# Patient Record
Sex: FEMALE | Employment: OTHER
[De-identification: names, ages, dates, MRNs, and addresses within clinical notes are randomized per-mention and may not be internally consistent; named-entity substitution may affect disease eponyms.]

---

## 2017-01-05 ENCOUNTER — TELEPHONE (OUTPATIENT)
Dept: ORTHOPEDIC SURGERY | Facility: CLINIC | Age: 52
End: 2017-01-05

## 2017-01-09 ENCOUNTER — OFFICE VISIT (OUTPATIENT)
Dept: ORTHOPEDIC SURGERY | Facility: CLINIC | Age: 52
End: 2017-01-09

## 2017-01-09 VITALS — WEIGHT: 153 LBS | HEIGHT: 62 IN | BODY MASS INDEX: 28.16 KG/M2

## 2017-01-09 DIAGNOSIS — M17.32 POST-TRAUMATIC OSTEOARTHRITIS OF LEFT KNEE: Primary | ICD-10-CM

## 2017-01-09 PROCEDURE — 99213 OFFICE O/P EST LOW 20 MIN: CPT | Performed by: ORTHOPAEDIC SURGERY

## 2017-01-09 ASSESSMENT — ENCOUNTER SYMPTOMS
CHOKING: 0
EYE DISCHARGE: 0
CHEST TIGHTNESS: 0
DIARRHEA: 0
ABDOMINAL PAIN: 0
WHEEZING: 0
VOMITING: 0
NAUSEA: 0

## 2017-01-10 DIAGNOSIS — Z01.818 PRE-OP TESTING: Primary | ICD-10-CM

## 2017-01-10 DIAGNOSIS — M17.32 POST-TRAUMATIC OSTEOARTHRITIS OF LEFT KNEE: ICD-10-CM

## 2017-01-26 PROBLEM — Z96.659 S/P TOTAL KNEE ARTHROPLASTY: Status: ACTIVE | Noted: 2017-01-26

## 2017-01-31 DIAGNOSIS — Z96.652 STATUS POST TOTAL LEFT KNEE REPLACEMENT: Primary | ICD-10-CM

## 2017-02-06 ENCOUNTER — OFFICE VISIT (OUTPATIENT)
Dept: ORTHOPEDIC SURGERY | Facility: CLINIC | Age: 52
End: 2017-02-06

## 2017-02-06 VITALS — HEIGHT: 62 IN | WEIGHT: 145.5 LBS | BODY MASS INDEX: 26.78 KG/M2

## 2017-02-06 DIAGNOSIS — Z96.652 STATUS POST TOTAL LEFT KNEE REPLACEMENT: Primary | ICD-10-CM

## 2017-02-06 DIAGNOSIS — M17.32 POST-TRAUMATIC OSTEOARTHRITIS OF LEFT KNEE: ICD-10-CM

## 2017-02-06 PROCEDURE — 99024 POSTOP FOLLOW-UP VISIT: CPT | Performed by: ORTHOPAEDIC SURGERY

## 2017-02-06 RX ORDER — OXYCODONE HYDROCHLORIDE AND ACETAMINOPHEN 5; 325 MG/1; MG/1
1 TABLET ORAL EVERY 6 HOURS PRN
Qty: 60 TABLET | Refills: 0 | Status: SHIPPED | OUTPATIENT
Start: 2017-02-06 | End: 2017-03-01 | Stop reason: SDUPTHER

## 2017-02-06 RX ORDER — DICLOFENAC SODIUM 75 MG/1
75 TABLET, DELAYED RELEASE ORAL 2 TIMES DAILY
Qty: 60 TABLET | Refills: 2 | Status: ON HOLD | OUTPATIENT
Start: 2017-02-06 | End: 2019-04-07

## 2017-02-06 ASSESSMENT — ENCOUNTER SYMPTOMS
NAUSEA: 0
CONSTIPATION: 0
DIARRHEA: 0
COUGH: 0

## 2017-02-16 ENCOUNTER — TELEPHONE (OUTPATIENT)
Dept: ORTHOPEDIC SURGERY | Facility: CLINIC | Age: 52
End: 2017-02-16

## 2017-02-16 DIAGNOSIS — Z96.652 STATUS POST TOTAL LEFT KNEE REPLACEMENT: Primary | ICD-10-CM

## 2017-02-23 ENCOUNTER — TELEPHONE (OUTPATIENT)
Dept: ORTHOPEDIC SURGERY | Facility: CLINIC | Age: 52
End: 2017-02-23

## 2017-02-28 ENCOUNTER — HOSPITAL ENCOUNTER (OUTPATIENT)
Dept: PHYSICAL THERAPY | Age: 52
Setting detail: THERAPIES SERIES
Discharge: HOME | End: 2017-02-28
Payer: MEDICAID

## 2017-04-03 ENCOUNTER — OFFICE VISIT (OUTPATIENT)
Dept: ORTHOPEDIC SURGERY | Age: 52
End: 2017-04-03

## 2017-04-03 VITALS — WEIGHT: 145 LBS | HEIGHT: 61 IN | BODY MASS INDEX: 27.38 KG/M2

## 2017-04-03 DIAGNOSIS — Z96.652 STATUS POST TOTAL LEFT KNEE REPLACEMENT: Primary | ICD-10-CM

## 2017-04-03 DIAGNOSIS — M17.32 POST-TRAUMATIC OSTEOARTHRITIS OF LEFT KNEE: ICD-10-CM

## 2017-04-03 PROCEDURE — 99024 POSTOP FOLLOW-UP VISIT: CPT | Performed by: ORTHOPAEDIC SURGERY

## 2017-04-27 RX ORDER — OXYCODONE HYDROCHLORIDE AND ACETAMINOPHEN 5; 325 MG/1; MG/1
1 TABLET ORAL EVERY 6 HOURS PRN
Qty: 90 TABLET | Refills: 0 | OUTPATIENT
Start: 2017-04-27

## 2017-05-09 RX ORDER — CELECOXIB 200 MG/1
200 CAPSULE ORAL 2 TIMES DAILY
Qty: 60 CAPSULE | Refills: 3 | Status: ON HOLD | OUTPATIENT
Start: 2017-05-09 | End: 2019-04-07

## 2017-05-09 RX ORDER — TRAMADOL HYDROCHLORIDE 50 MG/1
TABLET ORAL
Qty: 60 TABLET | Refills: 0 | Status: ON HOLD | OUTPATIENT
Start: 2017-05-09 | End: 2019-04-07

## 2017-05-10 ENCOUNTER — TELEPHONE (OUTPATIENT)
Dept: ORTHOPEDIC SURGERY | Age: 52
End: 2017-05-10

## 2017-05-15 ENCOUNTER — OFFICE VISIT (OUTPATIENT)
Dept: ORTHOPEDIC SURGERY | Age: 52
End: 2017-05-15
Payer: MEDICAID

## 2017-05-15 VITALS — HEIGHT: 61 IN | BODY MASS INDEX: 27.39 KG/M2 | WEIGHT: 145.06 LBS

## 2017-05-15 DIAGNOSIS — Z96.652 STATUS POST TOTAL LEFT KNEE REPLACEMENT: Primary | ICD-10-CM

## 2017-05-15 PROCEDURE — 99213 OFFICE O/P EST LOW 20 MIN: CPT | Performed by: ORTHOPAEDIC SURGERY

## 2018-07-02 ENCOUNTER — HOSPITAL ENCOUNTER (EMERGENCY)
Age: 53
Discharge: HOME | End: 2018-07-02
Attending: EMERGENCY MEDICINE
Payer: MEDICAID

## 2018-07-02 VITALS
SYSTOLIC BLOOD PRESSURE: 141 MMHG | RESPIRATION RATE: 14 BRPM | HEART RATE: 82 BPM | TEMPERATURE: 97.3 F | OXYGEN SATURATION: 98 % | DIASTOLIC BLOOD PRESSURE: 95 MMHG

## 2018-07-02 DIAGNOSIS — S61.211A LACERATION OF LEFT INDEX FINGER WITHOUT FOREIGN BODY WITHOUT DAMAGE TO NAIL, INITIAL ENCOUNTER: Primary | ICD-10-CM

## 2018-07-02 PROCEDURE — 12001 RPR S/N/AX/GEN/TRNK 2.5CM/<: CPT

## 2018-07-02 PROCEDURE — 99282 EMERGENCY DEPT VISIT SF MDM: CPT

## 2018-07-02 ASSESSMENT — ENCOUNTER SYMPTOMS
SHORTNESS OF BREATH: 0
COLOR CHANGE: 0
BACK PAIN: 0
TROUBLE SWALLOWING: 0
EYE PAIN: 0
NAUSEA: 0
VOMITING: 0
SORE THROAT: 0

## 2018-07-02 ASSESSMENT — PAIN DESCRIPTION - ONSET: ONSET: SUDDEN

## 2018-07-02 ASSESSMENT — PAIN DESCRIPTION - ORIENTATION: ORIENTATION: LEFT

## 2018-07-02 ASSESSMENT — PAIN DESCRIPTION - PAIN TYPE: TYPE: ACUTE PAIN

## 2018-07-02 ASSESSMENT — PAIN DESCRIPTION - DESCRIPTORS: DESCRIPTORS: BURNING;ACHING

## 2018-07-02 ASSESSMENT — PAIN SCALES - GENERAL: PAINLEVEL_OUTOF10: 7

## 2018-07-02 ASSESSMENT — PAIN DESCRIPTION - PROGRESSION: CLINICAL_PROGRESSION: GRADUALLY WORSENING

## 2018-07-02 ASSESSMENT — PAIN DESCRIPTION - LOCATION: LOCATION: HAND

## 2018-07-02 NOTE — ED PROVIDER NOTES
to left palmar PIP of the pointer finger without active bleeding or discharge. No sign of foreign body. DIFFERENTIAL  DIAGNOSIS     PLAN (LABS / IMAGING / EKG):  No orders of the defined types were placed in this encounter. MEDICATIONS ORDERED:  No orders of the defined types were placed in this encounter. DDX: Finger laceration, finger injury, doubt foreign body, doubt significant blood loss anemia. DIAGNOSTIC RESULTS / EMERGENCY DEPARTMENT COURSE / MDM     LABS:  No results found for this visit on 07/02/18. IMPRESSION: 55-year-old female presents shortly after sustaining laceration to left palmar aspect of the pointer finger at the PIP. Patient states she was working on a privacy fence states a wrench slipped off a metal covering and she cut herself. Denies foreign body or any shrapnel from a metal area. Denies numbness, tingling, paresthesias. No significant blood loss physical exam shows 1.5 cm curvilinear laceration without significant bleeding or discharge. No imaging necessary at this time as patient has full range of motion of the affected finger without any evidence of foreign body. Laceration repair performed with 4-0 Ethilon suture after lidocaine infiltration and subsequent irrigation. Patient tolerated procedure well. Finger was covered with gauze and a finger immobilizer. Discharged home with instructions to follow up with PCP or return to the ED for suture removal in 7-10 days    RADIOLOGY:  None    EKG  None    All EKG's are interpreted by the Emergency Department Physician who either signs or Co-signs this chart in the absence of a cardiologist.    EMERGENCY DEPARTMENT COURSE:    55-year-old female presents to ED with acute onset laceration of left pointer finger. Patient was working on a privacy fence when laceration occurred. Up-to-date with immunizations including T gap.   Denies any significant blood loss, no evidence of foreign body no shrapnel from the metal itself. Physical exam remarkable for 1.5 cm curvilinear laceration of left pointer finger. We'll proceed with laceration repair. Laceration repair performed at bedside and patient tolerated procedure well. Wound edges were well approximated and wound appeared clean and dry. A finger immobilizer was placed over gauze dressing and patient instructed to keep the area clean and dry and watch for concerning signs of infection or obvious abnormalities. Patient agreed with plan. She was discharged with instructions to follow with PCP or return to the ED in 7-10 days for suture removal.  She'll otherwise return as necessary. She remained stable throughout the entire ED while under my care and was discharged in no acute distress. PROCEDURES:    Laceration Repair Procedure Note    Indication: Laceration    Procedure: The patient was placed in the appropriate position and anesthesia around the laceration was obtained by infiltration using 1% Lidocaine without epinephrine. The area was then irrigated with high pressure Shur-Clens and normal saline solution. The laceration was closed with 4-0 Ethilon using interrupted sutures. There were no additional lacerations requiring repair. The wound area was then dressed with gauze. Total repaired wound length: 1.5 cm. Other Items: Suture count: 3    The patient tolerated the procedure well. Complications: None      CONSULTS:  None    CRITICAL CARE:  None    FINAL IMPRESSION      1.  Laceration of left index finger without foreign body without damage to nail, initial encounter          DISPOSITION / PLAN     DISPOSITION      Discharged    PATIENT REFERRED TO:  [facility]  [address] [place] [address]  [phone]    For suture removal in 7-10 days      DISCHARGE MEDICATIONS:  Discharge Medication List as of 7/2/2018  6:44 PM          Apolinar Jensen DO    Emergency Medicine Resident    (Please note that portions of this note were completed with a voice recognition program.  Efforts were made to edit the dictations but occasionally words are mis-transcribed.)     Sujey Garcia MD  07/03/18 [id number]

## 2018-11-12 ENCOUNTER — OFFICE VISIT (OUTPATIENT)
Dept: ORTHOPEDIC SURGERY | Age: 53
End: 2018-11-12
Payer: MEDICAID

## 2018-11-12 VITALS — HEIGHT: 61 IN | BODY MASS INDEX: 29.27 KG/M2 | WEIGHT: 155 LBS

## 2018-11-12 DIAGNOSIS — Z96.652 STATUS POST TOTAL LEFT KNEE REPLACEMENT: Primary | ICD-10-CM

## 2018-11-12 PROCEDURE — G8484 FLU IMMUNIZE NO ADMIN: HCPCS | Performed by: STUDENT IN AN ORGANIZED HEALTH CARE EDUCATION/TRAINING PROGRAM

## 2018-11-12 PROCEDURE — G8428 CUR MEDS NOT DOCUMENT: HCPCS | Performed by: STUDENT IN AN ORGANIZED HEALTH CARE EDUCATION/TRAINING PROGRAM

## 2018-11-12 PROCEDURE — 4004F PT TOBACCO SCREEN RCVD TLK: CPT | Performed by: STUDENT IN AN ORGANIZED HEALTH CARE EDUCATION/TRAINING PROGRAM

## 2018-11-12 PROCEDURE — 3017F COLORECTAL CA SCREEN DOC REV: CPT | Performed by: STUDENT IN AN ORGANIZED HEALTH CARE EDUCATION/TRAINING PROGRAM

## 2018-11-12 PROCEDURE — 99213 OFFICE O/P EST LOW 20 MIN: CPT | Performed by: STUDENT IN AN ORGANIZED HEALTH CARE EDUCATION/TRAINING PROGRAM

## 2018-11-12 PROCEDURE — G8419 CALC BMI OUT NRM PARAM NOF/U: HCPCS | Performed by: STUDENT IN AN ORGANIZED HEALTH CARE EDUCATION/TRAINING PROGRAM

## 2018-11-12 ASSESSMENT — ENCOUNTER SYMPTOMS
COLOR CHANGE: 0
ABDOMINAL PAIN: 0
ABDOMINAL DISTENTION: 0
COUGH: 0
SHORTNESS OF BREATH: 0
NAUSEA: 0
VOMITING: 0
CHEST TIGHTNESS: 0

## 2019-04-05 ENCOUNTER — APPOINTMENT (OUTPATIENT)
Dept: GENERAL RADIOLOGY | Age: 54
DRG: 710 | End: 2019-04-05
Payer: MEDICAID

## 2019-04-05 ENCOUNTER — HOSPITAL ENCOUNTER (INPATIENT)
Age: 54
LOS: 29 days | Discharge: HOME HEALTH | DRG: 710 | End: 2019-05-04
Attending: EMERGENCY MEDICINE | Admitting: INTERNAL MEDICINE
Payer: MEDICAID

## 2019-04-05 ENCOUNTER — APPOINTMENT (OUTPATIENT)
Dept: CT IMAGING | Age: 54
DRG: 710 | End: 2019-04-05
Payer: MEDICAID

## 2019-04-05 DIAGNOSIS — F41.9 ANXIETY: ICD-10-CM

## 2019-04-05 DIAGNOSIS — N17.9 AKI (ACUTE KIDNEY INJURY) (HCC): ICD-10-CM

## 2019-04-05 DIAGNOSIS — N17.9 ACUTE RENAL FAILURE, UNSPECIFIED ACUTE RENAL FAILURE TYPE (HCC): Primary | ICD-10-CM

## 2019-04-05 DIAGNOSIS — K55.049: ICD-10-CM

## 2019-04-05 DIAGNOSIS — R41.82 ALTERED MENTAL STATUS, UNSPECIFIED ALTERED MENTAL STATUS TYPE: ICD-10-CM

## 2019-04-05 PROBLEM — R57.9 SHOCK (HCC): Status: ACTIVE | Noted: 2019-04-05

## 2019-04-05 LAB
-: ABNORMAL
ABSOLUTE EOS #: 0 K/UL (ref 0–0.4)
ABSOLUTE IMMATURE GRANULOCYTE: 0 K/UL (ref 0–0.3)
ABSOLUTE LYMPH #: 0.94 K/UL (ref 1–4.8)
ABSOLUTE MONO #: 0.24 K/UL (ref 0.1–0.8)
ALBUMIN SERPL-MCNC: 2.8 G/DL (ref 3.5–5.2)
ALBUMIN/GLOBULIN RATIO: 1.5 (ref 1–2.5)
ALP BLD-CCNC: 168 U/L (ref 35–104)
ALT SERPL-CCNC: 746 U/L (ref 5–33)
AMORPHOUS: ABNORMAL
ANION GAP SERPL CALCULATED.3IONS-SCNC: 23 MMOL/L (ref 9–17)
AST SERPL-CCNC: 1122 U/L
BACTERIA: ABNORMAL
BASOPHILS # BLD: 0 % (ref 0–2)
BASOPHILS ABSOLUTE: 0 K/UL (ref 0–0.2)
BILIRUB SERPL-MCNC: 0.37 MG/DL (ref 0.3–1.2)
BILIRUBIN URINE: NEGATIVE
BUN BLDV-MCNC: 62 MG/DL (ref 6–20)
BUN/CREAT BLD: ABNORMAL (ref 9–20)
CALCIUM SERPL-MCNC: 5.2 MG/DL (ref 8.6–10.4)
CASTS UA: ABNORMAL /LPF (ref 0–8)
CHLORIDE BLD-SCNC: 93 MMOL/L (ref 98–107)
CO2: 9 MMOL/L (ref 20–31)
COLOR: ABNORMAL
CREAT SERPL-MCNC: 4.06 MG/DL (ref 0.5–0.9)
CRYSTALS, UA: ABNORMAL /HPF
DIFFERENTIAL TYPE: ABNORMAL
EOSINOPHILS RELATIVE PERCENT: 0 % (ref 1–4)
EPITHELIAL CELLS UA: ABNORMAL /HPF (ref 0–5)
GFR AFRICAN AMERICAN: 14 ML/MIN
GFR NON-AFRICAN AMERICAN: 12 ML/MIN
GFR SERPL CREATININE-BSD FRML MDRD: ABNORMAL ML/MIN/{1.73_M2}
GFR SERPL CREATININE-BSD FRML MDRD: ABNORMAL ML/MIN/{1.73_M2}
GLUCOSE BLD-MCNC: 186 MG/DL (ref 70–99)
GLUCOSE URINE: NEGATIVE
HCT VFR BLD CALC: 33.8 % (ref 36.3–47.1)
HEMOGLOBIN: 10.2 G/DL (ref 11.9–15.1)
IMMATURE GRANULOCYTES: 0 %
INR BLD: 1.3
KETONES, URINE: ABNORMAL
LACTIC ACID, SEPSIS WHOLE BLOOD: 5.7 MMOL/L (ref 0.5–1.9)
LACTIC ACID, SEPSIS: ABNORMAL MMOL/L (ref 0.5–1.9)
LEUKOCYTE ESTERASE, URINE: ABNORMAL
LYMPHOCYTES # BLD: 4 % (ref 24–44)
MCH RBC QN AUTO: 30.4 PG (ref 25.2–33.5)
MCHC RBC AUTO-ENTMCNC: 30.2 G/DL (ref 28.4–34.8)
MCV RBC AUTO: 100.9 FL (ref 82.6–102.9)
MONOCYTES # BLD: 1 % (ref 1–7)
MORPHOLOGY: ABNORMAL
MUCUS: ABNORMAL
NITRITE, URINE: NEGATIVE
NRBC AUTOMATED: 0 PER 100 WBC
OTHER OBSERVATIONS UA: ABNORMAL
PARTIAL THROMBOPLASTIN TIME: 28 SEC (ref 20.5–30.5)
PDW BLD-RTO: 14.7 % (ref 11.8–14.4)
PH UA: 5 (ref 5–8)
PLATELET # BLD: 381 K/UL (ref 138–453)
PLATELET ESTIMATE: ABNORMAL
PMV BLD AUTO: 10.3 FL (ref 8.1–13.5)
POTASSIUM SERPL-SCNC: 8 MMOL/L (ref 3.7–5.3)
PROTEIN UA: ABNORMAL
PROTHROMBIN TIME: 13.9 SEC (ref 9–12)
RBC # BLD: 3.35 M/UL (ref 3.95–5.11)
RBC # BLD: ABNORMAL 10*6/UL
RBC UA: ABNORMAL /HPF (ref 0–4)
RENAL EPITHELIAL, UA: ABNORMAL /HPF
SEG NEUTROPHILS: 95 % (ref 36–66)
SEGMENTED NEUTROPHILS ABSOLUTE COUNT: 22.32 K/UL (ref 1.8–7.7)
SODIUM BLD-SCNC: 125 MMOL/L (ref 135–144)
SPECIFIC GRAVITY UA: 1.02 (ref 1–1.03)
TOTAL PROTEIN: 4.7 G/DL (ref 6.4–8.3)
TRICHOMONAS: ABNORMAL
TROPONIN INTERP: ABNORMAL
TROPONIN T: ABNORMAL NG/ML
TROPONIN, HIGH SENSITIVITY: 89 NG/L (ref 0–14)
TURBIDITY: ABNORMAL
URINE HGB: ABNORMAL
UROBILINOGEN, URINE: NORMAL
WBC # BLD: 23.5 K/UL (ref 3.5–11.3)
WBC # BLD: ABNORMAL 10*3/UL
WBC UA: ABNORMAL /HPF (ref 0–5)
YEAST: ABNORMAL

## 2019-04-05 PROCEDURE — 80053 COMPREHEN METABOLIC PANEL: CPT

## 2019-04-05 PROCEDURE — 87185 SC STD ENZYME DETCJ PER NZM: CPT

## 2019-04-05 PROCEDURE — 36556 INSERT NON-TUNNEL CV CATH: CPT

## 2019-04-05 PROCEDURE — 83516 IMMUNOASSAY NONANTIBODY: CPT

## 2019-04-05 PROCEDURE — 87186 SC STD MICRODIL/AGAR DIL: CPT

## 2019-04-05 PROCEDURE — 87077 CULTURE AEROBIC IDENTIFY: CPT

## 2019-04-05 PROCEDURE — 86160 COMPLEMENT ANTIGEN: CPT

## 2019-04-05 PROCEDURE — 6360000004 HC RX CONTRAST MEDICATION: Performed by: EMERGENCY MEDICINE

## 2019-04-05 PROCEDURE — 6360000002 HC RX W HCPCS: Performed by: EMERGENCY MEDICINE

## 2019-04-05 PROCEDURE — 85610 PROTHROMBIN TIME: CPT

## 2019-04-05 PROCEDURE — 87149 DNA/RNA DIRECT PROBE: CPT

## 2019-04-05 PROCEDURE — 84295 ASSAY OF SERUM SODIUM: CPT

## 2019-04-05 PROCEDURE — 93005 ELECTROCARDIOGRAM TRACING: CPT

## 2019-04-05 PROCEDURE — 71275 CT ANGIOGRAPHY CHEST: CPT

## 2019-04-05 PROCEDURE — 6360000002 HC RX W HCPCS

## 2019-04-05 PROCEDURE — 2580000003 HC RX 258: Performed by: EMERGENCY MEDICINE

## 2019-04-05 PROCEDURE — 86038 ANTINUCLEAR ANTIBODIES: CPT

## 2019-04-05 PROCEDURE — 31500 INSERT EMERGENCY AIRWAY: CPT

## 2019-04-05 PROCEDURE — 80307 DRUG TEST PRSMV CHEM ANLYZR: CPT

## 2019-04-05 PROCEDURE — 06HJ33Z INSERTION OF INFUSION DEVICE INTO LEFT HYPOGASTRIC VEIN, PERCUTANEOUS APPROACH: ICD-10-PCS | Performed by: EMERGENCY MEDICINE

## 2019-04-05 PROCEDURE — 2580000003 HC RX 258

## 2019-04-05 PROCEDURE — 2500000003 HC RX 250 WO HCPCS: Performed by: EMERGENCY MEDICINE

## 2019-04-05 PROCEDURE — 82947 ASSAY GLUCOSE BLOOD QUANT: CPT

## 2019-04-05 PROCEDURE — 85730 THROMBOPLASTIN TIME PARTIAL: CPT

## 2019-04-05 PROCEDURE — 84132 ASSAY OF SERUM POTASSIUM: CPT

## 2019-04-05 PROCEDURE — 94002 VENT MGMT INPAT INIT DAY: CPT

## 2019-04-05 PROCEDURE — 82803 BLOOD GASES ANY COMBINATION: CPT

## 2019-04-05 PROCEDURE — 80074 ACUTE HEPATITIS PANEL: CPT

## 2019-04-05 PROCEDURE — 0BH18EZ INSERTION OF ENDOTRACHEAL AIRWAY INTO TRACHEA, VIA NATURAL OR ARTIFICIAL OPENING ENDOSCOPIC: ICD-10-PCS | Performed by: EMERGENCY MEDICINE

## 2019-04-05 PROCEDURE — 94762 N-INVAS EAR/PLS OXIMTRY CONT: CPT

## 2019-04-05 PROCEDURE — 84484 ASSAY OF TROPONIN QUANT: CPT

## 2019-04-05 PROCEDURE — 85025 COMPLETE CBC W/AUTO DIFF WBC: CPT

## 2019-04-05 PROCEDURE — 74174 CTA ABD&PLVS W/CONTRAST: CPT

## 2019-04-05 PROCEDURE — 84165 PROTEIN E-PHORESIS SERUM: CPT

## 2019-04-05 PROCEDURE — 99285 EMERGENCY DEPT VISIT HI MDM: CPT

## 2019-04-05 PROCEDURE — 82565 ASSAY OF CREATININE: CPT

## 2019-04-05 PROCEDURE — 83883 ASSAY NEPHELOMETRY NOT SPEC: CPT

## 2019-04-05 PROCEDURE — 71045 X-RAY EXAM CHEST 1 VIEW: CPT

## 2019-04-05 PROCEDURE — 87040 BLOOD CULTURE FOR BACTERIA: CPT

## 2019-04-05 PROCEDURE — 36600 WITHDRAWAL OF ARTERIAL BLOOD: CPT

## 2019-04-05 PROCEDURE — 83690 ASSAY OF LIPASE: CPT

## 2019-04-05 PROCEDURE — 2000000000 HC ICU R&B

## 2019-04-05 PROCEDURE — 84443 ASSAY THYROID STIM HORMONE: CPT

## 2019-04-05 PROCEDURE — 87389 HIV-1 AG W/HIV-1&-2 AB AG IA: CPT

## 2019-04-05 PROCEDURE — 84155 ASSAY OF PROTEIN SERUM: CPT

## 2019-04-05 PROCEDURE — 94770 HC ETCO2 MONITOR DAILY: CPT

## 2019-04-05 PROCEDURE — 81001 URINALYSIS AUTO W/SCOPE: CPT

## 2019-04-05 PROCEDURE — 87205 SMEAR GRAM STAIN: CPT

## 2019-04-05 PROCEDURE — 85014 HEMATOCRIT: CPT

## 2019-04-05 PROCEDURE — 87086 URINE CULTURE/COLONY COUNT: CPT

## 2019-04-05 PROCEDURE — 83605 ASSAY OF LACTIC ACID: CPT

## 2019-04-05 PROCEDURE — 82435 ASSAY OF BLOOD CHLORIDE: CPT

## 2019-04-05 PROCEDURE — 2500000003 HC RX 250 WO HCPCS

## 2019-04-05 PROCEDURE — 5A1955Z RESPIRATORY VENTILATION, GREATER THAN 96 CONSECUTIVE HOURS: ICD-10-PCS | Performed by: INTERNAL MEDICINE

## 2019-04-05 PROCEDURE — 2700000000 HC OXYGEN THERAPY PER DAY

## 2019-04-05 PROCEDURE — 6370000000 HC RX 637 (ALT 250 FOR IP): Performed by: EMERGENCY MEDICINE

## 2019-04-05 PROCEDURE — 86334 IMMUNOFIX E-PHORESIS SERUM: CPT

## 2019-04-05 RX ORDER — ETOMIDATE 2 MG/ML
30 INJECTION INTRAVENOUS ONCE
Status: COMPLETED | OUTPATIENT
Start: 2019-04-05 | End: 2019-04-05

## 2019-04-05 RX ORDER — DEXTROSE MONOHYDRATE 25 G/50ML
25 INJECTION, SOLUTION INTRAVENOUS ONCE
Status: COMPLETED | OUTPATIENT
Start: 2019-04-05 | End: 2019-04-05

## 2019-04-05 RX ORDER — MAGNESIUM SULFATE 1 G/100ML
1 INJECTION INTRAVENOUS ONCE
Status: DISCONTINUED | OUTPATIENT
Start: 2019-04-05 | End: 2019-04-10

## 2019-04-05 RX ORDER — ROCURONIUM BROMIDE 10 MG/ML
1 INJECTION, SOLUTION INTRAVENOUS ONCE
Status: COMPLETED | OUTPATIENT
Start: 2019-04-05 | End: 2019-04-05

## 2019-04-05 RX ORDER — DEXTROSE MONOHYDRATE 25 G/50ML
INJECTION, SOLUTION INTRAVENOUS
Status: COMPLETED
Start: 2019-04-05 | End: 2019-04-05

## 2019-04-05 RX ORDER — MIDAZOLAM HYDROCHLORIDE 5 MG/ML
INJECTION INTRAMUSCULAR; INTRAVENOUS
Status: DISPENSED
Start: 2019-04-05 | End: 2019-04-06

## 2019-04-05 RX ORDER — MAGNESIUM SULFATE 1 G/100ML
INJECTION INTRAVENOUS
Status: COMPLETED
Start: 2019-04-05 | End: 2019-04-05

## 2019-04-05 RX ORDER — LEVOFLOXACIN 5 MG/ML
750 INJECTION, SOLUTION INTRAVENOUS ONCE
Status: COMPLETED | OUTPATIENT
Start: 2019-04-05 | End: 2019-04-05

## 2019-04-05 RX ORDER — 0.9 % SODIUM CHLORIDE 0.9 %
30 INTRAVENOUS SOLUTION INTRAVENOUS ONCE
Status: COMPLETED | OUTPATIENT
Start: 2019-04-05 | End: 2019-04-05

## 2019-04-05 RX ADMIN — INSULIN HUMAN 10 UNITS: 100 INJECTION, SOLUTION PARENTERAL at 23:33

## 2019-04-05 RX ADMIN — Medication 100 MG: at 22:15

## 2019-04-05 RX ADMIN — ROCURONIUM BROMIDE 77 MG: 10 INJECTION INTRAVENOUS at 21:14

## 2019-04-05 RX ADMIN — DEXTROSE MONOHYDRATE 25 G: 25 INJECTION, SOLUTION INTRAVENOUS at 23:32

## 2019-04-05 RX ADMIN — Medication 1 G: at 22:00

## 2019-04-05 RX ADMIN — Medication 1250 MG: at 23:41

## 2019-04-05 RX ADMIN — LEVOFLOXACIN 750 MG: 5 INJECTION, SOLUTION INTRAVENOUS at 22:14

## 2019-04-05 RX ADMIN — Medication 10 MG: at 21:04

## 2019-04-05 RX ADMIN — DEXTROSE MONOHYDRATE 25 G: 500 INJECTION PARENTERAL at 23:32

## 2019-04-05 RX ADMIN — SODIUM CHLORIDE 2313 ML: 9 INJECTION, SOLUTION INTRAVENOUS at 21:04

## 2019-04-05 RX ADMIN — IOHEXOL 100 ML: 240 INJECTION, SOLUTION INTRATHECAL; INTRAVASCULAR; INTRAVENOUS; ORAL at 21:36

## 2019-04-05 RX ADMIN — Medication 2 MG/HR: at 23:02

## 2019-04-05 RX ADMIN — ETOMIDATE 30 MG: 2 INJECTION, SOLUTION INTRAVENOUS at 21:14

## 2019-04-05 RX ADMIN — MAGNESIUM SULFATE HEPTAHYDRATE 1 G: 1 INJECTION, SOLUTION INTRAVENOUS at 20:50

## 2019-04-05 RX ADMIN — DEXTROSE MONOHYDRATE 50 ML: 500 INJECTION PARENTERAL at 20:47

## 2019-04-05 RX ADMIN — NOREPINEPHRINE BITARTRATE 25 MCG/MIN: 1 INJECTION INTRAVENOUS at 22:10

## 2019-04-05 ASSESSMENT — PULMONARY FUNCTION TESTS: PIF_VALUE: 31

## 2019-04-06 ENCOUNTER — APPOINTMENT (OUTPATIENT)
Dept: GENERAL RADIOLOGY | Age: 54
DRG: 710 | End: 2019-04-06
Payer: MEDICAID

## 2019-04-06 ENCOUNTER — ANESTHESIA (OUTPATIENT)
Dept: OPERATING ROOM | Age: 54
DRG: 710 | End: 2019-04-06
Payer: MEDICAID

## 2019-04-06 ENCOUNTER — APPOINTMENT (OUTPATIENT)
Dept: ULTRASOUND IMAGING | Age: 54
DRG: 710 | End: 2019-04-06
Payer: MEDICAID

## 2019-04-06 ENCOUNTER — APPOINTMENT (OUTPATIENT)
Dept: CT IMAGING | Age: 54
DRG: 710 | End: 2019-04-06
Payer: MEDICAID

## 2019-04-06 ENCOUNTER — ANESTHESIA EVENT (OUTPATIENT)
Dept: OPERATING ROOM | Age: 54
DRG: 710 | End: 2019-04-06
Payer: MEDICAID

## 2019-04-06 VITALS — OXYGEN SATURATION: 83 % | RESPIRATION RATE: 14 BRPM | TEMPERATURE: 100.6 F

## 2019-04-06 PROBLEM — J96.00 ACUTE RESPIRATORY FAILURE (HCC): Status: ACTIVE | Noted: 2019-04-06

## 2019-04-06 PROBLEM — E87.20 METABOLIC ACIDOSIS: Status: ACTIVE | Noted: 2019-04-06

## 2019-04-06 PROBLEM — E87.1 HYPONATREMIA: Status: ACTIVE | Noted: 2019-04-06

## 2019-04-06 PROBLEM — N17.9 AKI (ACUTE KIDNEY INJURY) (HCC): Status: ACTIVE | Noted: 2019-04-06

## 2019-04-06 PROBLEM — M62.82 RHABDOMYOLYSIS: Status: ACTIVE | Noted: 2019-04-06

## 2019-04-06 PROBLEM — E87.20 LACTIC ACIDOSIS: Status: ACTIVE | Noted: 2019-04-06

## 2019-04-06 PROBLEM — R74.8 ELEVATED LIVER ENZYMES: Status: ACTIVE | Noted: 2019-04-06

## 2019-04-06 LAB
% CKMB: 2 % (ref 0–3)
-: ABNORMAL
-: NORMAL
ABSOLUTE EOS #: 0 K/UL (ref 0–0.4)
ABSOLUTE EOS #: 0 K/UL (ref 0–0.4)
ABSOLUTE IMMATURE GRANULOCYTE: 0.25 K/UL (ref 0–0.3)
ABSOLUTE IMMATURE GRANULOCYTE: 0.4 K/UL (ref 0–0.3)
ABSOLUTE LYMPH #: 1.35 K/UL (ref 1–4.8)
ABSOLUTE LYMPH #: 2.48 K/UL (ref 1–4.8)
ABSOLUTE MONO #: 0 K/UL (ref 0.1–0.8)
ABSOLUTE MONO #: 0.1 K/UL (ref 0.1–0.8)
ACETAMINOPHEN LEVEL: <5 UG/ML (ref 10–30)
ALBUMIN SERPL-MCNC: 3.2 G/DL (ref 3.5–5.2)
ALBUMIN/GLOBULIN RATIO: 1.4 (ref 1–2.5)
ALLEN TEST: ABNORMAL
ALLEN TEST: POSITIVE
ALP BLD-CCNC: 240 U/L (ref 35–104)
ALT SERPL-CCNC: 1740 U/L (ref 5–33)
AMORPHOUS: ABNORMAL
AMPHETAMINE SCREEN URINE: NEGATIVE
ANION GAP SERPL CALCULATED.3IONS-SCNC: 20 MMOL/L (ref 9–17)
ANION GAP SERPL CALCULATED.3IONS-SCNC: 24 MMOL/L (ref 9–17)
ANION GAP SERPL CALCULATED.3IONS-SCNC: 24 MMOL/L (ref 9–17)
ANION GAP: 16 MMOL/L (ref 7–16)
AST SERPL-CCNC: 2617 U/L
BACTERIA: ABNORMAL
BARBITURATE SCREEN URINE: NEGATIVE
BASOPHILS # BLD: 0 % (ref 0–2)
BASOPHILS # BLD: 0 % (ref 0–2)
BASOPHILS ABSOLUTE: 0 K/UL (ref 0–0.2)
BASOPHILS ABSOLUTE: 0 K/UL (ref 0–0.2)
BENZODIAZEPINE SCREEN, URINE: NEGATIVE
BILIRUB SERPL-MCNC: 0.62 MG/DL (ref 0.3–1.2)
BILIRUBIN DIRECT: 0.29 MG/DL
BILIRUBIN URINE: NEGATIVE
BILIRUBIN, INDIRECT: 0.33 MG/DL (ref 0–1)
BUN BLDV-MCNC: 53 MG/DL (ref 6–20)
BUN BLDV-MCNC: 62 MG/DL (ref 6–20)
BUN BLDV-MCNC: 62 MG/DL (ref 6–20)
BUN/CREAT BLD: ABNORMAL (ref 9–20)
BUPRENORPHINE URINE: ABNORMAL
CALCIUM IONIZED: 0.88 MMOL/L (ref 1.13–1.33)
CALCIUM IONIZED: 0.99 MMOL/L (ref 1.13–1.33)
CALCIUM SERPL-MCNC: 5.9 MG/DL (ref 8.6–10.4)
CALCIUM SERPL-MCNC: 6.8 MG/DL (ref 8.6–10.4)
CALCIUM SERPL-MCNC: 6.8 MG/DL (ref 8.6–10.4)
CANNABINOID SCREEN URINE: POSITIVE
CARBOXYHEMOGLOBIN: 0.5 % (ref 0–5)
CASTS UA: ABNORMAL /LPF (ref 0–8)
CHLORIDE BLD-SCNC: 94 MMOL/L (ref 98–107)
CHLORIDE BLD-SCNC: 95 MMOL/L (ref 98–107)
CHLORIDE BLD-SCNC: 96 MMOL/L (ref 98–107)
CHLORIDE, UR: <20 MMOL/L
CK MB: 305.7 NG/ML
CKMB INTERPRETATION: ABNORMAL
CO2: 13 MMOL/L (ref 20–31)
CO2: 13 MMOL/L (ref 20–31)
CO2: 8 MMOL/L (ref 20–31)
COCAINE METABOLITE, URINE: POSITIVE
COLOR: ABNORMAL
COMPLEMENT C3: 73 MG/DL (ref 90–180)
COMPLEMENT C4: 18 MG/DL (ref 10–40)
CORTISOL COLLECTION INFO: ABNORMAL
CORTISOL: 59 UG/DL (ref 2.7–18.4)
CREAT SERPL-MCNC: 3.11 MG/DL (ref 0.5–0.9)
CREAT SERPL-MCNC: 3.8 MG/DL (ref 0.5–0.9)
CREAT SERPL-MCNC: 3.89 MG/DL (ref 0.5–0.9)
CREATININE URINE: 94.4 MG/DL (ref 28–217)
CRYSTALS, UA: ABNORMAL /HPF
CULTURE: NO GROWTH
CULTURE: NORMAL
DIFFERENTIAL TYPE: ABNORMAL
DIFFERENTIAL TYPE: ABNORMAL
DIRECT EXAM: NORMAL
EOSINOPHIL,URINE: NORMAL
EOSINOPHILS RELATIVE PERCENT: 0 % (ref 1–4)
EOSINOPHILS RELATIVE PERCENT: 0 % (ref 1–4)
EPITHELIAL CELLS UA: ABNORMAL /HPF (ref 0–5)
ETHANOL PERCENT: <0.01 %
ETHANOL: <10 MG/DL
FIO2: 40
FIO2: 50
FIO2: 50
FIO2: ABNORMAL
FREE KAPPA/LAMBDA RATIO: 2.75 (ref 0.26–1.65)
GFR AFRICAN AMERICAN: 15 ML/MIN
GFR AFRICAN AMERICAN: 15 ML/MIN
GFR AFRICAN AMERICAN: 19 ML/MIN
GFR NON-AFRICAN AMERICAN: 12 ML/MIN
GFR NON-AFRICAN AMERICAN: 12 ML/MIN
GFR NON-AFRICAN AMERICAN: 16 ML/MIN
GFR NON-AFRICAN AMERICAN: 9 ML/MIN
GFR SERPL CREATININE-BSD FRML MDRD: 11 ML/MIN
GFR SERPL CREATININE-BSD FRML MDRD: ABNORMAL ML/MIN/{1.73_M2}
GLOBULIN: ABNORMAL G/DL (ref 1.5–3.8)
GLUCOSE BLD-MCNC: 105 MG/DL (ref 70–99)
GLUCOSE BLD-MCNC: 110 MG/DL (ref 74–100)
GLUCOSE BLD-MCNC: 227 MG/DL (ref 70–99)
GLUCOSE BLD-MCNC: 71 MG/DL (ref 74–100)
GLUCOSE BLD-MCNC: 85 MG/DL (ref 70–99)
GLUCOSE BLD-MCNC: 87 MG/DL (ref 74–100)
GLUCOSE URINE: NEGATIVE
HAV IGM SER IA-ACNC: NONREACTIVE
HCO3 VENOUS: 11 MMOL/L (ref 24–30)
HCT VFR BLD CALC: 29.2 % (ref 36.3–47.1)
HCT VFR BLD CALC: 40.3 % (ref 36.3–47.1)
HEMOGLOBIN: 12.8 G/DL (ref 11.9–15.1)
HEMOGLOBIN: 9.2 G/DL (ref 11.9–15.1)
HEPATITIS B CORE IGM ANTIBODY: NONREACTIVE
HEPATITIS B SURFACE ANTIGEN: NONREACTIVE
HEPATITIS C ANTIBODY: NONREACTIVE
HIV AG/AB: NONREACTIVE
IMMATURE GRANULOCYTES: 2 %
IMMATURE GRANULOCYTES: 4 %
INR BLD: 1.5
INR BLD: 1.6
KAPPA FREE LIGHT CHAINS QNT: 5.56 MG/DL (ref 0.37–1.94)
KETONES, URINE: ABNORMAL
LACTIC ACID, SEPSIS WHOLE BLOOD: 4.8 MMOL/L (ref 0.5–1.9)
LACTIC ACID, SEPSIS: ABNORMAL MMOL/L (ref 0.5–1.9)
LACTIC ACID, WHOLE BLOOD: 10.5 MMOL/L (ref 0.7–2.1)
LACTIC ACID, WHOLE BLOOD: 3.5 MMOL/L (ref 0.7–2.1)
LACTIC ACID, WHOLE BLOOD: 5.8 MMOL/L (ref 0.7–2.1)
LACTIC ACID, WHOLE BLOOD: 8.1 MMOL/L (ref 0.7–2.1)
LACTIC ACID, WHOLE BLOOD: 8.6 MMOL/L (ref 0.7–2.1)
LACTIC ACID, WHOLE BLOOD: 9.3 MMOL/L (ref 0.7–2.1)
LAMBDA FREE LIGHT CHAINS QNT: 2.02 MG/DL (ref 0.57–2.63)
LEUKOCYTE ESTERASE, URINE: NEGATIVE
LIPASE: 119 U/L (ref 13–60)
LV EF: 75 %
LVEF MODALITY: NORMAL
LYMPHOCYTES # BLD: 11 % (ref 24–44)
LYMPHOCYTES # BLD: 25 % (ref 24–44)
Lab: NORMAL
MAGNESIUM: 1.6 MG/DL (ref 1.6–2.6)
MCH RBC QN AUTO: 30.5 PG (ref 25.2–33.5)
MCH RBC QN AUTO: 31 PG (ref 25.2–33.5)
MCHC RBC AUTO-ENTMCNC: 31.5 G/DL (ref 28.4–34.8)
MCHC RBC AUTO-ENTMCNC: 31.8 G/DL (ref 28.4–34.8)
MCV RBC AUTO: 96.2 FL (ref 82.6–102.9)
MCV RBC AUTO: 98.3 FL (ref 82.6–102.9)
MDMA URINE: ABNORMAL
METHADONE SCREEN, URINE: NEGATIVE
METHAMPHETAMINE, URINE: ABNORMAL
METHEMOGLOBIN: ABNORMAL % (ref 0–1.5)
MODE: ABNORMAL
MONOCYTES # BLD: 0 % (ref 1–7)
MONOCYTES # BLD: 1 % (ref 1–7)
MORPHOLOGY: ABNORMAL
MRSA, DNA, NASAL: ABNORMAL
MUCUS: ABNORMAL
MYOGLOBIN: ABNORMAL NG/ML (ref 25–58)
NEGATIVE BASE EXCESS, ART: 11 (ref 0–2)
NEGATIVE BASE EXCESS, ART: 11 (ref 0–2)
NEGATIVE BASE EXCESS, ART: 14 (ref 0–2)
NEGATIVE BASE EXCESS, ART: 18 (ref 0–2)
NEGATIVE BASE EXCESS, ART: 9 (ref 0–2)
NEGATIVE BASE EXCESS, VEN: 14.3 MMOL/L (ref 0–2)
NITRITE, URINE: NEGATIVE
NOTIFICATION TIME: ABNORMAL
NOTIFICATION: ABNORMAL
NRBC AUTOMATED: 0.2 PER 100 WBC
NRBC AUTOMATED: 2.2 PER 100 WBC
NUCLEATED RED BLOOD CELLS: 6 PER 100 WBC
O2 DEVICE/FLOW/%: ABNORMAL
O2 SAT, VEN: 97 % (ref 60–85)
OPIATES, URINE: NEGATIVE
OTHER OBSERVATIONS UA: ABNORMAL
OXYCODONE SCREEN URINE: NEGATIVE
OXYHEMOGLOBIN: ABNORMAL % (ref 95–98)
PATIENT TEMP: 37
PATIENT TEMP: 38.1
PATIENT TEMP: ABNORMAL
PCO2, VEN, TEMP ADJ: ABNORMAL MMHG (ref 39–55)
PCO2, VEN: 24.3 (ref 39–55)
PDW BLD-RTO: 14.6 % (ref 11.8–14.4)
PDW BLD-RTO: 15 % (ref 11.8–14.4)
PEEP/CPAP: ABNORMAL
PH UA: 5 (ref 5–8)
PH VENOUS: 7.28 (ref 7.32–7.42)
PH, VEN, TEMP ADJ: ABNORMAL (ref 7.32–7.42)
PHENCYCLIDINE, URINE: NEGATIVE
PHOSPHORUS: 6.1 MG/DL (ref 2.6–4.5)
PLATELET # BLD: 271 K/UL (ref 138–453)
PLATELET # BLD: 396 K/UL (ref 138–453)
PLATELET ESTIMATE: ABNORMAL
PLATELET ESTIMATE: ABNORMAL
PMV BLD AUTO: 10.5 FL (ref 8.1–13.5)
PMV BLD AUTO: 11.8 FL (ref 8.1–13.5)
PO2, VEN, TEMP ADJ: ABNORMAL MMHG (ref 30–50)
PO2, VEN: 116 (ref 30–50)
POC CHLORIDE: 97 MMOL/L (ref 98–107)
POC CREATININE: 5.09 MG/DL (ref 0.51–1.19)
POC HCO3: 10.8 MMOL/L (ref 21–28)
POC HCO3: 12.6 MMOL/L (ref 21–28)
POC HCO3: 14.8 MMOL/L (ref 21–28)
POC HCO3: 15.4 MMOL/L (ref 21–28)
POC HCO3: 15.9 MMOL/L (ref 21–28)
POC HEMATOCRIT: 26 % (ref 36–46)
POC HEMOGLOBIN: 9 G/DL (ref 12–16)
POC IONIZED CALCIUM: 0.84 MMOL/L (ref 1.15–1.33)
POC LACTIC ACID: 6.58 MMOL/L (ref 0.56–1.39)
POC O2 SATURATION: 93 % (ref 94–98)
POC O2 SATURATION: 93 % (ref 94–98)
POC O2 SATURATION: 94 % (ref 94–98)
POC O2 SATURATION: 98 % (ref 94–98)
POC O2 SATURATION: 98 % (ref 94–98)
POC PCO2 TEMP: 34 MM HG
POC PCO2 TEMP: ABNORMAL MM HG
POC PCO2: 27 MM HG (ref 35–48)
POC PCO2: 32 MM HG (ref 35–48)
POC PCO2: 33.5 MM HG (ref 35–48)
POC PCO2: 37.4 MM HG (ref 35–48)
POC PCO2: 38.4 MM HG (ref 35–48)
POC PH TEMP: 7.19
POC PH TEMP: ABNORMAL
POC PH: 7.07 (ref 7.35–7.45)
POC PH: 7.2 (ref 7.35–7.45)
POC PH: 7.22 (ref 7.35–7.45)
POC PH: 7.27 (ref 7.35–7.45)
POC PH: 7.35 (ref 7.35–7.45)
POC PO2 TEMP: 87 MM HG
POC PO2 TEMP: ABNORMAL MM HG
POC PO2: 102.1 MM HG (ref 83–108)
POC PO2: 126.7 MM HG (ref 83–108)
POC PO2: 81 MM HG (ref 83–108)
POC PO2: 83 MM HG (ref 83–108)
POC PO2: 91.2 MM HG (ref 83–108)
POC POTASSIUM: 5.1 MMOL/L (ref 3.5–4.5)
POC SODIUM: 128 MMOL/L (ref 138–146)
POSITIVE BASE EXCESS, ART: ABNORMAL (ref 0–3)
POSITIVE BASE EXCESS, VEN: ABNORMAL MMOL/L (ref 0–2)
POTASSIUM SERPL-SCNC: 4.6 MMOL/L (ref 3.7–5.3)
POTASSIUM SERPL-SCNC: 5.2 MMOL/L (ref 3.7–5.3)
POTASSIUM SERPL-SCNC: 6.6 MMOL/L (ref 3.7–5.3)
PROPOXYPHENE, URINE: ABNORMAL
PROTEIN UA: ABNORMAL
PROTHROMBIN TIME: 15 SEC (ref 9–12)
PROTHROMBIN TIME: 16.3 SEC (ref 9–12)
PSV: ABNORMAL
PT. POSITION: ABNORMAL
RBC # BLD: 2.97 M/UL (ref 3.95–5.11)
RBC # BLD: 4.19 M/UL (ref 3.95–5.11)
RBC # BLD: ABNORMAL 10*6/UL
RBC # BLD: ABNORMAL 10*6/UL
RBC UA: ABNORMAL /HPF (ref 0–4)
REASON FOR REJECTION: NORMAL
RENAL EPITHELIAL, UA: ABNORMAL /HPF
RESPIRATORY RATE: ABNORMAL
SALICYLATE LEVEL: 1 MG/DL (ref 3–10)
SAMPLE SITE: ABNORMAL
SEG NEUTROPHILS: 70 % (ref 36–66)
SEG NEUTROPHILS: 87 % (ref 36–66)
SEGMENTED NEUTROPHILS ABSOLUTE COUNT: 10.7 K/UL (ref 1.8–7.7)
SEGMENTED NEUTROPHILS ABSOLUTE COUNT: 6.92 K/UL (ref 1.8–7.7)
SET RATE: ABNORMAL
SODIUM BLD-SCNC: 127 MMOL/L (ref 135–144)
SODIUM BLD-SCNC: 129 MMOL/L (ref 135–144)
SODIUM BLD-SCNC: 131 MMOL/L (ref 135–144)
SODIUM,UR: <20 MMOL/L
SPECIFIC GRAVITY UA: 1.03 (ref 1–1.03)
SPECIMEN DESCRIPTION: ABNORMAL
SPECIMEN DESCRIPTION: NORMAL
TCO2 (CALC), ART: 12 MMOL/L (ref 22–29)
TCO2 (CALC), ART: 14 MMOL/L (ref 22–29)
TCO2 (CALC), ART: 16 MMOL/L (ref 22–29)
TCO2 (CALC), ART: 16 MMOL/L (ref 22–29)
TCO2 (CALC), ART: 17 MMOL/L (ref 22–29)
TEST INFORMATION: ABNORMAL
TEXT FOR RESPIRATORY: ABNORMAL
TOTAL CK: ABNORMAL U/L (ref 26–192)
TOTAL CK: ABNORMAL U/L (ref 26–192)
TOTAL HB: ABNORMAL G/DL (ref 12–16)
TOTAL PROTEIN, URINE: 197 MG/DL
TOTAL PROTEIN: 5.5 G/DL (ref 6.4–8.3)
TOTAL RATE: ABNORMAL
TOXIC TRICYCLIC SC,BLOOD: NEGATIVE
TRICHOMONAS: ABNORMAL
TRICYCLIC ANTIDEPRESSANTS, UR: ABNORMAL
TROPONIN INTERP: ABNORMAL
TROPONIN INTERP: ABNORMAL
TROPONIN T: ABNORMAL NG/ML
TROPONIN T: ABNORMAL NG/ML
TROPONIN, HIGH SENSITIVITY: 128 NG/L (ref 0–14)
TROPONIN, HIGH SENSITIVITY: 94 NG/L (ref 0–14)
TSH SERPL DL<=0.05 MIU/L-ACNC: 0.97 MIU/L (ref 0.3–5)
TURBIDITY: ABNORMAL
URINE HGB: ABNORMAL
URINE TOTAL PROTEIN CREATININE RATIO: 2.09 (ref 0–0.2)
UROBILINOGEN, URINE: NORMAL
VT: ABNORMAL
WBC # BLD: 12.3 K/UL (ref 3.5–11.3)
WBC # BLD: 9.9 K/UL (ref 3.5–11.3)
WBC # BLD: ABNORMAL 10*3/UL
WBC # BLD: ABNORMAL 10*3/UL
WBC UA: ABNORMAL /HPF (ref 0–5)
YEAST: ABNORMAL
ZZ NTE CLEAN UP: ORDERED TEST: NORMAL
ZZ NTE WITH NAME CLEAN UP: SPECIMEN SOURCE: NORMAL

## 2019-04-06 PROCEDURE — 87641 MR-STAPH DNA AMP PROBE: CPT

## 2019-04-06 PROCEDURE — P9041 ALBUMIN (HUMAN),5%, 50ML: HCPCS | Performed by: NURSE ANESTHETIST, CERTIFIED REGISTERED

## 2019-04-06 PROCEDURE — 84100 ASSAY OF PHOSPHORUS: CPT

## 2019-04-06 PROCEDURE — 2580000003 HC RX 258: Performed by: STUDENT IN AN ORGANIZED HEALTH CARE EDUCATION/TRAINING PROGRAM

## 2019-04-06 PROCEDURE — 6360000002 HC RX W HCPCS: Performed by: EMERGENCY MEDICINE

## 2019-04-06 PROCEDURE — 87086 URINE CULTURE/COLONY COUNT: CPT

## 2019-04-06 PROCEDURE — 5A1D70Z PERFORMANCE OF URINARY FILTRATION, INTERMITTENT, LESS THAN 6 HOURS PER DAY: ICD-10-PCS | Performed by: INTERNAL MEDICINE

## 2019-04-06 PROCEDURE — 80307 DRUG TEST PRSMV CHEM ANLYZR: CPT

## 2019-04-06 PROCEDURE — 2500000003 HC RX 250 WO HCPCS: Performed by: EMERGENCY MEDICINE

## 2019-04-06 PROCEDURE — 84166 PROTEIN E-PHORESIS/URINE/CSF: CPT

## 2019-04-06 PROCEDURE — 0DTG0ZZ RESECTION OF LEFT LARGE INTESTINE, OPEN APPROACH: ICD-10-PCS | Performed by: SURGERY

## 2019-04-06 PROCEDURE — 87205 SMEAR GRAM STAIN: CPT

## 2019-04-06 PROCEDURE — 0DTH0ZZ RESECTION OF CECUM, OPEN APPROACH: ICD-10-PCS | Performed by: SURGERY

## 2019-04-06 PROCEDURE — G0480 DRUG TEST DEF 1-7 CLASSES: HCPCS

## 2019-04-06 PROCEDURE — 82553 CREATINE MB FRACTION: CPT

## 2019-04-06 PROCEDURE — 87804 INFLUENZA ASSAY W/OPTIC: CPT

## 2019-04-06 PROCEDURE — 36620 INSERTION CATHETER ARTERY: CPT

## 2019-04-06 PROCEDURE — 82570 ASSAY OF URINE CREATININE: CPT

## 2019-04-06 PROCEDURE — 3600000015 HC SURGERY LEVEL 5 ADDTL 15MIN: Performed by: SURGERY

## 2019-04-06 PROCEDURE — 83874 ASSAY OF MYOGLOBIN: CPT

## 2019-04-06 PROCEDURE — 2700000000 HC OXYGEN THERAPY PER DAY

## 2019-04-06 PROCEDURE — 82805 BLOOD GASES W/O2 SATURATION: CPT

## 2019-04-06 PROCEDURE — C9113 INJ PANTOPRAZOLE SODIUM, VIA: HCPCS | Performed by: STUDENT IN AN ORGANIZED HEALTH CARE EDUCATION/TRAINING PROGRAM

## 2019-04-06 PROCEDURE — 6360000002 HC RX W HCPCS: Performed by: SURGERY

## 2019-04-06 PROCEDURE — 6360000002 HC RX W HCPCS: Performed by: NURSE ANESTHETIST, CERTIFIED REGISTERED

## 2019-04-06 PROCEDURE — 2580000003 HC RX 258: Performed by: NURSE ANESTHETIST, CERTIFIED REGISTERED

## 2019-04-06 PROCEDURE — 88307 TISSUE EXAM BY PATHOLOGIST: CPT

## 2019-04-06 PROCEDURE — 2500000003 HC RX 250 WO HCPCS: Performed by: STUDENT IN AN ORGANIZED HEALTH CARE EDUCATION/TRAINING PROGRAM

## 2019-04-06 PROCEDURE — 82533 TOTAL CORTISOL: CPT

## 2019-04-06 PROCEDURE — 82803 BLOOD GASES ANY COMBINATION: CPT

## 2019-04-06 PROCEDURE — 3700000001 HC ADD 15 MINUTES (ANESTHESIA): Performed by: SURGERY

## 2019-04-06 PROCEDURE — 82330 ASSAY OF CALCIUM: CPT

## 2019-04-06 PROCEDURE — 2580000003 HC RX 258: Performed by: EMERGENCY MEDICINE

## 2019-04-06 PROCEDURE — 70450 CT HEAD/BRAIN W/O DYE: CPT

## 2019-04-06 PROCEDURE — 93306 TTE W/DOPPLER COMPLETE: CPT

## 2019-04-06 PROCEDURE — 94762 N-INVAS EAR/PLS OXIMTRY CONT: CPT

## 2019-04-06 PROCEDURE — 2580000003 HC RX 258: Performed by: INTERNAL MEDICINE

## 2019-04-06 PROCEDURE — 2709999900 HC NON-CHARGEABLE SUPPLY: Performed by: SURGERY

## 2019-04-06 PROCEDURE — 36415 COLL VENOUS BLD VENIPUNCTURE: CPT

## 2019-04-06 PROCEDURE — 6360000002 HC RX W HCPCS: Performed by: STUDENT IN AN ORGANIZED HEALTH CARE EDUCATION/TRAINING PROGRAM

## 2019-04-06 PROCEDURE — 84295 ASSAY OF SERUM SODIUM: CPT

## 2019-04-06 PROCEDURE — 80048 BASIC METABOLIC PNL TOTAL CA: CPT

## 2019-04-06 PROCEDURE — 84156 ASSAY OF PROTEIN URINE: CPT

## 2019-04-06 PROCEDURE — 71045 X-RAY EXAM CHEST 1 VIEW: CPT

## 2019-04-06 PROCEDURE — 82565 ASSAY OF CREATININE: CPT

## 2019-04-06 PROCEDURE — 86335 IMMUNFIX E-PHORSIS/URINE/CSF: CPT

## 2019-04-06 PROCEDURE — 85610 PROTHROMBIN TIME: CPT

## 2019-04-06 PROCEDURE — 6360000002 HC RX W HCPCS: Performed by: INTERNAL MEDICINE

## 2019-04-06 PROCEDURE — 82436 ASSAY OF URINE CHLORIDE: CPT

## 2019-04-06 PROCEDURE — 83605 ASSAY OF LACTIC ACID: CPT

## 2019-04-06 PROCEDURE — 84484 ASSAY OF TROPONIN QUANT: CPT

## 2019-04-06 PROCEDURE — 80076 HEPATIC FUNCTION PANEL: CPT

## 2019-04-06 PROCEDURE — 83735 ASSAY OF MAGNESIUM: CPT

## 2019-04-06 PROCEDURE — 94770 HC ETCO2 MONITOR DAILY: CPT

## 2019-04-06 PROCEDURE — 84300 ASSAY OF URINE SODIUM: CPT

## 2019-04-06 PROCEDURE — 81001 URINALYSIS AUTO W/SCOPE: CPT

## 2019-04-06 PROCEDURE — 2720000010 HC SURG SUPPLY STERILE: Performed by: SURGERY

## 2019-04-06 PROCEDURE — 2580000003 HC RX 258: Performed by: SURGERY

## 2019-04-06 PROCEDURE — 80053 COMPREHEN METABOLIC PANEL: CPT

## 2019-04-06 PROCEDURE — 2000000000 HC ICU R&B

## 2019-04-06 PROCEDURE — 85025 COMPLETE CBC W/AUTO DIFF WBC: CPT

## 2019-04-06 PROCEDURE — 3700000000 HC ANESTHESIA ATTENDED CARE: Performed by: SURGERY

## 2019-04-06 PROCEDURE — 76775 US EXAM ABDO BACK WALL LIM: CPT

## 2019-04-06 PROCEDURE — 99291 CRITICAL CARE FIRST HOUR: CPT | Performed by: INTERNAL MEDICINE

## 2019-04-06 PROCEDURE — 84132 ASSAY OF SERUM POTASSIUM: CPT

## 2019-04-06 PROCEDURE — 85014 HEMATOCRIT: CPT

## 2019-04-06 PROCEDURE — 94003 VENT MGMT INPAT SUBQ DAY: CPT

## 2019-04-06 PROCEDURE — 74018 RADEX ABDOMEN 1 VIEW: CPT

## 2019-04-06 PROCEDURE — 82947 ASSAY GLUCOSE BLOOD QUANT: CPT

## 2019-04-06 PROCEDURE — 82435 ASSAY OF BLOOD CHLORIDE: CPT

## 2019-04-06 PROCEDURE — 82550 ASSAY OF CK (CPK): CPT

## 2019-04-06 PROCEDURE — 3600000005 HC SURGERY LEVEL 5 BASE: Performed by: SURGERY

## 2019-04-06 PROCEDURE — 82595 ASSAY OF CRYOGLOBULIN: CPT

## 2019-04-06 RX ORDER — FENTANYL CITRATE 50 UG/ML
50 INJECTION, SOLUTION INTRAMUSCULAR; INTRAVENOUS ONCE
Status: COMPLETED | OUTPATIENT
Start: 2019-04-06 | End: 2019-04-06

## 2019-04-06 RX ORDER — MIDAZOLAM HYDROCHLORIDE 1 MG/ML
INJECTION INTRAMUSCULAR; INTRAVENOUS PRN
Status: DISCONTINUED | OUTPATIENT
Start: 2019-04-06 | End: 2019-04-06 | Stop reason: SDUPTHER

## 2019-04-06 RX ORDER — SODIUM CHLORIDE, SODIUM LACTATE, POTASSIUM CHLORIDE, AND CALCIUM CHLORIDE .6; .31; .03; .02 G/100ML; G/100ML; G/100ML; G/100ML
1000 INJECTION, SOLUTION INTRAVENOUS ONCE
Status: COMPLETED | OUTPATIENT
Start: 2019-04-06 | End: 2019-04-06

## 2019-04-06 RX ORDER — POTASSIUM CHLORIDE 7.45 MG/ML
10 INJECTION INTRAVENOUS PRN
Status: DISCONTINUED | OUTPATIENT
Start: 2019-04-06 | End: 2019-04-08

## 2019-04-06 RX ORDER — MAGNESIUM HYDROXIDE 1200 MG/15ML
LIQUID ORAL CONTINUOUS PRN
Status: COMPLETED | OUTPATIENT
Start: 2019-04-06 | End: 2019-04-06

## 2019-04-06 RX ORDER — SODIUM CHLORIDE, SODIUM LACTATE, POTASSIUM CHLORIDE, CALCIUM CHLORIDE 600; 310; 30; 20 MG/100ML; MG/100ML; MG/100ML; MG/100ML
INJECTION, SOLUTION INTRAVENOUS CONTINUOUS PRN
Status: DISCONTINUED | OUTPATIENT
Start: 2019-04-06 | End: 2019-04-06 | Stop reason: SDUPTHER

## 2019-04-06 RX ORDER — FENTANYL CITRATE 50 UG/ML
50 INJECTION, SOLUTION INTRAMUSCULAR; INTRAVENOUS
Status: DISCONTINUED | OUTPATIENT
Start: 2019-04-06 | End: 2019-04-11

## 2019-04-06 RX ORDER — SODIUM CHLORIDE 0.9 % (FLUSH) 0.9 %
10 SYRINGE (ML) INJECTION EVERY 12 HOURS SCHEDULED
Status: DISCONTINUED | OUTPATIENT
Start: 2019-04-06 | End: 2019-05-04 | Stop reason: HOSPADM

## 2019-04-06 RX ORDER — MAGNESIUM HYDROXIDE 1200 MG/15ML
LIQUID ORAL
Status: DISCONTINUED
Start: 2019-04-06 | End: 2019-04-07

## 2019-04-06 RX ORDER — SODIUM CHLORIDE 0.9 % (FLUSH) 0.9 %
10 SYRINGE (ML) INJECTION PRN
Status: DISCONTINUED | OUTPATIENT
Start: 2019-04-06 | End: 2019-05-04 | Stop reason: HOSPADM

## 2019-04-06 RX ORDER — HEPARIN SODIUM 1000 [USP'U]/ML
1400 INJECTION, SOLUTION INTRAVENOUS; SUBCUTANEOUS PRN
Status: DISCONTINUED | OUTPATIENT
Start: 2019-04-06 | End: 2019-04-10

## 2019-04-06 RX ORDER — ALBUMIN, HUMAN INJ 5% 5 %
SOLUTION INTRAVENOUS PRN
Status: DISCONTINUED | OUTPATIENT
Start: 2019-04-06 | End: 2019-04-06 | Stop reason: SDUPTHER

## 2019-04-06 RX ORDER — ONDANSETRON 2 MG/ML
4 INJECTION INTRAMUSCULAR; INTRAVENOUS EVERY 6 HOURS PRN
Status: DISCONTINUED | OUTPATIENT
Start: 2019-04-06 | End: 2019-05-04 | Stop reason: HOSPADM

## 2019-04-06 RX ORDER — FENTANYL CITRATE 50 UG/ML
100 INJECTION, SOLUTION INTRAMUSCULAR; INTRAVENOUS
Status: DISCONTINUED | OUTPATIENT
Start: 2019-04-06 | End: 2019-04-11

## 2019-04-06 RX ORDER — LEVOFLOXACIN 5 MG/ML
750 INJECTION, SOLUTION INTRAVENOUS ONCE
Status: DISCONTINUED | OUTPATIENT
Start: 2019-04-06 | End: 2019-04-06 | Stop reason: SDUPTHER

## 2019-04-06 RX ORDER — HEPARIN SODIUM 1000 [USP'U]/ML
1500 INJECTION, SOLUTION INTRAVENOUS; SUBCUTANEOUS PRN
Status: DISCONTINUED | OUTPATIENT
Start: 2019-04-06 | End: 2019-04-10

## 2019-04-06 RX ORDER — POTASSIUM CHLORIDE 29.8 MG/ML
20 INJECTION INTRAVENOUS PRN
Status: DISCONTINUED | OUTPATIENT
Start: 2019-04-06 | End: 2019-04-08

## 2019-04-06 RX ORDER — LEVOFLOXACIN 5 MG/ML
500 INJECTION, SOLUTION INTRAVENOUS
Status: DISCONTINUED | OUTPATIENT
Start: 2019-04-07 | End: 2019-04-07

## 2019-04-06 RX ORDER — 0.9 % SODIUM CHLORIDE 0.9 %
1000 INTRAVENOUS SOLUTION INTRAVENOUS ONCE
Status: COMPLETED | OUTPATIENT
Start: 2019-04-06 | End: 2019-04-06

## 2019-04-06 RX ORDER — MAGNESIUM SULFATE 1 G/100ML
1 INJECTION INTRAVENOUS PRN
Status: DISCONTINUED | OUTPATIENT
Start: 2019-04-06 | End: 2019-04-08

## 2019-04-06 RX ORDER — SODIUM CHLORIDE 450 MG/100ML
INJECTION, SOLUTION INTRAVENOUS CONTINUOUS
Status: DISCONTINUED | OUTPATIENT
Start: 2019-04-06 | End: 2019-04-09

## 2019-04-06 RX ORDER — IBUPROFEN 400 MG/1
400 TABLET ORAL ONCE
Status: DISCONTINUED | OUTPATIENT
Start: 2019-04-06 | End: 2019-04-06

## 2019-04-06 RX ADMIN — FENTANYL CITRATE 50 MCG: 50 INJECTION, SOLUTION INTRAMUSCULAR; INTRAVENOUS at 06:51

## 2019-04-06 RX ADMIN — Medication: at 19:58

## 2019-04-06 RX ADMIN — PANTOPRAZOLE SODIUM 8 MG/HR: 40 INJECTION, POWDER, FOR SOLUTION INTRAVENOUS at 21:35

## 2019-04-06 RX ADMIN — SODIUM CHLORIDE, POTASSIUM CHLORIDE, SODIUM LACTATE AND CALCIUM CHLORIDE 1000 ML: 600; 310; 30; 20 INJECTION, SOLUTION INTRAVENOUS at 14:03

## 2019-04-06 RX ADMIN — SODIUM CHLORIDE, POTASSIUM CHLORIDE, SODIUM LACTATE AND CALCIUM CHLORIDE 1000 ML: 600; 310; 30; 20 INJECTION, SOLUTION INTRAVENOUS at 15:06

## 2019-04-06 RX ADMIN — Medication: at 01:40

## 2019-04-06 RX ADMIN — MAGNESIUM SULFATE HEPTAHYDRATE 1 G: 1 INJECTION, SOLUTION INTRAVENOUS at 18:33

## 2019-04-06 RX ADMIN — SODIUM CHLORIDE, POTASSIUM CHLORIDE, SODIUM LACTATE AND CALCIUM CHLORIDE: 600; 310; 30; 20 INJECTION, SOLUTION INTRAVENOUS at 17:49

## 2019-04-06 RX ADMIN — VASOPRESSIN 0.02 UNITS/MIN: 20 INJECTION INTRAVENOUS at 19:58

## 2019-04-06 RX ADMIN — ALBUMIN (HUMAN) 25 G: 12.5 INJECTION, SOLUTION INTRAVENOUS at 18:13

## 2019-04-06 RX ADMIN — HEPARIN SODIUM 1500 UNITS: 1000 INJECTION INTRAVENOUS; SUBCUTANEOUS at 04:45

## 2019-04-06 RX ADMIN — VASOPRESSIN 0.04 UNITS/MIN: 20 INJECTION INTRAVENOUS at 10:14

## 2019-04-06 RX ADMIN — CALCIUM GLUCONATE 1 G: 98 INJECTION, SOLUTION INTRAVENOUS at 00:24

## 2019-04-06 RX ADMIN — CALCIUM GLUCONATE 2 G: 98 INJECTION, SOLUTION INTRAVENOUS at 14:07

## 2019-04-06 RX ADMIN — MIDAZOLAM HYDROCHLORIDE 2 MG: 1 INJECTION, SOLUTION INTRAMUSCULAR; INTRAVENOUS at 18:03

## 2019-04-06 RX ADMIN — Medication 50 MCG/HR: at 21:36

## 2019-04-06 RX ADMIN — Medication 10 ML: at 08:55

## 2019-04-06 RX ADMIN — ALBUMIN (HUMAN) 25 G: 12.5 INJECTION, SOLUTION INTRAVENOUS at 18:24

## 2019-04-06 RX ADMIN — FENTANYL CITRATE 100 MCG: 50 INJECTION, SOLUTION INTRAMUSCULAR; INTRAVENOUS at 12:17

## 2019-04-06 RX ADMIN — HEPARIN SODIUM 1400 UNITS: 1000 INJECTION INTRAVENOUS; SUBCUTANEOUS at 04:45

## 2019-04-06 RX ADMIN — SODIUM CHLORIDE: 4.5 INJECTION, SOLUTION INTRAVENOUS at 09:47

## 2019-04-06 RX ADMIN — FENTANYL CITRATE 50 MCG: 50 INJECTION, SOLUTION INTRAMUSCULAR; INTRAVENOUS at 09:19

## 2019-04-06 RX ADMIN — CALCIUM GLUCONATE 2 G: 98 INJECTION, SOLUTION INTRAVENOUS at 17:20

## 2019-04-06 RX ADMIN — AZTREONAM 2 G: 2 INJECTION, POWDER, LYOPHILIZED, FOR SOLUTION INTRAMUSCULAR; INTRAVENOUS at 11:52

## 2019-04-06 RX ADMIN — FAMOTIDINE 20 MG: 10 INJECTION, SOLUTION INTRAVENOUS at 08:40

## 2019-04-06 RX ADMIN — MAGNESIUM SULFATE HEPTAHYDRATE 1 G: 1 INJECTION, SOLUTION INTRAVENOUS at 16:51

## 2019-04-06 RX ADMIN — SODIUM CHLORIDE, POTASSIUM CHLORIDE, SODIUM LACTATE AND CALCIUM CHLORIDE: 600; 310; 30; 20 INJECTION, SOLUTION INTRAVENOUS at 19:16

## 2019-04-06 RX ADMIN — SODIUM CHLORIDE, POTASSIUM CHLORIDE, SODIUM LACTATE AND CALCIUM CHLORIDE 1000 ML: 600; 310; 30; 20 INJECTION, SOLUTION INTRAVENOUS at 22:00

## 2019-04-06 RX ADMIN — CALCIUM GLUCONATE 4 G: 98 INJECTION, SOLUTION INTRAVENOUS at 22:46

## 2019-04-06 RX ADMIN — NOREPINEPHRINE BITARTRATE 30 MCG/MIN: 1 INJECTION, SOLUTION, CONCENTRATE INTRAVENOUS at 09:03

## 2019-04-06 RX ADMIN — Medication: at 10:23

## 2019-04-06 RX ADMIN — SODIUM CHLORIDE 1000 ML: 9 INJECTION, SOLUTION INTRAVENOUS at 09:49

## 2019-04-06 RX ADMIN — Medication 10 ML: at 08:47

## 2019-04-06 RX ADMIN — MEROPENEM 1 G: 1 INJECTION, POWDER, FOR SOLUTION INTRAVENOUS at 13:43

## 2019-04-06 RX ADMIN — FENTANYL CITRATE 50 MCG: 50 INJECTION, SOLUTION INTRAMUSCULAR; INTRAVENOUS at 11:12

## 2019-04-06 RX ADMIN — PANTOPRAZOLE SODIUM 8 MG/HR: 40 INJECTION, POWDER, FOR SOLUTION INTRAVENOUS at 11:04

## 2019-04-06 ASSESSMENT — PULMONARY FUNCTION TESTS
PIF_VALUE: 25
PIF_VALUE: 29
PIF_VALUE: 25
PIF_VALUE: 28
PIF_VALUE: 30
PIF_VALUE: 1
PIF_VALUE: 30
PIF_VALUE: 28
PIF_VALUE: 29
PIF_VALUE: 29
PIF_VALUE: 28
PIF_VALUE: 28
PIF_VALUE: 29
PIF_VALUE: 27
PIF_VALUE: 22
PIF_VALUE: 30
PIF_VALUE: 29
PIF_VALUE: 29
PIF_VALUE: 30
PIF_VALUE: 28
PIF_VALUE: 29
PIF_VALUE: 30
PIF_VALUE: 28
PIF_VALUE: 29
PIF_VALUE: 28
PIF_VALUE: 31
PIF_VALUE: 29
PIF_VALUE: 28
PIF_VALUE: 29
PIF_VALUE: 28
PIF_VALUE: 30
PIF_VALUE: 29
PIF_VALUE: 25
PIF_VALUE: 22
PIF_VALUE: 28
PIF_VALUE: 29
PIF_VALUE: 33
PIF_VALUE: 28
PIF_VALUE: 30
PIF_VALUE: 29
PIF_VALUE: 32
PIF_VALUE: 31
PIF_VALUE: 28
PIF_VALUE: 28
PIF_VALUE: 29
PIF_VALUE: 21
PIF_VALUE: 29
PIF_VALUE: 30
PIF_VALUE: 28
PIF_VALUE: 31
PIF_VALUE: 30
PIF_VALUE: 22
PIF_VALUE: 28
PIF_VALUE: 28
PIF_VALUE: 29
PIF_VALUE: 28
PIF_VALUE: 29
PIF_VALUE: 27
PIF_VALUE: 28
PIF_VALUE: 28
PIF_VALUE: 24
PIF_VALUE: 22
PIF_VALUE: 27
PIF_VALUE: 27
PIF_VALUE: 30
PIF_VALUE: 23
PIF_VALUE: 30
PIF_VALUE: 23
PIF_VALUE: 29
PIF_VALUE: 29
PIF_VALUE: 34
PIF_VALUE: 29
PIF_VALUE: 22
PIF_VALUE: 31
PIF_VALUE: 29
PIF_VALUE: 28
PIF_VALUE: 38
PIF_VALUE: 29
PIF_VALUE: 30
PIF_VALUE: 22
PIF_VALUE: 30
PIF_VALUE: 28
PIF_VALUE: 29
PIF_VALUE: 28
PIF_VALUE: 30
PIF_VALUE: 29
PIF_VALUE: 27
PIF_VALUE: 28
PIF_VALUE: 25
PIF_VALUE: 27
PIF_VALUE: 29
PIF_VALUE: 16
PIF_VALUE: 23
PIF_VALUE: 24
PIF_VALUE: 31
PIF_VALUE: 31
PIF_VALUE: 28
PIF_VALUE: 29
PIF_VALUE: 28
PIF_VALUE: 22
PIF_VALUE: 28
PIF_VALUE: 28
PIF_VALUE: 30
PIF_VALUE: 31
PIF_VALUE: 30
PIF_VALUE: 28
PIF_VALUE: 29
PIF_VALUE: 28
PIF_VALUE: 27
PIF_VALUE: 27
PIF_VALUE: 28
PIF_VALUE: 28
PIF_VALUE: 29
PIF_VALUE: 29
PIF_VALUE: 32
PIF_VALUE: 23
PIF_VALUE: 31
PIF_VALUE: 30
PIF_VALUE: 31
PIF_VALUE: 29
PIF_VALUE: 29

## 2019-04-06 ASSESSMENT — PAIN SCALES - GENERAL
PAINLEVEL_OUTOF10: 0
PAINLEVEL_OUTOF10: 7

## 2019-04-06 ASSESSMENT — LIFESTYLE VARIABLES: SMOKING_STATUS: 1

## 2019-04-06 NOTE — CONSULTS
HISTORY:    Patient presented to ED via EMS due to altered mental status and vomiting. Patient has history significant for COPD, right knee osteoarthirtis s/p arthoplasty, and chronic NSAID use. Patient lives in [place], but was here to visit her significant other. Arrived Wednesday night, said she didn't feel good. Thought she might have had a bad burger at a fast food restaurant. Went to sleep and slept for almost 24 hours. When she awoke, she said she was vomiting, having diarrhea, and abdominal pain. Significant other and sister are unsure of the nature of the vomit, diarrhea, or abdominal pain. Then she slept a bit more, until her significant other found her unresponsive and that's when he called EMS to bring her to the hospital.     Afebrile on admission, but Tmax overnight was 39.3. Tachycardic on admission, 129. Became hypotensive after admission and levophed and vasopressin were started. Initial labs: Admission labs significant for Na 125, K 8.0, CO2 9, BUN 62, Creatinine of 4.06, Anion gap of 23, Lactic acid of 3.5, Glucose of 186, Ca 5.2, POC HCO3 15.9, CK 15,342, Troponins high sensitivity 89 and 94, Alk phos 168, , AST 1,122, Bili .37, lipase of 119, Urine tox positive for THC and Cocaine, WBC 23.5, Hgb 10.2, Urine and blood cultures pending, HIV negative, influenza negative, hepatitis panel non-reactive, urine Leukocyte esterase trace, urine nitrite -, urine protein 2+, urine Hgb large, urine RBCs 5 to 10, many urine yeast,  ABG 7.22, pCO2 27, HCO3 15.9. Initial imaging showed:     CXR: No acute cardiopulmonary process    Abdominal X-ray 4/6: No free air    CTA of chest: Negative for PE or dissection. Patchy consolidations of bilateral lower lung lobes representing developing pneumonia vs atelectasis. CT head: pending    Initial course:     Intubated and sedated in ED due to altered mental status. Currently on Vancomycin, Levaquin, and aztreonam for empiric coverage.  Lisette Deleon catheter was placed due to request by nephrology for emergent dialysis. Dialysis attempted several times, but due to high arterial pressures and line clotting, dialysis to be completed later today by Dr. Daniela Rosas. NG tube with bloody output. FOBT +. General surgery has been consulted for evaluation. Cultures:  Urine:  · Pending  Blood:  · Pending  Sputum :  · NA  Wound:  · NA    Discussed with patient, RN, Dr Deneen Heart, Gen Surgery. ICU Care 60 min                I have personally reviewed the past medical history, past surgical history, medications, social history, and family history, and I have updated the database accordingly.   Past Medical History:     Past Medical History:   Diagnosis Date    Asthma     On inhaler    Back pain, chronic     Hypertension 2015    On Lisinopril    Snores     possible apnea but not tested    Wears glasses        Past Surgical  History:     Past Surgical History:   Procedure Laterality Date    KNEE ARTHROSCOPY Left 1980    KNEE ARTHROSCOPY Left 09/28/2016    with medial menisectomy    TONSILLECTOMY      ULNAR TUNNEL RELEASE Right 2013       Medications:      sodium chloride flush  10 mL Intravenous 2 times per day    [START ON 4/7/2019] levofloxacin  500 mg Intravenous Q48H    vancomycin  10 mg/kg Intravenous Once per day on Tue Thu Sat    vancomycin (VANCOCIN) intermittent dosing (placeholder)   Other RX Placeholder    aztreonam  2 g Intravenous Once    sodium chloride  1,000 mL Intravenous Once    magnesium sulfate  1 g Intravenous Once       Social History:     Social History     Socioeconomic History    Marital status: Single     Spouse name: Not on file    Number of children: 0    Years of education: Not on file    Highest education level: Not on file   Occupational History    Occupation: [address] [place] [address] Financial resource strain: Not on file    Food insecurity:     Worry: Not on file     Inability: Not on file    Transportation needs: 04/06/19 0730 -- -- -- 126 29 92 % -- --   04/06/19 0715 -- -- -- 126 29 92 % -- --   04/06/19 0700 (!) 85/57 -- -- 126 (!) 32 91 % -- --   04/06/19 0630 -- 102.7 °F (39.3 °C) -- -- -- -- -- --   04/06/19 0600 (!) 75/57 -- -- 120 26 91 % -- --   04/06/19 0500 (!) 76/58 -- -- 108 22 93 % -- --   04/06/19 0445 -- -- -- 106 -- -- -- --   04/06/19 0430 -- -- -- 102 -- -- -- --   04/06/19 0426 139/73 98.2 °F (36.8 °C) -- 104 21 98 % -- --   04/06/19 0400 86/65 98.4 °F (36.9 °C) Oral 99 20 97 % -- --   04/06/19 0330 118/60 98.2 °F (36.8 °C) -- 98 -- -- -- --   04/06/19 0317 -- -- -- 98 18 98 % -- --   04/06/19 0315 -- -- -- 97 -- -- -- --   04/06/19 0300 (!) 92/52 -- -- 98 19 97 % -- --   04/06/19 0245 -- -- -- 98 -- -- -- --   04/06/19 0230 (!) 107/58 -- -- 96 -- -- -- --   04/06/19 0215 -- -- -- -- -- -- 5' 1.02\" (1.55 m) --   04/06/19 0213 (!) 112/49 98.4 °F (36.9 °C) -- 92 -- -- -- --   04/06/19 0157 (!) 117/43 98.4 °F (36.9 °C) -- 93 20 99 % -- 173 lb 15.1 oz (78.9 kg)     General Appearance: Sedated, on ventilator  Head:  Normocephalic, no trauma  Eyes: Pupils equal, round, reactive to light; sclera anicteric; conjunctivae pink. No embolic phenomena. ENT: Oropharynx clear, without erythema, exudate, or thrush. No tenderness of sinuses. Mouth/throat: mucosa pink and moist. No lesions. Dentition in good repair. Neck:Supple, without lymphadenopathy. Thyroid normal, No bruits. Pulmonary/Chest: Clear to auscultation, without wheezes, rales, or rhonchi. No dullness to percussion. Cardiovascular: Regular rate and rhythm without murmurs, rubs, or gallops. Abdomen: Distended. Bowel sounds absent. Hard to palpate but no rigidity. Grimaces with pressure. All four Extremities: Cyanosis of trunk, abdomen, distal extremities  Neurologic: Sedated  Skin: Cool and dry with poor turgor. Signs of peripheral arterial  insufficiency. No ulcerations. No open wounds. Skin purplish, cyanotic.     Medical Decision Making -Laboratory:   I have independently reviewed/ordered the following labs:    CBC with Differential:   Recent Labs     04/05/19 2152 04/06/19  0436   WBC 23.5* 12.3*   HGB 10.2* 12.8   HCT 33.8* 40.3    396   LYMPHOPCT 4* 11*   MONOPCT 1 0*     BMP:   Recent Labs     04/06/19  0025 04/06/19  0436   * 129*   K 6.6* 5.2   CL 95* 96*   CO2 8* 13*   BUN 62* 53*   CREATININE 3.80* 3.11*     Hepatic Function Panel:   Recent Labs     04/05/19 2152 04/06/19 0436   PROT 4.5*  4.7* 5.5*   LABALBU 2.8* 3.2*   BILIDIR  --  0.29   IBILI  --  0.33   BILITOT 0.37 0.62   ALKPHOS 168* 240*   * 1,740*   AST 1,122* 2,617*     No results for input(s): RPR in the last 72 hours. No results for input(s): HIV in the last 72 hours. No results for input(s): BC in the last 72 hours. Lab Results   Component Value Date    MUCUS NOT REPORTED 04/06/2019    RBC 4.19 04/06/2019    TRICHOMONAS NOT REPORTED 04/06/2019    WBC 12.3 04/06/2019    YEAST NOT REPORTED 04/06/2019    TURBIDITY TURBID 04/06/2019     Lab Results   Component Value Date    CREATININE 3.11 04/06/2019    GLUCOSE 105 04/06/2019       Medical Decision Making-Imaging:     Abdominal xray:    Gas in mildly distended loops of large and small bowel likely reflecting an   ileus.       NG tube tip in the gastric fundus with the proximal side-port in the distal   thoracic esophagus, repositioning recommended.       Airspace disease left lung base.      CT scan chest:    Impression   Satisfactory position endotracheal tube.       Nasogastric tube needs advanced 10 cm.       Patchy perihilar opacities and bibasilar airspace disease.  Follow-up may be   beneficial following medical treatment course to document complete resolution.           EXAMINATION:   CTA OF THE ABDOMEN AND PELVIS WITH CONTRAST       4/5/2019 9:23 pm:       TECHNIQUE:   CTA of the abdomen and pelvis was performed with the administration of   intravenous contrast. Multiplanar reformatted images are

## 2019-04-06 NOTE — ED PROVIDER NOTES
MG per tablet Take 1 tablet by mouth daily 8/31/16   Historical Provider, MD   albuterol (PROVENTIL) (2.5 MG/3ML) 0.083% nebulizer solution Take 2.5 mg by nebulization every 6 hours as needed for Wheezing    Historical Provider, MD   albuterol sulfate  (90 BASE) MCG/ACT inhaler Inhale 2 puffs into the lungs every 6 hours as needed for Wheezing    Historical Provider, MD       REVIEW OF SYSTEMS    (2-9 systems for level 4, 10 or more for level 5)      Review of Systems   Unable to perform ROS: Unstable vital signs       PHYSICAL EXAM   (up to 7 for level 4, 8 or more for level 5)      INITIAL VITALS:   /63   Pulse 97   Temp 99.2 °F (37.3 °C) (Oral)   Resp 25   Ht 5' 1.02\" (1.55 m)   Wt 187 lb 6.3 oz (85 kg)   SpO2 100%   BMI 35.38 kg/m²     Physical Exam   Constitutional: She appears well-developed and well-nourished. She appears distressed. HENT:   Head: Normocephalic and atraumatic. Eyes: Pupils are equal, round, and reactive to light. Right eye exhibits no discharge. Left eye exhibits no discharge. Neck: Normal range of motion. Cardiovascular: Regular rhythm and normal heart sounds. Exam reveals no gallop and no friction rub. No murmur heard. Tachycardic     Pulmonary/Chest: No respiratory distress. She has no wheezes. She has no rales. She exhibits no tenderness. Abdominal: Soft. She exhibits distension. She exhibits no mass. There is tenderness. There is guarding. There is no rebound. Musculoskeletal: Normal range of motion. She exhibits no edema, tenderness or deformity. Neurological: She is alert. Patient had waxing and waning mentation was GCS approximately 7-10 as she waxed and waned. Skin: Skin is warm and intact. No rash noted. She is diaphoretic. No erythema. No pallor. Psychiatric: She has a normal mood and affect.  Her speech is normal and behavior is normal. Judgment and thought content normal. Cognition and memory are normal.       DIFFERENTIAL  DIAGNOSIS PLAN (LABS / IMAGING / EKG):  Orders Placed This Encounter   Procedures    Negative Pressure Wound Therapy - Supply Request    Culture Blood #1    Culture Blood #1    Urine Culture    RAPID INFLUENZA A/B ANTIGENS    MRSA DNA Probe, Nasal    Urine Culture    Microbiology Miscellaneous    ANAEROBIC AND AEROBIC CULTURE    Wound Culture    CULTURE BLOOD #1    CULTURE BLOOD #1    Culture Blood #1    CTA CHEST W WO CONTRAST    CTA ABDOMEN PELVIS W CONTRAST    XR CHEST PORTABLE    XR CHEST PORTABLE    XR CHEST PORTABLE    US RENAL LIMITED    CT Head WO Contrast    XR ABDOMEN (KUB) (SINGLE AP VIEW)    XR CHEST (SINGLE VIEW FRONTAL)    VL DUP UPPER EXTREMITY VENOUS RIGHT    FL MODIFIED BARIUM SWALLOW W VIDEO    CT CHEST ABDOMEN PELVIS WO CONTRAST    Comprehensive Metabolic Panel    CBC Auto Differential    Lactate, Sepsis    Protime-INR    APTT    Troponin    Urinalysis with Microscopic    TOX SCR, BLD, ED    CORTISOL    POTASSIUM (POC)    Urine Drug Screen    Basic Metabolic Panel w/ Reflex to MG    CALVIN Screen with Reflex    Anti-Neutrophilic Cytoplasmic Antibody    C3 Complement    C4 Complement    [place]/Lambda Free Lt Chains, Serum Quant    HIV Screen    Immunofixation serum profile    Lipase    Electrophoresis Protein, Serum without Reflex to Immunofixation    Hepatitis Panel, Acute    TSH with Reflex    BASIC METABOLIC PANEL    CBC WITH AUTO DIFFERENTIAL    Chloride, Random Urine    Eosinophils, Urine    Protein Electrophoresis, Urine    Sodium, urine, random    Urinalysis with Microscopic    CK isoenzymes    Cryoglobulin    Protein / creatinine ratio, urine    CK    MYOGLOBIN, SERUM    BLOOD GAS, ARTERIAL    LACTIC ACID, WHOLE BLOOD    Protime-INR    PREVIOUS SPECIMEN    Basic Metabolic Panel w/ Reflex to MG    MAGNESIUM    PHOSPHORUS    Lactic Acid, Whole Blood    Troponin    BLOOD GAS, VENOUS    CALCIUM, IONIZED    CK    Surgical Pathology  CBC WITH AUTO DIFFERENTIAL    LACTIC ACID, WHOLE BLOOD    PROTIME-INR    CALCIUM, IONIZED    SPECIMEN REJECTION    PREVIOUS SPECIMEN    Phosphorus    Comprehensive Metabolic Panel    Magnesium    Hemoglobin and hematocrit, blood    SODIUM (POC)    POTASSIUM (POC)    CHLORIDE (POC)    CALCIUM, IONIC (POC)    Blood Gas, Venous    Lactic Acid, Whole Blood    Hemoglobin and hematocrit, blood    SODIUM (POC)    POTASSIUM (POC)    CHLORIDE (POC)    CALCIUM, IONIC (POC)    Hemoglobin and hematocrit, blood    SODIUM (POC)    POTASSIUM (POC)    CHLORIDE (POC)    CALCIUM, IONIC (POC)    CALCIUM, IONIZED    Hemoglobin and hematocrit, blood    SODIUM (POC)    POTASSIUM (POC)    CHLORIDE (POC)    CALCIUM, IONIC (POC)    CALCIUM, IONIZED    MAGNESIUM    PHOSPHORUS    MAGNESIUM    CALCIUM, IONIZED    PHOSPHORUS    ELECTROLYTE PANEL    CALCIUM, IONIC (POC)    Immunofixation urine random profile    ELECTROLYTE PANEL    CALCIUM, IONIC (POC)    Hemoglobin and hematocrit, blood    SODIUM (POC)    POTASSIUM (POC)    CHLORIDE (POC)    CALCIUM, IONIC (POC)    Hemoglobin and hematocrit, blood    SODIUM (POC)    POTASSIUM (POC)    CHLORIDE (POC)    CALCIUM, IONIC (POC)    Hemoglobin and hematocrit, blood    SODIUM (POC)    POTASSIUM (POC)    CHLORIDE (POC)    CALCIUM, IONIC (POC)    Hemoglobin and hematocrit, blood    SODIUM (POC)    POTASSIUM (POC)    CHLORIDE (POC)    CALCIUM, IONIC (POC)    Hemoglobin and hematocrit, blood    SODIUM (POC)    POTASSIUM (POC)    CHLORIDE (POC)    CALCIUM, IONIC (POC)    Surgical Pathology    LAB SCANNED REPORT    Surgical Pathology    Hepatitis B surface antibody    Hepatitis B surface antigen    Hepatitis B core antibody, total    HEPATITIS C ANTIBODY    Lipase    Immature Platelet Fraction    Surgical Pathology    Immature Platelet Fraction    HEPARIN-INDUCED PLATELET ANTIBODY    HEPATIC FUNCTION PANEL    Basic Metabolic Panel w/ Reflex to MG    CBC Auto Differential    CK    Iron and TIBC    Ferritin    Path Review, Smear    Reticulocytes    Surgical Pathology    Lactate, Sepsis    Immature Platelet Fraction    SPECIMEN REJECTION    Basic Metabolic Panel    PREVIOUS SPECIMEN    Hepatic Function Panel    CALVIN SCREEN WITH REFLEX    ANTI-NEUTROPHILIC CYTOPLASMIC ANTIBODY    C3 COMPLEMENT    C4 COMPLEMENT    C-Reactive Protein    Sedimentation Rate    Procalcitonin    Protein C Functional    Protein S Functional    Antithrombin III Activity    ANTI-PHOSPHOLIPID AB    Factor 8 Assay    Factor 5 Leiden    HOMOCYSTEINE, SERUM    CRYOGLOBULIN    Prothrombin Gene Mutation    Potassium    CBC    APTT    Platelet count    DIET DENTAL SOFT; Low Sodium (2 GM); Daily Fluid Restriction: 1200 ml    Daily weights    Strict intake and output    Telemetry monitoring    Intake and output    Place intermittent pneumatic compression device    Up with assistance    Notify physician    Vital signs per unit routine    Remove Arterial Line    Misc nursing order (specify)    Elevate heels off of bed at all times if patient is not able to move lower extremities    Turn or assist with turn every 2 hours if patient is unable to turn self. Remind patient to turn if necessary.     Inspect skin per unit guidelines    Maintain HOB at the lowest elevation consistent with medical plan of care    Use lift equipment for lifting patient    Reposition every hour while in chair    Use cushion while in chair    Limit chair activity to 2 hour time intervals if patient unable to reposition self    Sacral foam dressing for prevention    Nursing communication    Negative Pressure Wound Therapy - Nursing Instructions    Negative Pressure Wound Therapy - Wound care    Remove central line    Full Code    Inpatient consult to Critical Care    Inpatient consult to Nephrology    Inpatient consult to General Surgery    Inpatient consult to Infectious Diseases    Inpatient consult to Cardiology    Inpatient consult to Dietitian    Inpatient consult to IV Team    Inpatient consult to Vascular Surgery    Inpatient consult to Hem/Onc    Contact isolation    Dietary Nutrition Supplements: Standard High Calorie Oral Supplement    OT eval and treat    PT eval and treat    Pulse oximetry, continuous    Initiate Oxygen Therapy Protocol    Respiratory care evaluation only    ABG draw    Extubation    Respiratory care evaluation only    BIPAP    SLP eval and treat    SLP clinical swallow evaluation    Arterial Blood Gas, POC    Arterial Blood Gas, POC    POCT Glucose    Arterial Blood Gas, POC    POC Blood Gas    Arterial Blood Gas, POC    POCT Glucose    POC Blood Gas    POC Blood Gas and Chemistry    Arterial Blood Gas, POC    Creatinine W/GFR Point of Care    Lactic Acid, POC    POCT Glucose    Anion Gap (Calc) POC    Arterial Blood Gas, POC    Creatinine W/GFR Point of Care    Lactic Acid, POC    POCT Glucose    Anion Gap (Calc) POC    Arterial Blood Gas, POC    Creatinine W/GFR Point of Care    Lactic Acid, POC    POCT Glucose    Anion Gap (Calc) POC    Arterial Blood Gas, POC    Creatinine W/GFR Point of Care    Lactic Acid, POC    POCT Glucose    Anion Gap (Calc) POC    POC Glucose Fingerstick    POCT Glucose    POC Blood Gas    Arterial Blood Gas, POC    Lactic Acid, POC    POCT Glucose    Arterial Blood Gas, POC    Lactic Acid, POC    POCT Glucose    Arterial Blood Gas, POC    Creatinine W/GFR Point of Care    Lactic Acid, POC    POCT Glucose    Anion Gap (Calc) POC    POC Glucose Fingerstick    POC Glucose Fingerstick    Arterial Blood Gas, POC    Creatinine W/GFR Point of Care    Lactic Acid, POC    POCT Glucose    Anion Gap (Calc) POC    Arterial Blood Gas, POC    Creatinine W/GFR Point of Care    Lactic Acid, POC    POCT Glucose    Anion Gap (Calc) POC    Venous Blood Gas, POC    Creatinine W/GFR Point of Care    Lactic Acid, POC    POCT Glucose    Anion Gap (Calc) POC    Arterial Blood Gas, POC    POC Glucose Fingerstick    Arterial Blood Gas, POC    Creatinine W/GFR Point of Care    Lactic Acid, POC    POCT Glucose    Anion Gap (Calc) POC    Arterial Blood Gas, POC    POCT Glucose    Arterial Blood Gas, POC    POCT Glucose    POC Glucose Fingerstick    POC Glucose Fingerstick    POC Glucose Fingerstick    POC Glucose Fingerstick    POC Glucose Fingerstick    Arterial Blood Gas, POC    POCT Glucose    POC Glucose Fingerstick    POC Glucose Fingerstick    POC Glucose Fingerstick    POC Glucose Fingerstick    POC Glucose Fingerstick    POC Glucose Fingerstick    POC Glucose Fingerstick    POC Glucose Fingerstick    POC Glucose Fingerstick    POC Glucose Fingerstick    POC Glucose Fingerstick    POC Glucose Fingerstick    POC Glucose Fingerstick    POC Glucose Fingerstick    POC Glucose Fingerstick    POC Glucose Fingerstick    POC Glucose Fingerstick    Arterial Blood Gas, POC    POCT Glucose    POC Glucose Fingerstick    POC Glucose Fingerstick    POC Glucose Fingerstick    POC Glucose Fingerstick    POC Glucose Fingerstick    POC Glucose Fingerstick    POC Glucose Fingerstick    POC Glucose Fingerstick    POC Glucose Fingerstick    POC Glucose Fingerstick    POC Glucose Fingerstick    POC Glucose Fingerstick    POC Glucose Fingerstick    POC Glucose Fingerstick    POCT glucose    POC Glucose Fingerstick    POC Glucose Fingerstick    POC Glucose Fingerstick    POC Glucose Fingerstick    EKG 12 Lead    EKG 12 Lead    EKG REPORT    EKG REPORT    ECHO Complete 2D W Doppler W Color    ECHO Complete 2D W Doppler W Color    Wound ostomy eval and treat    Insert peripheral IV    Hemodialysis inpatient    Hemodialysis inpatient    PATIENT STATUS (FROM ED OR OR/PROCEDURAL) Inpatient    Transfer Patient  Transfer Patient    Transfer patient    Restraints non-violent or non-self-destructive       MEDICATIONS ORDERED:  Orders Placed This Encounter   Medications    calcium gluconate IVPB     TR HUBBARD: cabinet override    magnesium sulfate 1-5 GM/100ML-% IVPB (premix)     TR HUBBARD: cabinet override    dextrose 50 % solution     SAMMI LEON: cabinet override    dextrose 50 % solution     TR HUBBARD: cabinet override    EPINEPHrine PF 1 MG/10ML injection     SAMMI LEON: cabinet override    norepinephrine (LEVOPHED) 64 MCG/ML infusion SOLN     SAMMI LEON: cabinet override    midazolam HCl (VERSED) 10 MG/2ML injection     TR HUBBARD: cabinet override    0.9 % sodium chloride bolus    levofloxacin (LEVAQUIN) 750 MG/150ML infusion 750 mg    vancomycin (VANCOCIN) 1250 mg in dextrose 5 % 250 mL IVPB    DISCONTD: norepinephrine (LEVOPHED) 16 mg in dextrose 5 % 250 mL infusion    iohexol (OMNIPAQUE 240) injection 100 mL    MIDAZOLAM 100 MG IN DEXTROSE 5% 100 ML INFUSION     TR HUBBARD: cabinet override    DISCONTD: midazolam (VERSED) 100 mg in dextrose 5% 100 mL infusion    etomidate (AMIDATE) injection 30 mg    rocuronium (ZEMURON) injection 77 mg    DISCONTD: magnesium sulfate 1 g in dextrose 5% 100 mL IVPB    calcium gluconate 1 g in dextrose 5 % 100 mL IVPB    dextrose 50 % solution 25 g    insulin regular (HUMULIN R;NOVOLIN R) injection 10 Units    DISCONTD: sodium bicarbonate 150 mEq in dextrose 5 % 1,000 mL infusion    sodium chloride flush 0.9 % injection 10 mL    sodium chloride flush 0.9 % injection 10 mL    magnesium hydroxide (MILK OF MAGNESIA) 400 MG/5ML suspension 30 mL    ondansetron (ZOFRAN) injection 4 mg    DISCONTD: potassium chloride 10 mEq/100 mL IVPB (Peripheral Line)    DISCONTD: potassium chloride 20 mEq/50 mL IVPB (Central Line)    DISCONTD: magnesium sulfate 1 g in dextrose 5% 100 mL IVPB    DISCONTD: famotidine (PEPCID) injection 20 mg    DISCONTD: levofloxacin (LEVAQUIN) 750 MG/150ML infusion 750 mg    DISCONTD: levofloxacin (LEVAQUIN) 500 MG/100ML infusion 500 mg    DISCONTD: vancomycin (VANCOCIN) 750 mg in dextrose 5 % 250 mL IVPB    DISCONTD: vancomycin (VANCOCIN) intermittent dosing (placeholder)    DISCONTD: heparin (porcine) injection 1,400 Units    DISCONTD: heparin (porcine) injection 1,500 Units    DISCONTD: fentaNYL (SUBLIMAZE) injection 50 mcg    DISCONTD: fentaNYL (SUBLIMAZE) injection 100 mcg    DISCONTD: norepinephrine (LEVOPHED) 16 mg in dextrose 5% 250 mL infusion    DISCONTD: ibuprofen (ADVIL;MOTRIN) tablet 400 mg    DISCONTD: vasopressin 20 Units in sodium chloride 0.9 % 100 mL infusion    DISCONTD: pantoprazole (PROTONIX) 80 mg in sodium chloride 0.9 % 100 mL infusion    fentaNYL (SUBLIMAZE) injection 50 mcg    DISCONTD: 0.45 % sodium chloride infusion    DISCONTD: aztreonam (AZACTAM) 2 g IVPB mini-bag    aztreonam (AZACTAM) 2 g IVPB mini-bag    0.9 % sodium chloride bolus    DISCONTD: meropenem (MERREM) 1 g in sodium chloride 0.9 % 100 mL IVPB (mini-bag)    calcium gluconate 2 g in dextrose 5 % 100 mL IVPB    DISCONTD: sodium chloride 0.9 % irrigation     NICKY FLORES: cabinet override    lactated ringers bolus    lactated ringers bolus    calcium gluconate 2 g in dextrose 5 % 100 mL IVPB    sodium chloride 0.9 % irrigation    DISCONTD: fentaNYL 20 mcg/mL Infusion    calcium gluconate 4 g in dextrose 5 % 100 mL IVPB    lactated ringers bolus    calcium gluconate 3 g in dextrose 5 % 100 mL IVPB    lactated ringers bolus    DISCONTD: calcium gluconate 2 g in dextrose 5 % 100 mL IVPB    calcium gluconate 4 g in dextrose 5 % 100 mL IVPB     Total 4g needed per [place].     0.9 % sodium chloride bolus    dextrose 50 % solution     FREDDY GONSALVES: cabinet override    dextrose 50 % solution 25 g    DISCONTD: pantoprazole (PROTONIX) injection 40 mg    DISCONTD: sodium chloride (PF) 0.9 % injection 10 mL    digoxin (LANOXIN) injection 250 mcg    DISCONTD: digoxin (LANOXIN) injection 250 mcg    digoxin (LANOXIN) injection 250 mcg     Please send two doses as we may given another dose in rapid succession if first dose dose not achieve adequate rate control    0.9 % sodium chloride bolus    calcium gluconate 4 g in dextrose 5 % 100 mL IVPB    midazolam (VERSED) injection 2 mg    amiodarone (CORDARONE) 150 mg in dextrose 5 % 100 mL bolus    magnesium sulfate 1 g in dextrose 5% 100 mL IVPB    calcium gluconate 4 g in dextrose 5 % 100 mL IVPB    furosemide (LASIX) injection 40 mg    DISCONTD: diltiazem 125 mg in dextrose 5 % 125 mL infusion    0.9 % sodium chloride bolus    calcium chloride 10 % injection 1 g    0.9 % sodium chloride bolus    calcium gluconate 1 g in dextrose 5% 100 mL IVPB    calcium gluconate 2 g in dextrose 5 % 100 mL IVPB    sodium chloride 0.9 % irrigation    DISCONTD: dextrose 5 % and 0.45 % NaCl 1,000 mL infusion    DISCONTD: dextrose 5 % and 0.45 % sodium chloride infusion    DISCONTD: dextrose 10 % infusion    furosemide (LASIX) injection 40 mg    DISCONTD: dextrose 5 % and 0.9 % sodium chloride infusion    DISCONTD: metoprolol tartrate (LOPRESSOR) tablet 12.5 mg    meropenem (MERREM) 1 g in sodium chloride 0.9 % 100 mL IVPB (mini-bag)    furosemide (LASIX) injection 80 mg    DISCONTD: midodrine (PROAMATINE) tablet 10 mg    midodrine (PROAMATINE) tablet 5 mg    anticoagulant sodium citrate 4 % injection 3 mL    anticoagulant sodium citrate 4 % injection 3 mL    oxyCODONE (ROXICODONE) immediate release tablet 5 mg    cyclobenzaprine (FLEXERIL) tablet 5 mg    OR Linked Order Group     morphine injection 4 mg     morphine injection 6 mg    0.9 % sodium chloride bolus    0.9 % sodium chloride bolus    pantoprazole (PROTONIX) tablet 40 mg    iron sucrose (VENOFER) 200 mg in sodium chloride 0.9 % 100 mL IVPB    metoprolol tartrate (LOPRESSOR) tablet 25 mg  DISCONTD: heparin (porcine) injection 5,000 Units    aspirin chewable tablet 81 mg    clopidogrel (PLAVIX) tablet 75 mg    heparin (porcine) injection 4,000 Units    heparin (porcine) injection 4,000 Units    heparin (porcine) injection 2,000 Units    heparin 25,000 units in dextrose 5% 250 mL infusion    levofloxacin (LEVAQUIN) tablet 500 mg    levofloxacin (LEVAQUIN) tablet 500 mg       DDX: CNS (Meningitis), Cardiac (myocarditis / pericarditis / endocarditis), Pulmonary (pneumonia), Intra-abdominal (pancreatitis, cholecystitis, cholangitis, pyelonephritis, urosepsis), cellulitis, septic arthritis, port/ catheter sites       DIAGNOSTIC RESULTS / EMERGENCY DEPARTMENT COURSE / MDM     LABS:    RADIOLOGY:     Cta Chest W Wo Contrast    Result Date: 4/5/2019  EXAMINATION: CTA OF THE CHEST WITH AND WITHOUT CONTRAST 4/5/2019 9:23 pm TECHNIQUE: CTA of the chest was performed before and after the administration of intravenous contrast.  Multiplanar reformatted images are provided for review. MIP images are provided for review. Dose modulation, iterative reconstruction, and/or weight based adjustment of the mA/kV was utilized to reduce the radiation dose to as low as reasonably achievable. 3D reconstructed images were performed on a separate workstation and provided for review. COMPARISON: Chest x-ray 04/05/2019 HISTORY: ORDERING SYSTEM PROVIDED HISTORY: AORTIC DZ, NON-TRAUMATIC, KNOWN OR SUSPECT FINDINGS: Aorta: No evidence of thoracic aortic aneurysm or dissection. No acute abnormality of the aorta. No intramural hematoma on the noncontrast examination. No mediastinal hemorrhage. Minimal aortic vascular calcifications. Mediastinum: Heart is mildly prominent size. Coronary calcifications. No pericardial effusion. No suspicious mediastinal, hilar, or atelectasis or adenopathy identified per CT criteria. Lungs/Pleura:  The consolidative changes with prominent air bronchograms identified involving both lower lobes evidence of patchy opacity identified involving the lingula. No suspicious pulmonary nodule identified. No pleural effusion pneumothorax. Upper Abdomen: Diffuse hypoattenuation liver suggestive of fatty infiltration. Patchy areas of hypoenhancement identified involving the anterior aspect of the liver along the falciform ligament likely related to focal fatty infiltration versus transient hepatic attenuation difference. Right adrenal nodule identified central low attenuation, please refer to separately reported CT abdomen pelvis report. .  Dilated loops of bowel identified within the left upper quadrant with prominent air-fluid level. Mild wall thickening noted. Soft Tissues/Bones: No acute bone or soft tissue abnormality. No evidence of aortic dissection or intramural hematoma. No central pulmonary emboli identified. Patchy consolidative changes both lower lobes involving lingula may be related to multifocal atelectasis versus developing pneumonia/infiltrates. Clinical correlation and follow-up may be beneficial.     Xr Chest Portable    Result Date: 4/5/2019  EXAMINATION: SINGLE XRAY VIEW OF THE CHEST 4/5/2019 10:16 pm COMPARISON: Chest x-ray 04/05/2019 at 2040 hours HISTORY: ORDERING SYSTEM PROVIDED HISTORY: intubation TECHNOLOGIST PROVIDED HISTORY: intubation FINDINGS: Endotracheal tube 2 cm above the leann. Mild cardiomegaly. Mild perihilar pulmonary vasculature. Patchy bibasilar airspace opacities. Nasogastric tube side port proximal to the GE junction is be advanced 10 cm. No pneumothorax. No pleural effusion. There nipple rings. Satisfactory position endotracheal tube. Nasogastric tube needs advanced 10 cm. Patchy perihilar opacities and bibasilar airspace disease. Follow-up may be beneficial following medical treatment course to document complete resolution.      Xr Chest Portable    Result Date: 4/5/2019  EXAMINATION: SINGLE XRAY VIEW OF THE CHEST 4/5/2019 9:31 pm COMPARISON: None. HISTORY: ORDERING SYSTEM PROVIDED HISTORY: SOB, R/O PNA, CHF Exacerbation TECHNOLOGIST PROVIDED HISTORY: SOB, R/O PNA, CHF Exacerbation Ordering Physician Provided Reason for Exam: copd, diff breathing . upr Acuity: Acute Type of Exam: Initial FINDINGS: The cardiomediastinal silhouette is normal in size and contour. Minor pulmonary vascular congestion. The lungs are clear. No pleural effusion or pneumothorax is present. No acute cardiopulmonary process     Cta Abdomen Pelvis W Contrast    Result Date: 4/5/2019  EXAMINATION: CTA OF THE ABDOMEN AND PELVIS WITH CONTRAST 4/5/2019 9:23 pm: TECHNIQUE: CTA of the abdomen and pelvis was performed with the administration of intravenous contrast. Multiplanar reformatted images are provided for review. MIP images are provided for review. Dose modulation, iterative reconstruction, and/or weight based adjustment of the mA/kV was utilized to reduce the radiation dose to as low as reasonably achievable. COMPARISON: None. HISTORY: ORDERING SYSTEM PROVIDED HISTORY: suspected dissection of abdominal aorta FINDINGS: CTA ABDOMEN: Bibasilar airspace disease. Liver, spleen, pancreas, gallbladder normal. Bilateral adrenal masses measuring 11 mm on the left and 18 x 28 mm on the left. Bilateral ill-defined hypodensities throughout the kidneys. The small bowel is somewhat distended with multiple air-fluid levels. Multiple air-fluid levels are also noted throughout the colon. The stomach appears normal.  Retroaortic left renal vein. Bones normal. Aorta appears normal without dissection. The celiac artery and SMA appear normal.  The renal arteries appear normal. CTA PELVIS: Bladder decompressed. Uterus normal.  Catheter right groin terminates in the common iliac vein. Ileus. No evidence of aortic dissection. The bilateral adrenal masses, left greater than right. Recommend follow-up adrenal MRI non emergently.        EKG    EKG: Normal sinus rhythm slightly prolonged QTC and Peaked T waves. All EKG's are interpreted by the Emergency Department Physician whoeither signs or Co-signs this chart in the absence of a cardiologist.    [facility]/EMERGENCY DEPARTMENT COURSE:      ED Course as of Apr 14 0615 Fri Apr 05, 2019   2206 Patient arrived hypotensive, mottled difficulties obtaining peripheral IV and initial 20-gauge was obtained we moved quickly to a right femoral central line see procedure note from Dr. Claudia Vela, patient tolerated procedure well 2 L of fluid was pressure bagged in patient given a gram of magnesium as well as a gram of calcium as her initial potassium was 5 and there are concerns for this. Patient was also found to have a point-of-care glucose of 20 she was given an amp of D50. Patient was intubated soon after central line as mentation was waxing and waning in patient was having episodes of agitation with disorientation. Patient provided limited to no history other than at one point she said she has been sick the last few days with possible ear infection. On exam patient is mottled below the umbilicus patient does have tenderness on abdominal exam and is wincing. No signs of any trauma. Patient was diaphoretic as well. Initial EKG shows slightly prolonged QTC which is why she is given the magnesium and also shows some potentially peaked T waves versus hyperacute T waves which is why she was given the calcium. [KW]   2255 Leukocyte Esterase, Urine(!): TRACE [KW]   2314 Patient has elevated potassium as well as low calcium we will give another gram of calcium, will give insulin as well as dextrose. [KW]   2315 Lactic Acid, Sepsis, Whole Blood(!): 5.7 [KW]   2315 Patient was started on pressors very early on in her course as she was hypotensive and initially we only had a 20-gauge IV. Patient has had nausea vomiting reported by family. At this time concern for acute renal failure.     [KW]   4298 WBC(!): 23.5 [KW]   2343 With Dr. Lisbet Love of nephrology, he recommended bicarb drip which has been ordered, I spoke with the mother the patient who is agreeable to dialysis is Dr. Jesenia Torres is recommended dialysis. Patient has a bed in the ICU and will be transferred up there or continued care. [KW]      ED Course User Index  [KW] Naomi Graham,        Sepsis Times and Checklist  Vital Signs: BP: 126/63  Pulse: 97  Resp: 25  Temp: 99.2 °F (37.3 °C) SpO2: 100 %  SIRS (>2)   Temp > 38.3C or < 36C   HR > 90   RR > 20   WBC > 12 or < 4 or >10% bands  SIRS (>2) and confirmed or suspected source of infection = Sepsis    Sepsis Identified   Date: 4/6/19  Time: 8:45pm  Sepsis Orders:   CBC: Yes   CMP: Yes   PT/PTT: Yes   Blood Cultures x2: Yes   Urinalysis and Urine Culture: Yes   Lactate: Yes   Broad Spectrum Antibiotics Given (within 3 hours of sepsis identification, after blood cultures): Yes              IV Crystalloid given: Yes    If lactate >2.0 MUST repeat within 6 hours    Is lactate > 4.0:  Yes  If lactate >4.0 OR hypotension 30ml/kg crystalloid MUST be ordered. Fluids must be completed within 3 hours of sepsis identification. Septic Shock Identified (Initial lactate >4.0 or persistent hypotension after 30ml/kg fluid bolus): For septic shock sepsis focus physical exam must be completed AND documented (within 6 hours). Date: 4/5/19 Time: 2300     Sepsis focus exam completed. For persistent hypotension after 30ml/kg fluid bolus vasopressors must be started (within 6 hours)    Vasopressors were started within the first 2 hours, patient received 2 L of pressure bagged fluids as well. PROCEDURES:  None    CONSULTS:  IP CONSULT TO CRITICAL CARE  IP CONSULT TO NEPHROLOGY  IP CONSULT TO GENERAL SURGERY  IP CONSULT TO INFECTIOUS DISEASES  IP CONSULT TO CARDIOLOGY  IP CONSULT TO DIETITIAN  IP CONSULT TO IV TEAM  IP CONSULT TO VASCULAR SURGERY  IP CONSULT TO HEM/ONC    CRITICAL CARE:  None    FINAL IMPRESSION      1.  Acute renal failure, unspecified acute renal failure type ([address]    2.  Altered mental status, unspecified altered mental status type          DISPOSITION / PLAN     DISPOSITION Admitted    PATIENT REFERRED TO:  [facility]  [address] [id number] [phone]  Schedule an appointment as soon as possible for a visit in 2 weeks  follow up in [place] [id number] in 1-2 weeks      DISCHARGE MEDICATIONS:  Current Discharge Medication List          Mago Bean DO  Emergency Medicine Resident    (Please note that portions of this note were completed with a voicerecognition program.  Efforts were made to edit the dictations but occasionally words are mis-transcribed.)       Mago Bean,   04/06/19 [facility]98 [facility] [place],   04/14/19 0101

## 2019-04-06 NOTE — CONSULTS
Renal Consult Note    Patient :  Desi Boateng; 47 y.o. MRN# [id number]  Location:  187/1536-91  Attending:  Genesis Arenas MD  Admit Date:  4/5/2019   Hospital Day: 1    Reason for Consult:     Asked by Dr Genesis Arenas MD to see for MARY/Elevated Creatinine. History Obtained From:     electronic medical record, ER staff    History of Present Illness:     Desi Boateng; 47 y.o. female with past medical history as mentioned below presented to the hospital with the chief complaint of increased confusion, abdominal pain, nausea vomiting to the emergency department. Patient was found to have mottled lower extremities. There is initial concern of aortic dissection due to the condition of lower extremities and hence, CTA chest and abdomen with contrast was done by ER, which was negative for any dissection. Patient started having respiratory failure and required mechanical ventilation. Later her labs came back with [place] 62 and creatinine of 4.06, sodium of 125, potassium of 8.0, chloride of 93 and bicarb of 9, with alk phosphatase of 168, , AST 1,122, lactic acid of 5.7. Initial pH was 7.069/37.4/91.2/10. 8. EKG was showing slightly prolonged QTC and some initially peaked T waves. Patient was started on calcium replacements and insulin and dextrose. Patient was very hypotensive with systolic blood pressures as low as in 70s and started to improve with pressor support and fluid replacements. Later nephrology was consulted after the labs came back positive for acute kidney injury. During the workup patient was also found positive for cocaine and marijuana. Her home meds mentioned Celebrex, diclofenac, lisinopril, hydrochlorothiazide. From the ED notes patient's family stated that she has not been feeling well since last few days. They reported patient has been more drowsy and has been laying in bed for the last 2 days. They brought her to the ED for worsening confusion.   Patient also has history of half pack per day smoking, social drinker and no drug abuse reported but urine tox was positive for cannabinoids and cocaine. Patient's mother was contacted by the ER physician and later I spoke to her personally and she was agreeable with getting hemodialysis initiated if required. Mother did mention that patient has a previous history of suicide attempt by taking sleeping pills about 3 years ago and nothing after that. She does say that she had issues with drug abuse, and the patient was a teenager. Also she mentioned that about few days ago patient was complaining of some lump on her left neck and had positive bleeding from her ear and was supposed to go see a doctor but then got sick and came here. ICU on-call physician was updated. Past History/Allergies? Social History:     Past Medical History:   Diagnosis Date    Asthma     On inhaler    Back pain, chronic     Hypertension 2015    On Lisinopril    Snores     possible apnea but not tested    Wears glasses        Allergies   Allergen Reactions    Pcn [Penicillins] Hives and Shortness Of Breath    Sulfa Antibiotics Hives and Shortness Of Breath    Tape [Adhesive Tape] Rash     redness       Social History     Socioeconomic History    Marital status: Single     Spouse name: Not on file    Number of children: 0    Years of education: Not on file    Highest education level: Not on file   Occupational History    Occupation: [address] [place] [address] Financial resource strain: Not on file    Food insecurity:     Worry: Not on file     Inability: Not on file    Transportation needs:     Medical: Not on file     Non-medical: Not on file   Tobacco Use    Smoking status: Light Tobacco Smoker     Packs/day: 0.25     Types: Cigarettes     Start date: 9/19/1980    Smokeless tobacco: Never Used   Substance and Sexual Activity    Alcohol use: Yes     Comment: OCCASSIONAL    Drug use: No    Sexual activity: Yes     Partners: Female   Lifestyle    Physical activity:     Days per week: Not on file     Minutes per session: Not on file    Stress: Not on file   Relationships    Social connections:     Talks on phone: Not on file     Gets together: Not on file     Attends Synagogue service: Not on file     Active member of club or organization: Not on file     Attends meetings of clubs or organizations: Not on file     Relationship status: Not on file    Intimate partner violence:     Fear of current or ex partner: Not on file     Emotionally abused: Not on file     Physically abused: Not on file     Forced sexual activity: Not on file   Other Topics Concern    Not on file   Social History Narrative    Not on file       Family History:        Family History   Problem Relation Age of Onset    Heart Disease Mother     Stroke Mother     Heart Surgery Mother     Diabetes Maternal Grandmother     Emphysema Maternal Grandfather     Diabetes Maternal Grandfather     Lung Cancer Maternal Uncle        Outpatient Medications:     Medications Prior to Admission: celecoxib (CELEBREX) 200 MG capsule, Take 1 capsule by mouth 2 times daily  traMADol (ULTRAM) 50 MG tablet, 1 tablet PO BID PRN pain  oxyCODONE-acetaminophen (PERCOCET) 5-325 MG per tablet, Take 1 tablet by mouth 2 times daily as needed for Pain . diclofenac (VOLTAREN) 75 MG EC tablet, Take 1 tablet by mouth 2 times daily  oxyCODONE-acetaminophen (PERCOCET) 5-325 MG per tablet, Take 1 tablet by mouth every 6 hours as needed for Pain . aspirin 325 MG EC tablet, Take 1 tablet by mouth 2 times daily for 14 days  Misc. Devices (ROLLER WALKER) MISC, 1 each by Does not apply route daily  QVAR 40 MCG/ACT inhaler, Inhale 2 puffs into the lungs 2 times daily  Calcium Citrate-Vitamin D (CALCIUM + D PO), Take 1 tablet by mouth daily  Misc.  Devices MISC, As directed by physician daily  diclofenac (VOLTAREN) 50 MG EC tablet, Take 1 tablet by mouth 2 times daily  docusate sodium (COLACE) 100 MG 10.2*   HCT 33.8*   .9   MCH 30.4   MCHC 30.2   RDW 14.7*      MPV 10.3      BMP:   Recent Labs     04/05/19 2152   *   K 8.0*   CL 93*   CO2 9*   BUN 62*   CREATININE 4.06*   GLUCOSE 186*   CALCIUM 5.2*      Albumin:    Recent Labs     04/05/19 2152   LABALBU 2.8*     SPEP:  Lab Results   Component Value Date    PROT 4.7 04/05/2019     Urinalysis/Chemistries:      Lab Results   Component Value Date    NITRU NEGATIVE 04/05/2019    COLORU DARK YELLOW 04/05/2019    PHUR 5.0 04/05/2019    WBCUA 10 TO 20 04/05/2019    RBCUA 5 TO 10 04/05/2019    MUCUS NOT REPORTED 04/05/2019    TRICHOMONAS NOT REPORTED 04/05/2019    YEAST MANY 04/05/2019    BACTERIA NOT REPORTED 04/05/2019    SPECGRAV 1.019 04/05/2019    LEUKOCYTESUR TRACE 04/05/2019    UROBILINOGEN Normal 04/05/2019    BILIRUBINUR NEGATIVE 04/05/2019    GLUCOSEU NEGATIVE 04/05/2019    KETUA TRACE 04/05/2019    AMORPHOUS NOT REPORTED 04/05/2019       Radiology:     CT abdomen and pelvis with contrast 4/5/19:  Bilateral adrenal masses measuring 11 mm on the left and 18 x 28 mm on the  left.  Bilateral ill-defined hypodensities throughout the kidneys. Impression:        Ileus.  No evidence of aortic dissection.  The bilateral adrenal masses, left  greater than right.  Recommend follow-up adrenal MRI non emergently. CT chest with and without contrast 4/5/19:  Impression:        No evidence of aortic dissection or intramural hematoma.  No central  pulmonary emboli identified. Patchy consolidative changes both lower lobes involving lingula may be  related to multifocal atelectasis versus developing pneumonia         Assessment:     1. Acute Kidney Injury oliguric likely secondary to hypotension requiring pressor support, the use of contrast and likely further complicated by use of cocaine, also home meds mention Celebrex, diclofenac, lisinopril, hydrochlorothiazide.   Current Baseline creatinine now known previous seems to be normal; last per renal function. 17.  Will follow. Nutrition   Please ensure that patient is on a renal diet/TF. Avoid nephrotoxic drugs/contrast exposure. Thank you for the consultation. Please do not hesitate to contact us for any further questions/concerns. We will continue to follow along with you. Sherman Rivera MD  [facility]     This note is created with the assistance of a speech-recognition program. While intending to generate a document that actually reflects the content of the visit, no guarantees can be provided that every mistake has been identified and corrected by editing.

## 2019-04-06 NOTE — PROGRESS NOTES
PREOPERATIVE DIAGNOSIS:  Central line  POSTOPERATIVE DIAGNOSIS:  Same  PROCEDURE PERFORMED:  Right Femoral Vein Central Line Insertion  ANESTHESIA:  Local utilizing 1% lidocaine  ESTIMATED BLOOD LOSS:  Less than 25 ml  COMPLICATIONS:  None immediately appreciated. DISCUSSION:  Andrew Lewis is a 47y.o.-year-old female who requires additional IV access and central line for pressor support. The history and physical examination were reviewed and confirmed. The diagnoses, proposed procedure, risks, possible complications, benefits and alternatives were discussed with the patient or family. She was given the opportunity to ask questions, and once answered, informed consent was obtained verbally. The patient was then prepared for the procedure. PROCEDURE:  A timeout was initiated and was confirmed by those present. The patient was placed in a supine position. Ultrasound guidance was utilized. The skin overlying the Right Femoral Vein was prepped with chlorhexadine and draped in sterile fashion. The skin was infiltrated with local anesthetic. Through the anesthetized region, the introducer needle was inserted into the femoral vein returning dark red non pulsatile blood. A guidewire was placed through the center of the needle with no resistance. A small incision made in the skin with a #11 scalpel blade. The dilator was inserted into the skin and vein over guidewire using Seldinger technique. The dilator was then removed and the catheter was placed in the vein over the guidewire using Seldinger technique. The guidewire was then removed and all ports aspirated and flushed appropriately. The catheter then secured using silk suture and a temporary sterile dressing was applied. No immediate complication was evident. All sponge, instrument and needle counts were correct at the completion of the procedure. The patient tolerated the procedure well with no immediate complication evidentKatiana Olivier Dyan Neff  4/5/2019, 9:18 PM

## 2019-04-06 NOTE — PLAN OF CARE
Chris Travis  Outcome: Ongoing     Problem: NUTRITION  Goal: Nutritional status is improving  4/6/2019 0803 by Law Solares RN  Outcome: Ongoing  4/5/2019 2220 by Chris Travis  Outcome: Ongoing

## 2019-04-06 NOTE — PROGRESS NOTES
Insert Arterial Line  Date/Time:  04/06/19, 2:56 AM  Performed by: Charlotte Antonio    Patient identity confirmed: arm band and provided demographic data   Time out: Immediately prior to procedure a \"time out\" was called to verify the correct patient, procedure, equipment, support staff. Preparation: Patient was prepped and draped in the usual sterile fashion.     Location:right radial    Senthil's test normal: yes  Needle gauge: 20     Number of attempts: 1  Post-procedure: transparent dressing applied and line secured    Patient tolerance: well

## 2019-04-06 NOTE — ED NOTES
Bed: 12  Expected date:   Expected time:   Means of arrival:   Comments:     Ana Luisa Jovel RN  04/05/19 2039

## 2019-04-06 NOTE — H&P
Critical Care - History and Physical Examination    Patient's name:  Vannessa Canada  Medical Record Number: [id number]  Patient's account/billing number: [de-identified]  Patient's YOB: 1965  Age: 47 y.o. Date of Admission: 4/5/2019  8:39 PM  Reason of ICU admission: AMS, Resp failure, intubated  Date of History and Physical Examination: 4/6/2019      Primary Care Physician: No primary care provider on file. Attending Physician: Dr. Tad Arnold    Code Status: Prior    Chief complaint: Abd pain, N, V    Reason for ICU admission: Resp failure      History Of Present Illness:   History was obtained from significant other and chart review. Vannessa Canada is a 47 y.o. F with PMHx of HTN, COPD who takes Lisinopril-HCTZ and Neurontin presented to the ED with increase confusion, abdominal pain, Nausea, Vomiting. Due to AMS, patient was intubated in the ED. Patient also had mottled lower extremities noted. Initial concern for Aortic dissection, so CTA chest/abd/pelvis performed which shows no dissection, but does show patchy consolidations lower lobes and bilateral adrenal massess as well as ileus. Patients significant other reports the patient hyatt been laying around for the past 2 days, with intermittent confusion, abd pain, nausea, and vomiting. They brought her to the ED for the worsening confusion. Patient is 1/2 ppd smoker, social drinker, no illicit drug use. Patients CMP shows BUN 62/Cr 4.06/Ca 5.2/Na 125/K 8.0/Cl 93/ CO2 9/ /AST 1122. Patient also had lactic acid of 5.7. Blood culture drawn. Patient given Vanco and Levaquin in ED. ED spoke with Nephrology who want emergent dialysis. Patient mother and POA agreeable for dialysis. Also recommended Bicarb drip. Further history obtained from mother when she arrived. States patient had recent ear infection and bleeding from ear on the left side as well as cystic mass to her left scalp.     Past Medical History:        Diagnosis Date    Asthma     On inhaler    Back pain, chronic     Hypertension 2015    On Lisinopril    Snores     possible apnea but not tested    Wears glasses        Past Surgical History:        Procedure Laterality Date    KNEE ARTHROSCOPY Left 1980    KNEE ARTHROSCOPY Left 09/28/2016    with medial menisectomy    TONSILLECTOMY      ULNAR TUNNEL RELEASE Right 2013       Allergies: Allergies   Allergen Reactions    Pcn [Penicillins] Hives and Shortness Of Breath    Sulfa Antibiotics Hives and Shortness Of Breath    Tape [Adhesive Tape] Rash     redness         Home Medications:   Prior to Admission medications    Medication Sig Start Date End Date Taking? Authorizing Provider   celecoxib (CELEBREX) 200 MG capsule Take 1 capsule by mouth 2 times daily 5/9/17   Ysabel Viveros DO   traMADol (ULTRAM) 50 MG tablet 1 tablet PO BID PRN pain 5/9/17   Sy Welsh DO   oxyCODONE-acetaminophen (PERCOCET) 5-325 MG per tablet Take 1 tablet by mouth 2 times daily as needed for Pain . 3/16/17   Sy Welsh DO   diclofenac (VOLTAREN) 75 MG EC tablet Take 1 tablet by mouth 2 times daily 2/6/17   Ysabel Viveros DO   oxyCODONE-acetaminophen (PERCOCET) 5-325 MG per tablet Take 1 tablet by mouth every 6 hours as needed for Pain . 1/25/17   Grace Elam DO   aspirin 325 MG EC tablet Take 1 tablet by mouth 2 times daily for 14 days 1/25/17 2/8/17  Grace Elam DO   Misc. Devices (ROLLER [place]) MISC 1 each by Does not apply route daily 1/25/17   Grace Elam DO   QVAR 40 MCG/ACT inhaler Inhale 2 puffs into the lungs 2 times daily 1/3/17   Historical Provider, MD   Calcium Citrate-Vitamin D (CALCIUM + D PO) Take 1 tablet by mouth daily    Historical Provider, MD   Misc.  Devices MISC As directed by physician daily 1/11/17   Ysabel Viveros DO   diclofenac (VOLTAREN) 50 MG EC tablet Take 1 tablet by mouth 2 times daily 1/9/17   Shelley Sanford DO   docusate sodium (COLACE) 100 MG capsule Take 1 capsule by mouth 2 times daily as needed for Constipation 9/28/16   Rossi Camara DO   gabapentin (NEURONTIN) 100 MG capsule Take 100 mg by mouth 3 times daily as needed  8/11/16   Historical Provider, MD   venlafaxine (EFFEXOR) 75 MG tablet Take 100 mg by mouth 2 times daily  9/10/16   Historical Provider, MD   lisinopril-hydrochlorothiazide (PRINZIDE;ZESTORETIC) 10-12.5 MG per tablet Take 1 tablet by mouth daily 8/31/16   Historical Provider, MD   albuterol (PROVENTIL) (2.5 MG/3ML) 0.083% nebulizer solution Take 2.5 mg by nebulization every 6 hours as needed for Wheezing    Historical Provider, MD   albuterol sulfate  (90 BASE) MCG/ACT inhaler Inhale 2 puffs into the lungs every 6 hours as needed for Wheezing    Historical Provider, MD       Social History:   TOBACCO:   reports that she has been smoking cigarettes. She started smoking about 38 years ago. She has been smoking about 0.25 packs per day. She has never used smokeless tobacco.  ETOH:   reports that she drinks alcohol. DRUGS:  reports that she does not use drugs. OCCUPATION:      Family History:       Problem Relation Age of Onset    Heart Disease Mother     Stroke Mother     Heart Surgery Mother     Diabetes Maternal Grandmother     Emphysema Maternal Grandfather     Diabetes Maternal Grandfather     Lung Cancer Maternal Uncle        REVIEW OF SYSTEMS (ROS):  Unable to obtain 2/2 patient clinical status    Physical Exam:    Vitals: BP (!) 120/49   Pulse 93   Temp 99.1 °F (37.3 °C) (Oral)   Resp 18   Wt 170 lb (77.1 kg)   SpO2 91%   BMI 32.12 kg/m²     Body weight:   Wt Readings from Last 3 Encounters:   04/05/19 170 lb (77.1 kg)   11/12/18 155 lb (70.3 kg)   05/15/17 145 lb 1 oz (65.8 kg)       Body Mass Index : Body mass index is 32.12 kg/m².       PHYSICAL EXAMINATION :   General Examination    General  Intubated and sedated   Head  Normocephalic, without obvious abnormality, atraumatic    Neck  Supple, symmetrical, Microbiology:  Cultures during this admission:     Blood cultures:                 [x] drawn      [] Negative             []  Positive (Details:  )  Urine Culture:                   [x] drawn      [] Negative             []  Positive (Details:  )  Sputum Culture:               [] None drawn       [] Negative             []  Positive (Details:  )   Endotracheal aspirate:     [] None drawn       [] Negative             []  Positive (Details:  )     -----------------------------------------------------------------  Radiological reports:    Cta Chest W Wo Contrast    Result Date: 4/5/2019  EXAMINATION: CTA OF THE CHEST WITH AND WITHOUT CONTRAST 4/5/2019 9:23 pm TECHNIQUE: CTA of the chest was performed before and after the administration of intravenous contrast.  Multiplanar reformatted images are provided for review. MIP images are provided for review. Dose modulation, iterative reconstruction, and/or weight based adjustment of the mA/kV was utilized to reduce the radiation dose to as low as reasonably achievable. 3D reconstructed images were performed on a separate workstation and provided for review. COMPARISON: Chest x-ray 04/05/2019 HISTORY: ORDERING SYSTEM PROVIDED HISTORY: AORTIC DZ, NON-TRAUMATIC, KNOWN OR SUSPECT FINDINGS: Aorta: No evidence of thoracic aortic aneurysm or dissection. No acute abnormality of the aorta. No intramural hematoma on the noncontrast examination. No mediastinal hemorrhage. Minimal aortic vascular calcifications. Mediastinum: Heart is mildly prominent size. Coronary calcifications. No pericardial effusion. No suspicious mediastinal, hilar, or atelectasis or adenopathy identified per CT criteria. Lungs/Pleura: The consolidative changes with prominent air bronchograms identified involving both lower lobes evidence of patchy opacity identified involving the lingula. No suspicious pulmonary nodule identified. No pleural effusion pneumothorax.  Upper Abdomen: Diffuse Shock ([address]     Additional assessment:  · Hyperglycemia  · Acute renal failure  · Hyperkalemia  · Hyponatremia  · Hypochloremia  · Low bicarb  · Anion gap acidosis  · Elevated troponin  · Lactic acidosis  · Cocaine abuse  · THC abuse  · Adrenal masses  · Transaminitis    Plan:  NEUROLOGIC:  - intubated and sedated on Versed  - remote history of left scalp mass and ear infection, f/u CT head     CARDIOVASCULAR:  - Hypotension, shock  - Levophed with goal map >65  - Echo ordered    PULMONARY:  - Chest CT show opacities in bases  - Continue Levaquin and Vanco    RENAL/FLUID/ELECTROLYTE:  - Nephrology consulted, Recommend emergent dialysis, Bicarb drop 150 mEq @ 125 ml/hr  - F/U nephrology labs  - Cortisol elevated  - ED tox panel negative    GI/NUTRITION:  NUTRITION:  Diet NPO Effective Now  - Transaminitis, F/U hep panel, HIV, cryoglobin    ID:  - Possible pneumonia  - continue Levaquin and Vanco    HEMATOLOGIC:  - Leukocytosis, continue to trend daily CBC    PROPHYLAXIS:   Stress ulcer: H2 blocker   VTE: SCDs    DISPOSITION:   Continue ICU      Margaret Murphy DO  Internal Medicine PGY 3   4/6/2019, 12:26 AM    Attending Physician Statement  I have discussed the care of Andrew Lewis, including pertinent history and exam findings with the resident. I have reviewed the key elements of all parts of the encounter with the resident. I have seen and examined the patient with the resident. I agree with the assessment and plan and status of the problem list as documented. I seen the patient during my round this morning, I have seen the events since last night, I have reviewed the chart, labs, arterial blood gases seen. I have reviewed the pertinent finding history and physical reviewed.   According to available history she has few days of vomiting, abdominal pain and possibly diarrhea there is no definite history of melena initially this morning or bleeding per rectum, she presented to emergency room with altered mental status, she was hypotensive she had received fluid boluses she was severely acidotic with metabolic acidosis/lactic acidosis, she was hyperkalemic also with acute renal failure, initial diagnosis was sepsis/septic shock she was mottled in her extremities and skin, because of her abdominal symptoms in the emergency room she had a CT chest and CT abdomen angiogram done because of concern for ischemia of the feet and mottled appearance concern for aortic dissection or aortic disease, CT abdomen angiogram did not show significant dissection aneurysm or abdominal vascular pathology radiology but did show dilated bowel loops consistent with ileus, she had received fluid boluses and she was started on bicarbonate drip, because of hyperkalemia and dialysis was tried last night after dialysis catheter was placed but she was not able to tolerate as catheter and flow was collapsing. She was on Levophed this morning at and she was also on bicarbonate drip, and echo was done this morning which showed hyperdynamic left ventricle and she was given 1 L fluid bolus and she will need more fluid boluses. On exam her abdomen was not very distended but it was tender, when she was off sedation she was able to wake up and follows command with all extremities, she was very cold clammy and sweaty and mottled appearance of the extremities. Her initial potassium was 8 sodium was 125 creatinine was 4.06 and BUN was 62, her potassium this morning was 5.2 and creatinine was 3.11, lactic acid was 3.5 which improved from 5.7 but repeat lactic acid started increasing to 5.8. Because of concern of bowel ischemia as abdominal is tender there is no other source of infection or sepsis, blood cultures so far negative, she was started on Levaquin and vancomycin in the emergency room a pretty she has severe penicillin allergy I have discussed with infectious disease and a started on Azactam to cover gram-negative organism.   There was some history by mother about a cyst rupture behind her left ear when I examined her I did not find any cyst or abscess or drainage both ears were examined and I did not see any purulent drainage from either. Her NG tube drainage was black tarry and she has slight jelly current stool on wiping the buttocks this morning. Her INR was 1.3 PTT was normal, platelet count is normal, initial WBC count was 23.5. Altered mental status likely metabolic encephalopathy. Acute renal failure likely ischemic ATN secondary to shock. Hypovolemic/less likely septic shock. SIRS/sepsis. Metabolic acidosis/lactic acidosis which could be secondary to hypovolemia shock but could be bowel ischemia  Transaminitis/ischemic otitis secondary to shock liver. Possible bowel ischemia causing metabolic acidosis and lactic acidosis. Hyponatremia. Hyperkalemia secondary to acute renal failure and metabolic acidosis. Hypocalcemia and hypomagnesemia. Leukocytosis. Troponin elevation likely secondary to acute renal failure. Cocaine positive on urine drug screen. Surgery consult stat and I have discussed with surgery resident concern for bowel ischemia this morning. An abdominal x-ray was done which shows dilated bowel loop but there was no signs of perforation. As lactic acid is increasing again which will be a distal concern for ischemia. IV fluid resuscitation Will give 1 L now and will need more IV fluid. Continue Levophed and will try to wean Levophed  Will follow-up lactic acid. Blood culture 1 set again today and follow blood cultures from last night. Infectious disease consult and I have discussed with infectious disease attending. Continue with bicarbonate drip. PPI drip  Will follow-up BMP for potassium. Follow-up with nephrology dialysis catheter in place and likely will need CVVH. Follow-up urine output and renal function. Azactam IV to continue and continue with Levaquin. Follow up CK and myoglobin.   [facility]  Follow-up WBC count pain  Will recheck INR and LFTs tomorrow    Discussed with nursing staff, treatment plan discussed. Total critical care time caring for this patient with life threatening, unstable organ failure, including direct patient contact, management of life support systems, review of data including imaging and labs, discussions with other team members and physicians at least 72  Min so far today, excluding procedures. Cinda Ulloa MD  4/6/2019 9:25 AM      Please note that this chart was generated using voice recognition Dragon dictation software. Although every effort was made to ensure the accuracy of this automated transcription, some errors in transcription may have occurred.

## 2019-04-06 NOTE — ED NOTES
Writer on phone w/ mother to witness verbal consent for dialysis treatment      Yodit Chow RN  04/06/19 0112

## 2019-04-06 NOTE — ED NOTES
Dr. Felisha Morley, dr. Joseph Simeon, at bedside. Rhoda Pollard rn at bedside.       Mary Beach RN  04/05/19 8445

## 2019-04-06 NOTE — PROGRESS NOTES
Pharmacy Note  Vancomycin Consult    Karishma Helton is a 47 y.o. female started on Vancomycin for pneumonia; consult received from Dr. Charly Christianson to manage therapy. Also receiving the following antibiotics: Levofloxacin. Patient Active Problem List   Diagnosis    Dog bite    Post-traumatic osteoarthritis of left knee    S/P left knee arthroscopy    S/P total knee arthroplasty    Arthritis of left knee    Shock ([address]       Allergies:  Pcn [penicillins]; Sulfa antibiotics; and Tape [adhesive tape]     Temp max: 98.4    Recent Labs     04/05/19 2152 04/06/19  0025   BUN 62* 62*       Recent Labs     04/05/19 2152 04/06/19  0025   CREATININE 4.06* 3.80*       Recent Labs     04/05/19 2152   WBC 23.5*         Intake/Output Summary (Last 24 hours) at 4/6/2019 0353  Last data filed at 4/6/2019 0130  Gross per 24 hour   Intake 3849 ml   Output 110 ml   Net 3739 ml       Culture Date      Source                       Results  pending    Ht Readings from Last 1 Encounters:   04/06/19 5' 1.02\" (1.55 m)        Wt Readings from Last 1 Encounters:   04/06/19 173 lb 15.1 oz (78.9 kg)         Body mass index is 32.84 kg/m². Estimated Creatinine Clearance: 16 mL/min (A) (based on SCr of 3.8 mg/dL (H)). Goal Trough Level: 15-20 mcg/mL    Assessment/Plan:  Will initiate vancomycin 1250 mg IV for one dose followed by 750 mg IV on Tues, Thurs and Sat following hemodialysis. Timing of trough level will be determined based on culture results, renal function, and clinical response. Thank you for the consult. Will continue to follow.     John Solis [place]  4/6/2019 3:55 AM

## 2019-04-06 NOTE — PROGRESS NOTES
Charting Intubations and reintubations    ETT Size 7.5  ETT placement : 23 @ lips     Tube placement verified by:   Auscultation (yes/no) yes  Positive color change on ETCO2 detector (yes/no) yes  CXR (yes/no) yes    Reason for intubation: AMS / Airway Protection     If difficult airway, was red tape placed (yes/no) n/a  If code situation, was rescue pod used? (yes/no) n/a     Procedure performed by: Dr Merline Lapidus

## 2019-04-06 NOTE — PROCEDURES
Central Line Placement Procedure Note    Indication: Renal failure requiring dialysis    Consent: The patients mother and POA provided verbal consent for this procedure. Procedure: The patient was positioned appropriately and the skin over the right internal jugular vein was prepped with betadine and draped in a sterile fashion. Local anesthesia was obtained by infiltration using 1% Lidocaine without epinephrine. A large bore needle was used to identify the vein. A guide wire was then inserted into the vein through the needle. [facility] D[facility] Catheter was then inserted into the vessel over the guide wire using the Seldinger technique. All ports showed good, free flowing blood return and were flushed with saline solution. The catheter was then securely fastened to the skin with sutures and covered with a sterile dressing. A post procedure X-ray was ordered and showed good line position. The patient tolerated the procedure well.     Complications: None

## 2019-04-06 NOTE — PLAN OF CARE
Problem: OXYGENATION/RESPIRATORY FUNCTION  Goal: Patient will maintain patent airway  4/6/2019 0320 by Luly Guaman RCP  Outcome: Ongoing  4/5/2019 2220 by Haleigh Mcclain  Outcome: Ongoing  Goal: Patient will achieve/maintain normal respiratory rate/effort  Description  Respiratory rate and effort will be within normal limits for the patient  4/6/2019 0320 by Luly Guaman RCP  Outcome: Ongoing  4/5/2019 2220 by Haleigh Mcclain  Outcome: Ongoing     Problem: MECHANICAL VENTILATION  Goal: Patient will maintain patent airway  4/6/2019 0320 by Luly Guaman RCP  Outcome: Ongoing  4/5/2019 2220 by Haleigh Mcclain  Outcome: Ongoing  Goal: Oral health is maintained or improved  4/6/2019 0320 by Luly Guaman RCP  Outcome: Ongoing  4/5/2019 2220 by Haleigh Mcclain  Outcome: Ongoing  Goal: Tracheostomy will be managed safely  4/6/2019 0320 by Luly Guaman RCP  Outcome: Ongoing  4/5/2019 2220 by Haleigh Mcclain  Outcome: Ongoing  Goal: ET tube will be managed safely  4/6/2019 0320 by Luly Guaman RCP  Outcome: Ongoing  4/5/2019 2220 by Haleigh Mcclain  Outcome: Ongoing  Goal: Ability to express needs and understand communication  4/6/2019 0320 by Luly Guaman RCP  Outcome: Ongoing  4/5/2019 2220 by Haleigh Mcclain  Outcome: Ongoing  Goal: Mobility/activity is maintained at optimum level for patient  4/6/2019 0320 by Luly Guaman RCP  Outcome: Ongoing  4/5/2019 2220 by Haleigh Mcclain  Outcome: Ongoing

## 2019-04-06 NOTE — CARE COORDINATION
Case Management Initial Discharge Plan  Winifred Broussard             Met with:family member patient and mother to discuss discharge plans. Information verified: address, contacts, phone number, , insurance Yes  PCP: No primary care provider on file. Date of last visit:     Insurance Provider: Refugio Clemens    Discharge Planning    Living Arrangements:  Family Members, Parent   Support Systems:       Home has 0 stories  0 stairs to climb to get into front door, 0stairs to climb to reach second floor  Location of bedroom/bathroom in home 0    Patient able to perform ADL's:Independent    Current Services (outpatient & in home)   DME equipment:   DME provider:     Pharmacy:    Potential Assistance Purchasing Medications:  No  Does patient want to participate in local refill/ meds to beds program?  No    Potential Assistance Needed:  N/A    Patient agreeable to home care: No  [place] of choice provided:  n/a    Prior SNF/Rehab Placement and Facility:   Agreeable to SNF/Rehab: No  [place] of choice provided: n/a   Evaluation: n/a    Expected Discharge date:     Patient expects to be discharged to:  SNF vs Home w/HC  Follow Up Appointment: Best Day/ Time:      Transportation provider: Family  Transportation arrangements needed for discharge: No    Readmission Risk              Risk of Unplanned Readmission:        14             Does patient have a readmission risk score greater than 14?: Yes or No  If yes, follow-up appointment must be made within 7 days of discharge.      Discharge Plan: SNF vs Home vs Home w/HC, intubated, following as POC develops          Electronically signed by William Live RN on 19 at 4:14 PM

## 2019-04-06 NOTE — PROGRESS NOTES
Patient admitted on Mechanical Ventilator Protocol. Patients height measured at 66 for an IBW 54    Patient placed on the ventilator on settings as charted on flowsheeet. Ventilator Bronchodilator assessment    Breath sounds: clear  Inspiratory Pressure: 25  Plateau Pressure: 23    Patient assessed at level 1          [x]    Bronchodilator Assessment    BRONCHODILATOR ASSESSMENT SCORE  Score 0 (Home) 1 2 3 4   Breath Sounds   []  Chronic Ventilator: Patient at baseline [x]  Mild Wheezes/ Clear []  Intermittent wheezes with good air entry []  Bilateral/unilateral wheezing with diminished air entry []  Insp/Exp wheeze and/or poor aeration   Ventilator Pressures   []  Chronic Ventilator [x]  Insp. Pressure less than 25 cm H20 []  Insp. Pressure less than 25 cm H20 []  Insp. Pressure exceeds 25 cm H20 []  Insp.  Pressure exceeds 30 cm H20   Plateau Pressure []  NA   [x]  Plateau Pressure less than 4  []  Plateau Pressure less than or equal to 5 []  Plateau Pressure greater than or equal to 6 []  Plateau Pressure greater than or equal to 8       [facility]  3:19 AM

## 2019-04-06 NOTE — ANESTHESIA PRE PROCEDURE
Department of Anesthesiology  Preprocedure Note       Name:  Faheem Paul   Age:  47 y.o.  :  1965                                          MRN:  [id number]         Date:  2019      Surgeon: Chester Lopez):  Sarina Morris MD    Procedure: LAPAROTOMY EXPLORATORY, POSSIBLE BOWEL RESECTION, OSTOMY CREATION, WOUND VAC PLACEMENT (N/A )    Medications prior to admission:   Prior to Admission medications    Medication Sig Start Date End Date Taking? Authorizing Provider   celecoxib (CELEBREX) 200 MG capsule Take 1 capsule by mouth 2 times daily 17   Ysabel Viveros DO   traMADol (ULTRAM) 50 MG tablet 1 tablet PO BID PRN pain 17   Sy Welsh DO   oxyCODONE-acetaminophen (PERCOCET) 5-325 MG per tablet Take 1 tablet by mouth 2 times daily as needed for Pain . 3/16/17   Sy Welsh DO   diclofenac (VOLTAREN) 75 MG EC tablet Take 1 tablet by mouth 2 times daily 17   Ysabel Viveros DO   oxyCODONE-acetaminophen (PERCOCET) 5-325 MG per tablet Take 1 tablet by mouth every 6 hours as needed for Pain . 17   Grace Elam DO   aspirin 325 MG EC tablet Take 1 tablet by mouth 2 times daily for 14 days 17  Grace Elam DO   Misc. Devices (ROLLER [place]) MISC 1 each by Does not apply route daily 17   Grace Elam DO   QVAR 40 MCG/ACT inhaler Inhale 2 puffs into the lungs 2 times daily 1/3/17   Historical Provider, MD   Calcium Citrate-Vitamin D (CALCIUM + D PO) Take 1 tablet by mouth daily    Historical Provider, MD   Misc.  Devices MISC As directed by physician daily 17   Ysabel Viveros DO   diclofenac (VOLTAREN) 50 MG EC tablet Take 1 tablet by mouth 2 times daily 17   Shelley Sanford DO   docusate sodium (COLACE) 100 MG capsule Take 1 capsule by mouth 2 times daily as needed for Constipation 16   Luke Yo DO   gabapentin (NEURONTIN) 100 MG capsule Take 100 mg by mouth 3 times daily as needed  16   Historical Provider, MD   venlafaxine (EFFEXOR) 75 MG tablet Take 100 mg by mouth 2 times daily  9/10/16   Historical Provider, MD   lisinopril-hydrochlorothiazide (PRINZIDE;ZESTORETIC) 10-12.5 MG per tablet Take 1 tablet by mouth daily 8/31/16   Historical Provider, MD   albuterol (PROVENTIL) (2.5 MG/3ML) 0.083% nebulizer solution Take 2.5 mg by nebulization every 6 hours as needed for Wheezing    Historical Provider, MD   albuterol sulfate  (90 BASE) MCG/ACT inhaler Inhale 2 puffs into the lungs every 6 hours as needed for Wheezing    Historical Provider, MD       Current medications:    Current Facility-Administered Medications   Medication Dose Route Frequency Provider Last Rate Last Dose    sodium chloride flush 0.9 % injection 10 mL  10 mL Intravenous 2 times per day Karina Cooney, DO   10 mL at 04/06/19 0847    sodium chloride flush 0.9 % injection 10 mL  10 mL Intravenous PRN Karina Cooney, DO   10 mL at 04/06/19 0855    magnesium hydroxide (MILK OF MAGNESIA) 400 MG/5ML suspension 30 mL  30 mL Oral Daily PRN Karina Cooney, DO        ondansetron (ZOFRAN) injection 4 mg  4 mg Intravenous Q6H PRN Karina Cooney, DO        potassium chloride 10 mEq/100 mL IVPB (Peripheral Line)  10 mEq Intravenous PRN Karina Cooney, DO        potassium chloride 20 mEq/50 mL IVPB (Central Line)  20 mEq Intravenous PRN Karina Cooney, DO        magnesium sulfate 1 g in dextrose 5% 100 mL IVPB  1 g Intravenous PRN Karina Cooney,  mL/hr at 04/06/19 1651 1 g at 04/06/19 1651    [START ON 4/7/2019] levofloxacin (LEVAQUIN) 500 MG/100ML infusion 500 mg  500 mg Intravenous Q48H Ricky Riddle, DO        heparin (porcine) injection 1,400 Units  1,400 Units Intercatheter PRN Kelsey Rizvi MD   1,400 Units at 04/06/19 0445    heparin (porcine) injection 1,500 Units  1,500 Units Intercatheter PRN Kelsey Rizvi MD   1,500 Units at 04/06/19 0445    fentaNYL (SUBLIMAZE) injection 50 mcg  50 mcg Intravenous Q1H PRN Trev Samayoa S/P total knee arthroplasty Z96.659    Arthritis of left knee M17.12    Shock (HCC) R57.9    Rhabdomyolysis M62.82    Hyponatremia E87.1    Lactic acidosis E87.2    MARY (acute kidney injury) ([address] C88.9    Metabolic acidosis C85.5    Acute respiratory failure (HCC) J96.00    Elevated liver enzymes R74.8    Hyperkalemia E87.5       Past Medical History:        Diagnosis Date    Asthma     On inhaler    Back pain, chronic     Hypertension 2015    On Lisinopril    Snores     possible apnea but not tested    Wears glasses        Past Surgical History:        Procedure Laterality Date    KNEE ARTHROSCOPY Left 1980    KNEE ARTHROSCOPY Left 09/28/2016    with medial menisectomy    TONSILLECTOMY      ULNAR TUNNEL RELEASE Right 2013       Social History:    Social History     Tobacco Use    Smoking status: Light Tobacco Smoker     Packs/day: 0.25     Types: Cigarettes     Start date: 9/19/1980    Smokeless tobacco: Never Used   Substance Use Topics    Alcohol use: Yes     Comment: OCCASSIONAL                                Ready to quit: Not Answered  Counseling given: Not Answered      Vital Signs (Current):   Vitals:    04/06/19 1545 04/06/19 1600 04/06/19 1615 04/06/19 1630   BP:  (!) 84/41     Pulse: 109 111 113 116   Resp: (!) 37 (!) 36 (!) 36 (!) 37   Temp:       TempSrc:       SpO2: 97% 94% 94% 92%   Weight:       Height:                                                  BP Readings from Last 3 Encounters:   04/06/19 (!) 84/41   07/02/18 (!) 141/95   01/26/17 (!) 158/82       NPO Status:                                                                                 BMI:   Wt Readings from Last 3 Encounters:   04/06/19 173 lb 15.1 oz (78.9 kg)   11/12/18 155 lb (70.3 kg)   05/15/17 145 lb 1 oz (65.8 kg)     Body mass index is 32.84 kg/m².     CBC:   Lab Results   Component Value Date    WBC 12.3 04/06/2019    RBC 4.19 04/06/2019    HGB 12.8 04/06/2019    HCT 40.3 04/06/2019    MCV 96.2 04/06/2019 RDW 14.6 04/06/2019     04/06/2019       CMP:   Lab Results   Component Value Date     04/06/2019    K 4.6 04/06/2019    CL 94 04/06/2019    CO2 13 04/06/2019    BUN 62 04/06/2019    CREATININE 3.89 04/06/2019    GFRAA 15 04/06/2019    LABGLOM 12 04/06/2019    GLUCOSE 85 04/06/2019    PROT 5.5 04/06/2019    CALCIUM 6.8 04/06/2019    BILITOT 0.62 04/06/2019    ALKPHOS 240 04/06/2019    AST 2,617 04/06/2019    ALT 1,740 04/06/2019       POC Tests:   Recent Labs     04/06/19  0008 04/06/19  0417   POCGLU  --  110*   POCK 6.4*  --        Coags:   Lab Results   Component Value Date    PROTIME 15.0 04/06/2019    INR 1.5 04/06/2019    APTT 28.0 04/05/2019       HCG (If Applicable):   Lab Results   Component Value Date    HCG NEGATIVE 01/24/2017        ABGs: No results found for: PHART, PO2ART, WWJ6PGY, ZNY1ITC, BEART, T2MGNXMQ     Type & Screen (If Applicable):  No results found for: LABABO, [address]    Anesthesia Evaluation  Patient summary reviewed no history of anesthetic complications:   Airway:        Comment: Oral intubated   Dental:          Pulmonary:   (+) decreased breath sounds,  asthma: current smoker                          ROS comment: Resp failure   Cardiovascular:    (+) hypertension:,         Rhythm: regular  Rate: normal                    Neuro/Psych:   (+) neuromuscular disease:,             GI/Hepatic/Renal:   (+) renal disease:,           Endo/Other:    (+) : arthritis:., electrolyte abnormalities, .                  Abdominal:           Vascular:                                       Mechanical Ventilation Data   SETTINGS (Comprehensive)  Vent Information  $Ventilation: $Subsequent Day  Ventilator Started: Yes  Skin Assessment: Clean, dry, & intact  Vent Type: Servo i  Vent Mode: PRVC  Vt Ordered: 470 mL  Rate Set: 20 bmp  FiO2 : 40 %  Sensitivity: 5  PEEP/CPAP: 8  I Time/ I Time %: 0.9 s  Cuff Pressure (cm H2O): 22 cm H2O  Humidification Source: HME  Additional Respiratory Assessments  Pulse: 115  Resp: (!) 38  SpO2: 95 %  End Tidal CO2: 18 (%)  Position: Semi-Salazar's  Humidification Source: HME  Oral Care Completed?: Yes  Oral Care: Mouthwash, Mouth swabbed, Mouth moisturizer, Mouth suctioned  Subglottic Suction Done?: No  Cuff Pressure (cm H2O): 22 cm H2O    ABG   No results found for: PH, PCO2, PO2, HCO3, O2SAT  Lab Results   Component Value Date    MODE NOT REPORTED 04/06/2019       Lactic acidosis worsening     Anesthesia Plan      general     ASA 5 - emergent       Induction: intravenous. arterial line    Anesthetic plan and risks discussed with Unable to obtain due to emergent nature. Plan discussed with CRNA.                 Shelli Phan MD   4/6/2019

## 2019-04-06 NOTE — PROGRESS NOTES
Talked to Dr Tj Mckeon, patient's lactic acid is increasing from 8-10.5 in spite of resuscitation Plan to take the patient to OR.

## 2019-04-06 NOTE — ED NOTES
Writer at bedside, ne labs drawn at this time  More medications started       Cam Figures, [facility]  04/06/19 014

## 2019-04-06 NOTE — ED NOTES
Pt arrived via Ls[facility], pt was pulled over from stretcher onto bed. C/O: Altered mental status. Sister states that the pt is on vacation, pt came to the sisters house 3 days ago, pt sister states 3 days ago the pt was vomiting uncontrollably, pt sis states the two days that followed the pt was laying in bed \"sleeping\" and not responding to stimuli or voice. Pt sister called 47 today and on arrival ems states the pt was unresponsive via verbal stimuli, ems gave 0.5 mg narcan and the pt preceded to become more of a awake state, on arrival pt is in very confused state, pt is not able to answer questions but is able to follow commands, pt is diaphoretic on arrival , labored breathing, pt modeling full body, pt cold to touch, Pt placed on full cardiac monitor, Iv access initiated, EKG preformed. Dr. Randa Nassar, dr. Mary Alice Leavitt rn, writer rn at bedside.         Glo Zhang, RN  04/05/19 18 Frances Elias RN  04/05/19 1006

## 2019-04-06 NOTE — PROGRESS NOTES
Nutrition Assessment    Type and Reason for Visit: Initial    Nutrition Recommendations:   -Monitor for start of nutrition, EN vs PN  -If TF, suggest Nepro at 25ml per hr to provide 1080 kcal, 79gm protein      Nutrition Assessment: Pt intubated    Malnutrition Assessment:  · Malnutrition Status: Insufficient data  · Context: Acute illness or injury  · Findings of the 6 clinical characteristics of malnutrition (Minimum of 2 out of 6 clinical characteristics is required to make the diagnosis of moderate or severe Protein Calorie Malnutrition based on AND/ASPEN Guidelines):  1. Energy Intake-Unable to assess, Unable to assess    2. Weight Loss-Unable to assess,    3. Fat Loss-Unable to assess,    4. Muscle Loss-Unable to assess,    5. Fluid Accumulation-No significant fluid accumulation,    6.   Strength-Not measured    Nutrition Risk Level: High    Nutrient Needs:  · Estimated Daily Total Kcal: 950-1150 kcal (12-14 kcal/kg)  · Estimated Daily Protein (g): 55-70 gm (1.2-1.5 gm/kg)    Nutrition Diagnosis:   · Problem: Inadequate oral intake  · Etiology: related to Impaired respiratory function-inability to consume food     Signs and symptoms:  as evidenced by NPO status due to medical condition    Objective Information:  · Nutrition-Focused Physical Findings: no bowel sounds  · Wound Type: None  · Current Nutrition Therapies:  · Oral Diet Orders: NPO   · Oral Diet intake: NPO  · Anthropometric Measures:  · Ht: 5' 1.02\" (155 cm)   · Current Body Wt: 173 lb 15.1 oz (78.9 kg)     · Ideal Body Wt: 105 lb 13.1 oz (48 kg), % Ideal Body 165%  · BMI Classification: BMI 30.0 - 34.9 Obese Class I    Nutrition Interventions:   Continue NPO  Continued Inpatient Monitoring, Education Not Indicated    Nutrition Evaluation:   · Evaluation: Goals set   · Goals: Meet % of estimated nutrition needs    · Monitoring: Nutrition Progression, Weight, Pertinent Labs      Electronically signed by Shazia Ellsworth RD, LD on 4/6/19 at 3:16 PM    Contact Number: [phone]

## 2019-04-06 NOTE — CONSULTS
History and Physical - General Surgery    PATIENT NAME: Niall Hairston  AGE: 47 y.o. MEDICAL RECORD NO. [id number]  DATE: 4/6/2019  SURGEON: Kodi Sanchez  PRIMARY CARE PHYSICIAN: No primary care provider on file. Patient evaluated at the request of  Dr. Aneta Sage  Reason for evaluation: Possible mesenteric ischemia    Patient information was obtained from parent and past medical records. History/Exam limitations: mental status and due to condition. Patient presented to the Emergency Department by ambulance where the patient received see Ambulance Run Sheet prior to arrival.    IMPRESSION:     Active Problems:    Shock ([address]    Rhabdomyolysis    Hyponatremia    Lactic acidosis    MARY (acute kidney injury) ([address]    Metabolic acidosis    Acute respiratory failure (HCC)    Elevated liver enzymes  Resolved Problems:    * No resolved hospital problems. *        MEDICAL DECISION MAKING AND PLAN:     1. Neuro  1. Altered mental status. Primary team ordered head CT, pt too unstable for transport at this time. 2. Pain: Fentanyl PRN  3. Sedation: Versed 2 mg/hr  4. + Drug abuse: cocaine/cannibus  2. CV:  Severe shock, suspect mixed hypovolemic-cardiogenic component  1. Pressor support: Levophed and vasopressin  2. ECHO 4/6: hyperdynamic with EF 75%  3. Troponins: 85-->94  3. Pulm  1. Acute respiratory failure due to acidosis  2. Mechanical ventilation  3. PRVC 40%/ 470 / Rate 20(36)/ PEEP 8  4. P/f: 340  4. GI  1. Acute GI bleed, possible mesenteric ischemia  2. Shock liver  3. NPO  4. CTA 4/5 negative for mesenteric ischemia, but patient profoundly hypotensive with high vasopressor requirements today. 5. Nephro  1. Rhabdomyolysis   2. Acute kidney failure (KDIGO stage III)  3. Severe metabolic acidosis  4. Attempted dialysis for hyperkalemia without success   5. IVF: sodium bicarb 125 cc/hr  6. UO: 33 mL since 7 AM  7. Hyponatremia  6. ID  1. Merrem/ Levaquin/ Aztreonam/ Vanc empiric coverage for sepsis  2.  Improving Pain . 1/25/17   Haresh Garcia DO   aspirin 325 MG EC tablet Take 1 tablet by mouth 2 times daily for 14 days 1/25/17 2/8/17  Haresh Garcia DO   Misc. Devices (ROLLER [place]) MISC 1 each by Does not apply route daily 1/25/17   Haresh Garcia DO   QVAR 40 MCG/ACT inhaler Inhale 2 puffs into the lungs 2 times daily 1/3/17   Historical Provider, MD   Calcium Citrate-Vitamin D (CALCIUM + D PO) Take 1 tablet by mouth daily    Historical Provider, MD   Misc.  Devices MISC As directed by physician daily 1/11/17   Juan Cotto,    diclofenac (VOLTAREN) 50 MG EC tablet Take 1 tablet by mouth 2 times daily 1/9/17   Paula Grey,    docusate sodium (COLACE) 100 MG capsule Take 1 capsule by mouth 2 times daily as needed for Constipation 9/28/16   Shanell Pate DO   gabapentin (NEURONTIN) 100 MG capsule Take 100 mg by mouth 3 times daily as needed  8/11/16   Historical Provider, MD   venlafaxine (EFFEXOR) 75 MG tablet Take 100 mg by mouth 2 times daily  9/10/16   Historical Provider, MD   lisinopril-hydrochlorothiazide (PRINZIDE;ZESTORETIC) 10-12.5 MG per tablet Take 1 tablet by mouth daily 8/31/16   Historical Provider, MD   albuterol (PROVENTIL) (2.5 MG/3ML) 0.083% nebulizer solution Take 2.5 mg by nebulization every 6 hours as needed for Wheezing    Historical Provider, MD   albuterol sulfate  (90 BASE) MCG/ACT inhaler Inhale 2 puffs into the lungs every 6 hours as needed for Wheezing    Historical Provider, MD    Scheduled Meds:   sodium chloride flush  10 mL Intravenous 2 times per day    [START ON 4/7/2019] levofloxacin  500 mg Intravenous Q48H    meropenem  1 g Intravenous Q24H    calcium gluconate IVPB  2 g Intravenous Once    lactated ringers bolus  1,000 mL Intravenous Once    magnesium sulfate  1 g Intravenous Once     Continuous Infusions:   norepinephrine 11 mcg/min (04/06/19 1307)    vasopressin (Septic Shock) infusion 0.04 Units/min (04/06/19 1014)    pantoprozole (PROTONIX) infusion Recent Labs     04/05/19 2152 04/06/19  0025 04/06/19  0436 04/06/19  0516 04/06/19  0942 04/06/19  1026   WBC 23.5*  --   --  12.3*  --   --   --    HGB 10.2*  --   --  12.8  --   --   --    HCT 33.8*  --   --  40.3  --   --   --      --   --  396  --   --   --    *  --  127* 129*  --  131*  --    K 8.0*  --  6.6* 5.2  --  4.6  --    CL 93*  --  95* 96*  --  94*  --    CO2 9*  --  8* 13*  --  13*  --    BUN 62*  --  62* 53*  --  62*  --    CREATININE 4.06*  --  3.80* 3.11*  --  3.89*  --    CALCIUM 5.2*  --  5.9* 6.8*  --  6.8*  --    INR 1.3  --   --   --   --   --  1.5   AST 1,122*  --   --  2,617*  --   --   --    *  --   --  1,740*  --   --   --    BILITOT 0.37  --   --  0.62  --   --   --    BILIDIR  --   --   --  0.29  --   --   --    NITRU  --    < >  --   --  NEGATIVE  --   --    COLORU  --    < >  --   --  DARK YELLOW*  --   --    BACTERIA  --    < >  --   --  NOT REPORTED  --   --     < > = values in this interval not displayed. Recent Labs     04/05/19 2152 04/06/19 0436   ALKPHOS 168* 240*   * 1,740*   AST 1,122* 2,617*   BILITOT 0.37 0.62   BILIDIR  --  0.29   LABALBU 2.8* 3.2*   LIPASE 119*  --        RADIOLOGY:   CT scan abd/pelvis:  Remarkable only for bilateral adrenal masses. Thank you for the interesting evaluation. Further recommendations to follow. Misty Fletcher,   4/6/19, 2:08 PM       Trauma Attending Attestation      I have reviewed the above TECSS note(s) and confirmed the key elements of the medical history and physical exam. I have seen and examined the pt. I have discussed the findings, established the care plan and recommendations with Resident and bedside nurse. I personally reviewed any images in real time to expedite the patient's care. Called to patient's bedside at approximately 13:00. Patient near extremis in profound hypovolemic/cardiogenic shock. Abdomen, bilateral LE mottled and purple toes - severely vasoconsricted. Performed SLR, and the patient was fluid responsive. I performed bedside echo showing decreased MAPSE, overall hypokinesis, and collapsing IVC. Gave 1L fluid, 2 g calcium, and stayed at patient's bedside for resuscitation. At 15:00 patient still fluid responsive by SLR, gave additional 1L, patient weaned from levophed at 0.28 mcg/kg to levophed 0.08 and vaso at 0.03. But lactic acidosis 3.5 this am --> 5.8 --> 8.1 --> 10.5, in spite of resuscitation. Noted troponins elevated 80-->90-->124. I think some of the elevation of both of these is due to worsening renal and liver function. However, I cannot rule out mesenteric ischemia. I had a long and honest conversation with the family based on patient's significant smoking and drug abuse (cocaine) history, that she is at high risk of not surviving an operation including possible death in the operating room, and that she will likely have a prolonged course of recovery if she does survive. Mom, sister, and brother at bedside and want to be sure that we have tried to intervene if there is any chance we can help her. I discussed that if there is a small area of necrotic bowel, that we will resect it and leave her in discontinuity for second look in 24-48 hours, but if there is global ischemia that is nonsurvivable, that I will close her incision and recommend palliation.     Effie Streeter MD  4/6/2019  4:32 PM

## 2019-04-06 NOTE — FLOWSHEET NOTE
SPIRITUAL CARE DEPARTMENT - [address]  PROGRESS NOTE    Shift date: 4/05/2019  Shift day: Friday   Shift # 3    Room # ED12  Name: Karl Rivera            Age: 47 y.o. Gender: female          Restorationism: Rastafari   Place of Islam: None    Referral: ED Alert    Admit Date & Time: 4/5/2019  8:39 PM    PATIENT/EVENT DESCRIPTION:  Karl Rivera is a 47 y.o. female who arrived to ED12 due to being found \"unresponsive\" in significant other's home. Per report, patient is in town from [place] and has been \"vomitting uncontrollably and sleeping for three days. \" Patient arrived to ED12 in a \"confused state, cold to the touch, and modeling. \" Patient was emergently intubated in ED12. SPIRITUAL ASSESSMENT/INTERVENTION:  Bhavna Woo was informed of patient's arrival to [facility].  met patient's sister and her boyfriend in ED Waiting Room and provided comfort and support to them.  facilitated medical update between patient's family and doctors in private consultation room. Patient's significant other, Dominick Jie, arrived soon after.  escorted patient's family to room, per nurse approval. Patient's family continued to share about patient's decline in health with bedside nurses. Per family, patient's parents will be coming in from [place] overnight. Patient's family members were tearful and expressed feelings of worry and concern over patient's condition. SPIRITUAL CARE FOLLOW-UP PLAN:  Chaplains can make follow-up visit, per request. Wesley Basurto can be reached 24/7 via [employer].       Electronically signed by Nba Wilburn on 4/6/2019 at 4:37 AM.  [facility]  [phone]

## 2019-04-06 NOTE — PLAN OF CARE
Problem: OXYGENATION/RESPIRATORY FUNCTION  Goal: Patient will maintain patent airway  4/6/2019 1852 by Eufemia Gomes RN  Outcome: Ongoing     Problem: OXYGENATION/RESPIRATORY FUNCTION  Goal: Patient will achieve/maintain normal respiratory rate/effort  Description  Respiratory rate and effort will be within normal limits for the patient  4/6/2019 1852 by Eufemia Gomes RN  Outcome: Ongoing     Problem: MECHANICAL VENTILATION  Goal: Patient will maintain patent airway  4/6/2019 1852 by Eufemia Gomes RN  Outcome: Ongoing     Problem: MECHANICAL VENTILATION  Goal: Oral health is maintained or improved  4/6/2019 1852 by Eufemia Gomes RN  Outcome: Ongoing     Problem: MECHANICAL VENTILATION  Goal: ET tube will be managed safely  4/6/2019 1852 by Eufemia Gomes RN  Outcome: Ongoing     Problem: MECHANICAL VENTILATION  Goal: Ability to express needs and understand communication  4/6/2019 1852 by Efuemia Gomes RN  Outcome: Ongoing     Problem: MECHANICAL VENTILATION  Goal: Mobility/activity is maintained at optimum level for patient  4/6/2019 1852 by Eufemia Goems RN  Outcome: Ongoing     Problem: NUTRITION  Goal: Nutritional status is improving  4/6/2019 1852 by Eufemia Gomes RN  Outcome: Not Met This Shift     Problem: Restraint Use - Nonviolent/Non-Self-Destructive Behavior:  Goal: Absence of restraint indications  Description  Absence of restraint indications  Outcome: Not Met This Shift     Problem: Restraint Use - Nonviolent/Non-Self-Destructive Behavior:  Goal: Absence of restraint-related injury  Description  Absence of restraint-related injury  Outcome: Met This Shift    Continue with bilateral soft wrist restraints due to patient reaching up towards airway/lines. Will continue to monitor.      Problem: Fluid Volume - Imbalance:  Goal: Absence of imbalanced fluid volume signs and symptoms  Description  Absence of imbalanced fluid volume signs and symptoms  Outcome: Ongoing

## 2019-04-06 NOTE — PROGRESS NOTES
Pharmacy Note     Renal Dose Adjustment    Michelle Forrester is a 47 y.o. female. Pharmacist assessment of renally cleared medications. Recent Labs     04/05/19 2152 04/06/19  0025   BUN 62* 62*       Recent Labs     04/05/19 2152 04/06/19  0025   CREATININE 4.06* 3.80*       Estimated Creatinine Clearance: 16 mL/min (A) (based on SCr of 3.8 mg/dL (H)). Estimated CrCl using Ideal Body Weight: 13.08 mL/min (based on IBW 47.85 kg)    Height:   Ht Readings from Last 1 Encounters:   04/06/19 5' 1.02\" (1.55 m)     Weight:  Wt Readings from Last 1 Encounters:   04/05/19 170 lb (77.1 kg)       The following medication dose has been adjusted based upon renal function per P&T Guidelines:             Famotidine 20 mg IV twice daily changed to 20 mg IV once daily.     Li Bridges [facility]  4/6/2019 2:16 AM

## 2019-04-06 NOTE — ED PROVIDER NOTES
[facility] Department     Faculty Attestation    I performed a history and physical examination of the patient and discussed management with the resident. I reviewed the residents note and agree with the documented findings and plan of care. Any areas of disagreement are noted on the chart. I was personally present for the key portions of any procedures. I have documented in the chart those procedures where I was not present during the key portions. I have reviewed the emergency nurses triage note. I agree with the chief complaint, past medical history, past surgical history, allergies, medications, social and family history as documented unless otherwise noted below. Documentation of the HPI, Physical Exam and Medical Decision Making performed by medical students or scribes is based on my personal performance of the HPI, PE and MDM. For Midlevel providers: I have personally seen and evaluated the patient. I find the patient's history and physical exam are consistent with the NP/PA documentation. I agree with the care provided, treatment rendered, disposition and follow-up plan        Last known well 2 days ago. Patient presented mottled and with respirator distress and confusion. Patient was felt to have probable sepsis. Patient also with complaints of abdominal and back pain with difficulty with pain in her lower extremities and trouble moving her large cavities. Suspected aortic pathology. Patient had hypotension tachycardia and low oxygen saturations. Patient was subsequently resuscitated. A central line was used to give fluids and pressors in preparation for intubation. Patient was intubated after stabilization of pressure with rocuronium and accommodate. Patient's blood pressure improved with fluids. Patient was taken immediately to CT scanner to do angiography of the aorta and chest.  No aortic injury was found. Patient was given IV antibiotics.   Patient on pressors. Patient for ICU.       Critical Care          MD Alondra Eisenberg MD  04/05/19 5054

## 2019-04-07 PROBLEM — K92.2 ACUTE GI BLEEDING: Status: ACTIVE | Noted: 2019-04-07

## 2019-04-07 PROBLEM — K55.049 ISCHEMIC NECROSIS OF LARGE INTESTINE (HCC): Status: ACTIVE | Noted: 2019-04-07

## 2019-04-07 PROBLEM — K55.9 SMALL BOWEL ISCHEMIA (HCC): Status: ACTIVE | Noted: 2019-04-07

## 2019-04-07 LAB
ABSOLUTE EOS #: 0 K/UL (ref 0–0.4)
ABSOLUTE IMMATURE GRANULOCYTE: 0.67 K/UL (ref 0–0.3)
ABSOLUTE LYMPH #: 0.84 K/UL (ref 1–4.8)
ABSOLUTE MONO #: 0.17 K/UL (ref 0.1–0.8)
ALBUMIN SERPL-MCNC: 2.3 G/DL (ref 3.5–5.2)
ALBUMIN SERPL-MCNC: 2.6 G/DL (ref 3.5–5.2)
ALBUMIN/GLOBULIN RATIO: 1.4 (ref 1–2.5)
ALBUMIN/GLOBULIN RATIO: 2 (ref 1–2.5)
ALLEN TEST: ABNORMAL
ALLEN TEST: NORMAL
ALP BLD-CCNC: 187 U/L (ref 35–104)
ALP BLD-CCNC: 194 U/L (ref 35–104)
ALT SERPL-CCNC: 1408 U/L (ref 5–33)
ALT SERPL-CCNC: 1528 U/L (ref 5–33)
ANION GAP SERPL CALCULATED.3IONS-SCNC: 15 MMOL/L (ref 9–17)
ANION GAP SERPL CALCULATED.3IONS-SCNC: 18 MMOL/L (ref 9–17)
ANION GAP SERPL CALCULATED.3IONS-SCNC: 21 MMOL/L (ref 9–17)
ANION GAP SERPL CALCULATED.3IONS-SCNC: 22 MMOL/L (ref 9–17)
ANION GAP: 12 MMOL/L (ref 7–16)
ANION GAP: 12 MMOL/L (ref 7–16)
ANION GAP: 15 MMOL/L (ref 7–16)
ANION GAP: 15 MMOL/L (ref 7–16)
AST SERPL-CCNC: 3260 U/L
AST SERPL-CCNC: 3542 U/L
BASOPHILS # BLD: 0 % (ref 0–2)
BASOPHILS ABSOLUTE: 0 K/UL (ref 0–0.2)
BILIRUB SERPL-MCNC: 1.56 MG/DL (ref 0.3–1.2)
BILIRUB SERPL-MCNC: 1.92 MG/DL (ref 0.3–1.2)
BILIRUBIN DIRECT: 1.11 MG/DL
BILIRUBIN, INDIRECT: 0.81 MG/DL (ref 0–1)
BUN BLDV-MCNC: 62 MG/DL (ref 6–20)
BUN BLDV-MCNC: 62 MG/DL (ref 6–20)
BUN/CREAT BLD: ABNORMAL (ref 9–20)
BUN/CREAT BLD: ABNORMAL (ref 9–20)
CALCIUM IONIZED: 0.96 MMOL/L (ref 1.13–1.33)
CALCIUM IONIZED: 1 MMOL/L (ref 1.13–1.33)
CALCIUM SERPL-MCNC: 6.4 MG/DL (ref 8.6–10.4)
CALCIUM SERPL-MCNC: 7.5 MG/DL (ref 8.6–10.4)
CARBOXYHEMOGLOBIN: 0.7 % (ref 0–5)
CHLORIDE BLD-SCNC: 89 MMOL/L (ref 98–107)
CHLORIDE BLD-SCNC: 90 MMOL/L (ref 98–107)
CHLORIDE BLD-SCNC: 91 MMOL/L (ref 98–107)
CHLORIDE BLD-SCNC: 93 MMOL/L (ref 98–107)
CO2: 14 MMOL/L (ref 20–31)
CO2: 17 MMOL/L (ref 20–31)
CO2: 20 MMOL/L (ref 20–31)
CO2: 23 MMOL/L (ref 20–31)
CREAT SERPL-MCNC: 3.79 MG/DL (ref 0.5–0.9)
CREAT SERPL-MCNC: 3.9 MG/DL (ref 0.5–0.9)
DIFFERENTIAL TYPE: ABNORMAL
EOSINOPHILS RELATIVE PERCENT: 0 % (ref 1–4)
FIO2: 40
FIO2: ABNORMAL
GFR AFRICAN AMERICAN: 15 ML/MIN
GFR AFRICAN AMERICAN: 15 ML/MIN
GFR NON-AFRICAN AMERICAN: 12 ML/MIN
GFR NON-AFRICAN AMERICAN: 13 ML/MIN
GFR NON-AFRICAN AMERICAN: 7 ML/MIN
GFR NON-AFRICAN AMERICAN: 8 ML/MIN
GFR SERPL CREATININE-BSD FRML MDRD: 9 ML/MIN
GFR SERPL CREATININE-BSD FRML MDRD: ABNORMAL ML/MIN/{1.73_M2}
GLOBULIN: ABNORMAL G/DL (ref 1.5–3.8)
GLUCOSE BLD-MCNC: 101 MG/DL (ref 74–100)
GLUCOSE BLD-MCNC: 106 MG/DL (ref 65–105)
GLUCOSE BLD-MCNC: 66 MG/DL (ref 74–100)
GLUCOSE BLD-MCNC: 69 MG/DL (ref 65–105)
GLUCOSE BLD-MCNC: 71 MG/DL (ref 74–100)
GLUCOSE BLD-MCNC: 73 MG/DL (ref 70–99)
GLUCOSE BLD-MCNC: 74 MG/DL (ref 74–100)
GLUCOSE BLD-MCNC: 81 MG/DL (ref 74–100)
GLUCOSE BLD-MCNC: 85 MG/DL (ref 65–105)
GLUCOSE BLD-MCNC: 91 MG/DL (ref 70–99)
GLUCOSE BLD-MCNC: 94 MG/DL (ref 74–100)
HCO3 VENOUS: 15.6 MMOL/L (ref 24–30)
HCT VFR BLD CALC: 28.1 % (ref 36.3–47.1)
HEMOGLOBIN: 9.1 G/DL (ref 11.9–15.1)
IMMATURE GRANULOCYTES: 4 %
INR BLD: 1.5
LACTIC ACID, WHOLE BLOOD: 3.5 MMOL/L (ref 0.7–2.1)
LACTIC ACID, WHOLE BLOOD: 3.8 MMOL/L (ref 0.7–2.1)
LACTIC ACID, WHOLE BLOOD: 6.5 MMOL/L (ref 0.7–2.1)
LACTIC ACID, WHOLE BLOOD: 7.3 MMOL/L (ref 0.7–2.1)
LYMPHOCYTES # BLD: 5 % (ref 24–44)
MAGNESIUM: 2 MG/DL (ref 1.6–2.6)
MAGNESIUM: 2.2 MG/DL (ref 1.6–2.6)
MCH RBC QN AUTO: 30.6 PG (ref 25.2–33.5)
MCHC RBC AUTO-ENTMCNC: 32.4 G/DL (ref 28.4–34.8)
MCV RBC AUTO: 94.6 FL (ref 82.6–102.9)
METHEMOGLOBIN: ABNORMAL % (ref 0–1.5)
MODE: ABNORMAL
MODE: NORMAL
MONOCYTES # BLD: 1 % (ref 1–7)
MORPHOLOGY: ABNORMAL
MORPHOLOGY: ABNORMAL
MYOGLOBIN: ABNORMAL NG/ML (ref 25–58)
NEGATIVE BASE EXCESS, ART: 1 (ref 0–2)
NEGATIVE BASE EXCESS, ART: 4 (ref 0–2)
NEGATIVE BASE EXCESS, ART: 5 (ref 0–2)
NEGATIVE BASE EXCESS, ART: 8 (ref 0–2)
NEGATIVE BASE EXCESS, ART: 8 (ref 0–2)
NEGATIVE BASE EXCESS, ART: NORMAL (ref 0–2)
NEGATIVE BASE EXCESS, VEN: 10.8 MMOL/L (ref 0–2)
NOTIFICATION TIME: ABNORMAL
NOTIFICATION: ABNORMAL
NRBC AUTOMATED: 1.2 PER 100 WBC
O2 DEVICE/FLOW/%: ABNORMAL
O2 DEVICE/FLOW/%: NORMAL
O2 SAT, VEN: 83.1 % (ref 60–85)
OXYHEMOGLOBIN: ABNORMAL % (ref 95–98)
PATIENT TEMP: 36.4
PATIENT TEMP: 37
PATIENT TEMP: ABNORMAL
PATIENT TEMP: NORMAL
PCO2, VEN, TEMP ADJ: ABNORMAL MMHG (ref 39–55)
PCO2, VEN: 39 (ref 39–55)
PDW BLD-RTO: 14.6 % (ref 11.8–14.4)
PEEP/CPAP: ABNORMAL
PH VENOUS: 7.23 (ref 7.32–7.42)
PH, VEN, TEMP ADJ: ABNORMAL (ref 7.32–7.42)
PHOSPHORUS: 8.2 MG/DL (ref 2.6–4.5)
PHOSPHORUS: 8.8 MG/DL (ref 2.6–4.5)
PLATELET # BLD: 234 K/UL (ref 138–453)
PLATELET ESTIMATE: ABNORMAL
PMV BLD AUTO: 11.8 FL (ref 8.1–13.5)
PO2, VEN, TEMP ADJ: ABNORMAL MMHG (ref 30–50)
PO2, VEN: 57.2 (ref 30–50)
POC CHLORIDE: 90 MMOL/L (ref 98–107)
POC CHLORIDE: 93 MMOL/L (ref 98–107)
POC CHLORIDE: 94 MMOL/L (ref 98–107)
POC CHLORIDE: 95 MMOL/L (ref 98–107)
POC CREATININE: 5.83 MG/DL (ref 0.51–1.19)
POC CREATININE: 5.96 MG/DL (ref 0.51–1.19)
POC CREATININE: 6 MG/DL (ref 0.51–1.19)
POC CREATININE: 6.19 MG/DL (ref 0.51–1.19)
POC HCO3: 18 MMOL/L (ref 21–28)
POC HCO3: 18.2 MMOL/L (ref 21–28)
POC HCO3: 20.2 MMOL/L (ref 21–28)
POC HCO3: 22.3 MMOL/L (ref 21–28)
POC HCO3: 24.2 MMOL/L (ref 21–28)
POC HCO3: 24.4 MMOL/L (ref 21–28)
POC HEMATOCRIT: 26 % (ref 36–46)
POC HEMATOCRIT: 27 % (ref 36–46)
POC HEMATOCRIT: 27 % (ref 36–46)
POC HEMATOCRIT: 29 % (ref 36–46)
POC HEMOGLOBIN: 9 G/DL (ref 12–16)
POC HEMOGLOBIN: 9 G/DL (ref 12–16)
POC HEMOGLOBIN: 9.3 G/DL (ref 12–16)
POC HEMOGLOBIN: 9.9 G/DL (ref 12–16)
POC IONIZED CALCIUM: 0.91 MMOL/L (ref 1.15–1.33)
POC IONIZED CALCIUM: 1 MMOL/L (ref 1.15–1.33)
POC IONIZED CALCIUM: 1 MMOL/L (ref 1.15–1.33)
POC IONIZED CALCIUM: 1.09 MMOL/L (ref 1.15–1.33)
POC IONIZED CALCIUM: 1.1 MMOL/L (ref 1.15–1.33)
POC IONIZED CALCIUM: 1.63 MMOL/L (ref 1.15–1.33)
POC LACTIC ACID: 3.16 MMOL/L (ref 0.56–1.39)
POC LACTIC ACID: 3.19 MMOL/L (ref 0.56–1.39)
POC LACTIC ACID: 4.29 MMOL/L (ref 0.56–1.39)
POC LACTIC ACID: 5.28 MMOL/L (ref 0.56–1.39)
POC LACTIC ACID: 5.77 MMOL/L (ref 0.56–1.39)
POC LACTIC ACID: 6.11 MMOL/L (ref 0.56–1.39)
POC O2 SATURATION: 96 % (ref 94–98)
POC O2 SATURATION: 96 % (ref 94–98)
POC O2 SATURATION: 98 % (ref 94–98)
POC O2 SATURATION: 98 % (ref 94–98)
POC O2 SATURATION: 99 % (ref 94–98)
POC O2 SATURATION: 99 % (ref 94–98)
POC PCO2 TEMP: ABNORMAL MM HG
POC PCO2 TEMP: NORMAL MM HG
POC PCO2: 37.2 MM HG (ref 35–48)
POC PCO2: 37.5 MM HG (ref 35–48)
POC PCO2: 39.5 MM HG (ref 35–48)
POC PCO2: 39.7 MM HG (ref 35–48)
POC PCO2: 41.4 MM HG (ref 35–48)
POC PCO2: 43.1 MM HG (ref 35–48)
POC PH TEMP: ABNORMAL
POC PH TEMP: NORMAL
POC PH: 7.26 (ref 7.35–7.45)
POC PH: 7.3 (ref 7.35–7.45)
POC PH: 7.32 (ref 7.35–7.45)
POC PH: 7.34 (ref 7.35–7.45)
POC PH: 7.37 (ref 7.35–7.45)
POC PH: 7.4 (ref 7.35–7.45)
POC PO2 TEMP: ABNORMAL MM HG
POC PO2 TEMP: NORMAL MM HG
POC PO2: 110.1 MM HG (ref 83–108)
POC PO2: 112.6 MM HG (ref 83–108)
POC PO2: 119.6 MM HG (ref 83–108)
POC PO2: 130.4 MM HG (ref 83–108)
POC PO2: 83.1 MM HG (ref 83–108)
POC PO2: 94 MM HG (ref 83–108)
POC POTASSIUM: 4.9 MMOL/L (ref 3.5–4.5)
POC POTASSIUM: 4.9 MMOL/L (ref 3.5–4.5)
POC POTASSIUM: 5 MMOL/L (ref 3.5–4.5)
POC POTASSIUM: 5.1 MMOL/L (ref 3.5–4.5)
POC SODIUM: 126 MMOL/L (ref 138–146)
POC SODIUM: 126 MMOL/L (ref 138–146)
POC SODIUM: 127 MMOL/L (ref 138–146)
POC SODIUM: 127 MMOL/L (ref 138–146)
POSITIVE BASE EXCESS, ART: 0 (ref 0–3)
POSITIVE BASE EXCESS, ART: ABNORMAL (ref 0–3)
POSITIVE BASE EXCESS, VEN: ABNORMAL MMOL/L (ref 0–2)
POTASSIUM SERPL-SCNC: 5.1 MMOL/L (ref 3.7–5.3)
POTASSIUM SERPL-SCNC: 5.2 MMOL/L (ref 3.7–5.3)
POTASSIUM SERPL-SCNC: 5.4 MMOL/L (ref 3.7–5.3)
POTASSIUM SERPL-SCNC: 5.5 MMOL/L (ref 3.7–5.3)
PROTHROMBIN TIME: 15.5 SEC (ref 9–12)
PSV: ABNORMAL
PT. POSITION: ABNORMAL
RBC # BLD: 2.97 M/UL (ref 3.95–5.11)
RBC # BLD: ABNORMAL 10*6/UL
RESPIRATORY RATE: ABNORMAL
SAMPLE SITE: ABNORMAL
SAMPLE SITE: NORMAL
SEG NEUTROPHILS: 90 % (ref 36–66)
SEGMENTED NEUTROPHILS ABSOLUTE COUNT: 15.12 K/UL (ref 1.8–7.7)
SET RATE: ABNORMAL
SODIUM BLD-SCNC: 127 MMOL/L (ref 135–144)
SODIUM BLD-SCNC: 128 MMOL/L (ref 135–144)
SODIUM BLD-SCNC: 129 MMOL/L (ref 135–144)
SODIUM BLD-SCNC: 129 MMOL/L (ref 135–144)
TCO2 (CALC), ART: 19 MMOL/L (ref 22–29)
TCO2 (CALC), ART: 19 MMOL/L (ref 22–29)
TCO2 (CALC), ART: 21 MMOL/L (ref 22–29)
TCO2 (CALC), ART: 24 MMOL/L (ref 22–29)
TCO2 (CALC), ART: 25 MMOL/L (ref 22–29)
TCO2 (CALC), ART: 26 MMOL/L (ref 22–29)
TEXT FOR RESPIRATORY: ABNORMAL
TOTAL CK: ABNORMAL U/L (ref 26–192)
TOTAL HB: ABNORMAL G/DL (ref 12–16)
TOTAL PROTEIN: 3.9 G/DL (ref 6.4–8.3)
TOTAL PROTEIN: 4 G/DL (ref 6.4–8.3)
TOTAL RATE: ABNORMAL
VT: ABNORMAL
WBC # BLD: 16.8 K/UL (ref 3.5–11.3)
WBC # BLD: ABNORMAL 10*3/UL

## 2019-04-07 PROCEDURE — 2580000003 HC RX 258: Performed by: STUDENT IN AN ORGANIZED HEALTH CARE EDUCATION/TRAINING PROGRAM

## 2019-04-07 PROCEDURE — 80076 HEPATIC FUNCTION PANEL: CPT

## 2019-04-07 PROCEDURE — 2580000003 HC RX 258: Performed by: SURGERY

## 2019-04-07 PROCEDURE — 99291 CRITICAL CARE FIRST HOUR: CPT | Performed by: INTERNAL MEDICINE

## 2019-04-07 PROCEDURE — 84132 ASSAY OF SERUM POTASSIUM: CPT

## 2019-04-07 PROCEDURE — 6360000002 HC RX W HCPCS: Performed by: INTERNAL MEDICINE

## 2019-04-07 PROCEDURE — 82803 BLOOD GASES ANY COMBINATION: CPT

## 2019-04-07 PROCEDURE — 82435 ASSAY OF BLOOD CHLORIDE: CPT

## 2019-04-07 PROCEDURE — 6360000002 HC RX W HCPCS: Performed by: EMERGENCY MEDICINE

## 2019-04-07 PROCEDURE — 85014 HEMATOCRIT: CPT

## 2019-04-07 PROCEDURE — 2500000003 HC RX 250 WO HCPCS: Performed by: STUDENT IN AN ORGANIZED HEALTH CARE EDUCATION/TRAINING PROGRAM

## 2019-04-07 PROCEDURE — 84295 ASSAY OF SERUM SODIUM: CPT

## 2019-04-07 PROCEDURE — 82550 ASSAY OF CK (CPK): CPT

## 2019-04-07 PROCEDURE — 6360000002 HC RX W HCPCS: Performed by: SURGERY

## 2019-04-07 PROCEDURE — 82947 ASSAY GLUCOSE BLOOD QUANT: CPT

## 2019-04-07 PROCEDURE — C9113 INJ PANTOPRAZOLE SODIUM, VIA: HCPCS | Performed by: EMERGENCY MEDICINE

## 2019-04-07 PROCEDURE — 94762 N-INVAS EAR/PLS OXIMTRY CONT: CPT

## 2019-04-07 PROCEDURE — 85610 PROTHROMBIN TIME: CPT

## 2019-04-07 PROCEDURE — 94770 HC ETCO2 MONITOR DAILY: CPT

## 2019-04-07 PROCEDURE — 2700000000 HC OXYGEN THERAPY PER DAY

## 2019-04-07 PROCEDURE — 80051 ELECTROLYTE PANEL: CPT

## 2019-04-07 PROCEDURE — 6360000002 HC RX W HCPCS: Performed by: STUDENT IN AN ORGANIZED HEALTH CARE EDUCATION/TRAINING PROGRAM

## 2019-04-07 PROCEDURE — 82565 ASSAY OF CREATININE: CPT

## 2019-04-07 PROCEDURE — 83874 ASSAY OF MYOGLOBIN: CPT

## 2019-04-07 PROCEDURE — 85025 COMPLETE CBC W/AUTO DIFF WBC: CPT

## 2019-04-07 PROCEDURE — 2500000003 HC RX 250 WO HCPCS: Performed by: EMERGENCY MEDICINE

## 2019-04-07 PROCEDURE — 83735 ASSAY OF MAGNESIUM: CPT

## 2019-04-07 PROCEDURE — 2000000000 HC ICU R&B

## 2019-04-07 PROCEDURE — 82330 ASSAY OF CALCIUM: CPT

## 2019-04-07 PROCEDURE — 2580000003 HC RX 258: Performed by: EMERGENCY MEDICINE

## 2019-04-07 PROCEDURE — 84100 ASSAY OF PHOSPHORUS: CPT

## 2019-04-07 PROCEDURE — 2580000003 HC RX 258: Performed by: INTERNAL MEDICINE

## 2019-04-07 PROCEDURE — 83605 ASSAY OF LACTIC ACID: CPT

## 2019-04-07 PROCEDURE — 2580000003 HC RX 258

## 2019-04-07 PROCEDURE — 80048 BASIC METABOLIC PNL TOTAL CA: CPT

## 2019-04-07 PROCEDURE — 93005 ELECTROCARDIOGRAM TRACING: CPT

## 2019-04-07 PROCEDURE — 94003 VENT MGMT INPAT SUBQ DAY: CPT

## 2019-04-07 RX ORDER — DEXTROSE MONOHYDRATE 25 G/50ML
INJECTION, SOLUTION INTRAVENOUS
Status: COMPLETED
Start: 2019-04-07 | End: 2019-04-07

## 2019-04-07 RX ORDER — MAGNESIUM SULFATE 1 G/100ML
1 INJECTION INTRAVENOUS
Status: COMPLETED | OUTPATIENT
Start: 2019-04-07 | End: 2019-04-07

## 2019-04-07 RX ORDER — PANTOPRAZOLE SODIUM 40 MG/10ML
40 INJECTION, POWDER, LYOPHILIZED, FOR SOLUTION INTRAVENOUS 2 TIMES DAILY
Status: DISCONTINUED | OUTPATIENT
Start: 2019-04-07 | End: 2019-04-11 | Stop reason: CLARIF

## 2019-04-07 RX ORDER — 0.9 % SODIUM CHLORIDE 0.9 %
1000 INTRAVENOUS SOLUTION INTRAVENOUS ONCE
Status: COMPLETED | OUTPATIENT
Start: 2019-04-07 | End: 2019-04-07

## 2019-04-07 RX ORDER — CALCIUM CHLORIDE 100 MG/ML
1 INJECTION INTRAVENOUS; INTRAVENTRICULAR ONCE
Status: COMPLETED | OUTPATIENT
Start: 2019-04-07 | End: 2019-04-07

## 2019-04-07 RX ORDER — DIGOXIN 0.25 MG/ML
250 INJECTION INTRAMUSCULAR; INTRAVENOUS ONCE
Status: COMPLETED | OUTPATIENT
Start: 2019-04-07 | End: 2019-04-07

## 2019-04-07 RX ORDER — IBUPROFEN 800 MG/1
800 TABLET ORAL EVERY 6 HOURS PRN
Status: ON HOLD | COMMUNITY
End: 2019-05-04 | Stop reason: HOSPADM

## 2019-04-07 RX ORDER — 0.9 % SODIUM CHLORIDE 0.9 %
10 VIAL (ML) INJECTION 2 TIMES DAILY
Status: DISCONTINUED | OUTPATIENT
Start: 2019-04-07 | End: 2019-04-11 | Stop reason: CLARIF

## 2019-04-07 RX ORDER — DEXTROSE MONOHYDRATE 25 G/50ML
25 INJECTION, SOLUTION INTRAVENOUS PRN
Status: DISCONTINUED | OUTPATIENT
Start: 2019-04-07 | End: 2019-05-04 | Stop reason: HOSPADM

## 2019-04-07 RX ORDER — ALPRAZOLAM 0.25 MG/1
0.25 TABLET ORAL NIGHTLY PRN
Status: ON HOLD | COMMUNITY
End: 2019-05-04 | Stop reason: HOSPADM

## 2019-04-07 RX ORDER — MIDAZOLAM HYDROCHLORIDE 1 MG/ML
2 INJECTION INTRAMUSCULAR; INTRAVENOUS ONCE
Status: COMPLETED | OUTPATIENT
Start: 2019-04-07 | End: 2019-04-07

## 2019-04-07 RX ORDER — SODIUM CHLORIDE, SODIUM LACTATE, POTASSIUM CHLORIDE, AND CALCIUM CHLORIDE .6; .31; .03; .02 G/100ML; G/100ML; G/100ML; G/100ML
1000 INJECTION, SOLUTION INTRAVENOUS ONCE
Status: COMPLETED | OUTPATIENT
Start: 2019-04-07 | End: 2019-04-07

## 2019-04-07 RX ORDER — DIGOXIN 0.25 MG/ML
250 INJECTION INTRAMUSCULAR; INTRAVENOUS ONCE
Status: DISCONTINUED | OUTPATIENT
Start: 2019-04-07 | End: 2019-04-07

## 2019-04-07 RX ORDER — FUROSEMIDE 10 MG/ML
40 INJECTION INTRAMUSCULAR; INTRAVENOUS ONCE
Status: COMPLETED | OUTPATIENT
Start: 2019-04-07 | End: 2019-04-07

## 2019-04-07 RX ADMIN — CALCIUM GLUCONATE 3 G: 98 INJECTION, SOLUTION INTRAVENOUS at 03:27

## 2019-04-07 RX ADMIN — CALCIUM CHLORIDE 1 G: 100 INJECTION, SOLUTION INTRAVENOUS; INTRAVENTRICULAR at 19:46

## 2019-04-07 RX ADMIN — DEXTROSE MONOHYDRATE 25 G: 500 INJECTION PARENTERAL at 07:25

## 2019-04-07 RX ADMIN — VASOPRESSIN 0.01 UNITS/MIN: 20 INJECTION INTRAVENOUS at 20:09

## 2019-04-07 RX ADMIN — Medication 10 ML: at 20:05

## 2019-04-07 RX ADMIN — Medication: at 04:22

## 2019-04-07 RX ADMIN — FUROSEMIDE 40 MG: 10 INJECTION, SOLUTION INTRAMUSCULAR; INTRAVENOUS at 16:14

## 2019-04-07 RX ADMIN — SODIUM CHLORIDE: 4.5 INJECTION, SOLUTION INTRAVENOUS at 16:43

## 2019-04-07 RX ADMIN — MAGNESIUM SULFATE HEPTAHYDRATE 1 G: 1 INJECTION, SOLUTION INTRAVENOUS at 15:30

## 2019-04-07 RX ADMIN — SODIUM CHLORIDE 1000 ML: 9 INJECTION, SOLUTION INTRAVENOUS at 19:47

## 2019-04-07 RX ADMIN — PANTOPRAZOLE SODIUM 40 MG: 40 INJECTION, POWDER, FOR SOLUTION INTRAVENOUS at 20:05

## 2019-04-07 RX ADMIN — DILTIAZEM HYDROCHLORIDE 5 MG/HR: 5 INJECTION INTRAVENOUS at 16:39

## 2019-04-07 RX ADMIN — MIDAZOLAM HYDROCHLORIDE 2 MG: 1 INJECTION, SOLUTION INTRAMUSCULAR; INTRAVENOUS at 11:21

## 2019-04-07 RX ADMIN — CALCIUM GLUCONATE 4 G: 98 INJECTION, SOLUTION INTRAVENOUS at 08:34

## 2019-04-07 RX ADMIN — SODIUM CHLORIDE, POTASSIUM CHLORIDE, SODIUM LACTATE AND CALCIUM CHLORIDE 1000 ML: 600; 310; 30; 20 INJECTION, SOLUTION INTRAVENOUS at 02:41

## 2019-04-07 RX ADMIN — MAGNESIUM SULFATE HEPTAHYDRATE 1 G: 1 INJECTION, SOLUTION INTRAVENOUS at 14:36

## 2019-04-07 RX ADMIN — CALCIUM GLUCONATE 4 G: 98 INJECTION, SOLUTION INTRAVENOUS at 14:32

## 2019-04-07 RX ADMIN — Medication 10 ML: at 16:27

## 2019-04-07 RX ADMIN — CALCIUM GLUCONATE 4 G: 98 INJECTION, SOLUTION INTRAVENOUS at 11:34

## 2019-04-07 RX ADMIN — DIGOXIN 250 MCG: 0.25 INJECTION INTRAMUSCULAR; INTRAVENOUS at 10:09

## 2019-04-07 RX ADMIN — Medication 10 ML: at 08:31

## 2019-04-07 RX ADMIN — PANTOPRAZOLE SODIUM 40 MG: 40 INJECTION, POWDER, FOR SOLUTION INTRAVENOUS at 08:31

## 2019-04-07 RX ADMIN — MEROPENEM 1 G: 1 INJECTION, POWDER, FOR SOLUTION INTRAVENOUS at 13:26

## 2019-04-07 RX ADMIN — SODIUM CHLORIDE 1000 ML: 9 INJECTION, SOLUTION INTRAVENOUS at 06:31

## 2019-04-07 RX ADMIN — Medication 50 MCG/HR: at 16:09

## 2019-04-07 RX ADMIN — DIGOXIN 250 MCG: 0.25 INJECTION INTRAMUSCULAR; INTRAVENOUS at 09:38

## 2019-04-07 RX ADMIN — AMIODARONE HYDROCHLORIDE 150 MG: 50 INJECTION, SOLUTION INTRAVENOUS at 11:33

## 2019-04-07 RX ADMIN — Medication 10 ML: at 08:36

## 2019-04-07 RX ADMIN — DEXTROSE MONOHYDRATE 25 G: 500 INJECTION PARENTERAL at 23:31

## 2019-04-07 RX ADMIN — SODIUM CHLORIDE 1000 ML: 9 INJECTION, SOLUTION INTRAVENOUS at 09:41

## 2019-04-07 RX ADMIN — Medication: at 13:50

## 2019-04-07 ASSESSMENT — PULMONARY FUNCTION TESTS
PIF_VALUE: 18
PIF_VALUE: 22
PIF_VALUE: 22
PIF_VALUE: 20
PIF_VALUE: 23
PIF_VALUE: 22
PIF_VALUE: 20
PIF_VALUE: 21
PIF_VALUE: 22
PIF_VALUE: 19
PIF_VALUE: 21
PIF_VALUE: 18
PIF_VALUE: 23
PIF_VALUE: 22
PIF_VALUE: 21
PIF_VALUE: 20
PIF_VALUE: 21
PIF_VALUE: 23
PIF_VALUE: 20
PIF_VALUE: 22
PIF_VALUE: 20
PIF_VALUE: 21
PIF_VALUE: 22
PIF_VALUE: 19
PIF_VALUE: 24
PIF_VALUE: 21
PIF_VALUE: 23
PIF_VALUE: 22
PIF_VALUE: 23
PIF_VALUE: 21
PIF_VALUE: 20
PIF_VALUE: 23
PIF_VALUE: 20
PIF_VALUE: 20
PIF_VALUE: 18
PIF_VALUE: 20
PIF_VALUE: 22
PIF_VALUE: 18
PIF_VALUE: 23
PIF_VALUE: 19
PIF_VALUE: 21
PIF_VALUE: 19
PIF_VALUE: 19
PIF_VALUE: 21

## 2019-04-07 ASSESSMENT — PAIN SCALES - GENERAL
PAINLEVEL_OUTOF10: 0

## 2019-04-07 NOTE — FLOWSHEET NOTE
CARDIOVERSION:    Dr. Lara OrKatiana Barba at bedside for cardioversion. Continue with fentanyl gtt per [facility] for sedation. Versed 2 mg IVP given per [facility] as ordered. 1113  250J in biphasic synchronized delivery administered by Dr. Jane Hargrove Heading with conversion to NSR briefly & then patient back to a-fib. 1120  300J in biphasic synchronized delivery administered by Dr. Jane Hargrove Heading with conversion to NSR momentarily & then patient back to a-fib. Will continue to monitor.

## 2019-04-07 NOTE — PROGRESS NOTES
Renal Progress Note    Patient :  Johanny Sigala; 47 y.o. MRN# [id number]  Location:  [id number]/7934-56  Attending:  Anthony Gutierrez MD  Admit Date:  2019   Hospital Day: 2    Subjective   Patient was seen and examined. Off Pressors at this time. Vitals signs reviewed. SBP at time off assessment 110s. 968 mL urine output overnight and 550 mL out the NG. + 8 L since admission. Labs reviewed this am note Sodium 129; K is trending upward again, peaked at 8 on admission and this am 5.4 rechecks ordered every 4 hours; Cl 91; CO2 17; BUN 62; creatinine this am 3.9 mg/dL and was 4.06 mg/dL on admission. Ionized calcium 0.96 and lactic acid was 7.3[place] [address] and myoglobin 423179. ALT and AST trending upwards 1528 and 3542. Leukocytosis noted at 16.8. Hbg 9.1. Complements reviewed with noted slight low C3 at 73. Elevated free light chain ratio noted. UA reviewed with 2+ protein and 50 to 100 RBCs noted with a large amount of Hbg in urine. Total urine protein creatinine ration was 2.09. Metabolic acidosis persists with HCO3 of 20.2 this am.     Yesterday afternoon she went for surgery and they ended up performing a ileocecectomy with extended left hemicolectomy and placement of ABThera wound vac for mesenteric ischemic of the distal ileum, ascending colon, descending colon, and sigmoid. During surgery EBL was about 50 mL and she was given 1 L of albumin and 1200 mL of LR.      Objective     VS: BP (!) 69/32   Pulse 103   Temp 98.1 °F (36.7 °C) (Core)   Resp 20   Ht 5' 1.02\" (1.55 m)   Wt 190 lb 7.6 oz (86.4 kg)   SpO2 96%   BMI 35.96 kg/m²   MAXIMUM TEMPERATURE OVER 24HRS:  Temp (24hrs), Av.8 °F (38.2 °C), Min:98.1 °F (36.7 °C), Max:102.2 °F (39 °C)    24HR BLOOD PRESSURE RANGE:  Systolic (77UUJ), BXU:66 , Min:40 , CFQ:576   ; Diastolic (54OBC), YFM:09, Min:11, Max:82    24HR INTAKE/OUTPUT:      Intake/Output Summary (Last 24 hours) at 2019 0756  Last data filed at 2019 0600  Gross per 24 hour Intake 08796.65 ml   Output 2068 ml   Net 8465.65 ml     WEIGHT :  Patient Vitals for the past 96 hrs (Last 3 readings):   Weight   04/07/19 0503 190 lb 7.6 oz (86.4 kg)   04/06/19 0157 173 lb 15.1 oz (78.9 kg)   04/05/19 2040 170 lb (77.1 kg)       Current Medications:     Scheduled Meds:    calcium gluconate IVPB  4 g Intravenous Once    sodium chloride  1,000 mL Intravenous Once    pantoprazole  40 mg Intravenous BID    And    sodium chloride (PF)  10 mL Intravenous BID    sodium chloride flush  10 mL Intravenous 2 times per day    levofloxacin  500 mg Intravenous Q48H    meropenem  1 g Intravenous Q24H    magnesium sulfate  1 g Intravenous Once     Continuous Infusions:    norepinephrine Stopped (04/06/19 2344)    vasopressin (Septic Shock) infusion Stopped (04/07/19 0644)    sodium chloride 10 mL/hr at 04/06/19 0947    fentaNYL 50 mcg/hr (04/06/19 2136)    midazolam Stopped (04/06/19 2003)    sodium bicarbonate infusion 125 mL/hr at 04/07/19 0422       Physical Examination:     General:          Sedated on ventilator. HEENT:           Atraumatic, normocephalic, ET tube +ve   Eyes:               Pupils equal, round and reactive to light, pallor, no icterus. Neck:               Supple  Chest:              Air entry fair bilaterally, no crackles   Cardiac:           S1 S2 RRR  Abdomen:        Soft, no masses or organomegally appreciable, BS sluggish. :                  No suprapubic or flank tenderness. Neuro:             Sedated on ventilator. Skin:                No rashes, good skin turgor. Extremities:     No edema, cold and mottled LE b/l.  Right knee small abrasion     Labs:       Recent Labs     04/06/19  0436 04/06/19 2107 04/07/19 0457   WBC 12.3* 9.9 16.8*   RBC 4.19 2.97* 2.97*   HGB 12.8 9.2* 9.1*   HCT 40.3 29.2* 28.1*   MCV 96.2 98.3 94.6   MCH 30.5 31.0 30.6   MCHC 31.8 31.5 32.4   RDW 14.6* 15.0* 14.6*    271 234   MPV 10.5 11.8 11.8      BMP:   Recent Labs 04/06/19  4784  04/06/19  2348  04/07/19  0442 04/07/19  0457 04/07/19  0651   *  --  129*  --   --  129*  --    K 4.6  --  5.1  --   --  5.4*  --    CL 94*  --  93*  --   --  91*  --    CO2 13*  --  14*  --   --  17*  --    BUN 62*  --  62*  --   --  62*  --    CREATININE 3.89*   < > 3.79*   < > 5.83* 3.90* 6.19*   GLUCOSE 85  --  91  --   --  73  --    CALCIUM 6.8*  --  6.4*  --   --  7.5*  --     < > = values in this interval not displayed. Phosphorus:     Recent Labs     04/06/19  1603 04/06/19  2348   PHOS 6.1* 8.8*     Magnesium:    Recent Labs     04/06/19  1603 04/06/19  2348   MG 1.6 2.2     Albumin:    Recent Labs     04/06/19  0436 04/06/19  2348 04/07/19  0457   LABALBU 3.2* 2.6* 2.3*     Urinalysis/Chemistries:      Lab Results   Component Value Date    NITRU NEGATIVE 04/06/2019    COLORU DARK YELLOW 04/06/2019    PHUR 5.0 04/06/2019    WBCUA 50  04/06/2019    RBCUA 50  04/06/2019    MUCUS NOT REPORTED 04/06/2019    TRICHOMONAS NOT REPORTED 04/06/2019    YEAST NOT REPORTED 04/06/2019    BACTERIA NOT REPORTED 04/06/2019    SPECGRAV 1.032 04/06/2019    LEUKOCYTESUR NEGATIVE 04/06/2019    UROBILINOGEN Normal 04/06/2019    BILIRUBINUR NEGATIVE 04/06/2019    GLUCOSEU NEGATIVE 04/06/2019    KETUA TRACE 04/06/2019    AMORPHOUS NOT REPORTED 04/06/2019     Blood cultures : + GNR       Radiology:     CXR: Reviewed as available. Assessment:     1. Acute Kidney Injury oliguric likely secondary to shock requiring pressor support . She did have contrast exposure as well. Patient also had surgery and was found to have ischemic bowel and had it removed. Current Baseline creatinine now known previous seems to be normal; last creatinine available is from January 2017 of 0.57 mg/dl. 2.  Severe hyperkalemia - likely due to acute kidney injury and decreased distal sodium delivery. K is better    3. Metabolic acidosis - MARY, ischemic bowel .    4.  Acute respiratory failure requiring mechanical ventilation - continues  5. Hypotension requiring pressor support - no pressors at this time. 6.  Lactic acidosis - continues   7. Elevated LFTs  8. Polysubstance abuse, cocaine and marijuana positive  9. Leukocytosis  10. Anemia  11. Bilateral adrenal masses  12. Ileus  13. AMS  14. Nausea and vomiting  15. Ischemic bowel - surgery yesterday    Plan:   1. Continue fluids and bicarb gtt. 2. Closely monitor labs. 3. No dialysis planned unless potassium becomes an issue today. 4. Will cont to follow closely for dialysis needs. Nutrition   Renal Diet/TF    Riley Wilson, APRN-CNP. [facility]. Attending Physician Statement  I have discussed the care of Gloria Olson, including pertinent history and exam findings with the resident/fellow. I have reviewed the key elements of all parts of the encounter with the resident/fellow. I have seen and examined the patient with the resident/fellow. I agree with the assessment and plan and status of the problem list as documented.       Parish Mckeon MD

## 2019-04-07 NOTE — CONSULTS
arthroscopy (Left, 09/28/2016). Home Medications:    Prior to Admission medications    Medication Sig Start Date End Date Taking? Authorizing Provider   ibuprofen (ADVIL;MOTRIN) 800 MG tablet Take 800 mg by mouth every 6 hours as needed for Pain   Yes Historical Provider, MD   ALPRAZolam (XANAX) 0.25 MG tablet Take 0.25 mg by mouth nightly as needed for Sleep or Anxiety.    Yes Historical Provider, MD   QVAR 40 MCG/ACT inhaler Inhale 2 puffs into the lungs 2 times daily 1/3/17   Historical Provider, MD   gabapentin (NEURONTIN) 100 MG capsule Take 100 mg by mouth 3 times daily as needed  8/11/16   Historical Provider, MD   venlafaxine (EFFEXOR) 75 MG tablet Take 100 mg by mouth 2 times daily  9/10/16   Historical Provider, MD   lisinopril-hydrochlorothiazide (PRINZIDE;ZESTORETIC) 10-12.5 MG per tablet Take 1 tablet by mouth daily 8/31/16   Historical Provider, MD   albuterol (PROVENTIL) (2.5 MG/3ML) 0.083% nebulizer solution Take 2.5 mg by nebulization every 6 hours as needed for Wheezing    Historical Provider, MD   albuterol sulfate  (90 BASE) MCG/ACT inhaler Inhale 2 puffs into the lungs every 6 hours as needed for Wheezing    Historical Provider, MD      Current Facility-Administered Medications: dextrose 50 % solution 25 g, 25 g, Intravenous, PRN  pantoprazole (PROTONIX) injection 40 mg, 40 mg, Intravenous, BID **AND** sodium chloride (PF) 0.9 % injection 10 mL, 10 mL, Intravenous, BID  sodium chloride flush 0.9 % injection 10 mL, 10 mL, Intravenous, 2 times per day  sodium chloride flush 0.9 % injection 10 mL, 10 mL, Intravenous, PRN  magnesium hydroxide (MILK OF MAGNESIA) 400 MG/5ML suspension 30 mL, 30 mL, Oral, Daily PRN  ondansetron (ZOFRAN) injection 4 mg, 4 mg, Intravenous, Q6H PRN  potassium chloride 10 mEq/100 mL IVPB (Peripheral Line), 10 mEq, Intravenous, PRN  potassium chloride 20 mEq/50 mL IVPB (Central Line), 20 mEq, Intravenous, PRN  magnesium sulfate 1 g in dextrose 5% 100 mL IVPB, 1 g, Intravenous, PRN  levofloxacin (LEVAQUIN) 500 MG/100ML infusion 500 mg, 500 mg, Intravenous, Q48H  heparin (porcine) injection 1,400 Units, 1,400 Units, Intercatheter, PRN  heparin (porcine) injection 1,500 Units, 1,500 Units, Intercatheter, PRN  fentaNYL (SUBLIMAZE) injection 50 mcg, 50 mcg, Intravenous, Q1H PRN **OR** fentaNYL (SUBLIMAZE) injection 100 mcg, 100 mcg, Intravenous, Q1H PRN  norepinephrine (LEVOPHED) 16 mg in dextrose 5% 250 mL infusion, 2 mcg/min, Intravenous, Continuous  vasopressin 20 Units in sodium chloride 0.9 % 100 mL infusion, 0.04 Units/min, Intravenous, Continuous  0.45 % sodium chloride infusion, , Intravenous, Continuous  meropenem (MERREM) 1 g in sodium chloride 0.9 % 100 mL IVPB (mini-bag), 1 g, Intravenous, Q24H  fentaNYL 20 mcg/mL Infusion, 50 mcg/hr, Intravenous, Continuous  midazolam (VERSED) 100 mg in dextrose 5% 100 mL infusion, 1 mg/hr, Intravenous, Continuous  magnesium sulfate 1 g in dextrose 5% 100 mL IVPB, 1 g, Intravenous, Once  sodium bicarbonate 150 mEq in dextrose 5 % 1,000 mL infusion, , Intravenous, Continuous    Allergies:  Pcn [penicillins]; Sulfa antibiotics; and Tape [adhesive tape]    Social History:   reports that she has been smoking cigarettes. She started smoking about 38 years ago. She has been smoking about 0.25 packs per day. She has never used smokeless tobacco. She reports that she drinks alcohol. She reports that she does not use drugs. Family History: family history includes Diabetes in her maternal grandfather and maternal grandmother; Emphysema in her maternal grandfather; Heart Disease in her mother; Heart Surgery in her mother; Tal Couch in her maternal uncle; Stroke in her mother. No h/o sudden cardiac death. No for premature CAD    REVIEW OF SYSTEMS:    · Constitutional: there has been no unanticipated weight loss. There's been No change in energy level, No change in activity level. · Eyes: No visual changes or diplopia.  No scleral icterus. · ENT: No Headaches  · Cardiovascular: No cardiac history  · Respiratory: No previous pulmonary problems, No cough  · Gastrointestinal: No abdominal pain. No change in bowel or bladder habits. · Genitourinary: No dysuria, trouble voiding, or hematuria. · Musculoskeletal:  No gait disturbance, No weakness or joint complaints. · Integumentary: No rash or pruritis. · Neurological: No headache, diplopia, change in muscle strength, numbness or tingling. No change in gait, balance, coordination, mood, affect, memory, mentation, behavior. · Psychiatric: No anxiety, or depression. · Endocrine: No temperature intolerance. No excessive thirst, fluid intake, or urination. No tremor. · Hematologic/Lymphatic: No abnormal bruising or bleeding, blood clots or swollen lymph nodes. · Allergic/Immunologic: No nasal congestion or hives. PHYSICAL EXAM:      BP 90/71   Pulse 141   Temp 97.3 °F (36.3 °C) (Core)   Resp 14   Ht 5' 1.02\" (1.55 m)   Wt 190 lb 7.6 oz (86.4 kg)   SpO2 96%   BMI 35.96 kg/m²    Constitutional and General Appearance: Intubated and sedated  HEENT: PERRL  Respiratory: Intubated and sedated. Mechanical ventilation. Coarse breath sounds with rhonchi  Cardiovascular: Heart sounds normal.  Irregular rhythm in A. Fib. Tachycardia  Abdomen: Open abdomen wound with wound VAC  Extremities: Generalized edema  Neurological: His extremities. Follows commands intermittently when off sedation    DATA:    Diagnostics:    EKG: A. fib with RVR  ECHO: Hyperdynamic LV function, grade 1 diastolic dysfunction  Ejection fraction: 75%  Stress Test: not obtained. Cardiac Angiography: not obtained.     Labs:     CBC:   Recent Labs     04/06/19 2107 04/07/19 0457   WBC 9.9 16.8*   HGB 9.2* 9.1*   HCT 29.2* 28.1*    234     BMP:   Recent Labs     04/06/19  2348  04/07/19 0457 04/07/19  0651   *  --  129*  --    K 5.1  --  5.4*  --    CO2 14*  --  17*  --    BUN 62*  --  62*  -- CREATININE 3.79*   < > 3.90* 6.19*   LABGLOM 13*   < > 12* 7*   GLUCOSE 91  --  73  --     < > = values in this interval not displayed. BNP: No results for input(s): BNP in the last 72 hours. PT/INR:   Recent Labs     04/06/19  2107 04/07/19  0457   PROTIME 16.3* 15.5*   INR 1.6 1.5     APTT:  Recent Labs     04/05/19  2152   APTT 28.0     CARDIAC ENZYMES:  Recent Labs     04/06/19  0436 04/06/19  0942 04/07/19  0457   CKTOTAL 15,342* 17,118* 44,710*   CKMB 305.7*  --   --      FASTING LIPID PANEL:  Lab Results   Component Value Date    HDL 48 01/06/2016    TRIG 100 01/06/2016     LIVER PROFILE:  Recent Labs     04/06/19  2348 04/07/19  0457   AST 3,260* 3,542*   ALT 1,408* 1,528*   LABALBU 2.6* 2.3*       IMPRESSION:    Patient Active Problem List   Diagnosis    Dog bite    Post-traumatic osteoarthritis of left knee    S/P left knee arthroscopy    S/P total knee arthroplasty    Arthritis of left knee    Shock ([facility] [address]    Rhabdomyolysis    Hyponatremia    Lactic acidosis    MARY (acute kidney injury) ([address]    Metabolic acidosis    Acute respiratory failure (HCC)    Elevated liver enzymes    Hyperkalemia    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HCC)    Small bowel ischemia (HCC)    Acute GI bleeding     1. Sepsis with Multiorgan failure  2. Atrial Fibrillation with RVR s/p Digoxin, CV.  3. Acute Renal Failure  4. Acute Liver failure  5. Bowel Ischemia S/P sub-total colectomy  6. Metabolic acidosis  7. Acute Respiratory failure    RECOMMENDATIONS:    1. Amiodarone  mg bolus. Do not recommend IV Amio at this point  2. Can consider Cardizem blood pressure is stable and patient is off pressors  3. Other management as per primary and critical care    Discussed with family and Nurse.     Electronically signed by Gema Herrera MD on 4/7/2019 at 11:53 [address] [place] [address] [facility]      [phone]

## 2019-04-07 NOTE — OP NOTE
[address]                                OPERATIVE REPORT    PATIENT NAME: Jovanni Adame                 :        1965  MED REC NO:   [id number]                             ROOM:       0117  ACCOUNT NO:   [de-identified]                           ADMIT DATE: 2019  PROVIDER:     Idalia Bowers MD    DATE OF PROCEDURE:  2019    PREOPERATIVE DIAGNOSIS:  Mesenteric ischemic. POSTOPERATIVE DIAGNOSIS:  Distal ileum, ascending colon, descending  colon, and sigmoid ischemia. PROCEDURES:  Ileocecectomy, mobilization of splenic flexure, extended left hemicolectomy, placement of  ABThera wound VAC. ATTENDING SURGEON:  Gil Perea. Piper Alarcon MD, PhD    Rossi Saucedaant:  Amy Montgomery DO; Dallin Gardiner DO    ANESTHESIA:  General endotracheal.    EBL:  50 mL. REPLACEMENT:  IV fluid replacement; 1 L of albumin and 1200 ML of LR.    WOUND CLASS:  Dirty. SPECIMENS:  Ileum and ascending colon, transverse colon, descending  colon, proximal rectum. INDICATIONS:  The patient is a 73-year-old woman with a history  significant for cocaine and drug substance abuse, who presented after a  period of more than 24 hours of downtime. Per family report, the  patient's girlfriend had noticed that the patient was lying on the floor  and unresponsive. She tried to move her to her bed, but was  unsuccessful. The girlfriend went to work the next morning, but when she came home  and noticed that the patient was still in the same position on the floor, called  the patient's mother, who recommended that she be taken to the emergency  room. In the emergency room, she was noted to be in extremis. Her  electrolytes were severely deranged. She was noted to be severely  hypotensive. Her lactate was 3.5. She had a CTA at that time that  demonstrated that her intraabdominal contents were  viable.   This morning, she was continuous dosing of  antibiotics from the ICU, the placement of SCDs, and anticipated  placement of a wound VAC. Entry was made into the abdomen using a midline incision in the  periumbilical location. The skin was incised with a scalpel followed by  Bovie electrocautery to the subcutaneous tissues down to the anterior  fascia. The anterior fascia was entered with a Bovie followed by entry  into the peritoneum using Metzenbaum's. Based on our initial 10-cm incision, we saw  that there was some viable bowel proximally. Based on this, we extended the incision  cephalad and caudad. After mobilizing the small  bowel, we noted that there was approximately 160 cm of viable small  bowel proximal.  We intended to preserve this. The small bowel was  divided using a 3.5-mm stapler load. We then took the LigaSure device  through the mesentery, towards the ileocecal artery. The cecum was  noted to be frankly necrotic and ischemic. There was no obvious hole,  but there was succus and foul-smelling material throughout the abdomen. The ascending colon was divided just distal to the hepatic flexure using  the 3.5-mm stapler load. The transverse colon appeared to be viable. We noted that the splenic flexure came up high behind the spleen in the  left upper quadrant. We then proceeded to inspect the distal large  bowel. We noted that the rectum was frankly ischemic proximally and now  this was divided using a 3.5-mm stapler load, just about at the sacral  promontory. We mobilized the white line of Toldt using Bovie  electrocautery and then took the mesentery using the LigaSure device. We took significant work to mobilize the splenic flexure. The  transverse colon was divided just proximal to the splenic flexure. We then irrigated the abdomen with 5 L of warm saline.   We inspected the  spleen, which was noted to have some adherent attachments and small  capillary tears due to the location of the splenic flexure. We placed  surgicel over this location. There was no active bleeding. We then placed  an ABThera VAC into the abdomen. The patient was awakened from anesthesia. She was left intubated due to  her critical and serious state. She was returned to the ICU in critical  condition. All sponge and needle counts were correct x2. The specimens  were  handed off Pathology. There were no known complications with the  procedure. The attending was present for the entirety of the procedure. Pastor South MD    D: 04/06/2019 19:30:31       T: 04/07/2019 7:29:36     [id number]  Job#: [id number]     Doc#: [id number]    CC:    Eben Pinto.  Elidia Duarte MD, PhD  04/27/19  1:26 PM

## 2019-04-07 NOTE — PROGRESS NOTES
Pt to CT. Romeo Flores RN, Marck Francisco RT at bedside. Pt on portable monitor and vent. ambu bag at bedside.

## 2019-04-07 NOTE — PLAN OF CARE
Problem: Restraint Use - Nonviolent/Non-Self-Destructive Behavior:  Goal: Absence of restraint indications  Description  Absence of restraint indications  Outcome: Met This Shift  Goal: Absence of restraint-related injury  Description  Absence of restraint-related injury  Outcome: Met This Shift    Bilateral soft wrist restraints discontinued this morning due to patient not reaching or pulling at lines & tubes. Restraints discontinued due to not being justified. Will continue to monitor.      Problem: Nutrition  Goal: Optimal nutrition therapy  Description  Nutrition Problem: Inadequate oral intake  Intervention: Food and/or Nutrient Delivery: Continue NPO  Nutritional Goals: Meet % of estimated nutrition needs   Outcome: Ongoing     Problem: Fluid Volume - Imbalance:  Goal: Absence of imbalanced fluid volume signs and symptoms  Description  Absence of imbalanced fluid volume signs and symptoms  Outcome: Ongoing

## 2019-04-07 NOTE — PROGRESS NOTES
[facility] - Progress Note    Today's Date and Time: 4/7/2019, 2:08 PM    Impression :   1. 48 y/o female with complaints of  · Vomiting  · Diarrhea  · Abdominal pain  · Altered mental status  2. Acute kidney injury  · Nephrology onboard and planning for urgent dialysis   3. Shock, presumptive septic plus hypovolemic, requiring Levophed  4. Septicemia with Gram negative bacilli 4-5-19  5. Gastroenteritis  6. Ischemic bowel  7. S/P laparotomy 4-6-19  8. Severe hyponatremia  9. Transaminitis, shock liver  10. Intubation 2/2 AMS  11. COPD  15. Arthritis  13. Chronic NSAID use  14. Funguria  15. Acral mottling of hands and feet  16. Allergy to penicillins: Hives and respiratory distress  17. Allergy to sulfa    Recommendations:   · Meropenem 1 gm IV q 24 hr  · D/C Levaquin   · IV fluids, presors  · Follow up cultures    Medical Decision Making/Summary/Discussion:   · 48 y/o female with vomiting, diarrhea, and AMS  · Found to have transaminitis, MARY requiring emergent dialysis, lactic acidosis, shock requiring pressors  · Intubated due to AMS  · CT chest shows atelactasis vs pneumonia  · Currently on vanc, levaquin, and aztreonam   · Patient is critically ill with origin in GI tract . Will need to treat with meropenem despite Hx of penicillin allergy. · Had laparotomy 4-6-19 with findings of ischemic bowel from distal ileum to sigmoid colon.    · S/P ileocecectomy with extended left hemicolectomy and placement of wound vac 4-6-19  · Overall improvement post surgery  Infection Control Recommendations   · [place] Precautions    Antimicrobial Stewardship Recommendations     · Simplification of therapy  · Targeted therapy    Coordination of Outpatient Care:   · Estimated Length of IV antimicrobials: TBD  · Patient will need Midline Catheter Insertion: TBD  · Patient will need PICC line Insertion: TBD  · Patient will need: Home IV , [place],  SNF,  LTAC  · Patient will need outpatient wound care: TBD    Chief complaint/reason for consultation:   · Altered mental status and tender abdomen       History of Present Illness:   Mckenna Wilhelm is a 47y.o.-year-old  female who was initially admitted on 4/5/2019. Patient seen at the request of Dr. Rosemary Masters:    Patient presented to ED via EMS due to altered mental status and vomiting. Patient has history significant for COPD, right knee osteoarthirtis s/p arthoplasty, and chronic NSAID use. Patient lives in [place], but was here to visit her significant other. Arrived Wednesday night, said she didn't feel good. Thought she might have had a bad burger at a fast food restaurant. Went to sleep and slept for almost 24 hours. When she awoke, she said she was vomiting, having diarrhea, and abdominal pain. Significant other and sister are unsure of the nature of the vomit, diarrhea, or abdominal pain. Then she slept a bit more, until her significant other found her unresponsive and that's when he called EMS to bring her to the hospital.     Afebrile on admission, but Tmax overnight was 39.3. Tachycardic on admission, 129. Became hypotensive after admission and levophed and vasopressin were started. Initial labs: Admission labs significant for Na 125, K 8.0, CO2 9, BUN 62, Creatinine of 4.06, Anion gap of 23, Lactic acid of 3.5, Glucose of 186, Ca 5.2, POC HCO3 15.9, CK 15,342, Troponins high sensitivity 89 and 94, Alk phos 168, , AST 1,122, Bili .37, lipase of 119, Urine tox positive for THC and Cocaine, WBC 23.5, Hgb 10.2, Urine and blood cultures pending, HIV negative, influenza negative, hepatitis panel non-reactive, urine Leukocyte esterase trace, urine nitrite -, urine protein 2+, urine Hgb large, urine RBCs 5 to 10, many urine yeast,  ABG 7.22, pCO2 27, HCO3 15.9.     Initial imaging showed:     CXR: No acute cardiopulmonary process    Abdominal X-ray 4/6: No free air    CTA of chest: Negative for PE or dissection. Patchy consolidations of bilateral lower lung lobes representing developing pneumonia vs atelectasis. CT head: pending    Initial course:     Intubated and sedated in ED due to altered mental status. Currently on Vancomycin, Levaquin, and aztreonam for empiric coverage. Richar catheter was placed due to request by nephrology for emergent dialysis. Dialysis attempted several times, but due to high arterial pressures and line clotting, dialysis to be completed later today by Dr. Daniela Rosas. NG tube with bloody output. FOBT +. General surgery has been consulted for evaluation. CURRENT EVALUATION : 4/7/2019    Patient underwent ileocecectomy with extended left hemicolectomy and placement of wound vac on 4-6-19 because of ischemic bowel. Overall improvement post surgery    Afebrile  VS stable. BP supported with Vasopressin. Had episodes of a fib with RVR. Shocked twice to control rhythm and rate. On ventilator. Comfortable, sedated    Abdomen softer. VAC in place  No bowel activity   Skin cyanosis much improved    Blood culture with Gram negative bacilli     Cultures:  Urine:  · Pending  Blood:  · 4-5-19 : Gram negative bacilli   Sputum :  · NA  Wound:  · NA    MRSA- Neg    Discussed with RN, Dr Deneen Heart, Gen Surgery. ICU Care 35 min    I have personally reviewed the past medical history, past surgical history, medications, social history, and family history, and I have updated the database accordingly.   Past Medical History:     Past Medical History:   Diagnosis Date    Asthma     On inhaler    Back pain, chronic     Hypertension 2015    On Lisinopril    Snores     possible apnea but not tested    Wears glasses        Past Surgical  History:     Past Surgical History:   Procedure Laterality Date    KNEE ARTHROSCOPY Left 1980    KNEE ARTHROSCOPY Left 09/28/2016    with medial menisectomy    TONSILLECTOMY      ULNAR TUNNEL RELEASE Right 2013       Medications:      pantoprazole  40 mg Intravenous BID    And    sodium chloride (PF)  10 mL Intravenous BID    magnesium sulfate  1 g Intravenous Q1H    calcium gluconate IVPB  4 g Intravenous Once    sodium chloride flush  10 mL Intravenous 2 times per day    levofloxacin  500 mg Intravenous Q48H    meropenem  1 g Intravenous Q24H    magnesium sulfate  1 g Intravenous Once       Social History:     Social History     Socioeconomic History    Marital status: Single     Spouse name: Not on file    Number of children: 0    Years of education: Not on file    Highest education level: Not on file   Occupational History    Occupation: [address] [place] [address] Financial resource strain: Not on file    Food insecurity:     Worry: Not on file     Inability: Not on file    Transportation needs:     Medical: Not on file     Non-medical: Not on file   Tobacco Use    Smoking status: Light Tobacco Smoker     Packs/day: 0.25     Types: Cigarettes     Start date: 9/19/1980    Smokeless tobacco: Never Used   Substance and Sexual Activity    Alcohol use: Yes     Comment: OCCASSIONAL    Drug use: No    Sexual activity: Yes     Partners: Female   Lifestyle    Physical activity:     Days per week: Not on file     Minutes per session: Not on file    Stress: Not on file   Relationships    Social connections:     Talks on phone: Not on file     Gets together: Not on file     Attends Anabaptist service: Not on file     Active member of club or organization: Not on file     Attends meetings of clubs or organizations: Not on file     Relationship status: Not on file    Intimate partner violence:     Fear of current or ex partner: Not on file     Emotionally abused: Not on file     Physically abused: Not on file     Forced sexual activity: Not on file   Other Topics Concern    Not on file   Social History Narrative    Not on file       Family History:     Family History   Problem Relation Age of Onset    Heart Disease Mother     Stroke Mother  Heart Surgery Mother     Diabetes Maternal Grandmother     Emphysema Maternal Grandfather     Diabetes Maternal Grandfather     Lung Cancer Maternal Uncle         Allergies:   Pcn [penicillins]; Sulfa antibiotics; and Tape [adhesive tape]     Review of Systems:   Unable to provide. Sedated on ventilator.       Physical Examination :     Patient Vitals for the past 8 hrs:   BP Temp Temp src Pulse Resp SpO2   04/07/19 1400 -- -- -- 145 20 97 %   04/07/19 1345 -- -- -- 141 20 97 %   04/07/19 1330 -- -- -- 145 20 97 %   04/07/19 1315 -- -- -- 132 20 97 %   04/07/19 1300 (!) 102/56 -- -- 121 20 97 %   04/07/19 1245 -- -- -- 137 19 99 %   04/07/19 1230 -- -- -- 146 -- 97 %   04/07/19 1215 -- -- -- 138 -- 98 %   04/07/19 1200 (!) 90/38 97.5 °F (36.4 °C) CORE 137 19 98 %   04/07/19 1145 -- -- -- 143 19 96 %   04/07/19 1143 -- -- -- 141 14 96 %   04/07/19 1130 -- -- -- 151 14 96 %   04/07/19 1115 -- -- -- 138 15 98 %   04/07/19 1100 90/71 -- -- 146 24 98 %   04/07/19 1045 -- -- -- 150 16 97 %   04/07/19 1030 -- -- -- 151 20 96 %   04/07/19 1015 -- -- -- 153 17 97 %   04/07/19 1009 -- -- -- 154 -- --   04/07/19 1000 (!) 71/36 -- -- 153 24 96 %   04/07/19 0958 -- -- -- 154 25 96 %   04/07/19 0945 -- -- -- 156 24 96 %   04/07/19 0938 -- -- -- 154 -- --   04/07/19 0930 -- -- -- 152 22 96 %   04/07/19 0915 -- -- -- 155 21 97 %   04/07/19 0905 -- -- -- -- 29 97 %   04/07/19 0903 -- -- -- 129 28 97 %   04/07/19 0900 (!) 98/54 -- -- 107 22 97 %   04/07/19 0845 -- -- -- 106 22 97 %   04/07/19 0830 -- -- -- 103 27 96 %   04/07/19 0815 -- -- -- 105 25 96 %   04/07/19 0800 (!) 81/43 97.3 °F (36.3 °C) CORE 103 22 96 %   04/07/19 0745 -- -- -- 106 20 97 %   04/07/19 0730 -- -- -- 103 29 100 %   04/07/19 0715 -- -- -- 102 21 96 %   04/07/19 0700 (!) 76/37 -- -- 101 28 96 %   04/07/19 0645 -- -- -- 103 20 96 %   04/07/19 0630 -- -- -- 106 27 96 %   04/07/19 0615 -- -- -- 107 28 96 %     General Appearance: Sedated, on ventilator  Head:  Normocephalic, no trauma  Eyes: Pupils equal, round, reactive to light; sclera anicteric; conjunctivae pink. No embolic phenomena. ENT: Oropharynx clear, without erythema, exudate, or thrush. No tenderness of sinuses. Mouth/throat: mucosa pink and moist. No lesions. Dentition in good repair. Neck:Supple, without lymphadenopathy. Thyroid normal, No bruits. Pulmonary/Chest: Clear to auscultation, without wheezes, rales, or rhonchi. No dullness to percussion. Cardiovascular: Regular rate and rhythm without murmurs, rubs, or gallops. Abdomen: Distended. Bowel sounds absent. Hard to palpate but no rigidity. Grimaces with pressure. All four Extremities: Cyanosis of trunk, abdomen, distal extremities  Neurologic: Sedated  Skin: Cool and dry with poor turgor. Signs of peripheral arterial  insufficiency. No ulcerations. No open wounds. Skin purplish, cyanotic. Medical Decision Making -Laboratory:   I have independently reviewed/ordered the following labs:    CBC with Differential:   Recent Labs     04/06/19 2107 04/07/19 0457   WBC 9.9 16.8*   HGB 9.2* 9.1*   HCT 29.2* 28.1*    234   LYMPHOPCT 25 5*   MONOPCT 1 1     BMP:   Recent Labs     04/06/19 2348 04/07/19 0457 04/07/19  0651 04/07/19  0942 04/07/19  1301   *  --  129*  --   --  127*   K 5.1  --  5.4*  --   --  5.5*   CL 93*  --  91*  --   --  89*   CO2 14*  --  17*  --   --  20   BUN 62*  --  62*  --   --   --    CREATININE 3.79*   < > 3.90* 6.19*  --   --    MG 2.2  --   --   --  2.0  --     < > = values in this interval not displayed. Hepatic Function Panel:   Recent Labs     04/06/19  0436 04/06/19 2348 04/07/19 0457   PROT 5.5* 3.9* 4.0*   LABALBU 3.2* 2.6* 2.3*   BILIDIR 0.29  --  1.11*   IBILI 0.33  --  0.81   BILITOT 0.62 1.56* 1.92*   ALKPHOS 240* 187* 194*   ALT 1,740* 1,408* 1,528*   AST 2,617* 3,260* 3,542*     No results for input(s): RPR in the last 72 hours.   No results for input(s): HIV in the last 72 hours. No results for input(s): BC in the last 72 hours. Lab Results   Component Value Date    MUCUS NOT REPORTED 04/06/2019    RBC 2.97 04/07/2019    TRICHOMONAS NOT REPORTED 04/06/2019    WBC 16.8 04/07/2019    YEAST NOT REPORTED 04/06/2019    TURBIDITY TURBID 04/06/2019     Lab Results   Component Value Date    CREATININE 6.19 04/07/2019    CREATININE 3.90 04/07/2019    GLUCOSE 73 04/07/2019       Medical Decision Making-Imaging:     Abdominal xray:    Gas in mildly distended loops of large and small bowel likely reflecting an   ileus.       NG tube tip in the gastric fundus with the proximal side-port in the distal   thoracic esophagus, repositioning recommended.       Airspace disease left lung base. CT scan chest:    Impression   Satisfactory position endotracheal tube.       Nasogastric tube needs advanced 10 cm.       Patchy perihilar opacities and bibasilar airspace disease.  Follow-up may be   beneficial following medical treatment course to document complete resolution.           EXAMINATION:   CTA OF THE ABDOMEN AND PELVIS WITH CONTRAST       4/5/2019 9:23 pm:       TECHNIQUE:   CTA of the abdomen and pelvis was performed with the administration of   intravenous contrast. Multiplanar reformatted images are provided for review. MIP images are provided for review.  Dose modulation, iterative   reconstruction, and/or weight based adjustment of the mA/kV was utilized to   reduce the radiation dose to as low as reasonably achievable.       COMPARISON:   None.       HISTORY:   ORDERING SYSTEM PROVIDED HISTORY: suspected dissection of abdominal aorta       FINDINGS:       CTA ABDOMEN:       Bibasilar airspace disease.  Liver, spleen, pancreas, gallbladder normal.   Bilateral adrenal masses measuring 11 mm on the left and 18 x 28 mm on the   left.  Bilateral ill-defined hypodensities throughout the kidneys.  The small   bowel is somewhat distended with multiple air-fluid levels.  Multiple   air-fluid

## 2019-04-07 NOTE — PROGRESS NOTES
ICU PROGRESS NOTE          PATIENT NAME: Sarah Arambula RECORD NO. [id number]  DATE: 4/7/2019    PRIMARY CARE PHYSICIAN: No primary care provider on file. HD: # 2    ASSESSMENT    Active Problems:    Shock ([facility] [address]    Rhabdomyolysis    Hyponatremia    Lactic acidosis    MARY (acute kidney injury) ([facility] [address]    Metabolic acidosis    Acute respiratory failure (HCC)    Elevated liver enzymes    Hyperkalemia    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HCC)    Small bowel ischemia (HCC)    Acute GI bleeding  Resolved Problems:    * No resolved hospital problems. *      MEDICAL DECISION MAKING AND PLAN    1. Neuro  1. Altered mental status. CT head -- negative. Likely acute metabolic encephalopathy  2. Pain: Fentanyl drip  3. Sedation: Versed   4. + Drug abuse: cocaine/cannibus  2. CV:  Severe shock, suspect mixed hypovolemic-cardiogenic component  1. Pressor support: Vasopressin. Off levophed  2. ECHO 4/6: hyperdynamic with EF 75%  3. Troponins: 85-->94-->128 - secondary to renal failure and liver failure  3. Pulm  1. Acute respiratory failure due to acidosis  2. Mechanical ventilation  3. PRVC 40%/ 470 / Rate 20(36)/ PEEP 8  4. GI  1. Acute mesenteric ischemia  2. POD#1 ex lap, resection of ischemic sigmoid, right colon and distal small bowel. Left in discontinuity  3. Shock liver  4. Strict NPO  5. Anticipate return to OR in next 1-2 days  5. Nephro  1. Rhabdomyolysis   2. Acute kidney failure   3. Severe metabolic acidosis  4. IVF: sodium bicarb 125 cc/hr  5. Hyponatremia  6. Creatinine trending up  6. ID  1. Merrem/ Levaquin/ Aztreonam/ Vanc empiric coverage for sepsis  2. Improving leukocytosis 23-->12  7. Endo:    1. Glucose   8. Heme  1. H/h 9.9      SUBJECTIVE    Reyne Florence was seen and examined. OR yesterday for ex lap and resection of terminal ileum, right colon and sigmoid colon. Urine output started to increase after resuscitation. Lactic acid trending down.        OBJECTIVE  VITALS: Temp: Temp: 99.5 °F (37.5 °C)Temp  Av.8 °F (38.2 °C)  Min: 98.2 °F (36.8 °C)  Max: 102.7 °F (46.7 °C) BP Systolic (22STX), WRB:19 , Min:40 , FEQ:876   Diastolic (38OTK), GW, Min:11, Max:81   Pulse Pulse  Av.3  Min: 101  Max: 126 Resp Resp  Av.6  Min: 4  Max: 38 Pulse ox SpO2  Av.9 %  Min: 52 %  Max: 100 %    GENERAL: intubated and sedated  NEURO: sedated, follows commands off sedation  HEENT: mucous membranes moist  LUNGS: clear to ausculation, without wheezes, rales or rhonci  HEART: Tachycardic, regular rhythm  ABDOMEN: soft, non distended. Midline incision with abthera in place. EXTERMITY: No signs of cyanosis or ischemia      LAB:  CBC:   Recent Labs     19  2152 19  0436 19  2107   WBC 23.5* 12.3* 9.9   HGB 10.2* 12.8 9.2*   HCT 33.8* 40.3 29.2*   .9 96.2 98.3    396 271     BMP:   Recent Labs     19  0436 19  0942 19  2337 19  2348 19  0221   * 131*  --  129*  --    K 5.2 4.6  --  5.1  --    CL 96* 94*  --  93*  --    CO2 13* 13*  --  14*  --    BUN 53* 62*  --  62*  --    CREATININE 3.11* 3.89* 5.09* 3.79* 5.96*   GLUCOSE 105* 85  --  91  --          RADIOLOGY:  CXR:       Amy Montgomery DO  19, 4:05 AM       Trauma Attending Attestation      I have reviewed the above TECSS note(s) and confirmed the key elements of the medical history and physical exam. I have seen and examined the pt. I have discussed the findings, established the care plan and recommendations with Resident, GCS RN, bedside nurse. I personally reviewed any images in real time to expedite the patient's care.         Arias Stephenson MD  2019  8:41 PM

## 2019-04-07 NOTE — PROGRESS NOTES
POST OP NOTE    SUBJECTIVE  Pt s/p exploratory lap. .     OBJECTIVE  VITALS:  /81   Pulse 113   Temp 99.5 °F (37.5 °C) (Core)   Resp 20   Ht 5' 1.02\" (1.55 m)   Wt 173 lb 15.1 oz (78.9 kg)   SpO2 (!) 54%   BMI 32.84 kg/m²         GENERAL:  Sedated and intubated  CARDIOVASCULAR:  regular rate and rhythm   LUNGS:  CTA Bilaterally  ABDOMEN:   Abdomen soft, mildly tender, non-distended  INCISION: Incision clean/dry/intact    ASSESSMENT  1. POD# 0 s/p exploratory lap and ileocecectomy and wound vac placement    PLAN  1. Pain management-Fentanyl  2. DVT proph-per primary  3. GI proph-per primary  4.  Monitor drain output      Daisha Ratechristiano  Trauma/Surgery Service  4/7/2019 at 12:47 AM

## 2019-04-07 NOTE — PROGRESS NOTES
and INR tomorrow. Continue to follow arterial blood gases. Next and continue with bicarbonate drip. Likely will need CVVH. Continue with Levophed but surgery want vasopressin. Will get chest x-ray tomorrow. Continue to follow lactic acid. Surgery plans to take her back to surgery later today. Cardiology was consulted and discussed with cardiology we will not be able to give amiodarone drip because of severely elevated liver function tests and she already had digoxin. Will start her on Cardizem drip without bolus and will continue with pressors  Follow up CK tomorrow    Discussed with parents in detail, all questions answered  Discussed with nursing staff, treatment plan discussed. Discussed with surgery attending Dr. Elidia Duarte. Discussed with the respiratory therapist.    Total critical care time caring for this patient with life threatening, unstable organ failure, including direct patient contact, management of life support systems, review of data including imaging and labs, discussions with other team members and physicians at least 61  Min so far today, excluding procedures. Alexander Woodruff MD  4/7/2019 9:42 AM      Please note that this chart was generated using voice recognition Dragon dictation software. Although every effort was made to ensure the accuracy of this automated transcription, some errors in transcription may have occurred.

## 2019-04-07 NOTE — FLOWSHEET NOTE
Perfect Serve message sent to Dr. Ball Lab her of lab/ABG results:  Lactic 4.29, Ica 1.10, Ph 7.322, PC02 43.1, P02 130.4, HC03 22.3, BE -3.5

## 2019-04-07 NOTE — FLOWSHEET NOTE
04/07/19 0903   Vitals   Pulse 129   Resp 28   Art Line   ABP (Arterial line BP) 124/52   ABP mean (Arterial line mean) 70 mmHg     Dr. Holly Ashley notified of HR up to 160's - rhythm interpreted as a-fib. Orders received for EKG. Will continue to monitor.

## 2019-04-07 NOTE — ANESTHESIA POSTPROCEDURE EVALUATION
Department of Anesthesiology  Postprocedure Note    Patient: Shaila Mcarthur  MRN: [id number]  YOB: 1965  Date of evaluation: 4/7/2019  Time:  6:54 AM     Procedure Summary     Date:  04/06/19 Room / Location:  [address] OR    Anesthesia Start:  1749 Anesthesia Stop:  1947    Procedure:  LAPAROTOMY EXPLORATORY, ILEOCECECTOMY, SUBTOTAL COLECTOMY, ABTHEHRA WOUND VAC PLACEMENT (N/A ) Diagnosis:  (ISCHEMIC BOWEL)    Surgeon:  Flavio Adair MD Responsible Provider:  Maciel Carlin MD    Anesthesia Type:  general ASA Status:  5 - Emergent          Anesthesia Type: general    Dillon Phase I:      Dillon Phase II:      Last vitals: Reviewed and per EMR flowsheets.        Anesthesia Post Evaluation    Patient location during evaluation: ICU  Patient participation: complete - patient cannot participate  Level of consciousness: sedated and ventilated  Pain score: 0  Airway patency: patent  Nausea & Vomiting: no nausea and no vomiting  Complications: no  Cardiovascular status: blood pressure returned to baseline  Respiratory status: intubated  Hydration status: euvolemic  Comments:   Mechanical Ventilation Data  SETTINGS (Comprehensive)  Vent Information  $Ventilation: $Subsequent Day  Ventilator Started: Yes  Skin Assessment: Clean, dry, & intact  Vent Type: Servo i  Vent Mode: PRVC  Vt Ordered: 470 mL  Rate Set: 20 bmp  FiO2 : 40 %  Sensitivity: 5  PEEP/CPAP: 8  I Time/ I Time %: 0.9 s  Cuff Pressure (cm H2O): 22 cm H2O  Humidification Source: HME  Additional Respiratory  Assessments  Pulse: 107  Resp: 28  SpO2: 96 %  End Tidal CO2: 30 (%)  Position: Semi-Salazar's  Humidification Source: HME  Oral Care Completed?: Yes  Oral Care: Mouthwash, Mouth swabbed, Mouth moisturizer, Mouth suctioned  Subglottic Suction Done?: No  Cuff Pressure (cm H2O): 22 cm H2O    ABG   No results found for: PH, PCO2, PO2, HCO3, O2SAT  Lab Results       Component                Value               Date                       MODE [facility]                04/07/2019

## 2019-04-07 NOTE — PLAN OF CARE
Problem: OXYGENATION/RESPIRATORY FUNCTION  Goal: Patient will achieve/maintain normal respiratory rate/effort  Description  Respiratory rate and effort will be within normal limits for the patient  4/7/2019 0845 by Tierra Jordan RCP  Outcome: Ongoing     Problem: MECHANICAL VENTILATION  Goal: Patient will maintain patent airway  4/7/2019 0845 by Tierra Jordan RCP  Outcome: Ongoing     Problem: MECHANICAL VENTILATION  Goal: Oral health is maintained or improved  4/7/2019 0845 by Tierra Jordan RCP  Outcome: Ongoing     Problem: MECHANICAL VENTILATION  Goal: ET tube will be managed safely  4/7/2019 0845 by Tierra Jordan RCP  Outcome: Ongoing     Problem: MECHANICAL VENTILATION  Goal: Ability to express needs and understand communication  4/7/2019 0845 by Tierra Jordan RCP  Outcome: Ongoing     Problem: MECHANICAL VENTILATION  Goal: Mobility/activity is maintained at optimum level for patient  4/7/2019 0845 by Tierra Jordan RCP  Outcome: Ongoing

## 2019-04-07 NOTE — BRIEF OP NOTE
Brief Postoperative Note  ______________________________________________________________    Patient: Niall Hairston  YOB: 1965  MRN: [id number]  Date of Procedure: 4/6/2019    Pre-Op Diagnosis: ISCHEMIC BOWEL    Post-Op Diagnosis: Same       Procedure(s):  LAPAROTOMY EXPLORATORY, ILEOCECECTOMY, SUBTOTAL COLECTOMY, ABTHEHRA WOUND VAC PLACEMENT    Anesthesia: General    Surgeon(s):  Patsy Cranker, MD    Assistant: Raghav Ramirez    Estimated Blood Loss (mL): 50    Complications: None    Specimens:   ID Type Source Tests Collected by Time Destination   A : DISTAL ILEUM ASCENDING COLON Tissue Ileum SURGICAL PATHOLOGY Patsy Cranker, MD 4/6/2019 1846    B : TRANSVERSE AND DESCENDING COLON Tissue Colon-Transverse SURGICAL PATHOLOGY Patsy Cranker, MD 4/6/2019 1913        Implants:  * No implants in log *      Drains:   Negative Pressure Wound Therapy Abdomen Mid (Active)       NG/OG/NJ/NE Tube Nasogastric 16 fr Left nostril (Active)   Surrounding Skin Dry; Intact 4/6/2019  4:00 PM   Securement device Yes 4/6/2019  4:00 PM   Status Suction-low intermittent 4/6/2019  4:00 PM   Placement Verified by Gastric Contents 4/6/2019  4:00 PM   Drainage Appearance Other (Comment) 4/6/2019  4:00 PM   Free Water Flush (mL) 180 mL 4/6/2019  3:00 PM   Output (mL) 150 ml 4/6/2019  5:40 PM       Urethral Catheter (Active)   Catheter Indications Need for fluid management in critically ill patients in a critical care setting not able to be managed by other means such as BSC with hat, bedpan, urinal, condom catheter, or short term intermittent urethral catherization 4/6/2019  4:00 PM   Site Assessment No urethral drainage 4/6/2019  4:00 PM   Urine Color Anita 4/6/2019  4:00 PM   Urine Appearance Cloudy 4/6/2019  4:00 PM   Output (mL) 65 mL 4/6/2019  5:21 PM       Findings: Purulent, malodorous ascites. Global hypoperfusion throughout abdomen. 160cm dusky, but potentially viable proximal small bowel.   Ischemic cecum, viable

## 2019-04-07 NOTE — PLAN OF CARE
Problem: OXYGENATION/RESPIRATORY FUNCTION  Goal: Patient will maintain patent airway  4/6/2019 2311 by Gilbert Wyatt RN  Outcome: Ongoing  4/6/2019 1852 by Juan Jose Coronado RN  Outcome: Ongoing  Goal: Patient will achieve/maintain normal respiratory rate/effort  Description  Respiratory rate and effort will be within normal limits for the patient  4/6/2019 2311 by Gilbert Wyatt RN  Outcome: Ongoing  4/6/2019 1852 by Juan Jose Coronado RN  Outcome: Ongoing     Problem: MECHANICAL VENTILATION  Goal: Patient will maintain patent airway  4/6/2019 2311 by Gilbert Wyatt RN  Outcome: Ongoing  4/6/2019 1852 by Juan Jose Coronado RN  Outcome: Ongoing  Goal: Oral health is maintained or improved  4/6/2019 2311 by Gilbert Wyatt RN  Outcome: Ongoing  4/6/2019 1852 by Juan Jose Coronado RN  Outcome: Ongoing  Goal: Tracheostomy will be managed safely  Outcome: Ongoing  Goal: ET tube will be managed safely  4/6/2019 2311 by Gilbert Wyatt RN  Outcome: Ongoing  4/6/2019 1852 by Juan Jose Coronado RN  Outcome: Ongoing  Goal: Ability to express needs and understand communication  4/6/2019 2311 by Gilbert Wyatt RN  Outcome: Ongoing  4/6/2019 1852 by Juan Jose Coronado RN  Outcome: Ongoing  Goal: Mobility/activity is maintained at optimum level for patient  4/6/2019 2311 by Gilbert Wyatt RN  Outcome: Ongoing  4/6/2019 1852 by Juan Jose Coronado RN  Outcome: Ongoing     Problem: NUTRITION  Goal: Nutritional status is improving  4/6/2019 2311 by Gilbert Wyatt RN  Outcome: Ongoing  4/6/2019 1852 by Juan Jose Coronado RN  Outcome: Not Met This Shift     Problem: Restraint Use - Nonviolent/Non-Self-Destructive Behavior:  Goal: Absence of restraint indications  Description  Absence of restraint indications  4/6/2019 2311 by Gilbert Wyatt RN  Outcome: Ongoing  4/6/2019 1852 by Juan Jose Coronado RN  Outcome: Not Met This Shift  Goal: Absence of restraint-related injury  Description  Absence of restraint-related injury  4/6/2019 2311 by Conchita Savage RN  Outcome: Ongoing  4/6/2019 1852 by Elder Michael RN  Outcome: Met This Shift     Problem: Nutrition  Goal: Optimal nutrition therapy  Description  Nutrition Problem: Inadequate oral intake  Intervention: Food and/or Nutrient Delivery: Continue NPO  Nutritional Goals: Meet % of estimated nutrition needs   Outcome: Ongoing     Problem: Fluid Volume - Imbalance:  Goal: Absence of imbalanced fluid volume signs and symptoms  Description  Absence of imbalanced fluid volume signs and symptoms  4/6/2019 2311 by Conchita Savage RN  Outcome: Ongoing  4/6/2019 1852 by Elder Michael RN  Outcome: Ongoing     Problem: Infection - Central Venous Catheter-Associated Bloodstream Infection:  Goal: Will show no infection signs and symptoms  Description  Will show no infection signs and symptoms  Outcome: Ongoing

## 2019-04-07 NOTE — PROGRESS NOTES
Pt opened eyes while turning for the first time since returning from OR. Pt is still not responsive to pain or voice.

## 2019-04-07 NOTE — PROCEDURES
[facility]   Cardioversion procedure Note         Today's Date: 4/7/2019  Indication: Atrial fibrillation with RVR and hemodynamic instability     Verbal consent was obtained from family after explanation of risks and benefits of procedure. Patient was seen and examined. History and Physical reviewed. Labs were reviewed. After an adequate level of sedation was achieved, 250J in biphasic synchronized delivery was administered with conversion to normal sinus rhythm. The patient went back to atrial fib after 30 secs. Another shock of 300 J was then administered. Patient remained in sinus rhythm for one minute and them went back to A-fib.        Electronically signed by Yajaira Dumont MD on 4/7/2019 at 4:31 PM

## 2019-04-08 ENCOUNTER — APPOINTMENT (OUTPATIENT)
Dept: GENERAL RADIOLOGY | Age: 54
DRG: 710 | End: 2019-04-08
Payer: MEDICAID

## 2019-04-08 ENCOUNTER — ANESTHESIA (OUTPATIENT)
Dept: OPERATING ROOM | Age: 54
DRG: 710 | End: 2019-04-08
Payer: MEDICAID

## 2019-04-08 ENCOUNTER — ANESTHESIA EVENT (OUTPATIENT)
Dept: OPERATING ROOM | Age: 54
DRG: 710 | End: 2019-04-08
Payer: MEDICAID

## 2019-04-08 VITALS
SYSTOLIC BLOOD PRESSURE: 80 MMHG | DIASTOLIC BLOOD PRESSURE: 47 MMHG | TEMPERATURE: 97.9 F | OXYGEN SATURATION: 98 % | RESPIRATION RATE: 14 BRPM

## 2019-04-08 LAB
ABSOLUTE EOS #: 0 K/UL (ref 0–0.4)
ABSOLUTE IMMATURE GRANULOCYTE: 0.26 K/UL (ref 0–0.3)
ABSOLUTE LYMPH #: 0.79 K/UL (ref 1–4.8)
ABSOLUTE MONO #: 0.52 K/UL (ref 0.1–0.8)
ALBUMIN SERPL-MCNC: 1.7 G/DL (ref 3.5–5.2)
ALBUMIN/GLOBULIN RATIO: 0.9 (ref 1–2.5)
ALLEN TEST: ABNORMAL
ALLEN TEST: NORMAL
ALLEN TEST: NORMAL
ALP BLD-CCNC: 172 U/L (ref 35–104)
ALT SERPL-CCNC: 1200 U/L (ref 5–33)
ANCA MYELOPEROXIDASE: 6 AU/ML
ANCA PROTEINASE 3: 11 AU/ML
ANION GAP SERPL CALCULATED.3IONS-SCNC: 17 MMOL/L (ref 9–17)
ANION GAP: 10 MMOL/L (ref 7–16)
ANION GAP: 12 MMOL/L (ref 7–16)
ANION GAP: 14 MMOL/L (ref 7–16)
ANION GAP: 9 MMOL/L (ref 7–16)
ANTI-NUCLEAR ANTIBODY (ANA): NEGATIVE
AST SERPL-CCNC: 1867 U/L
BASOPHILS # BLD: 0 % (ref 0–2)
BASOPHILS ABSOLUTE: 0 K/UL (ref 0–0.2)
BILIRUB SERPL-MCNC: 2.77 MG/DL (ref 0.3–1.2)
BILIRUBIN DIRECT: 2.16 MG/DL
BILIRUBIN, INDIRECT: 0.61 MG/DL (ref 0–1)
BUN BLDV-MCNC: 63 MG/DL (ref 6–20)
BUN/CREAT BLD: ABNORMAL (ref 9–20)
CALCIUM IONIZED: 0.86 MMOL/L (ref 1.13–1.33)
CALCIUM SERPL-MCNC: 6.6 MG/DL (ref 8.6–10.4)
CHLORIDE BLD-SCNC: 87 MMOL/L (ref 98–107)
CO2: 24 MMOL/L (ref 20–31)
CREAT SERPL-MCNC: 4.1 MG/DL (ref 0.5–0.9)
CULTURE: ABNORMAL
DIFFERENTIAL TYPE: ABNORMAL
EKG ATRIAL RATE: 87 BPM
EKG P AXIS: 79 DEGREES
EKG P-R INTERVAL: 194 MS
EKG Q-T INTERVAL: 414 MS
EKG QRS DURATION: 110 MS
EKG QTC CALCULATION (BAZETT): 498 MS
EKG R AXIS: -35 DEGREES
EKG T AXIS: 50 DEGREES
EKG VENTRICULAR RATE: 87 BPM
EOSINOPHILS RELATIVE PERCENT: 0 % (ref 1–4)
FIO2: 30
FIO2: 40
FIO2: 40
FIO2: 50
FIO2: ABNORMAL
GFR AFRICAN AMERICAN: 14 ML/MIN
GFR NON-AFRICAN AMERICAN: 11 ML/MIN
GFR NON-AFRICAN AMERICAN: 23 ML/MIN
GFR NON-AFRICAN AMERICAN: 7 ML/MIN
GFR NON-AFRICAN AMERICAN: 7 ML/MIN
GFR NON-AFRICAN AMERICAN: 8 ML/MIN
GFR SERPL CREATININE-BSD FRML MDRD: 28 ML/MIN
GFR SERPL CREATININE-BSD FRML MDRD: 8 ML/MIN
GFR SERPL CREATININE-BSD FRML MDRD: 9 ML/MIN
GFR SERPL CREATININE-BSD FRML MDRD: 9 ML/MIN
GFR SERPL CREATININE-BSD FRML MDRD: ABNORMAL ML/MIN/{1.73_M2}
GLOBULIN: ABNORMAL G/DL (ref 1.5–3.8)
GLUCOSE BLD-MCNC: 101 MG/DL (ref 74–100)
GLUCOSE BLD-MCNC: 21 MG/DL (ref 74–100)
GLUCOSE BLD-MCNC: 69 MG/DL (ref 74–100)
GLUCOSE BLD-MCNC: 78 MG/DL (ref 74–100)
GLUCOSE BLD-MCNC: 79 MG/DL (ref 65–105)
GLUCOSE BLD-MCNC: 87 MG/DL (ref 74–100)
GLUCOSE BLD-MCNC: 88 MG/DL (ref 74–100)
GLUCOSE BLD-MCNC: 91 MG/DL (ref 70–99)
HCO3 VENOUS: 9.4 MMOL/L (ref 22–29)
HCT VFR BLD CALC: 24.1 % (ref 36.3–47.1)
HEMOGLOBIN: 8 G/DL (ref 11.9–15.1)
IMMATURE GRANULOCYTES: 1 %
INR BLD: 1.1
LACTIC ACID, WHOLE BLOOD: 3.4 MMOL/L (ref 0.7–2.1)
LYMPHOCYTES # BLD: 3 % (ref 24–44)
Lab: ABNORMAL
MAGNESIUM: 2.1 MG/DL (ref 1.6–2.6)
MCH RBC QN AUTO: 30.4 PG (ref 25.2–33.5)
MCHC RBC AUTO-ENTMCNC: 33.2 G/DL (ref 28.4–34.8)
MCV RBC AUTO: 91.6 FL (ref 82.6–102.9)
MODE: ABNORMAL
MODE: NORMAL
MODE: NORMAL
MONOCYTES # BLD: 2 % (ref 1–7)
MORPHOLOGY: ABNORMAL
NEGATIVE BASE EXCESS, ART: 15 (ref 0–2)
NEGATIVE BASE EXCESS, ART: ABNORMAL (ref 0–2)
NEGATIVE BASE EXCESS, ART: NORMAL (ref 0–2)
NEGATIVE BASE EXCESS, ART: NORMAL (ref 0–2)
NEGATIVE BASE EXCESS, VEN: 23 (ref 0–2)
NRBC AUTOMATED: 0.7 PER 100 WBC
O2 DEVICE/FLOW/%: ABNORMAL
O2 DEVICE/FLOW/%: NORMAL
O2 DEVICE/FLOW/%: NORMAL
O2 SAT, VEN: 32 % (ref 60–85)
PATIENT TEMP: ABNORMAL
PATIENT TEMP: NORMAL
PATIENT TEMP: NORMAL
PCO2, VEN: 48.5 MM HG (ref 41–51)
PDW BLD-RTO: 14.4 % (ref 11.8–14.4)
PH VENOUS: 6.89 (ref 7.32–7.43)
PHOSPHORUS: 5.9 MG/DL (ref 2.6–4.5)
PLATELET # BLD: 244 K/UL (ref 138–453)
PLATELET ESTIMATE: ABNORMAL
PMV BLD AUTO: ABNORMAL FL (ref 8.1–13.5)
PO2, VEN: 33.7 MM HG (ref 30–50)
POC CHLORIDE: 121 MMOL/L (ref 98–107)
POC CHLORIDE: 90 MMOL/L (ref 98–107)
POC CREATININE: 2.25 MG/DL (ref 0.51–1.19)
POC CREATININE: 5.9 MG/DL (ref 0.51–1.19)
POC CREATININE: 6.14 MG/DL (ref 0.51–1.19)
POC CREATININE: 6.5 MG/DL (ref 0.51–1.19)
POC HCO3: 10.2 MMOL/L (ref 21–28)
POC HCO3: 25.2 MMOL/L (ref 21–28)
POC HCO3: 25.6 MMOL/L (ref 21–28)
POC HCO3: 27.1 MMOL/L (ref 21–28)
POC HCO3: 29.1 MMOL/L (ref 21–28)
POC HCO3: 29.7 MMOL/L (ref 21–28)
POC HEMATOCRIT: 22 % (ref 36–46)
POC HEMATOCRIT: 24 % (ref 36–46)
POC HEMATOCRIT: 25 % (ref 36–46)
POC HEMATOCRIT: 31 % (ref 36–46)
POC HEMOGLOBIN: 10.4 G/DL (ref 12–16)
POC HEMOGLOBIN: 7.6 G/DL (ref 12–16)
POC HEMOGLOBIN: 8.1 G/DL (ref 12–16)
POC HEMOGLOBIN: 8.4 G/DL (ref 12–16)
POC IONIZED CALCIUM: 0.86 MMOL/L (ref 1.15–1.33)
POC IONIZED CALCIUM: 0.88 MMOL/L (ref 1.15–1.33)
POC IONIZED CALCIUM: 0.92 MMOL/L (ref 1.15–1.33)
POC IONIZED CALCIUM: NORMAL MMOL/L (ref 1.15–1.33)
POC LACTIC ACID: 2.87 MMOL/L (ref 0.56–1.39)
POC LACTIC ACID: 3.31 MMOL/L (ref 0.56–1.39)
POC LACTIC ACID: 3.44 MMOL/L (ref 0.56–1.39)
POC LACTIC ACID: 4.93 MMOL/L (ref 0.56–1.39)
POC O2 SATURATION: 95 % (ref 94–98)
POC O2 SATURATION: 96 % (ref 94–98)
POC O2 SATURATION: 96 % (ref 94–98)
POC O2 SATURATION: 97 % (ref 94–98)
POC O2 SATURATION: 98 % (ref 94–98)
POC O2 SATURATION: 99 % (ref 94–98)
POC PCO2 TEMP: ABNORMAL MM HG
POC PCO2 TEMP: NORMAL MM HG
POC PCO2 TEMP: NORMAL MM HG
POC PCO2: 21.9 MM HG (ref 35–48)
POC PCO2: 36.7 MM HG (ref 35–48)
POC PCO2: 38.9 MM HG (ref 35–48)
POC PCO2: 39.7 MM HG (ref 35–48)
POC PCO2: 43 MM HG (ref 35–48)
POC PCO2: 46.4 MM HG (ref 35–48)
POC PH TEMP: ABNORMAL
POC PH TEMP: NORMAL
POC PH TEMP: NORMAL
POC PH: 7.28 (ref 7.35–7.45)
POC PH: 7.41 (ref 7.35–7.45)
POC PH: 7.42 (ref 7.35–7.45)
POC PH: 7.44 (ref 7.35–7.45)
POC PH: 7.45 (ref 7.35–7.45)
POC PH: 7.45 (ref 7.35–7.45)
POC PO2 TEMP: ABNORMAL MM HG
POC PO2 TEMP: NORMAL MM HG
POC PO2 TEMP: NORMAL MM HG
POC PO2: 117.6 MM HG (ref 83–108)
POC PO2: 74 MM HG (ref 83–108)
POC PO2: 81.6 MM HG (ref 83–108)
POC PO2: 84.3 MM HG (ref 83–108)
POC PO2: 94.9 MM HG (ref 83–108)
POC PO2: 98.3 MM HG (ref 83–108)
POC POTASSIUM: 4.4 MMOL/L (ref 3.5–4.5)
POC POTASSIUM: 4.6 MMOL/L (ref 3.5–4.5)
POC POTASSIUM: 4.7 MMOL/L (ref 3.5–4.5)
POC POTASSIUM: 5.4 MMOL/L (ref 3.5–4.5)
POC POTASSIUM: 6.4 MMOL/L (ref 3.5–4.5)
POC SODIUM: 126 MMOL/L (ref 138–146)
POC SODIUM: 126 MMOL/L (ref 138–146)
POC SODIUM: 127 MMOL/L (ref 138–146)
POC SODIUM: 144 MMOL/L (ref 138–146)
POSITIVE BASE EXCESS, ART: 1 (ref 0–3)
POSITIVE BASE EXCESS, ART: 1 (ref 0–3)
POSITIVE BASE EXCESS, ART: 3 (ref 0–3)
POSITIVE BASE EXCESS, ART: 4 (ref 0–3)
POSITIVE BASE EXCESS, ART: 5 (ref 0–3)
POSITIVE BASE EXCESS, ART: ABNORMAL (ref 0–3)
POSITIVE BASE EXCESS, VEN: ABNORMAL (ref 0–3)
POTASSIUM SERPL-SCNC: 4.9 MMOL/L (ref 3.7–5.3)
PROTHROMBIN TIME: 11.3 SEC (ref 9–12)
RBC # BLD: 2.63 M/UL (ref 3.95–5.11)
RBC # BLD: ABNORMAL 10*6/UL
SAMPLE SITE: ABNORMAL
SAMPLE SITE: NORMAL
SAMPLE SITE: NORMAL
SEG NEUTROPHILS: 94 % (ref 36–66)
SEGMENTED NEUTROPHILS ABSOLUTE COUNT: 24.63 K/UL (ref 1.8–7.7)
SODIUM BLD-SCNC: 128 MMOL/L (ref 135–144)
SPECIMEN DESCRIPTION: ABNORMAL
TCO2 (CALC), ART: 11 MMOL/L (ref 22–29)
TCO2 (CALC), ART: 26 MMOL/L (ref 22–29)
TCO2 (CALC), ART: 27 MMOL/L (ref 22–29)
TCO2 (CALC), ART: 28 MMOL/L (ref 22–29)
TCO2 (CALC), ART: 30 MMOL/L (ref 22–29)
TCO2 (CALC), ART: 31 MMOL/L (ref 22–29)
TOTAL CK: ABNORMAL U/L (ref 26–192)
TOTAL CO2, VENOUS: 11 MMOL/L (ref 23–30)
TOTAL PROTEIN: 3.6 G/DL (ref 6.4–8.3)
WBC # BLD: 26.2 K/UL (ref 3.5–11.3)
WBC # BLD: ABNORMAL 10*3/UL

## 2019-04-08 PROCEDURE — 84295 ASSAY OF SERUM SODIUM: CPT

## 2019-04-08 PROCEDURE — 2580000003 HC RX 258: Performed by: STUDENT IN AN ORGANIZED HEALTH CARE EDUCATION/TRAINING PROGRAM

## 2019-04-08 PROCEDURE — 88307 TISSUE EXAM BY PATHOLOGIST: CPT

## 2019-04-08 PROCEDURE — 86317 IMMUNOASSAY INFECTIOUS AGENT: CPT

## 2019-04-08 PROCEDURE — 2580000003 HC RX 258: Performed by: NURSE ANESTHETIST, CERTIFIED REGISTERED

## 2019-04-08 PROCEDURE — 86803 HEPATITIS C AB TEST: CPT

## 2019-04-08 PROCEDURE — 3700000001 HC ADD 15 MINUTES (ANESTHESIA): Performed by: SURGERY

## 2019-04-08 PROCEDURE — 90937 HEMODIALYSIS REPEATED EVAL: CPT

## 2019-04-08 PROCEDURE — 82330 ASSAY OF CALCIUM: CPT

## 2019-04-08 PROCEDURE — 0DBP0ZZ EXCISION OF RECTUM, OPEN APPROACH: ICD-10-PCS | Performed by: SURGERY

## 2019-04-08 PROCEDURE — 3600000014 HC SURGERY LEVEL 4 ADDTL 15MIN: Performed by: SURGERY

## 2019-04-08 PROCEDURE — 6360000002 HC RX W HCPCS: Performed by: STUDENT IN AN ORGANIZED HEALTH CARE EDUCATION/TRAINING PROGRAM

## 2019-04-08 PROCEDURE — 86704 HEP B CORE ANTIBODY TOTAL: CPT

## 2019-04-08 PROCEDURE — 94762 N-INVAS EAR/PLS OXIMTRY CONT: CPT

## 2019-04-08 PROCEDURE — 83735 ASSAY OF MAGNESIUM: CPT

## 2019-04-08 PROCEDURE — 99233 SBSQ HOSP IP/OBS HIGH 50: CPT | Performed by: INTERNAL MEDICINE

## 2019-04-08 PROCEDURE — 0D1B0Z4 BYPASS ILEUM TO CUTANEOUS, OPEN APPROACH: ICD-10-PCS | Performed by: SURGERY

## 2019-04-08 PROCEDURE — 2500000003 HC RX 250 WO HCPCS: Performed by: NURSE ANESTHETIST, CERTIFIED REGISTERED

## 2019-04-08 PROCEDURE — 0DBU0ZZ EXCISION OF OMENTUM, OPEN APPROACH: ICD-10-PCS | Performed by: SURGERY

## 2019-04-08 PROCEDURE — 2720000010 HC SURG SUPPLY STERILE: Performed by: SURGERY

## 2019-04-08 PROCEDURE — 2500000003 HC RX 250 WO HCPCS: Performed by: EMERGENCY MEDICINE

## 2019-04-08 PROCEDURE — 84100 ASSAY OF PHOSPHORUS: CPT

## 2019-04-08 PROCEDURE — 36600 WITHDRAWAL OF ARTERIAL BLOOD: CPT

## 2019-04-08 PROCEDURE — 3700000000 HC ANESTHESIA ATTENDED CARE: Performed by: SURGERY

## 2019-04-08 PROCEDURE — 85025 COMPLETE CBC W/AUTO DIFF WBC: CPT

## 2019-04-08 PROCEDURE — 2500000003 HC RX 250 WO HCPCS: Performed by: STUDENT IN AN ORGANIZED HEALTH CARE EDUCATION/TRAINING PROGRAM

## 2019-04-08 PROCEDURE — 71045 X-RAY EXAM CHEST 1 VIEW: CPT

## 2019-04-08 PROCEDURE — 2709999900 HC NON-CHARGEABLE SUPPLY: Performed by: SURGERY

## 2019-04-08 PROCEDURE — 3600000004 HC SURGERY LEVEL 4 BASE: Performed by: SURGERY

## 2019-04-08 PROCEDURE — 80048 BASIC METABOLIC PNL TOTAL CA: CPT

## 2019-04-08 PROCEDURE — 0DTL0ZZ RESECTION OF TRANSVERSE COLON, OPEN APPROACH: ICD-10-PCS | Performed by: SURGERY

## 2019-04-08 PROCEDURE — 82435 ASSAY OF BLOOD CHLORIDE: CPT

## 2019-04-08 PROCEDURE — 6360000002 HC RX W HCPCS: Performed by: NURSE ANESTHETIST, CERTIFIED REGISTERED

## 2019-04-08 PROCEDURE — C9113 INJ PANTOPRAZOLE SODIUM, VIA: HCPCS | Performed by: EMERGENCY MEDICINE

## 2019-04-08 PROCEDURE — 87340 HEPATITIS B SURFACE AG IA: CPT

## 2019-04-08 PROCEDURE — 82550 ASSAY OF CK (CPK): CPT

## 2019-04-08 PROCEDURE — 2000000000 HC ICU R&B

## 2019-04-08 PROCEDURE — 6360000002 HC RX W HCPCS: Performed by: EMERGENCY MEDICINE

## 2019-04-08 PROCEDURE — 85610 PROTHROMBIN TIME: CPT

## 2019-04-08 PROCEDURE — 80076 HEPATIC FUNCTION PANEL: CPT

## 2019-04-08 PROCEDURE — 82803 BLOOD GASES ANY COMBINATION: CPT

## 2019-04-08 PROCEDURE — 2700000000 HC OXYGEN THERAPY PER DAY

## 2019-04-08 PROCEDURE — 2580000003 HC RX 258: Performed by: EMERGENCY MEDICINE

## 2019-04-08 PROCEDURE — 94003 VENT MGMT INPAT SUBQ DAY: CPT

## 2019-04-08 PROCEDURE — 94770 HC ETCO2 MONITOR DAILY: CPT

## 2019-04-08 PROCEDURE — 88305 TISSUE EXAM BY PATHOLOGIST: CPT

## 2019-04-08 PROCEDURE — 2580000003 HC RX 258: Performed by: SURGERY

## 2019-04-08 PROCEDURE — C9113 INJ PANTOPRAZOLE SODIUM, VIA: HCPCS | Performed by: STUDENT IN AN ORGANIZED HEALTH CARE EDUCATION/TRAINING PROGRAM

## 2019-04-08 PROCEDURE — 99291 CRITICAL CARE FIRST HOUR: CPT | Performed by: INTERNAL MEDICINE

## 2019-04-08 PROCEDURE — 2580000003 HC RX 258: Performed by: INTERNAL MEDICINE

## 2019-04-08 RX ORDER — SODIUM CHLORIDE, SODIUM LACTATE, POTASSIUM CHLORIDE, CALCIUM CHLORIDE 600; 310; 30; 20 MG/100ML; MG/100ML; MG/100ML; MG/100ML
INJECTION, SOLUTION INTRAVENOUS CONTINUOUS PRN
Status: DISCONTINUED | OUTPATIENT
Start: 2019-04-08 | End: 2019-04-08 | Stop reason: SDUPTHER

## 2019-04-08 RX ORDER — MAGNESIUM HYDROXIDE 1200 MG/15ML
LIQUID ORAL CONTINUOUS PRN
Status: COMPLETED | OUTPATIENT
Start: 2019-04-08 | End: 2019-04-08

## 2019-04-08 RX ORDER — FENTANYL CITRATE 50 UG/ML
INJECTION, SOLUTION INTRAMUSCULAR; INTRAVENOUS PRN
Status: DISCONTINUED | OUTPATIENT
Start: 2019-04-08 | End: 2019-04-08 | Stop reason: SDUPTHER

## 2019-04-08 RX ORDER — 0.9 % SODIUM CHLORIDE 0.9 %
1000 INTRAVENOUS SOLUTION INTRAVENOUS ONCE
Status: COMPLETED | OUTPATIENT
Start: 2019-04-08 | End: 2019-04-08

## 2019-04-08 RX ORDER — ROCURONIUM BROMIDE 10 MG/ML
INJECTION, SOLUTION INTRAVENOUS PRN
Status: DISCONTINUED | OUTPATIENT
Start: 2019-04-08 | End: 2019-04-08 | Stop reason: SDUPTHER

## 2019-04-08 RX ORDER — MIDAZOLAM HYDROCHLORIDE 1 MG/ML
INJECTION INTRAMUSCULAR; INTRAVENOUS PRN
Status: DISCONTINUED | OUTPATIENT
Start: 2019-04-08 | End: 2019-04-08 | Stop reason: SDUPTHER

## 2019-04-08 RX ADMIN — Medication: at 07:20

## 2019-04-08 RX ADMIN — MIDAZOLAM HYDROCHLORIDE 2 MG: 1 INJECTION, SOLUTION INTRAMUSCULAR; INTRAVENOUS at 11:46

## 2019-04-08 RX ADMIN — HEPARIN SODIUM 1500 UNITS: 1000 INJECTION INTRAVENOUS; SUBCUTANEOUS at 19:05

## 2019-04-08 RX ADMIN — DILTIAZEM HYDROCHLORIDE 15 MG/HR: 5 INJECTION INTRAVENOUS at 00:03

## 2019-04-08 RX ADMIN — SODIUM CHLORIDE 20 ML/HR: 4.5 INJECTION, SOLUTION INTRAVENOUS at 00:02

## 2019-04-08 RX ADMIN — Medication 75 MCG/HR: at 06:43

## 2019-04-08 RX ADMIN — VASOPRESSIN 0.01 UNITS/MIN: 20 INJECTION INTRAVENOUS at 05:14

## 2019-04-08 RX ADMIN — Medication 10 ML: at 21:58

## 2019-04-08 RX ADMIN — FENTANYL CITRATE 100 MCG: 50 INJECTION INTRAMUSCULAR; INTRAVENOUS at 12:05

## 2019-04-08 RX ADMIN — SODIUM CHLORIDE, POTASSIUM CHLORIDE, SODIUM LACTATE AND CALCIUM CHLORIDE: 600; 310; 30; 20 INJECTION, SOLUTION INTRAVENOUS at 11:37

## 2019-04-08 RX ADMIN — PANTOPRAZOLE SODIUM 40 MG: 40 INJECTION, POWDER, FOR SOLUTION INTRAVENOUS at 08:05

## 2019-04-08 RX ADMIN — HEPARIN SODIUM 1400 UNITS: 1000 INJECTION INTRAVENOUS; SUBCUTANEOUS at 19:05

## 2019-04-08 RX ADMIN — ROCURONIUM BROMIDE 50 MG: 10 INJECTION INTRAVENOUS at 11:46

## 2019-04-08 RX ADMIN — Medication: at 00:04

## 2019-04-08 RX ADMIN — Medication 1 G: at 02:02

## 2019-04-08 RX ADMIN — CALCIUM GLUCONATE 2 G: 98 INJECTION, SOLUTION INTRAVENOUS at 05:47

## 2019-04-08 RX ADMIN — ROCURONIUM BROMIDE 20 MG: 10 INJECTION INTRAVENOUS at 13:00

## 2019-04-08 RX ADMIN — VASOPRESSIN 0.01 UNITS/MIN: 20 INJECTION INTRAVENOUS at 00:30

## 2019-04-08 RX ADMIN — PANTOPRAZOLE SODIUM 40 MG: 40 INJECTION, POWDER, FOR SOLUTION INTRAVENOUS at 21:57

## 2019-04-08 RX ADMIN — Medication 10 ML: at 08:05

## 2019-04-08 RX ADMIN — MEROPENEM 1 G: 1 INJECTION, POWDER, FOR SOLUTION INTRAVENOUS at 14:54

## 2019-04-08 RX ADMIN — ROCURONIUM BROMIDE 20 MG: 10 INJECTION INTRAVENOUS at 14:05

## 2019-04-08 RX ADMIN — ROCURONIUM BROMIDE 30 MG: 10 INJECTION INTRAVENOUS at 13:18

## 2019-04-08 RX ADMIN — DILTIAZEM HYDROCHLORIDE 15 MG/HR: 5 INJECTION INTRAVENOUS at 07:18

## 2019-04-08 RX ADMIN — SODIUM CHLORIDE 1000 ML: 9 INJECTION, SOLUTION INTRAVENOUS at 01:46

## 2019-04-08 ASSESSMENT — PULMONARY FUNCTION TESTS
PIF_VALUE: 24
PIF_VALUE: 25
PIF_VALUE: 20
PIF_VALUE: 24
PIF_VALUE: 23
PIF_VALUE: 26
PIF_VALUE: 25
PIF_VALUE: 25
PIF_VALUE: 24
PIF_VALUE: 25
PIF_VALUE: 24
PIF_VALUE: 20
PIF_VALUE: 25
PIF_VALUE: 25
PIF_VALUE: 22
PIF_VALUE: 25
PIF_VALUE: 23
PIF_VALUE: 25
PIF_VALUE: 24
PIF_VALUE: 1
PIF_VALUE: 25
PIF_VALUE: 21
PIF_VALUE: 24
PIF_VALUE: 24
PIF_VALUE: 23
PIF_VALUE: 24
PIF_VALUE: 26
PIF_VALUE: 25
PIF_VALUE: 23
PIF_VALUE: 25
PIF_VALUE: 25
PIF_VALUE: 21
PIF_VALUE: 26
PIF_VALUE: 26
PIF_VALUE: 25
PIF_VALUE: 26
PIF_VALUE: 25
PIF_VALUE: 25
PIF_VALUE: 23
PIF_VALUE: 24
PIF_VALUE: 24
PIF_VALUE: 26
PIF_VALUE: 24
PIF_VALUE: 23
PIF_VALUE: 25
PIF_VALUE: 26
PIF_VALUE: 24
PIF_VALUE: 26
PIF_VALUE: 22
PIF_VALUE: 25
PIF_VALUE: 24
PIF_VALUE: 26
PIF_VALUE: 26
PIF_VALUE: 24
PIF_VALUE: 26
PIF_VALUE: 26
PIF_VALUE: 22
PIF_VALUE: 25
PIF_VALUE: 24
PIF_VALUE: 25
PIF_VALUE: 25
PIF_VALUE: 23
PIF_VALUE: 25
PIF_VALUE: 25
PIF_VALUE: 23
PIF_VALUE: 24
PIF_VALUE: 24
PIF_VALUE: 25
PIF_VALUE: 23
PIF_VALUE: 24
PIF_VALUE: 25
PIF_VALUE: 26
PIF_VALUE: 25
PIF_VALUE: 24
PIF_VALUE: 25
PIF_VALUE: 25
PIF_VALUE: 26
PIF_VALUE: 23
PIF_VALUE: 24
PIF_VALUE: 21
PIF_VALUE: 25
PIF_VALUE: 26
PIF_VALUE: 25
PIF_VALUE: 24
PIF_VALUE: 25
PIF_VALUE: 15
PIF_VALUE: 24
PIF_VALUE: 24
PIF_VALUE: 23
PIF_VALUE: 25
PIF_VALUE: 20
PIF_VALUE: 23
PIF_VALUE: 25
PIF_VALUE: 24
PIF_VALUE: 25
PIF_VALUE: 24
PIF_VALUE: 25
PIF_VALUE: 25
PIF_VALUE: 26
PIF_VALUE: 24
PIF_VALUE: 23
PIF_VALUE: 24
PIF_VALUE: 25
PIF_VALUE: 25
PIF_VALUE: 26
PIF_VALUE: 25
PIF_VALUE: 26
PIF_VALUE: 24
PIF_VALUE: 25
PIF_VALUE: 2
PIF_VALUE: 25
PIF_VALUE: 24
PIF_VALUE: 24
PIF_VALUE: 27
PIF_VALUE: 26
PIF_VALUE: 25
PIF_VALUE: 25
PIF_VALUE: 24
PIF_VALUE: 25
PIF_VALUE: 26
PIF_VALUE: 21
PIF_VALUE: 24
PIF_VALUE: 25
PIF_VALUE: 25
PIF_VALUE: 24
PIF_VALUE: 26
PIF_VALUE: 26
PIF_VALUE: 22
PIF_VALUE: 25
PIF_VALUE: 21
PIF_VALUE: 24
PIF_VALUE: 25
PIF_VALUE: 24
PIF_VALUE: 24
PIF_VALUE: 25
PIF_VALUE: 24
PIF_VALUE: 25
PIF_VALUE: 25
PIF_VALUE: 24
PIF_VALUE: 24
PIF_VALUE: 23
PIF_VALUE: 25
PIF_VALUE: 26
PIF_VALUE: 24
PIF_VALUE: 22
PIF_VALUE: 26
PIF_VALUE: 25
PIF_VALUE: 25
PIF_VALUE: 24
PIF_VALUE: 25
PIF_VALUE: 26
PIF_VALUE: 24
PIF_VALUE: 26
PIF_VALUE: 25
PIF_VALUE: 25
PIF_VALUE: 23
PIF_VALUE: 24
PIF_VALUE: 25
PIF_VALUE: 23
PIF_VALUE: 20
PIF_VALUE: 25
PIF_VALUE: 26
PIF_VALUE: 25
PIF_VALUE: 25
PIF_VALUE: 24
PIF_VALUE: 23

## 2019-04-08 ASSESSMENT — LIFESTYLE VARIABLES: SMOKING_STATUS: 1

## 2019-04-08 ASSESSMENT — PAIN SCALES - GENERAL
PAINLEVEL_OUTOF10: 0
PAINLEVEL_OUTOF10: 0

## 2019-04-08 ASSESSMENT — ENCOUNTER SYMPTOMS: STRIDOR: 0

## 2019-04-08 NOTE — PROGRESS NOTES
NEPHROLOGY PROGRESS NOTE      SUBJECTIVE   Hospitalized with the delirium associated with abdominal pain nausea vomiting. Initial assessment showed hypotensive lady with the imaging studies revealing evidence of ideas. Further investigations demonstrated neutrophilic leukocytosis along with acute hepatitis/acute kidney injury and elevated lactic acid. Underwent surgical resection for ischemic gut. Hospital course further complicated by persistent atrial fibrillation needing DC cardioversion. She was started on dialysis in view of life-threatening hyperkalemia and acute kidney injury. Her general condition has detriorated  further. Blood pressure has dropped down and needing more pressors. She was scheduled for hemodialysis but plan is to take her for another surgery today. Continues to be in atrial fibrillation on low-dose Cardizem. Urine output is decent. OBJECTIVE     Vitals:    04/08/19 1015 04/08/19 1030 04/08/19 1045 04/08/19 1100   BP:   (!) 76/41 (!) 75/39   Pulse: 84 84 84 82   Resp: 18 18 18 18   Temp:       TempSrc:       SpO2: 95% 95% 94% 96%   Weight:       Height:         24HR INTAKE/OUTPUT:      Intake/Output Summary (Last 24 hours) at 4/8/2019 1130  Last data filed at 4/8/2019 0900  Gross per 24 hour   Intake 8269.56 ml   Output 2741 ml   Net 5528. 56 ml       General appearance: Mechanical ventilation  HEENT: Mechanical ventilation  Respiratory::vesicular breath sounds,no wheeze/crackles  Cardiovascular:S1 S2 normal,no gallop or organic murmur.   Extremities: No Cyanosis or Clubbing, present Lower extremity edema  Neurological: Mechanical ventilation    MEDICATIONS     Scheduled Meds:    pantoprazole  40 mg Intravenous BID    And    sodium chloride (PF)  10 mL Intravenous BID    sodium chloride flush  10 mL Intravenous 2 times per day    meropenem  1 g Intravenous Q24H    magnesium sulfate  1 g Intravenous Once     Continuous Infusions:    diltiazem (CARDIZEM) 125 mg in dextrose 5% Constipation  gabapentin (NEURONTIN) 100 MG capsule, Take 100 mg by mouth 3 times daily as needed   lisinopril-hydrochlorothiazide (PRINZIDE;ZESTORETIC) 10-12.5 MG per tablet, Take 1 tablet by mouth daily  albuterol (PROVENTIL) (2.5 MG/3ML) 0.083% nebulizer solution, Take 2.5 mg by nebulization every 6 hours as needed for Wheezing  albuterol sulfate  (90 BASE) MCG/ACT inhaler, Inhale 2 puffs into the lungs every 6 hours as needed for Wheezing    INVESTIGATIONS     Last 3 CMP:    Recent Labs     04/06/19  2348  04/07/19  0457  04/07/19  1301 04/07/19  1958  04/08/19  0429 04/08/19  0430 04/08/19  0840   *  --  129*  --  127* 128*  --  128*  --   --    K 5.1  --  5.4*  --  5.5* 5.2  --  4.9  --   --    CL 93*  --  91*  --  89* 90*  --  87*  --   --    CO2 14*  --  17*  --  20 23  --  24  --   --    BUN 62*  --  62*  --   --   --   --  63*  --   --    CREATININE 3.79*   < > 3.90*   < >  --   --    < > 4.10* 5.90* 6.50*   CALCIUM 6.4*  --  7.5*  --   --   --   --  6.6*  --   --    PROT 3.9*  --  4.0*  --   --   --   --  3.6*  --   --    LABALBU 2.6*  --  2.3*  --   --   --   --  1.7*  --   --    BILITOT 1.56*  --  1.92*  --   --   --   --  2.77*  --   --    ALKPHOS 187*  --  194*  --   --   --   --  172*  --   --    AST 3,260*  --  3,542*  --   --   --   --  1,867*  --   --    ALT 1,408*  --  1,528*  --   --   --   --  1,200*  --   --     < > = values in this interval not displayed. Last 3 CBC:  Recent Labs     04/06/19  2107 04/07/19  0457 04/08/19  0429   WBC 9.9 16.8* 26.2*   RBC 2.97* 2.97* 2.63*   HGB 9.2* 9.1* 8.0*   HCT 29.2* 28.1* 24.1*   MCV 98.3 94.6 91.6   MCH 31.0 30.6 30.4   MCHC 31.5 32.4 33.2   RDW 15.0* 14.6* 14.4    234 244   MPV 11.8 11.8 NOT REPORTED       ASSESSMENT     1.   Acute kidney injury nonoliguric secondary to ischemic (Hypotension/arrthymia/ischemic gut ) and nephrotoxic (contrast) ATN aggravated further by concomitant NSAID and diuretic use along with cocaine - hemodialysis dependent since   Creat peaked at 4 with hyperkalemia 8  Baseline creat in Jan 2017 was normal  Ultrasound shows right kidney 10.4 cm and left 10.2 cm  2. Circulatory shock - sepsis and volume depletion - on pressors  3. Ischemic gut status post Ileocecectomy, extended left hemicolectomy, placement of  ABThera wound VAC on 4/6  4. Sepsis - blood culture positive for H.infleuenza  5. VDRF  6. Bilateral adrenal masses 11 mm left and 18 mm on the right  7. Anemia  8. Persistent Afib S/o cardioversion      PLAN     1. Patient being wheeled to OR soon for surgery for ongoing ischemic gut  2. Will start her on CRRT after the surgery and review of labs  3. Continue pressors  4. Antibiotics as per CRRT dosing  5. Avoid nephrotoxins  6.   Will follow    Please do not hesitate to call with questions    This note is created with the assistance of a speech-recognition program. While intending to generate a document that actually reflects the content of the visit, no guarantees can be provided that every mistake has been identified and corrected by editing    Melinda Benson MD, MRCP [facility]ada), [address]   4/8/2019 11:30 AM  [facility]

## 2019-04-08 NOTE — PROGRESS NOTES
[facility] - Progress Note    Today's Date and Time: 4/8/2019, 3:47 PM    Impression :   1. 48 y/o female with complaints of  · Vomiting  · Diarrhea  · Abdominal pain  · Altered mental status  2. Acute kidney injury  · Nephrology onboard and planning for urgent dialysis   3. Shock, presumptive septic plus hypovolemic, requiring Levophed  4. Septicemia with Gram negative bacilli 4-5-19  5. Gastroenteritis  6. Ischemic bowel  7. S/P laparotomy 4-6-19  8. Severe hyponatremia  9. Transaminitis, shock liver  10. Intubation 2/2 AMS  11. COPD  15. Arthritis  13. Chronic NSAID use  14. Funguria  15. Acral mottling of hands and feet  16. Allergy to penicillins: Hives and respiratory distress  17. Allergy to sulfa    Recommendations:   · Meropenem 1 gm IV q 24 hr  · IV fluids, presors  · Follow up cultures    Medical Decision Making/Summary/Discussion:   · 48 y/o female with vomiting, diarrhea, and AMS  · Found to have transaminitis, MARY requiring emergent dialysis, lactic acidosis, shock requiring pressors  · Intubated due to AMS  · CT chest shows atelactasis vs pneumonia  · Currently on vanc, levaquin, and aztreonam   · Patient is critically ill with origin in GI tract . Will need to treat with meropenem despite Hx of penicillin allergy. · Had laparotomy 4-6-19 with findings of ischemic bowel from distal ileum to sigmoid colon.    · S/P ileocecectomy with extended left hemicolectomy and placement of wound vac 4-6-19  · Overall improvement post surgery  Infection Control Recommendations   · [place] Precautions    Antimicrobial Stewardship Recommendations     · Simplification of therapy  · Targeted therapy    Coordination of Outpatient Care:   · Estimated Length of IV antimicrobials: TBD  · Patient will need Midline Catheter Insertion: TBD  · Patient will need PICC line Insertion: TBD  · Patient will need: Home IV , [place],  SNF,  LTAC  · Patient will need outpatient wound LAPAROTOMY EXPLORATORY, ILEOCECECTOMY, SUBTOTAL COLECTOMY, ABTHEHRA WOUND VAC PLACEMENT performed by Aleida Munoz MD at [address] Right 2013       Medications:      pantoprazole  40 mg Intravenous BID    And    sodium chloride (PF)  10 mL Intravenous BID    sodium chloride flush  10 mL Intravenous 2 times per day    meropenem  1 g Intravenous Q24H    magnesium sulfate  1 g Intravenous Once       Social History:     Social History     Socioeconomic History    Marital status: Single     Spouse name: Not on file    Number of children: 0    Years of education: Not on file    Highest education level: Not on file   Occupational History    Occupation: [address] [place] [address] Financial resource strain: Not on file    Food insecurity:     Worry: Not on file     Inability: Not on file    Transportation needs:     Medical: Not on file     Non-medical: Not on file   Tobacco Use    Smoking status: Light Tobacco Smoker     Packs/day: 0.25     Types: Cigarettes     Start date: 9/19/1980    Smokeless tobacco: Never Used   Substance and Sexual Activity    Alcohol use: Yes     Comment: OCCASSIONAL    Drug use: No    Sexual activity: Yes     Partners: Female   Lifestyle    Physical activity:     Days per week: Not on file     Minutes per session: Not on file    Stress: Not on file   Relationships    Social connections:     Talks on phone: Not on file     Gets together: Not on file     Attends Christian service: Not on file     Active member of club or organization: Not on file     Attends meetings of clubs or organizations: Not on file     Relationship status: Not on file    Intimate partner violence:     Fear of current or ex partner: Not on file     Emotionally abused: Not on file     Physically abused: Not on file     Forced sexual activity: Not on file   Other Topics Concern    Not on file   Social History Narrative    Not on file       Family History: Family History   Problem Relation Age of Onset    Heart Disease Mother     Stroke Mother     Heart Surgery Mother     Diabetes Maternal Grandmother     Emphysema Maternal Grandfather     Diabetes Maternal Grandfather     Lung Cancer Maternal Uncle         Allergies:   Pcn [penicillins]; Sulfa antibiotics; and Tape [adhesive tape]     Review of Systems:   Unable to provide. Sedated on ventilator. Physical Examination :     Patient Vitals for the past 8 hrs:   BP Temp Temp src Pulse Resp SpO2   04/08/19 1541 -- -- -- -- 18 96 %   04/08/19 1538 -- -- -- 93 18 96 %   04/08/19 1530 -- -- -- 94 18 96 %   04/08/19 1515 -- -- -- 94 18 96 %   04/08/19 1500 -- -- -- 94 18 96 %   04/08/19 1445 -- -- -- 94 18 95 %   04/08/19 1430 -- -- -- 92 -- 94 %   04/08/19 1428 (!) 140/65 -- -- 93 -- --   04/08/19 1422 -- -- -- 95 18 94 %   04/08/19 1100 (!) 75/39 -- -- 82 18 96 %   04/08/19 1045 (!) 76/41 -- -- 84 18 94 %   04/08/19 1030 -- -- -- 84 18 95 %   04/08/19 1015 -- -- -- 84 18 95 %   04/08/19 1000 (!) 66/28 -- -- 83 17 94 %   04/08/19 0945 -- -- -- 85 (!) 0 95 %   04/08/19 0930 -- -- -- 86 (!) 0 96 %   04/08/19 0915 -- -- -- 84 (!) 0 95 %   04/08/19 0900 (!) 79/40 -- -- 84 18 95 %   04/08/19 0845 -- -- -- 83 (!) 0 94 %   04/08/19 0838 -- -- -- -- (!) 0 94 %   04/08/19 0830 -- -- -- 83 18 94 %   04/08/19 0815 -- -- -- 82 18 94 %   04/08/19 0810 -- -- -- -- 19 94 %   04/08/19 0800 (!) 81/37 97.9 °F (36.6 °C) CORE 84 20 92 %     General Appearance: Sedated, on ventilator  Head:  Normocephalic, no trauma  Eyes: Pupils equal, round, reactive to light; sclera anicteric; conjunctivae pink. No embolic phenomena. ENT: Oropharynx clear, without erythema, exudate, or thrush. No tenderness of sinuses. Mouth/throat: mucosa pink and moist. No lesions. Dentition in good repair. Neck:Supple, without lymphadenopathy. Thyroid normal, No bruits. Pulmonary/Chest: Clear to auscultation, without wheezes, rales, or rhonchi.   No dullness to percussion. Cardiovascular: Regular rate and rhythm without murmurs, rubs, or gallops. Abdomen: Distended. Bowel sounds absent. Hard to palpate but no rigidity. Grimaces with pressure. All four Extremities: Cyanosis of trunk, abdomen, distal extremities  Neurologic: Sedated  Skin: Cool and dry with poor turgor. Signs of peripheral arterial  insufficiency. No ulcerations. No open wounds. Skin purplish, cyanotic. Medical Decision Making -Laboratory:   I have independently reviewed/ordered the following labs:    CBC with Differential:   Recent Labs     04/07/19 0457 04/08/19 0429   WBC 16.8* 26.2*   HGB 9.1* 8.0*   HCT 28.1* 24.1*    244   LYMPHOPCT 5* 3*   MONOPCT 1 2     BMP:   Recent Labs     04/07/19 0457 04/07/19  0942  04/07/19 1958 04/08/19 0429 04/08/19  0430 04/08/19  0840   *  --   --    < > 128*  --  128*  --   --    K 5.4*  --   --    < > 5.2  --  4.9  --   --    CL 91*  --   --    < > 90*  --  87*  --   --    CO2 17*  --   --    < > 23  --  24  --   --    BUN 62*  --   --   --   --   --  63*  --   --    CREATININE 3.90*   < >  --   --   --    < > 4.10* 5.90* 6.50*   MG  --   --  2.0  --   --   --  2.1  --   --     < > = values in this interval not displayed. Hepatic Function Panel:   Recent Labs     04/07/19 0457 04/08/19 0429   PROT 4.0* 3.6*   LABALBU 2.3* 1.7*   BILIDIR 1.11* 2.16*   IBILI 0.81 0.61   BILITOT 1.92* 2.77*   ALKPHOS 194* 172*   ALT 1,528* 1,200*   AST 3,542* 1,867*     No results for input(s): RPR in the last 72 hours. No results for input(s): HIV in the last 72 hours. No results for input(s): BC in the last 72 hours.   Lab Results   Component Value Date    MUCUS NOT REPORTED 04/06/2019    RBC 2.63 04/08/2019    TRICHOMONAS NOT REPORTED 04/06/2019    WBC 26.2 04/08/2019    YEAST NOT REPORTED 04/06/2019    TURBIDITY TURBID 04/06/2019     Lab Results   Component Value Date    CREATININE 6.50 04/08/2019    CREATININE 4.10 04/08/2019    GLUCOSE 91 04/08/2019       Medical Decision Making-Imaging:     Abdominal xray:    Gas in mildly distended loops of large and small bowel likely reflecting an   ileus.       NG tube tip in the gastric fundus with the proximal side-port in the distal   thoracic esophagus, repositioning recommended.       Airspace disease left lung base. CT scan chest:    Impression   Satisfactory position endotracheal tube.       Nasogastric tube needs advanced 10 cm.       Patchy perihilar opacities and bibasilar airspace disease.  Follow-up may be   beneficial following medical treatment course to document complete resolution.           EXAMINATION:   CTA OF THE ABDOMEN AND PELVIS WITH CONTRAST       4/5/2019 9:23 pm:       TECHNIQUE:   CTA of the abdomen and pelvis was performed with the administration of   intravenous contrast. Multiplanar reformatted images are provided for review. MIP images are provided for review.  Dose modulation, iterative   reconstruction, and/or weight based adjustment of the mA/kV was utilized to   reduce the radiation dose to as low as reasonably achievable.       COMPARISON:   None.       HISTORY:   ORDERING SYSTEM PROVIDED HISTORY: suspected dissection of abdominal aorta       FINDINGS:       CTA ABDOMEN:       Bibasilar airspace disease.  Liver, spleen, pancreas, gallbladder normal.   Bilateral adrenal masses measuring 11 mm on the left and 18 x 28 mm on the   left.  Bilateral ill-defined hypodensities throughout the kidneys.  The small   bowel is somewhat distended with multiple air-fluid levels.  Multiple   air-fluid levels are also noted throughout the colon.  The stomach appears   normal.  Retroaortic left renal vein.       Bones normal.       Aorta appears normal without dissection.  The celiac artery and SMA appear   normal.  The renal arteries appear normal.           CTA PELVIS:       Bladder decompressed.  Uterus normal.  Catheter right groin terminates in the   common iliac vein.         Impression   Ileus.  No evidence of aortic dissection.  The bilateral adrenal masses, left   greater than right.  Recommend follow-up adrenal MRI non emergently.           Medical Decision Making-Other: Thank you for allowing us to participate in the care of this patient. Please call with questions.     Lorene Officer, MD         Pager: [phone] - Office: [phone]

## 2019-04-08 NOTE — PROGRESS NOTES
ICU PROGRESS NOTE          PATIENT NAME: Sarah Arambula RECORD NO. [id number]  DATE: 2019    PRIMARY CARE PHYSICIAN: No primary care provider on file. HD: # 3    ASSESSMENT    Active Problems:    Shock ([address]    Rhabdomyolysis    Hyponatremia    Lactic acidosis    MARY (acute kidney injury) ([facility] [address]    Metabolic acidosis    Acute respiratory failure (HCC)    Elevated liver enzymes    Hyperkalemia    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HCC)    Small bowel ischemia (HCC)    Acute GI bleeding    Gram negative septic shock ([address]  Resolved Problems:    * No resolved hospital problems. *      MEDICAL DECISION MAKING AND PLAN    1. Neuro  1. Altered mental status. CT head -- negative. Likely acute metabolic encephalopathy  2. Pain: Fentanyl drip  3. Sedation: Versed   4. + Drug abuse: cocaine/cannibus  2. CV:  Severe shock, suspect mixed hypovolemic-cardiogenic component  1. Pressor support: Vasopressin. 2. ECHO 4/6: hyperdynamic with EF 75%  3. Troponins: 85-->94-->128 - secondary to renal failure and liver failure  3. Pulm  1. Acute respiratory failure due to acidosis  2. Mechanical ventilation  3. Vent management per ICU  4. GI  1. Acute GI bleed, possible mesenteric ischemia  2. POD#2 ex lap, resection of ischemic sigmoid, right colon and distal small bowel. Left in discontinuity  3. Shock liver  4. NPO  5. Protonix  6. OR today for second look laparotomy  5. Nephro  1. Rhabdomyolysis   2. Acute kidney failure   3. Severe metabolic acidosis - Resolved  4. IVF: sodium bicarb 125 cc/hr  5. Hyponatremia  6. Creatinine continuing to trend up  6. ID  1. Merrem   2. Leukocytosis of 26.2 today  7. Endo:    1. Glucose   8. Heme  1. H/h 7.6      SUBJECTIVE    Sheyla Catena was seen and examined. Lactic acid trending down. Still on minimal vasopressin, following commands off sedation.        OBJECTIVE  VITALS: Temp: Temp: 98.2 °F (36.8 °C)Temp  Av.9 °F (36.6 °C)  Min: 97.5 °F (36.4 °C)  Max: 98.6 °F (37 °C) BP Systolic (91RYA), RVT:02 , Min:61 , OVP:983   Diastolic (26UYX), QLW:92, Min:36, Max:72   Pulse Pulse  Av  Min: 82  Max: 162 Resp Resp  Av.4  Min: 0  Max: 25 Pulse ox SpO2  Av.2 %  Min: 90 %  Max: 99 %    GENERAL: intubated and sedated  NEURO: sedated, follows commands off sedation  HEENT: mucous membranes moist  LUNGS: clear to ausculation, without wheezes, rales or rhonci  HEART: RRR. ABDOMEN: soft, non distended. Midline incision with abthera in place. EXTERMITY: No signs of cyanosis or ischemia      LAB:  CBC:   Recent Labs     19  2107 19  0457 19  0429   WBC 9.9 16.8* 26.2*   HGB 9.2* 9.1* 8.0*   HCT 29.2* 28.1* 24.1*   MCV 98.3 94.6 91.6    234 244     BMP:   Recent Labs     19  2348  19  0457  19  1301 19  1958  19  0429 19  0430 19  0840   *  --  129*  --  127* 128*  --  128*  --   --    K 5.1  --  5.4*  --  5.5* 5.2  --  4.9  --   --    CL 93*  --  91*  --  89* 90*  --  87*  --   --    CO2 14*  --  17*  --  20 23  --  24  --   --    BUN 62*  --  62*  --   --   --   --  63*  --   --    CREATININE 3.79*   < > 3.90*   < >  --   --    < > 4.10* 5.90* 6.50*   GLUCOSE 91  --  73  --   --   --   --  91  --   --     < > = values in this interval not displayed. RADIOLOGY:  CXR:       Alfred Montgomery DO  19, 9:33 AM                 Trauma Attending Princess Lombardi      I have reviewed the above GCS note(s) and confirmed the key elements of the medical history and physical exam. I have seen and examined the pt. I have discussed the findings, established the care plan and recommendations with Resident, GCS RN, bedside nurse.   Leukocytosis - may need second look       Dru Rosas DO  2019  9:49 PM

## 2019-04-08 NOTE — PLAN OF CARE
Problem: OXYGENATION/RESPIRATORY FUNCTION  Goal: Patient will maintain patent airway  4/8/2019 1227 by Carlos Haney RN  Outcome: Ongoing  4/8/2019 0812 by Marcus Tam RCP  Outcome: Ongoing  Goal: Patient will achieve/maintain normal respiratory rate/effort  Description  Respiratory rate and effort will be within normal limits for the patient  4/8/2019 1227 by Carlos Haney RN  Outcome: Ongoing  4/8/2019 0812 by Marcus Tam RCP  Outcome: Ongoing     Problem: MECHANICAL VENTILATION  Goal: Patient will maintain patent airway  4/8/2019 1227 by Carlos Haney RN  Outcome: Ongoing  4/8/2019 0812 by Marcus Tam RCP  Outcome: Ongoing  Goal: Oral health is maintained or improved  4/8/2019 1227 by Carlos Haney RN  Outcome: Ongoing  4/8/2019 0812 by Marcus Tam RCP  Outcome: Ongoing  Goal: Tracheostomy will be managed safely  4/8/2019 1227 by Carlos Haney RN  Outcome: Ongoing  4/8/2019 0812 by Marcus Tam RCP  Outcome: Ongoing  Goal: ET tube will be managed safely  4/8/2019 1227 by Carlos Haney RN  Outcome: Ongoing  4/8/2019 0812 by Marcus Tam RCP  Outcome: Ongoing  Goal: Ability to express needs and understand communication  4/8/2019 1227 by Carlos Haney RN  Outcome: Ongoing  4/8/2019 0812 by Marcus Tam RCP  Outcome: Ongoing  Goal: Mobility/activity is maintained at optimum level for patient  4/8/2019 1227 by Carlos Haney RN  Outcome: Ongoing  4/8/2019 0812 by Marcus Tam RCP  Outcome: Ongoing     Problem: NUTRITION  Goal: Nutritional status is improving  Outcome: Ongoing     Problem: Nutrition  Goal: Optimal nutrition therapy  Description  Nutrition Problem: Inadequate oral intake  Intervention: Food and/or Nutrient Delivery: Continue NPO  Nutritional Goals: Meet % of estimated nutrition needs   Outcome: Ongoing     Problem: Fluid Volume - Imbalance:  Goal: Absence of imbalanced fluid volume signs and symptoms  Description  Absence of imbalanced fluid volume signs and symptoms  Outcome: Ongoing     Problem: Infection - Central Venous Catheter-Associated Bloodstream Infection:  Goal: Will show no infection signs and symptoms  Description  Will show no infection signs and symptoms  Outcome: Ongoing     Problem: Risk for Impaired Skin Integrity  Goal: Tissue integrity - skin and mucous membranes  Description  Structural intactness and normal physiological function of skin and  mucous membranes.   Outcome: Ongoing     Problem: Falls - Risk of:  Goal: Will remain free from falls  Description  Will remain free from falls  Outcome: Ongoing  Goal: Absence of physical injury  Description  Absence of physical injury  Outcome: Ongoing     Problem: Cardiac:  Goal: Ability to maintain an adequate cardiac output will improve  Description  Ability to maintain an adequate cardiac output will improve  Outcome: Ongoing  Goal: Complications related to the disease process, condition or treatment will be avoided or minimized  Description  Complications related to the disease process, condition or treatment will be avoided or minimized  Outcome: Ongoing     Problem: Safety:  Goal: Ability to remain free from injury will improve  Description  Ability to remain free from injury will improve  Outcome: Ongoing  Goal: Will show no signs and symptoms of excessive bleeding  Description  Will show no signs and symptoms of excessive bleeding  Outcome: Ongoing

## 2019-04-08 NOTE — ANESTHESIA PRE PROCEDURE
Department of Anesthesiology  Preprocedure Note       Name:  Ferna Kussmaul   Age:  47 y.o.  :  1965                                          MRN:  [id number]         Date:  2019      Surgeon: Luis Jha):  Shavon Hernandez MD    Procedure: 2ND LOOK EXPLORATORY LAPAROTOMY, POSSIBLE BOWEL RESECTION, POSSIBLE OSTOMY (N/A )    Medications prior to admission:   Prior to Admission medications    Medication Sig Start Date End Date Taking? Authorizing Provider   ibuprofen (ADVIL;MOTRIN) 800 MG tablet Take 800 mg by mouth every 6 hours as needed for Pain    Historical Provider, MD   ALPRAZolam (XANAX) 0.25 MG tablet Take 0.25 mg by mouth nightly as needed for Sleep or Anxiety. Historical Provider, MD   QVAR 40 MCG/ACT inhaler Inhale 2 puffs into the lungs 2 times daily 1/3/17   Historical Provider, MD   gabapentin (NEURONTIN) 100 MG capsule Take 100 mg by mouth 3 times daily as needed  16   Historical Provider, MD   venlafaxine (EFFEXOR) 75 MG tablet Take 100 mg by mouth 2 times daily  9/10/16   Historical Provider, MD   lisinopril-hydrochlorothiazide (PRINZIDE;ZESTORETIC) 10-12.5 MG per tablet Take 1 tablet by mouth daily 16   Historical Provider, MD   albuterol (PROVENTIL) (2.5 MG/3ML) 0.083% nebulizer solution Take 2.5 mg by nebulization every 6 hours as needed for Wheezing    Historical Provider, MD   albuterol sulfate  (90 BASE) MCG/ACT inhaler Inhale 2 puffs into the lungs every 6 hours as needed for Wheezing    Historical Provider, MD       Current medications:    No current facility-administered medications for this visit. No current outpatient medications on file.      Facility-Administered Medications Ordered in Other Visits   Medication Dose Route Frequency Provider Last Rate Last Dose    midazolam (VERSED) injection    PRN Anthony Josse, APRN - CRNA   2 mg at 19 1146    rocuronium (ZEMURON) injection    PRN Anthony Josse, APRN - CRNA   50 mg at 19 1146    fentaNYL (SUBLIMAZE) injection    PRN LORA Chang CRNA   100 mcg at 04/08/19 1205    sodium chloride 0.9 % irrigation    Continuous PRN Latisha Aldrich MD   4,000 mL at 04/08/19 1200    [MAR Hold] dextrose 50 % solution 25 g  25 g Intravenous PRN Zoya Acosta MD   25 g at 04/07/19 2331    [MAR Hold] pantoprazole (PROTONIX) injection 40 mg  40 mg Intravenous BID Josefa Yue, DO   40 mg at 04/08/19 0805    And    [MAR Hold] sodium chloride (PF) 0.9 % injection 10 mL  10 mL Intravenous BID Josefa Yue, DO   10 mL at 04/08/19 0805    [MAR Hold] diltiazem 125 mg in dextrose 5 % 125 mL infusion  5 mg/hr Intravenous Continuous Josefa Yue, DO 2.5 mL/hr at 04/08/19 1115 2.5 mg/hr at 04/08/19 1115    [MAR Hold] sodium chloride flush 0.9 % injection 10 mL  10 mL Intravenous 2 times per day Christine Boggs, DO   10 mL at 04/07/19 2005    [MAR Hold] sodium chloride flush 0.9 % injection 10 mL  10 mL Intravenous PRN Christine Figueroaler, DO   10 mL at 04/07/19 1627    [MAR Hold] magnesium hydroxide (MILK OF MAGNESIA) 400 MG/5ML suspension 30 mL  30 mL Oral Daily PRN Marggamale Gambler, DO        [facility]] [facility]dansetron [facility]) injection 4 mg  4 mg Intravenous Q6H PRN Nayee Henryler, DO        [facility] Hold] heparin (porcine) injection 1,400 Units  1,400 Units Intercatheter PRN Margueritte Gambler, DO   1,400 Units at 04/06/19 0445    [MAR Hold] heparin (porcine) injection 1,500 Units  1,500 Units Intercatheter PRN Margueritte Gambler, DO   1,500 Units at 04/06/19 0445    [MAR Hold] fentaNYL (SUBLIMAZE) injection 50 mcg  50 mcg Intravenous Q1H PRN Nayee Gambler, DO   50 mcg at 04/06/19 1112    Or    [MAR Hold] fentaNYL (SUBLIMAZE) injection 100 mcg  100 mcg Intravenous Q1H PRN Christine Boggs DO   100 mcg at 04/06/19 1217    [MAR Hold] norepinephrine (LEVOPHED) 16 mg in dextrose 5% 250 mL infusion  2 mcg/min Intravenous Continuous Christine Boggs DO   Stopped at 04/06/19 2344    [MAR Hold] vasopressin 20 Units in sodium chloride 0.9 % 100 mL infusion  0.04 Units/min Intravenous Continuous Finis Furrow, DO 12 mL/hr at 04/08/19 1137 0.04 Units/min at 04/08/19 1137    [MAR Hold] 0.45 % sodium chloride infusion   Intravenous Continuous Raj Will MD 20 mL/hr at 04/08/19 0002 20 mL/hr at 04/08/19 0002    [MAR Hold] meropenem (MERREM) 1 g in sodium chloride 0.9 % 100 mL IVPB (mini-bag)  1 g Intravenous Q24H Finis Furrow, DO   Stopped at 04/07/19 1626    [MAR Hold] fentaNYL 20 mcg/mL Infusion  50 mcg/hr Intravenous Continuous Amy Montgomery, DO 2.5 mL/hr at 04/08/19 1137 50 mcg/hr at 04/08/19 1137    [MAR Hold] midazolam (VERSED) 100 mg in dextrose 5% 100 mL infusion  1 mg/hr Intravenous Continuous Finis Furrow, DO   Stopped at 04/06/19 2003    [MAR Hold] magnesium sulfate 1 g in dextrose 5% 100 mL IVPB  1 g Intravenous Once Finis Furrow, DO   Stopped at 04/05/19 2104    [MAR Hold] sodium bicarbonate 150 mEq in dextrose 5 % 1,000 mL infusion   Intravenous Continuous Finis Furrow,  mL/hr at 04/08/19 1137 125 mL/hr at 04/08/19 1137       Allergies: Allergies   Allergen Reactions    Pcn [Penicillins] Hives and Shortness Of Breath     Tolerated cefazolin 1/13    Sulfa Antibiotics Hives and Shortness Of Breath    Tape Birdena Layman Tape] Rash     redness       Problem List:    Patient Active Problem List   Diagnosis Code    Dog bite W54. 0XXA    Post-traumatic osteoarthritis of left knee M17.32    S/P left knee arthroscopy Z98.890    S/P total knee arthroplasty Z96.659    Arthritis of left knee M17.12    Shock (HCC) R57.9    Rhabdomyolysis M62.82    Hyponatremia E87.1    Lactic acidosis E87.2    MARY (acute kidney injury) ([facility] [address] H70.8    Metabolic acidosis T09.0    Acute respiratory failure (HCC) J96.00    Elevated liver enzymes R74.8    Hyperkalemia E87.5    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HCC) K55.049    Small bowel ischemia (HCC) K55.9    Acute GI bleeding K92.2    Gram negative septic shock ([facility] A41.50, R65.21       Past Medical History:        Diagnosis Date    Asthma     On inhaler    Back pain, chronic     Hypertension 2015    On Lisinopril    Snores     possible apnea but not tested    Wears glasses        Past Surgical History:        Procedure Laterality Date    KNEE ARTHROSCOPY Left 1980    KNEE ARTHROSCOPY Left 09/28/2016    with medial menisectomy    LAPAROTOMY EXPLORATORY N/A 4/6/2019    LAPAROTOMY EXPLORATORY, ILEOCECECTOMY, SUBTOTAL COLECTOMY, ABTHEHRA WOUND VAC PLACEMENT performed by Michelle Wilson MD at [address] [place] [address] Right 2013       Social History:    Social History     Tobacco Use    Smoking status: Light Tobacco Smoker     Packs/day: 0.25     Types: Cigarettes     Start date: 9/19/1980    Smokeless tobacco: Never Used   Substance Use Topics    Alcohol use: Yes     Comment: OCCASSIONAL                                Ready to quit: Not Answered  Counseling given: Not Answered      Vital Signs (Current): There were no vitals filed for this visit.                                            BP Readings from Last 3 Encounters:   04/08/19 (!) 75/39   04/08/19 (!) 73/44   07/02/18 (!) 141/95       NPO Status:                                                                                 BMI:   Wt Readings from Last 3 Encounters:   04/08/19 200 lb 2.8 oz (90.8 kg)   11/12/18 155 lb (70.3 kg)   05/15/17 145 lb 1 oz (65.8 kg)     There is no height or weight on file to calculate BMI.    CBC:   Lab Results   Component Value Date    WBC 26.2 04/08/2019    RBC 2.63 04/08/2019    HGB 8.0 04/08/2019    HCT 24.1 04/08/2019    MCV 91.6 04/08/2019    RDW 14.4 04/08/2019     04/08/2019       CMP:   Lab Results   Component Value Date     04/08/2019    K 4.9 04/08/2019    CL 87 04/08/2019    CO2 24 04/08/2019    BUN 63 04/08/2019    CREATININE 6.50 04/08/2019    CREATININE 4.10 04/08/2019    GFRAA 14 04/08/2019    LABGLOM 7 04/08/2019 GLUCOSE 91 04/08/2019    PROT 3.6 04/08/2019    CALCIUM 6.6 04/08/2019    BILITOT 2.77 04/08/2019    ALKPHOS 172 04/08/2019    AST 1,867 04/08/2019    ALT 1,200 04/08/2019       POC Tests:   Recent Labs     04/08/19  0840   POCGLU 80   POCNA 126*   POCK 4.4   POCCL 90*   POCHEMO 8.4*   POCHCT 25*       Coags:   Lab Results   Component Value Date    PROTIME 11.3 04/08/2019    INR 1.1 04/08/2019    APTT 28.0 04/05/2019       HCG (If Applicable):   Lab Results   Component Value Date    HCG NEGATIVE 01/24/2017        ABGs: No results found for: PHART, PO2ART, NSE5RTZ, YOC6ADC, BEART, S1EXAIGX     Type & Screen (If Applicable):  No results found for: LABABO, [address]    Anesthesia Evaluation  Patient summary reviewed no history of anesthetic complications:   Airway:        Comment: Oral intubated   Dental:      Comment: Unable to evaluate      Pulmonary:   (+) asthma: current smoker    (-) stridor                          ROS comment: Resp failure   Cardiovascular:    (+) hypertension:, dysrhythmias: atrial fibrillation,           Rate: normal                    Neuro/Psych:   (+) neuromuscular disease:,             GI/Hepatic/Renal:   (+) renal disease: dialysis,           Endo/Other:    (+) : arthritis:., electrolyte abnormalities, . Abdominal:           Vascular:                                       Mechanical Ventilation Data   SETTINGS (Comprehensive)          ABG   No results found for: PH, PCO2, PO2, HCO3, O2SAT  Lab Results   Component Value Date    MODE PRVC 04/08/2019       Lactic acidosis worsening       Anesthesia Plan      general     ASA 4 - emergent     (Note copied and updated from previous procedure. No family with patient at OR . Proceed directly to OR for emergent case. Gtts-vasopressin, cardizem, bicarb, levophed or midazolam, fentanyl  )  Induction: intravenous. arterial line    Anesthetic plan and risks discussed with Unable to obtain due to emergent nature.       Plan discussed with CRNA.                   Sharif Talbot MD   4/8/2019

## 2019-04-08 NOTE — ANESTHESIA POSTPROCEDURE EVALUATION
Department of Anesthesiology  Postprocedure Note    Patient: Davina Adjutant  MRN: [id number]  YOB: 1965  Date of evaluation: 4/8/2019  Time:  7:26 PM     Procedure Summary     Date:  04/08/19 Room / Location:  [facility] OR  / [facility] OR    Anesthesia Start:  1137 Anesthesia Stop:  7396    Procedure:  2ND LOOK EXPLORATORY LAPAROTOMY, RESECTION TRANSVERSE COLON, ILEOSTOMY CREATION, RESECTION RECTAL STUMP, ABDOMINAL WASHOUT, OMENTECTOMY, ABDOMINAL WALL CLOSURE (N/A ) Diagnosis:  (MESENTERIC ISCHEMIA)    Surgeon:  Radha Soto MD Responsible Provider:  Sharif Talbot MD    Anesthesia Type:  general ASA Status:  4 - Emergent          Anesthesia Type: No value filed. Dillon Phase I:      Dillon Phase II:      Last vitals: Reviewed and per EMR flowsheets.        Anesthesia Post Evaluation    Patient location during evaluation: ICU  Patient participation: complete - patient cannot participate  Level of consciousness: sedated and ventilated  Pain score: 1  Airway patency: patent  Nausea & Vomiting: no vomiting  Complications: no  Cardiovascular status: hemodynamically stable  Respiratory status: acceptable  Hydration status: stable

## 2019-04-08 NOTE — PROGRESS NOTES
[facility] - Progress Note    Today's Date and Time: 4/8/2019, 7:25 AM    Impression :   1. 48 y/o female with complaints of  · Vomiting  · Diarrhea  · Abdominal pain  · Altered mental status  2. Acute kidney injury  · Nephrology onboard and planning for urgent dialysis   3. Shock, presumptive septic plus hypovolemic, requiring Levophed and vasopressin. Now only on vasopressin. 4. Septicemia with Gram negative bacilli 4-5-19, awaiting final cultures. 5. Gastroenteritis  6. Ischemic bowel  7. S/P laparotomy 4-6-19  8. Severe hyponatremia  9. Transaminitis, shock liver, improving  10. Intubation 2/2 AMS  11. COPD  15. Arthritis  13. Chronic NSAID use  14. Funguria  15. Acral mottling of hands and feet  16. Allergy to penicillins: Hives and respiratory distress  17. Allergy to sulfa    Recommendations:   · Meropenem 1 gm IV q 24 hr  · D/C Levaquin   · IV fluids, presors  · Follow up cultures    Medical Decision Making/Summary/Discussion:   · 48 y/o female with vomiting, diarrhea, and AMS  · Found to have transaminitis, MARY requiring emergent dialysis, lactic acidosis, shock requiring pressors  · Intubated due to AMS  · CT chest shows atelactasis vs pneumonia  · Currently on vanc, levaquin, and aztreonam   · Patient is critically ill with origin in GI tract . Will need to treat with meropenem despite Hx of penicillin allergy. · Had laparotomy 4-6-19 with findings of ischemic bowel from distal ileum to sigmoid colon.    · S/P ileocecectomy with extended left hemicolectomy and placement of wound vac 4-6-19  · Overall improvement post surgery  Infection Control Recommendations   · Tynan Precautions    Antimicrobial Stewardship Recommendations     · Simplification of therapy  · Targeted therapy    Coordination of Outpatient Care:   · Estimated Length of IV antimicrobials: TBD  · Patient will need Midline Catheter Insertion: TBD  · Patient will need PICC line Insertion: TBD  · Patient will need: Home IV , [place],  SNF,  LTAC  · Patient will need outpatient wound care: TBD    Chief complaint/reason for consultation:   · Altered mental status and tender abdomen       History of Present Illness:   Vannessa Canada is a 47y.o.-year-old  female who was initially admitted on 4/5/2019. Patient seen at the request of Dr. Romero Zaragoza:    Patient presented to ED via EMS due to altered mental status and vomiting. Patient has history significant for COPD, right knee osteoarthirtis s/p arthoplasty, and chronic NSAID use. Patient lives in [place], but was here to visit her significant other. Arrived Wednesday night, said she didn't feel good. Thought she might have had a bad burger at a fast food restaurant. Went to sleep and slept for almost 24 hours. When she awoke, she said she was vomiting, having diarrhea, and abdominal pain. Significant other and sister are unsure of the nature of the vomit, diarrhea, or abdominal pain. Then she slept a bit more, until her significant other found her unresponsive and that's when he called EMS to bring her to the hospital.     Afebrile on admission, but Tmax overnight was 39.3. Tachycardic on admission, 129. Became hypotensive after admission and levophed and vasopressin were started. Initial labs: Admission labs significant for Na 125, K 8.0, CO2 9, BUN 62, Creatinine of 4.06, Anion gap of 23, Lactic acid of 3.5, Glucose of 186, Ca 5.2, POC HCO3 15.9, CK 15,342, Troponins high sensitivity 89 and 94, Alk phos 168, , AST 1,122, Bili .37, lipase of 119, Urine tox positive for THC and Cocaine, WBC 23.5, Hgb 10.2, Urine and blood cultures pending, HIV negative, influenza negative, hepatitis panel non-reactive, urine Leukocyte esterase trace, urine nitrite -, urine protein 2+, urine Hgb large, urine RBCs 5 to 10, many urine yeast,  ABG 7.22, pCO2 27, HCO3 15.9.     Initial imaging showed:     CXR: No acute cardiopulmonary process    Abdominal X-ray 4/6: No free air    CTA of chest: Negative for PE or dissection. Patchy consolidations of bilateral lower lung lobes representing developing pneumonia vs atelectasis. CT head: pending    Initial course:     Intubated and sedated in ED due to altered mental status. Currently on Vancomycin, Levaquin, and aztreonam for empiric coverage. Richar catheter was placed due to request by nephrology for emergent dialysis. Dialysis attempted several times, but due to high arterial pressures and line clotting, dialysis to be completed later today by Dr. Sage Uribe. NG tube with bloody output. FOBT +. General surgery has been consulted for evaluation. CURRENT EVALUATION : 4/8/2019    Patient seen and examined. Remains intubated. Following simple commands. Appears to be resting comfortably. Patient underwent ileocecectomy with extended left hemicolectomy and placement of wound vac on 4-6-19 because of ischemic bowel. Overall improvement post surgery. No definite plans of when she is going back to the OR for a second look. One half dialysis session completed, not finished due to improvement of K and electrolytes. Creatinine stable at 4.10    Still hyponatremic, na 128 this morning. CK 44, 540 today. ALT and AST improving. Bilirubin increased, majority is direct. WBC up to 26.2, from 16.8 yesterday. On diltizaem gtt from elevated heart rate. Per nursing, she has not been in A fib since last night. Remains on vasopressin. Afebrile  Heart rate 80's to 90's overnight  Bp 88/49 this morning   VS stable. BP supported with low dose vasopressin. Had episodes of a fib with RVR. Shocked twice to control rhythm and rate. Abdomen softer. VAC in place  No bowel activity   Skin cyanosis much improved    Blood culture with Gram negative bacilli, final cultures awaited.       CXR today showed:     Persistent bilateral heterogeneous opacities, more confluent in the left   lung base.  Possible small bilateral pleural effusions. Cultures:  Urine: NGTD    Blood:  · 4-5-19 : 1/2 Gram negative bacilli, presumptive species haemophilus, awaiting final cultures  Sputum :  · NA  Wound:  · NA    MRSA- Neg    WBC: 23.5 -> 12.3 -> 9.9 -> 16.8 -> 26.2. AST: 1,122 -> 2,617 -> 3,542 -> 1, 867  ALT: 1,740 -> 1,528 -> 1,200    Bilirubin: -> 1.92 -> 2.77  D bili: 1.11 -> 2.16    Discussed with RN, Dr Linda Ball, Gen Surgery. ICU Care 35 min    I have personally reviewed the past medical history, past surgical history, medications, social history, and family history, and I have updated the database accordingly.   Past Medical History:     Past Medical History:   Diagnosis Date    Asthma     On inhaler    Back pain, chronic     Hypertension 2015    On Lisinopril    Snores     possible apnea but not tested    Wears glasses        Past Surgical  History:     Past Surgical History:   Procedure Laterality Date    KNEE ARTHROSCOPY Left 1980    KNEE ARTHROSCOPY Left 09/28/2016    with medial menisectomy    TONSILLECTOMY      ULNAR TUNNEL RELEASE Right 2013       Medications:      calcium gluconate IVPB  2 g Intravenous Once    pantoprazole  40 mg Intravenous BID    And    sodium chloride (PF)  10 mL Intravenous BID    sodium chloride flush  10 mL Intravenous 2 times per day    meropenem  1 g Intravenous Q24H    magnesium sulfate  1 g Intravenous Once       Social History:     Social History     Socioeconomic History    Marital status: Single     Spouse name: Not on file    Number of children: 0    Years of education: Not on file    Highest education level: Not on file   Occupational History    Occupation: [address] [place] [address] Financial resource strain: Not on file    Food insecurity:     Worry: Not on file     Inability: Not on file    Transportation needs:     Medical: Not on file     Non-medical: Not on file   Tobacco Use    Smoking status: Light Tobacco Smoker -- -- -- 90 20 96 % --   04/08/19 0400 (!) 95/49 98.2 °F (36.8 °C) CORE 89 20 96 % --   04/08/19 0345 -- -- -- 89 20 96 % --   04/08/19 0330 -- -- -- 90 20 96 % --   04/08/19 0317 -- -- -- 90 20 95 % --   04/08/19 0315 -- -- -- 90 20 95 % --   04/08/19 0300 (!) 97/46 -- -- 93 20 96 % --   04/08/19 0245 -- -- -- 91 20 95 % --   04/08/19 0230 -- -- -- 93 18 95 % --   04/08/19 0215 -- -- -- 92 (!) 0 95 % --   04/08/19 0200 (!) 100/50 -- -- 92 20 94 % --   04/08/19 0145 -- -- -- 93 19 95 % --   04/08/19 0130 -- -- -- 95 19 95 % --   04/08/19 0115 -- -- -- 94 20 95 % --   04/08/19 0100 (!) 96/49 -- -- 94 20 95 % --   04/08/19 0045 -- -- -- 93 20 95 % --   04/08/19 0030 -- -- -- 101 14 95 % --   04/08/19 0015 -- -- -- 94 15 95 % --   04/08/19 0000 (!) 104/51 98.6 °F (37 °C) CORE 96 15 95 % --   04/07/19 2345 -- -- -- 97 18 95 % --   04/07/19 2330 -- -- -- 95 16 96 % --     General Appearance: Sedated, on ventilator  Head:  Normocephalic, no trauma  Eyes: Pupils equal, round, reactive to light; sclera anicteric; conjunctivae pink. No embolic phenomena. ENT: Oropharynx clear, without erythema, exudate, or thrush. No tenderness of sinuses. Mouth/throat: mucosa pink and moist. No lesions. Dentition in good repair. Neck:Supple, without lymphadenopathy. Thyroid normal, No bruits. Pulmonary/Chest: Clear to auscultation, without wheezes, rales, or rhonchi. No dullness to percussion. Cardiovascular: Regular rate and rhythm without murmurs, rubs, or gallops. Abdomen: Distended. Bowel sounds absent. Hard to palpate but no rigidity. Grimaces with pressure. Abdominal wound vac in place. All four Extremities: Cyanosis of trunk, abdomen, distal extremities  Neurologic: Sedated  Skin: Cool and dry with poor turgor. Signs of peripheral arterial  insufficiency. No ulcerations. No open wounds. Skin purplish, cyanotic.     Medical Decision Making -Laboratory:   I have independently reviewed/ordered the following labs:    CBC with Differential:   Recent Labs     04/07/19 0457 04/08/19 0429   WBC 16.8* 26.2*   HGB 9.1* 8.0*   HCT 28.1* 24.1*    244   LYMPHOPCT 5* 3*   MONOPCT 1 2     BMP:   Recent Labs     04/07/19 0457 04/07/19  0942  04/07/19 1958  04/08/19 0429 04/08/19  0430   *  --   --    < > 128*  --  128*  --    K 5.4*  --   --    < > 5.2  --  4.9  --    CL 91*  --   --    < > 90*  --  87*  --    CO2 17*  --   --    < > 23  --  24  --    BUN 62*  --   --   --   --   --  63*  --    CREATININE 3.90*   < >  --   --   --    < > 4.10* 5.90*   MG  --   --  2.0  --   --   --  2.1  --     < > = values in this interval not displayed. Hepatic Function Panel:   Recent Labs     04/07/19 0457 04/08/19 0429   PROT 4.0* 3.6*   LABALBU 2.3* 1.7*   BILIDIR 1.11* 2.16*   IBILI 0.81 0.61   BILITOT 1.92* 2.77*   ALKPHOS 194* 172*   ALT 1,528* 1,200*   AST 3,542* 1,867*     No results for input(s): RPR in the last 72 hours. No results for input(s): HIV in the last 72 hours. No results for input(s): BC in the last 72 hours. Lab Results   Component Value Date    MUCUS NOT REPORTED 04/06/2019    RBC 2.63 04/08/2019    TRICHOMONAS NOT REPORTED 04/06/2019    WBC 26.2 04/08/2019    YEAST NOT REPORTED 04/06/2019    TURBIDITY TURBID 04/06/2019     Lab Results   Component Value Date    CREATININE 5.90 04/08/2019    CREATININE 4.10 04/08/2019    GLUCOSE 91 04/08/2019       Medical Decision Making-Imaging:     Abdominal xray:    Gas in mildly distended loops of large and small bowel likely reflecting an   ileus.       NG tube tip in the gastric fundus with the proximal side-port in the distal   thoracic esophagus, repositioning recommended.       Airspace disease left lung base.      CT scan chest:    Impression   Satisfactory position endotracheal tube.       Nasogastric tube needs advanced 10 cm.       Patchy perihilar opacities and bibasilar airspace disease.  Follow-up may be   beneficial following medical treatment course to document complete resolution.           EXAMINATION:   CTA OF THE ABDOMEN AND PELVIS WITH CONTRAST       4/5/2019 9:23 pm:       TECHNIQUE:   CTA of the abdomen and pelvis was performed with the administration of   intravenous contrast. Multiplanar reformatted images are provided for review. MIP images are provided for review. Dose modulation, iterative   reconstruction, and/or weight based adjustment of the mA/kV was utilized to   reduce the radiation dose to as low as reasonably achievable.       COMPARISON:   None.       HISTORY:   ORDERING SYSTEM PROVIDED HISTORY: suspected dissection of abdominal aorta       FINDINGS:       CTA ABDOMEN:       Bibasilar airspace disease.  Liver, spleen, pancreas, gallbladder normal.   Bilateral adrenal masses measuring 11 mm on the left and 18 x 28 mm on the   left.  Bilateral ill-defined hypodensities throughout the kidneys.  The small   bowel is somewhat distended with multiple air-fluid levels.  Multiple   air-fluid levels are also noted throughout the colon.  The stomach appears   normal.  Retroaortic left renal vein.       Bones normal.       Aorta appears normal without dissection.  The celiac artery and SMA appear   normal.  The renal arteries appear normal.           CTA PELVIS:       Bladder decompressed.  Uterus normal.  Catheter right groin terminates in the   common iliac vein.           Impression   Ileus.  No evidence of aortic dissection.  The bilateral adrenal masses, left   greater than right.  Recommend follow-up adrenal MRI non emergently.           Medical Decision Making-Other: Thank you for allowing us to participate in the care of this patient. Please call with questions. Connie Chavez     ATTESTATION:    I have discussed the case, including pertinent history and exam findings with the residents. I have seen and examined the patient and the key elements of the encounter have been performed by me.  I have reviewed the laboratory data, other diagnostic

## 2019-04-08 NOTE — BRIEF OP NOTE
Brief Postoperative Note  ______________________________________________________________    Patient: Shaila Mcarthur  YOB: 1965  MRN: [id number]  Date of Procedure: 4/8/2019    Pre-Op Diagnosis: MESENTERIC ISCHEMIA    Post-Op Diagnosis: Same       Procedure(s):  2ND LOOK EXPLORATORY LAPAROTOMY, RESECTION TRANSVERSE COLON, ILEOSTOMY CREATION, RESECTION RECTAL STUMP, ABDOMINAL WASHOUT, OMENTECTOMY, ABDOMINAL WALL CLOSURE    Anesthesia: Anesthesia type not filed in the log. Surgeon(s):  Cyndi Lindo MD    Assistant: Jaden Olson, PGY3, Ciara White, PGY1    Estimated Blood Loss (mL): 50    Complications: None    Specimens:   ID Type Source Tests Collected by Time Destination   A : TRANSVERSE COLON Tissue Colon-Transverse SURGICAL PATHOLOGY Cyndi Lindo MD 4/8/2019 1216    B : RECTAL STUMP Tissue Recto sigmoid SURGICAL PATHOLOGY Cyndi Lindo MD 4/8/2019 1229        Implants:  * No implants in log *      Drains:   NG/OG/NJ/NE Tube Nasogastric 16 fr Left nostril (Active)   Surrounding Skin Dry; Intact 4/8/2019  8:00 AM   Securement device Yes 4/8/2019  8:00 AM   Status Suction-low continuous 4/8/2019  8:00 AM   Placement Verified by External Catheter Length 4/8/2019  8:00 AM   Drainage Appearance [place] 4/8/2019  4:00 AM   Free Water Flush (mL) 180 mL 4/6/2019  3:00 PM   Output (mL) 50 ml 4/8/2019  4:00 AM       Ileostomy Ileostomy RUQ (Active)       Urethral Catheter (Active)   Catheter Indications Need for fluid management in critically ill patients in a critical care setting not able to be managed by other means such as BSC with hat, bedpan, urinal, condom catheter, or short term intermittent urethral catherization 4/8/2019  8:00 AM   Site Assessment No urethral drainage 4/8/2019  8:00 AM   Urine Color Anita 4/8/2019  8:00 AM   Urine Appearance Clear 4/8/2019  8:00 AM   Output (mL) 30 mL 4/8/2019 11:00 AM       [REMOVED] Negative Pressure Wound Therapy Abdomen Mid (Removed)   Wound Type Surgical 4/8/2019  8:00 AM   Unit Type Abthera 4/8/2019  8:00 AM   Dressing Type Other (Comment) 4/8/2019  8:00 AM   Cycle Continuous; On 4/8/2019  8:00 AM   Target Pressure (mmHg) 125 4/8/2019  8:00 AM   Canister changed? Yes 4/8/2019  5:41 AM   Dressing Status Clean;Dry; Intact 4/8/2019  8:00 AM   Drainage Amount Small 4/8/2019  8:00 AM   Drainage Description Serous 4/8/2019  8:00 AM   Output (ml) 0 ml 4/8/2019  8:00 AM   Wound Assessment Clean;Dry; Intact 4/8/2019  8:00 AM   Karly-wound Assessment Clean;Dry; Intact 4/8/2019  8:00 AM   Odor None 4/8/2019  8:00 AM       Findings: Wound class 2. Edematous bowel. NEcrosis of proximal rectal stump. Dontrell Yap DO  Date: 4/8/2019  Time: 2:08 PM         Attending Note    Ileum exteriorized as ostomy. Although the exterior appears viable, and appears no different from the remainder of the small bowel, the mucosa appears somewhat dusky. If revision is required in future, this is preferable to further delay of closure of abdomen or further speculative loss of small bowel length. I have reviewed the above TECSS note and I either performed the key elements of the procedure or was present with the resident when the key elements of the procedure were performed. I have discussed the findings, established the care plan and recommendations with resident.     Annie Perez MD  4/8/2019  4:01 PM

## 2019-04-08 NOTE — PROGRESS NOTES
Spoke with pts sister, Kristen Perry. Correct patient identifiers given. Updated on pts condition and answered any questions.

## 2019-04-08 NOTE — PROGRESS NOTES
INTENSIVE CARE UNIT  Resident Physician Progress Note    Patient - Gloria Olson  Date of Admission -  2019  8:39 PM  Date of Evaluation -  2019  Room and Bed Number -  0117/0117-01   Hospital Day - 3      SUBJECTIVE:     OVERNIGHT EVENTS: JASMYNE; Being weaned from vasopressin; converted from afib back into nsr at 6 oclock        TODAY:  Seen and Examined     AWAKE & FOLLOWING COMMANDS:  [] No   [x] Yes    SECRETIONS Amount:  [] Small [] Moderate  [] Large  [x] None  Color:     [] White [] Colored  [] Bloody    SEDATION:    [] Propofol gtt  [] Versed gtt  [] Ativan gtt   [x] Fentanyl    PARALYZED:  [x] No    [] Yes    VASOPRESSORS:  [] No    [] Yes  [] Levophed [] Dopamine [x] Vasopressin  [] Dobutamine [] Phenylephrine [] Epinephrine      OBJECTIVE:     VITAL SIGNS:  BP (!) 81/37   Pulse 82   Temp 98.2 °F (36.8 °C) (Core)   Resp (!) 0   Ht 5' 1.02\" (1.55 m)   Wt 200 lb 2.8 oz (90.8 kg)   SpO2 94%   BMI 37.79 kg/m²   Tmax over 24 hours:  Temp (24hrs), Av.9 °F (36.6 °C), Min:97.5 °F (36.4 °C), Max:98.6 °F (37 °C)      Patient Vitals for the past 8 hrs:   BP Temp Temp src Pulse Resp SpO2 Weight   19 0838 -- -- -- -- (!) 0 94 % --   19 0815 -- -- -- 82 18 94 % --   19 0810 -- -- -- -- 19 94 % --   19 0800 (!) 81/37 -- -- 84 20 92 % --   19 0745 -- -- -- 82 18 90 % --   19 0730 -- -- -- 86 18 90 % --   19 0715 -- -- -- 86 (!) 7 92 % --   19 0700 -- -- -- 84 8 91 % --   19 0645 -- -- -- 85 17 91 % --   19 -- -- -- 83 18 91 % --   1915 -- -- -- 84 18 91 % --   19 -- -- -- 87 20 92 % --   1945 -- -- -- 87 12 92 % --   19 -- -- -- 88 18 92 % --   19 -- -- -- 87 17 93 % --   1911 -- -- -- -- -- -- 200 lb 2.8 oz (90.8 kg)   19 0500 (!) 88/40 -- -- 89 16 92 % --   19 0445 -- -- -- 84 21 91 % --   19 0430 -- -- -- 88 (!) 0 96 % --   19 0415 -- -- -- 90 20 96 % --   04/08/19 0400 (!) 95/49 98.2 °F (36.8 °C) CORE 89 20 96 % --   04/08/19 0345 -- -- -- 89 20 96 % --   04/08/19 0330 -- -- -- 90 20 96 % --   04/08/19 0317 -- -- -- 90 20 95 % --   04/08/19 0315 -- -- -- 90 20 95 % --   04/08/19 0300 (!) 97/46 -- -- 93 20 96 % --   04/08/19 0245 -- -- -- 91 20 95 % --   04/08/19 0230 -- -- -- 93 18 95 % --   04/08/19 0215 -- -- -- 92 (!) 0 95 % --   04/08/19 0200 (!) 100/50 -- -- 92 20 94 % --   04/08/19 0145 -- -- -- 93 19 95 % --   04/08/19 0130 -- -- -- 95 19 95 % --   04/08/19 0115 -- -- -- 94 20 95 % --   04/08/19 0100 (!) 96/49 -- -- 94 20 95 % --   04/08/19 0045 -- -- -- 93 20 95 % --         Intake/Output Summary (Last 24 hours) at 4/8/2019 0841  Last data filed at 4/8/2019 0600  Gross per 24 hour   Intake 7504.56 ml   Output 2728 ml   Net 4776.56 ml     Date 04/08/19 0000 - 04/08/19 2359   Shift 8035-4420 4746-0944 9550-0199 24 Hour Total   INTAKE   I.V.(mL/kg) 3321(36.6)   3321(36.6)   Shift Total(mL/kg) 8963(56.3)   3321(36.6)   OUTPUT   Urine(mL/kg/hr) 470(0.6)   470   Emesis/NG output(mL/kg) 50(0.6)   50(0.6)   Drains(mL/kg) 900(9.9)   900(9.9)   Shift Total(mL/kg) 1420(15.6)   1420(15.6)   Weight (kg) 90.8 90.8 90.8 90.8     Wt Readings from Last 3 Encounters:   04/08/19 200 lb 2.8 oz (90.8 kg)   11/12/18 155 lb (70.3 kg)   05/15/17 145 lb 1 oz (65.8 kg)     Body mass index is 37.79 kg/m².         PHYSICAL EXAM:  GEN:  no apparent distress, well appearing  HEENT:  Normocepalic, without obvious abnormality, Atraumatic, eyelids/periorbital:  normal and conjunctiva: slight sclera icterus                                    trachea midline/symmetric   LUNGS:  Coarse no retractions/accessory muscles usage  CV:    S1/s2,   ABDOMEN:   soft, moderate distended, vac in place moderate tender and no masses palpated  :    Deferred  MSK:    there is no redness, warmth, or swelling of the joints  SKIN:   Warm and dry  EXTREMITIES:      Edema 2+, distal pulses intact MEDICATIONS:  Scheduled Meds:   pantoprazole  40 mg Intravenous BID    And    sodium chloride (PF)  10 mL Intravenous BID    sodium chloride flush  10 mL Intravenous 2 times per day    meropenem  1 g Intravenous Q24H    magnesium sulfate  1 g Intravenous Once     Continuous Infusions:   diltiazem (CARDIZEM) 125 mg in dextrose 5% 125 mL infusion 10 mg/hr (04/08/19 0804)    norepinephrine Stopped (04/06/19 2344)    vasopressin (Septic Shock) infusion 0.01 Units/min (04/08/19 0514)    sodium chloride 20 mL/hr (04/08/19 0002)    fentaNYL 75 mcg/hr (04/08/19 0643)    midazolam Stopped (04/06/19 2003)    sodium bicarbonate infusion 125 mL/hr at 04/08/19 0720     PRN Meds:     dextrose 25 g PRN   sodium chloride flush 10 mL PRN   magnesium hydroxide 30 mL Daily PRN   ondansetron 4 mg Q6H PRN   heparin (porcine) 1,400 Units PRN   heparin (porcine) 1,500 Units PRN   fentanNYL 50 mcg Q1H PRN   Or     fentanNYL 100 mcg Q1H PRN       SUPPORT DEVICES: [x] Ventilator [] BIPAP  [] Nasal Cannula [] Room Air    VENT SETTINGS (Comprehensive) (if applicable):  Vent Information  $Ventilation: $Subsequent Day  Ventilator Started: Yes  Skin Assessment: Clean, dry, & intact  Vent Type: Servo i  Vent Mode: PRVC  Vt Ordered: 470 mL  Rate Set: 18 bmp  FiO2 : 30 %  Sensitivity: 5  PEEP/CPAP: 5  I Time/ I Time %: 0.9 s  Cuff Pressure (cm H2O): 22 cm H2O  Humidification Source: HME  Additional Respiratory  Assessments  Pulse: 82  Resp: (!) 0  SpO2: 94 %  End Tidal CO2: 32 (%)  Position: Semi-Salazar's  Humidification Source: E  Oral Care Completed?: Yes  Oral Care: Mouthwash, Mouth suctioned, Mouth moisturizer  Subglottic Suction Done?: Yes  Cuff Pressure (cm H2O): 22 cm H2O    ABGs:   Lab Results   Component Value Date    FSY8NGI 26 04/08/2019    FIO2 40.0 04/08/2019         DATA:  Complete Blood Count:   Recent Labs     04/06/19 2107 04/07/19  0457 04/08/19  0429   WBC 9.9 16.8* 26.2*   RBC 2.97* 2.97* 2.63*   HGB 9.2* 9. 1* 8.0*   HCT 29.2* 28.1* 24.1*   MCV 98.3 94.6 91.6   MCH 31.0 30.6 30.4   MCHC 31.5 32.4 33.2   RDW 15.0* 14.6* 14.4    234 244   MPV 11.8 11.8 NOT REPORTED        Last 3 Blood Glucose:   Recent Labs     04/05/19  2152 04/06/19  0025 04/06/19  0436 04/06/19  0942 04/06/19  2348 04/07/19  0457 04/08/19  0429   GLUCOSE 186* 227* 105* 85 91 73 91        PT/INR:    Lab Results   Component Value Date    PROTIME 11.3 04/08/2019    INR 1.1 04/08/2019     PTT:    Lab Results   Component Value Date    APTT 28.0 04/05/2019       Comprehensive Metabolic Profile:   Recent Labs     04/06/19 2348  04/07/19 0457  04/07/19  1301 04/07/19  1958  04/08/19  0016 04/08/19  0429 04/08/19  0430   *  --  129*  --  127* 128*  --   --  128*  --    K 5.1  --  5.4*  --  5.5* 5.2  --   --  4.9  --    CL 93*  --  91*  --  89* 90*  --   --  87*  --    CO2 14*  --  17*  --  20 23  --   --  24  --    BUN 62*  --  62*  --   --   --   --   --  63*  --    CREATININE 3.79*   < > 3.90*   < >  --   --    < > 6.14* 4.10* 5.90*   GLUCOSE 91  --  73  --   --   --   --   --  91  --    CALCIUM 6.4*  --  7.5*  --   --   --   --   --  6.6*  --    PROT 3.9*  --  4.0*  --   --   --   --   --  3.6*  --    LABALBU 2.6*  --  2.3*  --   --   --   --   --  1.7*  --    BILITOT 1.56*  --  1.92*  --   --   --   --   --  2.77*  --    ALKPHOS 187*  --  194*  --   --   --   --   --  172*  --    AST 3,260*  --  3,542*  --   --   --   --   --  1,867*  --    ALT 1,408*  --  1,528*  --   --   --   --   --  1,200*  --     < > = values in this interval not displayed.       Magnesium:   Lab Results   Component Value Date    MG 2.1 04/08/2019    MG 2.0 04/07/2019    MG 2.2 04/06/2019     Phosphorus:   Lab Results   Component Value Date    PHOS 5.9 04/08/2019    PHOS 8.2 04/07/2019    PHOS 8.8 04/06/2019     Ionized Calcium:   Lab Results   Component Value Date    CAION 0.86 04/08/2019    CAION 1.00 04/07/2019    CAION 0.96 04/07/2019        Urinalysis:   Lab Yes    TRANSFER OUT OF ICU:   [x] No  [] Yes      Plan:    RUSLAN  -Volume depletion on admission  -Cocaine positive on admission  -POD #2  status post ileocecectomy, extended left hemicolectomy placement of abthera wound vac  -Consider second look performed just over 24 hours from first operation  -Abdomen remains tender on exam   -Increase in leukocytosis partially it can be attributed from post operative up to 26 vs on going bowel ischemia  -Continue NPO diet vs consider tpn if unable to advance diet in the upcoming days  -Bowel regimen per surgery  -Protonix    Rhabdomyolysis   -Anticipate CK to continue to uptrend to tomorrow   - Replacing  Electrolytes prn: hold on further calcium to prevent calciphylaxis; total corrected calcium normal   -9.6 Liters In /2.8 Out  - Increase 30 pounds since admission  - I/O: Bicarb at 125 ml/hr decrease bicarbonate however will likely need CVRRT  given creatine   - UO: 0.7 ml/kg/hr  - Drains: 1200 vac;     CV:  -Converted to Sinus Rhythm this am  -Wean off cardizem and wean off vasopressin which was at .01    Infectious Disease  -Antibiotics: meropenem since  previously aztreonam and levaquin. Will continue anaerobe coverage.    -1 Blood Culture Grew G- Rods H.  Flu on     Pulm:  - Vent: PRVC 470/5/18/30%  Actual RR of 18-22  wean as tolerable  - AB.45/36/94  - CXR: Will keep daily chest xray for now given leukocytosis     Neuro:  - Sedation:50-75 Fentanyl mcg per hour, wean as tolerable  - RASS:-1, goal Neg 1 to 1  -Cam Negative    ID:  - Tmax: afebrile; increase in luekocytosis  - Cultures: last cultured on     Other/Prophylaxis  - HOB: Elevated  - ICU Level of Care  - Glycemic Control low 80s-100    Chantal Mccall MD  2019 8:41 AM

## 2019-04-08 NOTE — PLAN OF CARE
Problem: OXYGENATION/RESPIRATORY FUNCTION  Goal: Patient will maintain patent airway  4/7/2019 2133 by Arpan Zaidi RN  Outcome: Ongoing  4/7/2019 2003 by Roberto Joe RCP  Outcome: Ongoing  Goal: Patient will achieve/maintain normal respiratory rate/effort  Description  Respiratory rate and effort will be within normal limits for the patient  4/7/2019 2133 by Arpan Zaidi RN  Outcome: Ongoing  4/7/2019 2003 by Roberto Joe RCP  Outcome: Ongoing  4/7/2019 0845 by Nicky Jordan RCP  Outcome: Ongoing     Problem: MECHANICAL VENTILATION  Goal: Patient will maintain patent airway  4/7/2019 2133 by Arpan Zaidi RN  Outcome: Ongoing  4/7/2019 2003 by Roberto Joe RCP  Outcome: Ongoing  4/7/2019 0845 by Nicky Jordan RCP  Outcome: Ongoing  Goal: Oral health is maintained or improved  4/7/2019 2133 by Arpan Zaidi RN  Outcome: Ongoing  4/7/2019 2003 by Roberto Joe RCP  Outcome: Ongoing  4/7/2019 0845 by Nicky Jordan RCP  Outcome: Ongoing  Goal: Tracheostomy will be managed safely  4/7/2019 2133 by Arpan Zaidi RN  Outcome: Ongoing  4/7/2019 2003 by Roberto Joe RCP  Outcome: Ongoing  Goal: ET tube will be managed safely  4/7/2019 2133 by Arpan Zaidi RN  Outcome: Ongoing  4/7/2019 2003 by Roberto Joe RCP  Outcome: Ongoing  4/7/2019 0845 by Nicky Jordan RCP  Outcome: Ongoing  Goal: Ability to express needs and understand communication  4/7/2019 2133 by Arpan Zaidi RN  Outcome: Ongoing  4/7/2019 2003 by Roberto Joe RCP  Outcome: Ongoing  4/7/2019 0845 by Nicky Jordan RCP  Outcome: Ongoing  Goal: Mobility/activity is maintained at optimum level for patient  4/7/2019 2133 by Arpan Zaidi RN  Outcome: Ongoing  4/7/2019 2003 by Roberto Joe RCP  Outcome: Ongoing  4/7/2019 0845 by Nicky Jordan RCP  Outcome: Ongoing     Problem: NUTRITION  Goal: Nutritional status is improving  Outcome: Ongoing     Problem: Restraint Use - Nonviolent/Non-Self-Destructive Behavior:  Goal: Absence of restraint indications  Description  Absence of restraint indications  4/7/2019 2133 by Miryam Holly RN  Outcome: Ongoing  4/7/2019 1753 by Peggy Herrera RN  Outcome: Met This Shift  Goal: Absence of restraint-related injury  Description  Absence of restraint-related injury  4/7/2019 2133 by Miryam Holly RN  Outcome: Ongoing  4/7/2019 1753 by Peggy Herrera RN  Outcome: Met This Shift     Problem: Nutrition  Goal: Optimal nutrition therapy  Description  Nutrition Problem: Inadequate oral intake  Intervention: Food and/or Nutrient Delivery: Continue NPO  Nutritional Goals: Meet % of estimated nutrition needs   4/7/2019 2133 by Miryam Holly RN  Outcome: Ongoing  4/7/2019 1753 by Peggy Herrera RN  Outcome: Ongoing     Problem: Fluid Volume - Imbalance:  Goal: Absence of imbalanced fluid volume signs and symptoms  Description  Absence of imbalanced fluid volume signs and symptoms  4/7/2019 2133 by Miryam Holly RN  Outcome: Ongoing  4/7/2019 1753 by Peggy Herrera RN  Outcome: Ongoing     Problem: Infection - Central Venous Catheter-Associated Bloodstream Infection:  Goal: Will show no infection signs and symptoms  Description  Will show no infection signs and symptoms  4/7/2019 2133 by Miryam Holly RN  Outcome: Ongoing  4/7/2019 1753 by Peggy Herrera RN  Outcome: Ongoing     Problem: Risk for Impaired Skin Integrity  Goal: Tissue integrity - skin and mucous membranes  Description  Structural intactness and normal physiological function of skin and  mucous membranes.   Outcome: Ongoing     Problem: Falls - Risk of:  Goal: Will remain free from falls  Description  Will remain free from falls  Outcome: Ongoing  Goal: Absence of physical injury  Description  Absence of physical injury  Outcome: Ongoing     Problem: Cardiac:  Goal: Ability to maintain an adequate cardiac

## 2019-04-08 NOTE — PROGRESS NOTES
[facility]   Progress Note                   Date:   4/8/2019  Patient name: Karishma Helton  Date of admission:  4/5/2019  8:39 PM  MRN:   [id number]  YOB: 1965  PCP: No primary care provider on file. Reason for Admission:      Subjective:   Patient admitted with Septic shock with Multiorgan failure  S/P Subtotal bowel resection, hemodialysis for ARF  Atrial fibrillation s/p CV x 2    Patient seen and examined bedside  Intubated and sedated with Fentanyl PCA  Opening eyes to commands  HR controlled after starting Cardizem yesterday evening. Now in 80s-90s NSR  BP stable in 80s-100s, on Vasopressin 0.01  UO 1473.  Received no further dialysis    K normal. LFTs improving  Leukocytosis worsening    Medications:   Scheduled Meds:   pantoprazole  40 mg Intravenous BID    And    sodium chloride (PF)  10 mL Intravenous BID    sodium chloride flush  10 mL Intravenous 2 times per day    meropenem  1 g Intravenous Q24H    magnesium sulfate  1 g Intravenous Once       Continuous Infusions:   diltiazem (CARDIZEM) 125 mg in dextrose 5% 125 mL infusion 10 mg/hr (04/08/19 0804)    norepinephrine Stopped (04/06/19 2344)    vasopressin (Septic Shock) infusion 0.01 Units/min (04/08/19 0514)    sodium chloride 20 mL/hr (04/08/19 0002)    fentaNYL 75 mcg/hr (04/08/19 0643)    midazolam Stopped (04/06/19 2003)    sodium bicarbonate infusion 125 mL/hr at 04/08/19 0720       CBC:   Recent Labs     04/06/19  2107 04/07/19 0457 04/08/19  0429   WBC 9.9 16.8* 26.2*   HGB 9.2* 9.1* 8.0*    234 244     BMP:    Recent Labs     04/06/19  2348  04/07/19  0457  04/07/19  1301 04/07/19  1958  04/08/19  0429 04/08/19  0430 04/08/19  0840   *  --  129*  --  127* 128*  --  128*  --   --    K 5.1  --  5.4*  --  5.5* 5.2  --  4.9  --   --    CL 93*  --  91*  --  89* 90*  --  87*  --   --    CO2 14*  --  17*  --  20 23  --  24  --   --    BUN 62*  --  62*  --   --   --   --  63*  --   -- failure    Treatment Plan:   1. Continue Cardizem drip. Can repeat Amiodarone IV bolus if patient has Afib. 2. Anticoagulation withheld due to high risk of bleeding  3. Antibiotics as per ID  4. Respiratory management as per primary/critical care      Discussed with nursing. Betsy Guillen MD  PGY-1 Internal Medicine Resident  [place], [place]  4/8/2019 9:06 AM    I performed a history and physical examination of the patient and discussed management with the resident. I reviewed the residents note and agree with the documented findings and plan of care. Any areas of disagreement are noted on the chart. I was personally present for the key portions of any procedures. I have documented in the chart those procedures where I was not present during the key portions. I have personally evaluated this patient and have completed at least one if not all key elements of the E/M (history, physical exam, and MDM). Additional findings are as noted.     Israel Mccullough MD

## 2019-04-08 NOTE — PROGRESS NOTES
PHARMACY NOTE:    The electrolyte replacement protocol for potassium/magnesium has been discontinued per P&T guidelines because the patient has reduced renal function (CrCl < 30 mL/min). The patient's most recent potassium & magnesium levels are:  Recent Labs     04/06/19  2348  04/07/19  0942 04/07/19  1301 04/07/19  1958 04/08/19  0429   K 5.1   < >  --  5.5* 5.2 4.9   MG 2.2  --  2.0  --   --  2.1    < > = values in this interval not displayed. Estimated Creatinine Clearance: 11 mL/min (A) (based on SCr of 5.9 mg/dL [facility])). For patients with decreased renal function (below 30ml/min) needing potassium/magnesium supplementation, please order individual bolus doses with appropriate monitoring. Please contact the inpatient pharmacy with any concerns. Thank you.   Ty Canseco, PharmD BCPS  4/8/2019 7:55 AM

## 2019-04-08 NOTE — PROGRESS NOTES
Nutrition Assessment    Type and Reason for Visit: Reassess    Nutrition Recommendations: Start nutrition as able. If TPN needed, suggest starting with custom solution: 200 g Dex, 50 g AA at 41.7 mL/hr with 100 mL 20% lipids. Will continue to follow/monitor plans. Nutrition Assessment: Pt remains on vent. No nutrition at present. Back to surgery today. Malnutrition Assessment:  · Malnutrition Status: Insufficient data  · Context: Acute illness or injury    Nutrition Risk Level: High    Nutrient Needs:  · Estimated Daily Total Kcal: 1842-6548 kcal/day  · Estimated Daily Protein (g): 70 g pro/day    Nutrition Diagnosis:   · Problem: Inadequate oral intake  · Etiology: related to Impaired respiratory function-inability to consume food, Alteration in GI function     Signs and symptoms:  as evidenced by NPO status due to medical condition    Objective Information:  · Wound Type: Surgical Wound  · Current Nutrition Therapies:  · Oral Diet Orders: NPO   · Nutrition Support Orders: None   · Anthropometric Measures:  · Ht: 5' 1.02\" (155 cm)   · Current Body Wt: 200 lb 2.8 oz (90.8 kg)  · Admission Body Wt: 173 lb 15.1 oz (78.9 kg)  · Ideal Body Wt: 105 lb 13.1 oz (48 kg), % Ideal Body 165% (adm/ideal)  · BMI Classification: BMI 30.0 - 34.9 Obese Class I    Nutrition Interventions:   Start nutrition as able. If TPN needed, suggest starting with custom solution: 200 g Dex, 50 g AA at 41.7 mL/hr with 100 mL 20% lipids.    Continued Inpatient Monitoring, Education Not Indicated    Nutrition Evaluation:   · Evaluation: No progress toward goals   · Goals: Meet % of estimated nutrition needs    · Monitoring: Nutrition Progression, Monitor Bowel Function, Wound Healing, Weight, Pertinent Labs      Electronically signed by Jan Garza RD, LD on 4/8/19 at 12:07 PM    Contact Number: [phone]

## 2019-04-08 NOTE — PLAN OF CARE
Problem: OXYGENATION/RESPIRATORY FUNCTION  Goal: Patient will maintain patent airway  4/8/2019 0812 by Jazmine Bullock RCP  Outcome: Ongoing  4/7/2019 2133 by Miryam Holly RN  Outcome: Ongoing  4/7/2019 2003 by Carolyn Lei RCP  Outcome: Ongoing     Problem: OXYGENATION/RESPIRATORY FUNCTION  Goal: Patient will maintain patent airway  4/8/2019 0812 by Jazmine Bullock RCP  Outcome: Ongoing     Problem: OXYGENATION/RESPIRATORY FUNCTION  Goal: Patient will achieve/maintain normal respiratory rate/effort  Description  Respiratory rate and effort will be within normal limits for the patient  4/8/2019 6315 by Jazmine Bullock RCP  Outcome: Ongoing  4/7/2019 2133 by Miryam Holly RN  Outcome: Ongoing  4/7/2019 2003 by Carolyn Lei RCP  Outcome: Ongoing     Problem: MECHANICAL VENTILATION  Goal: Patient will maintain patent airway  4/8/2019 0812 by Jazmine Bullock RCP  Outcome: Ongoing  4/7/2019 2133 by Miryam Holly RN  Outcome: Ongoing  4/7/2019 2003 by Carolyn Lei RCP  Outcome: Ongoing     Problem: MECHANICAL VENTILATION  Goal: Oral health is maintained or improved  4/8/2019 0812 by Jazmine Bullock RCP  Outcome: Ongoing  4/7/2019 2133 by Miryam Holly RN  Outcome: Ongoing  4/7/2019 2003 by Carolyn Lei RCP  Outcome: Ongoing     Problem: MECHANICAL VENTILATION  Goal: Ability to express needs and understand communication  4/8/2019 0812 by Jazmine Bullock RCP  Outcome: Ongoing  4/7/2019 2133 by Miryam Holly RN  Outcome: Ongoing  4/7/2019 2003 by Carolyn Lei RCP  Outcome: Ongoing     Problem: MECHANICAL VENTILATION  Goal: Mobility/activity is maintained at optimum level for patient  4/8/2019 8805 by Jazmine Bullock RCP  Outcome: Ongoing  4/7/2019 2133 by Miryam Holly RN  Outcome: Ongoing  4/7/2019 2003 by Carolyn Lei RCP  Outcome: Ongoing

## 2019-04-08 NOTE — PROGRESS NOTES
Dialysis Post Treatment Note  Patient tolerated treatment well. Denies complaints at time of discharge.    Vitals:    04/08/19 1910   BP: 133/65   Pulse:    Resp:    Temp: 97.7 °F (36.5 °C)   SpO2:      Pre-Weight = 89.9  Post-weight = Weight: 196 lb 10.4 oz (89.2 kg)  Total Liters Processed = Total Liters Processed (l/min): 67 l/min  Rinseback Volume (mL) = Rinseback Volume (ml): 350 ml  Net Removal (mL) = 760  Length of treatment=210    Pt tolerated tx intubated, no complications noted

## 2019-04-08 NOTE — PLAN OF CARE
ICU Team Daily Plan of Care     NEURO    Pain Score:  Pain Level: 0    Pain Medication Indicated ? yes    Transition to PO ? no   Agitation/Sedation      RASS Goal: 0 to -2 Current RASS -2 (Light Sedation)   Sedation Awakening Trial (SAT) Indicated ? yes   Delirium (CAM-ICU) negative    Non-pharmacologic therapies reviewed ? yes    Sleep enhancement needed ? no    Pharmacological Rx indicated ? no    Restains needed ? no   CARDS     Vasoactive Agents ? yes    MAP Goal: >65 mmHg     Current IV fluid rate I.V.: 1915 mL(IV pumps cleared from OR )     Can IV fluids be discontinued ? not applicable    Additional IV access needed ? no    Cardiac Meds reviewed ? yes    Echocardiogram reviewed ?  not applicable   RESP    Mechanically Ventilated ? yes   Vent Information  $Ventilation: $Subsequent Day  Ventilator Started: Yes  Skin Assessment: Clean, dry, & intact  Vent Type: Servo i  Vent Mode: PRVC  Vt Ordered: 470 mL  Rate Set: 18 bmp  FiO2 : 50 %  Sensitivity: 5  PEEP/CPAP: 5  I Time/ I Time %: 0.9 s  Cuff Pressure (cm H2O): 22 cm H2O  Humidification Source: HME     Lung Protective Ventilation ? yes    Vent Bundle (Oral care, HOB >30, Subglottic suction) reviewed ? yes   Spontaneous breathing trial (SBT) indicated ? yes   Coordinated SAT & SBT ? yes   CXR/CT reviewed ? yes   RENAL    Urine Output: adequate  Output  Urine: 100 mL Urine: 100 mL     Intake/Output Summary (Last 24 hours) at 4/8/2019 1534  Last data filed at 4/8/2019 1506  Gross per 24 hour   Intake 7656.55 ml   Output 2670 ml   Net 4986.55 ml        Fluid Balance Goal  positive    Renal panel/BMP reviewed? yes    Lytes replaced ? yes    Renal replacement therapy (HD/CRRT) ? yes   HEME/ONC     CBC/Coagulation profile reviewed ? yes    Require transfusion ? no   DVT prophylaxis intermittent pneumatic compression boots   GI    Nutrition Plan:   npo    Current TF rate at Goal ? not applicable    TPN indicated ?  no    Bowel function/regimen required ? Per surgery   GI prophylaxis ? yes protonix   ENDOCRINE     Adequate glycemic control ? yes    Insulin gtt indicated ? no    Stress dose steroids indicated ? no    Taper steroids? no   ID     Antibiotics ? yes    Positive Cx ? G- Rods H. Flu    Source Control ? pending    De-esclation plan ?  not applicable   DERM     Skin integrity/Wound care reviewed ? yes    WOC  nurse needed? not applicable   EARLY MOBILIZATION PLAN     Lines, Tubes, Drains needed ?  yes    PT/OT/Speech Therapy PT and OT   DISPOSITION/CODE STATUS/GOAL OF CARE     Disposition/Code Status/Goals of care ICU

## 2019-04-08 NOTE — PROGRESS NOTES
Spoke with Dr. Oma Singh, notified of ionic zhanna of .86, lactic of 3.3, and hgb of 8. Ordered 2g ca gluconate, stated nothing for lactic at this time, and to notify surgery of decreased hgb. Dr. Howard Rings notified of hgb.

## 2019-04-08 NOTE — PLAN OF CARE
Problem: OXYGENATION/RESPIRATORY FUNCTION  Goal: Patient will maintain patent airway  Outcome: Ongoing  Goal: Patient will achieve/maintain normal respiratory rate/effort  Description  Respiratory rate and effort will be within normal limits for the patient  4/7/2019 2003 by Deidra Odom RCP  Outcome: Ongoing  4/7/2019 0845 by Miya Naqvi RCP  Outcome: Ongoing     Problem: MECHANICAL VENTILATION  Goal: Patient will maintain patent airway  4/7/2019 2003 by Deidra Odom RCP  Outcome: Ongoing  4/7/2019 0845 by Miya Naqvi RCP  Outcome: Ongoing  Goal: Oral health is maintained or improved  4/7/2019 2003 by Deidra Odom RCP  Outcome: Ongoing  4/7/2019 0845 by Miya Naqvi RCP  Outcome: Ongoing  Goal: Tracheostomy will be managed safely  Outcome: Ongoing  Goal: ET tube will be managed safely  4/7/2019 2003 by Deidra Odom RCP  Outcome: Ongoing  4/7/2019 0845 by Miya Naqvi RCP  Outcome: Ongoing  Goal: Ability to express needs and understand communication  4/7/2019 2003 by Deidra Odom RCP  Outcome: Ongoing  4/7/2019 0845 by Miya Naqvi RCP  Outcome: Ongoing  Goal: Mobility/activity is maintained at optimum level for patient  4/7/2019 2003 by Deidra Odom RCP  Outcome: Ongoing  4/7/2019 0845 by Miya Naqvi RCP  Outcome: Ongoing

## 2019-04-09 ENCOUNTER — APPOINTMENT (OUTPATIENT)
Dept: GENERAL RADIOLOGY | Age: 54
DRG: 710 | End: 2019-04-09
Payer: MEDICAID

## 2019-04-09 LAB
ABSOLUTE EOS #: 0.22 K/UL (ref 0–0.4)
ABSOLUTE IMMATURE GRANULOCYTE: 0.22 K/UL (ref 0–0.3)
ABSOLUTE LYMPH #: 1.33 K/UL (ref 1–4.8)
ABSOLUTE MONO #: 1.55 K/UL (ref 0.1–0.8)
ALBUMIN (CALCULATED): 2.8 G/DL (ref 3.2–5.2)
ALBUMIN PERCENT: 63 % (ref 45–65)
ALBUMIN SERPL-MCNC: 1.6 G/DL (ref 3.5–5.2)
ALBUMIN/GLOBULIN RATIO: 0.7 (ref 1–2.5)
ALLEN TEST: ABNORMAL
ALP BLD-CCNC: 145 U/L (ref 35–104)
ALPHA 1 PERCENT: 3 % (ref 3–6)
ALPHA 2 PERCENT: 12 % (ref 6–13)
ALPHA-1-GLOBULIN: 0.2 G/DL (ref 0.1–0.4)
ALPHA-2-GLOBULIN: 0.6 G/DL (ref 0.5–0.9)
ALT SERPL-CCNC: 783 U/L (ref 5–33)
ANION GAP SERPL CALCULATED.3IONS-SCNC: 8 MMOL/L (ref 9–17)
AST SERPL-CCNC: 989 U/L
BASOPHILS # BLD: 0 % (ref 0–2)
BASOPHILS ABSOLUTE: 0 K/UL (ref 0–0.2)
BETA GLOBULIN: 0.5 G/DL (ref 0.5–1.1)
BETA PERCENT: 11 % (ref 11–19)
BILIRUB SERPL-MCNC: 2.42 MG/DL (ref 0.3–1.2)
BILIRUBIN DIRECT: 1.96 MG/DL
BILIRUBIN, INDIRECT: 0.46 MG/DL (ref 0–1)
BUN BLDV-MCNC: 36 MG/DL (ref 6–20)
BUN/CREAT BLD: ABNORMAL (ref 9–20)
CALCIUM IONIZED: 0.84 MMOL/L (ref 1.13–1.33)
CALCIUM SERPL-MCNC: 6.3 MG/DL (ref 8.6–10.4)
CHLORIDE BLD-SCNC: 94 MMOL/L (ref 98–107)
CO2: 28 MMOL/L (ref 20–31)
CREAT SERPL-MCNC: 2.87 MG/DL (ref 0.5–0.9)
DIFFERENTIAL TYPE: ABNORMAL
EKG ATRIAL RATE: 170 BPM
EKG Q-T INTERVAL: 286 MS
EKG QRS DURATION: 70 MS
EKG QTC CALCULATION (BAZETT): 468 MS
EKG R AXIS: 70 DEGREES
EKG T AXIS: 89 DEGREES
EKG VENTRICULAR RATE: 161 BPM
EOSINOPHILS RELATIVE PERCENT: 1 % (ref 1–4)
FIO2: ABNORMAL
GAMMA GLOBULIN %: 11 % (ref 9–20)
GAMMA GLOBULIN: 0.5 G/DL (ref 0.5–1.5)
GFR AFRICAN AMERICAN: 21 ML/MIN
GFR NON-AFRICAN AMERICAN: 17 ML/MIN
GFR SERPL CREATININE-BSD FRML MDRD: ABNORMAL ML/MIN/{1.73_M2}
GFR SERPL CREATININE-BSD FRML MDRD: ABNORMAL ML/MIN/{1.73_M2}
GLOBULIN: ABNORMAL G/DL (ref 1.5–3.8)
GLUCOSE BLD-MCNC: 114 MG/DL (ref 65–105)
GLUCOSE BLD-MCNC: 116 MG/DL (ref 65–105)
GLUCOSE BLD-MCNC: 43 MG/DL (ref 65–105)
GLUCOSE BLD-MCNC: 58 MG/DL (ref 65–105)
GLUCOSE BLD-MCNC: 59 MG/DL (ref 65–105)
GLUCOSE BLD-MCNC: 61 MG/DL (ref 74–100)
GLUCOSE BLD-MCNC: 62 MG/DL (ref 65–105)
GLUCOSE BLD-MCNC: 62 MG/DL (ref 65–105)
GLUCOSE BLD-MCNC: 67 MG/DL (ref 65–105)
GLUCOSE BLD-MCNC: 68 MG/DL (ref 70–99)
GLUCOSE BLD-MCNC: 76 MG/DL (ref 65–105)
GLUCOSE BLD-MCNC: 78 MG/DL (ref 65–105)
GLUCOSE BLD-MCNC: 78 MG/DL (ref 65–105)
GLUCOSE BLD-MCNC: 79 MG/DL (ref 65–105)
GLUCOSE BLD-MCNC: 80 MG/DL (ref 65–105)
GLUCOSE BLD-MCNC: 80 MG/DL (ref 65–105)
GLUCOSE BLD-MCNC: 81 MG/DL (ref 65–105)
GLUCOSE BLD-MCNC: 83 MG/DL (ref 65–105)
GLUCOSE BLD-MCNC: 85 MG/DL (ref 65–105)
GLUCOSE BLD-MCNC: 87 MG/DL (ref 65–105)
GLUCOSE BLD-MCNC: 91 MG/DL (ref 65–105)
GLUCOSE BLD-MCNC: 97 MG/DL (ref 65–105)
HBV SURFACE AB TITR SER: <3.5 MIU/ML
HCT VFR BLD CALC: 24.1 % (ref 36.3–47.1)
HEMOGLOBIN: 7.9 G/DL (ref 11.9–15.1)
HEPATITIS B CORE TOTAL ANTIBODY: NONREACTIVE
HEPATITIS B SURFACE ANTIGEN: NONREACTIVE
HEPATITIS C ANTIBODY: NONREACTIVE
IMMATURE GRANULOCYTES: 1 %
INR BLD: 1
LACTIC ACID, WHOLE BLOOD: 2.1 MMOL/L (ref 0.7–2.1)
LIPASE: 19 U/L (ref 13–60)
LYMPHOCYTES # BLD: 6 % (ref 24–44)
MCH RBC QN AUTO: 30.3 PG (ref 25.2–33.5)
MCHC RBC AUTO-ENTMCNC: 32.8 G/DL (ref 28.4–34.8)
MCV RBC AUTO: 92.3 FL (ref 82.6–102.9)
MODE: ABNORMAL
MONOCYTES # BLD: 7 % (ref 1–7)
MORPHOLOGY: NORMAL
NEGATIVE BASE EXCESS, ART: ABNORMAL (ref 0–2)
NRBC AUTOMATED: 1.1 PER 100 WBC
NUCLEATED RED BLOOD CELLS: 4 PER 100 WBC
O2 DEVICE/FLOW/%: ABNORMAL
PATHOLOGIST: ABNORMAL
PATHOLOGIST: NORMAL
PATIENT TEMP: ABNORMAL
PDW BLD-RTO: 14.3 % (ref 11.8–14.4)
PLATELET # BLD: ABNORMAL K/UL (ref 138–453)
PLATELET ESTIMATE: ABNORMAL
PLATELET, FLUORESCENCE: 98 K/UL (ref 138–453)
PLATELET, IMMATURE FRACTION: 10.3 % (ref 1.1–10.3)
PMV BLD AUTO: ABNORMAL FL (ref 8.1–13.5)
POC HCO3: 30.3 MMOL/L (ref 21–28)
POC O2 SATURATION: 96 % (ref 94–98)
POC PCO2 TEMP: ABNORMAL MM HG
POC PCO2: 42 MM HG (ref 35–48)
POC PH TEMP: ABNORMAL
POC PH: 7.47 (ref 7.35–7.45)
POC PO2 TEMP: ABNORMAL MM HG
POC PO2: 78.3 MM HG (ref 83–108)
POSITIVE BASE EXCESS, ART: 6 (ref 0–3)
POTASSIUM SERPL-SCNC: 3.7 MMOL/L (ref 3.7–5.3)
PROTEIN ELECTROPHORESIS, SERUM: ABNORMAL
PROTHROMBIN TIME: 10.5 SEC (ref 9–12)
RBC # BLD: 2.61 M/UL (ref 3.95–5.11)
RBC # BLD: ABNORMAL 10*6/UL
SAMPLE SITE: ABNORMAL
SEG NEUTROPHILS: 85 % (ref 36–66)
SEGMENTED NEUTROPHILS ABSOLUTE COUNT: 18.88 K/UL (ref 1.8–7.7)
SERUM IFX INTERP: NORMAL
SODIUM BLD-SCNC: 130 MMOL/L (ref 135–144)
SURGICAL PATHOLOGY REPORT: NORMAL
TCO2 (CALC), ART: 32 MMOL/L (ref 22–29)
TOTAL CK: ABNORMAL U/L (ref 26–192)
TOTAL PROT. SUM,%: 100 % (ref 98–102)
TOTAL PROT. SUM: 4.6 G/DL (ref 6.3–8.2)
TOTAL PROTEIN: 3.8 G/DL (ref 6.4–8.3)
TOTAL PROTEIN: 4.5 G/DL (ref 6.4–8.3)
WBC # BLD: 22.2 K/UL (ref 3.5–11.3)
WBC # BLD: ABNORMAL 10*3/UL

## 2019-04-09 PROCEDURE — 85055 RETICULATED PLATELET ASSAY: CPT

## 2019-04-09 PROCEDURE — 2580000003 HC RX 258: Performed by: STUDENT IN AN ORGANIZED HEALTH CARE EDUCATION/TRAINING PROGRAM

## 2019-04-09 PROCEDURE — 82803 BLOOD GASES ANY COMBINATION: CPT

## 2019-04-09 PROCEDURE — 82550 ASSAY OF CK (CPK): CPT

## 2019-04-09 PROCEDURE — 6360000002 HC RX W HCPCS: Performed by: STUDENT IN AN ORGANIZED HEALTH CARE EDUCATION/TRAINING PROGRAM

## 2019-04-09 PROCEDURE — 99211 OFF/OP EST MAY X REQ PHY/QHP: CPT

## 2019-04-09 PROCEDURE — 2000000000 HC ICU R&B

## 2019-04-09 PROCEDURE — 85610 PROTHROMBIN TIME: CPT

## 2019-04-09 PROCEDURE — 94003 VENT MGMT INPAT SUBQ DAY: CPT

## 2019-04-09 PROCEDURE — 2700000000 HC OXYGEN THERAPY PER DAY

## 2019-04-09 PROCEDURE — 83690 ASSAY OF LIPASE: CPT

## 2019-04-09 PROCEDURE — 94762 N-INVAS EAR/PLS OXIMTRY CONT: CPT

## 2019-04-09 PROCEDURE — 80048 BASIC METABOLIC PNL TOTAL CA: CPT

## 2019-04-09 PROCEDURE — 93971 EXTREMITY STUDY: CPT

## 2019-04-09 PROCEDURE — 82330 ASSAY OF CALCIUM: CPT

## 2019-04-09 PROCEDURE — 2580000003 HC RX 258: Performed by: EMERGENCY MEDICINE

## 2019-04-09 PROCEDURE — 99291 CRITICAL CARE FIRST HOUR: CPT | Performed by: INTERNAL MEDICINE

## 2019-04-09 PROCEDURE — 99233 SBSQ HOSP IP/OBS HIGH 50: CPT | Performed by: INTERNAL MEDICINE

## 2019-04-09 PROCEDURE — 94681 O2 UPTK CO2 OUTP % O2 XTRC: CPT

## 2019-04-09 PROCEDURE — 80076 HEPATIC FUNCTION PANEL: CPT

## 2019-04-09 PROCEDURE — 2580000003 HC RX 258: Performed by: INTERNAL MEDICINE

## 2019-04-09 PROCEDURE — 85025 COMPLETE CBC W/AUTO DIFF WBC: CPT

## 2019-04-09 PROCEDURE — C9113 INJ PANTOPRAZOLE SODIUM, VIA: HCPCS | Performed by: STUDENT IN AN ORGANIZED HEALTH CARE EDUCATION/TRAINING PROGRAM

## 2019-04-09 PROCEDURE — 6360000002 HC RX W HCPCS: Performed by: INTERNAL MEDICINE

## 2019-04-09 PROCEDURE — 82947 ASSAY GLUCOSE BLOOD QUANT: CPT

## 2019-04-09 PROCEDURE — 83605 ASSAY OF LACTIC ACID: CPT

## 2019-04-09 PROCEDURE — APPSS15 APP SPLIT SHARED TIME 0-15 MINUTES: Performed by: NURSE PRACTITIONER

## 2019-04-09 PROCEDURE — 71045 X-RAY EXAM CHEST 1 VIEW: CPT

## 2019-04-09 RX ORDER — DEXTROSE MONOHYDRATE 100 MG/ML
INJECTION, SOLUTION INTRAVENOUS CONTINUOUS
Status: DISCONTINUED | OUTPATIENT
Start: 2019-04-09 | End: 2019-04-09

## 2019-04-09 RX ORDER — DEXTROSE AND SODIUM CHLORIDE 5; .9 G/100ML; G/100ML
INJECTION, SOLUTION INTRAVENOUS CONTINUOUS
Status: DISCONTINUED | OUTPATIENT
Start: 2019-04-09 | End: 2019-04-12

## 2019-04-09 RX ORDER — FUROSEMIDE 10 MG/ML
40 INJECTION INTRAMUSCULAR; INTRAVENOUS 2 TIMES DAILY
Status: COMPLETED | OUTPATIENT
Start: 2019-04-09 | End: 2019-04-09

## 2019-04-09 RX ORDER — DEXTROSE AND SODIUM CHLORIDE 5; .45 G/100ML; G/100ML
INJECTION, SOLUTION INTRAVENOUS CONTINUOUS
Status: DISCONTINUED | OUTPATIENT
Start: 2019-04-09 | End: 2019-04-09

## 2019-04-09 RX ADMIN — DEXTROSE MONOHYDRATE 25 G: 500 INJECTION PARENTERAL at 00:33

## 2019-04-09 RX ADMIN — DEXTROSE MONOHYDRATE 25 G: 500 INJECTION PARENTERAL at 05:16

## 2019-04-09 RX ADMIN — DEXTROSE MONOHYDRATE 25 G: 500 INJECTION PARENTERAL at 20:53

## 2019-04-09 RX ADMIN — DEXTROSE MONOHYDRATE 25 G: 500 INJECTION PARENTERAL at 07:58

## 2019-04-09 RX ADMIN — DEXTROSE AND SODIUM CHLORIDE: 5; 900 INJECTION, SOLUTION INTRAVENOUS at 10:51

## 2019-04-09 RX ADMIN — PANTOPRAZOLE SODIUM 40 MG: 40 INJECTION, POWDER, FOR SOLUTION INTRAVENOUS at 07:57

## 2019-04-09 RX ADMIN — DEXTROSE MONOHYDRATE 25 G: 500 INJECTION PARENTERAL at 15:02

## 2019-04-09 RX ADMIN — DEXTROSE MONOHYDRATE: 100 INJECTION, SOLUTION INTRAVENOUS at 08:50

## 2019-04-09 RX ADMIN — FENTANYL CITRATE 50 MCG: 50 INJECTION, SOLUTION INTRAMUSCULAR; INTRAVENOUS at 08:16

## 2019-04-09 RX ADMIN — DEXTROSE AND SODIUM CHLORIDE: 5; 450 INJECTION, SOLUTION INTRAVENOUS at 03:12

## 2019-04-09 RX ADMIN — FUROSEMIDE 40 MG: 10 INJECTION, SOLUTION INTRAMUSCULAR; INTRAVENOUS at 11:20

## 2019-04-09 RX ADMIN — PANTOPRAZOLE SODIUM 40 MG: 40 INJECTION, POWDER, FOR SOLUTION INTRAVENOUS at 20:56

## 2019-04-09 RX ADMIN — FUROSEMIDE 40 MG: 10 INJECTION, SOLUTION INTRAMUSCULAR; INTRAVENOUS at 18:23

## 2019-04-09 RX ADMIN — MEROPENEM 1 G: 1 INJECTION, POWDER, FOR SOLUTION INTRAVENOUS at 13:24

## 2019-04-09 RX ADMIN — FENTANYL CITRATE 50 MCG: 50 INJECTION, SOLUTION INTRAMUSCULAR; INTRAVENOUS at 15:53

## 2019-04-09 RX ADMIN — Medication 10 ML: at 07:57

## 2019-04-09 RX ADMIN — DEXTROSE MONOHYDRATE 25 G: 500 INJECTION PARENTERAL at 17:16

## 2019-04-09 RX ADMIN — Medication 10 ML: at 20:57

## 2019-04-09 RX ADMIN — Medication 10 ML: at 20:56

## 2019-04-09 RX ADMIN — DEXTROSE MONOHYDRATE 25 G: 500 INJECTION PARENTERAL at 12:55

## 2019-04-09 ASSESSMENT — PULMONARY FUNCTION TESTS
PIF_VALUE: 22
PIF_VALUE: 23
PIF_VALUE: 21
PIF_VALUE: 22
PIF_VALUE: 22
PIF_VALUE: 21
PIF_VALUE: 22
PIF_VALUE: 23
PIF_VALUE: 22
PIF_VALUE: 23
PIF_VALUE: 21
PIF_VALUE: 22
PIF_VALUE: 23
PIF_VALUE: 22
PIF_VALUE: 20
PIF_VALUE: 22
PIF_VALUE: 23

## 2019-04-09 ASSESSMENT — PAIN SCALES - GENERAL
PAINLEVEL_OUTOF10: 0

## 2019-04-09 NOTE — PLAN OF CARE
ICU Team Daily Plan of Care     NEURO    Pain Score:  Pain Level: 0    Pain Medication Indicated ? intermittent    Transition to PO ? When bowel function returns   Agitation/Sedation      RASS Goal: 0 to -2 Current RASS -2 (Light Sedation)   Sedation Awakening Trial (SAT) Indicated ?  no   Delirium (CAM-ICU) negative    Non-pharmacologic therapies reviewed ? yes    Sleep enhancement needed ? no    Pharmacological Rx indicated ? no    Restains needed ? no   CARDS     Vasoactive Agents ? no    MAP Goal: >65 mmHg     Current IV fluid rate I.V.: 7826 mL(IV pumps cleared from OR )     Can IV fluids be discontinued ? no    Additional IV access needed ? yes    Cardiac Meds reviewed ? yes    Echocardiogram reviewed ? yes   RESP    Mechanically Ventilated ? yes   Vent Information  $Ventilation: $Subsequent Day  Ventilator Started: Yes  Skin Assessment: Clean, dry, & intact  Equipment Changed: HME  Vent Type: Servo i  Vent Mode: PRVC  Vt Ordered: 470 mL  Rate Set: 18 bmp  FiO2 : 35 %  Sensitivity: 5  PEEP/CPAP: 5  I Time/ I Time %: 0.9 s  Cuff Pressure (cm H2O): 22 cm H2O  Humidification Source: HME     Lung Protective Ventilation ? yes    Vent Bundle (Oral care, HOB >30, Subglottic suction) reviewed ? yes   Spontaneous breathing trial (SBT) indicated ? no   Coordinated SAT & SBT ? no   CXR/CT reviewed ? yes   RENAL    Urine Output: inadequate minimal/third spacing  Output  Urine: 100 mL Urine: 100 mL     Intake/Output Summary (Last 24 hours) at 4/9/2019 1145  Last data filed at 4/9/2019 1100  Gross per 24 hour   Intake 3382.53 ml   Output 1920 ml   Net 1462.53 ml        Fluid Balance Goal  positive    Renal panel/BMP reviewed? yes    Lytes replaced ?  not applicable    Renal replacement therapy (HD/CRRT) ? yes   HEME/ONC     CBC/Coagulation profile reviewed ? yes    Require transfusion ?   no   DVT prophylaxis intermittent pneumatic compression boots   GI    Nutrition Plan:   TPN    Current TF rate at Goal ? Not applicable    TPN indicated ? yes    Bowel function/regimen required ? Per surgery   GI prophylaxis ? yes protonix   ENDOCRINE     Adequate glycemic control ? adjusting    Insulin gtt indicated ? no    Stress dose steroids indicated ? no    Taper steroids? no   ID     Antibiotics ? yes    Positive Cx ? yes    Source Control ? yes    De-esclation plan ?  not   DERM     Skin integrity/Wound care reviewed ? yes    WOC  nurse needed? no   EARLY MOBILIZATION PLAN     Lines, Tubes, Drains needed ? Dced arterial line    PT/OT/Speech Therapy PT and OT   DISPOSITION/CODE STATUS/GOAL OF CARE     Disposition/Code Status/Goals of care Updated Family.

## 2019-04-09 NOTE — PROGRESS NOTES
[facility]   Progress Note                   Date:   4/9/2019  Patient name: Lilliana Hamm  Date of admission:  4/5/2019  8:39 PM  MRN:   [id number]  YOB: 1965  PCP: No primary care provider on file. Reason for Admission:      Subjective:   Patient admitted with Septic shock with Multiorgan failure  S/P Subtotal bowel resection, hemodialysis for ARF  Atrial fibrillation s/p CV x 2    1. @ she had ex lap & resection of ex lap, resection of ischemic sigmoid, right colon and distal small bowel. Left in discontinuity ( 4/8)    In SR  SBP is 100  Off the pressors. On mechanical vent            Medications:   Scheduled Meds:   furosemide  40 mg Intravenous BID    pantoprazole  40 mg Intravenous BID    And    sodium chloride (PF)  10 mL Intravenous BID    sodium chloride flush  10 mL Intravenous 2 times per day    meropenem  1 g Intravenous Q24H    magnesium sulfate  1 g Intravenous Once       Continuous Infusions:   dextrose 5 % and 0.9 % NaCl         CBC:   Recent Labs     04/07/19 0457 04/08/19 0429 04/09/19  0506   WBC 16.8* 26.2* 22.2*   HGB 9.1* 8.0* 7.9*    244 See Reflexed IPF Result     BMP:    Recent Labs     04/07/19 0457 04/07/19 1958 04/08/19 0429 04/08/19  0430 04/08/19  0840 04/09/19  0506   *   < > 128*  --  128*  --   --  130*   K 5.4*   < > 5.2  --  4.9  --   --  3.7   CL 91*   < > 90*  --  87*  --   --  94*   CO2 17*   < > 23  --  24  --   --  28   BUN 62*  --   --   --  63*  --   --  36*   CREATININE 3.90*   < >  --    < > 4.10* 5.90* 6.50* 2.87*   GLUCOSE 73  --   --   --  91  --   --  68*    < > = values in this interval not displayed.      Hepatic:   Recent Labs     04/07/19 0457 04/08/19 0429 04/09/19  0506   AST 3,542* 1,867* 989*   ALT 1,528* 1,200* 783*   BILITOT 1.92* 2.77* 2.42*   ALKPHOS 194* 172* 145*     INR:   Recent Labs     04/07/19  0457 04/08/19  0429 04/09/19  0506   INR 1.5 1.1 1.0       Objective:   Vitals: BP (!) 94/48   Pulse 84   Temp 98.4 °F (36.9 °C) (Core)   Resp (!) 7   Ht 5' 1.02\" (1.55 m)   Wt 199 lb 1.2 oz (90.3 kg)   SpO2 99%   BMI 37.59 kg/m²     General appearance: intubated and sedated  HEENT: Head: Normocephalic, no lesions, without obvious abnormality  Neck: no JVD  Lungs: B/L air entry. mechanically ventilated  Heart: regular rate and rhythm, S1, S2 normal, no murmur  Abdomen: ileostomy present  Extremities: Mild generalized edema+  Neurologic: Mental status: Opens eyes to commands. Intubated and sedated       EKG: NSR 4/8/19    Echocardiogram: 4/6/19 - Hyperdynamic LV function, grade 1 diastolic dysfunction. Ejection fraction: 75%        Assessment:     Patient Active Problem List:     Dog bite     Post-traumatic osteoarthritis of left knee     S/P left knee arthroscopy     S/P total knee arthroplasty     Arthritis of left knee     Shock ([address]     Rhabdomyolysis     Hyponatremia     Lactic acidosis     MARY (acute kidney injury) ([address]     Metabolic acidosis     Acute respiratory failure (HCC)     Elevated liver enzymes     Hyperkalemia     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HCC)     Small bowel ischemia (HCC)     Acute GI bleeding     Gram negative septic shock ([address]      1. Septic shock with Multiorgan failure  2. New onset Atrial Fibrillation with RVR s/p Digoxin, CV X 2 in SR now( [id number]) 2  3. Acute Renal Failure  4. Acute Liver failure  5. Bowel Ischemia S/P sub-total colectomy  6. S/p Ex lap and small bowl resection with colectomy( 4/8)  7. Metabolic acidosis  8. Acute Respiratory failure  9    Hypoglycemia  Treatment Plan:   1. Will recommend lopressor 12.5 mg BID. Not on [facility] because of low platelet counts and bleeding risk. will re address in office once acute issues are resolved. 2. + 20 liter. X ray chest congestion. Bilateral pleural effusion. Fluid management per Nephrology  3. On IV meropenem per ID        Discussed with nursing.        Fellow Cardiology  Mary Montes    Attending Note,   The patient was seen and examined, agree with above, intubated and sedated, in NSR, echo showed preserved EF, continue current management.

## 2019-04-09 NOTE — PROGRESS NOTES
Ostomy Care and Education:      4[birth year]6/[birth year]:Ileocecectomy, extended left hemicolectomy   4/8/19: Ileostomy created  due to mesenteric ischemia. Omentectomy. Resection of the transverse colon, rectal stump. Abdominal wall closure. 04/09/19 0830   Ileostomy Ileostomy RUQ   Placement Date/Time: 04/08/19 1342   Pre-existing: No  Timeout: Patient; Appropriate Equipment;Sterile Technique using full body drape;Procedure  Inserted by: Dr Hola Huerta  Ileostomy Type: Ileostomy  Location: RUQ   Stomal Appliance Clean;Dry; Intact; Changed   Flange Size (inches) 2.5 Inches   Stoma  Assessment Red;Moist;Flush   Mucocutaneous Junction Intact   Peristomal Assessment Clean; Intact   Treatment Bag change;Site care  (ring barrier to kevon-stomal skin)   Stool Color Other (Comment)  (serosangenous)   Stool Appearance Watery   Output (mL) 20 ml   Incision 04/06/19 Abdomen   Date First Assessed/Time First Assessed: 04/06/19 1840   Location: Abdomen   Wound Assessment Clean;Dry;Edges well approximated   Kevon-wound Assessment Clean   Closure Staples  (intermittent)   Drainage Amount Scant   Drainage Description Serosanguinous   Odor None   Dressing/Treatment ABD;Packing   Dressing Changed   (tape applied to secure left lateral dressing edge)   Dressing Status Clean;Dry; Intact     Written educational material placed in room. Patient unable to participate in education. No family at bedside. Plan of care:  Cut barrier to fit stoma with no more than 1/8\" of exposed skin. Currently 7/8\" x 1 1/8\" oval  Apply an [employer] Barrier Ring to the cut edge of the pouching system. Empty the pouch when 1/3 to 1/2 full. Change the pouching system twice weekly and prn  Our goal is to keep the skin around the stoma intact and to have a dependable pouching system without leaks. The skin around the stoma should be intact and appear just like the skin on the opposite side of the abdomen.    Call the [address] [place] [address] at [phone] with questions or

## 2019-04-09 NOTE — CARE COORDINATION
Ref received as pt has started dialysis  Will follow to see if pt will need OP dialysis  Also noted positive tox for cocaine/marijuana - will discuss with pt as well once pt able

## 2019-04-09 NOTE — PROGRESS NOTES
INTENSIVE CARE UNIT  Resident Physician Progress Note    Patient - Lilliana Hamm  Date of Admission -  2019  8:39 PM  Date of Evaluation -  2019  Room and Bed Number -  0117/0117-01   Hospital Day - 4      SUBJECTIVE:     OVERNIGHT EVENTS: JASMYNE; Tolerated second look yesterday and dialysis was weaned from vasopressin and cardizem in NSR         TODAY:  Seen and Examined     AWAKE & FOLLOWING COMMANDS:  [] No   [x] Yes    SECRETIONS Amount:  [] Small [x] Moderate  [] Large  [] None  Color:     [] White [x] Colored  [] Bloody    SEDATION:    [] Propofol gtt  [] Versed gtt  [] Ativan gtt   [x] Fentanyl    PARALYZED:  [x] No    [] Yes    VASOPRESSORS:  [x] No    [] Yes  [] Levophed [] Dopamine [] Vasopressin  [] Dobutamine [] Phenylephrine [] Epinephrine      OBJECTIVE:     VITAL SIGNS:  BP (!) 92/52   Pulse 85   Temp 98.4 °F (36.9 °C) (Core)   Resp 18   Ht 5' 1.02\" (1.55 m)   Wt 199 lb 1.2 oz (90.3 kg)   SpO2 99%   BMI 37.59 kg/m²   Tmax over 24 hours:  Temp (24hrs), Av.4 °F (36.3 °C), Min:91.2 °F (32.9 °C), Max:98.6 °F (37 °C)      Patient Vitals for the past 8 hrs:   BP Temp Temp src Pulse Resp SpO2 Weight   19 1100 (!) 92/52 -- -- 85 18 99 % --   19 1000 (!) 90/50 -- -- 82 18 99 % --   19 0900 (!) 92/50 -- -- 84 (!) 7 99 % --   19 0800 (!) 94/48 98.4 °F (36.9 °C) CORE 85 18 97 % --   19 0730 -- -- -- 85 18 98 % --   19 0700 (!) 91/46 -- -- 84 18 96 % --   19 0645 -- -- -- 85 18 97 % --   19 0630 -- -- -- 88 18 96 % --   19 0615 -- -- -- 88 18 96 % --   19 0600 (!) 97/48 -- -- 90 18 97 % 199 lb 1.2 oz (90.3 kg)   19 0545 -- -- -- 91 16 96 % --   19 0530 -- -- -- 88 18 98 % --   19 0515 -- -- -- 89 18 96 % --   19 0500 (!) 84/37 -- -- 85 18 96 % --   19 0445 -- -- -- 84 18 97 % --   19 0430 -- -- -- 86 18 96 % --   19 0415 -- -- -- 88 18 96 % --   19 0400 (!) 99/53 -- -- 85 (!) 1 97 % -- 04/09/19 0345 -- -- -- 85 (!) 3 97 % --   04/09/19 0330 -- -- -- 87 (!) 0 97 % --   04/09/19 0315 -- -- -- 88 (!) 0 97 % --         Intake/Output Summary (Last 24 hours) at 4/9/2019 1110  Last data filed at 4/9/2019 1000  Gross per 24 hour   Intake 3382.53 ml   Output 1880 ml   Net 1502.53 ml     Date 04/09/19 0000 - 04/09/19 2359   Shift 3402-8889 4643-9379 3672-1074 24 Hour Total   INTAKE   I.V.(mL/kg) 367.5(4.1)   367.5(4.1)   IV Piggyback(mL/kg) 300(3.3)   300(3.3)   Shift Total(mL/kg) 667.5(7.4)   667.5(7.4)   OUTPUT   Urine(mL/kg/hr) 95(0.1) 20  115   Emesis/NG output(mL/kg) 250(2.8)   250(2.8)   Stool(mL/kg) 0(0)   0(0)   Shift Total(mL/kg) 345(3.8) 20(0.2)  365(4)   Weight (kg) 90.3 90.3 90.3 90.3     Wt Readings from Last 3 Encounters:   04/09/19 199 lb 1.2 oz (90.3 kg)   11/12/18 155 lb (70.3 kg)   05/15/17 145 lb 1 oz (65.8 kg)     Body mass index is 37.59 kg/m².         PHYSICAL EXAM:  GEN:  no apparent distress, moderate distress, drowsy follows commands  HEENT:  Normocepalic, without obvious abnormality, Atraumatic, eyelids/periorbital:  normal and conjunctiva: slight sclera icterus                                    trachea midline/symmetric   LUNGS:  Coarse no retractions/accessory muscles usage  CV:    S1/s2,   ABDOMEN:   soft, moderate distended, ostomy in place no output  :    Deferred  MSK:    there is no redness, warmth, or swelling of the joints  SKIN:   Warm and dry  EXTREMITIES:      Edema 2+ and on Right Arm 3+, distal pulses intact       MEDICATIONS:  Scheduled Meds:   furosemide  40 mg Intravenous BID    pantoprazole  40 mg Intravenous BID    And    sodium chloride (PF)  10 mL Intravenous BID    sodium chloride flush  10 mL Intravenous 2 times per day    meropenem  1 g Intravenous Q24H    magnesium sulfate  1 g Intravenous Once     Continuous Infusions:   dextrose 5 % and 0.9 % NaCl 50 mL/hr at 04/09/19 1051     PRN Meds:     dextrose 25 g PRN   sodium chloride flush 10 mL PRN 4.9  --   --  3.7   CL 91*   < > 90*  --  87*  --   --  94*   CO2 17*   < > 23  --  24  --   --  28   BUN 62*  --   --   --  63*  --   --  36*   CREATININE 3.90*   < >  --    < > 4.10* 5.90* 6.50* 2.87*   GLUCOSE 73  --   --   --  91  --   --  68*   CALCIUM 7.5*  --   --   --  6.6*  --   --  6.3*   PROT 4.0*  --   --   --  3.6*  --   --  3.8*   LABALBU 2.3*  --   --   --  1.7*  --   --  1.6*   BILITOT 1.92*  --   --   --  2.77*  --   --  2.42*   ALKPHOS 194*  --   --   --  172*  --   --  145*   AST 3,542*  --   --   --  1,867*  --   --  989*   ALT 1,528*  --   --   --  1,200*  --   --  783*    < > = values in this interval not displayed. Magnesium:   Lab Results   Component Value Date    MG 2.1 04/08/2019    MG 2.0 04/07/2019    MG 2.2 04/06/2019     Phosphorus:   Lab Results   Component Value Date    PHOS 5.9 04/08/2019    PHOS 8.2 04/07/2019    PHOS 8.8 04/06/2019     Ionized Calcium:   Lab Results   Component Value Date    CAION 0.84 04/09/2019    CAION 0.86 04/08/2019    CAION 1.00 04/07/2019        Urinalysis:   Lab Results   Component Value Date    NITRU NEGATIVE 04/06/2019    COLORU DARK YELLOW 04/06/2019    PHUR 5.0 04/06/2019    WBCUA 50  04/06/2019    RBCUA 50  04/06/2019    MUCUS NOT REPORTED 04/06/2019    TRICHOMONAS NOT REPORTED 04/06/2019    YEAST NOT REPORTED 04/06/2019    BACTERIA NOT REPORTED 04/06/2019    SPECGRAV 1.032 04/06/2019    LEUKOCYTESUR NEGATIVE 04/06/2019    UROBILINOGEN Normal 04/06/2019    BILIRUBINUR NEGATIVE 04/06/2019    GLUCOSEU NEGATIVE 04/06/2019    KETUA TRACE 04/06/2019    AMORPHOUS NOT REPORTED 04/06/2019       HgBA1c:  No results found for: LABA1C  TSH:    Lab Results   Component Value Date    TSH 0.97 04/05/2019     Lactic Acid: No results found for: LACTA   Troponin: No results for input(s): TROPONINI in the last 72 hours.     Microbiology:  Gram- Rods H Flu    ASSESSMENT:     Patient Active Problem List    Diagnosis Date Noted    Protein-calorie consider starting prophylaxis now that surgery is done if ok with surgery  - Glycemic Control: d5 NS  - Drains:     Renal:  - UO: 0.3 ml/kg/hr    ID:  - Tmax: 37 deg C  - Antibiotics: continue meropenem  - Cultures: 1 Blood Culture Grew G- Rods H.  Flu on 4/5  -Infectious Disease is following     Fluids/Electrolytes:  - IVF: d5 1/2 at 75 ml/hr change to NS; increase glucose checks, prn dextrose bolus; avoid switching to d10 given d10 has free water  - I/O: +4 Liters  - NGT: 250  - Replacing  Electrolytes prn   -Nephrology is Following     Other/Prophylaxis  - HOB: Elevated  - DVT: Start Heparin prophylaxis when GS is ok; SCDs  - ICU Level of Care  - PT/OT   -Stop CK, Lactic acid de-esclate abgs, inr/pt, ionized calcium    Lawyer Vivian MD  4/9/2019 7:48 AM

## 2019-04-09 NOTE — PROGRESS NOTES
ICU PROGRESS NOTE          PATIENT NAME: Sarah Arambula RECORD NO. [id number]  DATE: 2019    PRIMARY CARE PHYSICIAN: No primary care provider on file. HD: # 4    ASSESSMENT    Active Problems:    Shock ([address]    Rhabdomyolysis    Hyponatremia    Lactic acidosis    MARY (acute kidney injury) ([address]    Metabolic acidosis    Acute respiratory failure (HCC)    Elevated liver enzymes    Hyperkalemia    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HCC)    Small bowel ischemia (HCC)    Acute GI bleeding    Gram negative septic shock ([address]    Protein-calorie malnutrition [facility]  Resolved Problems:    * No resolved hospital problems. *      MEDICAL DECISION MAKING AND PLAN    1. Neuro  1. Altered mental status. CT head -- negative. Likely acute metabolic encephalopathy  2. Pain: Fentanyl drip  3. Sedation: Versed  - being held  4. + Drug abuse: cocaine/cannibus  2. CV:  Severe shock, suspect mixed hypovolemic-cardiogenic component  1. Off pressors  2. ECHO 4/6: hyperdynamic with EF 75%  3. Pulm  1. Acute respiratory failure due to acidosis  2. Mechanical ventilation  3. Vent management per ICU  4. GI  1. Acute GI bleed, possible mesenteric ischemia  2. POD#3  ex lap, resection of ischemic sigmoid, right colon and distal small bowel. Left in discontinuity  3. POD#1 - second look, completion sub total colectomy, rectal stump resection, end ileostomy   4. Shock liver - improving  5. NPO  6. Protonix  7. Recommend D5 1/2 NS to prevent catabolism  5. Nephro  1. Rhabdomyolysis - improving  2. Acute kidney failure   3. IVF: D5 1/2 NS  4. Hyponatremia  5. Creatinine continuing to trend up  6. ID  1. Merrem   2. Leukocytosis down to 22 today (26)  7. Endo:    1. Glucose   8. Heme  1. H/h 7.6      SUBJECTIVE    Mckenna Melonie was seen and examined. No ostomy function, NGT with 250 output. Abdomen soft. Follows commands off sedation. Off pressors.        OBJECTIVE  VITALS: Temp: Temp: 98.4 °F (36.9 °C)Temp  Av.4 °F (36.3 °C)  Min: 91.2 °F (32.9 °C)  Max: 98.6 °F (37 °C) BP Systolic (04ECB), TDJ:28 , Min:66 , CDU:509   Diastolic (89PGL), TEL:36, Min:28, Max:80   Pulse Pulse  Av.7  Min: 81  Max: 102 Resp Resp  Av.5  Min: 0  Max: 20 Pulse ox SpO2  Av.4 %  Min: 40 %  Max: 100 %    GENERAL: intubated, off sedation  NEURO: Follows commands off sedation  HEENT: mucous membranes moist  LUNGS: clear to ausculation, without rales or rhonci  HEART: Regular rate and rhythm  ABDOMEN: soft, non distended. Midline incision with dressing and packing - mild strike through. Ileostomy pink with purple patches, appears viable, retracted at inferior border, no output. EXTERMITY: No signs of cyanosis or ischemia      LAB:  CBC:   Recent Labs     19  0429 19  0506   WBC 16.8* 26.2* 22.2*   HGB 9.1* 8.0* 7.9*   HCT 28.1* 24.1* 24.1*   MCV 94.6 91.6 92.3    244 See Reflexed IPF Result     BMP:   Recent Labs     199 19  0430 19  0840 19  0506   *   < > 128*  --  128*  --   --  130*   K 5.4*   < > 5.2  --  4.9  --   --  3.7   CL 91*   < > 90*  --  87*  --   --  94*   CO2 17*   < > 23  --  24  --   --  28   BUN 62*  --   --   --  63*  --   --  36*   CREATININE 3.90*   < >  --    < > 4.10* 5.90* 6.50* 2.87*   GLUCOSE 73  --   --   --  91  --   --  68*    < > = values in this interval not displayed. RADIOLOGY:  CXR:       Chapin Montgomery DO  19, 9:03 AM                   Trauma Attending Glory Alexandro      I have reviewed the above [facility] note(s) and confirmed the key elements of the medical history and physical exam. I have seen and examined the pt. I have discussed the findings, established the care plan and recommendations with Resident, [facility] RN, bedside nurse.   Ileostomy beefy red, some sweat in bag  Trickle tube feeds  MARY, rhabdo and shock liver improving       Juancho Shankar DO  2019  8:26 PM

## 2019-04-09 NOTE — OP NOTE
[address]                                OPERATIVE REPORT    PATIENT NAME: Vargas Muniz                 :        1965  MED REC NO:   [id number]                             ROOM:       0117  ACCOUNT NO:   [de-identified]                           ADMIT DATE: 2019  PROVIDER:     Cony Montgomery    DATE OF PROCEDURE:  2019    PREOPERATIVE DIAGNOSIS:  Mesenteric ischemia. POSTOPERATIVE DIAGNOSIS:  Mesenteric ischemia. PROCEDURES:  1. Second-look exploratory laparotomy. 2.  Resection of transverse colon with completion of subtotal colectomy. 3.  Ileostomy creation. 4.  Resection of rectal stump. 5.  Abdominal washout. 6.  Omentectomy. 7.  Abdominal wall closure. ANESTHESIA:  General.    SURGEON:  Esha Ramirez MD    ASSISTANTS:  Nelida Garza, PGY-3 and Kenna Schumacher, PGY-1    ESTIMATED BLOOD LOSS:  50 mL. COMPLICATIONS:  None. SPECIMENS:  Transverse colon and rectal stump both sent to pathology. WOUND CLASS:  2. INDICATIONS:  This is a 81-year-old female who is postop from an  exploratory laparotomy with multiple bowel resections and was in  discontinuity 2 days prior for ischemic mesenteric ischemia. The  patient has been resuscitated and is still on a minimal requirement  vasopressors. Her white count went from within normal limits to 26,000  overnight, and the decision was made to take the patient back for a  second-look laparotomy, possible bowel resection, possible ostomy  creation. CONSENT:  The procedure was explained in detail along with risks versus  benefits and alternatives. All questions and concerns were addressed  per the mother over the phone and witnessed by nurses. Formal written  consent was obtained and placed in the patient's chart. DESCRIPTION OF PROCEDURE:  The patient was brought to the OR and placed  on the OR table in a supine position. General anesthesia was continued  by the Anesthesia team.  Bilateral EPC cuffs were placed. Correa  catheter was already placed prior to procedure. A formal time-out  identifying the patient, procedure, allergies, and antibiotics was  performed. The patient was therapeutic on multiple antibiotics for  sepsis and did not require preop antibiotics. The tape of the abdominal wound vac was taken down. Abdomen was prepped and  draped in the usual sterile fashion. The remainder sponges were removed from the  abdomen and the small intestine was identified and ran from the ligament  of Treitz down to the resected portion of small intestine. This was  measured to be 200 cm of small bowel. The transverse colon was  identified which still appeared healthy, but due to only 12 cm  remaining, the decision was made to perform a completion subtotal  colectomy by resecting this portion of bowel. The ligature was used to  take down the bowel just at the mesentery site. This specimen was  passed off from labeled transverse colon. Next, we took our attention to the rectal stump which did appear to have  one area of hemorrhagic appearance. Due to it roughly being 5 cm left  above the peritoneal reflection and concern for a lack of blood flow,  the decision was made to dissect the bowel down to the peritoneal  reflection and resected that portion. The \white line of Toldt and peritoneum on  either side of the sigmoid was taken down with electrocautery towards  the peritoneal reflection. Once the peritoneum was cleared or resected,  the posterior mesenteric portion of the rectosigmoid junction was  transected using the Enseal.  Once down to the reflection, the contour  staple blue load was inserted around the rectum and transected just at  the peritoneal reflection. This specimen was passed off as rectal  stump. 2 L of warm normal saline was then used to wash out the abdomen. Attention was turned to all 4 quadrants.

## 2019-04-09 NOTE — PROGRESS NOTES
[facility] - Progress Note    Today's Date and Time: 4/9/2019, 7:24 AM    Impression :   1. 46 y/o female with complaints of  · Vomiting  · Diarrhea  · Abdominal pain  · Altered mental status  2. Acute kidney injury  · Nephrology onboard and planning for urgent dialysis   3. Shock, presumptive septic plus hypovolemic, requiring Levophed  4. Septicemia with Gram negative bacilli 4-5-19  5. Gastroenteritis  6. Ischemic bowel  7. S/P laparotomy 4-6-19  8. S/p second look with resection of transverse colon, ileostomy creation, resection of rectal stump and abdominal closure. 9. Severe hyponatremia  10. Transaminitis, shock liver, improving  11. Intubation 2/2 AMS  12. COPD  15. Arthritis  14. Chronic NSAID use  15. Funguria  16. Acral mottling of hands and feet  17. Allergy to penicillins: Hives and respiratory distress  18. Allergy to sulfa    Recommendations:   · Meropenem 1 gm IV q 24 hr  · IV fluids, presors    Medical Decision Making/Summary/Discussion:   · 46 y/o female with vomiting, diarrhea, and AMS  · Found to have transaminitis, MARY requiring emergent dialysis, lactic acidosis, shock requiring pressors  · Intubated due to AMS  · CT chest shows atelactasis vs pneumonia  · Currently on vanc, levaquin, and aztreonam   · Patient is critically ill with origin in GI tract . Will need to treat with meropenem despite Hx of penicillin allergy. · Had laparotomy 4-6-19 with findings of ischemic bowel from distal ileum to sigmoid colon.    · S/P ileocecectomy with extended left hemicolectomy and placement of wound vac 4-6-19  · Overall improvement post surgery  · Off pressors and sedation  · Cultures grew haemophilus influenza, beta lactamase negative, sensitivities pending  Infection Control Recommendations   · [place] Precautions    Antimicrobial Stewardship Recommendations     · Simplification of therapy  · Targeted therapy    Coordination of Outpatient Care:   · Estimated Length of IV antimicrobials: TBD  · Patient will need Midline Catheter Insertion: TBD  · Patient will need PICC line Insertion: TBD  · Patient will need: Home IV , [place],  SNF,  LTAC  · Patient will need outpatient wound care: TBD    Chief complaint/reason for consultation:   · Altered mental status and tender abdomen       History of Present Illness:   Tashia Villela is a 47y.o.-year-old  female who was initially admitted on 4/5/2019. Patient seen at the request of Dr. Ever Bliss:    Patient presented to ED via EMS due to altered mental status and vomiting. Patient has history significant for COPD, right knee osteoarthirtis s/p arthoplasty, and chronic NSAID use. Patient lives in [place], but was here to visit her significant other. Arrived Wednesday night, said she didn't feel good. Thought she might have had a bad burger at a fast food restaurant. Went to sleep and slept for almost 24 hours. When she awoke, she said she was vomiting, having diarrhea, and abdominal pain. Significant other and sister are unsure of the nature of the vomit, diarrhea, or abdominal pain. Then she slept a bit more, until her significant other found her unresponsive and that's when he called EMS to bring her to the hospital.     Afebrile on admission, but Tmax overnight was 39.3. Tachycardic on admission, 129. Became hypotensive after admission and levophed and vasopressin were started. Initial labs:     Admission labs significant for Na 125, K 8.0, CO2 9, BUN 62, Creatinine of 4.06, Anion gap of 23, Lactic acid of 3.5, Glucose of 186, Ca 5.2, POC HCO3 15.9, CK 15,342, Troponins high sensitivity 89 and 94, Alk phos 168, , AST 1,122, Bili .37, lipase of 119, Urine tox positive for THC and Cocaine, WBC 23.5, Hgb 10.2, Urine and blood cultures pending, HIV negative, influenza negative, hepatitis panel non-reactive, urine Leukocyte esterase trace, urine nitrite -, urine protein 2+, urine Hgb large, urine RBCs 5 to 10, many urine yeast,  ABG 7.22, pCO2 27, HCO3 15.9. Initial imaging showed:     CXR: No acute cardiopulmonary process    Abdominal X-ray 4/6: No free air    CTA of chest: Negative for PE or dissection. Patchy consolidations of bilateral lower lung lobes representing developing pneumonia vs atelectasis. CT head: pending    Initial course:     Intubated and sedated in ED due to altered mental status. Currently on Vancomycin, Levaquin, and aztreonam for empiric coverage. Richar catheter was placed due to request by nephrology for emergent dialysis. Dialysis attempted several times, but due to high arterial pressures and line clotting, dialysis to be completed later today by Dr. Todd Friedman. NG tube with bloody output. FOBT +. General surgery has been consulted for evaluation. CURRENT EVALUATION : 4/9/2019    Patient seen and examined. Off sedation and pressors, but remains intubated. Remains somnolent but will follow simple commands. Nephrology is planning for dialysis tomorrow. Blood cultures grew haemophilus influenza, beta lactamase negative. Sensitivities pending. LFTs improving. CK improving. WBC improved from 26.2 to 22.2. Patient has duplex ordered of the right upper extremity because of right wrist swelling with ruptured bullae. Patient underwent ileocecectomy with extended left hemicolectomy and placement of wound vac on 4-6-19 because of ischemic bowel. Had additional surgery on 4-8-19 for second look laparotomy with transverse colon resection and ileostomy creation. Overall improvement post surgeries. Afebrile  VS stable. Off pressors, mostly in the 100s. Had episodes of a fib with RVR. Shocked twice to control rhythm and rate on 4-7-19. Pulse now in the 80's and 90's. On ventilator. Comfortable, sedated    Abdomen softer.  VAC in place  No bowel activity   Skin cyanosis much improved      Cultures:  Urine:  · NGTD  Blood:  · 4-5-19 : Gram negative bacilli, haemophilus influenza, beta lactamase negative, sensitivities pending  Sputum :  · NA  Wound:  · NA    MRSA- Neg    Discussed with RN, Dr Kari Rosales, Gen Surgery. ICU Care 35 min    I have personally reviewed the past medical history, past surgical history, medications, social history, and family history, and I have updated the database accordingly.   Past Medical History:     Past Medical History:   Diagnosis Date    Asthma     On inhaler    Back pain, chronic     Hypertension 2015    On Lisinopril    Snores     possible apnea but not tested    Wears glasses        Past Surgical  History:     Past Surgical History:   Procedure Laterality Date    KNEE ARTHROSCOPY Left 1980    KNEE ARTHROSCOPY Left 09/28/2016    with medial menisectomy    LAPAROTOMY EXPLORATORY N/A 4/6/2019    LAPAROTOMY EXPLORATORY, ILEOCECECTOMY, SUBTOTAL COLECTOMY, ABTHEHRA WOUND VAC PLACEMENT performed by Deborah Martínez MD at [address] [place] [address] Right 2013       Medications:      pantoprazole  40 mg Intravenous BID    And    sodium chloride (PF)  10 mL Intravenous BID    sodium chloride flush  10 mL Intravenous 2 times per day    meropenem  1 g Intravenous Q24H    magnesium sulfate  1 g Intravenous Once       Social History:     Social History     Socioeconomic History    Marital status: Single     Spouse name: Not on file    Number of children: 0    Years of education: Not on file    Highest education level: Not on file   Occupational History    Occupation: [address] [place] [address] Financial resource strain: Not on file    Food insecurity:     Worry: Not on file     Inability: Not on file    Transportation needs:     Medical: Not on file     Non-medical: Not on file   Tobacco Use    Smoking status: Light Tobacco Smoker     Packs/day: 0.25     Types: Cigarettes     Start date: 9/19/1980    Smokeless tobacco: Never Used   Substance and Sexual Activity    Alcohol use: Yes     Comment: OCCASSIONAL    Drug use: No    Sexual activity: Yes     Partners: Female   Lifestyle    Physical activity:     Days per week: Not on file     Minutes per session: Not on file    Stress: Not on file   Relationships    Social connections:     Talks on phone: Not on file     Gets together: Not on file     Attends Jew service: Not on file     Active member of club or organization: Not on file     Attends meetings of clubs or organizations: Not on file     Relationship status: Not on file    Intimate partner violence:     Fear of current or ex partner: Not on file     Emotionally abused: Not on file     Physically abused: Not on file     Forced sexual activity: Not on file   Other Topics Concern    Not on file   Social History Narrative    Not on file       Family History:     Family History   Problem Relation Age of Onset    Heart Disease Mother     Stroke Mother     Heart Surgery Mother     Diabetes Maternal Grandmother     Emphysema Maternal Grandfather     Diabetes Maternal Grandfather     Lung Cancer Maternal Uncle         Allergies:   Pcn [penicillins]; Sulfa antibiotics; and Tape [adhesive tape]     Review of Systems:   Unable to provide. Sedated on ventilator.       Physical Examination :     Patient Vitals for the past 8 hrs:   BP Temp Temp src Pulse Resp SpO2 Weight   04/09/19 0645 -- -- -- 85 18 97 % --   04/09/19 0630 -- -- -- 88 18 96 % --   04/09/19 0615 -- -- -- 88 18 96 % --   04/09/19 0600 (!) 97/48 -- -- 90 18 97 % 199 lb 1.2 oz (90.3 kg)   04/09/19 0545 -- -- -- 91 16 96 % --   04/09/19 0530 -- -- -- 88 18 98 % --   04/09/19 0515 -- -- -- 89 18 96 % --   04/09/19 0500 (!) 84/37 -- -- 85 18 96 % --   04/09/19 0445 -- -- -- 84 18 97 % --   04/09/19 0430 -- -- -- 86 18 96 % --   04/09/19 0415 -- -- -- 88 18 96 % --   04/09/19 0400 (!) 99/53 -- -- 85 (!) 1 97 % --   04/09/19 0345 -- -- -- 85 (!) 3 97 % --   04/09/19 0330 -- -- -- 87 (!) 0 97 % --   04/09/19 0315 -- -- -- 88 (!) 0 97 % --   04/09/19 0300 (!) 101/55 -- -- 84 18 97 % --   04/09/19 0259 -- -- -- 84 -- 97 % --   04/09/19 0200 (!) 98/43 -- -- 86 18 97 % --   04/09/19 0100 (!) 104/46 -- -- 85 18 97 % --   04/09/19 0000 (!) 95/43 98.6 °F (37 °C) CORE 82 18 97 % --     General Appearance: Sedated, on ventilator  Head:  Normocephalic, no trauma  Eyes: Pupils equal, round, reactive to light; sclera anicteric; conjunctivae pink. No embolic phenomena. ENT: Oropharynx clear, without erythema, exudate, or thrush. No tenderness of sinuses. Mouth/throat: mucosa pink and moist. No lesions. Dentition in good repair. Neck:Supple, without lymphadenopathy. Thyroid normal, No bruits. Pulmonary/Chest: Clear to auscultation, without wheezes, rales, or rhonchi. No dullness to percussion. Cardiovascular: Regular rate and rhythm without murmurs, rubs, or gallops. Abdomen: Distended. Bowel sounds absent. Soft, mildly tender, surgical bandages in place. Wound vac removed. All four Extremities: Cyanosis of trunk, abdomen, distal extremities  Neurologic: Sedated  Skin: Cool and dry with poor turgor. Signs of peripheral arterial  insufficiency. No ulcerations. No open wounds. Skin purplish, cyanotic. Medical Decision Making -Laboratory:   I have independently reviewed/ordered the following labs:    CBC with Differential:   Recent Labs     04/08/19 0429 04/09/19  0506   WBC 26.2* 22.2*   HGB 8.0* 7.9*   HCT 24.1* 24.1*    See Reflexed IPF Result   LYMPHOPCT 3* 6*   MONOPCT 2 7     BMP:   Recent Labs     04/07/19  0942  04/08/19  0429 04/08/19  0840 04/09/19  0506   NA  --    < > 128*  --   --  130*   K  --    < > 4.9  --   --  3.7   CL  --    < > 87*  --   --  94*   CO2  --    < > 24  --   --  28   BUN  --   --  63*  --   --  36*   CREATININE  --    < > 4.10*   < > 6.50* 2.87*   MG 2.0  --  2.1  --   --   --     < > = values in this interval not displayed.      Hepatic Function Panel:   Recent Labs     04/08/19  0429 04/09/19  0506   PROT 3.6* 3.8*   LABALBU 1.7* 1.6*   BILIDIR 2.16* 1.96*   IBILI 0.61 0.46   BILITOT 2.77* 2.42*   ALKPHOS 172* 145*   ALT 1,200* 783*   AST 1,867* 989*     No results for input(s): RPR in the last 72 hours. No results for input(s): HIV in the last 72 hours. No results for input(s): BC in the last 72 hours. Lab Results   Component Value Date    MUCUS NOT REPORTED 04/06/2019    RBC 2.61 04/09/2019    TRICHOMONAS NOT REPORTED 04/06/2019    WBC 22.2 04/09/2019    YEAST NOT REPORTED 04/06/2019    TURBIDITY TURBID 04/06/2019     Lab Results   Component Value Date    CREATININE 2.87 04/09/2019    GLUCOSE 68 04/09/2019       Medical Decision Making-Imaging:     Abdominal xray:    Gas in mildly distended loops of large and small bowel likely reflecting an   ileus.       NG tube tip in the gastric fundus with the proximal side-port in the distal   thoracic esophagus, repositioning recommended.       Airspace disease left lung base. CT scan chest:    Impression   Satisfactory position endotracheal tube.       Nasogastric tube needs advanced 10 cm.       Patchy perihilar opacities and bibasilar airspace disease.  Follow-up may be   beneficial following medical treatment course to document complete resolution.           EXAMINATION:   CTA OF THE ABDOMEN AND PELVIS WITH CONTRAST       4/5/2019 9:23 pm:       TECHNIQUE:   CTA of the abdomen and pelvis was performed with the administration of   intravenous contrast. Multiplanar reformatted images are provided for review. MIP images are provided for review.  Dose modulation, iterative   reconstruction, and/or weight based adjustment of the mA/kV was utilized to   reduce the radiation dose to as low as reasonably achievable.       COMPARISON:   None.       HISTORY:   ORDERING SYSTEM PROVIDED HISTORY: suspected dissection of abdominal aorta       FINDINGS:       CTA ABDOMEN:       Bibasilar airspace disease.  Liver, spleen, pancreas, gallbladder normal. Bilateral adrenal masses measuring 11 mm on the left and 18 x 28 mm on the   left.  Bilateral ill-defined hypodensities throughout the kidneys.  The small   bowel is somewhat distended with multiple air-fluid levels.  Multiple   air-fluid levels are also noted throughout the colon.  The stomach appears   normal.  Retroaortic left renal vein.       Bones normal.       Aorta appears normal without dissection.  The celiac artery and SMA appear   normal.  The renal arteries appear normal.           CTA PELVIS:       Bladder decompressed.  Uterus normal.  Catheter right groin terminates in the   common iliac vein.           Impression   Ileus.  No evidence of aortic dissection.  The bilateral adrenal masses, left   greater than right.  Recommend follow-up adrenal MRI non emergently.           Medical Decision Making-Other: Thank you for allowing us to participate in the care of this patient. Please call with questions. Latisha Chavez     ATTESTATION:    I have discussed the case, including pertinent history and exam findings with the residents. I have seen and examined the patient and the key elements of the encounter have been performed by me. I have reviewed the laboratory data, other diagnostic studies and discussed them with the residents. I have updated the medical record where necessary. I agree with the assessment, plan and orders as documented by the resident.     Abi Leung MD.      Pager: [phone] - Office: [phone]

## 2019-04-09 NOTE — CARE COORDINATION
Pt remains intubated. New ileostomy yesterday. New hemo-unsure of long term dialysis needs at this time. Notified Margaret SW of potential for OP hemo needs. Continue to follow for transition needs. Anticipate SNF/rehab.

## 2019-04-09 NOTE — ANESTHESIA POSTPROCEDURE EVALUATION
Department of Anesthesiology  Postprocedure Note    Patient: Cesia Puente  MRN: [id number]  YOB: 1965  Date of evaluation: 4/9/2019  Time:  10:28 AM     Procedure Summary     Date:  04/08/19 Room / Location:  [facility] OR  / [facility] OR    Anesthesia Start:  1137 Anesthesia Stop:  4168    Procedure:  2ND LOOK EXPLORATORY LAPAROTOMY, RESECTION TRANSVERSE COLON, ILEOSTOMY CREATION, RESECTION RECTAL STUMP, ABDOMINAL WASHOUT, OMENTECTOMY, ABDOMINAL WALL CLOSURE (N/A ) Diagnosis:  (MESENTERIC ISCHEMIA)    Surgeon:  Basia Hussein MD Responsible Provider:  Jorge Martinez MD    Anesthesia Type:  general ASA Status:  4 - Emergent          Anesthesia Type: No value filed. Dillon Phase I:      Dillon Phase II:      Last vitals: Reviewed and per EMR flowsheets.        Anesthesia Post Evaluation    Patient location during evaluation: ICU  Patient participation: complete - patient cannot participate  Level of consciousness: sedated and ventilated  Airway patency: patent  Complications: no  Cardiovascular status: blood pressure returned to baseline and vasoactive/inotropes  Respiratory status: ventilator  Hydration status: stable

## 2019-04-09 NOTE — PLAN OF CARE
Patient being repositioned every 2 hours and as needed, skin kept clean and dry. No new areas of breakdown noted. Will continue to monitor.

## 2019-04-09 NOTE — PROGRESS NOTES
NEPHROLOGY PROGRESS NOTE      SUBJECTIVE   Hospitalized with the delirium associated with abdominal pain nausea vomiting. Initial assessment showed hypotensive lady with the imaging studies revealing evidence of ideas. Further investigations demonstrated neutrophilic leukocytosis along with acute hepatitis/acute kidney injury and elevated lactic acid. Underwent surgical resection for ischemic gut. Hospital course further complicated by persistent atrial fibrillation needing DC cardioversion. She was started on dialysis in view of life-threatening hyperkalemia and acute kidney injury. Was taken to the OR yesterday for second look expiratory laparotomy. Underwent resection of the transverse colonic with ileostomy creation. Currently off pressors. Having hypoglycemic episodes. Also had dialysis yesterday with minimal ultrafiltration in view she be hypotensive. Weight has gone up significantly. Continues to been mechanical ventilation. OBJECTIVE     Vitals:    04/09/19 0700 04/09/19 0730 04/09/19 0800 04/09/19 0900   BP: (!) 91/46  (!) 94/48    Pulse: 84 85 85 84   Resp: 18 18 18 (!) 7   Temp:   98.4 °F (36.9 °C)    TempSrc:   Core    SpO2: 96% 98% 97% 99%   Weight:       Height:         24HR INTAKE/OUTPUT:      Intake/Output Summary (Last 24 hours) at 4/9/2019 0904  Last data filed at 4/9/2019 0600  Gross per 24 hour   Intake 3382.53 ml   Output 1940 ml   Net 1442.53 ml       General appearance: Mechanical ventilation  HEENT: Mechanical ventilation  Respiratory::vesicular breath sounds,no wheeze/crackles  Cardiovascular:S1 S2 normal,no gallop or organic murmur.   Extremities: No Cyanosis or Clubbing, present Lower extremity edema  Neurological: Mechanical ventilation    MEDICATIONS     Scheduled Meds:    pantoprazole  40 mg Intravenous BID    And    sodium chloride (PF)  10 mL Intravenous BID    sodium chloride flush  10 mL Intravenous 2 times per day    meropenem  1 g Intravenous Q24H    magnesium sulfate  1 g Intravenous Once     Continuous Infusions:    dextrose 75 mL/hr at 04/09/19 0850     PRN Meds:  dextrose, sodium chloride flush, magnesium hydroxide, ondansetron, heparin (porcine), heparin (porcine), fentanNYL **OR** fentanNYL  Home Meds:                Medications Prior to Admission: ibuprofen (ADVIL;MOTRIN) 800 MG tablet, Take 800 mg by mouth every 6 hours as needed for Pain  ALPRAZolam (XANAX) 0.25 MG tablet, Take 0.25 mg by mouth nightly as needed for Sleep or Anxiety. venlafaxine (EFFEXOR) 75 MG tablet, Take 100 mg by mouth 2 times daily   [DISCONTINUED] celecoxib (CELEBREX) 200 MG capsule, Take 1 capsule by mouth 2 times daily  [DISCONTINUED] traMADol (ULTRAM) 50 MG tablet, 1 tablet PO BID PRN pain  [DISCONTINUED] oxyCODONE-acetaminophen (PERCOCET) 5-325 MG per tablet, Take 1 tablet by mouth 2 times daily as needed for Pain . [DISCONTINUED] diclofenac (VOLTAREN) 75 MG EC tablet, Take 1 tablet by mouth 2 times daily  [DISCONTINUED] oxyCODONE-acetaminophen (PERCOCET) 5-325 MG per tablet, Take 1 tablet by mouth every 6 hours as needed for Pain . [DISCONTINUED] aspirin 325 MG EC tablet, Take 1 tablet by mouth 2 times daily for 14 days  [DISCONTINUED] Misc. Devices (ROLLER WALKER) MISC, 1 each by Does not apply route daily  QVAR 40 MCG/ACT inhaler, Inhale 2 puffs into the lungs 2 times daily  [DISCONTINUED] Calcium Citrate-Vitamin D (CALCIUM + D PO), Take 1 tablet by mouth daily  [DISCONTINUED] Misc.  Devices MISC, As directed by physician daily  [DISCONTINUED] diclofenac (VOLTAREN) 50 MG EC tablet, Take 1 tablet by mouth 2 times daily  [DISCONTINUED] docusate sodium (COLACE) 100 MG capsule, Take 1 capsule by mouth 2 times daily as needed for Constipation  gabapentin (NEURONTIN) 100 MG capsule, Take 100 mg by mouth 3 times daily as needed   lisinopril-hydrochlorothiazide (PRINZIDE;ZESTORETIC) 10-12.5 MG per tablet, Take 1 tablet by mouth daily  albuterol (PROVENTIL) (2.5 MG/3ML) 0.083% nebulizer solution, Take 2.5 mg by nebulization every 6 hours as needed for Wheezing  albuterol sulfate  (90 BASE) MCG/ACT inhaler, Inhale 2 puffs into the lungs every 6 hours as needed for Wheezing    INVESTIGATIONS     Last 3 CMP:    Recent Labs     04/07/19 0457 04/07/19 1958 04/08/19 0429 04/08/19  0430 04/08/19  0840 04/09/19  0506   *   < > 128*  --  128*  --   --  130*   K 5.4*   < > 5.2  --  4.9  --   --  3.7   CL 91*   < > 90*  --  87*  --   --  94*   CO2 17*   < > 23  --  24  --   --  28   BUN 62*  --   --   --  63*  --   --  36*   CREATININE 3.90*   < >  --    < > 4.10* 5.90* 6.50* 2.87*   CALCIUM 7.5*  --   --   --  6.6*  --   --  6.3*   PROT 4.0*  --   --   --  3.6*  --   --  3.8*   LABALBU 2.3*  --   --   --  1.7*  --   --  1.6*   BILITOT 1.92*  --   --   --  2.77*  --   --  2.42*   ALKPHOS 194*  --   --   --  172*  --   --  145*   AST 3,542*  --   --   --  1,867*  --   --  989*   ALT 1,528*  --   --   --  1,200*  --   --  783*    < > = values in this interval not displayed. Last 3 CBC:  Recent Labs     04/07/19 0457 04/08/19 0429 04/09/19  0506   WBC 16.8* 26.2* 22.2*   RBC 2.97* 2.63* 2.61*   HGB 9.1* 8.0* 7.9*   HCT 28.1* 24.1* 24.1*   MCV 94.6 91.6 92.3   MCH 30.6 30.4 30.3   MCHC 32.4 33.2 32.8   RDW 14.6* 14.4 14.3    244 See Reflexed IPF Result   MPV 11.8 NOT REPORTED NOT REPORTED       ASSESSMENT     1. Acute kidney injury nonoliguric secondary to ischemic (Hypotension/arrthymia/ischemic gut ) and nephrotoxic (contrast) ATN aggravated further by concomitant NSAID and diuretic use along with cocaine - hemodialysis dependent   Creat peaked at 4 with hyperkalemia 8  Baseline creat in Jan 2017 was normal  Ultrasound shows right kidney 10.4 cm and left 10.2 cm  2. Circulatory shock - sepsis and volume depletion - on pressors  3.   Ischemic gut status post Ileocecectomy, extended left hemicolectomy, placement of  ABThera wound VAC on 4/6 4/8 Second Look exploratory upper, with resection of transverse colon AND ileostomy  4. Sepsis - blood culture positive for H.infleuenza  5. VDRF  6. Bilateral adrenal masses 11 mm left and 18 mm on the right  7. Anemia  8. Persistent Afib S/o cardioversion      PLAN     1.  [id number] saline at 50 an hour for hypoglycemia  2. IV Lasix  3. No HD today  4.   Avoid nephrotoxins    Please do not hesitate to call with questions    This note is created with the assistance of a speech-recognition program. While intending to generate a document that actually reflects the content of the visit, no guarantees can be provided that every mistake has been identified and corrected by editing    Hunter Hudson MD, MRCP [facility], [address]   4/9/2019 9:04 AM  [facility]

## 2019-04-09 NOTE — PROGRESS NOTES
POST OP NOTE    SUBJECTIVE  Pt s/p second look laparotomy, ileostomy creation, abdominal washout, closure of abdominal fascia. OBJECTIVE  VITALS:  /65   Pulse 94   Temp 97.7 °F (36.5 °C)   Resp 18   Ht 5' 1.02\" (1.55 m)   Wt 196 lb 10.4 oz (89.2 kg)   SpO2 99%   BMI 37.13 kg/m²         GENERAL:  Sedated and intubated  CARDIOVASCULAR:  regular rate and rhythm   LUNGS:  CTA Bilaterally  ABDOMEN:   Abdomen soft, non-tender, non-distended  INCISION: Incision clean/dry/intact    ASSESSMENT  1. POD# 0 s/p second look laparotomy, ileostomy creation, abdominal washout, closure of abdominal fascia    PLAN  1. Pain management- fentanyl and propofol  2. DVT proph-per primary  3. GI proph-per primary  4. Monitor ostomy outpt.       [facility]  Trauma/Surgery Service  4/8/2019 at 8:46 PM

## 2019-04-09 NOTE — PROGRESS NOTES
Nutrition Assessment (Enteral Nutrition)    Type and Reason for Visit: Reassess, Consult(TPN )    Nutrition Recommendations: Start Trickle Tube Feeding. Monitor TF tolerance- If/when able to advance rate, suggest goal of 55 mL/hr (1584 kcal and 99 g pro/day). Nutrition Assessment: Pt nutritionally compromised as evidenced by recent GI surgery, intubation, dialysis. Consulted for TPN- to hold off on initiation of PN for now per surgery recs. Plan to start semi-elemental tube feeding at 10 mL/hr (trickle). Will monitor TF tolerance and provide recommendations for goal rate. Malnutrition Assessment:  · Malnutrition Status: Insufficient data  · Context: Acute illness or injury    Nutrition Risk Level: High    Nutrition Needs:  · Estimated Daily Total Kcal: 9791-3251 kcal/day  · Estimated Daily Protein (g): 70-95 g pro/day     Nutrition Diagnosis:   · Problem: Inadequate oral intake  · Etiology: related to Impaired respiratory function-inability to consume food, Alteration in GI function     Signs and symptoms:  as evidenced by NPO status due to medical condition,need for nutrition support     Objective Information:  · Nutrition-Focused Physical Findings: no bowel sounds  · Wound Type: Surgical Wound  · Current Nutrition Therapies:  · Oral Diet Orders: NPO   · Tube Feeding (TF) Orders:   · Formula:  Semi-elemental (Vital 1.2) to start today  · Rate (ml/hr):  10 mL/hr (goal)  · Current TF & Flush Orders Provides:  10 mL/hr will provide 288 kcal and 18 g pro/day. · Goal TF & Flush Orders Provides:  Recommendation for 55 mL would provide 1584 kcal and 99 g pro/day.    · Additional Calories: D5% 0.9%NaCl at 50 mL/hr =204 kcal/day   · Anthropometric Measures:  · Ht: 5' 1.02\" (155 cm)   · Current Body Wt: 199 lb 1.2 oz (90.3 kg)  · Admission Body Wt: 173 lb 15.1 oz (78.9 kg)  · Ideal Body Wt: 105 lb 13.1 oz (48 kg), % Ideal Body 165% (adm/ideal)  · BMI Classification: BMI 30.0 - 34.9 Obese Class

## 2019-04-10 ENCOUNTER — APPOINTMENT (OUTPATIENT)
Dept: GENERAL RADIOLOGY | Age: 54
DRG: 710 | End: 2019-04-10
Payer: MEDICAID

## 2019-04-10 LAB
ABSOLUTE EOS #: 0 K/UL (ref 0–0.4)
ABSOLUTE IMMATURE GRANULOCYTE: 0.93 K/UL (ref 0–0.3)
ABSOLUTE LYMPH #: 2.22 K/UL (ref 1–4.8)
ABSOLUTE MONO #: 1.11 K/UL (ref 0.1–0.8)
ALBUMIN SERPL-MCNC: 1.6 G/DL (ref 3.5–5.2)
ALBUMIN/GLOBULIN RATIO: 0.6 (ref 1–2.5)
ALLEN TEST: POSITIVE
ALP BLD-CCNC: 121 U/L (ref 35–104)
ALT SERPL-CCNC: 518 U/L (ref 5–33)
ANION GAP SERPL CALCULATED.3IONS-SCNC: 10 MMOL/L (ref 9–17)
AST SERPL-CCNC: 526 U/L
BASOPHILS # BLD: 0 % (ref 0–2)
BASOPHILS ABSOLUTE: 0 K/UL (ref 0–0.2)
BILIRUB SERPL-MCNC: 1.55 MG/DL (ref 0.3–1.2)
BILIRUBIN DIRECT: 1.18 MG/DL
BILIRUBIN, INDIRECT: 0.37 MG/DL (ref 0–1)
BUN BLDV-MCNC: 46 MG/DL (ref 6–20)
BUN/CREAT BLD: ABNORMAL (ref 9–20)
CALCIUM SERPL-MCNC: 6.5 MG/DL (ref 8.6–10.4)
CHLORIDE BLD-SCNC: 94 MMOL/L (ref 98–107)
CO2: 27 MMOL/L (ref 20–31)
CREAT SERPL-MCNC: 4.08 MG/DL (ref 0.5–0.9)
DIFFERENTIAL TYPE: ABNORMAL
EOSINOPHILS RELATIVE PERCENT: 0 % (ref 1–4)
FIO2: ABNORMAL
GFR AFRICAN AMERICAN: 14 ML/MIN
GFR NON-AFRICAN AMERICAN: 11 ML/MIN
GFR SERPL CREATININE-BSD FRML MDRD: ABNORMAL ML/MIN/{1.73_M2}
GFR SERPL CREATININE-BSD FRML MDRD: ABNORMAL ML/MIN/{1.73_M2}
GLOBULIN: ABNORMAL G/DL (ref 1.5–3.8)
GLUCOSE BLD-MCNC: 108 MG/DL (ref 65–105)
GLUCOSE BLD-MCNC: 215 MG/DL (ref 74–100)
GLUCOSE BLD-MCNC: 64 MG/DL (ref 65–105)
GLUCOSE BLD-MCNC: 64 MG/DL (ref 65–105)
GLUCOSE BLD-MCNC: 66 MG/DL (ref 65–105)
GLUCOSE BLD-MCNC: 72 MG/DL (ref 65–105)
GLUCOSE BLD-MCNC: 74 MG/DL (ref 65–105)
GLUCOSE BLD-MCNC: 75 MG/DL (ref 65–105)
GLUCOSE BLD-MCNC: 77 MG/DL (ref 65–105)
GLUCOSE BLD-MCNC: 95 MG/DL (ref 70–99)
GLUCOSE BLD-MCNC: 99 MG/DL (ref 65–105)
HCT VFR BLD CALC: 25.3 % (ref 36.3–47.1)
HEMOGLOBIN: 8.2 G/DL (ref 11.9–15.1)
IMMATURE GRANULOCYTES: 5 %
LYMPHOCYTES # BLD: 12 % (ref 24–44)
MCH RBC QN AUTO: 30.7 PG (ref 25.2–33.5)
MCHC RBC AUTO-ENTMCNC: 32.4 G/DL (ref 28.4–34.8)
MCV RBC AUTO: 94.8 FL (ref 82.6–102.9)
MODE: ABNORMAL
MONOCYTES # BLD: 6 % (ref 1–7)
MORPHOLOGY: ABNORMAL
MORPHOLOGY: ABNORMAL
NEGATIVE BASE EXCESS, ART: ABNORMAL (ref 0–2)
NRBC AUTOMATED: 0.9 PER 100 WBC
O2 DEVICE/FLOW/%: ABNORMAL
P E INTERPRETATION, U: NORMAL
PATHOLOGIST: NORMAL
PATIENT TEMP: ABNORMAL
PDW BLD-RTO: 15 % (ref 11.8–14.4)
PLATELET # BLD: ABNORMAL K/UL (ref 138–453)
PLATELET ESTIMATE: ABNORMAL
PLATELET, FLUORESCENCE: 80 K/UL (ref 138–453)
PLATELET, IMMATURE FRACTION: 10.1 % (ref 1.1–10.3)
PMV BLD AUTO: ABNORMAL FL (ref 8.1–13.5)
POC HCO3: 30.3 MMOL/L (ref 21–28)
POC O2 SATURATION: 96 % (ref 94–98)
POC PCO2 TEMP: ABNORMAL MM HG
POC PCO2: 44 MM HG (ref 35–48)
POC PH TEMP: ABNORMAL
POC PH: 7.45 (ref 7.35–7.45)
POC PO2 TEMP: ABNORMAL MM HG
POC PO2: 76.6 MM HG (ref 83–108)
POSITIVE BASE EXCESS, ART: 6 (ref 0–3)
POTASSIUM SERPL-SCNC: 3.6 MMOL/L (ref 3.7–5.3)
RBC # BLD: 2.67 M/UL (ref 3.95–5.11)
RBC # BLD: ABNORMAL 10*6/UL
SAMPLE SITE: ABNORMAL
SEG NEUTROPHILS: 77 % (ref 36–66)
SEGMENTED NEUTROPHILS ABSOLUTE COUNT: 14.24 K/UL (ref 1.8–7.7)
SODIUM BLD-SCNC: 131 MMOL/L (ref 135–144)
SPECIMEN TYPE: NORMAL
SURGICAL PATHOLOGY REPORT: NORMAL
TCO2 (CALC), ART: 32 MMOL/L (ref 22–29)
TOTAL PROTEIN: 4.2 G/DL (ref 6.4–8.3)
URINE IFX INTERP: NORMAL
URINE IFX SPECIMEN: NORMAL
URINE TOTAL PROTEIN: 197 MG/DL
URINE TOTAL PROTEIN: 197 MG/DL
VOLUME: NORMAL ML
WBC # BLD: 18.5 K/UL (ref 3.5–11.3)
WBC # BLD: ABNORMAL 10*3/UL

## 2019-04-10 PROCEDURE — 6360000002 HC RX W HCPCS: Performed by: STUDENT IN AN ORGANIZED HEALTH CARE EDUCATION/TRAINING PROGRAM

## 2019-04-10 PROCEDURE — C9113 INJ PANTOPRAZOLE SODIUM, VIA: HCPCS | Performed by: STUDENT IN AN ORGANIZED HEALTH CARE EDUCATION/TRAINING PROGRAM

## 2019-04-10 PROCEDURE — 2580000003 HC RX 258: Performed by: INTERNAL MEDICINE

## 2019-04-10 PROCEDURE — 99291 CRITICAL CARE FIRST HOUR: CPT | Performed by: INTERNAL MEDICINE

## 2019-04-10 PROCEDURE — 2700000000 HC OXYGEN THERAPY PER DAY

## 2019-04-10 PROCEDURE — 85025 COMPLETE CBC W/AUTO DIFF WBC: CPT

## 2019-04-10 PROCEDURE — 2580000003 HC RX 258: Performed by: STUDENT IN AN ORGANIZED HEALTH CARE EDUCATION/TRAINING PROGRAM

## 2019-04-10 PROCEDURE — 82803 BLOOD GASES ANY COMBINATION: CPT

## 2019-04-10 PROCEDURE — 2000000000 HC ICU R&B

## 2019-04-10 PROCEDURE — 86022 PLATELET ANTIBODIES: CPT

## 2019-04-10 PROCEDURE — 71045 X-RAY EXAM CHEST 1 VIEW: CPT

## 2019-04-10 PROCEDURE — 85055 RETICULATED PLATELET ASSAY: CPT

## 2019-04-10 PROCEDURE — 90937 HEMODIALYSIS REPEATED EVAL: CPT

## 2019-04-10 PROCEDURE — 94762 N-INVAS EAR/PLS OXIMTRY CONT: CPT

## 2019-04-10 PROCEDURE — 80048 BASIC METABOLIC PNL TOTAL CA: CPT

## 2019-04-10 PROCEDURE — 99233 SBSQ HOSP IP/OBS HIGH 50: CPT | Performed by: INTERNAL MEDICINE

## 2019-04-10 PROCEDURE — 6370000000 HC RX 637 (ALT 250 FOR IP): Performed by: STUDENT IN AN ORGANIZED HEALTH CARE EDUCATION/TRAINING PROGRAM

## 2019-04-10 PROCEDURE — 94660 CPAP INITIATION&MGMT: CPT

## 2019-04-10 PROCEDURE — 6360000002 HC RX W HCPCS: Performed by: INTERNAL MEDICINE

## 2019-04-10 PROCEDURE — 2500000003 HC RX 250 WO HCPCS: Performed by: STUDENT IN AN ORGANIZED HEALTH CARE EDUCATION/TRAINING PROGRAM

## 2019-04-10 PROCEDURE — 94003 VENT MGMT INPAT SUBQ DAY: CPT

## 2019-04-10 PROCEDURE — 80076 HEPATIC FUNCTION PANEL: CPT

## 2019-04-10 PROCEDURE — 94770 HC ETCO2 MONITOR DAILY: CPT

## 2019-04-10 PROCEDURE — 2500000003 HC RX 250 WO HCPCS: Performed by: INTERNAL MEDICINE

## 2019-04-10 PROCEDURE — 36600 WITHDRAWAL OF ARTERIAL BLOOD: CPT

## 2019-04-10 PROCEDURE — 36415 COLL VENOUS BLD VENIPUNCTURE: CPT

## 2019-04-10 RX ORDER — FUROSEMIDE 10 MG/ML
80 INJECTION INTRAMUSCULAR; INTRAVENOUS 2 TIMES DAILY
Status: COMPLETED | OUTPATIENT
Start: 2019-04-10 | End: 2019-04-10

## 2019-04-10 RX ORDER — MIDODRINE HYDROCHLORIDE 5 MG/1
5 TABLET ORAL
Status: DISCONTINUED | OUTPATIENT
Start: 2019-04-10 | End: 2019-04-16

## 2019-04-10 RX ORDER — MIDODRINE HYDROCHLORIDE 5 MG/1
10 TABLET ORAL
Status: DISCONTINUED | OUTPATIENT
Start: 2019-04-10 | End: 2019-04-10

## 2019-04-10 RX ADMIN — FUROSEMIDE 80 MG: 10 INJECTION, SOLUTION INTRAMUSCULAR; INTRAVENOUS at 12:14

## 2019-04-10 RX ADMIN — Medication 1.4 ML: at 18:47

## 2019-04-10 RX ADMIN — DEXTROSE AND SODIUM CHLORIDE: 5; 900 INJECTION, SOLUTION INTRAVENOUS at 07:00

## 2019-04-10 RX ADMIN — Medication 1.5 ML: at 19:01

## 2019-04-10 RX ADMIN — MEROPENEM 1 G: 1 INJECTION, POWDER, FOR SOLUTION INTRAVENOUS at 18:35

## 2019-04-10 RX ADMIN — FENTANYL CITRATE 100 MCG: 50 INJECTION, SOLUTION INTRAMUSCULAR; INTRAVENOUS at 13:40

## 2019-04-10 RX ADMIN — PANTOPRAZOLE SODIUM 40 MG: 40 INJECTION, POWDER, FOR SOLUTION INTRAVENOUS at 09:58

## 2019-04-10 RX ADMIN — Medication 10 ML: at 09:58

## 2019-04-10 RX ADMIN — FENTANYL CITRATE 50 MCG: 50 INJECTION, SOLUTION INTRAMUSCULAR; INTRAVENOUS at 08:10

## 2019-04-10 RX ADMIN — FENTANYL CITRATE: 50 INJECTION, SOLUTION INTRAMUSCULAR; INTRAVENOUS at 16:03

## 2019-04-10 RX ADMIN — MIDODRINE HYDROCHLORIDE 5 MG: 5 TABLET ORAL at 12:14

## 2019-04-10 RX ADMIN — FENTANYL CITRATE 50 MCG: 50 INJECTION, SOLUTION INTRAMUSCULAR; INTRAVENOUS at 12:27

## 2019-04-10 RX ADMIN — DEXTROSE MONOHYDRATE 25 G: 500 INJECTION PARENTERAL at 01:35

## 2019-04-10 RX ADMIN — FENTANYL CITRATE 100 MCG: 50 INJECTION, SOLUTION INTRAMUSCULAR; INTRAVENOUS at 18:34

## 2019-04-10 RX ADMIN — MIDODRINE HYDROCHLORIDE 5 MG: 5 TABLET ORAL at 18:35

## 2019-04-10 RX ADMIN — DEXTROSE MONOHYDRATE 25 G: 500 INJECTION PARENTERAL at 04:28

## 2019-04-10 RX ADMIN — FUROSEMIDE 80 MG: 10 INJECTION, SOLUTION INTRAMUSCULAR; INTRAVENOUS at 18:34

## 2019-04-10 ASSESSMENT — PULMONARY FUNCTION TESTS
PIF_VALUE: 15
PIF_VALUE: 15
PIF_VALUE: 22
PIF_VALUE: 12
PIF_VALUE: 11
PIF_VALUE: 12
PIF_VALUE: 23
PIF_VALUE: 11
PIF_VALUE: 19
PIF_VALUE: 15
PIF_VALUE: 15

## 2019-04-10 ASSESSMENT — PAIN SCALES - GENERAL: PAINLEVEL_OUTOF10: 3

## 2019-04-10 NOTE — PROGRESS NOTES
INTENSIVE CARE UNIT  Resident Physician Progress Note    Patient - Dee Dee Eagle  Date of Admission -  4/5/2019  8:39 PM  Date of Evaluation -  4/10/2019  Room and Bed Number -  0117/0117-01   Hospital Day - 5      SUBJECTIVE:     OVERNIGHT EVENTS:   Seen and examined at bedside. Charts reviewed. Started on trickle tube feeds @ 10ml/hr per general surgery. Continues on IV meropenem per infectious disease. Did not get hemodialysis yesterday. Hemodynamically stable. VSS. Overnight had some hypotension. Blood pressure 105/60, HR 81 this am, NSR. RR 18, , PIP 22, PEEP 5. Continues on D5 and normal saline. At 50 mL an hour. LFTs improving, alk phos 121, 85 and 8 from 73 a day,  and 989 yesterday, bilirubin 1.5. The leukocytosis is improving, 18.5 to same. Hemoglobin stable at 8.2. Platelet count stable at 80. Weaned off of Cardizem infusion. Cardio Lopressor 12.5 twice a day per cardiology yesterday. Also received IV Lasix 40 per nephrology. Urine output over 24 hours 720 mL. Will D/C the Lopresor given the hypotension and start on Midodrine 5 TID, can go up if still hypotensive. CXR from this am  Impression   Unchanged findings of pulmonary edema.  And pleural effusions.       Tubes and catheters are in unchanged positioning.  Similar linear density in   the region of the distal jugular venous catheter.          TODAY:     AWAKE & FOLLOWING COMMANDS:  [] No   [x] Yes    SECRETIONS Amount:  [] Small [x] Moderate  [] Large  [] None  Color:     [] White [x] Colored  [] Bloody    SEDATION:    [] Propofol gtt  [] Versed gtt  [] Ativan gtt   [x] Fentanyl    PARALYZED:  [x] No    [] Yes    VASOPRESSORS:  [x] No    [] Yes  [] Levophed [] Dopamine [] Vasopressin  [] Dobutamine [] Phenylephrine [] Epinephrine      OBJECTIVE:     VITAL SIGNS:  BP (!) 105/49   Pulse 82   Temp 97.7 °F (36.5 °C) (Core)   Resp 11   Ht 5' 1.02\" (1.55 m)   Wt 201 lb 11.5 oz (91.5 kg)   SpO2 98%   BMI 38.08 kg/m²   Tmax over 24 hours:  Temp (24hrs), Av.4 °F (36.9 °C), Min:97.7 °F (36.5 °C), Max:98.8 °F (37.1 °C)      Patient Vitals for the past 8 hrs:   BP Temp Temp src Pulse Resp SpO2 Weight   04/10/19 1200 (!) 105/49 97.7 °F (36.5 °C) CORE 82 11 98 % --   04/10/19 1114 -- -- -- 85 13 100 % --   04/10/19 1100 (!) 111/54 -- -- 81 13 98 % --   04/10/19 1039 -- -- -- 77 10 99 % --   04/10/19 1000 (!) 93/50 -- -- 76 9 98 % --   04/10/19 0900 (!) 100/45 -- -- 77 -- 97 % --   04/10/19 0800 105/60 97.9 °F (36.6 °C) CORE 81 16 98 % --   04/10/19 0751 -- -- -- 83 18 99 % --   04/10/19 0730 (!) 96/54 -- -- 80 22 97 % --   04/10/19 0700 (!) 96/51 -- -- 80 18 96 % --   04/10/19 0630 (!) 94/52 -- -- 82 18 96 % --   04/10/19 0600 (!) 116/55 -- -- 87 15 95 % 201 lb 11.5 oz (91.5 kg)   04/10/19 0530 (!) 96/45 -- -- 79 18 98 % --         Intake/Output Summary (Last 24 hours) at 4/10/2019 1311  Last data filed at 4/10/2019 1200  Gross per 24 hour   Intake 1268.8 ml   Output 665 ml   Net 603.8 ml     Date 04/10/19 0000 - 04/10/19 2359   Shift 5579-6210 5669-8265 9948-5119 24 Hour Total   INTAKE   I.V.(mL/kg) 426.5(4.7)   426.5(4.7)   NG/GT(mL/kg) 85(0.9)   85(0.9)   Shift Total(mL/kg) 511.5(5.6)   511.5(5.6)   OUTPUT   Urine(mL/kg/hr) 165(0.2) 80  245   Shift Total(mL/kg) 165(1.8) 80(0.9)  245(2.7)   Weight (kg) 91.5 91.5 91.5 91.5     Wt Readings from Last 3 Encounters:   04/10/19 201 lb 11.5 oz (91.5 kg)   18 155 lb (70.3 kg)   05/15/17 145 lb 1 oz (65.8 kg)     Body mass index is 38.08 kg/m².         PHYSICAL EXAM:  GEN:  no apparent distress, moderate distress, drowsy follows commands  HEENT:  Normocepalic, without obvious abnormality, Atraumatic, eyelids/periorbital:  normal and conjunctiva: slight sclera icterus                                    trachea midline/symmetric   LUNGS:  Coarse no retractions/accessory muscles usage  CV:    S1/s2,   ABDOMEN:   soft, moderate distended, ostomy in place no output  : Deferred  MSK:    there is no redness, warmth, or swelling of the joints  SKIN:   Warm and dry  EXTREMITIES:      Edema 2+ and on Right Arm 3+, distal pulses intact       MEDICATIONS:  Scheduled Meds:   meropenem  1 g Intravenous Q24H    furosemide  80 mg Intravenous BID    midodrine  5 mg Oral TID WC    pantoprazole  40 mg Intravenous BID    And    sodium chloride (PF)  10 mL Intravenous BID    sodium chloride flush  10 mL Intravenous 2 times per day     Continuous Infusions:   dextrose 5 % and 0.9 % NaCl 50 mL/hr at 04/10/19 0700     PRN Meds:     anticoagulant sodium citrate 3 mL PRN   dextrose 25 g PRN   sodium chloride flush 10 mL PRN   magnesium hydroxide 30 mL Daily PRN   ondansetron 4 mg Q6H PRN   fentanNYL 50 mcg Q1H PRN   Or     fentanNYL 100 mcg Q1H PRN       SUPPORT DEVICES: [x] Ventilator [] BIPAP  [] Nasal Cannula [] Room Air    VENT SETTINGS (Comprehensive) (if applicable):  Vent Information  $Ventilation: $Subsequent Day  Ventilator Started: Yes  Skin Assessment: Clean, dry, & intact  Equipment Changed: HME  Vent Type: Servo i  Vent Mode: CPAP  Vt Ordered: 470 mL  Rate Set: 18 bmp  Pressure Support: 10 cmH20  FiO2 : 35 %  Sensitivity: 5  PEEP/CPAP: 5  I Time/ I Time %: 0.9 s  Cuff Pressure (cm H2O): 22 cm H2O  Humidification Source: HME  Additional Respiratory  Assessments  Pulse: 82  Resp: 11  SpO2: 98 %  End Tidal CO2: 34 (%)  Position: Semi-Salazar's  Humidification Source: HME  Oral Care Completed?: Yes  Oral Care: Mouthwash, Mouth swabbed, Mouth moisturizer, Mouth suctioned  Subglottic Suction Done?: Yes  Cuff Pressure (cm H2O): 22 cm H2O  Skin barrier applied: Yes    ABGs:   Lab Results   Component Value Date    DMU8AHO 32 04/10/2019    FIO2 NOT REPORTED 04/10/2019         DATA:  Complete Blood Count:   Recent Labs     04/08/19  0429 04/09/19  0506 04/10/19  0636   WBC 26.2* 22.2* 18.5*   RBC 2.63* 2.61* 2.67*   HGB 8.0* 7.9* 8.2*   HCT 24.1* 24.1* 25.3*   MCV 91.6 92.3 94.8   MCH 30.4 30.3 30.7   MCHC 33.2 32.8 32.4   RDW 14.4 14.3 15.0*    See Reflexed IPF Result See Reflexed IPF Result   MPV NOT REPORTED NOT REPORTED NOT REPORTED        Last 3 Blood Glucose:   Recent Labs     04/08/19 0429 04/09/19  0506 04/10/19  0636   GLUCOSE 91 68* 95        PT/INR:    Lab Results   Component Value Date    PROTIME 10.5 04/09/2019    INR 1.0 04/09/2019     PTT:    Lab Results   Component Value Date    APTT 28.0 04/05/2019       Comprehensive Metabolic Profile:   Recent Labs     04/08/19 0429 04/08/19  0840 04/09/19  0506 04/10/19  0636   *  --   --  130* 131*   K 4.9  --   --  3.7 3.6*   CL 87*  --   --  94* 94*   CO2 24  --   --  28 27   BUN 63*  --   --  36* 46*   CREATININE 4.10*   < > 6.50* 2.87* 4.08*   GLUCOSE 91  --   --  68* 95   CALCIUM 6.6*  --   --  6.3* 6.5*   PROT 3.6*  --   --  3.8* 4.2*   LABALBU 1.7*  --   --  1.6* 1.6*   BILITOT 2.77*  --   --  2.42* 1.55*   ALKPHOS 172*  --   --  145* 121*   AST 1,867*  --   --  989* 526*   ALT 1,200*  --   --  783* 518*    < > = values in this interval not displayed.       Magnesium:   Lab Results   Component Value Date    MG 2.1 04/08/2019    MG 2.0 04/07/2019    MG 2.2 04/06/2019     Phosphorus:   Lab Results   Component Value Date    PHOS 5.9 04/08/2019    PHOS 8.2 04/07/2019    PHOS 8.8 04/06/2019     Ionized Calcium:   Lab Results   Component Value Date    CAION 0.84 04/09/2019    CAION 0.86 04/08/2019    CAION 1.00 04/07/2019        Urinalysis:   Lab Results   Component Value Date    NITRU NEGATIVE 04/06/2019    COLORU DARK YELLOW 04/06/2019    PHUR 5.0 04/06/2019    WBCUA 50  04/06/2019    RBCUA 50  04/06/2019    MUCUS NOT REPORTED 04/06/2019    TRICHOMONAS NOT REPORTED 04/06/2019    YEAST NOT REPORTED 04/06/2019    BACTERIA NOT REPORTED 04/06/2019    SPECGRAV 1.032 04/06/2019    LEUKOCYTESUR NEGATIVE 04/06/2019    UROBILINOGEN Normal 04/06/2019    BILIRUBINUR NEGATIVE 04/06/2019    GLUCOSEU NEGATIVE 04/06/2019    Erika Bourgeois TRACE 04/06/2019    AMORPHOUS NOT REPORTED 04/06/2019       HgBA1c:  No results found for: LABA1C  TSH:    Lab Results   Component Value Date    TSH 0.97 04/05/2019     Lactic Acid: No results found for: LACTA   Troponin: No results for input(s): TROPONINI in the last 72 hours. Microbiology:  Gram- Rods H Flu    ASSESSMENT:     Patient Active Problem List    Diagnosis Date Noted    Protein-calorie malnutrition ([facility]     Septic shock ([facility]     Gastroenteritis     Septicemia due to Gram negative organism ([facility]     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[facility] 04/07/2019    Small bowel ischemia (HCC) 04/07/2019    Acute GI bleeding 04/07/2019    Gram negative septic shock (HCC)     Rhabdomyolysis 04/06/2019    Hyponatremia 04/06/2019    Lactic acidosis 04/06/2019    MARY (acute kidney injury) ([facility] 39/56/4421    Metabolic acidosis 41/54/5772    Acute respiratory failure ([facility] 04/06/2019    Elevated liver enzymes 04/06/2019    Hyperkalemia     Shock ([facility] 04/05/2019    Arthritis of left knee     S/P total knee arthroplasty 01/26/2017    Post-traumatic osteoarthritis of left knee 11/14/2016    S/P left knee arthroscopy 11/14/2016    Dog bite 08/31/2013          PLAN:     WEAN PER PROTOCOL:  [] No   [x] Yes  [] N/A    ICU PROPHYLAXIS:  Stress ulcer:  [x] PPI Agent  [] L1Ojiwi [] Sucralfate  [] Other:  VTE:   [] Enoxaparin  [] Unfract.  Heparin Subcut  [x] EPC Cuffs    NUTRITION:  [] NPO [x] Tube Feeding (Specify: ) [x] TPN  [] PO    HOME MEDS RECONCILED: [x] No  [] Yes    CONSULTATION NEEDED:  [] No  [x] Yes    FAMILY UPDATED:    [] No  [x] Yes    TRANSFER OUT OF ICU:   [x] No  [] Yes    Problem List:  RUSLAN  MARY  Sepsis  Hypovolemic Shock  Multi organ dysfunction   PAF  Rhabdomyolysis  Cocaine Positive  THC Positive     Plan:  Neuro:  - Sedation: Minimal use overnight, turned off  - RASS:-2, goal Neg 1 to 1; Cam-  - Restraints: none  - Pain Control: fentanyl prn available and controlled    CV:  - VS:Average was 65 after weaned off  - Medications: Vasopressin and Cardizem weaned off  (10 oclock at night drop)  - Stared on Lopressor 12.5 BID per Cardio. - Will D/C the Lopresor given the hypotension and start on Midodrine 5 TID, can go up if still hypotensive.  - Start midodrine 5 TID. Pulm:  - Vent:  470/5/19/35% Actual RR of 18 will hold on wean trial today  - AB.446/44/76/+6   - P/F: 222 improved from 70s-110s   -Mild to moderate secretions tan, thick with weak cough, gag      GI  - Nutrition: Start TPN. Mucosa was dusky do not anticipate bowel function returning within a week  - Bowel: Per surgery  - GI Prophylaxis SCDs consider starting prophylaxis now that surgery is done if ok with surgery  - Glycemic Control: d5 NS  - Drains:     Renal:  - UO: 0.3 ml/kg/hr    ID:  - Tmax: 36.9C  - Antibiotics: continue meropenem  - Cultures: 1 Blood Culture Grew G- Rods H. Flu on   - Infectious Disease is following     Fluids/Electrolytes:  - IVF: d5 1/2 + NS at 50 ml/hr; increase glucose checks, prn dextrose bolus; avoid switching to d10 given d10 has free water  - Replacing  Electrolytes prn   -Nephrology is Following     Other/Prophylaxis  - HOB: Elevated  - DVT: Start Heparin prophylaxis when GS is ok; SCDs  - ICU Level of Care  - PT/OT   -Stop CK, Lactic acid de-esclate abgs, inr/pt, ionized calcium  - F/U HIT antibodies. F/U HIT antibodies. Start midodrine 5 TID. Mazin Groves MD  PGY-2, Internal Medicine  [address] [facility]      ICU Team Daily Plan of Care     NEURO    Pain Score:  Pain Level: (nonverbal)    Pain Medication Indicated ? yes    Transition to PO ? no   Agitation/Sedation      RASS Goal: 0 to -2 Current RASS -1 (Drowsy)   Sedation Awakening Trial (SAT) Indicated ? yes   Delirium (CAM-ICU) positive    Non-pharmacologic therapies reviewed ? yes    Sleep enhancement needed ? not applicable    Pharmacological Rx indicated ? no    Restains needed ? no   CARDS     Vasoactive Agents ? yes    MAP Goal: >65 mmHg     Current IV fluid rate I.V.: 15 mL     Can IV fluids be discontinued ? no    Additional IV access needed ? no    Cardiac Meds reviewed ? yes    Echocardiogram reviewed ? yes   RESP    Mechanically Ventilated ? yes   Vent Information  $Ventilation: $Subsequent Day  Ventilator Started: Yes  Skin Assessment: Clean, dry, & intact  Equipment Changed: HME  Vent Type: Servo i  Vent Mode: CPAP  Vt Ordered: 470 mL  Rate Set: 18 bmp  Pressure Support: 10 cmH20  FiO2 : 35 %  Sensitivity: 5  PEEP/CPAP: 5  I Time/ I Time %: 0.9 s  Cuff Pressure (cm H2O): 22 cm H2O  Humidification Source: HME     Lung Protective Ventilation ? yes    Vent Bundle (Oral care, HOB >30, Subglottic suction) reviewed ? yes   Spontaneous breathing trial (SBT) indicated ? yes   Coordinated SAT & SBT ? yes   CXR/CT reviewed ? yes   RENAL    Urine Output: adequate  Output  Urine: 100 mL Urine: 100 mL     Intake/Output Summary (Last 24 hours) at 4/10/2019 1311  Last data filed at 4/10/2019 1200  Gross per 24 hour   Intake 1268.8 ml   Output 665 ml   Net 603.8 ml        Fluid Balance Goal  negative    Renal panel/BMP reviewed? yes    Lytes replaced ? yes    Renal replacement therapy (HD/CRRT) ? yes   HEME/ONC     CBC/Coagulation profile reviewed ? yes    Require transfusion ? no   DVT prophylaxis intermittent pneumatic compression boots and regimen to be chosen by surgical team   GI    Nutrition Plan:   continue current nutrition therapy    Current TF rate at Goal ? yes    TPN indicated ? no    Bowel function/regimen required ?   no   GI prophylaxis ? yes proton pump inhibitor per orders   ENDOCRINE     Adequate glycemic control ? yes    Insulin gtt indicated ? no    Stress dose steroids indicated ? no    Taper steroids? not applicable   ID     Antibiotics ? yes    Positive Cx ? yes    Source Control ?  yes    De-esclation plan ?  no   DERM     Skin integrity/Wound care reviewed ? yes    WOC  nurse needed? yes   EARLY MOBILIZATION PLAN     Lines, Tubes, Drains needed ? yes    PT/OT/Speech Therapy Bed Rest   DISPOSITION/CODE STATUS/GOAL OF CARE     Disposition/Code Status/Goals of care Unchanged.

## 2019-04-10 NOTE — PROGRESS NOTES
04/10/19 1037 04/10/19 1039   Vent Information   Vent Mode CPAP  --    Pressure Support 12 cmH20 10 cmH20   PEEP/CPAP 5  --      1037 wean trial started per Dr Angela Matias.

## 2019-04-10 NOTE — PROGRESS NOTES
°C)Temp  Av.6 °F (37 °C)  Min: 98.4 °F (36.9 °C)  Max: 98.8 °F (84.3 °C) BP Systolic (47KAM), IQT:81 , Min:90 , GEB:879   Diastolic (16BCJ), CGY:46, Min:39, Max:55   Pulse Pulse  Av.3  Min: 78  Max: 89 Resp Resp  Av.4  Min: 0  Max: 19 Pulse ox SpO2  Av.5 %  Min: 89 %  Max: 100 %    GENERAL: intubated  NEURO: Following commands, moves all extremities  HEENT: mucous membranes moist  LUNGS: rhonchi heard at bases. HEART: Regular rate and rhythm  ABDOMEN: soft, non distended. Midline incision clean - no signs of infection. Ileostomy pink with purple patches, appears viable, retracted at inferior border, bowel sweat noted within ostomy bag. EXTERMITY: No signs of cyanosis or ischemia      LAB:  CBC:   Recent Labs     19  0506   WBC 26.2* 22.2*   HGB 8.0* 7.9*   HCT 24.1* 24.1*   MCV 91.6 92.3    See Reflexed IPF Result     BMP:   Recent Labs     19  0430 19  0840 19  0506   *  --  128*  --   --  130*   K 5.2  --  4.9  --   --  3.7   CL 90*  --  87*  --   --  94*   CO2 23  --  24  --   --  28   BUN  --   --  63*  --   --  36*   CREATININE  --    < > 4.10* 5.90* 6.50* 2.87*   GLUCOSE  --   --  91  --   --  68*    < > = values in this interval not displayed. RADIOLOGY:  CXR:   FINDINGS:   Cardiomegaly and pulmonary vascular congestion.  Bilateral pleural effusions   and adjacent airspace disease, likely atelectasis.  These findings remain   essentially unchanged as compared to previous study.  Right internal jugular   vein catheter tip is in unchanged positioning of the cavoatrial junction.    There is redemonstration of a linear density in the region of the distal   catheter which most likely represents normal part of the catheter.  However,   as previously questioned, retained wire fragment cannot be definitively   excluded.  Endotracheal tube and enteric tube are in unchanged positioning.           Impression Unchanged findings of pulmonary edema.  And pleural effusions.       Tubes and catheters are in unchanged positioning.  Similar linear density in   the region of the distal jugular venous catheter. Arabella Montgomery,   4/9/19, 6:22 AM                 Trauma Attending Attestation      I have reviewed the above [facility] note(s) and confirmed the key elements of the medical history and physical exam. I have seen and examined the pt. I have discussed the findings, established the care plan and recommendations with Resident, [facility]CS RN, bedside nurse.   Ostomy at skin level beefy red   Incision healing well no signs of infection   Awakens and follows commands   Advance TF         Tim Multani, DO  4/10/2019  10:56 AM

## 2019-04-10 NOTE — PROGRESS NOTES
[facility] - Progress Note    Today's Date and Time: 4/10/2019, 7:41 AM    Impression :   1. 46 y/o female with complaints of  · Vomiting  · Diarrhea  · Abdominal pain  · Altered mental status  2. Acute kidney injury  · Nephrology onboard and planning for urgent dialysis   3. Shock, presumptive septic plus hypovolemic, requiring Levophed  4. Septicemia with Haemophilus influenzae 4-5-19  5. Gastroenteritis  6. Ischemic bowel  7. S/P laparotomy 4-6-19  8. S/p second look with resection of transverse colon, ileostomy creation, resection of rectal stump and abdominal closure. 9. Severe hyponatremia  10. Transaminitis, shock liver, improving  11. Intubation 2/2 AMS  12. COPD  15. Arthritis  14. Chronic NSAID use  15. Funguria  16. Acral mottling of hands and feet  17. Allergy to penicillins: Hives and respiratory distress  18. Allergy to sulfa    Recommendations:   · Meropenem 1 gm IV q 24 hr  · IV fluids, presors    Medical Decision Making/Summary/Discussion:   · 46 y/o female with vomiting, diarrhea, and AMS  · Found to have transaminitis, MARY requiring emergent dialysis, lactic acidosis, shock requiring pressors  · Intubated due to AMS  · CT chest shows atelactasis vs pneumonia  · Currently on vanc, levaquin, and aztreonam   · Patient is critically ill with origin in GI tract . Will need to treat with meropenem despite Hx of penicillin allergy. · Had laparotomy 4-6-19 with findings of ischemic bowel from distal ileum to sigmoid colon.    · S/P ileocecectomy with extended left hemicolectomy and placement of wound vac 4-6-19  · Overall improvement post surgery  · Off pressors and sedation  · Cultures grew haemophilus influenza, beta lactamase negative, sensitivities pending  Infection Control Recommendations   · [place] Precautions    Antimicrobial Stewardship Recommendations     · Simplification of therapy  · Targeted therapy    Coordination of Outpatient Care:   · Estimated Length of IV antimicrobials: TBD  · Patient will need Midline Catheter Insertion: TBD  · Patient will need PICC line Insertion: TBD  · Patient will need: Home IV , [place],  SNF,  LTAC  · Patient will need outpatient wound care: TBD    Chief complaint/reason for consultation:   · Altered mental status and tender abdomen       History of Present Illness:   Lilliana Hamm is a 47y.o.-year-old  female who was initially admitted on 4/5/2019. Patient seen at the request of Dr. Deedee Frost:    Patient presented to ED via EMS due to altered mental status and vomiting. Patient has history significant for COPD, right knee osteoarthirtis s/p arthoplasty, and chronic NSAID use. Patient lives in [place], but was here to visit her significant other. Arrived Wednesday night, said she didn't feel good. Thought she might have had a bad burger at a fast food restaurant. Went to sleep and slept for almost 24 hours. When she awoke, she said she was vomiting, having diarrhea, and abdominal pain. Significant other and sister are unsure of the nature of the vomit, diarrhea, or abdominal pain. Then she slept a bit more, until her significant other found her unresponsive and that's when he called EMS to bring her to the hospital.     Afebrile on admission, but Tmax overnight was 39.3. Tachycardic on admission, 129. Became hypotensive after admission and levophed and vasopressin were started. Initial labs:     Admission labs significant for Na 125, K 8.0, CO2 9, BUN 62, Creatinine of 4.06, Anion gap of 23, Lactic acid of 3.5, Glucose of 186, Ca 5.2, POC HCO3 15.9, CK 15,342, Troponins high sensitivity 89 and 94, Alk phos 168, , AST 1,122, Bili .37, lipase of 119, Urine tox positive for THC and Cocaine, WBC 23.5, Hgb 10.2, Urine and blood cultures pending, HIV negative, influenza negative, hepatitis panel non-reactive, urine Leukocyte esterase trace, urine nitrite -, urine protein 2+, urine Hgb large, urine RBCs 5 to 10, many urine yeast,  ABG 7.22, pCO2 27, HCO3 15.9. Initial imaging showed:     CXR: No acute cardiopulmonary process    Abdominal X-ray 4/6: No free air    CTA of chest: Negative for PE or dissection. Patchy consolidations of bilateral lower lung lobes representing developing pneumonia vs atelectasis. CT head: pending    Initial course:     Intubated and sedated in ED due to altered mental status. Currently on Vancomycin, Levaquin, and aztreonam for empiric coverage. Richar catheter was placed due to request by nephrology for emergent dialysis. Dialysis attempted several times, but due to high arterial pressures and line clotting, dialysis to be completed later today by Dr. Ben Shabazz. NG tube with bloody output. FOBT +. General surgery has been consulted for evaluation. CURRENT EVALUATION : 4/10/2019    Patient seen and examined. Still somnolent, but follows simple commands. Remains intubated. Swelling of right wrist appears unchanged from yesterday. Seems to have difficulty moving that hand. Duplex of the right arm completed but results pending. Nephrology is planning for dialysis tomorrow. D5NS started at 50 ml an hour for hypoglycemia. Blood cultures grew haemophilus influenza, beta lactamase negative. Sensitivities pending. LFTs continuing to improve. CK improving. WBC improved from 22.2 to 18.5. Platelets dropped to 80 from 244 two days ago. Creatinine today jumped to 4.08 from 2.87 yesterday. CXR today showed unchanged pulmonary edema and effusions. Patient has duplex ordered of the right upper extremity because of right wrist swelling with ruptured bullae - completed but results pending. Patient underwent ileocecectomy with extended left hemicolectomy and placement of wound vac on 4-6-19 because of ischemic bowel. Had additional surgery on 4-8-19 for second look laparotomy with transverse colon resection and ileostomy creation.     Overall improvement post surgeries. Afebrile  VS stable. Off pressors, mostly in the 100s. Had episodes of a fib with RVR. Shocked twice to control rhythm and rate on 4-7-19. Pulse now in the 70's and 80's. On ventilator. Comfortable, sedated    Abdomen softer. VAC in place  No bowel activity   Skin cyanosis much improved      Cultures:  Urine:  · NGTD  Blood:  · 4-5-19 : Gram negative bacilli, haemophilus influenza, beta lactamase negative, sensitivities pending  Sputum :  · NA  Wound:  · NA    MRSA- Neg    Discussed with RN, Dr Marcella Jenikns, Gen Surgery. ICU Care 35 min    I have personally reviewed the past medical history, past surgical history, medications, social history, and family history, and I have updated the database accordingly.   Past Medical History:     Past Medical History:   Diagnosis Date    Asthma     On inhaler    Back pain, chronic     Hypertension 2015    On Lisinopril    Snores     possible apnea but not tested    Wears glasses        Past Surgical  History:     Past Surgical History:   Procedure Laterality Date    KNEE ARTHROSCOPY Left 1980    KNEE ARTHROSCOPY Left 09/28/2016    with medial menisectomy    LAPAROTOMY EXPLORATORY N/A 4/6/2019    LAPAROTOMY EXPLORATORY, ILEOCECECTOMY, SUBTOTAL COLECTOMY, ABTHEHRA WOUND VAC PLACEMENT performed by Demian Flores MD at [address] [facility] 4/8/2019    2ND LOOK EXPLORATORY LAPAROTOMY, RESECTION TRANSVERSE COLON, ILEOSTOMY CREATION, RESECTION RECTAL STUMP, ABDOMINAL WASHOUT, OMENTECTOMY, ABDOMINAL WALL CLOSURE performed by Radha Soto MD at [address] [place] [address] Right 2013       Medications:      metoprolol tartrate  12.5 mg Oral BID    pantoprazole  40 mg Intravenous BID    And    sodium chloride (PF)  10 mL Intravenous BID    sodium chloride flush  10 mL Intravenous 2 times per day    meropenem  1 g Intravenous Q24H    magnesium sulfate  1 g Intravenous Once       Social History:     Social History Socioeconomic History    Marital status: Single     Spouse name: Not on file    Number of children: 0    Years of education: Not on file    Highest education level: Not on file   Occupational History    Occupation: [employer] Financial resource strain: Not on file    Food insecurity:     Worry: Not on file     Inability: Not on file    Transportation needs:     Medical: Not on file     Non-medical: Not on file   Tobacco Use    Smoking status: Light Tobacco Smoker     Packs/day: 0.25     Types: Cigarettes     Start date: 9/19/1980    Smokeless tobacco: Never Used   Substance and Sexual Activity    Alcohol use: Yes     Comment: OCCASSIONAL    Drug use: No    Sexual activity: Yes     Partners: Female   Lifestyle    Physical activity:     Days per week: Not on file     Minutes per session: Not on file    Stress: Not on file   Relationships    Social connections:     Talks on phone: Not on file     Gets together: Not on file     Attends Tenriism service: Not on file     Active member of club or organization: Not on file     Attends meetings of clubs or organizations: Not on file     Relationship status: Not on file    Intimate partner violence:     Fear of current or ex partner: Not on file     Emotionally abused: Not on file     Physically abused: Not on file     Forced sexual activity: Not on file   Other Topics Concern    Not on file   Social History Narrative    Not on file       Family History:     Family History   Problem Relation Age of Onset    Heart Disease Mother     Stroke Mother     Heart Surgery Mother     Diabetes Maternal Grandmother     Emphysema Maternal Grandfather     Diabetes Maternal Grandfather     Lung Cancer Maternal Uncle         Allergies:   Pcn [penicillins]; Sulfa antibiotics; and Tape [adhesive tape]     Review of Systems:   Unable to provide. Sedated on ventilator.       Physical Examination :     Patient Vitals for the past 8 hrs:   BP Temp Temp src Pulse Resp SpO2 Weight   04/10/19 0600 (!) 116/55 -- -- 87 15 95 % 201 lb 11.5 oz (91.5 kg)   04/10/19 0530 (!) 96/45 -- -- 79 18 98 % --   04/10/19 0500 (!) 99/47 -- -- 79 18 98 % --   04/10/19 0430 (!) 100/44 -- -- 81 18 98 % --   04/10/19 0400 (!) 90/49 98.4 °F (36.9 °C) CORE 78 18 98 % --   04/10/19 0335 -- -- -- 80 18 97 % --   04/10/19 0330 (!) 95/45 -- -- 79 18 97 % --   04/10/19 0300 (!) 94/47 -- -- 80 18 98 % --   04/10/19 0230 (!) 98/45 -- -- 84 18 97 % --   04/10/19 0200 (!) 104/47 -- -- 83 18 98 % --   04/10/19 0130 (!) 101/50 -- -- 81 18 98 % --   04/10/19 0100 (!) 100/46 -- -- 81 18 98 % --   04/10/19 0030 (!) 96/44 -- -- 82 18 98 % --   04/10/19 0000 (!) 100/47 98.8 °F (37.1 °C) CORE 83 18 99 % --     General Appearance: Sedated, on ventilator  Head:  Normocephalic, no trauma  Eyes: Pupils equal, round, reactive to light; sclera anicteric; conjunctivae pink. No embolic phenomena. ENT: Oropharynx clear, without erythema, exudate, or thrush. No tenderness of sinuses. Mouth/throat: mucosa pink and moist. No lesions. Dentition in good repair. Neck:Supple, without lymphadenopathy. Thyroid normal, No bruits. Pulmonary/Chest: Clear to auscultation, without wheezes, rales, or rhonchi. No dullness to percussion. Cardiovascular: Regular rate and rhythm without murmurs, rubs, or gallops. Abdomen: Distended. Bowel sounds absent. Soft, mildly tender, surgical bandages in place. Wound vac removed. All four Extremities: Cyanosis of trunk, abdomen, distal extremities  Neurologic: Sedated  Skin: Cool and dry with poor turgor. Signs of peripheral arterial  insufficiency. No ulcerations. No open wounds. Skin purplish, cyanotic.     Medical Decision Making -Laboratory:   I have independently reviewed/ordered the following labs:    CBC with Differential:   Recent Labs     04/09/19  0506 04/10/19  0636   WBC 22.2* 18.5*   HGB 7.9* 8.2*   HCT 24.1* 25.3*   PLT See Reflexed IPF Result See Reflexed IPF Result   LYMPHOPCT 6* PENDING   MONOPCT 7 PENDING     BMP:   Recent Labs     04/07/19  0942  04/08/19  0429  04/09/19  0506 04/10/19  0636   NA  --    < > 128*  --  130* 131*   K  --    < > 4.9  --  3.7 3.6*   CL  --    < > 87*  --  94* 94*   CO2  --    < > 24  --  28 27   BUN  --    < > 63*  --  36* 46*   CREATININE  --    < > 4.10*   < > 2.87* 4.08*   MG 2.0  --  2.1  --   --   --     < > = values in this interval not displayed. Hepatic Function Panel:   Recent Labs     04/09/19  0506 04/10/19  0636   PROT 3.8* 4.2*   LABALBU 1.6* 1.6*   BILIDIR 1.96* 1.18*   IBILI 0.46 0.37   BILITOT 2.42* 1.55*   ALKPHOS 145* 121*   * 518*   * 526*     No results for input(s): RPR in the last 72 hours. No results for input(s): HIV in the last 72 hours. No results for input(s): BC in the last 72 hours. Lab Results   Component Value Date    MUCUS NOT REPORTED 04/06/2019    RBC 2.67 04/10/2019    TRICHOMONAS NOT REPORTED 04/06/2019    WBC 18.5 04/10/2019    YEAST NOT REPORTED 04/06/2019    TURBIDITY TURBID 04/06/2019     Lab Results   Component Value Date    CREATININE 4.08 04/10/2019    GLUCOSE 95 04/10/2019       Medical Decision Making-Imaging:     Abdominal xray:    Gas in mildly distended loops of large and small bowel likely reflecting an   ileus.       NG tube tip in the gastric fundus with the proximal side-port in the distal   thoracic esophagus, repositioning recommended.       Airspace disease left lung base.      CT scan chest:    Impression   Satisfactory position endotracheal tube.       Nasogastric tube needs advanced 10 cm.       Patchy perihilar opacities and bibasilar airspace disease.  Follow-up may be   beneficial following medical treatment course to document complete resolution.           EXAMINATION:   CTA OF THE ABDOMEN AND PELVIS WITH CONTRAST       4/5/2019 9:23 pm:       TECHNIQUE:   CTA of the abdomen and pelvis was performed with the administration of   intravenous contrast. Multiplanar reformatted images are provided for review. MIP images are provided for review. Dose modulation, iterative   reconstruction, and/or weight based adjustment of the mA/kV was utilized to   reduce the radiation dose to as low as reasonably achievable.       COMPARISON:   None.       HISTORY:   ORDERING SYSTEM PROVIDED HISTORY: suspected dissection of abdominal aorta       FINDINGS:       CTA ABDOMEN:       Bibasilar airspace disease.  Liver, spleen, pancreas, gallbladder normal.   Bilateral adrenal masses measuring 11 mm on the left and 18 x 28 mm on the   left.  Bilateral ill-defined hypodensities throughout the kidneys.  The small   bowel is somewhat distended with multiple air-fluid levels.  Multiple   air-fluid levels are also noted throughout the colon.  The stomach appears   normal.  Retroaortic left renal vein.       Bones normal.       Aorta appears normal without dissection.  The celiac artery and SMA appear   normal.  The renal arteries appear normal.           CTA PELVIS:       Bladder decompressed.  Uterus normal.  Catheter right groin terminates in the   common iliac vein.           Impression   Ileus.  No evidence of aortic dissection.  The bilateral adrenal masses, left   greater than right.  Recommend follow-up adrenal MRI non emergently.           Medical Decision Making-Other: Thank you for allowing us to participate in the care of this patient. Please call with questions. Mynor Chavez     ATTESTATION:    I have discussed the case, including pertinent history and exam findings with the residents. I have seen and examined the patient and the key elements of the encounter have been performed by me. I have reviewed the laboratory data, other diagnostic studies and discussed them with the residents. I have updated the medical record where necessary. I agree with the assessment, plan and orders as documented by the resident.     Stefany Desai MD.      Pager: [phone] - Office: [phone]

## 2019-04-10 NOTE — PROGRESS NOTES
Physical Therapy  DATE: 4/10/2019    NAME: Johanny Sigala  MRN: [id number]   : 1965    Patient not seen this date for Physical Therapy due to:  [] Blood transfusion in progress  [] Hemodialysis  []  Patient Declined  [] Spine Precautions   [] Strict Bedrest  [] Surgery/ Procedure  [] Testing      [x] Other Awaiting doppler results. [] PT being discontinued at this time. Patient independent. No further needs. [] PT being discontinued at this time as the patient has been transferred to palliative care. No further needs.     Cassia Feliciano, PT

## 2019-04-10 NOTE — CARE COORDINATION
Remains intubated, following commands, likely needs SNF vs. Home with [facility] unsure if she will be moving to her mothers in [place]. Following as POC develops.

## 2019-04-10 NOTE — PROGRESS NOTES
Dialysis Post Treatment Note  Patient tolerated treatment well. Denies complaints at time of discharge. Vitals:    04/10/19 1807   BP: (!) 125/55   Pulse: 75   Resp:    Temp: 98.6 °F (37 °C)   SpO2:      Pre-Weight =90.5kg  Post- Weight: 196 lb 6.9 oz (89.1 kg)   Total Liters Processed (l/min): 81.1 l/min   Rinseback Volume (ml): 360 ml  Net Removal (mL) =   Length of treatment=3.5 hours    No distress noted t/o tx. V/S remained stable. Family at bedside at the end of tx.

## 2019-04-10 NOTE — PROGRESS NOTES
sulfate  (90 BASE) MCG/ACT inhaler, Inhale 2 puffs into the lungs every 6 hours as needed for Wheezing    INVESTIGATIONS     Last 3 CMP:    Recent Labs     04/08/19 0429 04/08/19  0840 04/09/19  0506 04/10/19  0636   *  --   --  130* 131*   K 4.9  --   --  3.7 3.6*   CL 87*  --   --  94* 94*   CO2 24  --   --  28 27   BUN 63*  --   --  36* 46*   CREATININE 4.10*   < > 6.50* 2.87* 4.08*   CALCIUM 6.6*  --   --  6.3* 6.5*   PROT 3.6*  --   --  3.8* 4.2*   LABALBU 1.7*  --   --  1.6* 1.6*   BILITOT 2.77*  --   --  2.42* 1.55*   ALKPHOS 172*  --   --  145* 121*   AST 1,867*  --   --  989* 526*   ALT 1,200*  --   --  783* 518*    < > = values in this interval not displayed. Last 3 CBC:  Recent Labs     04/08/19 0429 04/09/19  0506 04/10/19  0636   WBC 26.2* 22.2* 18.5*   RBC 2.63* 2.61* 2.67*   HGB 8.0* 7.9* 8.2*   HCT 24.1* 24.1* 25.3*   MCV 91.6 92.3 94.8   MCH 30.4 30.3 30.7   MCHC 33.2 32.8 32.4   RDW 14.4 14.3 15.0*    See Reflexed IPF Result See Reflexed IPF Result   MPV NOT REPORTED NOT REPORTED NOT REPORTED       ASSESSMENT     1. Acute kidney injury nonoliguric secondary to ischemic (Hypotension/arrthymia/ischemic gut ) and nephrotoxic (contrast) ATN aggravated further by concomitant NSAID and diuretic use along with cocaine - hemodialysis dependent   Creat peaked at 4 with hyperkalemia 8  Baseline creat in Jan 2017 was normal  Ultrasound shows right kidney 10.4 cm and left 10.2 cm  2. Circulatory shock - sepsis and volume depletion - on pressors  3. Ischemic gut status post Ileocecectomy, extended left hemicolectomy, placement of  ABThera wound VAC on 4/6 4/8 Second Look exploratory upper, with resection of transverse colon AND ileostomy  4. Sepsis - blood culture positive for H.infleuenza  5. VDRF  6. Bilateral adrenal masses 11 mm left and 18 mm on the right  7. Anemia  8. Persistent Afib S/o cardioversion      PLAN     1.  HD today  2. IV Lasix  3. Avoid nephrotoxins  4.

## 2019-04-11 ENCOUNTER — APPOINTMENT (OUTPATIENT)
Dept: GENERAL RADIOLOGY | Age: 54
DRG: 710 | End: 2019-04-11
Payer: MEDICAID

## 2019-04-11 LAB
ABSOLUTE EOS #: 0.48 K/UL (ref 0–0.4)
ABSOLUTE IMMATURE GRANULOCYTE: 1.67 K/UL (ref 0–0.3)
ABSOLUTE LYMPH #: 2.39 K/UL (ref 1–4.8)
ABSOLUTE MONO #: 1.2 K/UL (ref 0.1–0.8)
ABSOLUTE RETIC #: 0.1 M/UL (ref 0.03–0.08)
ANION GAP SERPL CALCULATED.3IONS-SCNC: 9 MMOL/L (ref 9–17)
BASOPHILS # BLD: 0 % (ref 0–2)
BASOPHILS ABSOLUTE: 0 K/UL (ref 0–0.2)
BUN BLDV-MCNC: 33 MG/DL (ref 6–20)
BUN/CREAT BLD: ABNORMAL (ref 9–20)
CALCIUM SERPL-MCNC: 7.5 MG/DL (ref 8.6–10.4)
CHLORIDE BLD-SCNC: 102 MMOL/L (ref 98–107)
CO2: 28 MMOL/L (ref 20–31)
CREAT SERPL-MCNC: 3.2 MG/DL (ref 0.5–0.9)
CULTURE: NORMAL
DIFFERENTIAL TYPE: ABNORMAL
EOSINOPHILS RELATIVE PERCENT: 2 % (ref 1–4)
FERRITIN: 398 UG/L (ref 13–150)
GFR AFRICAN AMERICAN: 18 ML/MIN
GFR NON-AFRICAN AMERICAN: 15 ML/MIN
GFR SERPL CREATININE-BSD FRML MDRD: ABNORMAL ML/MIN/{1.73_M2}
GFR SERPL CREATININE-BSD FRML MDRD: ABNORMAL ML/MIN/{1.73_M2}
GLUCOSE BLD-MCNC: 111 MG/DL (ref 70–99)
GLUCOSE BLD-MCNC: 79 MG/DL (ref 65–105)
GLUCOSE BLD-MCNC: 99 MG/DL (ref 65–105)
HCT VFR BLD CALC: 25.4 % (ref 36.3–47.1)
HEMOGLOBIN: 8.3 G/DL (ref 11.9–15.1)
HEPARIN INDUCED PLATELET ANTIBODY: 0.15 O.D. (ref 0–0.4)
IMMATURE GRANULOCYTES: 7 %
IMMATURE RETIC FRACT: 38.3 % (ref 2.7–18.3)
IRON SATURATION: 13 % (ref 20–55)
IRON: 25 UG/DL (ref 37–145)
LYMPHOCYTES # BLD: 10 % (ref 24–44)
Lab: NORMAL
MCH RBC QN AUTO: 31.2 PG (ref 25.2–33.5)
MCHC RBC AUTO-ENTMCNC: 32.7 G/DL (ref 28.4–34.8)
MCV RBC AUTO: 95.5 FL (ref 82.6–102.9)
MONOCYTES # BLD: 5 % (ref 1–7)
MORPHOLOGY: ABNORMAL
MORPHOLOGY: ABNORMAL
NRBC AUTOMATED: 0.4 PER 100 WBC
PDW BLD-RTO: 15.5 % (ref 11.8–14.4)
PLATELET # BLD: 75 K/UL (ref 138–453)
PLATELET ESTIMATE: ABNORMAL
PMV BLD AUTO: 12.4 FL (ref 8.1–13.5)
POTASSIUM SERPL-SCNC: 4.1 MMOL/L (ref 3.7–5.3)
RBC # BLD: 2.66 M/UL (ref 3.95–5.11)
RBC # BLD: ABNORMAL 10*6/UL
RETIC %: 3.7 % (ref 0.5–1.9)
RETIC HEMOGLOBIN: 31.2 PG (ref 28.2–35.7)
SEG NEUTROPHILS: 76 % (ref 36–66)
SEGMENTED NEUTROPHILS ABSOLUTE COUNT: 18.16 K/UL (ref 1.8–7.7)
SODIUM BLD-SCNC: 139 MMOL/L (ref 135–144)
SPECIMEN DESCRIPTION: NORMAL
SURGICAL PATHOLOGY REPORT: NORMAL
TOTAL CK: 4054 U/L (ref 26–192)
TOTAL IRON BINDING CAPACITY: 188 UG/DL (ref 250–450)
UNSATURATED IRON BINDING CAPACITY: 163 UG/DL (ref 112–347)
WBC # BLD: 23.9 K/UL (ref 3.5–11.3)
WBC # BLD: ABNORMAL 10*3/UL

## 2019-04-11 PROCEDURE — 80048 BASIC METABOLIC PNL TOTAL CA: CPT

## 2019-04-11 PROCEDURE — 97162 PT EVAL MOD COMPLEX 30 MIN: CPT

## 2019-04-11 PROCEDURE — 2500000003 HC RX 250 WO HCPCS: Performed by: INTERNAL MEDICINE

## 2019-04-11 PROCEDURE — 83550 IRON BINDING TEST: CPT

## 2019-04-11 PROCEDURE — 99291 CRITICAL CARE FIRST HOUR: CPT | Performed by: INTERNAL MEDICINE

## 2019-04-11 PROCEDURE — 82947 ASSAY GLUCOSE BLOOD QUANT: CPT

## 2019-04-11 PROCEDURE — 85045 AUTOMATED RETICULOCYTE COUNT: CPT

## 2019-04-11 PROCEDURE — 83540 ASSAY OF IRON: CPT

## 2019-04-11 PROCEDURE — 92610 EVALUATE SWALLOWING FUNCTION: CPT

## 2019-04-11 PROCEDURE — C9113 INJ PANTOPRAZOLE SODIUM, VIA: HCPCS | Performed by: STUDENT IN AN ORGANIZED HEALTH CARE EDUCATION/TRAINING PROGRAM

## 2019-04-11 PROCEDURE — 99233 SBSQ HOSP IP/OBS HIGH 50: CPT | Performed by: INTERNAL MEDICINE

## 2019-04-11 PROCEDURE — 6370000000 HC RX 637 (ALT 250 FOR IP): Performed by: STUDENT IN AN ORGANIZED HEALTH CARE EDUCATION/TRAINING PROGRAM

## 2019-04-11 PROCEDURE — 6370000000 HC RX 637 (ALT 250 FOR IP): Performed by: INTERNAL MEDICINE

## 2019-04-11 PROCEDURE — 97535 SELF CARE MNGMENT TRAINING: CPT | Performed by: OCCUPATIONAL THERAPIST

## 2019-04-11 PROCEDURE — 82550 ASSAY OF CK (CPK): CPT

## 2019-04-11 PROCEDURE — 90935 HEMODIALYSIS ONE EVALUATION: CPT

## 2019-04-11 PROCEDURE — 2060000000 HC ICU INTERMEDIATE R&B

## 2019-04-11 PROCEDURE — 71045 X-RAY EXAM CHEST 1 VIEW: CPT

## 2019-04-11 PROCEDURE — 2580000003 HC RX 258: Performed by: STUDENT IN AN ORGANIZED HEALTH CARE EDUCATION/TRAINING PROGRAM

## 2019-04-11 PROCEDURE — 94660 CPAP INITIATION&MGMT: CPT

## 2019-04-11 PROCEDURE — 97530 THERAPEUTIC ACTIVITIES: CPT

## 2019-04-11 PROCEDURE — 82728 ASSAY OF FERRITIN: CPT

## 2019-04-11 PROCEDURE — 31720 CLEARANCE OF AIRWAYS: CPT

## 2019-04-11 PROCEDURE — 2580000003 HC RX 258: Performed by: INTERNAL MEDICINE

## 2019-04-11 PROCEDURE — 6360000002 HC RX W HCPCS: Performed by: STUDENT IN AN ORGANIZED HEALTH CARE EDUCATION/TRAINING PROGRAM

## 2019-04-11 PROCEDURE — 2500000003 HC RX 250 WO HCPCS: Performed by: STUDENT IN AN ORGANIZED HEALTH CARE EDUCATION/TRAINING PROGRAM

## 2019-04-11 PROCEDURE — 97166 OT EVAL MOD COMPLEX 45 MIN: CPT | Performed by: OCCUPATIONAL THERAPIST

## 2019-04-11 PROCEDURE — 6360000002 HC RX W HCPCS: Performed by: INTERNAL MEDICINE

## 2019-04-11 PROCEDURE — 85025 COMPLETE CBC W/AUTO DIFF WBC: CPT

## 2019-04-11 RX ORDER — 0.9 % SODIUM CHLORIDE 0.9 %
250 INTRAVENOUS SOLUTION INTRAVENOUS PRN
Status: DISCONTINUED | OUTPATIENT
Start: 2019-04-11 | End: 2019-05-04 | Stop reason: HOSPADM

## 2019-04-11 RX ORDER — MORPHINE SULFATE 4 MG/ML
4 INJECTION, SOLUTION INTRAMUSCULAR; INTRAVENOUS
Status: DISCONTINUED | OUTPATIENT
Start: 2019-04-11 | End: 2019-05-04 | Stop reason: HOSPADM

## 2019-04-11 RX ORDER — CYCLOBENZAPRINE HCL 5 MG
5 TABLET ORAL 3 TIMES DAILY
Status: DISCONTINUED | OUTPATIENT
Start: 2019-04-11 | End: 2019-05-04 | Stop reason: HOSPADM

## 2019-04-11 RX ORDER — 0.9 % SODIUM CHLORIDE 0.9 %
150 INTRAVENOUS SOLUTION INTRAVENOUS PRN
Status: DISCONTINUED | OUTPATIENT
Start: 2019-04-11 | End: 2019-05-04 | Stop reason: HOSPADM

## 2019-04-11 RX ORDER — OXYCODONE HYDROCHLORIDE 5 MG/1
5 TABLET ORAL EVERY 4 HOURS PRN
Status: DISCONTINUED | OUTPATIENT
Start: 2019-04-11 | End: 2019-05-04 | Stop reason: HOSPADM

## 2019-04-11 RX ORDER — MORPHINE SULFATE 10 MG/ML
6 INJECTION, SOLUTION INTRAMUSCULAR; INTRAVENOUS
Status: DISCONTINUED | OUTPATIENT
Start: 2019-04-11 | End: 2019-05-04 | Stop reason: HOSPADM

## 2019-04-11 RX ORDER — PANTOPRAZOLE SODIUM 40 MG/1
40 TABLET, DELAYED RELEASE ORAL
Status: DISCONTINUED | OUTPATIENT
Start: 2019-04-11 | End: 2019-05-04 | Stop reason: HOSPADM

## 2019-04-11 RX ADMIN — Medication 3 ML: at 17:11

## 2019-04-11 RX ADMIN — FENTANYL CITRATE 100 MCG: 50 INJECTION, SOLUTION INTRAMUSCULAR; INTRAVENOUS at 06:11

## 2019-04-11 RX ADMIN — FENTANYL CITRATE 50 MCG: 50 INJECTION, SOLUTION INTRAMUSCULAR; INTRAVENOUS at 08:17

## 2019-04-11 RX ADMIN — Medication 10 ML: at 00:25

## 2019-04-11 RX ADMIN — PANTOPRAZOLE SODIUM 40 MG: 40 TABLET, DELAYED RELEASE ORAL at 18:08

## 2019-04-11 RX ADMIN — Medication 10 ML: at 21:52

## 2019-04-11 RX ADMIN — FENTANYL CITRATE 50 MCG: 50 INJECTION, SOLUTION INTRAMUSCULAR; INTRAVENOUS at 00:00

## 2019-04-11 RX ADMIN — PANTOPRAZOLE SODIUM 40 MG: 40 INJECTION, POWDER, FOR SOLUTION INTRAVENOUS at 00:24

## 2019-04-11 RX ADMIN — CYCLOBENZAPRINE 5 MG: 10 TABLET, FILM COATED ORAL at 13:19

## 2019-04-11 RX ADMIN — Medication 10 ML: at 09:13

## 2019-04-11 RX ADMIN — OXYCODONE HYDROCHLORIDE 5 MG: 5 TABLET ORAL at 13:20

## 2019-04-11 RX ADMIN — CYCLOBENZAPRINE 5 MG: 10 TABLET, FILM COATED ORAL at 09:13

## 2019-04-11 RX ADMIN — OXYCODONE HYDROCHLORIDE 5 MG: 5 TABLET ORAL at 09:12

## 2019-04-11 RX ADMIN — MEROPENEM 1 G: 1 INJECTION, POWDER, FOR SOLUTION INTRAVENOUS at 13:11

## 2019-04-11 RX ADMIN — OXYCODONE HYDROCHLORIDE 5 MG: 5 TABLET ORAL at 19:00

## 2019-04-11 RX ADMIN — FENTANYL CITRATE 100 MCG: 50 INJECTION, SOLUTION INTRAMUSCULAR; INTRAVENOUS at 04:06

## 2019-04-11 RX ADMIN — PANTOPRAZOLE SODIUM 40 MG: 40 INJECTION, POWDER, FOR SOLUTION INTRAVENOUS at 09:13

## 2019-04-11 RX ADMIN — CYCLOBENZAPRINE 5 MG: 10 TABLET, FILM COATED ORAL at 21:52

## 2019-04-11 RX ADMIN — FENTANYL CITRATE 50 MCG: 50 INJECTION, SOLUTION INTRAMUSCULAR; INTRAVENOUS at 02:00

## 2019-04-11 RX ADMIN — Medication 10 ML: at 09:16

## 2019-04-11 RX ADMIN — MIDODRINE HYDROCHLORIDE 5 MG: 5 TABLET ORAL at 09:13

## 2019-04-11 ASSESSMENT — PAIN SCALES - GENERAL
PAINLEVEL_OUTOF10: 7
PAINLEVEL_OUTOF10: 3
PAINLEVEL_OUTOF10: 8
PAINLEVEL_OUTOF10: 9
PAINLEVEL_OUTOF10: 8
PAINLEVEL_OUTOF10: 8
PAINLEVEL_OUTOF10: 9
PAINLEVEL_OUTOF10: 8
PAINLEVEL_OUTOF10: 8
PAINLEVEL_OUTOF10: 6

## 2019-04-11 ASSESSMENT — PULMONARY FUNCTION TESTS: PIF_VALUE: 13

## 2019-04-11 ASSESSMENT — PAIN - FUNCTIONAL ASSESSMENT: PAIN_FUNCTIONAL_ASSESSMENT: 0-10

## 2019-04-11 NOTE — PROGRESS NOTES
Order obtained for extubation. SpO2 of 100 on 30% FiO2. Patient extubated and placed on 5 liters/min via nasal cannula. Post extubation SpO2 is 100% with HR  85 bpm and RR 17 breaths/min. Patient had moderate cough that was productive of white sputum. Extubation Well tolerated by patient. .   Breath Sounds: clear    Delorise Dys   8:18 PM

## 2019-04-11 NOTE — PROGRESS NOTES
NEPHROLOGY PROGRESS NOTE      SUBJECTIVE   Hospitalized with the delirium associated with abdominal pain nausea vomiting. Initial assessment showed hypotensive lady with the imaging studies revealing evidence of ileus. Further investigations demonstrated neutrophilic leukocytosis along with acute hepatitis/acute kidney injury and elevated lactic acid. Underwent surgical resection for ischemic gut. Hospital course further complicated by persistent atrial fibrillation needing DC cardioversion. She was started on dialysis in view of life-threatening hyperkalemia and acute kidney injury. Now extubated and off pressors. Currently on tube feedings  20 mL an hour. On D5 also in view of hypoglycemia. Had hemodialysis yesterday. 1 KG taken off only in view of low blood pressure yesterday. Urine output only  400 mL last 24 hours. She is positive by around 20 L. Nearly 10 kg above her admitting weight. Chest x-ray reviewed. No signs of improving renal function so far. OBJECTIVE     Vitals:    04/11/19 0302 04/11/19 0323 04/11/19 0345 04/11/19 0400   BP:    135/61   Pulse: 79 86  75   Resp: 12 25  14   Temp:   97.7 °F (36.5 °C)    TempSrc:   Core    SpO2: 98% 99%  98%   Weight:       Height:         24HR INTAKE/OUTPUT:      Intake/Output Summary (Last 24 hours) at 4/11/2019 0853  Last data filed at 4/11/2019 0630  Gross per 24 hour   Intake 1606 ml   Output 2713 ml   Net -1107 ml       General appearance: Mechanical ventilation  HEENT: Mechanical ventilation  Respiratory::vesicular breath sounds,no wheeze/crackles  Cardiovascular:S1 S2 normal,no gallop or organic murmur.   Extremities: No Cyanosis or Clubbing, present Lower extremity edema  Neurological: Mechanical ventilation    MEDICATIONS     Scheduled Meds:    cyclobenzaprine  5 mg Oral TID    meropenem  1 g Intravenous Q24H    midodrine  5 mg Oral TID WC    pantoprazole  40 mg Intravenous BID    And    sodium chloride (PF)  10 mL Intravenous BID    1 tablet by mouth daily  albuterol (PROVENTIL) (2.5 MG/3ML) 0.083% nebulizer solution, Take 2.5 mg by nebulization every 6 hours as needed for Wheezing  albuterol sulfate  (90 BASE) MCG/ACT inhaler, Inhale 2 puffs into the lungs every 6 hours as needed for Wheezing    INVESTIGATIONS     Last 3 CMP:    Recent Labs     04/09/19  0506 04/10/19  0636   * 131*   K 3.7 3.6*   CL 94* 94*   CO2 28 27   BUN 36* 46*   CREATININE 2.87* 4.08*   CALCIUM 6.3* 6.5*   PROT 3.8* 4.2*   LABALBU 1.6* 1.6*   BILITOT 2.42* 1.55*   ALKPHOS 145* 121*   * 526*   * 518*       Last 3 CBC:  Recent Labs     04/09/19  0506 04/10/19  0636   WBC 22.2* 18.5*   RBC 2.61* 2.67*   HGB 7.9* 8.2*   HCT 24.1* 25.3*   MCV 92.3 94.8   MCH 30.3 30.7   MCHC 32.8 32.4   RDW 14.3 15.0*   PLT See Reflexed IPF Result See Reflexed IPF Result   MPV NOT REPORTED NOT REPORTED       ASSESSMENT     1. Acute kidney injury nonoliguric secondary to ischemic - hypoperfusion (Hypotension/arrthymia/ischemic gut ) and nephrotoxic (contrast) ATN aggravated further by concomitant NSAID and diuretic use along with cocaine - hemodialysis dependent   Creat peaked at 4 with hyperkalemia 8  Baseline creat in Jan 2017 was normal  Ultrasound shows right kidney 10.4 cm and left 10.2 cm  Serologies neg  2. Volume overload- iatrogenic/capillary leak syndrome  3. Ischemic gut status post Ileocecectomy, extended left hemicolectomy, placement of  ABThera wound VAC on 4/6 4/8 Second Look exploratory upper, with resection of transverse colon AND ileostomy  4. Sepsis - blood culture positive for H.infleuenza  5. VDRF  6. Bilateral adrenal masses 11 mm left and 18 mm on the right  7. Anemia  8. Persistent Afib S/o cardioversion      PLAN     1. UF today  2. HD and UF am also  3. Avoid nephrotoxins  4. Abx as per GFR <15  5. Continue enteral feedings  6.  Will follow    Please do not hesitate to call with questions    This note is created with the assistance

## 2019-04-11 NOTE — FLOWSHEET NOTE
Patient transferred to new room without incident. Georgiana RONQUILLO at bedside. Mother Yudy Amaya notified.

## 2019-04-11 NOTE — PLAN OF CARE
Problem: OXYGENATION/RESPIRATORY FUNCTION  Goal: Patient will maintain patent airway  4/10/2019 2036 by Shalonda Ewing RN  Outcome: Ongoing  4/10/2019 0913 by Kiara Dean RCP  Outcome: Ongoing  Goal: Patient will achieve/maintain normal respiratory rate/effort  Description  Respiratory rate and effort will be within normal limits for the patient  4/10/2019 2036 by Shalonda Ewing RN  Outcome: Ongoing  4/10/2019 0913 by Kiara Dean RCP  Outcome: Ongoing     Problem: MECHANICAL VENTILATION  Goal: Patient will maintain patent airway  4/10/2019 2036 by Shalonda Ewing RN  Outcome: Ongoing  4/10/2019 0913 by Kiara Dean RCP  Outcome: Ongoing  Goal: Oral health is maintained or improved  4/10/2019 2036 by Shalonda Ewing RN  Outcome: Ongoing  4/10/2019 0913 by Kiara Dean RCP  Outcome: Ongoing  Goal: Tracheostomy will be managed safely  4/10/2019 2036 by Shalonda Ewing RN  Outcome: Ongoing  4/10/2019 0913 by Kiara Dean RCP  Outcome: Ongoing  Goal: ET tube will be managed safely  4/10/2019 2036 by Shalonda Ewing RN  Outcome: Ongoing  4/10/2019 0913 by Kiara Dean RCP  Outcome: Ongoing  Goal: Ability to express needs and understand communication  4/10/2019 2036 by Shalonda Ewing RN  Outcome: Ongoing  4/10/2019 0913 by Kiara Dean RCP  Outcome: Ongoing  Goal: Mobility/activity is maintained at optimum level for patient  4/10/2019 2036 by Shalonda Ewing RN  Outcome: Ongoing  4/10/2019 0913 by Kiara Dean RCP  Outcome: Ongoing     Problem: NUTRITION  Goal: Nutritional status is improving  Outcome: Ongoing     Problem: Nutrition  Goal: Optimal nutrition therapy  Description  Nutrition Problem: Inadequate oral intake  Intervention: Food and/or Nutrient Delivery: Continue NPO  Nutritional Goals: Meet % of estimated nutrition needs   Outcome: Ongoing     Problem: Fluid Volume - Imbalance:  Goal: Absence of imbalanced fluid volume signs and symptoms  Description  Absence of imbalanced fluid baseline  Description  Mental status will be restored to baseline  Outcome: Ongoing     Problem: Discharge Planning:  Goal: Ability to perform activities of daily living will improve  Description  Ability to perform activities of daily living will improve  Outcome: Ongoing  Goal: Participates in care planning  Description  Participates in care planning  Outcome: Ongoing     Problem: Injury - Risk of, Physical Injury:  Goal: Will remain free from falls  Description  Will remain free from falls  Outcome: Ongoing  Goal: Absence of physical injury  Description  Absence of physical injury  Outcome: Ongoing     Problem: Mood - Altered:  Goal: Mood stable  Description  Mood stable  Outcome: Ongoing  Goal: Absence of abusive behavior  Description  Absence of abusive behavior  Outcome: Ongoing  Goal: Verbalizations of feeling emotionally comfortable while being cared for will increase  Description  Verbalizations of feeling emotionally comfortable while being cared for will increase  Outcome: Ongoing     Problem: Psychomotor Activity - Altered:  Goal: Absence of psychomotor disturbance signs and symptoms  Description  Absence of psychomotor disturbance signs and symptoms  Outcome: Ongoing     Problem: Sensory Perception - Impaired:  Goal: Demonstrations of improved sensory functioning will increase  Description  Demonstrations of improved sensory functioning will increase  Outcome: Ongoing  Goal: Decrease in sensory misperception frequency  Description  Decrease in sensory misperception frequency  Outcome: Ongoing  Goal: Able to refrain from responding to false sensory perceptions  Description  Able to refrain from responding to false sensory perceptions  Outcome: Ongoing  Goal: Demonstrates accurate environmental perceptions  Description  Demonstrates accurate environmental perceptions  Outcome: Ongoing  Goal: Able to distinguish between reality-based and nonreality-based thinking  Description  Able to distinguish between

## 2019-04-11 NOTE — PROGRESS NOTES
Physical Therapy    [address]  Initial Assessment    NAME: Michelle Forrester  : 1965  MRN: [id number]    Chief Complaint   Patient presents with    Altered Mental Status       Date of Service: 2019    Discharge Recommendations:    Further therapy recommended at discharge. PT Equipment Recommendations  Equipment Needed: No  Other: Pt has RW    Assessment   Body structures, Functions, Activity limitations: Decreased functional mobility ; Decreased strength;Decreased balance  Assessment: Pt grossly Ameya to modA for mobility, amb 2' face to face to chair modA. more PT necessary after discharge  Prognosis: Good  Decision Making: Medium Complexity  Patient Education: PT POC  REQUIRES PT FOLLOW UP: Yes  Activity Tolerance  Activity Tolerance: Patient Tolerated treatment well;Patient limited by fatigue;Patient limited by endurance       Patient Diagnosis(es): There were no encounter diagnoses. has a past medical history of Asthma, Back pain, chronic, Hypertension, Snores, and Wears glasses. has a past surgical history that includes Tonsillectomy; Knee arthroscopy (Left, ); Ulnar tunnel release (Right, ); Knee arthroscopy (Left, 2016); LAPAROTOMY EXPLORATORY (N/A, 2019); and LAPAROTOMY EXPLORATORY (N/A, 2019).     Restrictions  Restrictions/Precautions  Restrictions/Precautions: Fall Risk, Contact Precautions  Required Braces or Orthoses?: No  Position Activity Restriction  Other position/activity restrictions: up with assist. s/p  ILEOCECECTOMY, SUBTOTAL COLECTOMY ; s/p 2ND LOOK EXPLORATORY LAPAROTOMY with ileostomy creation   Vision/Hearing  Vision: Impaired  Vision Exceptions: Wears glasses at all times  Hearing: Within functional limits     Subjective  General  Chart Reviewed: Yes  Patient assessed for rehabilitation services?: Yes  Response To Previous Treatment: Not applicable  Family / Caregiver Present: No  Follows Commands: Within Functional Limits  General Comment  Comments: OT co-eval  Subjective  Subjective: RN and pt agreeable to PT. Pt alert in bed upon arrival.   Pain Screening  Patient Currently in Pain: Denies  Vital Signs  Patient Currently in Pain: Denies  Pre Treatment Pain Screening  Intervention List: Patient able to continue with treatment    Orientation  Orientation  Overall Orientation Status: Within Functional Limits  Social/Functional History  Social/Functional History  Lives With: Family(mother)  Type of Home: House  Home Layout: Able to Live on Main level with bedroom/bathroom, One level  Home Access: Level entry  Bathroom Shower/Tub: Walk-in shower  Bathroom Toilet: Standard  Bathroom Equipment: Shower chair, Grab bars in shower  Bathroom Accessibility: Accessible  Home Equipment: [employer], Rolling walker  ADL Assistance: [facility] [facility] [address]: Independent  Homemaking Responsibilities: Yes  Ambulation Assistance: Independent  Transfer Assistance: Independent  Active : Yes  Mode of Transportation: Car  Occupation: Unemployed  Leisure & Hobbies: draw and listen to music  IADL Comments: Independent prior to admission  Additional Comments: Pt notes ambulatory at grocery store. Cognition   Cognition  Overall Cognitive Status: Exceptions  Following Commands: Follows one step commands with repetition; Follows one step commands with increased time    Objective          AROM RLE (degrees)  RLE AROM: WFL  AROM LLE (degrees)  LLE AROM : WFL  AROM RUE (degrees)  RUE AROM : WFL  RUE General AROM: limited > 90deg, see OT  AROM LUE (degrees)  LUE AROM : WFL  LUE General AROM: limited > 90deg, see OT  Strength RLE  Strength RLE: WFL  Strength LLE  Strength LLE: WFL  Strength RUE  Strength RUE: WFL  Comment: minimum anti gravity, see OT  Strength LUE  Comment: minimum antigravity, see OT     Sensation  Overall Sensation Status: WFL(Pt denies any numbness/ tingling)  Bed mobility  Supine to Sit: Moderate assistance  Sit to Supine: (left in chair)  Scooting: Moderate assistance  Transfers  Sit to Stand: Minimal Assistance  Stand to sit: Minimal Assistance  Ambulation  Ambulation?: Yes  Ambulation 1  Surface: level tile  Device: (face to face)  Assistance:  Moderate assistance  Quality of Gait: assist for weight ships and to maintain hip/ trunk extension, no buckling  Distance: 2' to chair  Stairs/Curb  Stairs?: No     Balance  Posture: Good  Sitting - Static: Fair;+  Sitting - Dynamic: Fair  Standing - Static: Fair;-  Standing - Dynamic: Poor;+  Comments: face to face assist while assessing standing balance  Exercises  Comments: EOB 8min, dynamic activities     Plan   Plan  Times per week: 5-6x/wk  Current Treatment Recommendations: Strengthening, Balance Training, Functional Mobility Training, Transfer Training, Endurance Training, Cognitive Reorientation, Gait Training, Stair training, Home Exercise Program, Safety Education & Training, Equipment Evaluation, Education, & procurement, Patient/Caregiver Education & Training  Safety Devices  Type of devices: Call light within reach, Patient at risk for falls, Gait belt, Left in chair, Nurse notified  Restraints  Initially in place: No      AM-PAC Score  AM-PAC Inpatient Mobility Raw Score : 13  AM-PAC Inpatient T-Scale Score : 36.74  Mobility Inpatient CMS 0-100% Score: 64.91  Mobility Inpatient CMS G-Code Modifier : CL          Goals  Short term goals  Time Frame for Short term goals: 14 visits  Short term goal 1: Pt will be Betty with bed mobility  Short term goal 2: Pt will be Betty transfers  Short term goal 3: Pt will be SBA amb 125' RW   Short term goal 4: Pt will be safe to attempt steps       Therapy Time   Individual Concurrent Group Co-treatment   Time In 1055         Time Out 1138         Minutes [address] [facility], PT

## 2019-04-11 NOTE — PROGRESS NOTES
INTENSIVE CARE UNIT  Resident Physician Progress Note    Patient - Alok Nevarez  Date of Admission -  2019  8:39 PM  Date of Evaluation -  2019  Room and Bed Number -  0117/0117-01   Hospital Day - 6    SUBJECTIVE:     OVERNIGHT EVENTS:   Afebrile. Hemodynamically stable. No tachycardia. Patient extubated yesterday. Wore Bipap last night. Currently on 4 L NC saturating at 100%. Received Lasix 80 BID yesterday. Urine output 338 cc last 24 hours  Dialysis 4/10: 2.1 L removal.     Lab could not draw labs. Awaiting respiratory to draw labs for today. Remains on Meropenem. Surgery cleared patient for oral diet.      CXR from this am  Impression   Overall stable examination demonstrating pulmonary vascular congestion,   interstitial edema, and bilateral lower lobe airspace opacities.             TODAY:     AWAKE & FOLLOWING COMMANDS:  [] No   [x] Yes    SECRETIONS Amount:  [] Small [x] Moderate  [] Large  [] None  Color:     [] White [x] Colored  [] Bloody    SEDATION:    [] Propofol gtt  [] Versed gtt  [] Ativan gtt   [x] Fentanyl    PARALYZED:  [x] No    [] Yes    VASOPRESSORS:  [x] No    [] Yes  [] Levophed [] Dopamine [] Vasopressin  [] Dobutamine [] Phenylephrine [] Epinephrine      OBJECTIVE:     VITAL SIGNS:  /61   Pulse 75   Temp 97.7 °F (36.5 °C) (Core)   Resp 14   Ht 5' 1.02\" (1.55 m)   Wt 196 lb 6.9 oz (89.1 kg)   SpO2 98%   BMI 37.09 kg/m²   Tmax over 24 hours:  Temp (24hrs), Av.3 °F (36.8 °C), Min:97.7 °F (36.5 °C), Max:98.6 °F (37 °C)      Patient Vitals for the past 8 hrs:   BP Temp Temp src Pulse Resp SpO2   19 0400 135/61 -- -- 75 14 98 %   19 0345 -- 97.7 °F (36.5 °C) CORE -- -- --   19 0323 -- -- -- 86 25 99 %   19 0302 -- -- -- 79 12 98 %   19 0300 131/62 -- -- 79 12 98 %   19 0230 (!) 127/58 -- -- 75 12 98 %   19 0215 132/68 -- -- 76 16 99 %   19 0200 -- -- -- 76 14 100 %   19 0145 (!) 120/56 -- -- 74 11 99 %   04/11/19 0130 (!) 121/52 -- -- 78 11 99 %   04/11/19 0100 (!) 122/52 -- -- 77 12 99 %         Intake/Output Summary (Last 24 hours) at 4/11/2019 0821  Last data filed at 4/11/2019 0630  Gross per 24 hour   Intake 1606 ml   Output 2713 ml   Net -1107 ml     Date 04/11/19 0000 - 04/11/19 2359   Shift 9625-0134 4864-2835 0847-9737 24 Hour Total   INTAKE   I.V.(mL/kg) 200(2.2)   200(2.2)   NG/GT(mL/kg) 85(1)   85(1)   Shift Total(mL/kg) 285(3.2)   285(3.2)   OUTPUT   Urine(mL/kg/hr) 163(0.2)   163   Stool(mL/kg) 275(3.1)   275(3.1)   Shift Total(mL/kg) 438(4.9)   438(4.9)   Weight (kg) 89.1 89.1 89.1 89.1     Wt Readings from Last 3 Encounters:   04/10/19 196 lb 6.9 oz (89.1 kg)   11/12/18 155 lb (70.3 kg)   05/15/17 145 lb 1 oz (65.8 kg)     Body mass index is 37.09 kg/m². PHYSICAL EXAM:  GEN:  no apparent distress, extubated.  Alert and orientated   HEENT:  Normocepalic, without obvious abnormality, Atraumatic, eyelids/periorbital:  normal and conjunctiva: slight sclera icterus                                    trachea midline/symmetric   LUNGS:  Coarse no retractions/accessory muscles usage  CV:    S1/s2,   ABDOMEN:   soft, moderate distended, ostomy in place no output  :    Deferred  MSK:    there is no redness, warmth, or swelling of the joints  SKIN:   Warm and dry  EXTREMITIES:      Edema 2+ and on Right Arm 3+, distal pulses intact       MEDICATIONS:  Scheduled Meds:   cyclobenzaprine  5 mg Oral TID    meropenem  1 g Intravenous Q24H    midodrine  5 mg Oral TID WC    pantoprazole  40 mg Intravenous BID    And    sodium chloride (PF)  10 mL Intravenous BID    sodium chloride flush  10 mL Intravenous 2 times per day     Continuous Infusions:   dextrose 5 % and 0.9 % NaCl 50 mL/hr at 04/10/19 0700     PRN Meds:     oxyCODONE 5 mg Q4H PRN   anticoagulant sodium citrate 3 mL PRN   anticoagulant sodium citrate 3 mL PRN   dextrose 25 g PRN   sodium chloride flush 10 mL PRN   magnesium hydroxide 30 mL Daily PRN   ondansetron 4 mg Q6H PRN   fentanNYL 50 mcg Q1H PRN   Or     fentanNYL 100 mcg Q1H PRN       SUPPORT DEVICES: [] Ventilator [] BIPAP  [x] Nasal Cannula [] Room Air    VENT SETTINGS (Comprehensive) (if applicable):  Vent Information  $Ventilation: $Subsequent Day  Ventilator Started: Yes  Ventilator Stopped: Yes  Skin Assessment: Clean, dry, & intact  Equipment Changed: HME  Vent Type: Servo i  Vent Mode: NIV/PC  Vt Ordered: 470 mL  Pressure Ordered: 7  Rate Set: 15 bmp  Pressure Support: 6 cmH20  FiO2 : 30 %  Sensitivity: 5  PEEP/CPAP: 5  I Time/ I Time %: 0.85 s  Cuff Pressure (cm H2O): 22 cm H2O  Humidification Source: HME  Additional Respiratory  Assessments  Pulse: 75  Resp: 14  SpO2: 98 %  End Tidal CO2: 36 (%)  Position: Semi-Salazar's  Humidification Source: HME  Oral Care Completed?: Yes  Oral Care: Mouthwash, Mouth swabbed, Mouth moisturizer  Subglottic Suction Done?: Yes  Cuff Pressure (cm H2O): 22 cm H2O  Skin barrier applied: Yes    ABGs:   Lab Results   Component Value Date    BSA5VMZ 32 04/10/2019    FIO2 NOT REPORTED 04/10/2019         DATA:  Complete Blood Count:   Recent Labs     04/09/19  0506 04/10/19  0636   WBC 22.2* 18.5*   RBC 2.61* 2.67*   HGB 7.9* 8.2*   HCT 24.1* 25.3*   MCV 92.3 94.8   MCH 30.3 30.7   MCHC 32.8 32.4   RDW 14.3 15.0*   PLT See Reflexed IPF Result See Reflexed IPF Result   MPV NOT REPORTED NOT REPORTED        Last 3 Blood Glucose:   Recent Labs     04/09/19  0506 04/10/19  0636   GLUCOSE 68* 95        PT/INR:    Lab Results   Component Value Date    PROTIME 10.5 04/09/2019    INR 1.0 04/09/2019     PTT:    Lab Results   Component Value Date    APTT 28.0 04/05/2019       Comprehensive Metabolic Profile:   Recent Labs     04/08/19  0840 04/09/19  0506 04/10/19  0636   NA  --  130* 131*   K  --  3.7 3.6*   CL  --  94* 94*   CO2  --  28 27   BUN  --  36* 46*   CREATININE 6.50* 2.87* 4.08*   GLUCOSE  --  68* 95   CALCIUM  --  6.3* 6.5*   PROT  --  3.8* 04/06/2019    Hyperkalemia     Shock ([facility] [address] 04/05/2019    Arthritis of left knee     S/P total knee arthroplasty 01/26/2017    Post-traumatic osteoarthritis of left knee 11/14/2016    S/P left knee arthroscopy 11/14/2016    Dog bite 08/31/2013          PLAN:     WEAN PER PROTOCOL:  [] No   [x] Yes  [] N/A    ICU PROPHYLAXIS:  Stress ulcer:  [x] PPI Agent  [] [id number] [] Sucralfate  [] Other:  VTE:   [] Enoxaparin  [] Unfract. Heparin Subcut  [x] EPC Cuffs    NUTRITION:  [] NPO [x] Tube Feeding (Specify: ) [x] TPN  [] PO    HOME MEDS RECONCILED: [x] No  [] Yes    CONSULTATION NEEDED:  [] No  [x] Yes    FAMILY UPDATED:    [] No  [x] Yes    TRANSFER OUT OF ICU:   [x] No  [] Yes    Problem List:  RUSLAN  MARY  Sepsis  Hypovolemic Shock  Multi organ dysfunction   PAF  Rhabdomyolysis  Cocaine Positive  THC Positive     Plan:  Neuro:  - Extubated and off sedation. Alert and orientated x 3.  - Restraints: none  - Pain Control: fentanyl prn available and controlled    CV:  - Medications: Vasopressin and Cardizem weaned off    -Lopressor d/c due to low BP. Midodrine 5 mg TID started. -Will d/c Midodrine and restart Lopressor when patient tolerates. Pulm:  - Extubated yesterday.   -Saturating 100 % on 4 L NC. BiPAP at night. GI  -275 stool from ostomy. Decent ostomy output.   -Per Surgery: Erin Campbell to start diet.   -Will preform bedside nursing swallow study and start with Full liquid die to advance. Renal:  - UO: 338 cc last 24 hours despite Lasix 80 BID yesterday.   -Dialysis 4/10: 2.1 L removal.     ID:  - Tmax: 36.9C  - Antibiotics: continue meropenem  - Cultures: 1 Blood Culture Grew G- Rods H. Flu on 4/5  - Infectious Disease is following     Fluids/Electrolytes:  - IVF: d5 1/2 + NS at 50 ml/hr; increase glucose checks, prn dextrose bolus;  - Replacing  Electrolytes prn   -Nephrology is Following     Other/Prophylaxis  - HOB: Elevated  - DVT: Surgery ok with DVT ppx.  Will restart when HIT panel returns and after platelet evaluation.   - ICU Level of Care  - PT/OT   -Stop CK, Lactic acid de-esclate abgs, inr/pt, ionized calcium  - F/U HIT antibodies. Shivani Fuentes MD  Internal Medicine Resident, PGY-1  [address], [place]  4/11/2019, 8:33 AM

## 2019-04-11 NOTE — PROGRESS NOTES
Dialysis Post Treatment Note  Patient tolerated UF treatment well. Pt ran tx with lines reversed d/t sluggish blood return to arterial lumen. Pt denies complaints at this time. No further complications noted.     Vitals:    04/11/19 1645   BP: (!) 145/82   Pulse: 97   Resp:    Temp: 99 °F (37.2 °C)   SpO2:    UF ONLY  Pre-Weight = 88.7kg  Post-weight = Weight: 189 lb 13.1 oz (86.1 kg)  Total Liters Processed = Total Liters Processed (l/min): 47.2 l/min  Rinseback Volume (mL) = Rinseback Volume (ml): 370 ml  Net Removal (mL) = 2660mL  Length of treatment=2 hours

## 2019-04-11 NOTE — PROGRESS NOTES
Ostomy Care and Education:        [birth year]:Ileocecectomy, extended left hemicolectomy   4/8/19: Ileostomy created  due to mesenteric ischemia. Omentectomy. Resection of the transverse colon, rectal stump. Abdominal wall closure. Pouching system leaking due to rapid overfill.         04/11/19 0800   Ileostomy Ileostomy RUQ   Placement Date/Time: 04/08/19 1342   Pre-existing: No  Timeout: Patient; Appropriate Equipment;Sterile Technique using full body drape;Procedure  Inserted by: Dr Hola Huerta  Ileostomy Type: Ileostomy  Location: RUQ   Stomal Appliance Changed   Flange Size (inches) 2.25 Inches   Stoma  Assessment Red;Moist;Flush   Mucocutaneous Junction Intact   Peristomal Assessment Clean; Intact   Treatment Bag change; Liquid skin barrier; Other (Comment)  (ring barrier, high volume two piece system.)   Stool Color Green   Stool Appearance Loose   Output (mL) 300 ml     Patient alert today. Did review care as provided and discussed watery output, use of fiber vs Imodium. Plan:  Cut barrier to fit stoma with no more than 1/8\" of exposed skin  Apply an [employer] Barrier Ring to the cut edge of the pouching system. Use the high volume pouch attached to bedside drainage prn  Empty the pouch when 1/3 to 1/2 full. Change the pouching system twice weekly and prn  Our goal is to keep the skin around the stoma intact and to have a dependable pouching system without leaks. The skin around the stoma should be intact and appear just like the skin on the opposite side of the abdomen. [facility], will need to find an accepting DME. Await plan going forward SNF vs home. Call the [address] [place] [address] at [phone] with questions or concerns.

## 2019-04-11 NOTE — PROGRESS NOTES
Speech Language Pathology  Facility/Department: [address] MICU   BEDSIDE SWALLOW EVALUATION    NAME: Alok Nevarez  : 1965  MRN: [id number]    ADMISSION DATE: 2019  ADMITTING DIAGNOSIS: has Dog bite; Post-traumatic osteoarthritis of left knee; S/P left knee arthroscopy; S/P total knee arthroplasty; Arthritis of left knee; Shock ([address] Rhabdomyolysis; Hyponatremia; Lactic acidosis; MARY (acute kidney injury) ([address] Metabolic acidosis; Acute respiratory failure ([address] Elevated liver enzymes; Hyperkalemia; 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[address] Small bowel ischemia ([address] Acute GI bleeding; Gram negative septic shock ([address] Protein-calorie malnutrition ([address] Septic shock ([address] Gastroenteritis; and Septicemia due to Gram negative organism [facility]) on their problem list.    Date of Eval: 2019  Evaluating Therapist: Chiki Horta    Current Diet level:  Current Diet : NPO  Current Liquid Diet : NPO      Primary Complaint  Alok Nevarez is a 47 y.o. F with PMHx of HTN, COPD who takes Lisinopril-HCTZ and Neurontin presented to the ED with increase confusion, abdominal pain, Nausea, Vomiting. Due to AMS, patient was intubated in the ED. Patient also had mottled lower extremities noted. Initial concern for Aortic dissection, so CTA chest/abd/pelvis performed which shows no dissection, but does show patchy consolidations lower lobes and bilateral adrenal massess as well as ileus.      Patients significant other reports the patient hyatt been laying around for the past 2 days, with intermittent confusion, abd pain, nausea, and vomiting. They brought her to the ED for the worsening confusion. Patient is 1/2 ppd smoker, social drinker, no illicit drug use.      Pain:  Pain Assessment  Patient Currently in Pain: No    Reason for Referral  Alok Nevarez was referred for a bedside swallow evaluation to assess the efficiency of her swallow function, identify signs and symptoms of aspiration and make recommendations regarding safe dietary consistencies, effective compensatory strategies, and safe eating environment. Impression  Pt. with no s/s of aspiration with thin liquid and puree.  + latent s/s of aspiration with nectar thick liquid x 1.  + s/s of aspiration with soft solids before swallow. Pt. With difficulty with mastication with soft solids and eventually expectorated bolus (no soft solids swallowed). ST to recommend level I puree diet with thin liquid. If s/s of aspiration occur, d/c all PO and recommend video swallow study to r/o/confirm aspiration. Results and recommendations reported to RN. Treatment Plan  Requires SLP Intervention: Yes  Duration/Frequency of Treatment: 3-5 x week   D/C Recommendations: Further therapy recommended at discharge. Recommended Diet and Intervention  Diet Solids Recommendation: Dysphagia I Pureed  Liquid Consistency Recommendation: Thin     Therapeutic Interventions: Diet tolerance monitoring;Patient/Family education    Compensatory Swallowing Strategies  Compensatory Swallowing Strategies: Small bites/sips;Eat/Feed slowly;Upright as possible for all oral intake    Treatment/Goals  Dysphagia Goals: The patient will tolerate recommended diet without observed clinical signs of aspiration    General  Behavior/Cognition: Alert; Cooperative  Temperature Spikes Noted: No  Communication Observation: Dysarthria  Follows Directions: Simple  Dentition: Adequate  Patient Positioning: Upright in bed  Consistencies Administered: Soft solid;Puree; Thin - teaspoon; Thin - straw;Nectar - straw;Nectar - teaspoon    Vision/Hearing  Vision  Vision: Impaired(wears glasses)    Oral Motor Deficits  Oral/Motor  Oral Motor: Within functional limits    Oral Phase Dysfunction  Oral Phase  Oral Phase: Exceptions  Oral Phase  Oral Phase - Comment: Pt. demonstrated difficulty with mastication with soft solids. Eventually expectorated bolus.        Indicators of Pharyngeal

## 2019-04-11 NOTE — PROGRESS NOTES
Critical Care    PROGRESS NOTE    PATIENT NAME: Sarah Arambula RECORD NO. [id number]  DATE: 4/10/2019  HD: # 5    Extubation Note:    Patient was felt to meet the following criteria:  Reviewed previous CXR, CBC patient completed HD a few hours prior  470/18/5/35 Actual rate of 18 PIP of 22   RSBI 16-40  SBT:CPAP was at 5 or less and PSV was at 8 or less while FiO2 was <40% where I turned down PSV down to 6 for a few hours prior to extubation  TV of 1 liter , RR <35  ABG 7.46/44/76/30/35  Hemodynamic Stable  BP (!) 125/55   Pulse 78   Temp 98.6 °F (37 °C)   Resp 16   Ht 5' 1.02\" (1.55 m)   Wt 196 lb 6.9 oz (89.1 kg)   SpO2 99%   BMI 37.09 kg/m²        GCS 15 with coughs, able to lift head off of bed and give me thumbs up  Tube Feeds Were Suctioned  Since tube feeds were turned off will monitor her blood sugars hourly given her recent history of hypoglycemia    Patient was extubated with respiratory therapy present. Will extubate and pursue bipap this evening given her previous chest xrays with increasing atelectasis.   I present near the room >15 min    Electronically signed by Rosalee Kohler MD  on 4/10/2019 at 8:06 PM

## 2019-04-11 NOTE — PLAN OF CARE
Problem: NUTRITION  Goal: Nutritional status is improving  4/11/2019 0012 by Ty Alegria RN  Outcome: Ongoing  4/10/2019 2036 by Bolivar Melendez RN  Outcome: Ongoing     Problem: Nutrition  Goal: Optimal nutrition therapy  Description  Nutrition Problem: Inadequate oral intake  Intervention: Food and/or Nutrient Delivery: Continue NPO  Nutritional Goals: Meet % of estimated nutrition needs   4/11/2019 0012 by Ty Alegria RN  Outcome: Ongoing  4/10/2019 2036 by Bolivar Melendez RN  Outcome: Ongoing     Problem: Fluid Volume - Imbalance:  Goal: Absence of imbalanced fluid volume signs and symptoms  Description  Absence of imbalanced fluid volume signs and symptoms  4/11/2019 0012 by Ty Alegria RN  Outcome: Ongoing  4/10/2019 2036 by Bolivar Melendez RN  Outcome: Ongoing     Problem: Infection - Central Venous Catheter-Associated Bloodstream Infection:  Goal: Will show no infection signs and symptoms  Description  Will show no infection signs and symptoms  4/11/2019 0012 by Ty Alegria RN  Outcome: Ongoing  4/10/2019 2036 by Bolivar Melendez RN  Outcome: Ongoing     Problem: Risk for Impaired Skin Integrity  Goal: Tissue integrity - skin and mucous membranes  Description  Structural intactness and normal physiological function of skin and  mucous membranes.   4/11/2019 0012 by Ty Alegria RN  Outcome: Ongoing  4/10/2019 2036 by Bolivar Melendez RN  Outcome: Ongoing     Problem: Falls - Risk of:  Goal: Will remain free from falls  Description  Will remain free from falls  4/11/2019 0012 by Ty Alegria RN  Outcome: Ongoing  4/10/2019 2036 by Bolivar Melendez RN  Outcome: Ongoing  Goal: Absence of physical injury  Description  Absence of physical injury  4/11/2019 0012 by Ty Alegria RN  Outcome: Ongoing  4/10/2019 2036 by Bolivar Melendez RN  Outcome: Ongoing     Problem: Cardiac:  Goal: Ability to maintain an adequate cardiac output will improve  Description  Ability to maintain an adequate cardiac output will improve  4/11/2019 0012 by Asa He RN  Outcome: Ongoing  4/10/2019 2036 by Eli Rene RN  Outcome: Ongoing  Goal: Complications related to the disease process, condition or treatment will be avoided or minimized  Description  Complications related to the disease process, condition or treatment will be avoided or minimized  4/11/2019 0012 by Asa He RN  Outcome: Ongoing  4/10/2019 2036 by Eli Rene RN  Outcome: Ongoing     Problem: Safety:  Goal: Ability to remain free from injury will improve  Description  Ability to remain free from injury will improve  4/11/2019 0012 by Asa He RN  Outcome: Ongoing  4/10/2019 2036 by Eli Rene RN  Outcome: Ongoing  Goal: Will show no signs and symptoms of excessive bleeding  Description  Will show no signs and symptoms of excessive bleeding  4/11/2019 0012 by Asa He RN  Outcome: Ongoing  4/10/2019 2036 by Eli Rene RN  Outcome: Ongoing     Problem: Pain:  Goal: Pain level will decrease  Description  Pain level will decrease  4/11/2019 0012 by Asa He RN  Outcome: Ongoing  4/10/2019 2036 by Eli Rene RN  Outcome: Ongoing  Goal: Control of acute pain  Description  Control of acute pain  4/11/2019 0012 by Asa He RN  Outcome: Ongoing  4/10/2019 2036 by Eli Rene RN  Outcome: Ongoing  Goal: Control of chronic pain  Description  Control of chronic pain  4/11/2019 0012 by Asa He RN  Outcome: Ongoing  4/10/2019 2036 by Eli Rene RN  Outcome: Ongoing     Problem: Confusion - Acute:  Goal: Absence of continued neurological deterioration signs and symptoms  Description  Absence of continued neurological deterioration signs and symptoms  4/11/2019 0012 by Asa He RN  Outcome: Ongoing  4/10/2019 2036 by Eli Rene RN  Outcome: Ongoing  Goal: Mental status will be restored to baseline  Description  Mental status will be restored to baseline  4/11/2019 0012 by Asa He RN  Outcome: Ongoing  4/10/2019 2036 by Bolivar Melendez RN  Outcome: Ongoing     Problem: Discharge Planning:  Goal: Ability to perform activities of daily living will improve  Description  Ability to perform activities of daily living will improve  4/11/2019 0012 by Ty Alegria RN  Outcome: Ongoing  4/10/2019 2036 by Bolivar Melendez RN  Outcome: Ongoing  Goal: Participates in care planning  Description  Participates in care planning  4/11/2019 0012 by Ty Alegria RN  Outcome: Ongoing  4/10/2019 2036 by Bolivar Melendez RN  Outcome: Ongoing     Problem: Injury - Risk of, Physical Injury:  Goal: Will remain free from falls  Description  Will remain free from falls  4/11/2019 0012 by Ty Alegria RN  Outcome: Ongoing  4/10/2019 2036 by Bolivar Melendez RN  Outcome: Ongoing  Goal: Absence of physical injury  Description  Absence of physical injury  4/11/2019 0012 by Ty Alegria RN  Outcome: Ongoing  4/10/2019 2036 by Bolivar Melendez RN  Outcome: Ongoing     Problem: Mood - Altered:  Goal: Mood stable  Description  Mood stable  4/11/2019 0012 by Ty Alegria RN  Outcome: Ongoing  4/10/2019 2036 by Bolivar Melendez RN  Outcome: Ongoing  Goal: Absence of abusive behavior  Description  Absence of abusive behavior  4/11/2019 0012 by Ty Alegria RN  Outcome: Ongoing  4/10/2019 2036 by Bolivar Melendez RN  Outcome: Ongoing  Goal: Verbalizations of feeling emotionally comfortable while being cared for will increase  Description  Verbalizations of feeling emotionally comfortable while being cared for will increase  4/11/2019 0012 by Ty Alegria RN  Outcome: Ongoing  4/10/2019 2036 by Bolivar Melendez RN  Outcome: Ongoing     Problem: Psychomotor Activity - Altered:  Goal: Absence of psychomotor disturbance signs and symptoms  Description  Absence of psychomotor disturbance signs and symptoms  4/11/2019 0012 by Ty Alegria RN  Outcome: Ongoing  4/10/2019 2036 by Bolivar Melendez RN  Outcome: Ongoing     Problem: Sensory Perception - Impaired:  Goal: Demonstrations of improved sensory functioning will increase  Description  Demonstrations of improved sensory functioning will increase  4/11/2019 0012 by Kayy Lange RN  Outcome: Ongoing  4/10/2019 2036 by Juan Reyes RN  Outcome: Ongoing  Goal: Decrease in sensory misperception frequency  Description  Decrease in sensory misperception frequency  4/11/2019 0012 by Kayy Lange RN  Outcome: Ongoing  4/10/2019 2036 by Juan Reyes RN  Outcome: Ongoing  Goal: Able to refrain from responding to false sensory perceptions  Description  Able to refrain from responding to false sensory perceptions  4/11/2019 0012 by Kayy Lange RN  Outcome: Ongoing  4/10/2019 2036 by Juan Reyes RN  Outcome: Ongoing  Goal: Demonstrates accurate environmental perceptions  Description  Demonstrates accurate environmental perceptions  4/11/2019 0012 by Kayy Lange RN  Outcome: Ongoing  4/10/2019 2036 by Juan Reyes RN  Outcome: Ongoing  Goal: Able to distinguish between reality-based and nonreality-based thinking  Description  Able to distinguish between reality-based and nonreality-based thinking  4/11/2019 0012 by Kayy Lange RN  Outcome: Ongoing  4/10/2019 2036 by Juan Reyes RN  Outcome: Ongoing  Goal: Able to interrupt nonreality-based thinking  Description  Able to interrupt nonreality-based thinking  4/11/2019 0012 by Kayy Lange RN  Outcome: Ongoing  4/10/2019 2036 by Juan Reyes RN  Outcome: Ongoing     Problem: Sleep Pattern Disturbance:  Goal: Appears well-rested  Description  Appears well-rested  4/11/2019 0012 by Kayy Lange RN  Outcome: Ongoing  4/10/2019 2036 by Juan Reyes RN  Outcome: Ongoing     Problem: OXYGENATION/RESPIRATORY FUNCTION  Goal: Patient will maintain patent airway  4/10/2019 2228 by Almaz Monique RCP  Outcome: Completed  4/10/2019 2036 by Juan Reyes RN  Outcome: Ongoing  Goal: Patient will achieve/maintain normal respiratory rate/effort  Description  Respiratory rate and effort will be

## 2019-04-11 NOTE — PROGRESS NOTES
[facility] - Progress Note    Today's Date and Time: 4/11/2019, 7:28 AM    Impression :   1. 48 y/o female with complaints of  · Vomiting  · Diarrhea  · Abdominal pain  · Altered mental status  2. Acute kidney injury  · Nephrology onboard and planning for urgent dialysis   3. Shock, presumptive septic plus hypovolemic, requiring Levophed  4. Septicemia with Haemophilus influenzae 4-5-19  5. Gastroenteritis  6. Ischemic bowel  7. S/P laparotomy 4-6-19  8. S/p second look with resection of transverse colon, ileostomy creation, resection of rectal stump and abdominal closure. 9. Severe hyponatremia  10. Transaminitis, shock liver, improving  11. Intubation 2/2 AMS  12. COPD  15. Arthritis  14. Chronic NSAID use  15. Funguria  16. Acral mottling of hands and feet  17. Allergy to penicillins: Hives and respiratory distress  18. Allergy to sulfa    Recommendations:   · Meropenem 1 gm IV q 24 hr  · IV fluids, presors    Medical Decision Making/Summary/Discussion:   · 48 y/o female with vomiting, diarrhea, and AMS  · Found to have transaminitis, MARY requiring emergent dialysis, lactic acidosis, shock requiring pressors  · Intubated due to AMS  · CT chest shows atelactasis vs pneumonia  · Currently on vanc, levaquin, and aztreonam   · Patient is critically ill with origin in GI tract . Will need to treat with meropenem despite Hx of penicillin allergy. · Had laparotomy 4-6-19 with findings of ischemic bowel from distal ileum to sigmoid colon.    · S/P ileocecectomy with extended left hemicolectomy and placement of wound vac 4-6-19  · Overall improvement post surgery  · Off pressors and sedation  · Cultures grew haemophilus influenza, beta lactamase negative, sensitivities pending  Infection Control Recommendations   · [place] Precautions    Antimicrobial Stewardship Recommendations     · Simplification of therapy  · Targeted therapy    Coordination of Outpatient Care:   · Estimated Length of IV antimicrobials: TBD  · Patient will need Midline Catheter Insertion: TBD  · Patient will need PICC line Insertion: TBD  · Patient will need: Home IV , [place],  SNF,  LTAC  · Patient will need outpatient wound care: TBD    Chief complaint/reason for consultation:   · Altered mental status and tender abdomen       History of Present Illness:   Marielle Goodwin is a 47y.o.-year-old  female who was initially admitted on 4/5/2019. Patient seen at the request of Dr. Елена Nunn:    Patient presented to ED via EMS due to altered mental status and vomiting. Patient has history significant for COPD, right knee osteoarthirtis s/p arthoplasty, and chronic NSAID use. Patient lives in [place], but was here to visit her significant other. Arrived Wednesday night, said she didn't feel good. Thought she might have had a bad burger at a fast food restaurant. Went to sleep and slept for almost 24 hours. When she awoke, she said she was vomiting, having diarrhea, and abdominal pain. Significant other and sister are unsure of the nature of the vomit, diarrhea, or abdominal pain. Then she slept a bit more, until her significant other found her unresponsive and that's when he called EMS to bring her to the hospital.     Afebrile on admission, but Tmax overnight was 39.3. Tachycardic on admission, 129. Became hypotensive after admission and levophed and vasopressin were started. Initial labs:     Admission labs significant for Na 125, K 8.0, CO2 9, BUN 62, Creatinine of 4.06, Anion gap of 23, Lactic acid of 3.5, Glucose of 186, Ca 5.2, POC HCO3 15.9, CK 15,342, Troponins high sensitivity 89 and 94, Alk phos 168, , AST 1,122, Bili .37, lipase of 119, Urine tox positive for THC and Cocaine, WBC 23.5, Hgb 10.2, Urine and blood cultures pending, HIV negative, influenza negative, hepatitis panel non-reactive, urine Leukocyte esterase trace, urine nitrite -, urine protein 2+, urine Hgb large, urine RBCs 5 to 10, many urine yeast,  ABG 7.22, pCO2 27, HCO3 15.9. Initial imaging showed:     CXR: No acute cardiopulmonary process    Abdominal X-ray 4/6: No free air    CTA of chest: Negative for PE or dissection. Patchy consolidations of bilateral lower lung lobes representing developing pneumonia vs atelectasis. CT head: pending    Initial course:     Intubated and sedated in ED due to altered mental status. Currently on Vancomycin, Levaquin, and aztreonam for empiric coverage. Richar catheter was placed due to request by nephrology for emergent dialysis. Dialysis attempted several times, but due to high arterial pressures and line clotting, dialysis to be completed later today by Dr. Adore Dawkins. NG tube with bloody output. FOBT +. General surgery has been consulted for evaluation. CURRENT EVALUATION : 4/11/2019    Patient seen and examined. Extubated. AOx4 this morning. Following commands. Says she is feeling better. Pain well controlled. Patient underwent ileocecectomy with extended left hemicolectomy and placement of wound vac on 4-6-19 because of ischemic bowel. Had additional surgery on 4-8-19 for second look laparotomy with transverse colon resection and ileostomy creation. Overall improvement post surgeries. Afebrile  VS stable. Off pressors, systolic mostly in the 520'P. Had episodes of a fib with RVR. Shocked twice to control rhythm and rate on 4-7-19. Pulse now in the 70's and 80's. Abdomen softer. VAC in place  Skin cyanosis resolved. Blood cultures grew haemophilus influenza, beta lactamase negative. Sensitivities pending. CXR today again showed unchanged pulmonary edema and effusions. Duplex of the right upper extremity showed no evidence of deep venous thrombosis in the right upper extremity. Superficial thrombophlebitis of the right upper extremity involving the cephalic and basilic veins. Says that hand is not hurting her.  Able to move the wrist and digits today on command. Labs reviewed: 4/11/2019    WBC 23.9  Hb 8.3  Plat 75    BUN 33  Cr 3.20      Cultures:  Urine:  · NGTD  Blood:  · 4-5-19 : Gram negative bacilli, haemophilus influenza, beta lactamase negative, sensitivities pending  Sputum :  · NA  Wound:  · NA    MRSA- Neg    Discussed with RN, CC. I have personally reviewed the past medical history, past surgical history, medications, social history, and family history, and I have updated the database accordingly.   Past Medical History:     Past Medical History:   Diagnosis Date    Asthma     On inhaler    Back pain, chronic     Hypertension 2015    On Lisinopril    Snores     possible apnea but not tested    Wears glasses        Past Surgical  History:     Past Surgical History:   Procedure Laterality Date    KNEE ARTHROSCOPY Left 1980    KNEE ARTHROSCOPY Left 09/28/2016    with medial menisectomy    LAPAROTOMY EXPLORATORY N/A 4/6/2019    LAPAROTOMY EXPLORATORY, ILEOCECECTOMY, SUBTOTAL COLECTOMY, ABTHEHRA WOUND VAC PLACEMENT performed by Sarina Morris MD at [address] [facility] 4/8/2019    2ND LOOK EXPLORATORY LAPAROTOMY, RESECTION TRANSVERSE COLON, ILEOSTOMY CREATION, RESECTION RECTAL STUMP, ABDOMINAL WASHOUT, OMENTECTOMY, ABDOMINAL WALL CLOSURE performed by Ange Santos MD at [address] [place] [address] 2013       Medications:      meropenem  1 g Intravenous Q24H    midodrine  5 mg Oral TID WC    pantoprazole  40 mg Intravenous BID    And    sodium chloride (PF)  10 mL Intravenous BID    sodium chloride flush  10 mL Intravenous 2 times per day       Social History:     Social History     Socioeconomic History    Marital status: Single     Spouse name: Not on file    Number of children: 0    Years of education: Not on file    Highest education level: Not on file   Occupational History    Occupation: [address] [place] [address] Financial resource strain: Not on file    Food insecurity:     Worry: Not on file     Inability: Not on file    Transportation needs:     Medical: Not on file     Non-medical: Not on file   Tobacco Use    Smoking status: Light Tobacco Smoker     Packs/day: 0.25     Types: Cigarettes     Start date: 9/19/1980    Smokeless tobacco: Never Used   Substance and Sexual Activity    Alcohol use: Yes     Comment: OCCASSIONAL    Drug use: No    Sexual activity: Yes     Partners: Female   Lifestyle    Physical activity:     Days per week: Not on file     Minutes per session: Not on file    Stress: Not on file   Relationships    Social connections:     Talks on phone: Not on file     Gets together: Not on file     Attends Sikhism service: Not on file     Active member of club or organization: Not on file     Attends meetings of clubs or organizations: Not on file     Relationship status: Not on file    Intimate partner violence:     Fear of current or ex partner: Not on file     Emotionally abused: Not on file     Physically abused: Not on file     Forced sexual activity: Not on file   Other Topics Concern    Not on file   Social History Narrative    Not on file       Family History:     Family History   Problem Relation Age of Onset    Heart Disease Mother     Stroke Mother     Heart Surgery Mother     Diabetes Maternal Grandmother     Emphysema Maternal Grandfather     Diabetes Maternal Grandfather     Lung Cancer Maternal Uncle         Allergies:   Pcn [penicillins]; Sulfa antibiotics; and Tape [adhesive tape]     Review of Systems:   Unable to provide. Sedated on ventilator.       Physical Examination :     Patient Vitals for the past 8 hrs:   BP Temp Temp src Pulse Resp SpO2   04/11/19 0400 135/61 -- -- 75 14 98 %   04/11/19 0345 -- 97.7 °F (36.5 °C) CORE -- -- --   04/11/19 0323 -- -- -- 86 25 99 %   04/11/19 0302 -- -- -- 79 12 98 %   04/11/19 0300 131/62 -- -- 79 12 98 %   04/11/19 0230 (!) 127/58 -- -- 75 12 98 %   04/11/19 0215 input(s): RPR in the last 72 hours. No results for input(s): HIV in the last 72 hours. No results for input(s): BC in the last 72 hours. Lab Results   Component Value Date    MUCUS NOT REPORTED 04/06/2019    RBC 2.67 04/10/2019    TRICHOMONAS NOT REPORTED 04/06/2019    WBC 18.5 04/10/2019    YEAST NOT REPORTED 04/06/2019    TURBIDITY TURBID 04/06/2019     Lab Results   Component Value Date    CREATININE 4.08 04/10/2019    GLUCOSE 95 04/10/2019       Medical Decision Making-Imaging:     Abdominal xray:    Gas in mildly distended loops of large and small bowel likely reflecting an   ileus.       NG tube tip in the gastric fundus with the proximal side-port in the distal   thoracic esophagus, repositioning recommended.       Airspace disease left lung base. CT scan chest:    Impression   Satisfactory position endotracheal tube.       Nasogastric tube needs advanced 10 cm.       Patchy perihilar opacities and bibasilar airspace disease.  Follow-up may be   beneficial following medical treatment course to document complete resolution.           EXAMINATION:   CTA OF THE ABDOMEN AND PELVIS WITH CONTRAST       4/5/2019 9:23 pm:       TECHNIQUE:   CTA of the abdomen and pelvis was performed with the administration of   intravenous contrast. Multiplanar reformatted images are provided for review. MIP images are provided for review.  Dose modulation, iterative   reconstruction, and/or weight based adjustment of the mA/kV was utilized to   reduce the radiation dose to as low as reasonably achievable.       COMPARISON:   None.       HISTORY:   ORDERING SYSTEM PROVIDED HISTORY: suspected dissection of abdominal aorta       FINDINGS:       CTA ABDOMEN:       Bibasilar airspace disease.  Liver, spleen, pancreas, gallbladder normal.   Bilateral adrenal masses measuring 11 mm on the left and 18 x 28 mm on the   left.  Bilateral ill-defined hypodensities throughout the kidneys.  The small   bowel is somewhat distended with multiple air-fluid levels.  Multiple   air-fluid levels are also noted throughout the colon.  The stomach appears   normal.  Retroaortic left renal vein.       Bones normal.       Aorta appears normal without dissection.  The celiac artery and SMA appear   normal.  The renal arteries appear normal.           CTA PELVIS:       Bladder decompressed.  Uterus normal.  Catheter right groin terminates in the   common iliac vein.           Impression   Ileus.  No evidence of aortic dissection.  The bilateral adrenal masses, left   greater than right.  Recommend follow-up adrenal MRI non emergently.           Medical Decision Making-Other: Thank you for allowing us to participate in the care of this patient. Please call with questions.     Almaz Estrada MD     Pager: [phone] - Office: [phone]

## 2019-04-11 NOTE — PLAN OF CARE
Ostomy appliance order:  Dx: ischemic necrosis of the large intestine  ICD-10-CM: K55.049  Type of Surgery: ileocecetomy  Type of stoma:  ileostomy  ICD-10-CM:  Z93.2    Brand:  Coloplast  Product Number: [id number]  Product Description: Assura deep convex maxi drainable pouch  Hcpcs: [id number]  Amount per month: 20 per month     Brand:  Coloplast  Product Number: [id number]  Product Description: Adi Almeida  Hcpcs:   Amount per month: as allowed    Brand:  Coloplast  Product Number: [id number]  Product Description: Brava strip paste  Hcpcs:   Amount per month: 4 oz/ 20 strips per month

## 2019-04-11 NOTE — PROGRESS NOTES
Nutrition Assessment (Enteral Nutrition)    Type and Reason for Visit: Reassess    Nutrition Recommendations: Start FL diet - monitor tolerance and intake. ONS as needed. Nutrition Assessment: Pt extubated today, sleeping at present. TF continues at 20 mL/hr at this time. Pt passed swallow study this afternoon for pureed diet with thin liquids- plan to advance to [facility] diet and discontinue TF per RN. Malnutrition Assessment:  · Malnutrition Status: Insufficient data  · Context: Acute illness or injury    Nutrition Risk Level: High    Nutrition Needs:  · Estimated Daily Total Kcal: 3106-0251 kcal/day  · Estimated Daily Protein (g): 70-95 g pro/day     Nutrition Diagnosis:   · Problem: Inadequate oral intake  · Etiology: related to recent abd surgery, recent extubation      Signs and symptoms:  as evidenced by Nutrition support - EN, No PO intake yet     Objective Information:  · Wound Type: Surgical Wound  · Current Nutrition Therapies:  · Oral Diet Orders: Full Liquid, 2gm Sodium, Fluid Restriction   · Oral Diet intake: no PO intake yet  · Oral Nutrition Supplement (ONS) Orders: None  · Tube Feeding (TF) Orders:   · Feeding Route:  NG  · Formula:  Semi-Elemental   · Rate (ml/hr): 20 mL/hr   · Duration:   24 hr  · Current TF & Flush Orders Provides:  576 kcal and 36 g pro/day  · Additional Calories: D5% 0.9%NaCl at 50 mL/hr =204 kcal/day   · Anthropometric Measures:  · Ht: 5' 1.02\" (155 cm)   · Current Body Wt: 196 lb 6.9 oz (89.1 kg)  · Admission Body Wt: 173 lb 15.1 oz (78.9 kg)  · Ideal Body Wt: 105 lb 13.1 oz (48 kg), % Ideal Body 165% (adm/ideal)  · BMI Classification: BMI 30.0 - 34.9 Obese Class I    Nutrition Interventions:   Start FL diet - monitor tolerance and intake.    Continued Inpatient Monitoring, Education Not Indicated    Nutrition Evaluation:   · Evaluation: Progressing toward goals   · Goals: Meet % of estimated nutrition needs   · Monitoring: Meal Intake, Diet Tolerance, Weight, Pertinent Labs      Electronically signed by Luana Marshall RD, LD on 4/11/19 at 2:47 PM    Contact Number: [phone]

## 2019-04-11 NOTE — PROGRESS NOTES
ICU PROGRESS NOTE          PATIENT NAME: Sarah Arambula RECORD NO. [id number]  DATE: 2019    PRIMARY CARE PHYSICIAN: No primary care provider on file. HD: # 6    ASSESSMENT    Active Problems:    Shock ([facility] [address]    Rhabdomyolysis    Hyponatremia    Lactic acidosis    MARY (acute kidney injury) ([facility] [address]    Metabolic acidosis    Acute respiratory failure (HCC)    Elevated liver enzymes    Hyperkalemia    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HCC)    Small bowel ischemia (HCC)    Acute GI bleeding    Gram negative septic shock (HCC)    Protein-calorie malnutrition (HCC)    Septic shock (HCC)    Gastroenteritis    Septicemia due to Gram negative organism [facility])  Resolved Problems:    * No resolved hospital problems. *      MEDICAL DECISION MAKING AND PLAN    1. Neuro  1. Altered mental status - resolved  2. Pain: Start roxicodone and flexeril. Morphine PRN  3. + Drug abuse: cocaine/cannibus  2. CV:  Severe shock, suspect mixed hypovolemic-cardiogenic component  1. Off pressors  3. Pulm  1. Acute respiratory failure due to acidosis - resolved  2. Extubated overnight. 4. GI  1. Acute GI bleed, possible mesenteric ischemia  2. POD#5  ex lap, resection of ischemic sigmoid, right colon and distal small bowel. Left in discontinuity  3. POD#3 - second look, completion sub total colectomy, rectal stump resection, end ileostomy   4. Ok to start diet from surgery standpoint  5. Protonix  5. Nephro  1. Acute kidney failure - requiring dialysis  2. IVF: D5 1/2 NS  3. Hyponatremia  6. ID  1. Merrem   2. Leukocytosis down to 26 --> 22 --> 18  7. MSK  1. Ok to be up out of bed and ambulating from general surgery standpoint  8. Endo:    1. Glucose   9. Heme  1. H/h 8.2      SUBJECTIVE    Brady [place] was seen and examined. Doing well. + ileostomy output. Wound clean, dry and intact. Asking appropriate questions this morning. Pain controlled.     OBJECTIVE  VITALS: Temp: Temp: 97.7 °F (36.5 °C)Temp  Av.3 °F (36.8 °C)  Min: 97.7 °F (36.5 °C)  Max: 98.6 °F (37 °C) BP Systolic (98LVA), AZQ:366 , Min:93 , WEE:554   Diastolic (29RXT), KCE:68, Min:44, Max:73   Pulse Pulse  Av  Min: 74  Max: 96 Resp Resp  Av.5  Min: 8  Max: 25 Pulse ox SpO2  Av.7 %  Min: 93 %  Max: 100 %    GENERAL: Alert, oriented. NEURO: No gross motor or sensory deficits  HEENT: mucous membranes moist  LUNGS: rhonchi heard at bases. HEART: Regular rate and rhythm  ABDOMEN: soft, non distended. Midline incision clean - no signs of infection. Ileostomy pink with and viable, retracted at inferior border, + enteric contents  EXTERMITY: No signs of cyanosis or ischemia      LAB:  CBC:   Recent Labs     19  0506 04/10/19  0636   WBC 22.2* 18.5*   HGB 7.9* 8.2*   HCT 24.1* 25.3*   MCV 92.3 94.8   PLT See Reflexed IPF Result See Reflexed IPF Result     BMP:   Recent Labs     19  0840 19  0506 04/10/19  0636   NA  --  130* 131*   K  --  3.7 3.6*   CL  --  94* 94*   CO2  --  28 27   BUN  --  36* 46*   CREATININE 6.50* 2.87* 4.08*   GLUCOSE  --  68* 95         RADIOLOGY:      Amy Montgomery DO  8:12 AM           Trauma Attending Attestation      I have reviewed the above [facility] note(s) and confirmed the key elements of the medical history and physical exam. I have seen and examined the pt. I have discussed the findings, established the care plan and recommendations with Resident, [facility] RN, bedside nurse.   Advance diet   Pt/ot   Ileostomy functioning         Hossein Gloria DO  2019  7:25 PM

## 2019-04-11 NOTE — PROGRESS NOTES
Critical care team - Resident sign-out to medicine service      Date and time: 4/11/2019 5:44 PM  Patient's name:  Catalina Fabian  Medical Record Number: [id number]  Patient's account/billing number: [de-identified]  Patient's YOB: 1965  Age: 47 y.o. Date of Admission: 4/5/2019  8:39 PM  Length of stay during current admission: 6    Primary Care Physician: No primary care provider on file. Code Status: Full Code    Mode of physician to physician communication:        [x] Via telephone   [] In person     Date and time of sign-out: 4/11/2019 5:44 PM    Accepting Internal Medicine resident: Dr. Brandon Ye    Accepting Medicine team: IM Team Med 1    Accepting team's attending: Dr. Raina Park    Patient's current ICU Bed:  117     Patient's assigned bed on floor:  418        [] Med-Surg Monitored [x] Step-down       [] Psychiatry ICU       [] Psych floor     Reason for ICU admission:     Acute Bowel Ischemia  Hypotension requiring pressors. ICU course summary:     20-year-old female with significant past medical history of hypertension and COPD presented to the emergency department with increased confusion, abdominal pain, nausea, and vomiting. Due to altered mental status patient was intubated in the emergency department. Patient had multitude lower extremities.      CT chest on 4/5 showed no evidence of aortic dissection or intramural hematoma. No PE identified. Patchy consolidation changes both lower lobes involving lingula may be related to multifocal atelectasis versus pneumonia. CT abdomen showed ileus with no evidence of aortic dissection. Bilateral adrenal masses with left radial than right were noted. Initial BMP showed hyponatremia at 125, potassium is 8.0, chloride 93, bicarb at 9, BUN 62, and a creatinine of 4.06. Lactic acid 5.7. Calcium 5.2. Hepatic panel showed a mixed hepatocellular pattern with ALT/AST greater than ALP. No cholestatic picture.   CBC demonstrated for atrial fibrillation rate control however due to some hypotension Lopressor was discontinued and Midrin was started. Patient finally extubated on the evening of 4/10 and is tolerating nasal cannula 4 L with good saturations. Nephrology continues to follow the patient and most recent dialysis was on 4/10. Patient currently +20 L since admission. Pt has been extubated and tolerating TFs. Was started on TF as she failed the bedside swallow.  Awaiting SLP for MBSS    Procedures during patient's ICU stay:     External cardioversion    Current Vitals:     BP (!) 145/82   Pulse 97   Temp 99 °F (37.2 °C)   Resp 12   Ht 5' 1.02\" (1.55 m)   Wt 189 lb 13.1 oz (86.1 kg)   SpO2 100%   BMI 35.84 kg/m²       Cultures:     Blood cultures:                 [] None drawn      [] Negative             []  Positive (Details:  )  Urine Culture:                   [] None drawn      [] Negative             []  Positive (Details:  )  Sputum Culture:               [] None drawn       [] Negative             []  Positive (Details:  )   Endotracheal aspirate:     [] None drawn       [] Negative             []  Positive (Details:  )       Consults:     Cardiology  General Surgery  ID  Nephrology    Assessment:     Patient Active Problem List    Diagnosis Date Noted    Protein-calorie malnutrition ([address]     Septic shock ([address]     Gastroenteritis     Septicemia due to Gram negative organism ([address]     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[address] 04/07/2019    Small bowel ischemia ([address] 04/07/2019    Acute GI bleeding 04/07/2019    Gram negative septic shock ([address]     Rhabdomyolysis 04/06/2019    Hyponatremia 04/06/2019    Lactic acidosis 04/06/2019    MARY (acute kidney injury) ([address] 91/91/2390    Metabolic acidosis 07/06/2233    Acute respiratory failure ([address] 04/06/2019    Elevated liver enzymes 04/06/2019    Hyperkalemia     Shock ([address] 04/05/2019    Arthritis of left knee     S/P total knee arthroplasty 01/26/2017  Post-traumatic osteoarthritis of left knee 11/14/2016    S/P left knee arthroscopy 11/14/2016    Dog bite 08/31/2013       Additional assessment:    RUSLAN  MARY  Sepsis  Hypovolemic Shock  Multi organ dysfunction   PAF  Rhabdomyolysis  Cocaine Positive  THC Positive       Recommended Follow-up:     Neuro:  - Extubated and off sedation. Alert and orientated x 3.  - Restraints: none  - Pain Control: fentanyl prn available and controlled     CV:  - Medications: Vasopressin and Cardizem weaned off    -Lopressor d/c due to low BP. Midodrine 5 mg TID started. -Will d/c Midodrine and restart Lopressor when patient tolerates.      Pulm:  - Extubated yesterday.   -Saturating 100 % on 4 L NC. BiPAP at night.         GI  -275 stool from ostomy. Decent ostomy output.   -Per Surgery: Ok to start diet.   -Will preform bedside nursing swallow study. Pt failed and coughed. - SLP for MBSS, if cleared then start with Full liquid diet to advance.      Renal:  - UO low. Lasix 80 BID. -Dialysis.     ID:  - Afebrile  - Antibiotics: continue meropenem  - Cultures: 1 Blood Culture Grew G- Rods H. Flu on 4/5  - Infectious Disease is following      Fluids/Electrolytes:  - IVF: d5 1/2 + NS at 50 ml/hr; increase glucose checks, prn dextrose bolus;  - Replacing  Electrolytes prn   -Nephrology is Following      Other/Prophylaxis  - HOB: Elevated  - DVT: Surgery ok with DVT ppx. Will restart when HIT panel returns and after platelet evaluation.   - ICU Level of Care  - PT/OT   -Stop CK, Lactic acid de-esclate abgs, inr/pt, ionized calcium  - HIT antibodies negative      Above mentioned assessment and plan was discussed by me with the admitting medicine resident. The medicine team assigned to the patient by medicine admitting resident will be following up the patient from now onwards on the floor.        Gregorio Che MD  PGY-2, Internal Medicine  [facility]

## 2019-04-11 NOTE — PROGRESS NOTES
for rehabilitation services?: Yes  Family / Caregiver Present: Yes(mother present at end of session)     Social/Functional History  Social/Functional History  Lives With: Family(mother)  Type of Home: House  Home Layout: Able to Live on Main level with bedroom/bathroom, One level  Home Access: Level entry  Bathroom Shower/Tub: Walk-in shower  Bathroom Toilet: Standard  Bathroom Equipment: Shower chair, Grab bars in shower  Bathroom Accessibility: Accessible  Home Equipment: [employer], Rolling walker  ADL Assistance: [address]: Independent  Homemaking Responsibilities: Yes  Ambulation Assistance: Independent  Transfer Assistance: Independent  Active : Yes  Mode of Transportation: Car  Occupation: Unemployed  [address]: draw and listen to music  IADL Comments: Independent prior to admission  Additional Comments: Pt notes ambulatory at grocery store. Objective   Vision: Impaired  Vision Exceptions: Wears glasses at all times  Hearing: Within functional limits    Orientation  Overall Orientation Status: Impaired  Orientation Level: Oriented to place;Oriented to time;Oriented to person;Disoriented to situation     Balance  Sitting Balance: Contact guard assistance  Standing Balance: Minimal assistance(2 person assist)  Standing Balance  Time: 5 mins  Activity: pt standing bedside, functional mobility to chair  Sit to stand: Minimal assistance(2 person assist)  Stand to sit: Minimal assistance  Comment: Pt presented with flexed posture, needed frequent verbal cueing to right self  Functional Mobility  Functional - Mobility Device: No device  Activity: Other(from bed to chair)  Assist Level: Moderate assistance  ADL  Feeding: Increased time to complete;Setup;Supervision  Grooming: Setup; Increased time to complete;Supervision  UE Bathing: Increased time to complete;Setup;Minimal assistance  LE Bathing: Setup; Increased time to complete;Maximum assistance  UE Dressing: Setup; Increased time to complete;Minimal assistance  LE Dressing: Setup; Increased time to complete;Maximum assistance  Toileting: Increased time to complete;Maximum assistance  Tone RUE  RUE Tone: Normotonic  Tone LUE  LUE Tone: Normotonic  Coordination  Coordination and Movement description: Fine motor impairments;Decreased accuracy(Pt reports swelling in hands typically not present at [facility])     Bed mobility  Supine to Sit: Moderate assistance  Sit to Supine: (Pt left up in chair)  Scooting: Moderate assistance  Transfers  Sit to stand: Minimal assistance(2 person assist)  Stand to sit: Minimal assistance     Cognition  Overall Cognitive Status: Exceptions  Following Commands: Follows one step commands with repetition; Follows one step commands with increased time  Perception  Overall Perceptual Status: Impaired  Initiation: Cues to initiate tasks     Sensation  Overall Sensation Status: WFL      LUE AROM (degrees)  LUE AROM : WFL  RUE AROM (degrees)  RUE AROM : WFL  LUE Strength  Gross LUE Strength: Exceptions to WFL;WFL  L Hand Grasp: 4/5  L Hand Release: 4/5  RUE Strength  Gross RUE Strength: WFL  R Hand Grasp: 4/5  R Hand Release: 4/5      Plan   Plan  Times per week: 3-4x  Current Treatment Recommendations: Balance Training, Self-Care / ADL, Patient/Caregiver Education & Training, Equipment Evaluation, Education, & procurement, Functional Mobility Training, Endurance Training, Cognitive Reorientation, Positioning, Safety Education & Training    AM-PAC Score  AM-[facility] Inpatient Daily Activity Raw Score: 15  AM-PAC Inpatient ADL T-Scale Score : 34.69  ADL Inpatient CMS 0-100% Score: 56.46  ADL Inpatient CMS G-Code Modifier : CK    Goals  Short term goals  Time Frame for Short term goals: Pt will by discharge  Short term goal 1: Complete LB ADL task with AD PRN and MIN A. Short term goal 2: Demo 10 minutes of dynamic standing during functional activity performance w/ SBA.    Short term goal 3: Tolerate 20 minutes of funct act performance. Short term goal 4: Demo 15 minutes of dynamic sitting balance during fucntional activity performance w/ SUP. Short term goal 5: Demo supine<>sit transfer w/ SUP.         Therapy Time   Individual Concurrent Group Co-treatment   Time In 1055         Time Out 1135         Minutes 40      Co-evaluation with PT         Penny Busch S/OT  Florian Grey OTR/L

## 2019-04-11 NOTE — PLAN OF CARE
ICU Team Daily Plan of Care     NEURO    Pain Score:  Pain Level: 9    Pain Medication Indicated ? yes    Transition to PO ? Yes- pending swallow study   Agitation/Sedation      RASS Goal: 0 to -2 Current N/A    Sedation Awakening Trial (SAT) Indicated ?  not applicable   Delirium (CAM-ICU) negative    Non-pharmacologic therapies reviewed ? yes    Sleep enhancement needed ? no    Pharmacological Rx indicated ? no    Restains needed ? no   CARDS     Vasoactive Agents ? no    MAP Goal: >65 mmHg     Current IV fluid rate I.V.: 200 mL     Can IV fluids be discontinued ? yes    Additional IV access needed ? no    Cardiac Meds reviewed ? no    Echocardiogram reviewed ? yes   RESP    Mechanically Ventilated ?   no   Vent Information  $Ventilation: $Subsequent Day  Ventilator Started: Yes  Ventilator Stopped: Yes  Skin Assessment: Clean, dry, & intact  Equipment Changed: HME  Vent Type: Servo i  Vent Mode: NIV/PC  Vt Ordered: 470 mL  Pressure Ordered: 7  Rate Set: 15 bmp  Pressure Support: 6 cmH20  FiO2 : 30 %  Sensitivity: 5  PEEP/CPAP: 5  I Time/ I Time %: 0.85 s  Cuff Pressure (cm H2O): 22 cm H2O  Humidification Source: HME     Lung Protective Ventilation ? not applicable    Vent Bundle (Oral care, HOB >30, Subglottic suction) reviewed ?  not applicable   Spontaneous breathing trial (SBT) indicated ? not applicable   Coordinated SAT & SBT ? not applicable   CXR/CT reviewed ? not applicable   RENAL    Urine Output: inadequate DIalysis dependent   Output  Urine: 100 mL Urine: 100 mL     Intake/Output Summary (Last 24 hours) at 4/11/2019 0917  Last data filed at 4/11/2019 0630  Gross per 24 hour   Intake 1606 ml   Output 2698 ml   Net -1092 ml        Fluid Balance Goal  negative    Renal panel/BMP reviewed? yes    Lytes replaced ? no    Renal replacement therapy (HD/CRRT) ? yes   HEME/ONC     CBC/Coagulation profile reviewed ? yes    Require transfusion ?   no   DVT prophylaxis Awaiting HIT panel

## 2019-04-12 ENCOUNTER — APPOINTMENT (OUTPATIENT)
Dept: GENERAL RADIOLOGY | Age: 54
DRG: 710 | End: 2019-04-12
Payer: MEDICAID

## 2019-04-12 LAB
ABSOLUTE EOS #: 0 K/UL (ref 0–0.4)
ABSOLUTE IMMATURE GRANULOCYTE: 0 K/UL (ref 0–0.3)
ABSOLUTE LYMPH #: 3.38 K/UL (ref 1–4.8)
ABSOLUTE MONO #: 3.04 K/UL (ref 0.1–0.8)
ANION GAP SERPL CALCULATED.3IONS-SCNC: 14 MMOL/L (ref 9–17)
BASOPHILS # BLD: 0 % (ref 0–2)
BASOPHILS ABSOLUTE: 0 K/UL (ref 0–0.2)
BUN BLDV-MCNC: 48 MG/DL (ref 6–20)
BUN/CREAT BLD: ABNORMAL (ref 9–20)
CALCIUM SERPL-MCNC: 7.9 MG/DL (ref 8.6–10.4)
CHLORIDE BLD-SCNC: 98 MMOL/L (ref 98–107)
CO2: 24 MMOL/L (ref 20–31)
CREAT SERPL-MCNC: 4.1 MG/DL (ref 0.5–0.9)
CRYOGLOBULIN: 0 MG/DL (ref 0–10)
CULTURE: NORMAL
DIFFERENTIAL TYPE: ABNORMAL
DIRECT EXAM: NORMAL
EOSINOPHILS RELATIVE PERCENT: 0 % (ref 1–4)
GFR AFRICAN AMERICAN: 14 ML/MIN
GFR NON-AFRICAN AMERICAN: 11 ML/MIN
GFR SERPL CREATININE-BSD FRML MDRD: ABNORMAL ML/MIN/{1.73_M2}
GFR SERPL CREATININE-BSD FRML MDRD: ABNORMAL ML/MIN/{1.73_M2}
GLUCOSE BLD-MCNC: 100 MG/DL (ref 65–105)
GLUCOSE BLD-MCNC: 108 MG/DL (ref 65–105)
GLUCOSE BLD-MCNC: 88 MG/DL (ref 70–99)
GLUCOSE BLD-MCNC: 90 MG/DL (ref 65–105)
GLUCOSE BLD-MCNC: 92 MG/DL (ref 65–105)
HCT VFR BLD CALC: 30.1 % (ref 36.3–47.1)
HEMOGLOBIN: 9.4 G/DL (ref 11.9–15.1)
IMMATURE GRANULOCYTES: 0 %
LACTIC ACID, SEPSIS WHOLE BLOOD: 1.7 MMOL/L (ref 0.5–1.9)
LACTIC ACID, SEPSIS: NORMAL MMOL/L (ref 0.5–1.9)
LYMPHOCYTES # BLD: 10 % (ref 24–44)
Lab: NORMAL
MCH RBC QN AUTO: 29.8 PG (ref 25.2–33.5)
MCHC RBC AUTO-ENTMCNC: 31.2 G/DL (ref 28.4–34.8)
MCV RBC AUTO: 95.6 FL (ref 82.6–102.9)
MICROBIOLOGY SEND OUT REPORT: NORMAL
MONOCYTES # BLD: 9 % (ref 1–7)
MORPHOLOGY: ABNORMAL
NRBC AUTOMATED: 0.2 PER 100 WBC
PATHOLOGIST REVIEW: NORMAL
PDW BLD-RTO: 15.9 % (ref 11.8–14.4)
PLATELET # BLD: 119 K/UL (ref 138–453)
PLATELET ESTIMATE: ABNORMAL
PMV BLD AUTO: 13 FL (ref 8.1–13.5)
POTASSIUM SERPL-SCNC: 4.3 MMOL/L (ref 3.7–5.3)
RBC # BLD: 3.15 M/UL (ref 3.95–5.11)
RBC # BLD: ABNORMAL 10*6/UL
SEG NEUTROPHILS: 81 % (ref 36–66)
SEGMENTED NEUTROPHILS ABSOLUTE COUNT: 27.38 K/UL (ref 1.8–7.7)
SODIUM BLD-SCNC: 136 MMOL/L (ref 135–144)
SPECIMEN DESCRIPTION: NORMAL
TEST NAME: NORMAL
WBC # BLD: 33.8 K/UL (ref 3.5–11.3)
WBC # BLD: ABNORMAL 10*3/UL

## 2019-04-12 PROCEDURE — 97535 SELF CARE MNGMENT TRAINING: CPT

## 2019-04-12 PROCEDURE — 2500000003 HC RX 250 WO HCPCS: Performed by: STUDENT IN AN ORGANIZED HEALTH CARE EDUCATION/TRAINING PROGRAM

## 2019-04-12 PROCEDURE — 99406 BEHAV CHNG SMOKING 3-10 MIN: CPT

## 2019-04-12 PROCEDURE — 99233 SBSQ HOSP IP/OBS HIGH 50: CPT | Performed by: INTERNAL MEDICINE

## 2019-04-12 PROCEDURE — 83605 ASSAY OF LACTIC ACID: CPT

## 2019-04-12 PROCEDURE — 92526 ORAL FUNCTION THERAPY: CPT

## 2019-04-12 PROCEDURE — 6370000000 HC RX 637 (ALT 250 FOR IP): Performed by: STUDENT IN AN ORGANIZED HEALTH CARE EDUCATION/TRAINING PROGRAM

## 2019-04-12 PROCEDURE — 6360000002 HC RX W HCPCS: Performed by: STUDENT IN AN ORGANIZED HEALTH CARE EDUCATION/TRAINING PROGRAM

## 2019-04-12 PROCEDURE — 2500000003 HC RX 250 WO HCPCS: Performed by: INTERNAL MEDICINE

## 2019-04-12 PROCEDURE — 2580000003 HC RX 258: Performed by: STUDENT IN AN ORGANIZED HEALTH CARE EDUCATION/TRAINING PROGRAM

## 2019-04-12 PROCEDURE — 6370000000 HC RX 637 (ALT 250 FOR IP): Performed by: NURSE PRACTITIONER

## 2019-04-12 PROCEDURE — 87040 BLOOD CULTURE FOR BACTERIA: CPT

## 2019-04-12 PROCEDURE — 92611 MOTION FLUOROSCOPY/SWALLOW: CPT

## 2019-04-12 PROCEDURE — 85025 COMPLETE CBC W/AUTO DIFF WBC: CPT

## 2019-04-12 PROCEDURE — 80048 BASIC METABOLIC PNL TOTAL CA: CPT

## 2019-04-12 PROCEDURE — 87205 SMEAR GRAM STAIN: CPT

## 2019-04-12 PROCEDURE — 87070 CULTURE OTHR SPECIMN AEROBIC: CPT

## 2019-04-12 PROCEDURE — 97530 THERAPEUTIC ACTIVITIES: CPT

## 2019-04-12 PROCEDURE — 76937 US GUIDE VASCULAR ACCESS: CPT

## 2019-04-12 PROCEDURE — 36415 COLL VENOUS BLD VENIPUNCTURE: CPT

## 2019-04-12 PROCEDURE — 74230 X-RAY XM SWLNG FUNCJ C+: CPT

## 2019-04-12 PROCEDURE — 2060000000 HC ICU INTERMEDIATE R&B

## 2019-04-12 PROCEDURE — 6360000002 HC RX W HCPCS: Performed by: INTERNAL MEDICINE

## 2019-04-12 PROCEDURE — 2580000003 HC RX 258: Performed by: INTERNAL MEDICINE

## 2019-04-12 PROCEDURE — 97116 GAIT TRAINING THERAPY: CPT

## 2019-04-12 PROCEDURE — 99232 SBSQ HOSP IP/OBS MODERATE 35: CPT | Performed by: INTERNAL MEDICINE

## 2019-04-12 PROCEDURE — 82947 ASSAY GLUCOSE BLOOD QUANT: CPT

## 2019-04-12 PROCEDURE — 6370000000 HC RX 637 (ALT 250 FOR IP): Performed by: INTERNAL MEDICINE

## 2019-04-12 RX ORDER — HEPARIN SODIUM 5000 [USP'U]/ML
5000 INJECTION, SOLUTION INTRAVENOUS; SUBCUTANEOUS EVERY 8 HOURS SCHEDULED
Status: DISCONTINUED | OUTPATIENT
Start: 2019-04-12 | End: 2019-04-13

## 2019-04-12 RX ADMIN — MEROPENEM 1 G: 1 INJECTION, POWDER, FOR SOLUTION INTRAVENOUS at 12:35

## 2019-04-12 RX ADMIN — OXYCODONE HYDROCHLORIDE 5 MG: 5 TABLET ORAL at 08:27

## 2019-04-12 RX ADMIN — PANTOPRAZOLE SODIUM 40 MG: 40 TABLET, DELAYED RELEASE ORAL at 06:56

## 2019-04-12 RX ADMIN — Medication 3 ML: at 18:30

## 2019-04-12 RX ADMIN — OXYCODONE HYDROCHLORIDE 5 MG: 5 TABLET ORAL at 19:19

## 2019-04-12 RX ADMIN — CYCLOBENZAPRINE 5 MG: 10 TABLET, FILM COATED ORAL at 08:27

## 2019-04-12 RX ADMIN — METOPROLOL TARTRATE 25 MG: 25 TABLET ORAL at 20:13

## 2019-04-12 RX ADMIN — CYCLOBENZAPRINE 5 MG: 10 TABLET, FILM COATED ORAL at 20:12

## 2019-04-12 RX ADMIN — METOPROLOL TARTRATE 25 MG: 25 TABLET ORAL at 12:35

## 2019-04-12 RX ADMIN — OXYCODONE HYDROCHLORIDE 5 MG: 5 TABLET ORAL at 12:35

## 2019-04-12 RX ADMIN — IRON SUCROSE 200 MG: 20 INJECTION, SOLUTION INTRAVENOUS at 09:55

## 2019-04-12 RX ADMIN — OXYCODONE HYDROCHLORIDE 5 MG: 5 TABLET ORAL at 04:09

## 2019-04-12 RX ADMIN — OXYCODONE HYDROCHLORIDE 5 MG: 5 TABLET ORAL at 23:05

## 2019-04-12 ASSESSMENT — PAIN SCALES - GENERAL
PAINLEVEL_OUTOF10: 9
PAINLEVEL_OUTOF10: 0
PAINLEVEL_OUTOF10: 0
PAINLEVEL_OUTOF10: 7
PAINLEVEL_OUTOF10: 7
PAINLEVEL_OUTOF10: 5
PAINLEVEL_OUTOF10: 7

## 2019-04-12 ASSESSMENT — PAIN DESCRIPTION - DESCRIPTORS: DESCRIPTORS: ACHING

## 2019-04-12 ASSESSMENT — PAIN - FUNCTIONAL ASSESSMENT: PAIN_FUNCTIONAL_ASSESSMENT: 0-10

## 2019-04-12 NOTE — CARE COORDINATION
Following for potential OP dialysis  Met with pt, who stated she was living with a gfriend and it was a bad relationship  Stated when she is discharged, she plans to move to [place], [place], with her mother/step-father  Pt stated she does not work, has Values of n in place  Pt tox screen + marijuana/cocaine  Pt denied alcohol use  Pt admits to marijuana use, would not be specific on how often, but stated she is done with it  She denied cocaine use and is aware she was + for it  She denied all other drug use  Pt stated she is not concerned about her drug use and declined any resources  No prior tx  No involvement with [facility]  Admits to h/o depression - is treated by her PCP  Advised pt will follow along and in the event that dialysis would be needed as an OP, arrangements would be made

## 2019-04-12 NOTE — PROGRESS NOTES
Nephrology Progress Note        Subjective: patient evaluated on dialysis. Pt is stable on dialysis. Access cannulated without problems. No new issues overnite. Stable hemodynamics. Has ultrafiltration yesterday 2 L removed. ID input reviewed. Temp cath works well. Objective:  CURRENT TEMPERATURE:  Temp: 99.6 °F (37.6 °C)  MAXIMUM TEMPERATURE OVER 24HRS:  Temp (24hrs), Av.6 °F (37 °C), Min:98.1 °F (36.7 °C), Max:99.6 °F (37.6 °C)    CURRENT RESPIRATORY RATE:  Resp: 14  CURRENT PULSE:  Pulse: 104  CURRENT BLOOD PRESSURE:  BP: (!) 148/79  24HR BLOOD PRESSURE RANGE:  Systolic (22OOF), MGF:246 , Min:131 , GPB:729   ; Diastolic (61EFC), VDL:44, Min:70, Max:107    24HR INTAKE/OUTPUT:      Intake/Output Summary (Last 24 hours) at 2019 1524  Last data filed at 2019 8908  Gross per 24 hour   Intake 124 ml   Output 3770 ml   Net -3646 ml     Patient Vitals for the past 96 hrs (Last 3 readings):   Weight   19 1645 189 lb 13.1 oz (86.1 kg)   19 1400 195 lb 8.8 oz (88.7 kg)   04/10/19 1807 196 lb 6.9 oz (89.1 kg)         Physical Exam:  General appearance:Awake, alert, in no acute distress  Skin: warm and dry, no rash or erythema  Eyes: conjunctivae normal and sclera anicteric  ENT:no thrush no pharyngeal congestion   Neck:  No JVD, No Thyromegaly  Pulmonary: clear to auscultation and no wheezing or rhonchi   Cardiovascular: normal S1 and S2. NO rubs and NO murmur.  No S3 OR S4   Abdomen: soft nontender, bowel sounds present, no organomegaly,  no ascites   Extremities: no cyanosis, clubbing or edema     Access:  Temporary catheter    Current Medications:      iron sucrose (VENOFER) 200 mg in sodium chloride 0.9 % 100 mL IVPB Q24H   metoprolol tartrate (LOPRESSOR) tablet 25 mg BID   oxyCODONE (ROXICODONE) immediate release tablet 5 mg Q4H PRN   cyclobenzaprine (FLEXERIL) tablet 5 mg TID   morphine injection 4 mg Q3H PRN   Or    morphine injection 6 mg Q3H PRN   0.9 % sodium chloride bolus PRN 0.9 % sodium chloride bolus PRN   pantoprazole (PROTONIX) tablet 40 mg BID AC   meropenem (MERREM) 1 g in sodium chloride 0.9 % 100 mL IVPB (mini-bag) Q24H   midodrine (PROAMATINE) tablet 5 mg TID WC   anticoagulant sodium citrate 4 % injection 3 mL PRN   anticoagulant sodium citrate 4 % injection 3 mL PRN   dextrose 50 % solution 25 g PRN   sodium chloride flush 0.9 % injection 10 mL 2 times per day   sodium chloride flush 0.9 % injection 10 mL PRN   magnesium hydroxide (MILK OF MAGNESIA) 400 MG/5ML suspension 30 mL Daily PRN   ondansetron (ZOFRAN) injection 4 mg Q6H PRN     Labs:   CBC: Recent Labs     04/10/19  0636 04/11/19  0926 04/12/19  0617   WBC 18.5* 23.9* 33.8*   RBC 2.67* 2.66* 3.15*   HGB 8.2* 8.3* 9.4*   HCT 25.3* 25.4* 30.1*   PLT See Reflexed IPF Result 75* 119*   MPV NOT REPORTED 12.4 13.0      BMP: Recent Labs     04/10/19  0636 04/11/19  0926 04/12/19  0617   * 139 136   K 3.6* 4.1 4.3   CL 94* 102 98   CO2 27 28 24   BUN 46* 33* 48*   CREATININE 4.08* 3.20* 4.10*   GLUCOSE 95 111* 88   CALCIUM 6.5* 7.5* 7.9*        Phosphorus:  No results for input(s): PHOS in the last 72 hours. Magnesium: No results for input(s): MG in the last 72 hours. Albumin:   Recent Labs     04/10/19  0636   LABALBU 1.6*       Dialysis bath: Dialysis Bath  K+ (Potassium): (UF ONLY)  Ca+ (Calcium): 3  Na+ (Sodium): 140  HCO3 (Bicarb): 35    Radiology:  Reviewed as available. Assessment:  1 acute kidney injury secondary to ischemic and nephrotoxic acute tubular necrosis from septic shock, hypotension, contrast exposure, dialysis dependent, oliguric. 2.HTN: Blood pressure well controlled  3 ischemic bowel status post expiratory laparotomy, resection of transverse colon and ileostomy  4 EDEMA : From fluid overload  5.H. influenzae bacteremia   6. SECONDARY HYPERPARATHYROIDISM:     Plan:  1. Wt removal and dialysis orders reviewed, 2-3 L of fluid removal.    2.  Discontinue Correa  3.  Cont pt on aranesp and zemplar per protocol. 4. Follow up labs ordered. Please do not hesitate to call with questions.     Electronically signed by Dali Winter MD on 4/12/2019 at 3:24 PM

## 2019-04-12 NOTE — PROGRESS NOTES
PROGRESS NOTE      PATIENT NAME: Alok Nevarez  MEDICAL RECORD NO. [id number]  DATE: 2019  SURGEON: Dr Carey Scot: No primary care provider on file. HD: # 7    ASSESSMENT    Patient Active Problem List   Diagnosis    Dog bite    Post-traumatic osteoarthritis of left knee    S/P left knee arthroscopy    S/P total knee arthroplasty    Arthritis of left knee    [facility] [address]    Rhabdomyolysis    Hyponatremia    Lactic acidosis    MARY (acute kidney injury) ([facility] [address]    Metabolic acidosis    Acute respiratory failure (HCC)    Elevated liver enzymes    Hyperkalemia    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HCC)    Small bowel ischemia (HCC)    Acute GI bleeding    Gram negative septic shock ([facility] [address]    Protein-calorie malnutrition ([facility] [address] [facility]    Septic shock ([facility] [address]    Gastroenteritis    Septicemia due to Gram negative organism [facility])       MEDICAL DECISION MAKING AND PLAN    1. Continue medical management per primary  2. Pain control - flexeril, morphine prn, igor prn  3. Hemodynamically stable  4. Pulm - stable  5. GI - POD#4 s/p second look laparotomy and creation of end ileostomy, OK for diet from Gen Surg standpoint  6. Renal - ARF, Nephro on board, HD  7. Heme/ID - Hgb stable, leukocytosis increased to 33 this AM  8. MSK -OOB, PT/OT      SUBJECTIVE    Alok Nevarez seen and examined at bedside. No acute events overnight. Afebrile. Reports she feels well this morning. Tolerating PO. Having ostomy output, 950cc overnight. UOP marginal, on HD per nephro.     OBJECTIVE  VITALS: Temp: Temp: 98.1 °F (36.7 °C)Temp  Av.4 °F (36.9 °C)  Min: 97.7 °F (36.5 °C)  Max: 99 °F (50.5 °C) BP Systolic (73AKR), QUK:133 , Min:125 , UVB:310   Diastolic (58YOS), WGC:09, Min:54, Max:107   Pulse Pulse  Av.3  Min: 80  Max: 111 Resp Resp  Av.1  Min: 10  Max: 19 Pulse ox SpO2  Av.9 %  Min: 96 %  Max: 100 %    CONSTITUTIONAL: awake, alert, cooperative, no apparent distress  HEAD: atraumatic, normocephalic  EYES: sclera clear, pupils equal and reactive to light  ENT: ears are symmetric, nares patent   HEENT: moist mucous membranes  NECK: Supple, symmetrical, trachea midline  LUNGS: no respiratory distress, no audible wheezing  CARDIOVASCULAR: +S1/S2  ABDOMEN: soft, minimally tender to palpation diffusely, nondistended, no guarding, no rebound tenderness, midline wound C/D/I, packing in place, ostomy to RLQ patent, stool present in appliance  MUSCULOSKELETAL: full range of motion noted  NEUROLOGIC: Awake, alert, oriented to name, place and time  EXTREMITIES: peripheral pulses normal, no pedal edema, no calf tenderness  SKIN: normal coloration and turgor    I/O last 3 completed shifts: In: 488 [I.V.:272; NG/GT:216]  Out: 4200 [Urine:220; Stool:950]    Drain/tube output: In: 124 [NG/GT:124]  Out: 3770 [Urine:90]    LAB:  CBC:   Recent Labs     04/10/19  0636 04/11/19  0926 04/12/19  0617   WBC 18.5* 23.9* 33.8*   HGB 8.2* 8.3* 9.4*   HCT 25.3* 25.4* 30.1*   MCV 94.8 95.5 95.6   PLT See Reflexed IPF Result 75* 119*     BMP:   Recent Labs     04/10/19  0636 04/11/19  0926   * 139   K 3.6* 4.1   CL 94* 102   CO2 27 28   BUN 46* 33*   CREATININE 4.08* 3.20*   GLUCOSE 95 111*     COAGS:   Recent Labs     04/10/19  0636   PROT 4.2*       RADIOLOGY:  Xr Abdomen (kub) (single Ap View)    Result Date: 4/6/2019  EXAMINATION: SINGLE SUPINE XRAY VIEW(S) OF THE ABDOMEN 4/6/2019 9:58 am COMPARISON: Chest radiograph dated 04/05/2019 HISTORY: ORDERING SYSTEM PROVIDED HISTORY: Distension TECHNOLOGIST PROVIDED HISTORY: Distension FINDINGS: NG tube tip is in the gastric fundus with the proximal side-port in the distal thoracic esophagus. Repositioning is recommended. There is airspace disease in the left lung base. Gas is seen in loops of large and small bowel suggesting a probable ileus. Right inguinal central venous catheter is in place tip in the right common iliac vein.      Gas in mildly distended loops of large and small bowel likely reflecting an ileus. NG tube tip in the gastric fundus with the proximal side-port in the distal thoracic esophagus, repositioning recommended. Airspace disease left lung base. Ct Head Wo Contrast    Result Date: 4/6/2019  EXAMINATION: CT OF THE HEAD WITHOUT CONTRAST  4/6/2019 8:36 pm TECHNIQUE: CT of the head was performed without the administration of intravenous contrast. Dose modulation, iterative reconstruction, and/or weight based adjustment of the mA/kV was utilized to reduce the radiation dose to as low as reasonably achievable. COMPARISON: None. HISTORY: ORDERING SYSTEM PROVIDED HISTORY: history of L ear infection, AMS, r/o brain abscess, CVA TECHNOLOGIST PROVIDED HISTORY: Ordering Physician Provided Reason for Exam: AMS; R/O CVA OR ABSCESS Acuity: Acute Type of Exam: Initial FINDINGS: BRAIN/VENTRICLES: No acute intracranial hemorrhage. No mass effect. No midline shift. Ventricles and sulci are within normal limits. ORBITS: The visualized portion of the orbits demonstrate no acute abnormality. SINUSES: Mastoid air cells are clear. Small air-fluid level with mucosal thickening within the right maxillary sinus. Opacity within the ethmoid air cells. SOFT TISSUES/SKULL:  No acute abnormality of the visualized skull or soft tissues. No acute intracranial abnormality. Cta Chest W Wo Contrast    Result Date: 4/5/2019  EXAMINATION: CTA OF THE CHEST WITH AND WITHOUT CONTRAST 4/5/2019 9:23 pm TECHNIQUE: CTA of the chest was performed before and after the administration of intravenous contrast.  Multiplanar reformatted images are provided for review. MIP images are provided for review. Dose modulation, iterative reconstruction, and/or weight based adjustment of the mA/kV was utilized to reduce the radiation dose to as low as reasonably achievable. 3D reconstructed images were performed on a separate workstation and provided for review.  COMPARISON: Chest x-ray 04/05/2019 HISTORY: ORDERING SYSTEM PROVIDED HISTORY: AORTIC DZ, NON-TRAUMATIC, KNOWN OR SUSPECT FINDINGS: Aorta: No evidence of thoracic aortic aneurysm or dissection. No acute abnormality of the aorta. No intramural hematoma on the noncontrast examination. No mediastinal hemorrhage. Minimal aortic vascular calcifications. Mediastinum: Heart is mildly prominent size. Coronary calcifications. No pericardial effusion. No suspicious mediastinal, hilar, or atelectasis or adenopathy identified per CT criteria. Lungs/Pleura: The consolidative changes with prominent air bronchograms identified involving both lower lobes evidence of patchy opacity identified involving the lingula. No suspicious pulmonary nodule identified. No pleural effusion pneumothorax. Upper Abdomen: Diffuse hypoattenuation liver suggestive of fatty infiltration. Patchy areas of hypoenhancement identified involving the anterior aspect of the liver along the falciform ligament likely related to focal fatty infiltration versus transient hepatic attenuation difference. Right adrenal nodule identified central low attenuation, please refer to separately reported CT abdomen pelvis report. .  Dilated loops of bowel identified within the left upper quadrant with prominent air-fluid level. Mild wall thickening noted. Soft Tissues/Bones: No acute bone or soft tissue abnormality. No evidence of aortic dissection or intramural hematoma. No central pulmonary emboli identified. Patchy consolidative changes both lower lobes involving lingula may be related to multifocal atelectasis versus developing pneumonia/infiltrates.  Clinical correlation and follow-up may be beneficial.     Us Renal Limited    Result Date: 4/6/2019  EXAMINATION: ULTRASOUND OF THE KIDNEYS 4/6/2019 12:51 pm COMPARISON: CT abdomen and pelvis dated 04/05/2019 HISTORY: ORDERING SYSTEM PROVIDED HISTORY: RENAL FAILURE, ACUTE (KIDNEY INJURY) FINDINGS: The right kidney measures 10.4 cm in length and the left kidney measures 10.2 cm in length. Kidneys demonstrate normal cortical echogenicity and thickness. No hydronephrosis or intrarenal stones. No focal lesions. Incidental note is made of a couple tiny echogenic foci in the gallbladder wall, likely adenomyomatosis. Unremarkable ultrasound of the kidneys. Xr Chest Portable    Result Date: 4/6/2019  EXAMINATION: SINGLE XRAY VIEW OF THE CHEST 4/6/2019 2:10 am COMPARISON: April 5, 2019. HISTORY: ORDERING SYSTEM PROVIDED HISTORY: R IJ line placement TECHNOLOGIST PROVIDED HISTORY: R IJ line placement FINDINGS: Grossly stable endotracheal and enteric tubes. New right IJ central venous catheter with tip near the superior atrial caval junction. Low lung volume. Persistent bilateral airspace disease. No pleural effusion or pneumothorax. Stable cardiomediastinal silhouette and great vessels. Life support devices as above. No pneumothorax. Persistent bilateral airspace disease. Xr Chest Portable    Result Date: 4/5/2019  EXAMINATION: SINGLE XRAY VIEW OF THE CHEST 4/5/2019 10:16 pm COMPARISON: Chest x-ray 04/05/2019 at 2040 hours HISTORY: ORDERING SYSTEM PROVIDED HISTORY: intubation TECHNOLOGIST PROVIDED HISTORY: intubation FINDINGS: Endotracheal tube 2 cm above the leann. Mild cardiomegaly. Mild perihilar pulmonary vasculature. Patchy bibasilar airspace opacities. Nasogastric tube side port proximal to the GE junction is be advanced 10 cm. No pneumothorax. No pleural effusion. There nipple rings. Satisfactory position endotracheal tube. Nasogastric tube needs advanced 10 cm. Patchy perihilar opacities and bibasilar airspace disease. Follow-up may be beneficial following medical treatment course to document complete resolution. Xr Chest Portable    Result Date: 4/5/2019  EXAMINATION: SINGLE XRAY VIEW OF THE CHEST 4/5/2019 9:31 pm COMPARISON: None.  HISTORY: ORDERING SYSTEM PROVIDED HISTORY: SOB, R/O PNA, CHF Exacerbation

## 2019-04-12 NOTE — PROGRESS NOTES
[facility]   Progress Note                   Date:   4/12/2019  Patient name: Marika Mccullough  Date of admission:  4/5/2019  8:39 PM  MRN:   [id number]  YOB: 1965  PCP: No primary care provider on file. Reason for Admission: Shock ([address] [R57.9]    Subjective:       Clinical Changes / Abnormalities: Pt seen and examined. Pt denies any CP or SOB. She states that she is feeling much better. Continues to have fluid overload. Remains in ST.      +16 L since admission   Medications:   Scheduled Meds:   iron sucrose  200 mg Intravenous Q24H    cyclobenzaprine  5 mg Oral TID    pantoprazole  40 mg Oral BID AC    meropenem  1 g Intravenous Q24H    midodrine  5 mg Oral TID WC    sodium chloride flush  10 mL Intravenous 2 times per day     Continuous Infusions:  CBC:   Recent Labs     04/10/19  0636 04/11/19  0926 04/12/19  0617   WBC 18.5* 23.9* 33.8*   HGB 8.2* 8.3* 9.4*   PLT See Reflexed IPF Result 75* 119*     BMP:    Recent Labs     04/10/19  0636 04/11/19  0926 04/12/19  0617   * 139 136   K 3.6* 4.1 4.3   CL 94* 102 98   CO2 27 28 24   BUN 46* 33* 48*   CREATININE 4.08* 3.20* 4.10*   GLUCOSE 95 111* 88     Hepatic:   Recent Labs     04/10/19  0636   *   *   BILITOT 1.55*   ALKPHOS 121*     Troponin: No results for input(s): TROPHS in the last 72 hours. BNP: No results for input(s): BNP in the last 72 hours. Lipids: No results for input(s): CHOL, HDL in the last 72 hours. Invalid input(s): LDLCALCU  INR: No results for input(s): INR in the last 72 hours. Cardioverted x 2 4/7/19   Unsuccessful   Objective:   Vitals: BP (!) 148/79   Pulse 104   Temp 99.6 °F (37.6 °C) (Temporal)   Resp 14   Ht 5' 1.02\" (1.55 m)   Wt 189 lb 13.1 oz (86.1 kg)   SpO2 96%   BMI 35.84 kg/m²   General appearance: alert and cooperative with exam  HEENT: Head: Normocephalic, no lesions, without obvious abnormality.   Neck: no JVD, trachea midline, no adenopathy  Lungs: Clear to auscultation,  Heart: Regular rate and rhythm, s1/s2 auscultated, no murmurs, ST   Abdomen: soft, non-tender, bowel sounds active  Extremities: +1 generalized  edema  Neurologic: not done        Assessment / Acute Cardiac Problems:   1. Sepsis with Multiorgan failure  2. Atrial Fibrillation with RVR s/p Digoxin, CV.  3. Acute Renal Failure  4. Acute Liver failure  5. Bowel Ischemia S/P sub-total colectomy  6. Metabolic acidosis  7. Acute Respiratory failure      1. Patient Active Problem List:     Dog bite     Post-traumatic osteoarthritis of left knee     S/P left knee arthroscopy     S/P total knee arthroplasty     Arthritis of left knee     Shock ([facility] [address]     Rhabdomyolysis     Hyponatremia     Lactic acidosis     MARY (acute kidney injury) ([address]     Metabolic acidosis     Acute respiratory failure (HCC)     Elevated liver enzymes     Hyperkalemia     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HCC)     Small bowel ischemia (HCC)     Acute GI bleeding     Gram negative septic shock (HCC)     Protein-calorie malnutrition (HCC)     Septic shock (HCC)     Gastroenteritis     Septicemia due to Gram negative organism (HCC)    [id number] Score for Atrial Fibrillation Stroke Risk   Risk   Factors  Component Value   C CHF No 0   H HTN No 0   A2 Age >= 75 No,  (49 y.o.) 0   D DM No 0   S2 Prior Stroke/TIA No 0   V Vascular Disease No 0   A Age 74-69 No,  (49 y.o.) 0   Sc Sex female 1    [id number]  Score  1   Score last updated 5/64/09 47:85 AM    Click here for a link to the UpToDate guideline \"Atrial Fibrillation: Anticoagulation therapy to prevent embolization    Disclaimer: Risk Score calculation is dependent on accuracy of patient problem list and past encounter diagnosis. Plan of Treatment:   1. Afib. Currently ST. Will start on BB. Pt is not candidate for [facility] at this time due to bleeding risk. Will re-evaluate as outpt  2. Fluid volume overload.   Management per Nephrology Electronically signed by LORA Banda CNP on 4/12/2019 at 9:57 AM  [address]  [phone]

## 2019-04-12 NOTE — PROGRESS NOTES
129. Pt donned O2 at this time and given ~2 minute rest break, O2 increasing to 94-96% and HR ~105-110. Pt requesting to return supine in bed at this time d/t increase in fatigue. Retired supine, call light within reach, bed alarm activated, Speech therapist in room and RN notified.      NELSON Osorio/KARLA

## 2019-04-12 NOTE — PROGRESS NOTES
Speech Language Pathology  Speech Language Pathology  [facility]    Dysphagia Treatment Note    Date: 4/12/2019  Patients Name: Marielle Goodwin  MRN: [id number]  Diagnosis: dysphagia   Patient Active Problem List   Diagnosis Code    Dog bite W54. 0XXA    Post-traumatic osteoarthritis of left knee M17.32    S/P left knee arthroscopy Z98.890    S/P total knee arthroplasty Z96.659    Arthritis of left knee M17.12    Shock (HCC) R57.9    Rhabdomyolysis M62.82    Hyponatremia E87.1    Lactic acidosis E87.2    MARY (acute kidney injury) ([facility] [address] V85.7    Metabolic acidosis K58.7    Acute respiratory failure (HCC) J96.00    Elevated liver enzymes R74.8    Hyperkalemia E87.5    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HCC) K55.049    Small bowel ischemia (HCC) K55.9    Acute GI bleeding K92.2    Gram negative septic shock (HCC) A41.50, R65.21    Protein-calorie malnutrition (HCC) E46    Septic shock (HCC) A41.9, R65.21    Gastroenteritis K52.9    Septicemia due to Gram negative organism ([facility]) A41.50       Pain: 0/10    Dysphagia Treatment  Treatment time:948-958    Subjective: [x] Alert [x] Cooperative     [] Confused     [] Agitated    [] Lethargic    Objective/Assessment:    Pt. Seen today for diet tolerance and possible upgrade. Pt. Up in chair. Given trials of thin liquid, puree and soft solids. Pt. Able to masticate solids without difficulty. No oral residual noted. Presented with + inconsistent s/s of aspiration with all consistencies given. ST to recommend NPO and video swallow study to r/o/confirm aspiration. Results and recommendations reported to RN. Plan:  [x] Continue ST services    [] Discharge from ST:        Discharge recommendations: [x] Inpatient Rehab   [] [place]   [] Outpatient Therapy  [] Follow up at trauma clinic   [] Other:         Treatment completed by: Aníbal Madrigal M.A.  CCC-SLP

## 2019-04-12 NOTE — PROGRESS NOTES
[facility] Wound Ostomy  Nurse  Follow up Note       NAME:  Sarah Arambula RECORD NUMBER:  [id number]  AGE: 47 y.o. GENDER: female  : 1965  TODAY'S DATE:  2019    Subjective   Reason for [address] Nurse Evaluation and Assessment:   NPWT to midabdominal incision with intermittent staples. Concern of surgeon for wound contamination from ileostomy leaking into wound. Patient examined by Drs Kelin Monge and Luís Albrecht; recommendation to apply NPWT with white sponge as jeyson between staples. Objective    BP (!) 148/79   Pulse 104   Temp 99.6 °F (37.6 °C) (Temporal)   Resp 14   Ht 5' 1.02\" (1.55 m)   Wt 189 lb 13.1 oz (86.1 kg)   SpO2 96%   BMI 35.84 kg/m²     LABS:  WBC:    Lab Results   Component Value Date    WBC 33.8 2019     H/H:    Lab Results   Component Value Date    HGB 9.4 2019    HCT 30.1 2019     PTT:    Lab Results   Component Value Date    APTT 28.0 2019   [APTT}  PT/INR:    Lab Results   Component Value Date    PROTIME 10.5 2019    INR 1.0 2019     HgBA1c:  No results found for: LABA1C    Assessment   Vickey Risk Score: Vickey Scale Score: 16    Patient Active Problem List   Diagnosis Code    Dog bite W54. 0XXA    Post-traumatic osteoarthritis of left knee M17.32    S/P left knee arthroscopy Z98.890    S/P total knee arthroplasty Z96.659    Arthritis of left knee M17.12    Shock (HCC) R57.9    Rhabdomyolysis M62.82    Hyponatremia E87.1    Lactic acidosis E87.2    MARY (acute kidney injury) ([facility] [address] N84.6    Metabolic acidosis O23.0    Acute respiratory failure (HCC) J96.00    Elevated liver enzymes R74.8    Hyperkalemia E87.5    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HCC) K55.049    Small bowel ischemia (HCC) K55.9    Acute GI bleeding K92.2    Gram negative septic shock (HCC) A41.50, R65.21    Protein-calorie malnutrition (HCC) E46    Septic shock (HCC) A41.9, R65.21    Gastroenteritis K52.9    Septicemia due to Gram negative organism [facility]) A41.50       Measurements:     04/12/19 1210   Ileostomy Ileostomy RUQ   Placement Date/Time: 04/08/19 1342   Pre-existing: No  Timeout: Patient; Appropriate Equipment;Sterile Technique using full body drape;Procedure  Inserted by: Dr Valarie Rivera  Ileostomy Type: Ileostomy  Location: RUQ   Stomal Appliance 2 piece  (Christiano convex barrier & hi volume pouch)   Flange Size (inches) 2.25 Inches   Stoma  Assessment Red;Moist;Flush  (1\" sl oval in crease)   Mucocutaneous Junction Intact   Peristomal Assessment Blanchable erythema   Treatment Bag change;Site care; Liquid skin barrier  (adapt ring to cut edge)   Stool Color Green;Brown   Stool Appearance Watery   Output (mL)   (to straight drainage bag)   Incision 04/06/19 Abdomen   Date First Assessed/Time First Assessed: 04/06/19 1840   Location: Abdomen   Wound Image    Wound Assessment Yellow  (subcutaneous fat)   Karly-wound Assessment Blanchable erythema   Closure Davey  (Intermittant)   Drainage Amount Small   Drainage Description Serosanguinous   Odor None   Dressing/Treatment Vacuum dressing   Dressing Changed Changed/New   Dressing Status Changed   Dressing Change Due 04/15/19   Negative Pressure Wound Therapy Abdomen Mid   Placement Date/Time: 04/12/19 1210   Location: Abdomen  Wound Location Orientation: Mid   Wound Type Surgical   Unit Type KCI VAC Ulta   Dressing Type White foam;Black foam;Non-adherant   Number of pieces used 10  (7 white as jeyson BT staples, 2 black, 1 adaptic)   Cycle Continuous   Target Pressure (mmHg) 125   Canister changed? Yes   Dressing Changed Changed/New   Wound Assessment   (see Incision LDA)   Karly-wound Assessment   (prepped with barrier wipe and drape)      Periwound bordered with drape. Piece of adaptic placed over stapes at umbilicus. Pieces of white foam cut and tucked into open area in incision between stpales. Black foam placed over top and draped.   Good seal was achieved/    High volume ostomy appliance placed to ileostomy with ring barrier to cut edge of convex barrier,    Response to treatment:  Well tolerated by patient. Plan   Plan of Care:        Specialty Bed Required : Yes   [] Low Air Loss   [x] Pressure Redistribution   Nikolas IsoFlex  [] Fluid Immersion  [] Bariatric  [] Total Pressure Relief  [] Other:     Current Diet: DIET FULL LIQUID; Low Sodium (2 GM);  Daily Fluid Restriction: 1200 ml  Dietary Nutrition Supplements: Standard High Calorie Oral Supplement

## 2019-04-12 NOTE — PROGRESS NOTES
Smoking Cessation - topics covered   [x]  Health Risks  [x]  Benefits of Quitting   [x]  Smoking Cessation  []  Patient has no history of tobacco use  []  Patient is former smoker. []  No need for tobacco cessation education. [x]  Booklet given  [x]  Patient verbalizes understanding. []  Patient denies need for tobacco cessation education. []  Unable to meet with patient today. Will follow up as able.   Grace Perez  2:43 PM

## 2019-04-12 NOTE — CARE COORDINATION
Patient would like to go to home with family support. Parents live in [place]  [facility] [address] and needs to be set up here in [place], then once medicaid is switched over to [place], can then transfer to another dialysis facility. Patient only walked 15 feet with PT and unsteady. May need SNF choice    1820 Attempted to provide SNF list to patient and patient is out of room and no family present.

## 2019-04-12 NOTE — PROGRESS NOTES
[facility] - Progress Note    Today's Date and Time: 4/12/2019, 1:38 PM    Impression :   1. 46 y/o female with complaints of  · Vomiting  · Diarrhea  · Abdominal pain  · Altered mental status  2. Acute kidney injury  · Nephrology onboard and planning for urgent dialysis   3. Shock, presumptive septic plus hypovolemic, requiring Levophed  4. Septicemia with Haemophilus influenzae 4-5-19  5. Gastroenteritis  6. Ischemic bowel  7. S/P laparotomy 4-6-19  8. S/p second look with resection of transverse colon, ileostomy creation, resection of rectal stump and abdominal closure. 9. Severe hyponatremia  10. Transaminitis, shock liver, improving  11. Intubation 2/2 AMS  12. COPD  15. Arthritis  14. Chronic NSAID use  15. Funguria  16. Acral mottling of hands and feet  17. Allergy to penicillins: Hives and respiratory distress  18. Allergy to sulfa    Recommendations:   · Meropenem 1 gm IV q 24 hr.Stop date 4-14-19  · Repeat CBC on 4-13-19 to check on leukocytosis    Medical Decision Making/Summary/Discussion:   · 46 y/o female with vomiting, diarrhea, and AMS  · Found to have transaminitis, MARY requiring emergent dialysis, lactic acidosis, shock requiring pressors  · Intubated due to AMS  · CT chest shows atelactasis vs pneumonia  · Currently on vanc, levaquin, and aztreonam   · Patient is critically ill with origin in GI tract . Will need to treat with meropenem despite Hx of penicillin allergy. · Had laparotomy 4-6-19 with findings of ischemic bowel from distal ileum to sigmoid colon.    · S/P ileocecectomy with extended left hemicolectomy and placement of wound vac 4-6-19  · Overall improvement post surgery  · Off pressors and sedation  · Cultures grew haemophilus influenza, beta lactamase negative, sensitivities pending  Infection Control Recommendations   · [place] Precautions    Antimicrobial Stewardship Recommendations     · Simplification of therapy  · Targeted therapy    Coordination of Outpatient Care:   · Estimated Length of IV antimicrobials: TBD  · Patient will need Midline Catheter Insertion: TBD  · Patient will need PICC line Insertion: TBD  · Patient will need: Home IV , [place],  SNF,  LTAC  · Patient will need outpatient wound care: TBD    Chief complaint/reason for consultation:   · Altered mental status and tender abdomen       History of Present Illness:   Karl Rivera is a 47y.o.-year-old  female who was initially admitted on 4/5/2019. Patient seen at the request of Dr. Cam Chang:    Patient presented to ED via EMS due to altered mental status and vomiting. Patient has history significant for COPD, right knee osteoarthirtis s/p arthoplasty, and chronic NSAID use. Patient lives in [place], but was here to visit her significant other. Arrived Wednesday night, said she didn't feel good. Thought she might have had a bad burger at a fast food restaurant. Went to sleep and slept for almost 24 hours. When she awoke, she said she was vomiting, having diarrhea, and abdominal pain. Significant other and sister are unsure of the nature of the vomit, diarrhea, or abdominal pain. Then she slept a bit more, until her significant other found her unresponsive and that's when he called EMS to bring her to the hospital.     Afebrile on admission, but Tmax overnight was 39.3. Tachycardic on admission, 129. Became hypotensive after admission and levophed and vasopressin were started. Initial labs:     Admission labs significant for Na 125, K 8.0, CO2 9, BUN 62, Creatinine of 4.06, Anion gap of 23, Lactic acid of 3.5, Glucose of 186, Ca 5.2, POC HCO3 15.9, CK 15,342, Troponins high sensitivity 89 and 94, Alk phos 168, , AST 1,122, Bili .37, lipase of 119, Urine tox positive for THC and Cocaine, WBC 23.5, Hgb 10.2, Urine and blood cultures pending, HIV negative, influenza negative, hepatitis panel non-reactive, urine Leukocyte esterase trace, urine nitrite -, urine protein 2+, urine Hgb large, urine RBCs 5 to 10, many urine yeast,  ABG 7.22, pCO2 27, HCO3 15.9. Initial imaging showed:     CXR: No acute cardiopulmonary process    Abdominal X-ray 4/6: No free air    CTA of chest: Negative for PE or dissection. Patchy consolidations of bilateral lower lung lobes representing developing pneumonia vs atelectasis. CT head: pending    Initial course:     Intubated and sedated in ED due to altered mental status. Currently on Vancomycin, Levaquin, and aztreonam for empiric coverage. Richar catheter was placed due to request by nephrology for emergent dialysis. Dialysis attempted several times, but due to high arterial pressures and line clotting, dialysis to be completed later today by Dr. Tamanna Blankenship. NG tube with bloody output. FOBT +. General surgery has been consulted for evaluation. CURRENT EVALUATION : 4/12/2019    Patient seen and examined. Extubated. AOx4. Following commands. Says she is feeling better. Pain well controlled. Patient underwent ileocecectomy with extended left hemicolectomy and placement of wound vac on 4-6-19 because of ischemic bowel. Had additional surgery on 4-8-19 for second look laparotomy with transverse colon resection and ileostomy creation. Overall improvement post surgeries. Afebrile  VS stable, mild HTN     Had episodes of a fib with RVR. Shocked twice to control rhythm and rate on 4-7-19. Pulse now in the low 100's. Abdomen softer. VAC in place  Skin cyanosis resolved. Blood cultures grew haemophilus influenza, beta lactamase negative. CXR 4-11-19 showed unchanged pulmonary edema and effusions. Duplex of the right upper extremity showed no evidence of deep venous thrombosis in the right upper extremity. Superficial thrombophlebitis of the right upper extremity involving the cephalic and basilic veins. Says that hand is not hurting her.  Able to move the wrist and digits on command. Rt wrist with skin ulceration. WBC has increased from 23-->33.8 without a specific cause. Clinically she continues to improve. Will repeat WBC in AM.    Labs reviewed: 4/12/2019    WBC 23.9-->33.8  Hb 8.3-->9.4  Plat 75-->119    BUN 33-->48  Cr 3.20-->4.10      Cultures:  Urine:  · NGTD  Blood:  · 4-5-19 : Gram negative bacilli, haemophilus influenza, beta lactamase negative, sensitivities pending  Wound:  · NA    MRSA- Neg    Discussed with RN, CC. I have personally reviewed the past medical history, past surgical history, medications, social history, and family history, and I have updated the database accordingly.   Past Medical History:     Past Medical History:   Diagnosis Date    Asthma     On inhaler    Back pain, chronic     Hypertension 2015    On Lisinopril    Snores     possible apnea but not tested    Wears glasses        Past Surgical  History:     Past Surgical History:   Procedure Laterality Date    KNEE ARTHROSCOPY Left 1980    KNEE ARTHROSCOPY Left 09/28/2016    with medial menisectomy    LAPAROTOMY EXPLORATORY N/A 4/6/2019    LAPAROTOMY EXPLORATORY, ILEOCECECTOMY, SUBTOTAL COLECTOMY, ABTHEHRA WOUND VAC PLACEMENT performed by Nico Sutherland MD at [facility] 4/8/2019    2ND LOOK EXPLORATORY LAPAROTOMY, RESECTION TRANSVERSE COLON, ILEOSTOMY CREATION, RESECTION RECTAL STUMP, ABDOMINAL WASHOUT, OMENTECTOMY, ABDOMINAL WALL CLOSURE performed by Basia Hussein MD at [address] [place] [address] Right 2013       Medications:      iron sucrose  200 mg Intravenous Q24H    metoprolol tartrate  25 mg Oral BID    cyclobenzaprine  5 mg Oral TID    pantoprazole  40 mg Oral BID AC    meropenem  1 g Intravenous Q24H    midodrine  5 mg Oral TID WC    sodium chloride flush  10 mL Intravenous 2 times per day       Social History:     Social History     Socioeconomic History    Marital status: Single     Spouse name: Not on file 104 14 96 %     General Appearance: Sedated, on ventilator  Head:  Normocephalic, no trauma  Eyes: Pupils equal, round, reactive to light; sclera anicteric; conjunctivae pink. No embolic phenomena. ENT: Oropharynx clear, without erythema, exudate, or thrush. No tenderness of sinuses. Mouth/throat: mucosa pink and moist. No lesions. Dentition in good repair. Neck:Supple, without lymphadenopathy. Thyroid normal, No bruits. Pulmonary/Chest: Clear to auscultation, without wheezes, rales, or rhonchi. No dullness to percussion. Cardiovascular: Regular rate and rhythm without murmurs, rubs, or gallops. Abdomen: Distended. Bowel sounds absent. Soft, mildly tender, surgical bandages in place. Wound vac removed. All four Extremities: Cyanosis of trunk, abdomen, distal extremities  Neurologic: Sedated  Skin: Cool and dry with poor turgor. Signs of peripheral arterial  insufficiency. No ulcerations. No open wounds. Skin purplish, cyanotic. Medical Decision Making -Laboratory:   I have independently reviewed/ordered the following labs:    CBC with Differential:   Recent Labs     04/11/19  0926 04/12/19  0617   WBC 23.9* 33.8*   HGB 8.3* 9.4*   HCT 25.4* 30.1*   PLT 75* 119*   LYMPHOPCT 10* 10*   MONOPCT 5 9*     BMP:   Recent Labs     04/11/19  0926 04/12/19  0617    136   K 4.1 4.3    98   CO2 28 24   BUN 33* 48*   CREATININE 3.20* 4.10*     Hepatic Function Panel:   Recent Labs     04/10/19  0636   PROT 4.2*   LABALBU 1.6*   BILIDIR 1.18*   IBILI 0.37   BILITOT 1.55*   ALKPHOS 121*   *   *     No results for input(s): RPR in the last 72 hours. No results for input(s): HIV in the last 72 hours. No results for input(s): BC in the last 72 hours.   Lab Results   Component Value Date    MUCUS NOT REPORTED 04/06/2019    RBC 3.15 04/12/2019    TRICHOMONAS NOT REPORTED 04/06/2019    WBC 33.8 04/12/2019    YEAST NOT REPORTED 04/06/2019    TURBIDITY TURBID 04/06/2019     Lab Results   Component Value Date    CREATININE 4.10 04/12/2019    GLUCOSE 88 04/12/2019       Medical Decision Making-Imaging:     Abdominal xray:    Gas in mildly distended loops of large and small bowel likely reflecting an   ileus.       NG tube tip in the gastric fundus with the proximal side-port in the distal   thoracic esophagus, repositioning recommended.       Airspace disease left lung base. CT scan chest:    Impression   Satisfactory position endotracheal tube.       Nasogastric tube needs advanced 10 cm.       Patchy perihilar opacities and bibasilar airspace disease.  Follow-up may be   beneficial following medical treatment course to document complete resolution.           EXAMINATION:   CTA OF THE ABDOMEN AND PELVIS WITH CONTRAST       4/5/2019 9:23 pm:       TECHNIQUE:   CTA of the abdomen and pelvis was performed with the administration of   intravenous contrast. Multiplanar reformatted images are provided for review. MIP images are provided for review.  Dose modulation, iterative   reconstruction, and/or weight based adjustment of the mA/kV was utilized to   reduce the radiation dose to as low as reasonably achievable.       COMPARISON:   None.       HISTORY:   ORDERING SYSTEM PROVIDED HISTORY: suspected dissection of abdominal aorta       FINDINGS:       CTA ABDOMEN:       Bibasilar airspace disease.  Liver, spleen, pancreas, gallbladder normal.   Bilateral adrenal masses measuring 11 mm on the left and 18 x 28 mm on the   left.  Bilateral ill-defined hypodensities throughout the kidneys.  The small   bowel is somewhat distended with multiple air-fluid levels.  Multiple   air-fluid levels are also noted throughout the colon.  The stomach appears   normal.  Retroaortic left renal vein.       Bones normal.       Aorta appears normal without dissection.  The celiac artery and SMA appear   normal.  The renal arteries appear normal.           CTA PELVIS:       Bladder decompressed.  Uterus normal.  Catheter right groin terminates in the   common iliac vein.           Impression   Ileus.  No evidence of aortic dissection.  The bilateral adrenal masses, left   greater than right.  Recommend follow-up adrenal MRI non emergently.           Medical Decision Making-Other: Thank you for allowing us to participate in the care of this patient. Please call with questions.     Ana Moody MD     Pager: [phone] - Office: [phone]

## 2019-04-12 NOTE — PROGRESS NOTES
Physical Therapy  Facility/Department: [address] STEPDOWN  Daily Treatment Note  NAME: Faheem Paul  : 1965  MRN: [id number]    Date of Service: 2019    Discharge Recommendations:  Further therapy recommended at discharge. PT Equipment Recommendations  Equipment Needed: No  Other: Pt has RW    Patient Diagnosis(es): There were no encounter diagnoses. has a past medical history of Asthma, Back pain, chronic, Hypertension, Snores, and Wears glasses. has a past surgical history that includes Tonsillectomy; Knee arthroscopy (Left, ); Ulnar tunnel release (Right, ); Knee arthroscopy (Left, 2016); LAPAROTOMY EXPLORATORY (N/A, 2019); and LAPAROTOMY EXPLORATORY (N/A, 2019). Restrictions  Restrictions/Precautions  Restrictions/Precautions: Fall Risk, Contact Precautions  Required Braces or Orthoses?: No  Position Activity Restriction  Other position/activity restrictions: up with assist. s/p  ILEOCECECTOMY, SUBTOTAL COLECTOMY ; s/p 2ND LOOK EXPLORATORY LAPAROTOMY with ileostomy creation       Subjective   General  Response To Previous Treatment: Patient with no complaints from previous session. Family / Caregiver Present: No  Subjective  Subjective: RN and pt agreed to PT, pt awake in bed upon arrival, pt c/o pain and fatigue after just completing shower with OT; pt did not rate pain; HR ~111 at rest  Pain Screening  Patient Currently in Pain: Yes  Vital Signs  Patient Currently in Pain: Yes       Orientation  Orientation  Overall Orientation Status: Within Functional Limits       Objective   Bed mobility  Supine to Sit: Moderate assistance (w/BLE progression and trunk control)  Sit to Supine: Moderate assistance(x2)  Scooting: Moderate assistance  Transfers  Sit to Stand: Minimal Assistance  Stand to sit: Minimal Assistance  Ambulation  Ambulation?: Yes  Ambulation 1  Surface: level tile  Device: (face to face)  Assistance:  Moderate assistance  Quality of Gait: VERY short shuffling steps, unsteady, flexed posture, unsafe with AD  Distance: 15ft  Comments: HR increased up to 135bpm with ambulation  Stairs/Curb  Stairs?: No     Balance  Posture: Good  Sitting - Static: Fair;+  Sitting - Dynamic: Fair  Standing - Static: Fair;-  Standing - Dynamic: Poor;+  Comments: pt sat EOB ~10mins close SBA; HR ~115bpm sitting EOB             Assessment   Body structures, Functions, Activity limitations: Decreased functional mobility ; Decreased strength;Decreased balance  Assessment: Pt requiring modA for 15ft gait w/RW, Limited by fatigue and low endurance; pt is a high risk for falls and is unsafe to amb alone  Prognosis: Good  Patient Education: Importance of PT, ambulation and getting OOB  REQUIRES PT FOLLOW UP: Yes  Activity Tolerance  Activity Tolerance: Patient Tolerated treatment well;Patient limited by endurance     Goals  Short term goals  Time Frame for Short term goals: 14 visits  Short term goal 1: Pt will be Betty with bed mobility  Short term goal 2: Pt will be Betty transfers  Short term goal 3: Pt will be SBA amb 125' RW   Short term goal 4: Pt will be safe to attempt steps    Plan    Plan  Times per week: 5-6x/wk  Current Treatment Recommendations: Strengthening, Balance Training, Functional Mobility Training, Transfer Training, Endurance Training, Cognitive Reorientation, Gait Training, Stair training, Home Exercise Program, Safety Education & Training, Equipment Evaluation, Education, & procurement, Patient/Caregiver Education & Training  Safety Devices  Type of devices: Call light within reach, Gait belt, Nurse notified, Left in bed, Bed alarm in place, All fall risk precautions in place(wound care needs pt in bed for colostomy dressing change)  Restraints  Initially in place: No     Therapy Time   Individual Concurrent Group Co-treatment   Time In 1101         Time Out 1128         Minutes 32                 Donna Balderas, PTA

## 2019-04-12 NOTE — PROGRESS NOTES
Dialysis Post Treatment Note  Patient tolerated treatment well. Denies complaints at this time.      Vitals:    04/12/19 1845   BP: (!) 157/82   Pulse: 89   Resp:    Temp: 98.1 °F (36.7 °C)   SpO2:      Pre-Weight = 87.7kg  Post-weight = Weight: 187 lb 9.8 oz (85.1 kg)  Total Liters Processed = Total Liters Processed (l/min): 68.2 l/min  Rinseback Volume (mL) = Rinseback Volume (ml): 350 ml  Net Removal (mL) = 2420 mL  Length of treatment=3.5 hours

## 2019-04-12 NOTE — PROGRESS NOTES
INTERNAL MEDICINE                                                                             ICU PATIENT TRANSFER NOTE        Patient:  Marielle Goodwin  YOB: 1965  MRN: [id number]     Acct: [de-identified]     Admit date: 4/5/2019    Code Status:-  Full code     Reason for ICU Admission:-       SUPPORT DEVICES: [] Ventilator [] BIPAP  [] Nasal Cannula [x] Room Air    Consultations:- [x] Cardiology [x] Nephrology  [] Hemo onco  [] GI                               [x] ID [] ENT  [] Rheum [] Endo   []Physiotherapy                                 Others:-  General Surgery    NUTRITION:  [] NPO [] Tube Feeding (Specify: ) [x] TPN  [] PO    Central Lines:- [] No   [x] Yes           If yes - Days/Date of Insertion. Pt seen and Chart reviewed. ICU COURSE :-     The patient came into the hospital with complaints of vomiting, abdominal pain and diarrhea for the last few days. The patient also had increasing confusion and presented to the hospital with altered mental state. She was hypotensive, received fluid bolus in the ED. She was severely acidotic with lactic acidosis. She also had hyperkalemia due to acute renal failure area. She had mottling of her lower extremities. She had a CT chest concerning aortic dissection which was ruled out. CTA Abdomen also ruled out aortic dissection but showed ileus. It showed dilated bowel loops consistent with Ileus. Because of hyperkalemia, the patient was started on bicarbonate drip. She required Levophed support and later with vasopressin as well. She got a dialyzing catheter during the night and was not able to tolerate the dialysis procedure as the flow was collapsing. She was found to be positive for cocaine on urinary drug screen.    Troponins continued to worsen from 85 to128 secondary to renal failure and liver failure. She had developed rhabdomyolysis with increasing myoglobin and creatine kinases as well as creatinine going up. On initial presentation to the hospital, the patient's creatinine was 4.06, calcium was 5.2, sodium was 125, potassium 8.0. ALT and AST were elevated. Lactic acid was 5.7. The patient was given vancomycin and Levaquin in the ED. Initial W BC count was 23.5. Nephrology was consulted in the ED and they recommended emergent dialysis. Dialysis catheter was placed but the patient could not tolerated the procedure as the flow was collapsing. Surgery was consulted for concerns of bowel ischemia. Abdominal x-ray was done which showed dilated bowel loop but no signs of perforation. She was given continuous IV fluids and levophed. Exploratory laparotomy was performed with subtotal colectomy and ileocecetcomy with wound vac placement. Extended left hemicolectomy. She was taken to OR again after 1 or 2 days for second look exploratory laparotomy, resection of the transverse colon, ileostomy creation, resection of rectal stump, abdominal washout and abdominal wall failure. Infectious disease was consulted. The patient had severe penicillin allergy history. She was started on azactam to cover gram-negative organisms with levofloxacin. Azactam was later on changed to meropenem 1 g IV every 24 hours. Levofloxacin was later on discontinued. An NG tube was placed which was draining black tarry material.  She also had generally current stools on wiping the part this morning. On April 7, the patient went into atrial fibrillation with RVR and was given digoxin 0.25 but no response and remained tachycardic. Her electrolytes are low and she was getting replacement. Blood cultures grew gram-negative rods H influenza. Cardiology was consulted for atrial fibrillation. She received a bolus of 150 mg amiodarone. She was also cardioverted because of hemodynamic instability but went into A. fib with RVR within 1 minute. She was not given anticoagulation because of coagulopathy. The patient smokes half a pack a day is a social drinker but denies any use of recreational drugs. On April 9, she had an episode of hypoglycemia so the IV fluids were changed to D5. The patient got her dialysis on April 10th and was extubated as well. She remained on BiPAP. Was started on low-dose metoprolol by cardiology. She had EF and HD in the morning yesterday. Physical Exam:   Vitals: BP (!) 143/70   Pulse 101   Temp 98.4 °F (36.9 °C) (Oral)   Resp 15   Ht 5' 1.02\" (1.55 m)   Wt 189 lb 13.1 oz (86.1 kg)   SpO2 96%   BMI 35.84 kg/m²   24 hour intake/output:    Intake/Output Summary (Last 24 hours) at 4/12/2019 0104  Last data filed at 4/11/2019 2216  Gross per 24 hour   Intake 773 ml   Output 4271 ml   Net -3498 ml     Last 3 weights: Wt Readings from Last 3 Encounters:   04/11/19 189 lb 13.1 oz (86.1 kg)   11/12/18 155 lb (70.3 kg)   05/15/17 145 lb 1 oz (65.8 kg)       General appearance: alert and cooperative with exam  HEENT: Head: Normocephalic, no lesions, without obvious abnormality.   Neck: no adenopathy, no carotid bruit, no JVD, supple, symmetrical, trachea midline and thyroid not enlarged, symmetric, no tenderness/mass/nodules  Lungs: clear to auscultation bilaterally  Heart: regular rate and rhythm, S1, S2 normal, no murmur, click, rub or gallop  Abdomen: soft, non-tender; bowel sounds normal; no masses,  no organomegaly  Extremities: extremities normal, atraumatic, no cyanosis or edema  Neurologic: Mental status: Alert, oriented, thought content appropriate    Medications:Current Inpatient  Scheduled Meds:   cyclobenzaprine  5 mg Oral TID    pantoprazole  40 mg Oral BID AC    meropenem  1 g Intravenous Q24H    midodrine  5 mg Oral TID WC    sodium chloride flush  10 mL Intravenous 2 times per day     Continuous Infusions:  PRN Meds:oxyCODONE, morphine **OR** morphine, sodium chloride, sodium chloride, anticoagulant sodium citrate, anticoagulant sodium citrate, dextrose, sodium chloride flush, magnesium hydroxide, ondansetron    Objective:    CBC:   Recent Labs     04/09/19  0506 04/10/19  0636 04/11/19  0926   WBC 22.2* 18.5* 23.9*   HGB 7.9* 8.2* 8.3*   PLT See Reflexed IPF Result See Reflexed IPF Result 75*     BMP:    Recent Labs     04/09/19  0506 04/10/19  0636 04/11/19  0926   * 131* 139   K 3.7 3.6* 4.1   CL 94* 94* 102   CO2 28 27 28   BUN 36* 46* 33*   CREATININE 2.87* 4.08* 3.20*   GLUCOSE 68* 95 111*     Calcium:  Recent Labs     04/11/19  0926   CALCIUM 7.5*     Glucose:  Recent Labs     04/11/19  0137 04/11/19  0806 04/12/19  0023   POCGLU 79 99 100     INR:   Recent Labs     04/09/19  0506   INR 1.0     Hepatic:   Recent Labs     04/10/19  0636   ALKPHOS 121*   *   *   PROT 4.2*   BILITOT 1.55*   BILIDIR 1.18*   LABALBU 1.6*     CARDIAC ENZYMES:  Recent Labs     04/09/19  0506 04/11/19  0926   CKTOTAL 21,045* 4,054*     Thyroid:   Lab Results   Component Value Date    TSH 0.97 04/05/2019      CULTURES:     Assessment:  Patient Active Problem List   Diagnosis    Dog bite    Post-traumatic osteoarthritis of left knee    S/P left knee arthroscopy    S/P total knee arthroplasty    Arthritis of left knee    Shock ([address]    Rhabdomyolysis    Hyponatremia    Lactic acidosis    MARY (acute kidney injury) ([address]    Metabolic acidosis    Acute respiratory failure (HCC)    Elevated liver enzymes    Hyperkalemia    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HCC)    Small bowel ischemia (HCC)    Acute GI bleeding    Gram negative septic shock (HCC)    Protein-calorie malnutrition (HCC)    Septic shock (HCC)    Gastroenteritis    Septicemia due to Gram negative organism ([address]       Plan:    · Will start diet as the patient passes the swallow study. We'll advance diet as tolerated. · Continue meropenem per ID recommendations.   · Not on anticoagulation due to low platelet count. Monitor CBC daily. · BiPAP as needed. · Continue Midodrine for low BP  · PT/OT  · Pain control with Morphine and Roxicodone PRN  · If the patient spike a fever, can be started on Vancomycin. Julio Alfonso MD  PGY-1, Department of Internal Medicine  [address], [place]  4/12/2019 3:02 AM      Attending Physician Statement  I have discussed the care of Sheyla Swift, including pertinent history and exam findings with the resident. I have reviewed the key elements of all parts of the encounter with the resident. I have seen and examined the patient with the resident. I agree with the assessment and plan and status of the problem list as documented. I seen the patient during round today, I have reviewed the labs, chart seen and medications reviewed. I have reviewed the chart, Gen. surgery note an infectious disease note seen. She is alert and awake and not in distress she's able to follow all the commands, she does complain of abdominal pain and denies any nausea or vomiting. Although she did not have any fever or WBC count is increased to 33,000 today she does not look toxic her blood pressure systolic is 031, her heart rate is controlled and she is on Lopressor 25 mg twice daily. Her last blood cultures were negative  Currently she is taking ileostomy care by wound, there is a central line present in right groin there is no discharge of fluctuation there is mild redness present at the skin site of the dressing and no tenderness present. Waiting for barium swallow evaluation for dysphagia. Chest x-ray shows bilateral pleural effusion worsening from chest x-ray the day before and shows bilateral basilar atelectasis, she does have good cough she is able to expectorate and she is on nasal cannula 2 L saturating 96%. Urine output remains low and she had 220 urine in the last 24 hours and creatinine is 4.10.   Her platelet count is improving and it is 119

## 2019-04-12 NOTE — PROGRESS NOTES
Nutrition Assessment    Type and Reason for Visit: Reassess    Nutrition Recommendations:   - Monitor results of video swallow study and start of oral diet as appropriate. - As able will continue to provide Ensure Enlive ONS with meals. Nutrition Assessment: Pt leaving floor for a video swallow study today - Speech concerned for aspiration when working with today. RN states pt consuming all of her jello and lots of water today. NG has been removed. Malnutrition Assessment:  · Malnutrition Status: At risk for malnutrition  · Context: Acute illness or injury  · Findings of the 6 clinical characteristics of malnutrition (Minimum of 2 out of 6 clinical characteristics is required to make the diagnosis of moderate or severe Protein Calorie Malnutrition based on AND/ASPEN Guidelines):  1. Energy Intake-Less than or equal to 75% of estimated energy requirement, Greater than or equal to 7 days    2. Weight Loss-Unable to assess  3. Fat Loss-No significant subcutaneous fat loss  4. Muscle Loss-No significant muscle mass loss  5. Fluid Accumulation-Mild fluid accumulation, Extremities, Generalized    Nutrition Risk Level:  Moderate    Nutrient Needs:  · Estimated Daily Total Kcal: 1981-0898 kcal/day  · Estimated Daily Protein (g): 70-95 g pro/day     Nutrition Diagnosis:   · Problem: Inadequate oral intake  · Etiology: related to recent abd surgery, recent extubation     Signs and symptoms:  as evidenced by pending video swallow evaluation    Objective Information:  · Nutrition-Focused Physical Findings: Ileostomy  · Wound Type: Surgical Wound  · Current Nutrition Therapies:  · Oral Diet Orders: Full Liquid, 2gm Sodium, Fluid Restriction   · Oral Diet intake: Diet on hold for video swallow - consuming 100% jello and lots of water  · Oral Nutrition Supplement (ONS) Orders: Standard High Calorie Oral Supplement  · Anthropometric Measures:  · Ht: 5' 1.02\" (155 cm)   · Current Body Wt: 189 lb 13.1 oz (86.1 kg)  · Admission Body Wt: 173 lb 15.1 oz (78.9 kg)  · Ideal Body Wt: 105 lb 13.1 oz (48 kg), % Ideal Body 165% (adm/ideal)  · BMI Classification: BMI 30.0 - 34.9 Obese Class I    Nutrition Interventions:   Monitor results of video swallow study and start of oral diet as appropriate. Will continue to provide ONS with meals as able.   Continued Inpatient Monitoring, Education Not Indicated, Speech Therapy, Swallow Evaluation    Nutrition Evaluation:   · Evaluation: Progressing toward goals   · Goals: Meet % of estimated nutrition needs    · Monitoring: Chewing/Swallowing, Meal Intake, Supplement Intake, Diet Tolerance, Skin Integrity, Weight, Pertinent Labs    Electronically signed by Radha Broussard RD, LD on 4/12/19 at 2:22 PM    Contact Number: [phone]

## 2019-04-12 NOTE — CARE COORDINATION
Advised pt will need OP dialysis arranged  Attempted to meet with pt again x2 - has been off the floor    Will f/u with pt for dialysis unit preference  Pt had stated to writer earlier today that she wants to move in with her mother/step-father in [place], [place] at discharge  However, pt has an [address] product - Geroge Olson  Pt most likely will need SNF as well - which would need to be in [place]    Will discuss with pt the need to stay in [place] for potential SNF/OP dialysis at this time  Pt would need to close out her [address] before she can apply for [address] [place], [place] [address]vd did look at OP dialysis units in [place], [place]  There is a [facility]   Will discuss with pt using a [facility] unit here and then can transfer to [facility] once insurance issues were resolved

## 2019-04-12 NOTE — PROCEDURES
INSTRUMENTAL SWALLOW REPORT  MODIFIED BARIUM SWALLOW    NAME: Marielle Goodwin   : 1965  MRN: [id number]       Date of Eval: 2019              Referring Diagnosis(es):      Past Medical History:  has a past medical history of Asthma, Back pain, chronic, Hypertension, Snores, and Wears glasses. Past Surgical History:  has a past surgical history that includes Tonsillectomy; Knee arthroscopy (Left, ); Ulnar tunnel release (Right, ); Knee arthroscopy (Left, 2016); LAPAROTOMY EXPLORATORY (N/A, 2019); and LAPAROTOMY EXPLORATORY (N/A, 2019). Current Diet Solid Consistency: Dysphagia I Pureed  Current Diet Liquid Consistency: Thin       Type of Study: Initial MBS      Patient Complaints/Reason for Referral:  Marielle Goodwin was referred for a MBS to assess the efficiency of his/her swallow function, assess for aspiration, and to make recommendations regarding safe dietary consistencies, effective compensatory strategies, and safe eating environment. Onset of problem:     Brionna Yeager is a 47 y.o. F with PMHx of HTN, COPD who takes Lisinopril-HCTZ and Neurontin presented to the ED with increase confusion, abdominal pain, Nausea, Vomiting. Due to AMS, patient was intubated in the ED. Patient also had mottled lower extremities noted. Initial concern for Aortic dissection, so CTA chest/abd/pelvis performed which shows no dissection, but does show patchy consolidations lower lobes and bilateral adrenal massess as well as ileus.      Patients significant other reports the patient hyatt been laying around for the past 2 days, with intermittent confusion, abd pain, nausea, and vomiting. They brought her to the ED for the worsening confusion. Patient is 1/2 ppd smoker, social drinker, no illicit drug use.      Behavior/Cognition/Vision/Hearing:  Behavior/Cognition: Alert; Cooperative  Vision: Impaired  Hearing: Within functional limits    Impressions:  Patient presents

## 2019-04-13 ENCOUNTER — APPOINTMENT (OUTPATIENT)
Dept: CT IMAGING | Age: 54
DRG: 710 | End: 2019-04-13
Payer: MEDICAID

## 2019-04-13 LAB
-: NORMAL
ABSOLUTE EOS #: 0 K/UL (ref 0–0.4)
ABSOLUTE IMMATURE GRANULOCYTE: 1.25 K/UL (ref 0–0.3)
ABSOLUTE LYMPH #: 3.75 K/UL (ref 1–4.8)
ABSOLUTE MONO #: 0.83 K/UL (ref 0.1–0.8)
ALBUMIN SERPL-MCNC: 2.2 G/DL (ref 3.5–5.2)
ALBUMIN/GLOBULIN RATIO: 0.9 (ref 1–2.5)
ALP BLD-CCNC: 94 U/L (ref 35–104)
ALT SERPL-CCNC: 160 U/L (ref 5–33)
ANION GAP SERPL CALCULATED.3IONS-SCNC: 15 MMOL/L (ref 9–17)
AST SERPL-CCNC: 103 U/L
BASOPHILS # BLD: 0 % (ref 0–2)
BASOPHILS ABSOLUTE: 0 K/UL (ref 0–0.2)
BILIRUB SERPL-MCNC: 0.7 MG/DL (ref 0.3–1.2)
BILIRUBIN DIRECT: 0.36 MG/DL
BILIRUBIN, INDIRECT: 0.34 MG/DL (ref 0–1)
BUN BLDV-MCNC: 50 MG/DL (ref 6–20)
BUN/CREAT BLD: ABNORMAL (ref 9–20)
CALCIUM SERPL-MCNC: 8 MG/DL (ref 8.6–10.4)
CHLORIDE BLD-SCNC: 98 MMOL/L (ref 98–107)
CO2: 22 MMOL/L (ref 20–31)
COMPLEMENT C3: 115 MG/DL (ref 90–180)
COMPLEMENT C4: 24 MG/DL (ref 10–40)
CREAT SERPL-MCNC: 3.75 MG/DL (ref 0.5–0.9)
DIFFERENTIAL TYPE: ABNORMAL
EOSINOPHILS RELATIVE PERCENT: 0 % (ref 1–4)
GFR AFRICAN AMERICAN: 15 ML/MIN
GFR NON-AFRICAN AMERICAN: 13 ML/MIN
GFR SERPL CREATININE-BSD FRML MDRD: ABNORMAL ML/MIN/{1.73_M2}
GFR SERPL CREATININE-BSD FRML MDRD: ABNORMAL ML/MIN/{1.73_M2}
GLOBULIN: ABNORMAL G/DL (ref 1.5–3.8)
GLUCOSE BLD-MCNC: 110 MG/DL (ref 70–99)
GLUCOSE BLD-MCNC: 60 MG/DL (ref 65–105)
GLUCOSE BLD-MCNC: 67 MG/DL (ref 65–105)
GLUCOSE BLD-MCNC: 83 MG/DL (ref 65–105)
GLUCOSE BLD-MCNC: 87 MG/DL (ref 65–105)
HCT VFR BLD CALC: 28.9 % (ref 36.3–47.1)
HCT VFR BLD CALC: 31 % (ref 36.3–47.1)
HEMOGLOBIN: 9 G/DL (ref 11.9–15.1)
HEMOGLOBIN: 9.9 G/DL (ref 11.9–15.1)
HOMOCYSTEINE: 8.9 UMOL/L
IMMATURE GRANULOCYTES: 3 %
LYMPHOCYTES # BLD: 9 % (ref 24–44)
MCH RBC QN AUTO: 30.2 PG (ref 25.2–33.5)
MCH RBC QN AUTO: 31.1 PG (ref 25.2–33.5)
MCHC RBC AUTO-ENTMCNC: 31.1 G/DL (ref 28.4–34.8)
MCHC RBC AUTO-ENTMCNC: 31.9 G/DL (ref 28.4–34.8)
MCV RBC AUTO: 100 FL (ref 82.6–102.9)
MCV RBC AUTO: 94.5 FL (ref 82.6–102.9)
MONOCYTES # BLD: 2 % (ref 1–7)
MORPHOLOGY: ABNORMAL
MORPHOLOGY: ABNORMAL
NRBC AUTOMATED: 0.1 PER 100 WBC
NRBC AUTOMATED: 0.2 PER 100 WBC
PARTIAL THROMBOPLASTIN TIME: 63.1 SEC (ref 20.5–30.5)
PDW BLD-RTO: 16.1 % (ref 11.8–14.4)
PDW BLD-RTO: 16.3 % (ref 11.8–14.4)
PLATELET # BLD: 168 K/UL (ref 138–453)
PLATELET # BLD: ABNORMAL K/UL (ref 138–453)
PLATELET ESTIMATE: ABNORMAL
PLATELET, FLUORESCENCE: 147 K/UL (ref 138–453)
PLATELET, IMMATURE FRACTION: 16.8 % (ref 1.1–10.3)
PMV BLD AUTO: 12.8 FL (ref 8.1–13.5)
PMV BLD AUTO: ABNORMAL FL (ref 8.1–13.5)
POTASSIUM SERPL-SCNC: 4.1 MMOL/L (ref 3.7–5.3)
POTASSIUM SERPL-SCNC: 5 MMOL/L (ref 3.7–5.3)
PROCALCITONIN: 2.91 NG/ML
RBC # BLD: 2.89 M/UL (ref 3.95–5.11)
RBC # BLD: 3.28 M/UL (ref 3.95–5.11)
RBC # BLD: ABNORMAL 10*6/UL
REASON FOR REJECTION: NORMAL
SEDIMENTATION RATE, ERYTHROCYTE: 105 MM (ref 0–20)
SEG NEUTROPHILS: 86 % (ref 36–66)
SEGMENTED NEUTROPHILS ABSOLUTE COUNT: 35.87 K/UL (ref 1.8–7.7)
SODIUM BLD-SCNC: 135 MMOL/L (ref 135–144)
TOTAL PROTEIN: 4.6 G/DL (ref 6.4–8.3)
WBC # BLD: 38.3 K/UL (ref 3.5–11.3)
WBC # BLD: 41.7 K/UL (ref 3.5–11.3)
WBC # BLD: ABNORMAL 10*3/UL
ZZ NTE CLEAN UP: ORDERED TEST: NORMAL
ZZ NTE WITH NAME CLEAN UP: SPECIMEN SOURCE: NORMAL

## 2019-04-13 PROCEDURE — 6370000000 HC RX 637 (ALT 250 FOR IP): Performed by: STUDENT IN AN ORGANIZED HEALTH CARE EDUCATION/TRAINING PROGRAM

## 2019-04-13 PROCEDURE — 6370000000 HC RX 637 (ALT 250 FOR IP): Performed by: INTERNAL MEDICINE

## 2019-04-13 PROCEDURE — 94762 N-INVAS EAR/PLS OXIMTRY CONT: CPT

## 2019-04-13 PROCEDURE — 6370000000 HC RX 637 (ALT 250 FOR IP): Performed by: NURSE PRACTITIONER

## 2019-04-13 PROCEDURE — 87040 BLOOD CULTURE FOR BACTERIA: CPT

## 2019-04-13 PROCEDURE — 84145 PROCALCITONIN (PCT): CPT

## 2019-04-13 PROCEDURE — 2580000003 HC RX 258: Performed by: INTERNAL MEDICINE

## 2019-04-13 PROCEDURE — 80076 HEPATIC FUNCTION PANEL: CPT

## 2019-04-13 PROCEDURE — 82947 ASSAY GLUCOSE BLOOD QUANT: CPT

## 2019-04-13 PROCEDURE — 86038 ANTINUCLEAR ANTIBODIES: CPT

## 2019-04-13 PROCEDURE — 2500000003 HC RX 250 WO HCPCS: Performed by: STUDENT IN AN ORGANIZED HEALTH CARE EDUCATION/TRAINING PROGRAM

## 2019-04-13 PROCEDURE — 83516 IMMUNOASSAY NONANTIBODY: CPT

## 2019-04-13 PROCEDURE — 84132 ASSAY OF SERUM POTASSIUM: CPT

## 2019-04-13 PROCEDURE — 2580000003 HC RX 258: Performed by: STUDENT IN AN ORGANIZED HEALTH CARE EDUCATION/TRAINING PROGRAM

## 2019-04-13 PROCEDURE — 85303 CLOT INHIBIT PROT C ACTIVITY: CPT

## 2019-04-13 PROCEDURE — 6360000002 HC RX W HCPCS: Performed by: STUDENT IN AN ORGANIZED HEALTH CARE EDUCATION/TRAINING PROGRAM

## 2019-04-13 PROCEDURE — 99255 IP/OBS CONSLTJ NEW/EST HI 80: CPT | Performed by: INTERNAL MEDICINE

## 2019-04-13 PROCEDURE — 83090 ASSAY OF HOMOCYSTEINE: CPT

## 2019-04-13 PROCEDURE — 86147 CARDIOLIPIN ANTIBODY EA IG: CPT

## 2019-04-13 PROCEDURE — 85730 THROMBOPLASTIN TIME PARTIAL: CPT

## 2019-04-13 PROCEDURE — 99233 SBSQ HOSP IP/OBS HIGH 50: CPT | Performed by: INTERNAL MEDICINE

## 2019-04-13 PROCEDURE — 81240 F2 GENE: CPT

## 2019-04-13 PROCEDURE — 36415 COLL VENOUS BLD VENIPUNCTURE: CPT

## 2019-04-13 PROCEDURE — 80048 BASIC METABOLIC PNL TOTAL CA: CPT

## 2019-04-13 PROCEDURE — 71250 CT THORAX DX C-: CPT

## 2019-04-13 PROCEDURE — 85306 CLOT INHIBIT PROT S FREE: CPT

## 2019-04-13 PROCEDURE — 2060000000 HC ICU INTERMEDIATE R&B

## 2019-04-13 PROCEDURE — 85651 RBC SED RATE NONAUTOMATED: CPT

## 2019-04-13 PROCEDURE — 85300 ANTITHROMBIN III ACTIVITY: CPT

## 2019-04-13 PROCEDURE — 85055 RETICULATED PLATELET ASSAY: CPT

## 2019-04-13 PROCEDURE — 85025 COMPLETE CBC W/AUTO DIFF WBC: CPT

## 2019-04-13 PROCEDURE — 6360000002 HC RX W HCPCS: Performed by: INTERNAL MEDICINE

## 2019-04-13 PROCEDURE — 85027 COMPLETE CBC AUTOMATED: CPT

## 2019-04-13 PROCEDURE — 85240 CLOT FACTOR VIII AHG 1 STAGE: CPT

## 2019-04-13 PROCEDURE — 81241 F5 GENE: CPT

## 2019-04-13 PROCEDURE — 86160 COMPLEMENT ANTIGEN: CPT

## 2019-04-13 PROCEDURE — 82595 ASSAY OF CRYOGLOBULIN: CPT

## 2019-04-13 PROCEDURE — 2500000003 HC RX 250 WO HCPCS: Performed by: INTERNAL MEDICINE

## 2019-04-13 RX ORDER — HEPARIN SODIUM 10000 [USP'U]/100ML
12 INJECTION, SOLUTION INTRAVENOUS CONTINUOUS
Status: DISCONTINUED | OUTPATIENT
Start: 2019-04-13 | End: 2019-04-18

## 2019-04-13 RX ORDER — HEPARIN SODIUM 1000 [USP'U]/ML
4000 INJECTION, SOLUTION INTRAVENOUS; SUBCUTANEOUS ONCE
Status: COMPLETED | OUTPATIENT
Start: 2019-04-13 | End: 2019-04-13

## 2019-04-13 RX ORDER — HEPARIN SODIUM 1000 [USP'U]/ML
4000 INJECTION, SOLUTION INTRAVENOUS; SUBCUTANEOUS PRN
Status: DISCONTINUED | OUTPATIENT
Start: 2019-04-13 | End: 2019-04-24 | Stop reason: ALTCHOICE

## 2019-04-13 RX ORDER — LEVOFLOXACIN 500 MG/1
500 TABLET, FILM COATED ORAL EVERY OTHER DAY
Status: DISCONTINUED | OUTPATIENT
Start: 2019-04-14 | End: 2019-04-15

## 2019-04-13 RX ORDER — HEPARIN SODIUM 1000 [USP'U]/ML
2000 INJECTION, SOLUTION INTRAVENOUS; SUBCUTANEOUS PRN
Status: DISCONTINUED | OUTPATIENT
Start: 2019-04-13 | End: 2019-04-24 | Stop reason: ALTCHOICE

## 2019-04-13 RX ORDER — CLOPIDOGREL BISULFATE 75 MG/1
75 TABLET ORAL DAILY
Status: DISCONTINUED | OUTPATIENT
Start: 2019-04-13 | End: 2019-04-14

## 2019-04-13 RX ORDER — ASPIRIN 81 MG/1
81 TABLET, CHEWABLE ORAL DAILY
Status: DISCONTINUED | OUTPATIENT
Start: 2019-04-13 | End: 2019-05-04 | Stop reason: HOSPADM

## 2019-04-13 RX ORDER — LEVOFLOXACIN 500 MG/1
500 TABLET, FILM COATED ORAL ONCE
Status: COMPLETED | OUTPATIENT
Start: 2019-04-13 | End: 2019-04-14

## 2019-04-13 RX ADMIN — HEPARIN SODIUM 5000 UNITS: 5000 INJECTION INTRAVENOUS; SUBCUTANEOUS at 15:15

## 2019-04-13 RX ADMIN — OXYCODONE HYDROCHLORIDE 5 MG: 5 TABLET ORAL at 09:32

## 2019-04-13 RX ADMIN — PANTOPRAZOLE SODIUM 40 MG: 40 TABLET, DELAYED RELEASE ORAL at 05:56

## 2019-04-13 RX ADMIN — METOPROLOL TARTRATE 25 MG: 25 TABLET ORAL at 21:18

## 2019-04-13 RX ADMIN — MEROPENEM 1 G: 1 INJECTION, POWDER, FOR SOLUTION INTRAVENOUS at 15:15

## 2019-04-13 RX ADMIN — OXYCODONE HYDROCHLORIDE 5 MG: 5 TABLET ORAL at 03:34

## 2019-04-13 RX ADMIN — OXYCODONE HYDROCHLORIDE 5 MG: 5 TABLET ORAL at 22:48

## 2019-04-13 RX ADMIN — PANTOPRAZOLE SODIUM 40 MG: 40 TABLET, DELAYED RELEASE ORAL at 19:04

## 2019-04-13 RX ADMIN — METOPROLOL TARTRATE 25 MG: 25 TABLET ORAL at 09:32

## 2019-04-13 RX ADMIN — OXYCODONE HYDROCHLORIDE 5 MG: 5 TABLET ORAL at 18:25

## 2019-04-13 RX ADMIN — CLOPIDOGREL 75 MG: 75 TABLET, FILM COATED ORAL at 18:58

## 2019-04-13 RX ADMIN — OXYCODONE HYDROCHLORIDE 5 MG: 5 TABLET ORAL at 12:52

## 2019-04-13 RX ADMIN — HEPARIN SODIUM AND DEXTROSE 12 UNITS/KG/HR: 10000; 5 INJECTION INTRAVENOUS at 18:58

## 2019-04-13 RX ADMIN — HEPARIN SODIUM 4000 UNITS: 1000 INJECTION INTRAVENOUS; SUBCUTANEOUS at 18:57

## 2019-04-13 RX ADMIN — CYCLOBENZAPRINE 5 MG: 10 TABLET, FILM COATED ORAL at 09:32

## 2019-04-13 RX ADMIN — ASPIRIN 81 MG: 81 TABLET, CHEWABLE ORAL at 18:58

## 2019-04-13 RX ADMIN — Medication 3 ML: at 18:53

## 2019-04-13 RX ADMIN — Medication 10 ML: at 21:21

## 2019-04-13 RX ADMIN — Medication 10 ML: at 09:38

## 2019-04-13 RX ADMIN — Medication 3 ML: at 18:52

## 2019-04-13 RX ADMIN — CYCLOBENZAPRINE 5 MG: 10 TABLET, FILM COATED ORAL at 15:15

## 2019-04-13 RX ADMIN — IRON SUCROSE 200 MG: 20 INJECTION, SOLUTION INTRAVENOUS at 09:48

## 2019-04-13 RX ADMIN — CYCLOBENZAPRINE 5 MG: 10 TABLET, FILM COATED ORAL at 21:18

## 2019-04-13 RX ADMIN — MIDODRINE HYDROCHLORIDE 5 MG: 5 TABLET ORAL at 18:58

## 2019-04-13 RX ADMIN — MIDODRINE HYDROCHLORIDE 5 MG: 5 TABLET ORAL at 12:51

## 2019-04-13 ASSESSMENT — PAIN SCALES - GENERAL
PAINLEVEL_OUTOF10: 8
PAINLEVEL_OUTOF10: 8
PAINLEVEL_OUTOF10: 7
PAINLEVEL_OUTOF10: 9
PAINLEVEL_OUTOF10: 7

## 2019-04-13 ASSESSMENT — ENCOUNTER SYMPTOMS
APNEA: 0
SHORTNESS OF BREATH: 1
EYE DISCHARGE: 0
EYE ITCHING: 0
ABDOMINAL PAIN: 1

## 2019-04-13 NOTE — PROGRESS NOTES
[facility]  Internal Medicine Residency Program  Inpatient Daily Progress Note  ______________________________________________________________________________    Patient: Marika Mccullough  YOB: 1965   MRN: [id number]    Acct: [de-identified]     Admit date: 4/5/2019  Today's date: 04/13/19  Number of days in the hospital: 8       Admitting Diagnosis: <principal problem not specified>    Subjective:   Patient seen and examined at bedside. Vitals are fine. Afebrile. WBCs continued to improve. Today 41.7. Barium swallow studies showed laryngeal penetration of thin liquid resolving with chin tuck maneuver. Patient had 850 output overnight through the colostomy. No acute issues overnight. Complaining of pain and coldness in the feet bilaterally. Dopplers were done which were normal.  The patient had mottling of the skin of the bilateral lower extremities on presentation. Objective:   Vital Sign:  /71   Pulse 86   Temp 98.2 °F (36.8 °C) (Oral)   Resp 20   Ht 5' 1.02\" (1.55 m)   Wt 187 lb 6.3 oz (85 kg)   SpO2 98%   BMI 35.38 kg/m²       Physical Exam:  General appearance:   alert, well appearing, and in no distress  Mental Status: alert, oriented to person, place, and time  Neurologic:  alert, oriented, normal speech, no focal findings or movement disorder noted  Lungs:  clear to auscultation, no wheezes, rales or rhonchi, symmetric air entry  Heart[de-identified] normal rate, regular rhythm, normal S1, S2, no murmurs, rubs, clicks or gallops  Abdomen:  soft, nontender, nondistended, no masses or organomegaly.  Colostomy on the right  Extremities: peripheral pulses normal, no pedal edema, no clubbing or cyanosis   Skin: normal coloration and turgor, no rashes, no suspicious skin lesions noted    Medications:  Scheduled Medications   metoprolol tartrate  25 mg Oral BID    heparin (porcine)  5,000 Units Subcutaneous 3 times per day    cyclobenzaprine  5 Vancomycin. · Patient will go for dialysis today  · Vascular surgery consulted for cold and painful lower extremities bilaterally  · Vascular surgery recommends aspirin and Plavix at this time. No acute surgical intervention at this time  · Discussed with nephrology and general surgery. The patient will get CT scans of the chest and abdomen and pelvis without contrast to look for any acute process  · Heparin 5000U Q8H for DVT Prophylaxis    Latia Eli MD  PGY-1, Department of Internal Medicine  [place], [place]  4/13/2019 3:07 PM    Attending Physician Statement  I have discussed the care of Gloria Olson, including pertinent history and exam findings with the resident. I have reviewed the key elements of all parts of the encounter with the resident. I have seen and examined the patient with the resident. I agree with the assessment and plan and status of the problem list as documented. I seen the patient during round today, I have reviewed the labs, chart seen and medications reviewed, events noted from last 24 hours and discussed with nursing staff in detail, discussed with surgery attending. This morning she has complaining of pain in her feet bilaterally her feet just slightly above the ankle up to the toes are cold but border suggest pedis and posterior tibial were palpable she is a small areas of ecchymosis in the toes which were present before without much change her neurovascular otherwise was intact. Agree we will get hematology evaluation for determination of thrombotic/embolic/hypercoagulable state causing her to have ischemia and gangrene.   Her WBC count is increasing and it is 41.7 today, per general surgery they want to get CT abdomen with IV contrast to rule out abscess and collection but nephrology do not want to give contrast as she has acute renal failure, general surgery does not think that it is absolutely needed today on a stat as patient is not having fever she does not look toxic she is not hypotensive and as per my discussion with general surgery will get CT scan of the abdomen and chest without IV contrast.  Difficult to rule out an infectious process in her chest especially right lower lungs because of the bilateral effusion and will look into the CT of the chest when it is done. Vascular surgery consult  Need follow-up by infectious disease as she is having increasing WBC count. CT scan of the abdomen pelvis and chest without IV contrast.  We will get hypercoagulable workup, CALVIN profile and ANCA. We will check CRP. Okay to start heparin drip if vascular surgery want to start heparin drip as her platelet count continued to improve and her hemoglobin is is stable. Hematology evaluation    Discussed with nursing staff, treatment and plan discussed. Winnie Garcia MD  4/13/2019 6:08 PM    Please note that this chart was generated using voice recognition Dragon dictation software. Although every effort was made to ensure the accuracy of this automated transcription, some errors in transcription may have occurred.

## 2019-04-13 NOTE — PROGRESS NOTES
Nephrology Progress Note    Initial History  Hospitalized with the delirium associated with abdominal pain nausea vomiting. Initial assessment showed hypotensive lady with the imaging studies revealing evidence of ileus. Further investigations demonstrated neutrophilic leukocytosis along with acute hepatitis/acute kidney injury and elevated lactic acid. Underwent surgical resection for ischemic gut. Hospital course further complicated by persistent atrial fibrillation needing DC cardioversion. She was started on dialysis in view of life-threatening hyperkalemia and acute kidney injury        Subjective:  Patient seen and examined, no acute events overnight  Dialysis yesterday, 2.4 L removed, 2.6 kg removed  Vital stable    Objective:  CURRENT TEMPERATURE:  Temp: 97.4 °F (36.3 °C)  MAXIMUM TEMPERATURE OVER 24HRS:  Temp (24hrs), Av °F (36.7 °C), Min:97.4 °F (36.3 °C), Max:98.3 °F (36.8 °C)    CURRENT RESPIRATORY RATE:  Resp: 16  CURRENT PULSE:  Pulse: 85  CURRENT BLOOD PRESSURE:  BP: 130/66  24HR BLOOD PRESSURE RANGE:  Systolic (15VWE), XVP:176 , Min:117 , XUX:251   ; Diastolic (28QAV), QDQ:19, Min:59, Max:95    24HR INTAKE/OUTPUT:      Intake/Output Summary (Last 24 hours) at 2019 0950  Last data filed at 2019 0603  Gross per 24 hour   Intake 1058 ml   Output 3570 ml   Net -2512 ml     Patient Vitals for the past 96 hrs (Last 3 readings):   Weight   19 0545 187 lb 6.3 oz (85 kg)   19 1845 187 lb 9.8 oz (85.1 kg)   19 1445 193 lb 5.5 oz (87.7 kg)         Physical Exam:  General appearance:Awake, alert, in no acute distress  Skin: warm and dry, no rash or erythema  Eyes: conjunctivae normal and sclera anicteric  ENT:no thrush no pharyngeal congestion   Neck:  No JVD, No Thyromegaly  Pulmonary: clear to auscultation and no wheezing or rhonchi   Cardiovascular: normal S1 and S2. NO rubs and NO murmur.  No S3 OR S4   Abdomen: soft nontender, bowel sounds present, no organomegaly,  no ascites   Extremities: no cyanosis, clubbing or edema     Access:  Temporary catheter    Current Medications:      iron sucrose (VENOFER) 200 mg in sodium chloride 0.9 % 100 mL IVPB Q24H   metoprolol tartrate (LOPRESSOR) tablet 25 mg BID   heparin (porcine) injection 5,000 Units 3 times per day   oxyCODONE (ROXICODONE) immediate release tablet 5 mg Q4H PRN   cyclobenzaprine (FLEXERIL) tablet 5 mg TID   morphine injection 4 mg Q3H PRN   Or    morphine injection 6 mg Q3H PRN   0.9 % sodium chloride bolus PRN   0.9 % sodium chloride bolus PRN   pantoprazole (PROTONIX) tablet 40 mg BID AC   meropenem (MERREM) 1 g in sodium chloride 0.9 % 100 mL IVPB (mini-bag) Q24H   midodrine (PROAMATINE) tablet 5 mg TID WC   anticoagulant sodium citrate 4 % injection 3 mL PRN   anticoagulant sodium citrate 4 % injection 3 mL PRN   dextrose 50 % solution 25 g PRN   sodium chloride flush 0.9 % injection 10 mL 2 times per day   sodium chloride flush 0.9 % injection 10 mL PRN   magnesium hydroxide (MILK OF MAGNESIA) 400 MG/5ML suspension 30 mL Daily PRN   ondansetron (ZOFRAN) injection 4 mg Q6H PRN     Labs:   CBC:   Recent Labs     04/11/19  0926 04/12/19  0617 04/13/19  0635   WBC 23.9* 33.8* 41.7*   RBC 2.66* 3.15* 2.89*   HGB 8.3* 9.4* 9.0*   HCT 25.4* 30.1* 28.9*   PLT 75* 119* See Reflexed IPF Result   MPV 12.4 13.0 NOT REPORTED      BMP:   Recent Labs     04/11/19  0926 04/12/19  0617    136   K 4.1 4.3    98   CO2 28 24   BUN 33* 48*   CREATININE 3.20* 4.10*   GLUCOSE 111* 88   CALCIUM 7.5* 7.9*        Phosphorus:  No results for input(s): PHOS in the last 72 hours. Magnesium: No results for input(s): MG in the last 72 hours. Albumin:   Recent Labs     04/13/19  0635   LABALBU PENDING       Dialysis bath: Dialysis Bath  K+ (Potassium): 3  Ca+ (Calcium): 3  Na+ (Sodium): 140  HCO3 (Bicarb): 35    Radiology:  Reviewed as available. Assessment:  1.   Acute kidney injury nonoliguric secondary to ischemic - hypoperfusion (Hypotension/arrthymia/ischemic gut ) and nephrotoxic (contrast) ATN aggravated further by concomitant NSAID and diuretic use along with cocaine - hemodialysis dependent   Creat peaked at 4 with hyperkalemia 8  Baseline creat in Jan 2017 was normal  Ultrasound shows right kidney 10.4 cm and left 10.2 cm  Serologies neg  2. Volume overload- iatrogenic/capillary leak syndrome - IMPROVING  3. Ischemic gut status post Ileocecectomy, extended left hemicolectomy, placement of  ABThera wound VAC on 4/6 4/8 Second Look exploratory upper, with resection of transverse colon AND ileostomy  4. Sepsis - blood culture positive for H.infleuenza  5. VDRF  6. Bilateral adrenal masses 11 mm left and 18 mm on the right  7. Anemia  8. Persistent Afib S/o cardioversion        Plan:  1. No HD today. Will reassess AM for HD need  2. Recheck K  3. Avoid iv contrast unless emergent  4. Abx as per eGFR <15  5. Will follow      Please do not hesitate to call with questions. Electronically signed by Dylan Mahoney MD on 4/13/2019 at 9:50 AM    Attending Physician Statement  I have discussed the care of Johanny Sigala, including pertinent history and exam findings,  with the Fellow/Residentt. I have reviewed the key elements of all parts of the encounter with the Fellow/ Resident. I agree with the assessment, plan and orders as documented by the resident.     Devang Bernard MD, MRCP Sirena Correia, FACP   4/13/2019 12:22 PM    Nephrology [address] [place] [address]

## 2019-04-13 NOTE — PROGRESS NOTES
PATIENT REFUSES TO WEAR BIPAP     [x] Risks and benefits explained to patient   [x] Patient refuses to wear Bipap stating not unless I really need it  [x] Patient verbalizes understanding of information presented.

## 2019-04-13 NOTE — PROGRESS NOTES
PROGRESS NOTE      PATIENT NAME: Winifred Broussard  MEDICAL RECORD NO. [id number]  DATE: 2019  SURGEON: Dr Trevon Smith: No primary care provider on file. HD: # 8    ASSESSMENT    Patient Active Problem List   Diagnosis    Dog bite    Post-traumatic osteoarthritis of left knee    S/P left knee arthroscopy    S/P total knee arthroplasty    Arthritis of left knee    Shock ([facility] [address]    Rhabdomyolysis    Hyponatremia    Lactic acidosis    MARY (acute kidney injury) ([facility] [address]    Metabolic acidosis    Acute respiratory failure (HCC)    Elevated liver enzymes    Hyperkalemia    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HCC)    Small bowel ischemia (HCC)    Acute GI bleeding    Gram negative septic shock (HCC)    Protein-calorie malnutrition (HCC)    Septic shock (HCC)    Gastroenteritis    Septicemia due to Gram negative organism (HCC)    Pleural effusion, bilateral       MEDICAL DECISION MAKING AND PLAN    1. Continue medical management per primary  2. Pain control  3. Hemodynamically - stable  4. Pulm - stable  5. GI - Ileostomy healthy in appearance and well functioning, continue diet  6. Renal - ARF, Nephro on board, HD  7. Heme/ID - Increasing leukocytosis. Follow up CT chest/abd/pelvis      SUBJECTIVE    Winifred Broussard seen and examined at bedside. POD#5 s/p second look lap and end ileorstomy w closure of abdominal wall. No acute events overnight. Afebrile, VSS. Patient complains about not being able to eat what she wants. Otherwise, feels well.  Denies abdominal pain, chest pain, shortness of breath at time of exam.      OBJECTIVE  VITALS: Temp: Temp: 98.3 °F (36.8 °C)Temp  Av.3 °F (36.8 °C)  Min: 97.9 °F (36.6 °C)  Max: 99.6 °F (57.0 °C) BP Systolic (53HCQ), QFH:938 , Min:117 , TXZ:476   Diastolic (96GYT), FDB:43, Min:59, Max:95   Pulse Pulse  Av.3  Min: 74  Max: 104 Resp Resp  Av.8  Min: 14  Max: 19 Pulse ox SpO2  Av.4 %  Min: 96 %  Max: 100 %    CONSTITUTIONAL: awake, alert, cooperative, no apparent distress  HEAD: atraumatic, normocephalic  EYES: sclera clear, pupils equal and reactive to light  ENT: ears are symmetric, nares patent   HEENT: moist mucous membranes  NECK: Supple, symmetrical, trachea midline  LUNGS: no respiratory distress, no audible wheezing  CARDIOVASCULAR: +S1/S2  ABDOMEN: soft, nontender, nondistended, no guarding, nonperitoneal, midline incision w wound vac in place, ileostomy beefy red and functioning  MUSCULOSKELETAL: full range of motion noted  NEUROLOGIC: Awake, alert, oriented to name, place and time  EXTREMITIES: peripheral pulses normal, no pedal edema, no calf tenderness  SKIN: normal coloration and turgor    I/O last 3 completed shifts: In: 6043 [P.O.:1000; I.V.:58]  Out: 3775 [Urine:150; Stool:650]    Drain/tube output: In: 538 [P.O.:480; I.V.:58]  Out: 3570 [Urine:150]    LAB:  CBC:   Recent Labs     04/11/19  0926 04/12/19  0617 04/13/19  0635   WBC 23.9* 33.8* 41.7*   HGB 8.3* 9.4* 9.0*   HCT 25.4* 30.1* 28.9*   MCV 95.5 95.6 100.0   PLT 75* 119* See Reflexed IPF Result     BMP:   Recent Labs     04/11/19  0926 04/12/19  0617 04/13/19  0635    136 PENDING   K 4.1 4.3 PENDING    98 PENDING   CO2 28 24 PENDING   BUN 33* 48* PENDING   CREATININE 3.20* 4.10* PENDING   GLUCOSE 111* 88 PENDING     COAGS:   Recent Labs     04/13/19  0635   PROT PENDING       RADIOLOGY:  Xr Abdomen (kub) (single Ap View)    Result Date: 4/6/2019  EXAMINATION: SINGLE SUPINE XRAY VIEW(S) OF THE ABDOMEN 4/6/2019 9:58 am COMPARISON: Chest radiograph dated 04/05/2019 HISTORY: ORDERING SYSTEM PROVIDED HISTORY: Distension TECHNOLOGIST PROVIDED HISTORY: Distension FINDINGS: NG tube tip is in the gastric fundus with the proximal side-port in the distal thoracic esophagus. Repositioning is recommended. There is airspace disease in the left lung base. Gas is seen in loops of large and small bowel suggesting a probable ileus.   Right inguinal central venous catheter is in place tip in the right common iliac vein. Gas in mildly distended loops of large and small bowel likely reflecting an ileus. NG tube tip in the gastric fundus with the proximal side-port in the distal thoracic esophagus, repositioning recommended. Airspace disease left lung base. Ct Head Wo Contrast    Result Date: 4/6/2019  EXAMINATION: CT OF THE HEAD WITHOUT CONTRAST  4/6/2019 8:36 pm TECHNIQUE: CT of the head was performed without the administration of intravenous contrast. Dose modulation, iterative reconstruction, and/or weight based adjustment of the mA/kV was utilized to reduce the radiation dose to as low as reasonably achievable. COMPARISON: None. HISTORY: ORDERING SYSTEM PROVIDED HISTORY: history of L ear infection, AMS, r/o brain abscess, CVA TECHNOLOGIST PROVIDED HISTORY: Ordering Physician Provided Reason for Exam: AMS; R/O CVA OR ABSCESS Acuity: Acute Type of Exam: Initial FINDINGS: BRAIN/VENTRICLES: No acute intracranial hemorrhage. No mass effect. No midline shift. Ventricles and sulci are within normal limits. ORBITS: The visualized portion of the orbits demonstrate no acute abnormality. SINUSES: Mastoid air cells are clear. Small air-fluid level with mucosal thickening within the right maxillary sinus. Opacity within the ethmoid air cells. SOFT TISSUES/SKULL:  No acute abnormality of the visualized skull or soft tissues. No acute intracranial abnormality. Cta Chest W Wo Contrast    Result Date: 4/5/2019  EXAMINATION: CTA OF THE CHEST WITH AND WITHOUT CONTRAST 4/5/2019 9:23 pm TECHNIQUE: CTA of the chest was performed before and after the administration of intravenous contrast.  Multiplanar reformatted images are provided for review. MIP images are provided for review. Dose modulation, iterative reconstruction, and/or weight based adjustment of the mA/kV was utilized to reduce the radiation dose to as low as reasonably achievable.  3D reconstructed images were performed on a separate workstation and provided for review. COMPARISON: Chest x-ray 04/05/2019 HISTORY: ORDERING SYSTEM PROVIDED HISTORY: AORTIC DZ, NON-TRAUMATIC, KNOWN OR SUSPECT FINDINGS: Aorta: No evidence of thoracic aortic aneurysm or dissection. No acute abnormality of the aorta. No intramural hematoma on the noncontrast examination. No mediastinal hemorrhage. Minimal aortic vascular calcifications. Mediastinum: Heart is mildly prominent size. Coronary calcifications. No pericardial effusion. No suspicious mediastinal, hilar, or atelectasis or adenopathy identified per CT criteria. Lungs/Pleura: The consolidative changes with prominent air bronchograms identified involving both lower lobes evidence of patchy opacity identified involving the lingula. No suspicious pulmonary nodule identified. No pleural effusion pneumothorax. Upper Abdomen: Diffuse hypoattenuation liver suggestive of fatty infiltration. Patchy areas of hypoenhancement identified involving the anterior aspect of the liver along the falciform ligament likely related to focal fatty infiltration versus transient hepatic attenuation difference. Right adrenal nodule identified central low attenuation, please refer to separately reported CT abdomen pelvis report. .  Dilated loops of bowel identified within the left upper quadrant with prominent air-fluid level. Mild wall thickening noted. Soft Tissues/Bones: No acute bone or soft tissue abnormality. No evidence of aortic dissection or intramural hematoma. No central pulmonary emboli identified. Patchy consolidative changes both lower lobes involving lingula may be related to multifocal atelectasis versus developing pneumonia/infiltrates.  Clinical correlation and follow-up may be beneficial.     Us Renal Limited    Result Date: 4/6/2019  EXAMINATION: ULTRASOUND OF THE KIDNEYS 4/6/2019 12:51 pm COMPARISON: CT abdomen and pelvis dated 04/05/2019 HISTORY: ORDERING SYSTEM PROVIDED HISTORY: RENAL FAILURE, ACUTE (KIDNEY INJURY) FINDINGS: The right kidney measures 10.4 cm in length and the left kidney measures 10.2 cm in length. Kidneys demonstrate normal cortical echogenicity and thickness. No hydronephrosis or intrarenal stones. No focal lesions. Incidental note is made of a couple tiny echogenic foci in the gallbladder wall, likely adenomyomatosis. Unremarkable ultrasound of the kidneys. Xr Chest Portable    Result Date: 4/6/2019  EXAMINATION: SINGLE XRAY VIEW OF THE CHEST 4/6/2019 2:10 am COMPARISON: April 5, 2019. HISTORY: ORDERING SYSTEM PROVIDED HISTORY: R IJ line placement TECHNOLOGIST PROVIDED HISTORY: R IJ line placement FINDINGS: Grossly stable endotracheal and enteric tubes. New right IJ central venous catheter with tip near the superior atrial caval junction. Low lung volume. Persistent bilateral airspace disease. No pleural effusion or pneumothorax. Stable cardiomediastinal silhouette and great vessels. Life support devices as above. No pneumothorax. Persistent bilateral airspace disease. Xr Chest Portable    Result Date: 4/5/2019  EXAMINATION: SINGLE XRAY VIEW OF THE CHEST 4/5/2019 10:16 pm COMPARISON: Chest x-ray 04/05/2019 at 2040 hours HISTORY: ORDERING SYSTEM PROVIDED HISTORY: intubation TECHNOLOGIST PROVIDED HISTORY: intubation FINDINGS: Endotracheal tube 2 cm above the leann. Mild cardiomegaly. Mild perihilar pulmonary vasculature. Patchy bibasilar airspace opacities. Nasogastric tube side port proximal to the GE junction is be advanced 10 cm. No pneumothorax. No pleural effusion. There nipple rings. Satisfactory position endotracheal tube. Nasogastric tube needs advanced 10 cm. Patchy perihilar opacities and bibasilar airspace disease. Follow-up may be beneficial following medical treatment course to document complete resolution.      Xr Chest Portable    Result Date: 4/5/2019  EXAMINATION: SINGLE XRAY VIEW OF THE CHEST 4/5/2019 9:31 pm COMPARISON: None. HISTORY: ORDERING SYSTEM PROVIDED HISTORY: SOB, R/O PNA, CHF Exacerbation TECHNOLOGIST PROVIDED HISTORY: SOB, R/O PNA, CHF Exacerbation Ordering Physician Provided Reason for Exam: copd, diff breathing . upr Acuity: Acute Type of Exam: Initial FINDINGS: The cardiomediastinal silhouette is normal in size and contour. Minor pulmonary vascular congestion. The lungs are clear. No pleural effusion or pneumothorax is present. No acute cardiopulmonary process     Cta Abdomen Pelvis W Contrast    Result Date: 4/5/2019  EXAMINATION: CTA OF THE ABDOMEN AND PELVIS WITH CONTRAST 4/5/2019 9:23 pm: TECHNIQUE: CTA of the abdomen and pelvis was performed with the administration of intravenous contrast. Multiplanar reformatted images are provided for review. MIP images are provided for review. Dose modulation, iterative reconstruction, and/or weight based adjustment of the mA/kV was utilized to reduce the radiation dose to as low as reasonably achievable. COMPARISON: None. HISTORY: ORDERING SYSTEM PROVIDED HISTORY: suspected dissection of abdominal aorta FINDINGS: CTA ABDOMEN: Bibasilar airspace disease. Liver, spleen, pancreas, gallbladder normal. Bilateral adrenal masses measuring 11 mm on the left and 18 x 28 mm on the left. Bilateral ill-defined hypodensities throughout the kidneys. The small bowel is somewhat distended with multiple air-fluid levels. Multiple air-fluid levels are also noted throughout the colon. The stomach appears normal.  Retroaortic left renal vein. Bones normal. Aorta appears normal without dissection. The celiac artery and SMA appear normal.  The renal arteries appear normal. CTA PELVIS: Bladder decompressed. Uterus normal.  Catheter right groin terminates in the common iliac vein. Ileus. No evidence of aortic dissection. The bilateral adrenal masses, left greater than right. Recommend follow-up adrenal MRI non emergently.        Andreina Sims  4/13/19, 7:30 AM         Attending Note      I have reviewed the above [facility] resident progress note and I either performed the key elements of the medical history and physical exam or was present when the resident performed them. I have discussed the findings, established the care plan and recommendations with resident, TECSS nurse and bedside nurse. The following confirms and/or amends the IMPRESSION, MEDICAL DECISION MAKING and PLAN from above. ASSESSMENT    Patient Active Problem List   Diagnosis    Dog bite    Post-traumatic osteoarthritis of left knee    S/P left knee arthroscopy    S/P total knee arthroplasty    Arthritis of left knee    Shock ([address]    Rhabdomyolysis    Hyponatremia    Lactic acidosis    MARY (acute kidney injury) ([address]    Metabolic acidosis    Acute respiratory failure (HCC)    Elevated liver enzymes    Hyperkalemia    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HCC)    Small bowel ischemia (HCC)    Acute GI bleeding    Gram negative septic shock (HCC)    Protein-calorie malnutrition (HCC)    Septic shock (HCC)    Gastroenteritis    Septicemia due to Gram negative organism (HCC)    Pleural effusion, bilateral       MEDICAL DECISION MAKING AND PLAN    After speaking with Dr Raysa Marley, we agreed to obtain a non contrast CT of abdomen, and if there is an area suspicious for abscess, then will ask for a contrasted study with view toward IR drainage.   I agree to this stepwise approach as the patient is not septic appearing   Kandace Cedillo MD  4/13/2019  11:09 AM

## 2019-04-13 NOTE — CONSULTS
[address]      Patient's Name/ Date of Birth/ Gender: Marika Mccullough / 1965 (47 y.o.) / female     Referring Physician: Kelly Gar MD    Consulting Physician: Dr Julio Archibald    History of present Illness: Pt is a 47 y.o. female who was admitted on 4/5/2019 after being found down for unknown period of time, in shock, admitted to the ICU, requiring pressors for BP support, shock liver with severe metabolic disturbance. Patient was taken to the OR on 4/6 for ex lap and sigmoid/descending colectomy, right hemicolectomy, left in discontinuity. Returned to the OR for second look and had transverse colectomy and end ileostomy creation. Patient also required initiation of HD for acute renal failure which has continued. Patient was in a fib while in the ICU requiring amio. AC was not recommended by Cardiology at this time due to coagulopathy. A fib has since resolved. Patient found to be H flu positive, ID on board managing antibiotics. Presently on [facility]. Patient not presently on [facility] per primary due to thrombocytopenia. HIT negative. Patient reported this morning her feet felt cold and she was having pain in her toes bilaterally. Reports this is new. Denies any other complaints at this time. Denies pain in calves, swelling, chest pain, shortness of breath, abdominal pain, nausea, vomiting. Past Medical History:  has a past medical history of Asthma, Back pain, chronic, Hypertension, Snores, and Wears glasses.     Past Surgical History:   Past Surgical History:   Procedure Laterality Date    KNEE ARTHROSCOPY Left 1980    KNEE ARTHROSCOPY Left 09/28/2016    with medial menisectomy    LAPAROTOMY EXPLORATORY N/A 4/6/2019    LAPAROTOMY EXPLORATORY, ILEOCECECTOMY, SUBTOTAL COLECTOMY, ABTHEHRA WOUND VAC PLACEMENT performed by Bridgett Hauser MD at [address] [place] [address] 4/8/2019    2ND LOOK EXPLORATORY LAPAROTOMY, RESECTION TRANSVERSE COLON, ILEOSTOMY CREATION, RESECTION RECTAL STUMP, ABDOMINAL WASHOUT, OMENTECTOMY, ABDOMINAL WALL CLOSURE performed by Gorge Contreras MD at [address] Right 2013       Social History:  reports that she has been smoking cigarettes. She started smoking about 38 years ago. She has been smoking about 0.25 packs per day. She has never used smokeless tobacco. She reports that she drinks alcohol. She reports that she does not use drugs. Family History: family history includes Diabetes in her maternal grandfather and maternal grandmother; Emphysema in her maternal grandfather; Heart Disease in her mother; Heart Surgery in her mother; Karen Chimera in her maternal uncle; Stroke in her mother.     Allergies: Pcn [penicillins]; Sulfa antibiotics; and Tape [adhesive tape]    Current Meds:  Current Facility-Administered Medications:     metoprolol tartrate (LOPRESSOR) tablet 25 mg, 25 mg, Oral, BID, Mary Bender APRN - CNP, 25 mg at 04/13/19 0932    heparin (porcine) injection 5,000 Units, 5,000 Units, Subcutaneous, 3 times per day, Benny Raza MD    oxyCODONE (ROXICODONE) immediate release tablet 5 mg, 5 mg, Oral, Q4H PRN, Amy Montgomery, DO, 5 mg at 04/13/19 1252    cyclobenzaprine (FLEXERIL) tablet 5 mg, 5 mg, Oral, TID, Amy HENRY Imel, DO, 5 mg at 04/13/19 0932    morphine injection 4 mg, 4 mg, Intravenous, Q3H PRN **OR** morphine injection 6 mg, 6 mg, Intravenous, Q3H PRN, Amy Montgomery DO    0.9 % sodium chloride bolus, 250 mL, Intravenous, PRN, Abdi Osorio MD    0.9 % sodium chloride bolus, 150 mL, Intravenous, PRN, Savannah Stanley MD    pantoprazole (PROTONIX) tablet 40 mg, 40 mg, Oral, BID AC, Raj Will MD, 40 mg at 04/13/19 0556    meropenem (MERREM) 1 g in sodium chloride 0.9 % 100 mL IVPB (mini-bag), 1 g, Intravenous, Q24H, Sander Stewart MD, Stopped at 04/12/19 1535    midodrine (PROAMATINE) tablet 5 mg, 5 mg, Oral, TID Miah CURTIS Kemi Lanier MD, 5 mg at 04/13/19 1251    anticoagulant sodium citrate 4 % injection 3 mL, 3 mL, Intracatheter, PRN, Danae Frost MD, 3 mL at 04/12/19 1830    anticoagulant sodium citrate 4 % injection 3 mL, 3 mL, Intracatheter, PRN, Abi Hobbs MD, 3 mL at 04/12/19 1830    dextrose 50 % solution 25 g, 25 g, Intravenous, PRN, Amy J Imel, DO, 25 g at 04/10/19 0428    sodium chloride flush 0.9 % injection 10 mL, 10 mL, Intravenous, 2 times per day, Amy J Imel, DO, 10 mL at 04/13/19 6974    sodium chloride flush 0.9 % injection 10 mL, 10 mL, Intravenous, PRN, Amy J Imel, DO, 10 mL at 04/07/19 1627    magnesium hydroxide (MILK OF MAGNESIA) 400 MG/5ML suspension 30 mL, 30 mL, Oral, Daily PRN, Amy J Imel, DO    ondansetron (ZOFRAN) injection 4 mg, 4 mg, Intravenous, Q6H PRN, Amy HENRY Imel, DO    REVIEW OF SYSTEMS:      Review of Systems - General ROS: negative for - chills, fatigue, fever or night sweats  Psychological ROS: negative for - anxiety, behavioral disorder or depression  Ophthalmic ROS: negative for - blurry vision, dry eyes or eye pain  ENT ROS: negative for - epistaxis, headaches or nasal congestion  Hematological and Lymphatic ROS:  negative for - bleeding problems, blood clots  Endocrine ROS: negative for - malaise/lethargy, mood swings or palpitations  Respiratory ROS: negative for - cough, hemoptysis or shortness of breath  Cardiovascular ROS: no chest pain or dyspnea on exertion  Gastrointestinal ROS: positive abdominal pain  Genito-Urinary ROS: no dysuria, trouble voiding, or hematuria  Musculoskeletal ROS: negative for - joint pain, joint swelling or muscle pain  Neurological ROS:  TIA or stroke like symptom  Dermatological ROS: negative for dry skin and eczema      PHYSICAL EXAM:    VITALS:  /71   Pulse 86   Temp 98.2 °F (36.8 °C) (Oral)   Resp 20   Ht 5' 1.02\" (1.55 m)   Wt 187 lb 6.3 oz (85 kg)   SpO2 98%   BMI 35.38 kg/m²   INTAKE/OUTPUT:     Intake/Output Summary (Last 24 hours) at 4/13/2019 1306  Last data filed at 4/13/2019 9626  Gross per 24 hour   Intake 658 ml   Output 3570 ml   Net -2912 ml       CONSTITUTIONAL:  Alert and oriented, no acute distress  HEAD: normocephalic, atraumatic  EYES: Pupils equal and reactive to light, Extraocular muscles intact, sclera non icteric  ENT: Mucus membranes moist, No otorrhea, no rhinorrhea  NECK:  supple, symmetrical, trachea midline   LUNGS:  Good air movement bilaterally, unlabored respirations, no wheezes or rhonchi  CARDIOVASCULAR: Regular rate and rhythm, no murmurs rubs or gallops  ABDOMEN: soft, non tender, non distended, no rebound or guarding, qound vac in place to midline, ileostomy to RLQ functioning  MUSCULOSKELETAL:  Equal strength bilaterally, normal muscle tone  SKIN: livedo reticularis color changes to right knee and right posterior arm  EXT: Palpable signals on bilateral Fem, DP/PT  NEUROLOGIC:  Cranial nerves 2-12 grossly intact, no focal deficits  PSYCH: affect appropriate      Labs:   Lab Results   Component Value Date    WBC 41.7 04/13/2019    HGB 9.0 04/13/2019    HCT 28.9 04/13/2019    .0 04/13/2019    PLT See Reflexed IPF Result 04/13/2019     Lab Results   Component Value Date     04/13/2019    K 5.0 04/13/2019    CL 98 04/13/2019    CO2 22 04/13/2019    BUN 50 04/13/2019    CREATININE 3.75 04/13/2019    GLUCOSE 110 04/13/2019    CALCIUM 8.0 04/13/2019     Lab Results   Component Value Date    INR 1.0 04/09/2019       Imaging:  Xr Abdomen (kub) (single Ap View)    Result Date: 4/6/2019  EXAMINATION: SINGLE SUPINE XRAY VIEW(S) OF THE ABDOMEN 4/6/2019 9:58 am COMPARISON: Chest radiograph dated 04/05/2019 HISTORY: ORDERING SYSTEM PROVIDED HISTORY: Distension TECHNOLOGIST PROVIDED HISTORY: Distension FINDINGS: NG tube tip is in the gastric fundus with the proximal side-port in the distal thoracic esophagus. Repositioning is recommended. There is airspace disease in the left lung base.   Gas is seen in loops of large and small bowel suggesting a probable ileus. Right inguinal central venous catheter is in place tip in the right common iliac vein. Gas in mildly distended loops of large and small bowel likely reflecting an ileus. NG tube tip in the gastric fundus with the proximal side-port in the distal thoracic esophagus, repositioning recommended. Airspace disease left lung base. Ct Head Wo Contrast    Result Date: 4/6/2019  EXAMINATION: CT OF THE HEAD WITHOUT CONTRAST  4/6/2019 8:36 pm TECHNIQUE: CT of the head was performed without the administration of intravenous contrast. Dose modulation, iterative reconstruction, and/or weight based adjustment of the mA/kV was utilized to reduce the radiation dose to as low as reasonably achievable. COMPARISON: None. HISTORY: ORDERING SYSTEM PROVIDED HISTORY: history of L ear infection, AMS, r/o brain abscess, CVA TECHNOLOGIST PROVIDED HISTORY: Ordering Physician Provided Reason for Exam: AMS; R/O CVA OR ABSCESS Acuity: Acute Type of Exam: Initial FINDINGS: BRAIN/VENTRICLES: No acute intracranial hemorrhage. No mass effect. No midline shift. Ventricles and sulci are within normal limits. ORBITS: The visualized portion of the orbits demonstrate no acute abnormality. SINUSES: Mastoid air cells are clear. Small air-fluid level with mucosal thickening within the right maxillary sinus. Opacity within the ethmoid air cells. SOFT TISSUES/SKULL:  No acute abnormality of the visualized skull or soft tissues. No acute intracranial abnormality. Cta Chest W Wo Contrast    Result Date: 4/5/2019  EXAMINATION: CTA OF THE CHEST WITH AND WITHOUT CONTRAST 4/5/2019 9:23 pm TECHNIQUE: CTA of the chest was performed before and after the administration of intravenous contrast.  Multiplanar reformatted images are provided for review. MIP images are provided for review.  Dose modulation, iterative reconstruction, and/or weight based adjustment of the mA/kV was utilized to reduce the radiation dose to as low as reasonably achievable. 3D reconstructed images were performed on a separate workstation and provided for review. COMPARISON: Chest x-ray 04/05/2019 HISTORY: ORDERING SYSTEM PROVIDED HISTORY: AORTIC DZ, NON-TRAUMATIC, KNOWN OR SUSPECT FINDINGS: Aorta: No evidence of thoracic aortic aneurysm or dissection. No acute abnormality of the aorta. No intramural hematoma on the noncontrast examination. No mediastinal hemorrhage. Minimal aortic vascular calcifications. Mediastinum: Heart is mildly prominent size. Coronary calcifications. No pericardial effusion. No suspicious mediastinal, hilar, or atelectasis or adenopathy identified per CT criteria. Lungs/Pleura: The consolidative changes with prominent air bronchograms identified involving both lower lobes evidence of patchy opacity identified involving the lingula. No suspicious pulmonary nodule identified. No pleural effusion pneumothorax. Upper Abdomen: Diffuse hypoattenuation liver suggestive of fatty infiltration. Patchy areas of hypoenhancement identified involving the anterior aspect of the liver along the falciform ligament likely related to focal fatty infiltration versus transient hepatic attenuation difference. Right adrenal nodule identified central low attenuation, please refer to separately reported CT abdomen pelvis report. .  Dilated loops of bowel identified within the left upper quadrant with prominent air-fluid level. Mild wall thickening noted. Soft Tissues/Bones: No acute bone or soft tissue abnormality. No evidence of aortic dissection or intramural hematoma. No central pulmonary emboli identified. Patchy consolidative changes both lower lobes involving lingula may be related to multifocal atelectasis versus developing pneumonia/infiltrates.  Clinical correlation and follow-up may be beneficial.     Us Renal Limited    Result Date: 4/6/2019  EXAMINATION: ULTRASOUND OF THE KIDNEYS 4/6/2019 12:51 pm COMPARISON: CT abdomen and pelvis dated 04/05/2019 HISTORY: ORDERING SYSTEM PROVIDED HISTORY: RENAL FAILURE, ACUTE (KIDNEY INJURY) FINDINGS: The right kidney measures 10.4 cm in length and the left kidney measures 10.2 cm in length. Kidneys demonstrate normal cortical echogenicity and thickness. No hydronephrosis or intrarenal stones. No focal lesions. Incidental note is made of a couple tiny echogenic foci in the gallbladder wall, likely adenomyomatosis. Unremarkable ultrasound of the kidneys. Xr Chest Portable    Result Date: 4/6/2019  EXAMINATION: SINGLE XRAY VIEW OF THE CHEST 4/6/2019 2:10 am COMPARISON: April 5, 2019. HISTORY: ORDERING SYSTEM PROVIDED HISTORY: R IJ line placement TECHNOLOGIST PROVIDED HISTORY: R IJ line placement FINDINGS: Grossly stable endotracheal and enteric tubes. New right IJ central venous catheter with tip near the superior atrial caval junction. Low lung volume. Persistent bilateral airspace disease. No pleural effusion or pneumothorax. Stable cardiomediastinal silhouette and great vessels. Life support devices as above. No pneumothorax. Persistent bilateral airspace disease. Xr Chest Portable    Result Date: 4/5/2019  EXAMINATION: SINGLE XRAY VIEW OF THE CHEST 4/5/2019 10:16 pm COMPARISON: Chest x-ray 04/05/2019 at 2040 hours HISTORY: ORDERING SYSTEM PROVIDED HISTORY: intubation TECHNOLOGIST PROVIDED HISTORY: intubation FINDINGS: Endotracheal tube 2 cm above the leann. Mild cardiomegaly. Mild perihilar pulmonary vasculature. Patchy bibasilar airspace opacities. Nasogastric tube side port proximal to the GE junction is be advanced 10 cm. No pneumothorax. No pleural effusion. There nipple rings. Satisfactory position endotracheal tube. Nasogastric tube needs advanced 10 cm. Patchy perihilar opacities and bibasilar airspace disease. Follow-up may be beneficial following medical treatment course to document complete resolution.      Xr Chest Recommend follow-up adrenal MRI non emergently. Impression:  Patient Active Problem List   Diagnosis    Dog bite    Post-traumatic osteoarthritis of left knee    S/P left knee arthroscopy    S/P total knee arthroplasty    Arthritis of left knee    Shock ([facility] [address]    Rhabdomyolysis    Hyponatremia    Lactic acidosis    MARY (acute kidney injury) ([address]    Metabolic acidosis    Acute respiratory failure (HCC)    Elevated liver enzymes    Hyperkalemia    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HCC)    Small bowel ischemia (HCC)    Acute GI bleeding    Gram negative septic shock (HCC)    Protein-calorie malnutrition (HCC)    Septic shock (HCC)    Gastroenteritis    Septicemia due to Gram negative organism (HCC)    Pleural effusion, bilateral     1. Mottling of fingers, toes  2. Livedo reticularis    Recommendation:    1. Continue medical management per primary  2. Possible atheroembolic vs septic embolic event. CT abdomen pelvis shows possible splenic infarct vs abscess. Unlikely to be ischemic in nature based on the presence of mottling in bilateral hands and feet  3. Livedo reticularis rash may be explained by acute liver dysfunction, patient with resolving shock liver  4. Recommend ASA, plavix and therapeutic AC initation at this time   5. No acute surgical intervention at this time    Patient and plan discussed with Dr Myron Vargas, who is in agreement.      Julia Chow MD  General Surgery PGY1  Pager Number: [phone]

## 2019-04-13 NOTE — PLAN OF CARE
Patient's bed was set to the lowest height. Non-skid footwear on. Call light and side table were within reach. Patient remained free from falls this shift.     Electronically signed by Gomez Chambers RN on 4/13/2019 at 4:38 AM

## 2019-04-14 LAB
ABSOLUTE EOS #: 0.4 K/UL (ref 0–0.44)
ABSOLUTE IMMATURE GRANULOCYTE: 1.58 K/UL (ref 0–0.3)
ABSOLUTE LYMPH #: 2.77 K/UL (ref 1.1–3.7)
ABSOLUTE MONO #: 1.19 K/UL (ref 0.1–1.2)
ANION GAP SERPL CALCULATED.3IONS-SCNC: 15 MMOL/L (ref 9–17)
BASOPHILS # BLD: 0 % (ref 0–2)
BASOPHILS ABSOLUTE: 0 K/UL (ref 0–0.2)
BUN BLDV-MCNC: 66 MG/DL (ref 6–20)
BUN/CREAT BLD: ABNORMAL (ref 9–20)
C-REACTIVE PROTEIN: 80.5 MG/L (ref 0–5)
CALCIUM SERPL-MCNC: 7.8 MG/DL (ref 8.6–10.4)
CHLORIDE BLD-SCNC: 94 MMOL/L (ref 98–107)
CO2: 20 MMOL/L (ref 20–31)
CREAT SERPL-MCNC: 4.61 MG/DL (ref 0.5–0.9)
CULTURE: NORMAL
DIFFERENTIAL TYPE: ABNORMAL
DIRECT EXAM: NORMAL
DIRECT EXAM: NORMAL
EOSINOPHILS RELATIVE PERCENT: 1 % (ref 1–4)
GFR AFRICAN AMERICAN: 12 ML/MIN
GFR NON-AFRICAN AMERICAN: 10 ML/MIN
GFR SERPL CREATININE-BSD FRML MDRD: ABNORMAL ML/MIN/{1.73_M2}
GFR SERPL CREATININE-BSD FRML MDRD: ABNORMAL ML/MIN/{1.73_M2}
GLUCOSE BLD-MCNC: 66 MG/DL (ref 65–105)
GLUCOSE BLD-MCNC: 73 MG/DL (ref 65–105)
GLUCOSE BLD-MCNC: 76 MG/DL (ref 65–105)
GLUCOSE BLD-MCNC: 78 MG/DL (ref 65–105)
GLUCOSE BLD-MCNC: 82 MG/DL (ref 70–99)
HCT VFR BLD CALC: 25.8 % (ref 36.3–47.1)
HEMOGLOBIN: 8 G/DL (ref 11.9–15.1)
IMMATURE GRANULOCYTES: 4 %
LYMPHOCYTES # BLD: 7 % (ref 24–43)
Lab: NORMAL
MCH RBC QN AUTO: 30.7 PG (ref 25.2–33.5)
MCHC RBC AUTO-ENTMCNC: 31 G/DL (ref 28.4–34.8)
MCV RBC AUTO: 98.9 FL (ref 82.6–102.9)
MONOCYTES # BLD: 3 % (ref 3–12)
MORPHOLOGY: ABNORMAL
MORPHOLOGY: ABNORMAL
NRBC AUTOMATED: 0.1 PER 100 WBC
PARTIAL THROMBOPLASTIN TIME: 29.2 SEC (ref 20.5–30.5)
PARTIAL THROMBOPLASTIN TIME: 34.6 SEC (ref 20.5–30.5)
PARTIAL THROMBOPLASTIN TIME: 35 SEC (ref 20.5–30.5)
PARTIAL THROMBOPLASTIN TIME: 41.8 SEC (ref 20.5–30.5)
PDW BLD-RTO: 16 % (ref 11.8–14.4)
PLATELET # BLD: 268 K/UL (ref 138–453)
PLATELET ESTIMATE: ABNORMAL
PMV BLD AUTO: 12.4 FL (ref 8.1–13.5)
POTASSIUM SERPL-SCNC: 4.3 MMOL/L (ref 3.7–5.3)
RBC # BLD: 2.61 M/UL (ref 3.95–5.11)
RBC # BLD: ABNORMAL 10*6/UL
SEG NEUTROPHILS: 85 % (ref 36–65)
SEGMENTED NEUTROPHILS ABSOLUTE COUNT: 33.66 K/UL (ref 1.5–8.1)
SODIUM BLD-SCNC: 129 MMOL/L (ref 135–144)
SPECIMEN DESCRIPTION: NORMAL
WBC # BLD: 39.6 K/UL (ref 3.5–11.3)
WBC # BLD: ABNORMAL 10*3/UL

## 2019-04-14 PROCEDURE — 6360000002 HC RX W HCPCS: Performed by: STUDENT IN AN ORGANIZED HEALTH CARE EDUCATION/TRAINING PROGRAM

## 2019-04-14 PROCEDURE — 82947 ASSAY GLUCOSE BLOOD QUANT: CPT

## 2019-04-14 PROCEDURE — 86140 C-REACTIVE PROTEIN: CPT

## 2019-04-14 PROCEDURE — 99232 SBSQ HOSP IP/OBS MODERATE 35: CPT | Performed by: NURSE PRACTITIONER

## 2019-04-14 PROCEDURE — 99232 SBSQ HOSP IP/OBS MODERATE 35: CPT | Performed by: INTERNAL MEDICINE

## 2019-04-14 PROCEDURE — 6360000002 HC RX W HCPCS: Performed by: INTERNAL MEDICINE

## 2019-04-14 PROCEDURE — 85730 THROMBOPLASTIN TIME PARTIAL: CPT

## 2019-04-14 PROCEDURE — 2060000000 HC ICU INTERMEDIATE R&B

## 2019-04-14 PROCEDURE — 85025 COMPLETE CBC W/AUTO DIFF WBC: CPT

## 2019-04-14 PROCEDURE — 6370000000 HC RX 637 (ALT 250 FOR IP): Performed by: STUDENT IN AN ORGANIZED HEALTH CARE EDUCATION/TRAINING PROGRAM

## 2019-04-14 PROCEDURE — 2580000003 HC RX 258: Performed by: STUDENT IN AN ORGANIZED HEALTH CARE EDUCATION/TRAINING PROGRAM

## 2019-04-14 PROCEDURE — 2580000003 HC RX 258: Performed by: INTERNAL MEDICINE

## 2019-04-14 PROCEDURE — 80048 BASIC METABOLIC PNL TOTAL CA: CPT

## 2019-04-14 PROCEDURE — 36415 COLL VENOUS BLD VENIPUNCTURE: CPT

## 2019-04-14 PROCEDURE — 97110 THERAPEUTIC EXERCISES: CPT

## 2019-04-14 PROCEDURE — 94660 CPAP INITIATION&MGMT: CPT

## 2019-04-14 PROCEDURE — 6370000000 HC RX 637 (ALT 250 FOR IP): Performed by: INTERNAL MEDICINE

## 2019-04-14 PROCEDURE — 6370000000 HC RX 637 (ALT 250 FOR IP): Performed by: NURSE PRACTITIONER

## 2019-04-14 PROCEDURE — 99233 SBSQ HOSP IP/OBS HIGH 50: CPT | Performed by: INTERNAL MEDICINE

## 2019-04-14 RX ORDER — FUROSEMIDE 10 MG/ML
40 INJECTION INTRAMUSCULAR; INTRAVENOUS 2 TIMES DAILY
Status: COMPLETED | OUTPATIENT
Start: 2019-04-14 | End: 2019-04-14

## 2019-04-14 RX ADMIN — Medication 10 ML: at 08:26

## 2019-04-14 RX ADMIN — Medication 10 ML: at 20:41

## 2019-04-14 RX ADMIN — HEPARIN SODIUM AND DEXTROSE 18 UNITS/KG/HR: 10000; 5 INJECTION INTRAVENOUS at 11:37

## 2019-04-14 RX ADMIN — OXYCODONE HYDROCHLORIDE 5 MG: 5 TABLET ORAL at 03:56

## 2019-04-14 RX ADMIN — ASPIRIN 81 MG: 81 TABLET, CHEWABLE ORAL at 08:25

## 2019-04-14 RX ADMIN — PANTOPRAZOLE SODIUM 40 MG: 40 TABLET, DELAYED RELEASE ORAL at 06:21

## 2019-04-14 RX ADMIN — MORPHINE SULFATE 4 MG: 4 INJECTION INTRAVENOUS at 01:37

## 2019-04-14 RX ADMIN — FUROSEMIDE 40 MG: 10 INJECTION, SOLUTION INTRAMUSCULAR; INTRAVENOUS at 17:20

## 2019-04-14 RX ADMIN — OXYCODONE HYDROCHLORIDE 5 MG: 5 TABLET ORAL at 17:16

## 2019-04-14 RX ADMIN — FUROSEMIDE 40 MG: 10 INJECTION, SOLUTION INTRAMUSCULAR; INTRAVENOUS at 13:28

## 2019-04-14 RX ADMIN — OXYCODONE HYDROCHLORIDE 5 MG: 5 TABLET ORAL at 12:36

## 2019-04-14 RX ADMIN — HEPARIN SODIUM 2000 UNITS: 1000 INJECTION INTRAVENOUS; SUBCUTANEOUS at 20:35

## 2019-04-14 RX ADMIN — CLOPIDOGREL 75 MG: 75 TABLET, FILM COATED ORAL at 08:24

## 2019-04-14 RX ADMIN — CYCLOBENZAPRINE 5 MG: 10 TABLET, FILM COATED ORAL at 20:41

## 2019-04-14 RX ADMIN — CYCLOBENZAPRINE 5 MG: 10 TABLET, FILM COATED ORAL at 08:24

## 2019-04-14 RX ADMIN — CYCLOBENZAPRINE 5 MG: 10 TABLET, FILM COATED ORAL at 13:27

## 2019-04-14 RX ADMIN — METOPROLOL TARTRATE 25 MG: 25 TABLET ORAL at 20:40

## 2019-04-14 RX ADMIN — LEVOFLOXACIN 500 MG: 500 TABLET, FILM COATED ORAL at 00:29

## 2019-04-14 RX ADMIN — PANTOPRAZOLE SODIUM 40 MG: 40 TABLET, DELAYED RELEASE ORAL at 17:16

## 2019-04-14 RX ADMIN — METOPROLOL TARTRATE 25 MG: 25 TABLET ORAL at 08:25

## 2019-04-14 RX ADMIN — MEROPENEM 1 G: 1 INJECTION, POWDER, FOR SOLUTION INTRAVENOUS at 12:36

## 2019-04-14 RX ADMIN — HEPARIN SODIUM 2000 UNITS: 1000 INJECTION INTRAVENOUS; SUBCUTANEOUS at 11:38

## 2019-04-14 RX ADMIN — HEPARIN SODIUM 4000 UNITS: 1000 INJECTION INTRAVENOUS; SUBCUTANEOUS at 02:01

## 2019-04-14 RX ADMIN — OXYCODONE HYDROCHLORIDE 5 MG: 5 TABLET ORAL at 21:19

## 2019-04-14 RX ADMIN — HEPARIN SODIUM AND DEXTROSE 20 UNITS/KG/HR: 10000; 5 INJECTION INTRAVENOUS at 20:34

## 2019-04-14 RX ADMIN — OXYCODONE HYDROCHLORIDE 5 MG: 5 TABLET ORAL at 08:23

## 2019-04-14 RX ADMIN — MORPHINE SULFATE 4 MG: 4 INJECTION INTRAVENOUS at 23:03

## 2019-04-14 RX ADMIN — LEVOFLOXACIN 500 MG: 500 TABLET, FILM COATED ORAL at 08:25

## 2019-04-14 ASSESSMENT — PAIN DESCRIPTION - ORIENTATION: ORIENTATION: LOWER

## 2019-04-14 ASSESSMENT — ENCOUNTER SYMPTOMS
BACK PAIN: 0
ALLERGIC/IMMUNOLOGIC NEGATIVE: 1
RHINORRHEA: 0
DIARRHEA: 1
SHORTNESS OF BREATH: 0
ABDOMINAL PAIN: 1
VOMITING: 0
SORE THROAT: 0
EYE DISCHARGE: 0
EYE PAIN: 0
NAUSEA: 1
COUGH: 0

## 2019-04-14 ASSESSMENT — PAIN SCALES - GENERAL
PAINLEVEL_OUTOF10: 10
PAINLEVEL_OUTOF10: 8
PAINLEVEL_OUTOF10: 9
PAINLEVEL_OUTOF10: 8
PAINLEVEL_OUTOF10: 9
PAINLEVEL_OUTOF10: 9
PAINLEVEL_OUTOF10: 8

## 2019-04-14 ASSESSMENT — PAIN DESCRIPTION - ONSET: ONSET: ON-GOING

## 2019-04-14 ASSESSMENT — PAIN DESCRIPTION - DESCRIPTORS: DESCRIPTORS: CONSTANT

## 2019-04-14 ASSESSMENT — PAIN DESCRIPTION - LOCATION: LOCATION: ABDOMEN

## 2019-04-14 NOTE — PROGRESS NOTES
PROGRESS NOTE      PATIENT NAME: Mckenna Wilhelm  MEDICAL RECORD NO. [id number]  DATE: 4/14/2019  SURGEON: Dr Huan Becerril: No primary care provider on file. HD: # 9    ASSESSMENT    Patient Active Problem List   Diagnosis    Dog bite    Post-traumatic osteoarthritis of left knee    S/P left knee arthroscopy    S/P total knee arthroplasty    Arthritis of left knee    Shock ([facility] [address]    Rhabdomyolysis    Hyponatremia    Lactic acidosis    MARY (acute kidney injury) ([facility] [address]    Metabolic acidosis    Acute respiratory failure (HCC)    Elevated liver enzymes    Hyperkalemia    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HCC)    Small bowel ischemia (HCC)    Acute GI bleeding    Gram negative septic shock (HCC)    Protein-calorie malnutrition (HCC)    Septic shock (HCC)    Gastroenteritis    Haemophilus influenzae septicemia (HCC)    Pleural effusion, bilateral    Splenic infarct    Leukemoid reaction    Mottled skin       MEDICAL DECISION MAKING AND PLAN    1. Continue medical management per primary  2. Neuro - pain control  3. CV - hemodynamically stable. Started on therapeutic heparin, ASA, plavix per Vascular for concern for embolic event causing new onset mottling of digits, CT scan shows concern for possible splenic infarct vs abscess. Echo ordered, may benefit from EDWARD to better assess for possible vegetations  4. Pulm - Stable  5. GI - Ileostomy well appearing, functioning, continue diet  6. Renal - ARF, Nephro on board, continuing HD  7. Heme/ID - leukocytosis to 41 on 4/13, follow up repeat labs today. ID continuing Merem until today 4/14, adding levaquin, repeating blood cultures      SUBJECTIVE    Mckenna Wilhelm seen and examined at bedside. POD#6 s/p second look lap and end ileorstomy w closure of abdominal wall. Reports increased abdominal pain this morning. Cramping abdominal pain. No N/V. Tolerating diet.       OBJECTIVE  VITALS: Temp: Temp: 99.2 °F (37.3 °C)Temp Av.7 °F (37.1 °C)  Min: 98.1 °F (36.7 °C)  Max: 99.2 °F (85.2 °C) BP Systolic (99KRH), TFU:624 , Min:115 , EZT:966   Diastolic (28RQU), CHERI:19, Min:58, Max:71   Pulse Pulse  Av.8  Min: 86  Max: 104 Resp Resp  Av  Min: 19  Max: 25 Pulse ox SpO2  Av.2 %  Min: 95 %  Max: 100 %    CONSTITUTIONAL: awake, alert, cooperative, no apparent distress  HEAD: atraumatic, normocephalic  EYES: sclera clear, pupils equal and reactive to light  ENT: ears are symmetric, nares patent   HEENT: moist mucous membranes  NECK: Supple, symmetrical, trachea midline  LUNGS: no respiratory distress, no audible wheezing  CARDIOVASCULAR: +S1/S2, tachycardic, regular rhythm  ABDOMEN: soft, mildly tender to palpation worse on LUQ, no guarding, no rebound tenderness, midline incision w wound vac in place, ileostomy functioning  MUSCULOSKELETAL: full range of motion noted  NEUROLOGIC: Awake, alert, oriented to name, place and time  EXTREMITIES: peripheral pulses normal, no pedal edema, no calf tenderness  SKIN: normal coloration and turgor    I/O last 3 completed shifts: In: 595 [P.O.:480; I.V.:115]  Out: 2600 [Urine:450; Stool:2150]    Drain/tube output:   In: 595 [P.O.:480; I.V.:115]  Out: 2600 [Urine:450]    LAB:  CBC:   Recent Labs     19  0617 19  0635 19  1945   WBC 33.8* 41.7* 38.3*   HGB 9.4* 9.0* 9.9*   HCT 30.1* 28.9* 31.0*   MCV 95.6 100.0 94.5   * See Reflexed IPF Result 168     BMP:   Recent Labs     19  0926 19  0617 19  1004 19  1145    136 135  --    K 4.1 4.3 5.0 4.1    98 98  --    CO2 28 24 22  --    BUN 33* 48* 50*  --    CREATININE 3.20* 4.10* 3.75*  --    GLUCOSE 111* 88 110*  --      COAGS:   Recent Labs     19  1004 19  1945 19  2352   APTT  --  63.1* 29.2   PROT 4.6*  --   --        RADIOLOGY:  Xr Abdomen (kub) (single Ap View)    Result Date: 2019  EXAMINATION: SINGLE SUPINE XRAY VIEW(S) OF THE ABDOMEN 2019 9:58 am COMPARISON: Chest radiograph dated 04/05/2019 HISTORY: ORDERING SYSTEM PROVIDED HISTORY: Distension TECHNOLOGIST PROVIDED HISTORY: Distension FINDINGS: NG tube tip is in the gastric fundus with the proximal side-port in the distal thoracic esophagus. Repositioning is recommended. There is airspace disease in the left lung base. Gas is seen in loops of large and small bowel suggesting a probable ileus. Right inguinal central venous catheter is in place tip in the right common iliac vein. Gas in mildly distended loops of large and small bowel likely reflecting an ileus. NG tube tip in the gastric fundus with the proximal side-port in the distal thoracic esophagus, repositioning recommended. Airspace disease left lung base. Ct Head Wo Contrast    Result Date: 4/6/2019  EXAMINATION: CT OF THE HEAD WITHOUT CONTRAST  4/6/2019 8:36 pm TECHNIQUE: CT of the head was performed without the administration of intravenous contrast. Dose modulation, iterative reconstruction, and/or weight based adjustment of the mA/kV was utilized to reduce the radiation dose to as low as reasonably achievable. COMPARISON: None. HISTORY: ORDERING SYSTEM PROVIDED HISTORY: history of L ear infection, AMS, r/o brain abscess, CVA TECHNOLOGIST PROVIDED HISTORY: Ordering Physician Provided Reason for Exam: AMS; R/O CVA OR ABSCESS Acuity: Acute Type of Exam: Initial FINDINGS: BRAIN/VENTRICLES: No acute intracranial hemorrhage. No mass effect. No midline shift. Ventricles and sulci are within normal limits. ORBITS: The visualized portion of the orbits demonstrate no acute abnormality. SINUSES: Mastoid air cells are clear. Small air-fluid level with mucosal thickening within the right maxillary sinus. Opacity within the ethmoid air cells. SOFT TISSUES/SKULL:  No acute abnormality of the visualized skull or soft tissues. No acute intracranial abnormality.      Cta Chest W Wo Contrast    Result Date: 4/5/2019  EXAMINATION: CTA OF THE No central pulmonary emboli identified. Patchy consolidative changes both lower lobes involving lingula may be related to multifocal atelectasis versus developing pneumonia/infiltrates. Clinical correlation and follow-up may be beneficial.     Us Renal Limited    Result Date: 4/6/2019  EXAMINATION: ULTRASOUND OF THE KIDNEYS 4/6/2019 12:51 pm COMPARISON: CT abdomen and pelvis dated 04/05/2019 HISTORY: ORDERING SYSTEM PROVIDED HISTORY: RENAL FAILURE, ACUTE (KIDNEY INJURY) FINDINGS: The right kidney measures 10.4 cm in length and the left kidney measures 10.2 cm in length. Kidneys demonstrate normal cortical echogenicity and thickness. No hydronephrosis or intrarenal stones. No focal lesions. Incidental note is made of a couple tiny echogenic foci in the gallbladder wall, likely adenomyomatosis. Unremarkable ultrasound of the kidneys. Xr Chest Portable    Result Date: 4/6/2019  EXAMINATION: SINGLE XRAY VIEW OF THE CHEST 4/6/2019 2:10 am COMPARISON: April 5, 2019. HISTORY: ORDERING SYSTEM PROVIDED HISTORY: R IJ line placement TECHNOLOGIST PROVIDED HISTORY: R IJ line placement FINDINGS: Grossly stable endotracheal and enteric tubes. New right IJ central venous catheter with tip near the superior atrial caval junction. Low lung volume. Persistent bilateral airspace disease. No pleural effusion or pneumothorax. Stable cardiomediastinal silhouette and great vessels. Life support devices as above. No pneumothorax. Persistent bilateral airspace disease. Xr Chest Portable    Result Date: 4/5/2019  EXAMINATION: SINGLE XRAY VIEW OF THE CHEST 4/5/2019 10:16 pm COMPARISON: Chest x-ray 04/05/2019 at 2040 hours HISTORY: ORDERING SYSTEM PROVIDED HISTORY: intubation TECHNOLOGIST PROVIDED HISTORY: intubation FINDINGS: Endotracheal tube 2 cm above the leann. Mild cardiomegaly. Mild perihilar pulmonary vasculature. Patchy bibasilar airspace opacities.   Nasogastric tube side port proximal to the GE junction is be normal. Aorta appears normal without dissection. The celiac artery and SMA appear normal.  The renal arteries appear normal. CTA PELVIS: Bladder decompressed. Uterus normal.  Catheter right groin terminates in the common iliac vein. Ileus. No evidence of aortic dissection. The bilateral adrenal masses, left greater than right. Recommend follow-up adrenal MRI non emergently. Gina Santanavaughn  4/14/19, 8:34 AM         Attending Note      I have reviewed the above [facility] resident progress note and I either performed the key elements of the medical history and physical exam or was present when the resident performed them. I have discussed the findings, established the care plan and recommendations with resident, TECSS nurse and bedside nurse. The following confirms and/or amends the IMPRESSION, MEDICAL DECISION MAKING and PLAN from above. ASSESSMENT    Patient Active Problem List   Diagnosis    Dog bite    Post-traumatic osteoarthritis of left knee    S/P left knee arthroscopy    S/P total knee arthroplasty    Arthritis of left knee    Shock ([address]    Rhabdomyolysis    Hyponatremia    Lactic acidosis    MARY (acute kidney injury) ([address]    Metabolic acidosis    Acute respiratory failure (HCC)    Elevated liver enzymes    Hyperkalemia    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HCC)    Small bowel ischemia (HCC)    Acute GI bleeding    Gram negative septic shock (HCC)    Protein-calorie malnutrition (HCC)    Septic shock (HCC)    Gastroenteritis    Haemophilus influenzae septicemia (HCC)    Pleural effusion, bilateral    Splenic infarction    Leukemoid reaction    Mottled skin       MEDICAL DECISION MAKING AND PLAN  CT demonstrated splenic infarct vs abscess.  On antibiotics per ID EDWARD pending    Radha Soto MD  4/14/2019  9:50 PM

## 2019-04-14 NOTE — PROGRESS NOTES
[facility] -   Infectious diseases evaluation  admission date 4/5/2019    reason for consultation:   leukocytosis    Impression :   Current:  · Leukemoid reaction  · Splenic infarct or mass or abscess  · Septicemia H flu 4/5/19  · Resolved septic shock  · Ischemic bowel  · RLL pneumonia   · Mottled skin    Other:  ·   Discussion / summary of stay / plan of care   · 48 y/o female with vomiting, diarrhea, and AMS  · Found to have transaminitis, MARY requiring emergent dialysis, lactic acidosis, shock requiring pressors  · Intubated due to AMS  · CT chest shows atelactasis  And RLL pneumonia  · Patient is critically ill with origin in GI tract . · Had laparotomy 4-6-19 with findings of ischemic bowel from distal ileum to sigmoid colon.    · S/P ileocecectomy with extended left hemicolectomy and placement of wound vac 4-6-19  · Overall improvement post surgery  · Off pressors and sedation  · Blood Cultures grew haemophilus influenza, beta lactamase negative,  · 4/13 CT AP splenic infarct  · 4/13 cool feet and mottled thighs - all raising the concern for embolic events - anticoag started by vasc      ·   Recommendations   · Meropenem till 4/14/19  · Harden Trejo benefit from EDWARD looking for a septic emboli source  · Add levaquin  · Repeat blood cx x 2  · Disc w pt and RN    Infection Control Recommendations   · [place] Precautions  · Contact Isolation     Antimicrobial Stewardship Recommendations   · Simplification of therapy  · Targeted therapy  Coordination ofOutpatient Care:   · Estimated Length of IV antimicrobials:  · Patient will need Midline / picc Catheter Insertion:   · Patient will need SNF:  · Patient will need outpatient wound care:     History of Present Illness:   Initial history:  Ankit Donovan is a 47y.o.-year-old female     Interval changes  4/13/2019   No fever - left abd pain - NSR  Thighs mottled and toes cool this pm  A little SOB but better and in a good cleaning company   Social Needs    Financial resource strain: Not on file    Food insecurity:     Worry: Not on file     Inability: Not on file    Transportation needs:     Medical: Not on file     Non-medical: Not on file   Tobacco Use    Smoking status: Light Tobacco Smoker     Packs/day: 0.25     Types: Cigarettes     Start date: 9/19/1980    Smokeless tobacco: Never Used   Substance and Sexual Activity    Alcohol use: Yes     Comment: OCCASSIONAL    Drug use: No    Sexual activity: Yes     Partners: Female   Lifestyle    Physical activity:     Days per week: Not on file     Minutes per session: Not on file    Stress: Not on file   Relationships    Social connections:     Talks on phone: Not on file     Gets together: Not on file     Attends Nondenominational service: Not on file     Active member of club or organization: Not on file     Attends meetings of clubs or organizations: Not on file     Relationship status: Not on file    Intimate partner violence:     Fear of current or ex partner: Not on file     Emotionally abused: Not on file     Physically abused: Not on file     Forced sexual activity: Not on file   Other Topics Concern    Not on file   Social History Narrative    Not on file       Family History:     Family History   Problem Relation Age of Onset    Heart Disease Mother     Stroke Mother     Heart Surgery Mother     Diabetes Maternal Grandmother     Emphysema Maternal Grandfather     Diabetes Maternal Grandfather     Lung Cancer Maternal Uncle         Allergies:   Pcn [penicillins]; Sulfa antibiotics; and Tape [adhesive tape]     Review of Systems:     Review of Systems   Constitutional: Positive for activity change. Negative for chills. HENT: Negative for congestion. Eyes: Negative for discharge and itching. Respiratory: Positive for shortness of breath. Negative for apnea. Cardiovascular: Negative for chest pain. Gastrointestinal: Positive for abdominal pain (left). Endocrine: Negative for heat intolerance. Genitourinary: Negative for difficulty urinating. Musculoskeletal: Negative for arthralgias. Skin:        Mottling over the knees and cool feet   Allergic/Immunologic: Negative for immunocompromised state. Neurological: Negative for dizziness. Psychiatric/Behavioral: Negative for agitation. Physical Examination :     Patient Vitals for the past 8 hrs:   BP Temp Temp src Pulse Resp SpO2   04/13/19 2118 (!) 129/58 -- -- 98 -- --   04/13/19 2000 119/69 98.9 °F (37.2 °C) Oral 104 19 95 %   04/13/19 1845 124/68 98.6 °F (37 °C) Oral 100 23 95 %   04/13/19 1524 132/64 98.1 °F (36.7 °C) Oral 87 20 97 %       Physical Exam   Constitutional: She is oriented to person, place, and time. She appears well-developed and well-nourished. HENT:   Head: Normocephalic and atraumatic. Right Ear: External ear normal.   Left Ear: External ear normal.   Eyes: Conjunctivae are normal. No scleral icterus. Neck: Neck supple. No tracheal deviation present. Cardiovascular: Normal rate and regular rhythm. Exam reveals no friction rub. No murmur heard. Pulmonary/Chest: Effort normal. No stridor. No respiratory distress. She has no wheezes. Abdominal: Soft. She exhibits no distension. There is tenderness (left UQ). Genitourinary:   Genitourinary Comments: Urine clear -   Musculoskeletal: She exhibits no edema or deformity. Neurological: She is alert and oriented to person, place, and time. No cranial nerve deficit. Coordination normal.   Skin: Skin is warm and dry. No erythema. No pallor. Mottled thighs and knees   Toes cool   Psychiatric: She has a normal mood and affect.  Her behavior is normal.         Medical Decision Making:   I have independently reviewed/ordered the following labs:    CBC with Differential:   Recent Labs     04/12/19  0617 04/13/19  0635 04/13/19 1945   WBC 33.8* 41.7* 38.3*   HGB 9.4* 9.0* 9.9*   HCT 30.1* 28.9* 31.0*   * See Reflexed IPF

## 2019-04-14 NOTE — PLAN OF CARE
NUTRITION: Pt intake has improved. Pt ate 75% of dinner today and drank 240 ml of ensure  SKIN:Pt free from new skin issues. Pt turned and repositioned every two hours to prevent skin breakdown. Pt brief checked and changed every two hours. FALLS:Patient assessed for fall risk and fall precautions initiated as needed. Patient and family  instructed about safety devices and allowed to make decisions related to safey. Environment kept free of clutter and adequate lighting provided. Bed in lowest position with brakes locked. Call light in reach. Patient ID band correct and in place. Patient transferred with appropriate methods. Will continue to monitor. PAIN:Pt able to communicate needs for pain medications and states satisfaction with outcome.

## 2019-04-14 NOTE — PROGRESS NOTES
Physical Therapy    DATE: 2019  NAME: Catalina Fabian  MRN: [id number]   : 1965      Discharge Recommendations   Further therapy recommended at discharge. Subjective: Per additional RN, patient okay for PT. Primary RN notified at end of treatment. Patient agreeable to bed exercises. Pain: Reports generalized abdominal pain (sleen) and B LE's. Current on pain medication  Patient follows: All commands  Is patient on ventilator: No  Is patient on sedation: No  Precautions: Fall Risk, Contact Precautions, Up with assist. s/p  ILEOCECECTOMY, SUBTOTAL COLECTOMY ; s/p 2ND LOOK EXPLORATORY LAPAROTOMY with ileostomy creation           Therapeutic exercises:  A/AAROM to be LE in all available planes, SLR x10 reps  Quad, HS, glut set x10   Bilateral gastrocnemius stretching 3x20\"  Upper extremity exercises: Bicep curl, shoulder flexion/extension, punches, tricep curl, shoulder abduction/adduction, shoulder HAB. Reps: 10      Goals  Short Term Goals  Short term goal 1: Pt will be Betty with bed mobility  Short term goal 2: Pt will be Betty transfers  Short term goal 3: Pt will be SBA amb 125' RW   Short term goal 4: Pt will be safe to attempt steps       Plan: Progress functional mobility as medically appropriate.    Time In:   Time Out: 1523  Time Coded Minutes (treatment minutes): 29  Rehab Potential: Good  Treatments/week: 5-6x/wk    Mynor Mahan, PTA

## 2019-04-14 NOTE — PROGRESS NOTES
Today's Date: 4/14/2019  Patient Name: Faheem Paul  Date of admission: 4/5/2019  8:39 PM  Patient's age: 47 y. o., 1965  Admission Dx: Shock ([address] [R57.9]      Requesting Physician: Louisa Moreno MD    CHIEF COMPLAINT:  Abdominal pain    History Obtained From:  patient  INTERIM HISTORY  The patient is seen and evaluated. She has been tolerating heparin well. She no evidence of bleeding or bruising. Abdominal pain seems to have improved. HISTORY OF PRESENT ILLNESS:      The patient is a 47 y.o.  female who is admitted to the hospital for   for increased confusion and abdominal pain. She was found to have ischemic bowel and was taken to surgery on 4/6/18. Pathology showed ischemic bowel going to the margins. She was taken for a second surgery on 4/8/18 and more ischemic bowel was appreciated. The patient platelets were about 300,000 on admission and dropped about 98 with her multiple surgeries. Hit antibody was done and was negative. Platelets recovered since then. The patient continues to struggle with recovery. She underwent a CT scan of the abdomen and pelvis today that showed heterogenous changes in the spleen that the new. There was a suspicion of splenic infarction. Vascular were involved and started the patient in aspirin and Plavix as well as heparin. The patient is seen and examined. She is started on anticoagulation. She denies any active bleeding. She has very little insight about her disease and most of the information were obtained from the chart. It is no history of thromboembolism previously . family history is negative for arterial or venous thrombosis. The patient developed worsening renal failure and was started on hemodialysis. Past Medical History:   has a past medical history of Asthma, Back pain, chronic, Hypertension, Snores, and Wears glasses.     Past Surgical History:   has a past surgical history that includes Tonsillectomy; Knee arthroscopy (Left, 1980); Ulnar tunnel release (Right, 2013); Knee arthroscopy (Left, 09/28/2016); LAPAROTOMY EXPLORATORY (N/A, 4/6/2019); and LAPAROTOMY EXPLORATORY (N/A, 4/8/2019). Family History: family history includes Diabetes in her maternal grandfather and maternal grandmother; Emphysema in her maternal grandfather; Heart Disease in her mother; Heart Surgery in her mother; Ila Payan in her maternal uncle; Stroke in her mother. Social History:   reports that she has been smoking cigarettes. She started smoking about 38 years ago. She has been smoking about 0.25 packs per day. She has never used smokeless tobacco. She reports that she drinks alcohol. She reports that she does not use drugs. Medications:    Reviewed in EPIC     Allergies:  Pcn [penicillins]; Sulfa antibiotics; and Tape [adhesive tape]    REVIEW OF SYSTEMS:      Review of Systems  General: no fever or night sweats, Weight is stable. Progressive fatigue  ENT: No double or blurred vision, no tinnitus or hearing problem, no dysphagia or sore throat   Respiratory: No chest pain, no shortness of breath, no cough or hemoptysis. Cardiovascular: Denies chest pain, PND or orthopnea. No L E swelling or palpitations. Gastrointestinal:    Abdominal discomfort, wound VAC in place, no bleeding. Genitourinary: Denies dysuria, hematuria, frequency, urgency or incontinence. Neurological: Denies headaches, decreased LOC, no sensory or motor focal deficits. Musculoskeletal:  No arthralgia no back pain or joint swelling. Skin: There are no rashes or bleeding. Psychiatric:  No anxiety, no depression. Endocrine: no diabetes or thyroid disease. Hematologic: no bleeding , no adenopathy.                 PHYSICAL EXAM:      /69   Pulse 104   Temp 98.1 °F (36.7 °C) (Oral)   Resp 27   Ht 5' 1.02\" (1.55 m)   Wt 187 lb 6.3 oz (85 kg)   SpO2 97%   BMI 35.38 kg/m² Temp (24hrs), Av.8 °F (37.1 °C), Min:98.1 °F (36.7 °C), Max:99.2 °F (37.3 °C)      Physical Exam  Gen. Exam, showed a well-appearing patient without evidence of distress or pain  HEENT, normocephalic and atraumatic, PERRLA extraocular muscles are intact  Neck showed no JVD no carotid bruit, no cervical adenopathy  Chest is clear to auscultation bilaterally  Heart is regular without any murmur  Abdomen left upper quadrant tenderness but the abdomen was soft  Lower extremities showed no edema clubbing or cyanosis  Neurological examination was nonfocal, with intact cranial nerves  Skin  No rashes or bruising appreciated          DATA:      Labs:       CBC:   Recent Labs     19  1945 19  0854   WBC 38.3* 39.6*   HGB 9.9* 8.0*   HCT 31.0* 25.8*    268     BMP:   Recent Labs     19  1004 19  1145 19  0854     --  129*   K 5.0 4.1 4.3   CO2 22  --  20   BUN 50*  --  66*   CREATININE 3.75*  --  4.61*   LABGLOM 13*  --  10*   GLUCOSE 110*  --  82     PT/INR: No results for input(s): PROTIME, INR in the last 72 hours.   APTT:  Recent Labs     19  0854 19  1238   APTT 35.0* 41.8*     LIVER PROFILE:  Recent Labs     19  1004   *   *   LABALBU 2.2*               IMPRESSION:    Primary Problem  Septic shock [facility])    Active Hospital Problems    Diagnosis Date Noted    Splenic infarct [D73.5]     Leukemoid reaction [D72.823]     Mottled skin [L81.9]     Pleural effusion, bilateral [J90]     Protein-calorie malnutrition ([address] [E46]     Septic shock ([address] [A41.9, R65.21]     Gastroenteritis [K52.9]     Haemophilus influenzae septicemia ([address] [A41.3]     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[address] [K55.049] 2019    [facility] bowel ischemia ([facility] [K55.9] 2019    Acute GI bleeding [K92.2] 2019    Gram negative septic shock ([facility] [facility] [A41.50, R65.21]     Rhabdomyolysis [M62.82] 2019    Hyponatremia [E87.1] 2019    Lactic acidosis [E87.2] 04/06/2019    MARY (acute kidney injury) ([facility] [N17.9] 35/41/0348    Metabolic acidosis [A04.9] 04/06/2019    Acute respiratory failure ([facility] [J96.00] 04/06/2019    Elevated liver enzymes [R74.8] 04/06/2019    Hyperkalemia [E87.5]     Shock ([facility] [R57.9] 04/05/2019       RECOMMENDATIONS:  1. The patient has progressive mesenteric ischemia leading to repeat surgery  2. The CT changes of the spleen are very concerning  3. Agree with vascular to start antiplatelet plus anticoagulation  4. The patient clinically does not show any signs of sepsis or splenic abscess but this is not completely excluded  5. We will follow, no matter what the hypercoag work up shows, she will need long term anticoagulation looking at the extent of her thrombosis       Discussed with patient and Nurse.     Tiffanie Gregory MD   [phone]

## 2019-04-14 NOTE — CONSULTS
Today's Date: 4/13/2019  Patient Name: Christine Blankenship  Date of admission: 4/5/2019  8:39 PM  Patient's age: 47 y. o., 1965  Admission Dx: Shock ([address] [R57.9]      Requesting Physician: Sukh Massey MD    CHIEF COMPLAINT:  Abdominal pain    History Obtained From:  patient    HISTORY OF PRESENT ILLNESS:      The patient is a 47 y.o.  female who is admitted to the hospital for   for increased confusion and abdominal pain. She was found to have ischemic bowel and was taken to surgery on 4/6/18. Pathology showed ischemic bowel going to the margins. She was taken for a second surgery on 4/8/18 and more ischemic bowel was appreciated. The patient platelets were about 300,000 on admission and dropped about 98 with her multiple surgeries. Hit antibody was done and was negative. Platelets recovered since then. The patient continues to struggle with recovery. She underwent a CT scan of the abdomen and pelvis today that showed heterogenous changes in the spleen that the new. There was a suspicion of splenic infarction. Vascular were involved and started the patient in aspirin and Plavix as well as heparin. The patient is seen and examined. She is started on anticoagulation. She denies any active bleeding. She has very little insight about her disease and most of the information were obtained from the chart. It is no history of thromboembolism previously . family history is negative for arterial or venous thrombosis. The patient developed worsening renal failure and was started on hemodialysis. Past Medical History:   has a past medical history of Asthma, Back pain, chronic, Hypertension, Snores, and Wears glasses. Past Surgical History:   has a past surgical history that includes Tonsillectomy; Knee arthroscopy (Left, 1980); Ulnar tunnel release (Right, 2013);  Knee arthroscopy (Left, 09/28/2016); LAPAROTOMY EXPLORATORY (N/A, 2019); and LAPAROTOMY EXPLORATORY (N/A, 2019). Family History: family history includes Diabetes in her maternal grandfather and maternal grandmother; Emphysema in her maternal grandfather; Heart Disease in her mother; Heart Surgery in her mother; Montey Batman in her maternal uncle; Stroke in her mother. Social History:   reports that she has been smoking cigarettes. She started smoking about 38 years ago. She has been smoking about 0.25 packs per day. She has never used smokeless tobacco. She reports that she drinks alcohol. She reports that she does not use drugs. Medications:    Reviewed in EPIC     Allergies:  Pcn [penicillins]; Sulfa antibiotics; and Tape [adhesive tape]    REVIEW OF SYSTEMS:      Review of Systems  General: no fever or night sweats, Weight is stable. Progressive fatigue  ENT: No double or blurred vision, no tinnitus or hearing problem, no dysphagia or sore throat   Respiratory: No chest pain, no shortness of breath, no cough or hemoptysis. Cardiovascular: Denies chest pain, PND or orthopnea. No L E swelling or palpitations. Gastrointestinal:    Abdominal discomfort, wound VAC in place, no bleeding. Genitourinary: Denies dysuria, hematuria, frequency, urgency or incontinence. Neurological: Denies headaches, decreased LOC, no sensory or motor focal deficits. Musculoskeletal:  No arthralgia no back pain or joint swelling. Skin: There are no rashes or bleeding. Psychiatric:  No anxiety, no depression. Endocrine: no diabetes or thyroid disease. Hematologic: no bleeding , no adenopathy. PHYSICAL EXAM:      BP (!) 129/58   Pulse 98   Temp 98.9 °F (37.2 °C) (Oral)   Resp 19   Ht 5' 1.02\" (1.55 m)   Wt 187 lb 6.3 oz (85 kg)   SpO2 95%   BMI 35.38 kg/m²    Temp (24hrs), Av.2 °F (36.8 °C), Min:97.4 °F (36.3 °C), Max:98.9 °F (37.2 °C)      Physical Exam  Gen.  Exam, showed a well-appearing patient without evidence of distress or pain  HEENT, normocephalic and atraumatic, PERRLA extraocular muscles are intact  Neck showed no JVD no carotid bruit, no cervical adenopathy  Chest is clear to auscultation bilaterally  Heart is regular without any murmur  Abdomen left upper quadrant tenderness but the abdomen was soft  Lower extremities showed no edema clubbing or cyanosis  Neurological examination was nonfocal, with intact cranial nerves  Skin  No rashes or bruising appreciated          DATA:      Labs:       CBC:   Recent Labs     04/13/19  0635 04/13/19 1945   WBC 41.7* 38.3*   HGB 9.0* 9.9*   HCT 28.9* 31.0*   PLT See Reflexed IPF Result 168     BMP:   Recent Labs     04/12/19  0617 04/13/19  1004 04/13/19  1145    135  --    K 4.3 5.0 4.1   CO2 24 22  --    BUN 48* 50*  --    CREATININE 4.10* 3.75*  --    LABGLOM 11* 13*  --    GLUCOSE 88 110*  --      PT/INR: No results for input(s): PROTIME, INR in the last 72 hours.   APTT:  Recent Labs     04/13/19 1945   APTT 63.1*     LIVER PROFILE:  Recent Labs     04/13/19  1004   *   *   LABALBU 2.2*               IMPRESSION:    Primary Problem  Septic shock [facility])    Active Hospital Problems    Diagnosis Date Noted    Pleural effusion, bilateral [J90]     Protein-calorie malnutrition ([address] [E46]     Septic shock ([address] [A41.9, R65.21]     Gastroenteritis [K52.9]     Septicemia due to Gram negative organism ([address] [A41.50]     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[address] [K55.049] 04/07/2019    Small bowel ischemia ([address] [K55.9] 04/07/2019    Acute GI bleeding [K92.2] 04/07/2019    Gram negative septic shock ([address] [A41.50, R65.21]     Rhabdomyolysis [M62.82] 04/06/2019    Hyponatremia [E87.1] 04/06/2019    Lactic acidosis [E87.2] 04/06/2019    MARY (acute kidney injury) ([facility] [N17.9] 72/06/3573    Metabolic acidosis [L91.3] 04/06/2019    Acute respiratory failure ([facility] [J96.00] 04/06/2019    Elevated liver enzymes [R74.8] 04/06/2019    Hyperkalemia [E87.5]  Shock ([facility] [R57.9] 04/05/2019       RECOMMENDATIONS:  1. The patient has progressive mesenteric ischemia leading to repeat surgery  2. The CT changes of the spleen are very concerning  3. Agree with vascular to start antiplatelet plus anticoagulation  4. The patient clinically does not show any signs of sepsis or splenic abscess but this is not completely excluded  5. Hypercoagulable workup would be needed as the patient did not have any provoking factors for this extreme thrombosis  6. The platelet drop does not fit the diagnosis of heparin-induced thrombosis and the hit antibody was negative  7. We will review the blood smear  8. We will follow with you      Discussed with patient and Nurse.     Giovani Brice MD   [phone]

## 2019-04-14 NOTE — PLAN OF CARE
Patient's bed was set to the lowest height. Non-skid footwear on. Call light and side table were within reach. Patient remained free from falls this shift.     Electronically signed by Radu Glass RN on 4/14/2019 at 3:30 AM

## 2019-04-14 NOTE — PROGRESS NOTES
2-piece stoma appliance leaking significantly at shift change. Day RN applied 1-piece appliance with connection to drainage bag as 2-piece temporarily unavailable. 1-piece appliance also leaked significantly. 2-piece stoma system applied at 2130: Emily Taylor #[id number] with convex wafer #[id number] and barrier ring #[id number] per Lidia Kim wound ostomy RN recommendations. Reattached to drainage bag. Patient received hibiclens bath and right thigh dressing change; wound-vac dressing appears intact at this time. Will monitor closely for leaking.

## 2019-04-14 NOTE — PROGRESS NOTES
[facility]  Internal Medicine Residency Program  Inpatient Daily Progress Note  ______________________________________________________________________________    Patient: Cesia Puente  YOB: 1965   MRN: [id number]    Acct: [de-identified]     Admit date: 4/5/2019  Today's date: 04/14/19  Number of days in the hospital: 9       Admitting Diagnosis: Septic shock ([facility]    Subjective:   Patient seen and examined at bedside. Alert and oriented. Stable vitals this AM. Slightly tachycardiac in the morning  No acute issues overnight. Patient is eating and drinking fine. Pain in the feet bilaterally has improved. Objective:   Vital Sign:  /69   Pulse 104   Temp 98.1 °F (36.7 °C) (Oral)   Resp 27   Ht 5' 1.02\" (1.55 m)   Wt 187 lb 6.3 oz (85 kg)   SpO2 97%   BMI 35.38 kg/m²       Physical Exam:  General appearance:   alert, well appearing, and in no distress  Mental Status: alert, oriented to person, place, and time  Neurologic:  alert, oriented, normal speech, no focal findings or movement disorder noted  Lungs:  clear to auscultation, no wheezes, rales or rhonchi, symmetric air entry  Heart[de-identified] normal rate, regular rhythm, normal S1, S2, no murmurs, rubs, clicks or gallops  Abdomen:  soft, nontender, nondistended, no masses or organomegaly. Ileostomy in the right. Extremities: peripheral pulses normal, no pedal edema, no clubbing or cyanosis.  Feet warm to touch bilaterally   Skin: normal coloration and turgor, no rashes, no suspicious skin lesions noted    Medications:  Scheduled Medications   furosemide  40 mg Intravenous BID    aspirin  81 mg Oral Daily    levofloxacin  500 mg Oral Every Other Day    metoprolol tartrate  25 mg Oral BID    cyclobenzaprine  5 mg Oral TID    pantoprazole  40 mg Oral BID AC    meropenem  1 g Intravenous Q24H    midodrine  5 mg Oral TID WC    sodium chloride flush  10 mL Intravenous 2 times per day       PRN aspirin and Plavix for now. Vascular surgery consulted regarding continuing Plavix. · CT scan of the abdomen shows splenic infarct versus abscess. Patient is currently on therapeutic heparin drip. · Hypercoagulable workup pending. · WBC is trending down. We'll continue to follow blood cultures. · Transesophageal echocardiogram ordered to look for any septic emboli source. Red Munoz MD  PGY-1, Department of Internal Medicine  [address] [place] [address]  4/14/2019 2:34 PM      Attending Physician Statement  I have discussed the care of Andrew Lewis, including pertinent history and exam findings with the resident. I have reviewed the key elements of all parts of the encounter with the resident. I have seen and examined the patient with the resident. I agree with the assessment and plan and status of the problem list as documented. I seen the patient during my round today, I have reviewed the chart, lab seen and medications reviewed. Overnight she is afebrile and she denies any increased abdominal pain abdominal is soft and nontender and she is tolerating diet well with liquid stool from ileostomy. According to patient her pain in her feet is better as she is informed with warm blanket to her legs, there are areas of ecchymosis of the toes are about the same as it was before but extremities/feet is much warmer. She is on heparin drip and on aspirin, will ask vascular about Plavix as she was started Plavix but will determine the need for dual antiplatelet therapy. Her creatinine is increased  To 4.61 today and sodium is 129she had more than 2 L out off urine yesterday, she is going to get 2 dose of Lasix today and hemodialysis in the morning. She does have mild cough but no purulent sputum decreased breath sound at bases especially right base.   She was started on levofloxacin by infectious disease, her WBC count is 39.6 today slightly better than yesterday and platelet

## 2019-04-14 NOTE — PROGRESS NOTES
Vascular Surgery Progress Note            PATIENT NAME: Dee Dee Eagle     TODAY'S DATE: 4/14/2019, 7:14 AM    SUBJECTIVE:    Pt seen and examined. Afebrile. VSS. Heparin gtt running. C/o some left abdominal discomfort. Denies knowing an hx of clotting/DVT/VTE. Denies any known family hx of hypercoag disorders. OBJECTIVE:   Vitals:  /63   Pulse 97   Temp 99.2 °F (37.3 °C) (Oral)   Resp 25   Ht 5' 1.02\" (1.55 m)   Wt 187 lb 6.3 oz (85 kg)   SpO2 100%   BMI 35.38 kg/m²      INTAKE/OUTPUT:      Intake/Output Summary (Last 24 hours) at 4/14/2019 0714  Last data filed at 4/14/2019 4429  Gross per 24 hour   Intake 595 ml   Output 2600 ml   Net -2005 ml                 General: AOx3, NAD  Lungs: no respiratory distress, no audible wheezing  Heart: +S1S2  Abdomen: Soft, NT, ND, Active Bowel Sounds  Extremity: moves all extremities x4, No edema    Data:  CBC:   Lab Results   Component Value Date    WBC 38.3 04/13/2019    RBC 3.28 04/13/2019    HGB 9.9 04/13/2019    HCT 31.0 04/13/2019    MCV 94.5 04/13/2019    MCH 30.2 04/13/2019    MCHC 31.9 04/13/2019    RDW 16.3 04/13/2019     04/13/2019    MPV 12.8 04/13/2019     BMP:    Lab Results   Component Value Date     04/13/2019    K 4.1 04/13/2019    CL 98 04/13/2019    CO2 22 04/13/2019    BUN 50 04/13/2019    LABALBU 2.2 04/13/2019    CREATININE 3.75 04/13/2019    CALCIUM 8.0 04/13/2019    GFRAA 15 04/13/2019    LABGLOM 13 04/13/2019    GLUCOSE 110 04/13/2019         ASSESSMENT     1. 48 y/o female with mottled fingers/toes/hands. 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 Our recommendations:    PLAN    1. Recommend continued heparin gtt  2. Heme/onc following and additionall recommend ASA/Plavix and therapeutic AC  3. Recommend workup for septic embolic source. Last echo negative, consider EDWARD.   4. Hypercoaguable workup per hematology. 5. At this time there is no vascular intervention that would be beneficial to the patient. Will continue to follow along.         Electronically signed by Katy Lambert DO  on 4/14/2019 at 7:14 AM

## 2019-04-14 NOTE — PROGRESS NOTES
[facility] -   Infectious diseases evaluation  admission date 4/5/2019    reason for consultation:   Leukocytosis    Impression :   Current:  · Leukemoid reaction  · Splenic infarct or mass or abscess  · Septicemia H flu 4/5/19  · Resolved septic shock  · Ischemic bowel  · RLL pneumonia   · Mottled skin    Other:  ·   Discussion / summary of stay / plan of care   · 48 y/o female with vomiting, diarrhea, and AMS  · Found to have transaminitis, MARY requiring emergent dialysis, lactic acidosis, shock requiring pressors  · Intubated due to AMS  · CT chest shows atelactasis  And RLL pneumonia  · Patient is critically ill with origin in GI tract . · Had laparotomy 4-6-19 with findings of ischemic bowel from distal ileum to sigmoid colon. · S/P ileocecectomy with extended left hemicolectomy and placement of wound vac 4-6-19  · Overall improvement post surgery  · Off pressors and sedation  · Blood Cultures grew haemophilus influenza, beta lactamase negative,  · 4/13 CT AP splenic infarct  · 4/13 cool feet and mottled thighs - all raising the concern for embolic events - anticoag started by vasc     Recommendations   · Continue Meropenem till 4/14/19  · Ami Z[place] benefit from [facility] looking for a septic emboli source  · Continue levaquin  · Wait for final sensitivities from [facility] therapy  · Disc w pt and RN    Infection Control Recommendations   · [place] Precautions  · Contact Isolation     Antimicrobial Stewardship Recommendations   · Simplification of therapy  · Targeted therapy    Coordination ofOutpatient Care:   · Estimated Length of IV antimicrobials:  · Patient will need Midline / picc Catheter Insertion:   · Patient will need SNF:  · Patient will need outpatient wound care:     History of Present Illness:   Initial history:  Alanna Buck is a 47y.o.-year-old female     Interval changes  4/14/2019   T max 99.2. No chills, nausea. Diarrhea.   Abd cramping, (37.3 °C) Oral 97 25 100 %       Physical Exam   Constitutional: She is oriented to person, place, and time. She appears well-developed and well-nourished. No distress. HENT:   Head: Normocephalic and atraumatic. Mouth/Throat: No oropharyngeal exudate. Eyes: Pupils are equal, round, and reactive to light. Conjunctivae and EOM are normal.   Neck: Normal range of motion. Neck supple. No JVD present. No tracheal deviation present. Cardiovascular: Normal rate, regular rhythm and intact distal pulses. Pulmonary/Chest: Effort normal and breath sounds normal. She has no wheezes. She has no rales. Abdominal: Soft. Bowel sounds are normal. There is tenderness. Genitourinary:   Genitourinary Comments: No verduzco. Periwick   Musculoskeletal: Normal range of motion. She exhibits edema. She exhibits no deformity. Edema to ext x 4. Neurological: She is alert and oriented to person, place, and time. No cranial nerve deficit. Skin: Skin is warm and dry. Capillary refill takes 2 to 3 seconds. No rash noted. Psychiatric: She has a normal mood and affect. Her behavior is normal. Judgment and thought content normal.   Nursing note and vitals reviewed. Medical Decision Making:   I have independently reviewed/ordered the following labs:    CBC with Differential:   Recent Labs     04/13/19  0635 04/13/19  1945 04/14/19  0854   WBC 41.7* 38.3* 39.6*   HGB 9.0* 9.9* 8.0*   HCT 28.9* 31.0* 25.8*   PLT See Reflexed IPF Result 168 268   LYMPHOPCT 9*  --  7*   MONOPCT 2  --  3     BMP:  Recent Labs     04/13/19  1004 04/13/19  1145 04/14/19  0854     --  129*   K 5.0 4.1 4.3   CL 98  --  94*   CO2 22  --  20   BUN 50*  --  66*   CREATININE 3.75*  --  4.61*     Hepatic Function Panel:   Recent Labs     04/13/19  1004   PROT 4.6*   LABALBU 2.2*   BILIDIR 0.36*   IBILI 0.34   BILITOT 0.70   ALKPHOS 94   *   *     No results for input(s): RPR in the last 72 hours.   No results for input(s): HIV in the last 72 hours. No results for input(s): BC in the last 72 hours. Lab Results   Component Value Date    CREATININE 4.61 04/14/2019    GLUCOSE 82 04/14/2019       Detailed results: Thank you for allowing us to participate in the care of this patient. Please call with questions. This note is created with the assistance of a speech recognition program.  While intending to generate adocument that actually reflects the content of the visit, the document can still have some errors including those of syntax and sound a like substitutions which may escape proof reading. It such instances, actual meaningcan be extrapolated by contextual diversion.     LORA Pedraza - CNP  Office: [phone]

## 2019-04-15 LAB
ABSOLUTE EOS #: 0 K/UL (ref 0–0.4)
ABSOLUTE IMMATURE GRANULOCYTE: 0.69 K/UL (ref 0–0.3)
ABSOLUTE LYMPH #: 3.11 K/UL (ref 1–4.8)
ABSOLUTE MONO #: 1.04 K/UL (ref 0.1–0.8)
ANION GAP SERPL CALCULATED.3IONS-SCNC: 18 MMOL/L (ref 9–17)
ANTI-NUCLEAR ANTIBODY (ANA): NEGATIVE
BASOPHILS # BLD: 0 % (ref 0–2)
BASOPHILS ABSOLUTE: 0 K/UL (ref 0–0.2)
BUN BLDV-MCNC: 84 MG/DL (ref 6–20)
BUN/CREAT BLD: ABNORMAL (ref 9–20)
CALCIUM SERPL-MCNC: 7.7 MG/DL (ref 8.6–10.4)
CHLORIDE BLD-SCNC: 90 MMOL/L (ref 98–107)
CO2: 22 MMOL/L (ref 20–31)
CREAT SERPL-MCNC: 5.89 MG/DL (ref 0.5–0.9)
DIFFERENTIAL TYPE: ABNORMAL
EOSINOPHILS RELATIVE PERCENT: 0 % (ref 1–4)
GFR AFRICAN AMERICAN: 9 ML/MIN
GFR NON-AFRICAN AMERICAN: 8 ML/MIN
GFR SERPL CREATININE-BSD FRML MDRD: ABNORMAL ML/MIN/{1.73_M2}
GFR SERPL CREATININE-BSD FRML MDRD: ABNORMAL ML/MIN/{1.73_M2}
GLUCOSE BLD-MCNC: 106 MG/DL (ref 65–105)
GLUCOSE BLD-MCNC: 113 MG/DL (ref 65–105)
GLUCOSE BLD-MCNC: 63 MG/DL (ref 65–105)
GLUCOSE BLD-MCNC: 65 MG/DL (ref 65–105)
GLUCOSE BLD-MCNC: 78 MG/DL (ref 70–99)
GLUCOSE BLD-MCNC: 80 MG/DL (ref 65–105)
HCT VFR BLD CALC: 23.7 % (ref 36.3–47.1)
HEMOGLOBIN: 7.8 G/DL (ref 11.9–15.1)
IMMATURE GRANULOCYTES: 2 %
INR BLD: 1.1
LYMPHOCYTES # BLD: 9 % (ref 24–44)
MCH RBC QN AUTO: 30.8 PG (ref 25.2–33.5)
MCHC RBC AUTO-ENTMCNC: 32.9 G/DL (ref 28.4–34.8)
MCV RBC AUTO: 93.7 FL (ref 82.6–102.9)
MONOCYTES # BLD: 3 % (ref 1–7)
MORPHOLOGY: ABNORMAL
MORPHOLOGY: ABNORMAL
NRBC AUTOMATED: 0 PER 100 WBC
PARTIAL THROMBOPLASTIN TIME: 27.7 SEC (ref 20.5–30.5)
PARTIAL THROMBOPLASTIN TIME: 29.1 SEC (ref 20.5–30.5)
PARTIAL THROMBOPLASTIN TIME: 52.8 SEC (ref 20.5–30.5)
PDW BLD-RTO: 15.9 % (ref 11.8–14.4)
PLATELET # BLD: 294 K/UL (ref 138–453)
PLATELET ESTIMATE: ABNORMAL
PMV BLD AUTO: 12.2 FL (ref 8.1–13.5)
POTASSIUM SERPL-SCNC: 4.7 MMOL/L (ref 3.7–5.3)
PROTHROMBIN TIME: 11.3 SEC (ref 9–12)
RBC # BLD: 2.53 M/UL (ref 3.95–5.11)
RBC # BLD: ABNORMAL 10*6/UL
SEG NEUTROPHILS: 86 % (ref 36–66)
SEGMENTED NEUTROPHILS ABSOLUTE COUNT: 29.66 K/UL (ref 1.8–7.7)
SODIUM BLD-SCNC: 130 MMOL/L (ref 135–144)
WBC # BLD: 34.5 K/UL (ref 3.5–11.3)
WBC # BLD: ABNORMAL 10*3/UL

## 2019-04-15 PROCEDURE — 6370000000 HC RX 637 (ALT 250 FOR IP): Performed by: STUDENT IN AN ORGANIZED HEALTH CARE EDUCATION/TRAINING PROGRAM

## 2019-04-15 PROCEDURE — 80048 BASIC METABOLIC PNL TOTAL CA: CPT

## 2019-04-15 PROCEDURE — 6360000002 HC RX W HCPCS: Performed by: STUDENT IN AN ORGANIZED HEALTH CARE EDUCATION/TRAINING PROGRAM

## 2019-04-15 PROCEDURE — 2060000000 HC ICU INTERMEDIATE R&B

## 2019-04-15 PROCEDURE — 97605 NEG PRS WND THER DME<=50SQCM: CPT

## 2019-04-15 PROCEDURE — 85025 COMPLETE CBC W/AUTO DIFF WBC: CPT

## 2019-04-15 PROCEDURE — 2500000003 HC RX 250 WO HCPCS: Performed by: STUDENT IN AN ORGANIZED HEALTH CARE EDUCATION/TRAINING PROGRAM

## 2019-04-15 PROCEDURE — 2580000003 HC RX 258: Performed by: STUDENT IN AN ORGANIZED HEALTH CARE EDUCATION/TRAINING PROGRAM

## 2019-04-15 PROCEDURE — 85730 THROMBOPLASTIN TIME PARTIAL: CPT

## 2019-04-15 PROCEDURE — 99231 SBSQ HOSP IP/OBS SF/LOW 25: CPT | Performed by: INTERNAL MEDICINE

## 2019-04-15 PROCEDURE — 85610 PROTHROMBIN TIME: CPT

## 2019-04-15 PROCEDURE — 82947 ASSAY GLUCOSE BLOOD QUANT: CPT

## 2019-04-15 PROCEDURE — 36415 COLL VENOUS BLD VENIPUNCTURE: CPT

## 2019-04-15 PROCEDURE — 6370000000 HC RX 637 (ALT 250 FOR IP): Performed by: NURSE PRACTITIONER

## 2019-04-15 PROCEDURE — 99232 SBSQ HOSP IP/OBS MODERATE 35: CPT | Performed by: INTERNAL MEDICINE

## 2019-04-15 PROCEDURE — 6370000000 HC RX 637 (ALT 250 FOR IP): Performed by: INTERNAL MEDICINE

## 2019-04-15 PROCEDURE — 2500000003 HC RX 250 WO HCPCS: Performed by: INTERNAL MEDICINE

## 2019-04-15 PROCEDURE — 99233 SBSQ HOSP IP/OBS HIGH 50: CPT | Performed by: INTERNAL MEDICINE

## 2019-04-15 RX ORDER — DEXTROSE MONOHYDRATE 50 MG/ML
100 INJECTION, SOLUTION INTRAVENOUS PRN
Status: DISCONTINUED | OUTPATIENT
Start: 2019-04-15 | End: 2019-05-04 | Stop reason: HOSPADM

## 2019-04-15 RX ORDER — DEXTROSE MONOHYDRATE 25 G/50ML
12.5 INJECTION, SOLUTION INTRAVENOUS PRN
Status: DISCONTINUED | OUTPATIENT
Start: 2019-04-15 | End: 2019-04-20

## 2019-04-15 RX ORDER — NICOTINE POLACRILEX 4 MG
15 LOZENGE BUCCAL PRN
Status: DISCONTINUED | OUTPATIENT
Start: 2019-04-15 | End: 2019-04-20

## 2019-04-15 RX ORDER — SODIUM CHLORIDE 9 MG/ML
INJECTION, SOLUTION INTRAVENOUS CONTINUOUS
Status: DISCONTINUED | OUTPATIENT
Start: 2019-04-16 | End: 2019-04-16

## 2019-04-15 RX ORDER — DEXTROSE MONOHYDRATE 25 G/50ML
12.5 INJECTION, SOLUTION INTRAVENOUS PRN
Status: DISCONTINUED | OUTPATIENT
Start: 2019-04-15 | End: 2019-05-04 | Stop reason: HOSPADM

## 2019-04-15 RX ORDER — DEXTROSE MONOHYDRATE 50 MG/ML
100 INJECTION, SOLUTION INTRAVENOUS PRN
Status: DISCONTINUED | OUTPATIENT
Start: 2019-04-15 | End: 2019-04-20

## 2019-04-15 RX ORDER — NICOTINE POLACRILEX 4 MG
15 LOZENGE BUCCAL PRN
Status: DISCONTINUED | OUTPATIENT
Start: 2019-04-15 | End: 2019-05-04 | Stop reason: HOSPADM

## 2019-04-15 RX ORDER — CLINDAMYCIN PHOSPHATE 600 MG/50ML
600 INJECTION INTRAVENOUS ONCE
Status: DISCONTINUED | OUTPATIENT
Start: 2019-04-16 | End: 2019-04-16

## 2019-04-15 RX ORDER — NICOTINE 21 MG/24HR
1 PATCH, TRANSDERMAL 24 HOURS TRANSDERMAL DAILY
Status: DISCONTINUED | OUTPATIENT
Start: 2019-04-15 | End: 2019-04-17

## 2019-04-15 RX ADMIN — Medication 3 ML: at 13:40

## 2019-04-15 RX ADMIN — ONDANSETRON 4 MG: 2 INJECTION INTRAMUSCULAR; INTRAVENOUS at 01:01

## 2019-04-15 RX ADMIN — MIDODRINE HYDROCHLORIDE 5 MG: 5 TABLET ORAL at 15:07

## 2019-04-15 RX ADMIN — Medication 10 ML: at 21:49

## 2019-04-15 RX ADMIN — OXYCODONE HYDROCHLORIDE 5 MG: 5 TABLET ORAL at 09:22

## 2019-04-15 RX ADMIN — HEPARIN SODIUM 4000 UNITS: 1000 INJECTION INTRAVENOUS; SUBCUTANEOUS at 15:18

## 2019-04-15 RX ADMIN — HEPARIN SODIUM AND DEXTROSE 20 UNITS/KG/HR: 10000; 5 INJECTION INTRAVENOUS at 06:45

## 2019-04-15 RX ADMIN — CYCLOBENZAPRINE 5 MG: 10 TABLET, FILM COATED ORAL at 09:25

## 2019-04-15 RX ADMIN — PANTOPRAZOLE SODIUM 40 MG: 40 TABLET, DELAYED RELEASE ORAL at 09:23

## 2019-04-15 RX ADMIN — METOPROLOL TARTRATE 25 MG: 25 TABLET ORAL at 09:23

## 2019-04-15 RX ADMIN — DEXTROSE MONOHYDRATE 25 G: 500 INJECTION PARENTERAL at 08:06

## 2019-04-15 RX ADMIN — METOPROLOL TARTRATE 37.5 MG: 25 TABLET ORAL at 21:46

## 2019-04-15 RX ADMIN — Medication 10 ML: at 09:24

## 2019-04-15 RX ADMIN — OXYCODONE HYDROCHLORIDE 5 MG: 5 TABLET ORAL at 15:06

## 2019-04-15 RX ADMIN — OXYCODONE HYDROCHLORIDE 5 MG: 5 TABLET ORAL at 21:44

## 2019-04-15 RX ADMIN — OXYCODONE HYDROCHLORIDE 5 MG: 5 TABLET ORAL at 01:02

## 2019-04-15 RX ADMIN — CYCLOBENZAPRINE 5 MG: 10 TABLET, FILM COATED ORAL at 15:06

## 2019-04-15 RX ADMIN — PANTOPRAZOLE SODIUM 40 MG: 40 TABLET, DELAYED RELEASE ORAL at 15:07

## 2019-04-15 RX ADMIN — OXYCODONE HYDROCHLORIDE 5 MG: 5 TABLET ORAL at 05:18

## 2019-04-15 RX ADMIN — MIDODRINE HYDROCHLORIDE 5 MG: 5 TABLET ORAL at 09:25

## 2019-04-15 RX ADMIN — MORPHINE SULFATE 4 MG: 4 INJECTION INTRAVENOUS at 18:11

## 2019-04-15 RX ADMIN — CYCLOBENZAPRINE 5 MG: 10 TABLET, FILM COATED ORAL at 21:44

## 2019-04-15 RX ADMIN — MIDODRINE HYDROCHLORIDE 5 MG: 5 TABLET ORAL at 17:41

## 2019-04-15 RX ADMIN — ASPIRIN 81 MG: 81 TABLET, CHEWABLE ORAL at 09:23

## 2019-04-15 ASSESSMENT — PAIN SCALES - GENERAL
PAINLEVEL_OUTOF10: 9
PAINLEVEL_OUTOF10: 8
PAINLEVEL_OUTOF10: 9
PAINLEVEL_OUTOF10: 8
PAINLEVEL_OUTOF10: 8

## 2019-04-15 ASSESSMENT — PAIN DESCRIPTION - DESCRIPTORS
DESCRIPTORS: CRAMPING
DESCRIPTORS: CONSTANT;CRAMPING

## 2019-04-15 ASSESSMENT — PAIN DESCRIPTION - PAIN TYPE: TYPE: SURGICAL PAIN

## 2019-04-15 ASSESSMENT — PAIN DESCRIPTION - LOCATION
LOCATION: ABDOMEN
LOCATION: ABDOMEN

## 2019-04-15 NOTE — PROGRESS NOTES
Medications  glucose 15 g PRN   dextrose 12.5 g PRN   glucagon (rDNA) 1 mg PRN   dextrose 100 mL/hr PRN   glucose 15 g PRN   dextrose 12.5 g PRN   glucagon (rDNA) 1 mg PRN   dextrose 100 mL/hr PRN   heparin (porcine) 4,000 Units PRN   heparin (porcine) 2,000 Units PRN   oxyCODONE 5 mg Q4H PRN   morphine 4 mg Q3H PRN   Or     morphine 6 mg Q3H PRN   sodium chloride 250 mL PRN   sodium chloride 150 mL PRN   anticoagulant sodium citrate 3 mL PRN   anticoagulant sodium citrate 3 mL PRN   dextrose 25 g PRN   sodium chloride flush 10 mL PRN   magnesium hydroxide 30 mL Daily PRN   ondansetron 4 mg Q6H PRN       Diagnostic Labs and Imaging:  CBC:  Recent Labs     04/13/19  1945 04/14/19  0854 04/15/19  1030   WBC 38.3* 39.6* 34.5*   HGB 9.9* 8.0* 7.8*    268 294     BMP: Recent Labs     04/13/19  1004 04/13/19  1145 04/14/19  0854 04/15/19  1030     --  129* 130*   K 5.0 4.1 4.3 4.7   CL 98  --  94* 90*   CO2 22  --  20 22   BUN 50*  --  66* 84*   CREATININE 3.75*  --  4.61* 5.89*   GLUCOSE 110*  --  82 78     Hepatic: Recent Labs     04/13/19  1004   *   *   BILITOT 0.70   ALKPHOS 94       Assessment and Plan:     Principal Problem:    Septic shock ([facility] [address]  Active Problems:    Shock ([address]    Rhabdomyolysis    Hyponatremia    Lactic acidosis    MARY (acute kidney injury) ([facility] [address]    Metabolic acidosis    Acute respiratory failure (HCC)    Elevated liver enzymes    Hyperkalemia    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HCC)    Small bowel ischemia (HCC)    Acute GI bleeding    Gram negative septic shock (HCC)    Protein-calorie malnutrition (HCC)    Gastroenteritis    Haemophilus influenzae septicemia (HCC)    Pleural effusion, bilateral    Splenic infarction    Leukemoid reaction    Mottled skin  Resolved Problems:    * No resolved hospital problems. *    · Continue Levofloxacin for now  · Continue to monitor Hb and Platelets. CBC daily  · BiPAP as needed. · Continue dental soft diet.   · Continue Midodrine for low BP if needed  · PT/OT  · Pain control with Morphine and Roxicodone PRN  · If the patient spike a fever, can be started on Vancomycin. · Continue aspirin. Plavix was initially started on recommendations from Vascular Surgery. Vascular surgery consulted regarding continuing Plavix and they recommend asking Hem/Onc. Hem/onc recommends aspirin and Anticoagulation only and plavix was started based on Vascular Recommendations. Plavix discontinued. · CT scan of the abdomen shows splenic infarct versus abscess. Patient is currently on therapeutic heparin drip. · Hypercoagulable workup pending. · WBC is trending down. We'll continue to follow blood cultures. Negative so far  · Transesophageal echocardiogram ordered to look for any septic emboli source. Heather Lam MD  PGY-1, Department of Internal Medicine  [address] [place] [address]  4/15/2019 1:39 PM    Attending Physician Statement  I have discussed the care of Kandice Vyas, including pertinent history and exam findings with the resident. I have reviewed the key elements of all parts of the encounter with the resident. I have seen and examined the patient with the resident. I agree with the assessment and plan and status of the problem list as documented. She is afebrile and stable hemodynamics  She samira have cough productive of sputum and denies chest pain and and SOB  She remained  On room air and denies any night sweats. creatinine is 5.8  today and her urine output is  Little over 1 L in last 24 hours. she is tolerating metoprolol 37.5 mg twice a day. She is on aspirin and on heparin drip and discussed with vascular if they want Plavix also and they wanted hematology to decide and discuss with hematology they are okay with aspirin  And heparin drip. Her feet is almost the same as slightly cold and areas of ecchymosis on toes are the same.   Her WBC count is 34.8 today she is on Levaquin as started over the weekend and discontinued by  Infectious disease today. Her abodmen is mildly tender in the lower quadrants bilaterally but there is no guarding or rigidity, will need continued follow up by surgery. We will continue to follow temperature and WBC count and hemoglobin. She is complaining of abdominal pain on the sides there is no nausea and  Vomiting and she is able to tolerate  Diet although currently nothing by mouth for transesophageal echo. She is getting hemodialysis today.     Please note that this chart was generated using voice recognition Dragon dictation software. Although every effort was made to ensure the accuracy of this automated transcription, some errors in transcription may have occurred.       Kuldip Zuñiga MD  Pulmonary and critical care

## 2019-04-15 NOTE — PROGRESS NOTES
[facility] - Progress Note    Today's Date and Time: 4/15/2019, 12:23 PM    Impression :   1. 48 y/o female with complaints of  · Vomiting  · Diarrhea  · Abdominal pain  · Altered mental status  2. Acute kidney injury  · Nephrology onboard and planning for urgent dialysis   3. Shock, presumptive septic plus hypovolemic, requiring Levophed  4. Septicemia with Haemophilus influenzae 4-5-19  5. Gastroenteritis  6. Ischemic bowel  7. S/P laparotomy 4-6-19  8. S/p second look with resection of transverse colon, ileostomy creation, resection of rectal stump and abdominal closure. 9. Severe hyponatremia  10. Transaminitis, shock liver, improving  11. Intubation 2/2 AMS  12. COPD  15. Arthritis  14. Chronic NSAID use  15. Funguria  16. Acral mottling of hands and feet  17. Allergy to penicillins: Hives and respiratory distress  18. Allergy to sulfa    Recommendations:   · Complete Meropenem 1 gm IV q 24 hr.Stop date 4-14-19  · Levaquin started 4/13, discontinue today  · Pt is clinically stable, leukocytosis continues  · New tunnel catheter placement 4/16    Medical Decision Making/Summary/Discussion:   · 48 y/o female with vomiting, diarrhea, and AMS  · Found to have transaminitis, MARY requiring emergent dialysis, lactic acidosis, shock requiring pressors  · Intubated due to AMS  · CT chest shows atelactasis vs pneumonia  · Currently on vanc, levaquin, and aztreonam   · Patient is critically ill with origin in GI tract . Will need to treat with meropenem despite Hx of penicillin allergy. · Had laparotomy 4-6-19 with findings of ischemic bowel from distal ileum to sigmoid colon.    · S/P ileocecectomy with extended left hemicolectomy and placement of wound vac 4-6-19  · Overall improvement post surgery  · Off pressors and sedation  · Cultures grew haemophilus influenza, beta lactamase negative, sensitivities pending  · Treated with Meropenem through 4/14  Infection Control Recommendations   · [place] Precautions    Antimicrobial Stewardship Recommendations     · Discontinuation of therapy      Coordination of Outpatient Care:   · Estimated Length of IV antimicrobials: 4/14  · Patient will need Midline Catheter Insertion: No  · Patient will need PICC line Insertion: No  · Patient will need: Home IV , [employer],  SNF,  LTAC TBD  · Patient will need outpatient wound care: TBD    Chief complaint/reason for consultation:   · Altered mental status and tender abdomen       History of Present Illness:   Johanny Sigala is a 47y.o.-year-old  female who was initially admitted on 4/5/2019. Patient seen at the request of Dr. Leon Pelletier:    Patient presented to ED via EMS due to altered mental status and vomiting. Patient has history significant for COPD, right knee osteoarthirtis s/p arthoplasty, and chronic NSAID use. Patient lives in [facility], but was here to visit her significant other. Arrived Wednesday night, said she didn't feel good. Thought she might have had a bad burger at a fast food restaurant. Went to sleep and slept for almost 24 hours. When she awoke, she said she was vomiting, having diarrhea, and abdominal pain. Significant other and sister are unsure of the nature of the vomit, diarrhea, or abdominal pain. Then she slept a bit more, until her significant other found her unresponsive and that's when he called EMS to bring her to the hospital.     Afebrile on admission, but Tmax overnight was 39.3. Tachycardic on admission, 129. Became hypotensive after admission and levophed and vasopressin were started. Initial labs:     Admission labs significant for Na 125, K 8.0, CO2 9, BUN 62, Creatinine of 4.06, Anion gap of 23, Lactic acid of 3.5, Glucose of 186, Ca 5.2, POC HCO3 15.9, CK 15,342, Troponins high sensitivity 89 and 94, Alk phos 168, , AST 1,122, Bili .37, lipase of 119, Urine tox positive for THC and Cocaine, WBC 23.5, Hgb 10.2, Urine and blood cultures pending, HIV negative, influenza negative, hepatitis panel non-reactive, urine Leukocyte esterase trace, urine nitrite -, urine protein 2+, urine Hgb large, urine RBCs 5 to 10, many urine yeast,  ABG 7.22, pCO2 27, HCO3 15.9. Initial imaging showed:     CXR: No acute cardiopulmonary process    Abdominal X-ray 4/6: No free air    CTA of chest: Negative for PE or dissection. Patchy consolidations of bilateral lower lung lobes representing developing pneumonia vs atelectasis. CT head: pending    Initial course:     Intubated and sedated in ED due to altered mental status. Currently on Vancomycin, Levaquin, and aztreonam for empiric coverage. Richar catheter was placed due to request by nephrology for emergent dialysis. Dialysis attempted several times, but due to high arterial pressures and line clotting, dialysis to be completed later today by Dr. Genevieve Mckeon. NG tube with bloody output. FOBT +. General surgery has been consulted for evaluation. CURRENT EVALUATION : 4/15/2019    Patient seen and examined. Extubated. AOx4. Following commands. Says she is feeling better. Pain well controlled. Patient underwent ileocecectomy with extended left hemicolectomy and placement of wound vac on 4-6-19 because of ischemic bowel. Had additional surgery on 4-8-19 for second look laparotomy with transverse colon resection and ileostomy creation. Overall improvement post surgeries. Afebrile - tmax 99.2  VS stable     Had episodes of a fib with RVR. Shocked twice to control rhythm and rate on 4-7-19. Pulse now in the low 100's. Abdomen softer. VAC in place  Skin cyanosis resolved. Blood cultures grew haemophilus influenza, beta lactamase negative. CXR 4-11-19 showed unchanged pulmonary edema and effusions. Duplex of the right upper extremity showed no evidence of deep venous thrombosis in the right upper extremity.  Superficial thrombophlebitis of the right upper extremity involving the cephalic and basilic veins. Says that hand is not hurting her. Able to move the wrist and digits on command. Rt wrist with skin ulceration. WBC has increased from 23-->41.7 without a specific cause. Today is down to 34.5. Clinically she continues to improve. Labs reviewed: 4/15/2019    WBC 23.9-->33.8->41.7->38.3->39.6->34.5  Hb 8.3-->9.4->7.8  Plat 75-->119->294    BUN 84  Cr 5.89    Cultures:  Urine:  · NGTD  Blood:  · 4-5-19 : 1/2 haemophilus influenza, beta lactamase negative, sensitive to pcn and meropenem. Pt completed a 10 day course of meropenem  · 4/12 x 1 no growth to date  · 4/13 x 2 no growth to date  Wound:  · NA    MRSA- Neg    Discussed with RN. I have personally reviewed the past medical history, past surgical history, medications, social history, and family history, and I have updated the database accordingly.   Past Medical History:     Past Medical History:   Diagnosis Date    Asthma     On inhaler    Back pain, chronic     Hypertension 2015    On Lisinopril    Snores     possible apnea but not tested    Wears glasses        Past Surgical  History:     Past Surgical History:   Procedure Laterality Date    KNEE ARTHROSCOPY Left 1980    KNEE ARTHROSCOPY Left 09/28/2016    with medial menisectomy    LAPAROTOMY EXPLORATORY N/A 4/6/2019    LAPAROTOMY EXPLORATORY, ILEOCECECTOMY, SUBTOTAL COLECTOMY, ABTHEHRA WOUND VAC PLACEMENT performed by Olivia Lynn MD at [facility] 4/8/2019    2ND LOOK EXPLORATORY LAPAROTOMY, RESECTION TRANSVERSE COLON, ILEOSTOMY CREATION, RESECTION RECTAL STUMP, ABDOMINAL WASHOUT, OMENTECTOMY, ABDOMINAL WALL CLOSURE performed by Mac Lui MD at [address] [place] [address] Right 2013       Medications:      nicotine  1 patch Transdermal Daily    metoprolol tartrate  37.5 mg Oral BID    aspirin  81 mg Oral Daily    levofloxacin  500 mg Oral Every Other Day    cyclobenzaprine  5 mg Oral TID    pantoprazole  40 mg Oral BID AC    midodrine  5 mg Oral TID WC    sodium chloride flush  10 mL Intravenous 2 times per day       Social History:     Social History     Socioeconomic History    Marital status: Single     Spouse name: Not on file    Number of children: 0    Years of education: Not on file    Highest education level: Not on file   Occupational History    Occupation: [employer] Financial resource strain: Not on file    Food insecurity:     Worry: Not on file     Inability: Not on file    Transportation needs:     Medical: Not on file     Non-medical: Not on file   Tobacco Use    Smoking status: Light Tobacco Smoker     Packs/day: 0.25     Types: Cigarettes     Start date: 9/19/1980    Smokeless tobacco: Never Used   Substance and Sexual Activity    Alcohol use: Yes     Comment: OCCASSIONAL    Drug use: No    Sexual activity: Yes     Partners: Female   Lifestyle    Physical activity:     Days per week: Not on file     Minutes per session: Not on file    Stress: Not on file   Relationships    Social connections:     Talks on phone: Not on file     Gets together: Not on file     Attends Church service: Not on file     Active member of club or organization: Not on file     Attends meetings of clubs or organizations: Not on file     Relationship status: Not on file    Intimate partner violence:     Fear of current or ex partner: Not on file     Emotionally abused: Not on file     Physically abused: Not on file     Forced sexual activity: Not on file   Other Topics Concern    Not on file   Social History Narrative    Not on file       Family History:     Family History   Problem Relation Age of Onset    Heart Disease Mother     Stroke Mother     Heart Surgery Mother     Diabetes Maternal Grandmother     Emphysema Maternal Grandfather     Diabetes Maternal Grandfather     Lung Cancer Maternal Uncle         Allergies: Pcn [penicillins]; Sulfa antibiotics; and Tape [adhesive tape]     Review of Systems:   Unable to provide. Sedated on ventilator. Physical Examination :     Patient Vitals for the past 8 hrs:   BP Temp Pulse Resp Weight   04/15/19 1204 101/64 -- 103 -- --   04/15/19 1134 (!) 110/58 -- 109 -- --   04/15/19 1104 110/62 -- 104 -- --   04/15/19 1034 112/63 -- 106 -- --   04/15/19 1004 123/65 98.7 °F (37.1 °C) 97 -- --   04/15/19 0930 110/65 98.7 °F (37.1 °C) 98 17 180 lb 1.9 oz (81.7 kg)   04/15/19 0922 117/73 -- -- -- --   04/15/19 0800 -- -- 94 -- --     General Appearance: Sedated, on ventilator  Head:  Normocephalic, no trauma  Eyes: Pupils equal, round, reactive to light; sclera anicteric; conjunctivae pink. No embolic phenomena. ENT: Oropharynx clear, without erythema, exudate, or thrush. No tenderness of sinuses. Mouth/throat: mucosa pink and moist. No lesions. Dentition in good repair. Neck:Supple, without lymphadenopathy. Thyroid normal, No bruits. Pulmonary/Chest: Clear to auscultation, without wheezes, rales, or rhonchi. No dullness to percussion. Cardiovascular: Regular rate and rhythm without murmurs, rubs, or gallops. Abdomen: Distended. Bowel sounds absent. Soft, mildly tender, surgical bandages in place. Wound vac removed. All four Extremities: Cyanosis of trunk, abdomen, distal extremities  Neurologic: Sedated  Skin: Cool and dry with poor turgor. Signs of peripheral arterial  insufficiency. No ulcerations. No open wounds. Skin purplish, cyanotic.     Medical Decision Making -Laboratory:   I have independently reviewed/ordered the following labs:    CBC with Differential:   Recent Labs     04/14/19  0854 04/15/19  1030   WBC 39.6* 34.5*   HGB 8.0* 7.8*   HCT 25.8* 23.7*    294   LYMPHOPCT 7* 9*   MONOPCT 3 3     BMP:   Recent Labs     04/14/19  0854 04/15/19  1030   * 130*   K 4.3 4.7   CL 94* 90*   CO2 20 22   BUN 66* 84*   CREATININE 4.61* 5.89*     Hepatic Function Panel: Recent Labs     04/13/19  1004   PROT 4.6*   LABALBU 2.2*   BILIDIR 0.36*   IBILI 0.34   BILITOT 0.70   ALKPHOS 94   *   *     No results for input(s): RPR in the last 72 hours. No results for input(s): HIV in the last 72 hours. No results for input(s): BC in the last 72 hours. Lab Results   Component Value Date    MUCUS NOT REPORTED 04/06/2019    RBC 2.53 04/15/2019    TRICHOMONAS NOT REPORTED 04/06/2019    WBC 34.5 04/15/2019    YEAST NOT REPORTED 04/06/2019    TURBIDITY TURBID 04/06/2019     Lab Results   Component Value Date    CREATININE 5.89 04/15/2019    GLUCOSE 78 04/15/2019       Medical Decision Making-Imaging:     Abdominal xray:    Gas in mildly distended loops of large and small bowel likely reflecting an   ileus.       NG tube tip in the gastric fundus with the proximal side-port in the distal   thoracic esophagus, repositioning recommended.       Airspace disease left lung base. CT scan chest:    Impression   Satisfactory position endotracheal tube.       Nasogastric tube needs advanced 10 cm.       Patchy perihilar opacities and bibasilar airspace disease.  Follow-up may be   beneficial following medical treatment course to document complete resolution.           EXAMINATION:   CTA OF THE ABDOMEN AND PELVIS WITH CONTRAST       4/5/2019 9:23 pm:       TECHNIQUE:   CTA of the abdomen and pelvis was performed with the administration of   intravenous contrast. Multiplanar reformatted images are provided for review. MIP images are provided for review.  Dose modulation, iterative   reconstruction, and/or weight based adjustment of the mA/kV was utilized to   reduce the radiation dose to as low as reasonably achievable.       COMPARISON:   None.       HISTORY:   ORDERING SYSTEM PROVIDED HISTORY: suspected dissection of abdominal aorta       FINDINGS:       CTA ABDOMEN:       Bibasilar airspace disease.  Liver, spleen, pancreas, gallbladder normal.   Bilateral adrenal masses measuring 11 mm on the left and 18 x 28 mm on the   left.  Bilateral ill-defined hypodensities throughout the kidneys.  The small   bowel is somewhat distended with multiple air-fluid levels.  Multiple   air-fluid levels are also noted throughout the colon.  The stomach appears   normal.  Retroaortic left renal vein.       Bones normal.       Aorta appears normal without dissection.  The celiac artery and SMA appear   normal.  The renal arteries appear normal.           CTA PELVIS:       Bladder decompressed.  Uterus normal.  Catheter right groin terminates in the   common iliac vein.           Impression   Ileus.  No evidence of aortic dissection.  The bilateral adrenal masses, left   greater than right.  Recommend follow-up adrenal MRI non emergently.           Medical Decision Making-Other: Thank you for allowing us to participate in the care of this patient. Please call with questions.     Ronn Bhardwaj MD     Pager: [phone] - Office: [phone]

## 2019-04-15 NOTE — PROGRESS NOTES
Attending Physician Statement  I have discussed the care of Emerita Pang, including pertinent history and exam findings with the resident. I have reviewed the key elements of all parts of the encounter with the resident. I have seen and examined the patient with the resident. I agree with the assessment and plan and status of the problem list as documented. Please see full note by the resident  She is afebrile and stable hemodynamics  She samira have cough productive of sputum and denies chest pain and and SOB  She remained  On room air and denies any night sweats. creatinine is 5.8  today and her urine output is  Little over 1 L in last 24 hours. she is tolerating metoprolol 37.5 mg twice a day. She is on aspirin and on heparin drrip and discussed with vascular if they want Plavix also and they wanted hematology to decide and discuss with hematology they are okay with aspirin  And heparin drip. Her feet is almost the same as slightly cold and areas of ecchymosis on dose are the same. Her WBC count is 34.8 today she is on Levaquin as started over the weekend and discontinued by  Infectious disease today. We will continue to follow temperature and WBC count and hemoglobin. She is complaining of abdominal pain on the sides there is no nausea and  Vomiting and she is able to tolerate  Diet Although currently nothing by mouth for transesophageal echo. Her abdmen is mildly tender in the lower quadrants bilaterally but there is no guarding or rigidity, will need continued follow up by surgery. She is getting hemodialysis today. Please note that this chart was generated using voice recognition Dragon dictation software. Although every effort was made to ensure the accuracy of this automated transcription, some errors in transcription may have occurred.     Ailyn Springer MD  4/15/2019 12:50 PM

## 2019-04-15 NOTE — PROGRESS NOTES
PROGRESS NOTE      PATIENT NAME: Nancy Downing  MEDICAL RECORD NO. [id number]  DATE: 4/15/2019  PRIMARY CARE PHYSICIAN: No primary care provider on file. HD: # 10    ASSESSMENT    Patient Active Problem List   Diagnosis    Dog bite    Post-traumatic osteoarthritis of left knee    S/P left knee arthroscopy    S/P total knee arthroplasty    Arthritis of left knee    Shock ([facility] [address]    Rhabdomyolysis    Hyponatremia    Lactic acidosis    MARY (acute kidney injury) ([facility] [address]    Metabolic acidosis    Acute respiratory failure (HCC)    Elevated liver enzymes    Hyperkalemia    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HCC)    Small bowel ischemia (HCC)    Acute GI bleeding    Gram negative septic shock (HCC)    Protein-calorie malnutrition (HCC)    Septic shock (HCC)    Gastroenteritis    Haemophilus influenzae septicemia (HCC)    Pleural effusion, bilateral    Splenic infarction    Leukemoid reaction    Mottled skin       MEDICAL DECISION MAKING AND PLAN    1. Continue medical management per primary  2. Neuro - pain control  3. CV - hemodynamically stable. Started on therapeutic heparin, ASA, plavix per Vascular for concern for embolic event causing new onset mottling of digits, CT scan shows concern for possible splenic infarct vs abscess. EDWARD today per primary  4. Pulm - Stable  5. GI - Ileostomy well appearing, functioning, continue diet, monitor output  6. Renal - ARF, Nephro on board, continuing HD  7. Heme/ID - leukocytosis downtrended to 39 on , follow up repeat labs today. ID continuing Merem until today , adding levaquin, repeating blood cultures - no growth x1 day      SUBJECTIVE    Nancy Downing seen and examined. No acute events overnight. VSS, afebrile. Patient does report LUQ pain. Tolerating diet. Ileostomy functioning, total output incresed yesterday.       OBJECTIVE  VITALS: Temp: Temp: 98.6 °F (37 °C)Temp  Av.2 °F (36.8 °C)  Min: 97.2 °F (36.2 °C)  Max: 98.6 °F (37 °C) BP Systolic (55JIP), KXQ:993 , Min:109 , HDX:224   Diastolic (85QAX), VJU:15, Min:55, Max:69   Pulse Pulse  Av.4  Min: 91  Max: 105 Resp Resp  Av.6  Min: 23  Max: 27 Pulse ox SpO2  Av.7 %  Min: 97 %  Max: 98 %    CONSTITUTIONAL: awake, alert, cooperative, no apparent distress  HEAD: atraumatic, normocephalic  EYES: sclera clear, pupils equal and reactive to light  ENT: ears are symmetric, nares patent   HEENT: moist mucous membranes  NECK: Supple, symmetrical, trachea midline  LUNGS: no respiratory distress, no audible wheezing  CARDIOVASCULAR: +S1/S2  ABDOMEN: soft, mild tenderness to LUQ, no guarding, no rebound tenderness, wound vac in place, ostomy in place, functioning  MUSCULOSKELETAL: full range of motion noted  NEUROLOGIC: Awake, alert, oriented to name, place and time  EXTREMITIES: peripheral pulses normal, no pedal edema, no calf tenderness  SKIN:     I/O last 3 completed shifts: In: 1451.5 [P.O.:600; I.V.:851.5]  Out: 1590 [Urine:1100; Drains:15; Stool:475]    Drain/tube output: In: 1451.5 [P.O.:600; I.V.:851.5]  Out: 1590 [Urine:1100; Drains:15]    LAB:  CBC:   Recent Labs     19  0635 19  1945 19  0854   WBC 41.7* 38.3* 39.6*   HGB 9.0* 9.9* 8.0*   HCT 28.9* 31.0* 25.8*   .0 94.5 98.9   PLT See Reflexed IPF Result 168 268     BMP:   Recent Labs     19  1004 19  1145 19  0854     --  129*   K 5.0 4.1 4.3   CL 98  --  94*   CO2 22  --  20   BUN 50*  --  66*   CREATININE 3.75*  --  4.61*   GLUCOSE 110*  --  82     COAGS:   Recent Labs     19  1004  19  1238 19  1843 19  2343   APTT  --    < > 41.8* 34.6* 52.8*   PROT 4.6*  --   --   --   --     < > = values in this interval not displayed.        RADIOLOGY:  Xr Abdomen (kub) (single Ap View)    Result Date: 2019  EXAMINATION: SINGLE SUPINE XRAY VIEW(S) OF THE ABDOMEN 2019 9:58 am COMPARISON: Chest radiograph dated 2019 HISTORY: Burke Aby Padilla chest was performed before and after the administration of intravenous contrast.  Multiplanar reformatted images are provided for review. MIP images are provided for review. Dose modulation, iterative reconstruction, and/or weight based adjustment of the mA/kV was utilized to reduce the radiation dose to as low as reasonably achievable. 3D reconstructed images were performed on a separate workstation and provided for review. COMPARISON: Chest x-ray 04/05/2019 HISTORY: ORDERING SYSTEM PROVIDED HISTORY: AORTIC DZ, NON-TRAUMATIC, KNOWN OR SUSPECT FINDINGS: Aorta: No evidence of thoracic aortic aneurysm or dissection. No acute abnormality of the aorta. No intramural hematoma on the noncontrast examination. No mediastinal hemorrhage. Minimal aortic vascular calcifications. Mediastinum: Heart is mildly prominent size. Coronary calcifications. No pericardial effusion. No suspicious mediastinal, hilar, or atelectasis or adenopathy identified per CT criteria. Lungs/Pleura: The consolidative changes with prominent air bronchograms identified involving both lower lobes evidence of patchy opacity identified involving the lingula. No suspicious pulmonary nodule identified. No pleural effusion pneumothorax. Upper Abdomen: Diffuse hypoattenuation liver suggestive of fatty infiltration. Patchy areas of hypoenhancement identified involving the anterior aspect of the liver along the falciform ligament likely related to focal fatty infiltration versus transient hepatic attenuation difference. Right adrenal nodule identified central low attenuation, please refer to separately reported CT abdomen pelvis report. .  Dilated loops of bowel identified within the left upper quadrant with prominent air-fluid level. Mild wall thickening noted. Soft Tissues/Bones: No acute bone or soft tissue abnormality. No evidence of aortic dissection or intramural hematoma. No central pulmonary emboli identified.  Patchy consolidative changes both lower lobes involving lingula may be related to multifocal atelectasis versus developing pneumonia/infiltrates. Clinical correlation and follow-up may be beneficial.     Us Renal Limited    Result Date: 4/6/2019  EXAMINATION: ULTRASOUND OF THE KIDNEYS 4/6/2019 12:51 pm COMPARISON: CT abdomen and pelvis dated 04/05/2019 HISTORY: ORDERING SYSTEM PROVIDED HISTORY: RENAL FAILURE, ACUTE (KIDNEY INJURY) FINDINGS: The right kidney measures 10.4 cm in length and the left kidney measures 10.2 cm in length. Kidneys demonstrate normal cortical echogenicity and thickness. No hydronephrosis or intrarenal stones. No focal lesions. Incidental note is made of a couple tiny echogenic foci in the gallbladder wall, likely adenomyomatosis. Unremarkable ultrasound of the kidneys. Xr Chest Portable    Result Date: 4/6/2019  EXAMINATION: SINGLE XRAY VIEW OF THE CHEST 4/6/2019 2:10 am COMPARISON: April 5, 2019. HISTORY: ORDERING SYSTEM PROVIDED HISTORY: R IJ line placement TECHNOLOGIST PROVIDED HISTORY: R IJ line placement FINDINGS: Grossly stable endotracheal and enteric tubes. New right IJ central venous catheter with tip near the superior atrial caval junction. Low lung volume. Persistent bilateral airspace disease. No pleural effusion or pneumothorax. Stable cardiomediastinal silhouette and great vessels. Life support devices as above. No pneumothorax. Persistent bilateral airspace disease. Xr Chest Portable    Result Date: 4/5/2019  EXAMINATION: SINGLE XRAY VIEW OF THE CHEST 4/5/2019 10:16 pm COMPARISON: Chest x-ray 04/05/2019 at 2040 hours HISTORY: ORDERING SYSTEM PROVIDED HISTORY: intubation TECHNOLOGIST PROVIDED HISTORY: intubation FINDINGS: Endotracheal tube 2 cm above the leann. Mild cardiomegaly. Mild perihilar pulmonary vasculature. Patchy bibasilar airspace opacities. Nasogastric tube side port proximal to the GE junction is be advanced 10 cm. No pneumothorax. No pleural effusion.   There nipple rings. Satisfactory position endotracheal tube. Nasogastric tube needs advanced 10 cm. Patchy perihilar opacities and bibasilar airspace disease. Follow-up may be beneficial following medical treatment course to document complete resolution. Xr Chest Portable    Result Date: 4/5/2019  EXAMINATION: SINGLE XRAY VIEW OF THE CHEST 4/5/2019 9:31 pm COMPARISON: None. HISTORY: ORDERING SYSTEM PROVIDED HISTORY: SOB, R/O PNA, CHF Exacerbation TECHNOLOGIST PROVIDED HISTORY: SOB, R/O PNA, CHF Exacerbation Ordering Physician Provided Reason for Exam: copd, diff breathing . upr Acuity: Acute Type of Exam: Initial FINDINGS: The cardiomediastinal silhouette is normal in size and contour. Minor pulmonary vascular congestion. The lungs are clear. No pleural effusion or pneumothorax is present. No acute cardiopulmonary process     Cta Abdomen Pelvis W Contrast    Result Date: 4/5/2019  EXAMINATION: CTA OF THE ABDOMEN AND PELVIS WITH CONTRAST 4/5/2019 9:23 pm: TECHNIQUE: CTA of the abdomen and pelvis was performed with the administration of intravenous contrast. Multiplanar reformatted images are provided for review. MIP images are provided for review. Dose modulation, iterative reconstruction, and/or weight based adjustment of the mA/kV was utilized to reduce the radiation dose to as low as reasonably achievable. COMPARISON: None. HISTORY: ORDERING SYSTEM PROVIDED HISTORY: suspected dissection of abdominal aorta FINDINGS: CTA ABDOMEN: Bibasilar airspace disease. Liver, spleen, pancreas, gallbladder normal. Bilateral adrenal masses measuring 11 mm on the left and 18 x 28 mm on the left. Bilateral ill-defined hypodensities throughout the kidneys. The small bowel is somewhat distended with multiple air-fluid levels. Multiple air-fluid levels are also noted throughout the colon. The stomach appears normal.  Retroaortic left renal vein. Bones normal. Aorta appears normal without dissection.   The celiac artery and SMA appear normal.  The renal arteries appear normal. CTA PELVIS: Bladder decompressed. Uterus normal.  Catheter right groin terminates in the common iliac vein. Ileus. No evidence of aortic dissection. The bilateral adrenal masses, left greater than right. Recommend follow-up adrenal MRI non emergently. Kamilla You  4/15/19, 7:27 AM         Attending Note      I have reviewed the above [id number] resident progress note and I either performed the key elements of the medical history and physical exam or was present when the resident performed them. I have discussed the findings, established the care plan and recommendations with resident, TECSS nurse and bedside nurse. The following confirms and/or amends the IMPRESSION, MEDICAL DECISION MAKING and PLAN from above.     ASSESSMENT    Patient Active Problem List   Diagnosis    Dog bite    Post-traumatic osteoarthritis of left knee    S/P left knee arthroscopy    S/P total knee arthroplasty    Arthritis of left knee    Shock ([facility] [address]    Rhabdomyolysis    Hyponatremia    Lactic acidosis    MARY (acute kidney injury) ([facility] [address]    Metabolic acidosis    Acute respiratory failure (HCC)    Elevated liver enzymes    Hyperkalemia    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HCC)    Small bowel ischemia (HCC)    Acute GI bleeding    Gram negative septic shock (HCC)    Protein-calorie malnutrition (HCC)    Septic shock (HCC)    Gastroenteritis    Haemophilus influenzae septicemia (HCC)    Pleural effusion, bilateral    Splenic infarction    Leukemoid reaction    Mottled skin       MEDICAL DECISION MAKING AND PLAN    Recommend wound vac change and pictures of wound prior to transfer to Saji Tapia MD  4/15/2019  9:12 AM

## 2019-04-15 NOTE — PLAN OF CARE
Patient's bed was set to the lowest height. Non-skid footwear on. Call light and side table were within reach. Patient remained free from falls this shift. Patient's bed was set to the lowest height. Non-skid footwear on. Call light and side table were within reach. Patient remained free from falls this shift.

## 2019-04-15 NOTE — PROGRESS NOTES
Occupational Therapy    Occupational Therapy Not Seen Note    DATE: 4/15/2019  Name: Lilliana Hamm  : 1965  MRN: [id number]    Patient not available for Occupational Therapy due to:    Hemodialysis    Next Scheduled Treatment: Check back     Electronically signed by PILLO Freeman on 4/15/2019 at 11:08 AM

## 2019-04-15 NOTE — PROGRESS NOTES
Nephrology Progress Note        Subjective: patient evaluated on dialysis. Pt is stable on dialysis. Access cannulated without problems. No new issues overnite. Stable hemodynamics. Tolerating treatment well. Somewhat hypotensive. Vascular input noted. And evaluation continues. Workup for hypercoagulable state in progress. Antibodies continue. Blood cultures since 2019 have been negative so far. Antibiotics per primary service and infectious disease. Clearances continued to be poor. Urine output fluctuates, by a large have been suboptimal.  Requiring dialysis for clearances and ultrafiltration. Objective:  CURRENT TEMPERATURE:  Temp: 98.7 °F (37.1 °C)  MAXIMUM TEMPERATURE OVER 24HRS:  Temp (24hrs), Av.4 °F (36.9 °C), Min:97.2 °F (36.2 °C), Max:98.7 °F (37.1 °C)    CURRENT RESPIRATORY RATE:  Resp: 17  CURRENT PULSE:  Pulse: 104  CURRENT BLOOD PRESSURE:  BP: 110/62  24HR BLOOD PRESSURE RANGE:  Systolic (00HWY), EJB:252 , Min:109 , SNQ:445   ; Diastolic (03ULR), BBX:59, Min:55, Max:73    24HR INTAKE/OUTPUT:      Intake/Output Summary (Last 24 hours) at 4/15/2019 1124  Last data filed at 4/15/2019 7171  Gross per 24 hour   Intake 1461.47 ml   Output 1590 ml   Net -128.53 ml     Patient Vitals for the past 96 hrs (Last 3 readings):   Weight   04/15/19 0930 180 lb 1.9 oz (81.7 kg)   19 0545 187 lb 6.3 oz (85 kg)   19 1845 187 lb 9.8 oz (85.1 kg)         Physical Exam:  General appearance:Awake, alert, in no acute distress  Skin: warm and dry, no rash or erythema  Eyes: conjunctivae normal and sclera anicteric  ENT:no thrush no pharyngeal congestion   Neck:  No JVD, No Thyromegaly  Pulmonary: clear to auscultation and no wheezing or rhonchi   Cardiovascular: normal S1 and S2. NO rubs and NO murmur.  No S3 OR S4   Abdomen: soft nontender, bowel sounds present, no organomegaly,  no ascites   Extremities: no cyanosis, clubbing or edema     Access:  Temporary right IJ vein catheter    Current Medications:      glucose (GLUTOSE) 40 % oral gel 15 g PRN   dextrose 50 % solution 12.5 g PRN   glucagon (rDNA) injection 1 mg PRN   dextrose 5 % solution PRN   glucose (GLUTOSE) 40 % oral gel 15 g PRN   dextrose 50 % solution 12.5 g PRN   glucagon (rDNA) injection 1 mg PRN   dextrose 5 % solution PRN   nicotine (NICODERM CQ) 21 MG/24HR 1 patch Daily   aspirin chewable tablet 81 mg Daily   heparin (porcine) injection 4,000 Units PRN   heparin (porcine) injection 2,000 Units PRN   heparin 25,000 units in dextrose 5% 250 mL infusion Continuous   levofloxacin (LEVAQUIN) tablet 500 mg Every Other Day   metoprolol tartrate (LOPRESSOR) tablet 25 mg BID   oxyCODONE (ROXICODONE) immediate release tablet 5 mg Q4H PRN   cyclobenzaprine (FLEXERIL) tablet 5 mg TID   morphine injection 4 mg Q3H PRN   Or    morphine injection 6 mg Q3H PRN   0.9 % sodium chloride bolus PRN   0.9 % sodium chloride bolus PRN   pantoprazole (PROTONIX) tablet 40 mg BID AC   midodrine (PROAMATINE) tablet 5 mg TID WC   anticoagulant sodium citrate 4 % injection 3 mL PRN   anticoagulant sodium citrate 4 % injection 3 mL PRN   dextrose 50 % solution 25 g PRN   sodium chloride flush 0.9 % injection 10 mL 2 times per day   sodium chloride flush 0.9 % injection 10 mL PRN   magnesium hydroxide (MILK OF MAGNESIA) 400 MG/5ML suspension 30 mL Daily PRN   ondansetron (ZOFRAN) injection 4 mg Q6H PRN     Labs:   CBC: Recent Labs     04/13/19  1945 04/14/19  0854 04/15/19  1030   WBC 38.3* 39.6* 34.5*   RBC 3.28* 2.61* 2.53*   HGB 9.9* 8.0* 7.8*   HCT 31.0* 25.8* 23.7*    268 294   MPV 12.8 12.4 12.2      BMP: Recent Labs     04/13/19  1004 04/13/19  1145 04/14/19  0854 04/15/19  1030     --  129* 130*   K 5.0 4.1 4.3 4.7   CL 98  --  94* 90*   CO2 22  --  20 22   BUN 50*  --  66* 84*   CREATININE 3.75*  --  4.61* 5.89*   GLUCOSE 110*  --  82 78   CALCIUM 8.0*  --  7.8* 7.7*        Phosphorus:  No results for input(s): PHOS in the last 72 hours.  Magnesium: No results for input(s): MG in the last 72 hours. Albumin:   Recent Labs     04/13/19  1004   LABALBU 2.2*       Dialysis bath: Dialysis Bath  K+ (Potassium): 3  Ca+ (Calcium): 3  Na+ (Sodium): 135  HCO3 (Bicarb): 35    Radiology:  Reviewed as available. Assessment:  1 acute kidney injury on hemodialysis not showing signs of renal recovery: dialysis bath reviewed with nurse. Currently undergoing dialysis Monday Wednesday Friday  2.ischemic bowel status post resection and ileostomy   3 H. influenzae sepsis on treatment  4 EDEMA : Lymphedema and mild volume overload  5. ANEMIA OF CHRONIC DISEASE: Hemoglobin fluctuates  6.suspected hypercarbic audible state workup in progress   Plan:  1. Wt removal and dialysis orders reviewed. 2.  Will have interventional radiology place a tunneled dialysis catheter for ongoing dialysis treatments  3. Cont pt on aranesp and zemplar per protocol. 4. Follow up labs ordered. 5.  Renal function not expected to recover any time soon      Please do not hesitate to call with questions.     Electronically signed by Louie Mars MD on 4/15/2019 at 11:24 AM

## 2019-04-15 NOTE — PROGRESS NOTES
Vascular Surgery Progress Note         PATIENT NAME: Andrew Lewis     TODAY'S DATE: 4/15/2019, 5:48 AM    SUBJECTIVE:    Pt seen and examined. Pt afebrile and VSS. Denies any complaints besides some slight abdominal pain. Denies fevers, chills, chest pain, SOB, nausea, or vomiting. OBJECTIVE:   Vitals:  BP (!) 109/55   Pulse 91   Temp 98.6 °F (37 °C) (Oral)   Resp 24   Ht 5' 1.02\" (1.55 m)   Wt 187 lb 6.3 oz (85 kg)   SpO2 98%   BMI 35.38 kg/m²      INTAKE/OUTPUT:      Intake/Output Summary (Last 24 hours) at 4/15/2019 0548  Last data filed at 4/14/2019 1732  Gross per 24 hour   Intake 1209.47 ml   Output 1450 ml   Net -240.53 ml                 GENERAL: AOx3, NAD  HEENT: EOMI bilaterally  CARDIAC: Regular rate and rhythm. ABDOMEN: Soft, slightly tender to palpation, ND, Active Bowel Sounds  EXTREMITY: moves all extremities, no edema. Pulses 2+ DP/PT/Rad  NEURO: CN II-XII intact. Gross motor intact. Data:  CBC with Differential:    Lab Results   Component Value Date    WBC 39.6 04/14/2019    RBC 2.61 04/14/2019    HGB 8.0 04/14/2019    HCT 25.8 04/14/2019     04/14/2019    MCV 98.9 04/14/2019    MCH 30.7 04/14/2019    MCHC 31.0 04/14/2019    RDW 16.0 04/14/2019    NRBC 4 04/09/2019    LYMPHOPCT 7 04/14/2019    MONOPCT 3 04/14/2019    BASOPCT 0 04/14/2019    MONOSABS 1.19 04/14/2019    LYMPHSABS 2.77 04/14/2019    EOSABS 0.40 04/14/2019    BASOSABS 0.00 04/14/2019    DIFFTYPE NOT REPORTED 04/14/2019     CMP:    Lab Results   Component Value Date     04/14/2019    K 4.3 04/14/2019    CL 94 04/14/2019    CO2 20 04/14/2019    BUN 66 04/14/2019    CREATININE 4.61 04/14/2019    GFRAA 12 04/14/2019    LABGLOM 10 04/14/2019    GLUCOSE 82 04/14/2019    PROT 4.6 04/13/2019    LABALBU 2.2 04/13/2019    CALCIUM 7.8 04/14/2019    BILITOT 0.70 04/13/2019    ALKPHOS 94 04/13/2019     04/13/2019     04/13/2019         ASSESSMENT   1. 48 yo female with mottled fingers/toes/hands. 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 Continue heparin gtt  2. Continue ASA, will need long term AC  3. Heme/onc following, hypercoaguable workup, thrombocytopenia improving   4. Continue supportive care per primary  5. No acute vascular intervention at this time    Electronically signed by Clark Church on 4/15/2019 at 5:48 AM     Patient seen and examined at bedside. Changes made to above note as needed including assessment and plan. Pt denies any f/c, n/v, sob, or chest pain. Hyper coag workup in progress. No acute vascular surgery intervention. Recs as above.     Electronically signed by Mei Chapman DO on 4/15/2019 at 7:03 AM

## 2019-04-15 NOTE — PROGRESS NOTES
[id number] [facility] Wound Ostomy  Nurse  Follow up Note       NAME:  Sarah Arambula RECORD NUMBER:  [id number]  AGE: 47 y.o. GENDER: female  : 1965  TODAY'S DATE:  4/15/2019    Subjective   Reason for [address] Nurse Evaluation and Assessment:   Ileostomy care and education  NPWT to midline surgical incision with intermittently stapled closure; NPWT at request of surgeon to protect incision from effluent contamination with leaking appliance. Objective    BP (!) 84/42   Pulse 94   Temp 97.4 °F (36.3 °C)   Resp 22   Ht 5' 1.02\" (1.55 m)   Wt 179 lb 7.3 oz (81.4 kg)   SpO2 98%   BMI 33.88 kg/m²     LABS:  WBC:    Lab Results   Component Value Date    WBC 34.5 04/15/2019     H/H:    Lab Results   Component Value Date    HGB 7.8 04/15/2019    HCT 23.7 04/15/2019     PTT:    Lab Results   Component Value Date    APTT 29.1 04/15/2019   [APTT}  PT/INR:    Lab Results   Component Value Date    PROTIME 11.3 04/15/2019    INR 1.1 04/15/2019     HgBA1c:  No results found for: LABA1C    Assessment   Vickey Risk Score: Vickey Scale Score: 19    Patient Active Problem List   Diagnosis Code    Dog bite W54. 0XXA    Post-traumatic osteoarthritis of left knee M17.32    S/P left knee arthroscopy Z98.890    S/P total knee arthroplasty Z96.659    Arthritis of left knee M17.12    Shock (HCC) R57.9    Rhabdomyolysis M62.82    Hyponatremia E87.1    Lactic acidosis E87.2    MARY (acute kidney injury) ([facility] [address] [address] P88.5    Metabolic acidosis A76.6    Acute respiratory failure (HCC) J96.00    Elevated liver enzymes R74.8    Hyperkalemia E87.5    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HCC) K55.049    Small bowel ischemia (HCC) K55.9    Acute GI bleeding K92.2    Gram negative septic shock (HCC) A41.50, R65.21    Protein-calorie malnutrition (HCC) E46    Septic shock (HCC) A41.9, R65.21    Gastroenteritis K52.9    Haemophilus influenzae septicemia (HCC) A41.3    Pleural effusion, bilateral J90    Splenic infarction 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    Leukemoid reaction D72.823    Mottled skin L81.9       Measurements:     04/15/19 1702   Ileostomy Ileostomy RUQ   Placement Date/Time: 04/08/19 1342   Pre-existing: No  Timeout: Patient; Appropriate Equipment;Sterile Technique using full body drape;Procedure  Inserted by: Dr Leana Cooks  Ileostomy Type: Ileostomy  Location: RUQ   Stomal Appliance 2 piece   Flange Size (inches) 2.25 Inches   Stoma  Assessment Red;Moist;Flush   Mucocutaneous Junction Intact   Peristomal Assessment Blanchable erythema; Excoriation; Maceration   Treatment Bag change;Site care; Liquid skin barrier  (adapt ring)   Stool Color Brown   Stool Appearance Loose   Incision 04/06/19 Abdomen   Date First Assessed/Time First Assessed: 04/06/19 1840   Location: Abdomen   Wound Image    Wound Assessment Pink;Yellow;Bleeding  (oozing after white sponge removed. Irrigated with saline )   Karly-wound Assessment Blanchable erythema at distal incision ; Intact     Closure Staples  (intermittent staples)   Drainage Amount Small   Drainage Description Tan;Serosanguinous   Odor None   Dressing/Treatment Vacuum dressing   Dressing Changed Changed/New   Dressing Status Changed   Dressing Change Due 04/17/19   Negative Pressure Wound Therapy Abdomen Mid   Placement Date/Time: 04/12/19 1210   Location: Abdomen  Wound Location Orientation: Mid   Wound Type Surgical   Unit Type KCI VAC Ulta   Dressing Type White foam;Non-adherant;Black foam   Number of pieces used 10  (7 white \"jeyson\", 2 black, 1 adaptic over staples at umbilicus)   Cycle Continuous   Target Pressure (mmHg) 125   Canister changed? No   Dressing Status Changed   Dressing Changed Changed/New   Drainage Amount Small   Drainage Description Tan;Serosanguinous   Dressing Change Due 04/10/19   Wound Assessment   (see LDA & photo)   Karly-wound Assessment Blanchable erythema  (SurePREP and bordered with drape)       Response to treatment:  With complaints of pain.        Plan   Plan of Care:  M-W-F VAC change with ostomy appliance change at that time. Specialty Bed Required : Yes   [] Low Air Loss   [x] Pressure Redistribution  [] Fluid Immersion  [] Bariatric  [] Total Pressure Relief  [] Other:     Current Diet: Dietary Nutrition Supplements: Standard High Calorie Oral Supplement  Diet NPO Time Specified Exceptions are: Sips with Meds  DIET RENAL; Low Sodium (2 GM);  Daily Fluid Restriction: 1200 ml

## 2019-04-15 NOTE — CARE COORDINATION
Met with pt to discuss  OP dialysis arrangements  She prefers [facility] if OP dialysis needed  Ref made to [facility]

## 2019-04-15 NOTE — PROGRESS NOTES
Normocephalic, no lesions, without obvious abnormality. Neck: no JVD, trachea midline, no adenopathy  Lungs: Clear to auscultation,  Heart: Regular rate and rhythm, s1/s2 auscultated, no murmurs, ST   Abdomen: soft, non-tender, bowel sounds active  Extremities: +1 generalized  edema  Neurologic: not done        Assessment / Acute Cardiac Problems:   1. Sepsis with Multiorgan failure  2. Atrial Fibrillation with RVR s/p Digoxin, CV.  3. Acute Renal Failure  4. Acute Liver failure  5. Bowel Ischemia S/P sub-total colectomy  6. Metabolic acidosis  7. Acute Respiratory failure      1. Patient Active Problem List:     Dog bite     Post-traumatic osteoarthritis of left knee     S/P left knee arthroscopy     S/P total knee arthroplasty     Arthritis of left knee     Shock ([address]     Rhabdomyolysis     Hyponatremia     Lactic acidosis     MARY (acute kidney injury) ([address]     Metabolic acidosis     Acute respiratory failure (HCC)     Elevated liver enzymes     Hyperkalemia     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HCC)     Small bowel ischemia (HCC)     Acute GI bleeding     Gram negative septic shock (HCC)     Protein-calorie malnutrition (HCC)     Septic shock (HCC)     Gastroenteritis     Septicemia due to Gram negative organism (HCC)    [id number] Score for Atrial Fibrillation Stroke Risk   Risk   Factors  Component Value   C CHF No 0   H HTN No 0   A2 Age >= 75 No,  (49 y.o.) 0   D DM No 0   S2 Prior Stroke/TIA No 0   V Vascular Disease No 0   A Age 74-69 No,  (49 y.o.) 0   Sc Sex female 1    [id number]  Score  1   Score last updated 9/85/07 12:72 AM    Click here for a link to the UpToDate guideline \"Atrial Fibrillation: Anticoagulation therapy to prevent embolization    Disclaimer: Risk Score calculation is dependent on accuracy of patient problem list and past encounter diagnosis. Plan of Treatment:   1. Afib. Remains tachycardic. Will increase BB. Continue Proamatine.    Pt is not candidate for AC at this time due to bleeding risk. Will re-evaluate as outpt  2. Fluid volume overload. Management per Nephrology   3. Will do EDWARD today per ID request to look for septic emboli source.       Electronically signed by LORA Catalan CNP on 4/15/2019 at 10:22 [facility].  [phone]

## 2019-04-15 NOTE — CARE COORDINATION
Care Transition  Called Wanda at [address] intake to see if precert was started. It was not started. She asked if it needs to be and writer requested it be started.

## 2019-04-16 ENCOUNTER — APPOINTMENT (OUTPATIENT)
Dept: INTERVENTIONAL RADIOLOGY/VASCULAR | Age: 54
DRG: 710 | End: 2019-04-16
Payer: MEDICAID

## 2019-04-16 ENCOUNTER — APPOINTMENT (OUTPATIENT)
Dept: GENERAL RADIOLOGY | Age: 54
DRG: 710 | End: 2019-04-16
Payer: MEDICAID

## 2019-04-16 LAB
ABSOLUTE EOS #: 0.32 K/UL (ref 0–0.4)
ABSOLUTE IMMATURE GRANULOCYTE: 0.64 K/UL (ref 0–0.3)
ABSOLUTE LYMPH #: 2.23 K/UL (ref 1–4.8)
ABSOLUTE MONO #: 1.27 K/UL (ref 0.1–0.8)
ALBUMIN SERPL-MCNC: 2.2 G/DL (ref 3.5–5.2)
ALBUMIN/GLOBULIN RATIO: 0.8 (ref 1–2.5)
ALP BLD-CCNC: 91 U/L (ref 35–104)
ALT SERPL-CCNC: 59 U/L (ref 5–33)
ANCA MYELOPEROXIDASE: 7 AU/ML
ANCA PROTEINASE 3: 20 AU/ML
ANION GAP SERPL CALCULATED.3IONS-SCNC: 16 MMOL/L (ref 9–17)
ANTICARDIOLIPIN IGA ANTIBODY: 12.7 APU
ANTICARDIOLIPIN IGG ANTIBODY: 1.9 GPU
AST SERPL-CCNC: 37 U/L
AT-III ACTIVITY: 65 % (ref 83–122)
BASOPHILS # BLD: 0 % (ref 0–2)
BASOPHILS ABSOLUTE: 0 K/UL (ref 0–0.2)
BILIRUB SERPL-MCNC: 0.58 MG/DL (ref 0.3–1.2)
BILIRUBIN DIRECT: 0.38 MG/DL
BILIRUBIN, INDIRECT: 0.2 MG/DL (ref 0–1)
BLOOD BANK SPECIMEN: NORMAL
BUN BLDV-MCNC: 54 MG/DL (ref 6–20)
BUN/CREAT BLD: ABNORMAL (ref 9–20)
CALCIUM SERPL-MCNC: 7.5 MG/DL (ref 8.6–10.4)
CARDIOLIPIN AB IGM: 5.7 MPU
CHLORIDE BLD-SCNC: 97 MMOL/L (ref 98–107)
CO2: 22 MMOL/L (ref 20–31)
CREAT SERPL-MCNC: 4.4 MG/DL (ref 0.5–0.9)
CULTURE: NORMAL
CULTURE: NORMAL
DIFFERENTIAL TYPE: ABNORMAL
EOSINOPHILS RELATIVE PERCENT: 1 % (ref 1–4)
FACTOR VIII ACTIVITY: 332 % (ref 50–150)
GFR AFRICAN AMERICAN: 13 ML/MIN
GFR NON-AFRICAN AMERICAN: 11 ML/MIN
GFR SERPL CREATININE-BSD FRML MDRD: ABNORMAL ML/MIN/{1.73_M2}
GFR SERPL CREATININE-BSD FRML MDRD: ABNORMAL ML/MIN/{1.73_M2}
GLOBULIN: ABNORMAL G/DL (ref 1.5–3.8)
GLUCOSE BLD-MCNC: 129 MG/DL (ref 65–105)
GLUCOSE BLD-MCNC: 33 MG/DL (ref 65–105)
GLUCOSE BLD-MCNC: 63 MG/DL (ref 65–105)
GLUCOSE BLD-MCNC: 81 MG/DL (ref 65–105)
GLUCOSE BLD-MCNC: 82 MG/DL (ref 65–105)
GLUCOSE BLD-MCNC: 86 MG/DL (ref 70–99)
GLUCOSE BLD-MCNC: 96 MG/DL (ref 65–105)
HCT VFR BLD CALC: 22.9 % (ref 36.3–47.1)
HEMOGLOBIN: 6.9 G/DL (ref 11.9–15.1)
IMMATURE GRANULOCYTES: 2 %
LACTIC ACID, WHOLE BLOOD: 3.6 MMOL/L (ref 0.7–2.1)
LYMPHOCYTES # BLD: 7 % (ref 24–44)
Lab: NORMAL
Lab: NORMAL
MCH RBC QN AUTO: 30.5 PG (ref 25.2–33.5)
MCHC RBC AUTO-ENTMCNC: 30.1 G/DL (ref 28.4–34.8)
MCV RBC AUTO: 101.3 FL (ref 82.6–102.9)
MONOCYTES # BLD: 4 % (ref 1–7)
MORPHOLOGY: ABNORMAL
NRBC AUTOMATED: 0 PER 100 WBC
PDW BLD-RTO: 16.3 % (ref 11.8–14.4)
PLATELET # BLD: 411 K/UL (ref 138–453)
PLATELET ESTIMATE: ABNORMAL
PMV BLD AUTO: 11.8 FL (ref 8.1–13.5)
POTASSIUM SERPL-SCNC: 4.5 MMOL/L (ref 3.7–5.3)
PROTEIN C ACTIVITY: 69 %
PROTEIN S ACTIVITY: 90 % (ref 59–130)
RBC # BLD: 2.26 M/UL (ref 3.95–5.11)
RBC # BLD: ABNORMAL 10*6/UL
SEG NEUTROPHILS: 86 % (ref 36–66)
SEGMENTED NEUTROPHILS ABSOLUTE COUNT: 27.34 K/UL (ref 1.8–7.7)
SODIUM BLD-SCNC: 135 MMOL/L (ref 135–144)
SPECIMEN DESCRIPTION: NORMAL
SPECIMEN DESCRIPTION: NORMAL
TOTAL PROTEIN: 5 G/DL (ref 6.4–8.3)
WBC # BLD: 31.8 K/UL (ref 3.5–11.3)
WBC # BLD: ABNORMAL 10*3/UL

## 2019-04-16 PROCEDURE — 0JH63XZ INSERTION OF TUNNELED VASCULAR ACCESS DEVICE INTO CHEST SUBCUTANEOUS TISSUE AND FASCIA, PERCUTANEOUS APPROACH: ICD-10-PCS | Performed by: RADIOLOGY

## 2019-04-16 PROCEDURE — 6360000002 HC RX W HCPCS: Performed by: STUDENT IN AN ORGANIZED HEALTH CARE EDUCATION/TRAINING PROGRAM

## 2019-04-16 PROCEDURE — 82947 ASSAY GLUCOSE BLOOD QUANT: CPT

## 2019-04-16 PROCEDURE — 99233 SBSQ HOSP IP/OBS HIGH 50: CPT | Performed by: INTERNAL MEDICINE

## 2019-04-16 PROCEDURE — 6370000000 HC RX 637 (ALT 250 FOR IP): Performed by: STUDENT IN AN ORGANIZED HEALTH CARE EDUCATION/TRAINING PROGRAM

## 2019-04-16 PROCEDURE — C1769 GUIDE WIRE: HCPCS

## 2019-04-16 PROCEDURE — 2709999900 HC NON-CHARGEABLE SUPPLY

## 2019-04-16 PROCEDURE — 77001 FLUOROGUIDE FOR VEIN DEVICE: CPT | Performed by: RADIOLOGY

## 2019-04-16 PROCEDURE — 86920 COMPATIBILITY TEST SPIN: CPT

## 2019-04-16 PROCEDURE — 86900 BLOOD TYPING SEROLOGIC ABO: CPT

## 2019-04-16 PROCEDURE — 2060000000 HC ICU INTERMEDIATE R&B

## 2019-04-16 PROCEDURE — 2580000003 HC RX 258: Performed by: STUDENT IN AN ORGANIZED HEALTH CARE EDUCATION/TRAINING PROGRAM

## 2019-04-16 PROCEDURE — 86850 RBC ANTIBODY SCREEN: CPT

## 2019-04-16 PROCEDURE — 36558 INSERT TUNNELED CV CATH: CPT | Performed by: RADIOLOGY

## 2019-04-16 PROCEDURE — 71046 X-RAY EXAM CHEST 2 VIEWS: CPT

## 2019-04-16 PROCEDURE — 85025 COMPLETE CBC W/AUTO DIFF WBC: CPT

## 2019-04-16 PROCEDURE — 02H633Z INSERTION OF INFUSION DEVICE INTO RIGHT ATRIUM, PERCUTANEOUS APPROACH: ICD-10-PCS | Performed by: RADIOLOGY

## 2019-04-16 PROCEDURE — 99231 SBSQ HOSP IP/OBS SF/LOW 25: CPT | Performed by: INTERNAL MEDICINE

## 2019-04-16 PROCEDURE — 97605 NEG PRS WND THER DME<=50SQCM: CPT

## 2019-04-16 PROCEDURE — 36415 COLL VENOUS BLD VENIPUNCTURE: CPT

## 2019-04-16 PROCEDURE — 99232 SBSQ HOSP IP/OBS MODERATE 35: CPT | Performed by: INTERNAL MEDICINE

## 2019-04-16 PROCEDURE — 2580000003 HC RX 258: Performed by: INTERNAL MEDICINE

## 2019-04-16 PROCEDURE — 83605 ASSAY OF LACTIC ACID: CPT

## 2019-04-16 PROCEDURE — 80048 BASIC METABOLIC PNL TOTAL CA: CPT

## 2019-04-16 PROCEDURE — 6370000000 HC RX 637 (ALT 250 FOR IP): Performed by: INTERNAL MEDICINE

## 2019-04-16 PROCEDURE — 80076 HEPATIC FUNCTION PANEL: CPT

## 2019-04-16 PROCEDURE — 6360000002 HC RX W HCPCS: Performed by: RADIOLOGY

## 2019-04-16 PROCEDURE — C1894 INTRO/SHEATH, NON-LASER: HCPCS

## 2019-04-16 PROCEDURE — C1750 CATH, HEMODIALYSIS,LONG-TERM: HCPCS

## 2019-04-16 PROCEDURE — 2500000003 HC RX 250 WO HCPCS: Performed by: RADIOLOGY

## 2019-04-16 PROCEDURE — 87040 BLOOD CULTURE FOR BACTERIA: CPT

## 2019-04-16 PROCEDURE — 36556 INSERT NON-TUNNEL CV CATH: CPT

## 2019-04-16 PROCEDURE — P9016 RBC LEUKOCYTES REDUCED: HCPCS

## 2019-04-16 PROCEDURE — 86901 BLOOD TYPING SEROLOGIC RH(D): CPT

## 2019-04-16 PROCEDURE — 94762 N-INVAS EAR/PLS OXIMTRY CONT: CPT

## 2019-04-16 PROCEDURE — 36430 TRANSFUSION BLD/BLD COMPNT: CPT

## 2019-04-16 RX ORDER — MIDODRINE HYDROCHLORIDE 5 MG/1
10 TABLET ORAL
Status: DISCONTINUED | OUTPATIENT
Start: 2019-04-16 | End: 2019-04-17

## 2019-04-16 RX ORDER — SODIUM CHLORIDE 9 MG/ML
INJECTION, SOLUTION INTRAVENOUS CONTINUOUS
Status: DISCONTINUED | OUTPATIENT
Start: 2019-04-16 | End: 2019-04-16

## 2019-04-16 RX ORDER — 0.9 % SODIUM CHLORIDE 0.9 %
250 INTRAVENOUS SOLUTION INTRAVENOUS ONCE
Status: COMPLETED | OUTPATIENT
Start: 2019-04-16 | End: 2019-04-16

## 2019-04-16 RX ORDER — FENTANYL CITRATE 50 UG/ML
INJECTION, SOLUTION INTRAMUSCULAR; INTRAVENOUS
Status: COMPLETED | OUTPATIENT
Start: 2019-04-16 | End: 2019-04-16

## 2019-04-16 RX ORDER — HEPARIN SODIUM (PORCINE) LOCK FLUSH IV SOLN 100 UNIT/ML 100 UNIT/ML
SOLUTION INTRAVENOUS
Status: COMPLETED | OUTPATIENT
Start: 2019-04-16 | End: 2019-04-16

## 2019-04-16 RX ORDER — 0.9 % SODIUM CHLORIDE 0.9 %
500 INTRAVENOUS SOLUTION INTRAVENOUS ONCE
Status: COMPLETED | OUTPATIENT
Start: 2019-04-16 | End: 2019-04-16

## 2019-04-16 RX ORDER — ACETAMINOPHEN 325 MG/1
650 TABLET ORAL EVERY 4 HOURS PRN
Status: DISCONTINUED | OUTPATIENT
Start: 2019-04-16 | End: 2019-04-16 | Stop reason: SDUPTHER

## 2019-04-16 RX ORDER — 0.9 % SODIUM CHLORIDE 0.9 %
250 INTRAVENOUS SOLUTION INTRAVENOUS ONCE
Status: DISCONTINUED | OUTPATIENT
Start: 2019-04-16 | End: 2019-04-16

## 2019-04-16 RX ORDER — 0.9 % SODIUM CHLORIDE 0.9 %
250 INTRAVENOUS SOLUTION INTRAVENOUS ONCE
Status: COMPLETED | OUTPATIENT
Start: 2019-04-16 | End: 2019-04-17

## 2019-04-16 RX ORDER — 0.9 % SODIUM CHLORIDE 0.9 %
500 INTRAVENOUS SOLUTION INTRAVENOUS ONCE
Status: DISCONTINUED | OUTPATIENT
Start: 2019-04-16 | End: 2019-04-16

## 2019-04-16 RX ORDER — ACETAMINOPHEN 325 MG/1
650 TABLET ORAL EVERY 4 HOURS PRN
Status: DISCONTINUED | OUTPATIENT
Start: 2019-04-16 | End: 2019-05-04 | Stop reason: HOSPADM

## 2019-04-16 RX ORDER — CLINDAMYCIN PHOSPHATE 600 MG/50ML
600 INJECTION INTRAVENOUS ONCE
Status: COMPLETED | OUTPATIENT
Start: 2019-04-16 | End: 2019-04-16

## 2019-04-16 RX ADMIN — Medication 10 ML: at 10:23

## 2019-04-16 RX ADMIN — SODIUM CHLORIDE 250 ML: 9 INJECTION, SOLUTION INTRAVENOUS at 10:28

## 2019-04-16 RX ADMIN — SODIUM CHLORIDE: 234 INJECTION INTRAMUSCULAR; INTRAVENOUS; SUBCUTANEOUS at 10:24

## 2019-04-16 RX ADMIN — CYCLOBENZAPRINE 5 MG: 10 TABLET, FILM COATED ORAL at 23:33

## 2019-04-16 RX ADMIN — HEPARIN SODIUM (PORCINE) LOCK FLUSH IV SOLN 100 UNIT/ML 1.6 ML: 100 SOLUTION at 14:23

## 2019-04-16 RX ADMIN — SODIUM CHLORIDE 500 ML: 9 INJECTION, SOLUTION INTRAVENOUS at 06:31

## 2019-04-16 RX ADMIN — FENTANYL CITRATE 50 MCG: 50 INJECTION INTRAMUSCULAR; INTRAVENOUS at 14:13

## 2019-04-16 RX ADMIN — CYCLOBENZAPRINE 5 MG: 10 TABLET, FILM COATED ORAL at 16:31

## 2019-04-16 RX ADMIN — HEPARIN SODIUM (PORCINE) LOCK FLUSH IV SOLN 100 UNIT/ML 1.6 ML: 100 SOLUTION at 14:24

## 2019-04-16 RX ADMIN — OXYCODONE HYDROCHLORIDE 5 MG: 5 TABLET ORAL at 01:20

## 2019-04-16 RX ADMIN — PANTOPRAZOLE SODIUM 40 MG: 40 TABLET, DELAYED RELEASE ORAL at 06:17

## 2019-04-16 RX ADMIN — FENTANYL CITRATE 50 MCG: 50 INJECTION INTRAMUSCULAR; INTRAVENOUS at 14:22

## 2019-04-16 RX ADMIN — MIDODRINE HYDROCHLORIDE 10 MG: 5 TABLET ORAL at 16:31

## 2019-04-16 RX ADMIN — Medication 10 ML: at 23:33

## 2019-04-16 RX ADMIN — DEXTROSE MONOHYDRATE 25 G: 500 INJECTION PARENTERAL at 07:37

## 2019-04-16 RX ADMIN — MORPHINE SULFATE 4 MG: 4 INJECTION INTRAVENOUS at 16:31

## 2019-04-16 RX ADMIN — SODIUM CHLORIDE 250 ML: 9 INJECTION, SOLUTION INTRAVENOUS at 05:12

## 2019-04-16 RX ADMIN — CYCLOBENZAPRINE 5 MG: 10 TABLET, FILM COATED ORAL at 10:18

## 2019-04-16 RX ADMIN — MORPHINE SULFATE 4 MG: 4 INJECTION INTRAVENOUS at 10:18

## 2019-04-16 RX ADMIN — SODIUM CHLORIDE 250 ML: 9 INJECTION, SOLUTION INTRAVENOUS at 20:00

## 2019-04-16 RX ADMIN — MIDODRINE HYDROCHLORIDE 10 MG: 5 TABLET ORAL at 10:18

## 2019-04-16 RX ADMIN — CLINDAMYCIN PHOSPHATE 600 MG: 600 INJECTION, SOLUTION INTRAVENOUS at 13:57

## 2019-04-16 RX ADMIN — PANTOPRAZOLE SODIUM 40 MG: 40 TABLET, DELAYED RELEASE ORAL at 16:31

## 2019-04-16 ASSESSMENT — PAIN SCALES - GENERAL
PAINLEVEL_OUTOF10: 9
PAINLEVEL_OUTOF10: 8
PAINLEVEL_OUTOF10: 0
PAINLEVEL_OUTOF10: 0
PAINLEVEL_OUTOF10: 9

## 2019-04-16 NOTE — PROGRESS NOTES
[facility]  Internal Medicine Residency Program  Inpatient Daily Progress Note  ______________________________________________________________________________    Patient: Tashia Villela  YOB: 1965   MRN: [id number]    Acct: [de-identified]     Admit date: 4/5/2019  Today's date: 04/16/19  Number of days in the hospital: 11       Admitting Diagnosis: Septic shock ([address]    Subjective:   Patient seen and examined at bedside. She is alert and oriented. Afebrile this morning. She is denying any complaints at this time. She spiked a fever overnight. Her blood pressure has been running low since she came from dialysis yesterday. She got three doses of midodrine yesterday. She complains of bilateral flank pain which is getting worse. She got a total of 750 ml of fluid bolus overnight. Output from the stoma is good. Objective:   Vital Sign:  BP (!) 88/50   Pulse 93   Temp 98.9 °F (37.2 °C) (Oral)   Resp 26   Ht 5' 1.02\" (1.55 m)   Wt 179 lb 7.3 oz (81.4 kg)   SpO2 98%   BMI 33.88 kg/m²       Physical Exam:  General appearance:   alert, well appearing, and in no distress  Mental Status: alert, oriented to person, place, and time  Neurologic:  alert, oriented, normal speech, no focal findings or movement disorder noted  Lungs:  clear to auscultation, no wheezes, rales or rhonchi, symmetric air entry  Heart[de-identified] normal rate, regular rhythm, normal S1, S2, no murmurs, rubs, clicks or gallops  Abdomen:  soft, nontender, nondistended, no masses or organomegaly. Ostomy on the right.  Flanks bilaterally are tender to palpation  Extremities: peripheral pulses normal, no pedal edema, no clubbing or cyanosis   Skin: normal coloration and turgor, no rashes, no suspicious skin lesions noted    Medications:  Scheduled Medications   sodium chloride  500 mL Intravenous Once    nicotine  1 patch Transdermal Daily    metoprolol tartrate  37.5 mg Oral BID    clindamycin bilateral    Splenic infarction    Leukemoid reaction    Mottled skin  Resolved Problems:    * No resolved hospital problems. *    · Levofloxacin discontinued yesterday. Patient is off antibiotics at this time. Will start Vancomycin  · Continue to monitor Hb and Platelets. CBC daily  · BiPAP as needed. · Patient currently NPO for EDWARD. Will start patient on diet since EDWARD cannot be done due to low BP  · Continue Midodrine for low BP if needed  · PT/OT  · Pain control with Morphine and Roxicodone PRN  · Continue aspirin.  Plavix was initially started on recommendations from Vascular Surgery. Vascular surgery consulted regarding continuing Plavix and they recommend asking Hem/Onc. Hem/onc recommends aspirin and Anticoagulation only and plavix was started based on Vascular Recommendations. Plavix discontinued. Heparin on hold for now due to placement of tunneled cath  · CT scan of the abdomen shows splenic infarct versus abscess.  Patient is currently on therapeutic heparin drip. · Hypercoagulable workup pending. · WBC is trending down.  Will order cultures today  · Will order complete septic workup including blood cultures, urine cultures, and CXR, Lactic acid and LFTs  as well as 250 mL of bolus now because of overnight issues. Will discontinue lopressor as well. · Discussed with the RN. Will page ID for patient's evaluation  · Increased midodrine to 10 mg TD. Lance Barrientos MD  PGY-1, Department of Internal Medicine  [place], [place]  4/16/2019 9:28 AM    Attending Physician Statement  I have discussed the care of Tashia Villela, including pertinent history and exam findings with the resident. I have reviewed the key elements of all parts of the encounter with the resident. I have seen and examined the patient with the resident. I agree with the assessment and plan and status of the problem list as documented.     I seen the patient during my round today, I have reviewed the

## 2019-04-16 NOTE — SEDATION DOCUMENTATION
No bleeding noted from temp cath sight. Supine on table. Monitors and strap on. Right chest is prepped and draped.

## 2019-04-16 NOTE — PROGRESS NOTES
PRN   dextrose 50 % solution 12.5 g PRN   glucagon (rDNA) injection 1 mg PRN   dextrose 5 % solution PRN   nicotine (NICODERM CQ) 21 MG/24HR 1 patch Daily   aspirin chewable tablet 81 mg Daily   heparin (porcine) injection 4,000 Units PRN   heparin (porcine) injection 2,000 Units PRN   heparin 25,000 units in dextrose 5% 250 mL infusion Continuous   oxyCODONE (ROXICODONE) immediate release tablet 5 mg Q4H PRN   cyclobenzaprine (FLEXERIL) tablet 5 mg TID   morphine injection 4 mg Q3H PRN   Or    morphine injection 6 mg Q3H PRN   0.9 % sodium chloride bolus PRN   0.9 % sodium chloride bolus PRN   pantoprazole (PROTONIX) tablet 40 mg BID AC   anticoagulant sodium citrate 4 % injection 3 mL PRN   anticoagulant sodium citrate 4 % injection 3 mL PRN   dextrose 50 % solution 25 g PRN   sodium chloride flush 0.9 % injection 10 mL 2 times per day   sodium chloride flush 0.9 % injection 10 mL PRN   magnesium hydroxide (MILK OF MAGNESIA) 400 MG/5ML suspension 30 mL Daily PRN   ondansetron (ZOFRAN) injection 4 mg Q6H PRN     Labs:   CBC:   Recent Labs     04/13/19  1945 04/14/19  0854 04/15/19  1030   WBC 38.3* 39.6* 34.5*   RBC 3.28* 2.61* 2.53*   HGB 9.9* 8.0* 7.8*   HCT 31.0* 25.8* 23.7*    268 294   MPV 12.8 12.4 12.2      BMP:   Recent Labs     04/14/19  0854 04/15/19  1030   * 130*   K 4.3 4.7   CL 94* 90*   CO2 20 22   BUN 66* 84*   CREATININE 4.61* 5.89*   GLUCOSE 82 78   CALCIUM 7.8* 7.7*        Phosphorus:  No results for input(s): PHOS in the last 72 hours. Magnesium: No results for input(s): MG in the last 72 hours. Albumin:   No results for input(s): LABALBU in the last 72 hours. Dialysis bath: Dialysis Bath  K+ (Potassium): 3  Ca+ (Calcium): 3  Na+ (Sodium): 135  HCO3 (Bicarb): 35    Radiology:  Reviewed as available. Assessment:  1 Acute kidney injury on hemodialysis not showing signs of renal recovery.  Currently undergoing dialysis Monday Wednesday Friday, via tunneled cath. making minimal urine  2. Ischemic bowel status post resection and ileostomy   3 H. influenzae sepsis on treatment  4 EDEMA : Lymphedema and mild volume overload  5. ANEMIA OF CHRONIC DISEASE: Hemoglobin fluctuates  6.suspected hypercarbic audible state workup in progress   7. Hyponatremia    Plan:  1. Will get regular dialysis in a.m. as per MWF schedule. 2. Cont pt on aranesp and zemplar per protocol. 3. Patient got a tunneled catheter placed in today. 4. Renal function not expected to recover any time soon. 5. Waiting on confirmation from [facility] for outpatient dialysis unit. 6. Will follow. Please do not hesitate to call with questions. Electronically signed by Kiran Carranza MD on 4/16/2019 at 3:44 PM      Attending Physician Statement  I have discussed the care of Vannessa Canada, including pertinent history and exam findings,  with the Fellow/Resident/CNP. I have reviewed the key elements of all parts of the encounter with the Fellow/Resident/CNP. I agree with the assessment, plan and orders as documented by the Fellow/Resident/CNP. Sherman Shabazz MD   Nephrology [address] [place] [address]

## 2019-04-16 NOTE — PROGRESS NOTES
Patient seen and examined this afternoon at bedside for wound vac and ileostomy appliance changes. Wound bed appears very well healing in nature. Gently probed with cotton tip applicator with no evidence of fascial dehiscence. Wound does not appear to be erythematous, no drainage, no evidence of infectious process. Ileostomy is pink, patent and functioning at this time. At this time, there are no acute surgical needs identified. Continue wound care for vac changes and stoma care. Patient should follow up in the [address] [place] [address] for follow up one week after discharge. General Surgery to sign off at this time. Please do not hesittate to call or page with any questions or concerns.      Thank you,   Santino Castro MD  General Surgery PGY1

## 2019-04-16 NOTE — PROGRESS NOTES
Occupational Therapy    Occupational Therapy Not Seen Note    DATE: 2019  Name: Desi Boateng  : 1965  MRN: [id number]    Patient not available for Occupational Therapy due to:    Surgery/Procedure: Pt off the floor for new tunnel catheter placement.      Next Scheduled Treatment: Check back     Electronically signed by PILLO Maldonado on 2019 at 2:25 PM

## 2019-04-16 NOTE — PROGRESS NOTES
Physical Therapy  DATE: 2019    NAME: Davina Michaelutant  MRN: [id number]   : 1965    Patient not seen this date for Physical Therapy due to:  [] Blood transfusion in progress  [] Hemodialysis  []  Patient Declined  [] Spine Precautions   [] Strict Bedrest  [x] Surgery/ Procedure (Pt off the floor for new tunnel catheter placement. Will check back tomorrow)  [] Testing      [] Other        [] PT being discontinued at this time. Patient independent. No further needs. [] PT being discontinued at this time as the patient has been transferred to palliative care. No further needs.     [facility], PTA

## 2019-04-16 NOTE — PROGRESS NOTES
(Left, ); Ulnar tunnel release (Right, ); Knee arthroscopy (Left, 2016); LAPAROTOMY EXPLORATORY (N/A, 2019); and LAPAROTOMY EXPLORATORY (N/A, 2019). Family History: family history includes Diabetes in her maternal grandfather and maternal grandmother; Emphysema in her maternal grandfather; Heart Disease in her mother; Heart Surgery in her mother; Ila Payan in her maternal uncle; Stroke in her mother. Social History:   reports that she has been smoking cigarettes. She started smoking about 38 years ago. She has been smoking about 0.25 packs per day. She has never used smokeless tobacco. She reports that she drinks alcohol. She reports that she does not use drugs. Medications:    Reviewed in EPIC     Allergies:  Pcn [penicillins]; Sulfa antibiotics; and Tape [adhesive tape]    REVIEW OF SYSTEMS:      Review of Systems  General: no fever or night sweats, Weight is stable. Progressive fatigue  ENT: No double or blurred vision, no tinnitus or hearing problem, no dysphagia or sore throat   Respiratory: No chest pain, no shortness of breath, no cough or hemoptysis. Cardiovascular: Denies chest pain, PND or orthopnea. No L E swelling or palpitations. Gastrointestinal:    Abdominal discomfort, wound VAC in place, no bleeding. Genitourinary: Denies dysuria, hematuria, frequency, urgency or incontinence. Neurological: Denies headaches, decreased LOC, no sensory or motor focal deficits. Musculoskeletal:  No arthralgia no back pain or joint swelling. Skin: There are no rashes or bleeding. Psychiatric:  No anxiety, no depression. Endocrine: no diabetes or thyroid disease. Hematologic: no bleeding , no adenopathy.                 PHYSICAL EXAM:      BP (!) (P) 122/57   Pulse 117   Temp (P) 98.9 °F (37.2 °C) (Oral)   Resp 26   Ht 5' 1.02\" (1.55 m)   Wt 179 lb 7.3 oz (81.4 kg)   SpO2 99%   BMI 33.88 kg/m²    Temp (24hrs), Av.7 °F (37.6 °C), Min:98 °F (36.7 °C), Max:100.9 °F (38.3 °C)      Physical Exam  Gen.  Exam, showed a well-appearing patient without evidence of distress or pain  HEENT, normocephalic and atraumatic, PERRLA extraocular muscles are intact  Neck showed no JVD no carotid bruit, no cervical adenopathy  Chest is clear to auscultation bilaterally  Heart is regular without any murmur  Abdomen left upper quadrant tenderness but the abdomen was soft  Lower extremities showed no edema clubbing or cyanosis  Neurological examination was nonfocal, with intact cranial nerves  Skin  No rashes or bruising appreciated          DATA:      Labs:       CBC:   Recent Labs     04/15/19  1030 04/16/19  1629   WBC 34.5* 31.8*   HGB 7.8* 6.9*   HCT 23.7* 22.9*    411     BMP:   Recent Labs     04/15/19  1030 04/16/19  1629   * 135   K 4.7 4.5   CO2 22 22   BUN 84* 54*   CREATININE 5.89* 4.40*   LABGLOM 8* 11*   GLUCOSE 78 86     PT/INR:   Recent Labs     04/15/19  1031   PROTIME 11.3   INR 1.1     APTT:  Recent Labs     04/15/19  1031 04/15/19  1946   APTT 29.1 27.7     LIVER PROFILE:  Recent Labs     04/16/19  1629   AST 37*   ALT 59*   LABALBU 2.2*               IMPRESSION:    Primary Problem  Septic shock [facility])    Active Hospital Problems    Diagnosis Date Noted    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]     Leukemoid reaction [D72.823]     Mottled skin [L81.9]     Pleural effusion, bilateral [J90]     Protein-calorie malnutrition ([address] [E46]     Septic shock ([address] [A41.9, R65.21]     Gastroenteritis [K52.9]     Haemophilus influenzae septicemia ([address] [A41.3]     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[address] [K55.049] 04/07/2019    Small bowel ischemia (HCC) [K55.9] 04/07/2019    Acute GI bleeding [K92.2] 04/07/2019    Gram negative septic shock (HCC) [A41.50, R65.21]     Rhabdomyolysis [M62.82] 04/06/2019    Hyponatremia [E87.1] 04/06/2019    Lactic acidosis [E87.2] 04/06/2019    MARY (acute kidney injury) ([facility] [N17.9] 66/12/0734    Metabolic acidosis [N56.9] 04/06/2019    Acute respiratory failure ([facility] [J96.00] 04/06/2019    Elevated liver enzymes [R74.8] 04/06/2019    Hyperkalemia [E87.5]     Shock ([facility] [R57.9] 04/05/2019       RECOMMENDATIONS:  The patient is doing well  Continue heparin and aspirin  We will follow     Colleen Cazares MD   [phone]

## 2019-04-16 NOTE — PROGRESS NOTES
PROGRESS NOTE      PATIENT NAME: Vannessa Canada  MEDICAL RECORD NO. [id number]  DATE: 4/16/2019  PRIMARY CARE PHYSICIAN: No primary care provider on file. HD: # 11    ASSESSMENT    Patient Active Problem List   Diagnosis    Dog bite    Post-traumatic osteoarthritis of left knee    S/P left knee arthroscopy    S/P total knee arthroplasty    Arthritis of left knee    Shock ([facility] [address]    Rhabdomyolysis    Hyponatremia    Lactic acidosis    MARY (acute kidney injury) ([facility] [address]    Metabolic acidosis    Acute respiratory failure (HCC)    Elevated liver enzymes    Hyperkalemia    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HCC)    Small bowel ischemia (HCC)    Acute GI bleeding    Gram negative septic shock (HCC)    Protein-calorie malnutrition (HCC)    Septic shock (HCC)    Gastroenteritis    Haemophilus influenzae septicemia (HCC)    Pleural effusion, bilateral    Splenic infarction    Leukemoid reaction    Mottled skin       MEDICAL DECISION MAKING AND PLAN    1. Continue medical management per primary  2. Neuro - pain control  3. CV - hemodynamically stable. Started on therapeutic heparin, ASA, plavix per Vascular for concern for embolic event causing new onset mottling of digits, CT scan shows concern for possible splenic infarct vs abscess. EDWARD pending. 4. Pulm - Stable  5. GI - Ileostomy well appearing, functioning, monitor output  6. Renal - ARF, Nephro on board, continuing HD - tunneled cath today  7. Heme/ID - Hgb and platelets stable. Leukocytosis downtrended to 34.5 on 4/15, follow up repeat labs today. ID started levaquin, repeat blood cultures - no growth  8. Endo - stable, no inuslin requirements  9. Ppx - No GI ppx necessary. Patient on therapeutic heparin per [facility] seen and examined overnight. Tmax 38.2. Several BPs in 84Z systolic, patient asymptomatic. Patient reporting LUQ abdominal pain this AM. Continues to have good output from stoma.  Minimal UOP, on HD. Vac changed yesterday with 50cc output, increased since initial placement. OBJECTIVE  VITALS: Temp: Temp: 98.9 °F (37.2 °C)Temp  Av.3 °F (37.4 °C)  Min: 97.4 °F (36.3 °C)  Max: 100.8 °F (93.0 °C) BP Systolic (54QLT), OMF:272 , Min:78 , XUI:200   Diastolic (98OXE), BKC:48, Min:41, Max:73   Pulse Pulse  Av.8  Min: 92  Max: 109 Resp Resp  Av.5  Min: 17  Max: 28 Pulse ox SpO2  Av.7 %  Min: 98 %  Max: 100 %    CONSTITUTIONAL: awake, alert, cooperative, no apparent distress  HEAD: atraumatic, normocephalic  EYES: sclera clear, pupils equal and reactive to light  ENT: ears are symmetric, nares patent   HEENT: moist mucous membranes  NECK: Supple, symmetrical, trachea midline  LUNGS: no respiratory distress, no audible wheezing  CARDIOVASCULAR: +S1/S2  ABDOMEN: soft, mildly tender to palpation LUQ, nondistended, no guarding, no rebound tenderness, wound vac in place, 50cc drainage, ileostomy RLQ functioning, patent, beefy red  MUSCULOSKELETAL: full range of motion noted  NEUROLOGIC: Awake, alert, oriented to name, place and time  EXTREMITIES: peripheral pulses normal, no pedal edema, no calf tenderness  SKIN: normal coloration and turgor    I/O last 3 completed shifts:   In: 277 [I.V.:277]  Out: 1200 [Urine:325; Drains:25; Stool:850]    Drain/tube output:  In: 267 [I.V.:267]  Out: 1200 [Urine:325; Drains:25]    LAB:  CBC:   Recent Labs     19  1945 19  0854 04/15/19  1030   WBC 38.3* 39.6* 34.5*   HGB 9.9* 8.0* 7.8*   HCT 31.0* 25.8* 23.7*   MCV 94.5 98.9 93.7    268 294     BMP:   Recent Labs     19  1004 19  1145 19  0854 04/15/19  1030     --  129* 130*   K 5.0 4.1 4.3 4.7   CL 98  --  94* 90*   CO2 22  --  20 22   BUN 50*  --  66* 84*   CREATININE 3.75*  --  4.61* 5.89*   GLUCOSE 110*  --  82 78     COAGS:   Recent Labs     19  1004  19  2343 04/15/19  1031 04/15/19  1946   APTT  --    < > 52.8* 29.1 27.7   PROT 4.6*  --   --   -- --    INR  --   --   --  1.1  --     < > = values in this interval not displayed. Nader Martinezdevora  4/16/19, 7:21 AM         Attending Note    Patient febrile last night, currently afebrile. EDWARD pending to rule out likely septic emboli  I have reviewed the above TEC note(s) and I either performed the key elements of the medical history and physical exam or was present with the resident when the key elements of the medical history and physical exam were performed. I have discussed the findings, established the care plan and recommendations with Resident, TECSS RN, bedside nurse.     Henretta Fleischer, MD  4/16/2019  11:01 AM

## 2019-04-16 NOTE — PROGRESS NOTES
Patient Name: Rosi Rm  Date of admission: 4/5/2019  8:39 PM  Patient's age: 47 y. o., 1965  Admission Dx: Shock ([address] [R57.9]      Requesting Physician: Margareth Bustos MD    CHIEF COMPLAINT:  Abdominal pain    History Obtained From:  patient  INTERIM HISTORY  The patient is seen and examined  Feels about the same  abd pain persists. No issues with anticoagulation   HISTORY OF PRESENT ILLNESS:      The patient is a 47 y.o.  female who is admitted to the hospital for   for increased confusion and abdominal pain. She was found to have ischemic bowel and was taken to surgery on 4/6/18. Pathology showed ischemic bowel going to the margins. She was taken for a second surgery on 4/8/18 and more ischemic bowel was appreciated. The patient platelets were about 300,000 on admission and dropped about 98 with her multiple surgeries. Hit antibody was done and was negative. Platelets recovered since then. The patient continues to struggle with recovery. She underwent a CT scan of the abdomen and pelvis today that showed heterogenous changes in the spleen that the new. There was a suspicion of splenic infarction. Vascular were involved and started the patient in aspirin and Plavix as well as heparin. The patient is seen and examined. She is started on anticoagulation. She denies any active bleeding. She has very little insight about her disease and most of the information were obtained from the chart. It is no history of thromboembolism previously . family history is negative for arterial or venous thrombosis. The patient developed worsening renal failure and was started on hemodialysis. Past Medical History:   has a past medical history of Asthma, Back pain, chronic, Hypertension, Snores, and Wears glasses.     Past Surgical History:   has a past surgical history that includes Tonsillectomy; Knee Max:100.7 °F (38.2 °C)      Physical Exam  Gen.  Exam, showed a well-appearing patient without evidence of distress or pain  HEENT, normocephalic and atraumatic, PERRLA extraocular muscles are intact  Neck showed no JVD no carotid bruit, no cervical adenopathy  Chest is clear to auscultation bilaterally  Heart is regular without any murmur  Abdomen left upper quadrant tenderness but the abdomen was soft  Lower extremities showed no edema clubbing or cyanosis  Neurological examination was nonfocal, with intact cranial nerves  Skin  No rashes or bruising appreciated          DATA:      Labs:       CBC:   Recent Labs     04/14/19  0854 04/15/19  1030   WBC 39.6* 34.5*   HGB 8.0* 7.8*   HCT 25.8* 23.7*    294     BMP:   Recent Labs     04/14/19  0854 04/15/19  1030   * 130*   K 4.3 4.7   CO2 20 22   BUN 66* 84*   CREATININE 4.61* 5.89*   LABGLOM 10* 8*   GLUCOSE 82 78     PT/INR:   Recent Labs     04/15/19  1031   PROTIME 11.3   INR 1.1     APTT:  Recent Labs     04/15/19  1031 04/15/19  1946   APTT 29.1 27.7     LIVER PROFILE:  Recent Labs     04/13/19  1004   *   *   LABALBU 2.2*               IMPRESSION:    Primary Problem  Septic shock [facility])    Active Hospital Problems    Diagnosis Date Noted    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]     Leukemoid reaction [D72.823]     Mottled skin [L81.9]     Pleural effusion, bilateral [J90]     Protein-calorie malnutrition ([address] [E46]     Septic shock ([address] [A41.9, R65.21]     Gastroenteritis [K52.9]     Haemophilus influenzae septicemia ([address] [A41.3]     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[address] [K55.049] 04/07/2019    Small bowel ischemia (HCC) [K55.9] 04/07/2019    Acute GI bleeding [K92.2] 04/07/2019    Gram negative septic shock (HCC) [A41.50, R65.21]     Rhabdomyolysis [M62.82] 04/06/2019    Hyponatremia [E87.1] 04/06/2019    Lactic acidosis [E87.2] 04/06/2019    [facility] [N17.9] 38/68/2677    Metabolic acidosis [E87.2] 04/06/2019    Acute respiratory failure (HCC) [J96.00] 04/06/2019    Elevated liver enzymes [R74.8] 04/06/2019    Hyperkalemia [E87.5]     Shock ([address] [R57.9] 04/05/2019       RECOMMENDATIONS:  1.  The patient has progressive mesenteric ischemia leading to repeat surgery  Continue aspirin and heparin for now  Plan to transition to oral anticoagulant (likely Eliquis or coumadin ) when stable   Watch Hgb     Lazaro Gregorio MD   [phone]

## 2019-04-16 NOTE — BRIEF OP NOTE
Brief Postoperative Note    Marielle Goodwin  YOB: 1965  [id number]    Pre-operative Diagnosis: Septic shock. ARF    Post-operative Diagnosis: Same    Procedure: Rt IJ tempcath removal; IJ permcath placement    Anesthesia: Local    Surgeons/Assistants: Tana Haney MD    Estimated Blood Loss: less than 50     Complications: None    Specimens: Was Not Obtained    Findings: Rt IJ tempcath removed. Rt IJ permcath inserted without incident (Palindrome 19 cm x 14.5 F) w tip in RA and good blood withdrawals.     Electronically signed by Tana Haney MD on 4/16/2019 at 2:46 PM

## 2019-04-16 NOTE — PROGRESS NOTES
[facility] Wound Ostomy  Nurse  Follow up Note       NAME:  Sarah Arambula RECORD NUMBER:  [id number]  AGE: 47 y.o. GENDER: female  : 1965  TODAY'S DATE:  2019    Subjective   Reason for [id number] Nurse Evaluation and Assessment:   Assist surgeon to remove/replace NPWT dressing for wound inspection. Concern for change in condition overnight that warranted wound inspection; incision examined by  and  due to concern for elevated temp, HR  and color of drainage. Dr. Mancilla Amend in at this time as well. Wound appears clean; fascia intact as inspected by Elma Abrams. NPWT dressing applied using white foam between staples under black foam.  Wound bordered with drape. Good seal achieved. 19 1000   Ileostomy Ileostomy RUQ   Placement Date/Time: 19 1342   Pre-existing: No  Timeout: Patient; Appropriate Equipment;Sterile Technique using full body drape;Procedure  Inserted by: Dr Forrest Kehr  Ileostomy Type: Ileostomy  Location: RUQ   Stomal Appliance 2 piece   Flange Size (inches) 2.25 Inches   Stoma  Assessment Moist;Red;Flush   Mucocutaneous Junction Intact   Peristomal Assessment Blanchable erythema   Treatment Bag change;Site care; Liquid skin barrier  (adapt ring)   Stool Color Brown   Stool Appearance Loose   Incision 19 Abdomen   Date First Assessed/Time First Assessed: 19 1840   Location: Abdomen   Wound Assessment Clean;Red;Yellow   Karly-wound Assessment Blanchable erythema; Intact   Closure Staples  (intermittent)   Drainage Description Tan;Serosanguinous   Odor None   Dressing/Treatment Vacuum dressing   Dressing Changed Changed/New   Dressing Change Due 19   Negative Pressure Wound Therapy Abdomen Mid   Placement Date/Time: 19 1210   Location: Abdomen  Wound Location Orientation: Mid   Wound Type Surgical   Unit Type [employer] V[employer]ta   Dressing Type White foam;Black foam;Non-adherant   Number of pieces used 10  (7 white \"jeyson\" 2 black 1 adaptic) Cycle Continuous   Target Pressure (mmHg) 125   Canister changed?  No   Dressing Status Changed   Dressing Changed Changed/New   Drainage Description Tan;Serosanguinous   Dressing Change Due 04/19/19   Wound Assessment   (see LDA)

## 2019-04-16 NOTE — BRIEF OP NOTE
Brief Postoperative Note    Lilliana Hamm  YOB: 1965  [id number]    Pre-operative Diagnosis: MARY      Post-operative Diagnosis: Same    Procedure: Tunneled Dialysis Catheter    Medication Given: fentanyl    Anesthesia: 1% Lidocaine Local Injection     Surgeons/Assistants: Dr. Wandy Gage    Estimated Blood Loss: Minimal    Complications: none    Findings: 14.5 Fr x 19 cm tunneled HD Catheter placed Site:  Right Internal Jugular Vein. Dressing applied. Tip in RA. Good blood flows. May use HD cath for dialysis. Vital signs were reviewed and were stable after the procedure.       Electronically signed by Susan Nazario PA-C on 4/16/2019 at 2:27 PM

## 2019-04-16 NOTE — PROGRESS NOTES
Patient had a spike of fever 100.8. Her blood pressure has been running low since she came from the dialysis yesterday. She has received midodrine for three doses of April 15th for low blood pressure. Last BP is in 80s. On physical exam, she is alert and oriented. She has bilateral flank pains which she states is worse. Right flank is more tender than the left even on superficial palpation. Her colostomy site looks clean as well as the site at the peripherals though. Ordered Vancomycin with pharmacy to dose. Two sets of blood cultures. 250mL of fluid bolus. Have asked the RN to page the General Surgery for their evaluation at bedside.      Messi Alexander MD  PGY-1, Department of Internal Medicine  [address] [place] [address]  4/16/2019 5:18 AM

## 2019-04-16 NOTE — PLAN OF CARE
Patient's bed was set to the lowest height. Non-skid footwear on. Call light and side table were within reach. Patient remained free from falls this shift.     Electronically signed by Kevin Lopes RN on 4/16/2019 at 3:19 AM

## 2019-04-16 NOTE — PROGRESS NOTES
CALCIUM 7.7 04/15/2019    BILITOT 0.70 04/13/2019    ALKPHOS 94 04/13/2019     04/13/2019     04/13/2019       ASSESSMENT   1. 46 yo female with mottled fingers/toes/hands. 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 Pt to IR today, restart Heparin gtt, ASA when able  2. Will need long term AC  3. Heme/onc following, follow for recs on use of plavix   4. Continue supportive care per primary  5. No acute vascular intervention at this time, contact as needed     Electronically signed by Clark Church  on 4/16/2019 at 5:56 AM    Patient seen and examined at bedside. Changes made to above note as needed including assessment and plan. Pt denies any f/c, n/v, sob, or chest pain. Still has abdominal pain which is becoming more tolerable but still present. Rec for oral AC when applicable. Heme/onc following.  Contact vascular surgery as needed     Electronically signed by Mei Chapman DO on 4/16/2019 at 6:54 AM

## 2019-04-16 NOTE — PROGRESS NOTES
[facility] - Progress Note    Today's Date and Time: 4/16/2019, 9:28 AM    Impression :   1. 46 y/o female with complaints of  · Vomiting  · Diarrhea  · Abdominal pain  · Altered mental status  2. Acute kidney injury  · Nephrology onboard and planning for urgent dialysis   3. Shock, presumptive septic plus hypovolemic, requiring Levophed  4. Septicemia with Haemophilus influenzae 4-5-19  5. Gastroenteritis  6. Ischemic bowel  7. S/P laparotomy 4-6-19  8. S/p second look with resection of transverse colon, ileostomy creation, resection of rectal stump and abdominal closure. 9. Severe hyponatremia  10. Transaminitis, shock liver, improving  11. Intubation 2/2 AMS  12. COPD  15. Arthritis  14. Chronic NSAID use  15. Funguria  16. Acral mottling of hands and feet  17. Allergy to penicillins: Hives and respiratory distress  18. Allergy to sulfa    Recommendations:   · Completed Meropenem. Stop date 4-14-19  · Levaquin started 4/13, discontinued yesterday  · Vancomycin started yesterday per night resident. Will D/C after 1st dose (prophylaxis for new tunneled catheter)  · leukocytosis continues  · Consider telepsych treatment  · Start treatment for oral thrush  · New tunnel catheter placement 4/16    Medical Decision Making/Summary/Discussion:   · 46 y/o female with vomiting, diarrhea, and AMS  · Found to have transaminitis, MARY requiring emergent dialysis, lactic acidosis, shock requiring pressors  · Intubated due to AMS  · CT chest shows atelactasis vs pneumonia  · Currently on vanc, levaquin, and aztreonam   · Patient is critically ill with origin in GI tract . Will need to treat with meropenem despite Hx of penicillin allergy. · Had laparotomy 4-6-19 with findings of ischemic bowel from distal ileum to sigmoid colon.    · S/P ileocecectomy with extended left hemicolectomy and placement of wound vac 4-6-19  · Overall improvement post surgery  · Off pressors and sedation  · Cultures grew haemophilus influenza, beta lactamase negative, sensitivities pending  · Treated with Meropenem through 4/14  Infection Control Recommendations   · [place] Precautions    Antimicrobial Stewardship Recommendations     · Discontinuation of therapy      Coordination of Outpatient Care:   · Estimated Length of IV antimicrobials: 4/14  · Patient will need Midline Catheter Insertion: No  · Patient will need PICC line Insertion: No  · Patient will need: Home IV , [place],  SNF,  LTAC TBD  · Patient will need outpatient wound care: TBD    Chief complaint/reason for consultation:   · Altered mental status and tender abdomen       History of Present Illness:   Lilliana Hamm is a 47y.o.-year-old  female who was initially admitted on 4/5/2019. Patient seen at the request of Dr. Deedee Frost:    Patient presented to ED via EMS due to altered mental status and vomiting. Patient has history significant for COPD, right knee osteoarthirtis s/p arthoplasty, and chronic NSAID use. Patient lives in [place], but was here to visit her significant other. Arrived Wednesday night, said she didn't feel good. Thought she might have had a bad burger at a fast food restaurant. Went to sleep and slept for almost 24 hours. When she awoke, she said she was vomiting, having diarrhea, and abdominal pain. Significant other and sister are unsure of the nature of the vomit, diarrhea, or abdominal pain. Then she slept a bit more, until her significant other found her unresponsive and that's when he called EMS to bring her to the hospital.     Afebrile on admission, but Tmax overnight was 39.3. Tachycardic on admission, 129. Became hypotensive after admission and levophed and vasopressin were started. Initial labs:     Admission labs significant for Na 125, K 8.0, CO2 9, BUN 62, Creatinine of 4.06, Anion gap of 23, Lactic acid of 3.5, Glucose of 186, Ca 5.2, POC HCO3 15.9, CK 15,342, Troponins high sensitivity 89 and 94, Alk phos 168, , AST 1,122, Bili .37, lipase of 119, Urine tox positive for THC and Cocaine, WBC 23.5, Hgb 10.2, Urine and blood cultures pending, HIV negative, influenza negative, hepatitis panel non-reactive, urine Leukocyte esterase trace, urine nitrite -, urine protein 2+, urine Hgb large, urine RBCs 5 to 10, many urine yeast,  ABG 7.22, pCO2 27, HCO3 15.9. Initial imaging showed:     CXR: No acute cardiopulmonary process    Abdominal X-ray 4/6: No free air    CTA of chest: Negative for PE or dissection. Patchy consolidations of bilateral lower lung lobes representing developing pneumonia vs atelectasis. CT head: pending    Initial course:     Intubated and sedated in ED due to altered mental status. Currently on Vancomycin, Levaquin, and aztreonam for empiric coverage. Richar catheter was placed due to request by nephrology for emergent dialysis. Dialysis attempted several times, but due to high arterial pressures and line clotting, dialysis to be completed later today by Dr. Ruperto Carl. NG tube with bloody output. FOBT +. General surgery has been consulted for evaluation. CURRENT EVALUATION : 4/16/2019    Patient seen and examined. Extubated. AOx4. Following commands. Says she is feeling better. Pain well controlled. Been having low blood pressure and spiked fever of 38.2. Night resident paged. Ordered fluid bolus and started her on vancomycin. Also having bilateral flank pain. Night resident paged general surgery to evaluate further, no acute intervention on their part. She mentioned today a lesion on her tongue. Exam revealed yellow plaque on the center of the tongue consistent with thrush. Can be scraped off. Going for tunneled catheter placement today. Was started on therapeutic heparin gtt for possible hypercoagulable state. Currently stopped by primary. Patient admits to polysubstance use before coming in this admission.  Says she ARTHROSCOPY Left 09/28/2016    with medial menisectomy    LAPAROTOMY EXPLORATORY N/A 4/6/2019    LAPAROTOMY EXPLORATORY, ILEOCECECTOMY, SUBTOTAL COLECTOMY, ABTHEHRA WOUND VAC PLACEMENT performed by Nickey Duverney, MD at [address] [place] [address] N/A 4/8/2019    2ND LOOK EXPLORATORY LAPAROTOMY, RESECTION TRANSVERSE COLON, ILEOSTOMY CREATION, RESECTION RECTAL STUMP, ABDOMINAL WASHOUT, OMENTECTOMY, ABDOMINAL WALL CLOSURE performed by Artem Wilson MD at [address] [address]       Medications:      sodium chloride  500 mL Intravenous Once    nicotine  1 patch Transdermal Daily    metoprolol tartrate  37.5 mg Oral BID    clindamycin (CLEOCIN) IV  600 mg Intravenous Once    aspirin  81 mg Oral Daily    cyclobenzaprine  5 mg Oral TID    pantoprazole  40 mg Oral BID AC    midodrine  5 mg Oral TID WC    sodium chloride flush  10 mL Intravenous 2 times per day       Social History:     Social History     Socioeconomic History    Marital status: Single     Spouse name: Not on file    Number of children: 0    Years of education: Not on file    Highest education level: Not on file   Occupational History    Occupation: [address] [place] [address] Financial resource strain: Not on file    Food insecurity:     Worry: Not on file     Inability: Not on file    Transportation needs:     Medical: Not on file     Non-medical: Not on file   Tobacco Use    Smoking status: Light Tobacco Smoker     Packs/day: 0.25     Types: Cigarettes     Start date: 9/19/1980    Smokeless tobacco: Never Used   Substance and Sexual Activity    Alcohol use: Yes     Comment: OCCASSIONAL    Drug use: No    Sexual activity: Yes     Partners: Female   Lifestyle    Physical activity:     Days per week: Not on file     Minutes per session: Not on file    Stress: Not on file   Relationships    Social connections:     Talks on phone: Not on file     Gets together: Not on file Attends Jewish service: Not on file     Active member of club or organization: Not on file     Attends meetings of clubs or organizations: Not on file     Relationship status: Not on file    Intimate partner violence:     Fear of current or ex partner: Not on file     Emotionally abused: Not on file     Physically abused: Not on file     Forced sexual activity: Not on file   Other Topics Concern    Not on file   Social History Narrative    Not on file       Family History:     Family History   Problem Relation Age of Onset    Heart Disease Mother     Stroke Mother     Heart Surgery Mother     Diabetes Maternal Grandmother     Emphysema Maternal Grandfather     Diabetes Maternal Grandfather     Lung Cancer Maternal Uncle         Allergies:   Pcn [penicillins]; Sulfa antibiotics; and Tape [adhesive tape]     Review of Systems:   Unable to provide. Sedated on ventilator. Physical Examination :     Patient Vitals for the past 8 hrs:   BP Temp Temp src Pulse Resp SpO2   04/16/19 0515 (!) 88/50 98.9 °F (37.2 °C) Oral 93 26 98 %   04/16/19 0330 (!) 99/41 100.8 °F (38.2 °C) Oral 105 26 98 %     General Appearance: Sedated, on ventilator  Head:  Normocephalic, no trauma  Eyes: Pupils equal, round, reactive to light; sclera anicteric; conjunctivae pink. No embolic phenomena. ENT: Oropharynx clear, without erythema, exudate, or thrush. No tenderness of sinuses. Mouth/throat: mucosa pink and moist. No lesions. Dentition in good repair. Neck:Supple, without lymphadenopathy. Thyroid normal, No bruits. Pulmonary/Chest: Clear to auscultation, without wheezes, rales, or rhonchi. No dullness to percussion. Cardiovascular: Regular rate and rhythm without murmurs, rubs, or gallops. Abdomen: Distended. Bowel sounds absent. Soft, mildly tender, surgical bandages in place. Wound vac removed.    All four Extremities: Cyanosis of trunk, abdomen, distal extremities  Neurologic: Sedated  Skin: Cool and dry with

## 2019-04-16 NOTE — PLAN OF CARE
PATIENT REFUSES TO WEAR BIPAP     [x] Risks and benefits explained to patient   [x] Patient refuses to wear Bipap stating \"I just can't get used to it. \"  [x] Patient verbalizes understanding of information presented.

## 2019-04-16 NOTE — PROGRESS NOTES
Pharmacy Note  Vancomycin Consult    Papo Starks is a 47 y.o. female started on Vancomycin for sepsis; consult received from Dr. Omero Cook to manage therapy. Also receiving the following antibiotics: Cleocin. Patient Active Problem List   Diagnosis    Dog bite    Post-traumatic osteoarthritis of left knee    S/P left knee arthroscopy    S/P total knee arthroplasty    Arthritis of left knee    Shock (HCC)    Rhabdomyolysis    Hyponatremia    Lactic acidosis    MARY (acute kidney injury) ([facility] [address]    Metabolic acidosis    Acute respiratory failure (HCC)    Elevated liver enzymes    Hyperkalemia    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HCC)    Small bowel ischemia (HCC)    Acute GI bleeding    Gram negative septic shock (HCC)    Protein-calorie malnutrition (HCC)    Septic shock (HCC)    Gastroenteritis    Haemophilus influenzae septicemia (HCC)    Pleural effusion, bilateral    Splenic infarction    Leukemoid reaction    Mottled skin       Allergies:  Pcn [penicillins]; Sulfa antibiotics; and Tape [adhesive tape]     Temp max: 100.8 F    Recent Labs     04/14/19  0854 04/15/19  1030   BUN 66* 84*       Recent Labs     04/14/19  0854 04/15/19  1030   CREATININE 4.61* 5.89*       Recent Labs     04/14/19  0854 04/15/19  1030   WBC 39.6* 34.5*         Intake/Output Summary (Last 24 hours) at 4/16/2019 0440  Last data filed at 4/15/2019 6889  Gross per 24 hour   Intake 252 ml   Output 1090 ml   Net -838 ml       Culture Date      Source                       Results   Pending    Ht Readings from Last 1 Encounters:   04/06/19 5' 1.02\" (1.55 m)        Wt Readings from Last 1 Encounters:   04/15/19 179 lb 7.3 oz (81.4 kg)         Body mass index is 33.88 kg/m². Estimated Creatinine Clearance: 11 mL/min (A) (based on SCr of 5.89 mg/dL [facility])). Goal Trough Level: 15-20 mcg/mL    Assessment/Plan:  Will initiate vancomycin 1250 mg IV once then 750 mg after dialysis on MWF.   Timing of trough level will be determined based on culture results, renal function, and clinical response. Thank you for the consult. Will continue to follow.     Arash Weaver, PharmD  4/16/2019 4:41 AM

## 2019-04-16 NOTE — PLAN OF CARE
NONINVASIVE VENTILATION     PROVIDE OPTIMAL VENTILATION/ACCEPTABLE SPO2              IMPLEMENT NONINVASIVE VENTILATION PROTOCOL              MAINTAIN ACCEPTABLE SPO2              ASSESS SKIN INTEGRITY/BREAKDOWN SCORE              PATIENT EDUCATION AS NEEDED              BIPAP AS NEEDED    Pt not ready for machine yet this evening. Will notify RN when ready.

## 2019-04-16 NOTE — CARE COORDINATION
Await [facility] Admissions for confirmed chair time at [facility] at [facility] to see he was aware of the chair time - await return call

## 2019-04-17 ENCOUNTER — APPOINTMENT (OUTPATIENT)
Dept: DIALYSIS | Age: 54
DRG: 710 | End: 2019-04-17
Payer: MEDICAID

## 2019-04-17 LAB
ABO/RH: NORMAL
ABSOLUTE EOS #: 0 K/UL (ref 0–0.4)
ABSOLUTE IMMATURE GRANULOCYTE: 0 K/UL (ref 0–0.3)
ABSOLUTE LYMPH #: 2.64 K/UL (ref 1–4.8)
ABSOLUTE MONO #: 0.79 K/UL (ref 0.1–0.8)
ANION GAP SERPL CALCULATED.3IONS-SCNC: 16 MMOL/L (ref 9–17)
ANTIBODY SCREEN: NEGATIVE
ARM BAND NUMBER: NORMAL
BASOPHILS # BLD: 0 % (ref 0–2)
BASOPHILS ABSOLUTE: 0 K/UL (ref 0–0.2)
BLD PROD TYP BPU: NORMAL
BUN BLDV-MCNC: 62 MG/DL (ref 6–20)
BUN/CREAT BLD: ABNORMAL (ref 9–20)
CALCIUM SERPL-MCNC: 7.5 MG/DL (ref 8.6–10.4)
CHLORIDE BLD-SCNC: 95 MMOL/L (ref 98–107)
CO2: 20 MMOL/L (ref 20–31)
CREAT SERPL-MCNC: 4.86 MG/DL (ref 0.5–0.9)
CROSSMATCH RESULT: NORMAL
CRYOGLOBULIN: 0 MG/DL (ref 0–10)
DIFFERENTIAL TYPE: ABNORMAL
DISPENSE STATUS BLOOD BANK: NORMAL
EOSINOPHILS RELATIVE PERCENT: 0 % (ref 1–4)
EXPIRATION DATE: NORMAL
FACTOR V LEIDEN MUTATION: NORMAL
GFR AFRICAN AMERICAN: 11 ML/MIN
GFR NON-AFRICAN AMERICAN: 9 ML/MIN
GFR SERPL CREATININE-BSD FRML MDRD: ABNORMAL ML/MIN/{1.73_M2}
GFR SERPL CREATININE-BSD FRML MDRD: ABNORMAL ML/MIN/{1.73_M2}
GLUCOSE BLD-MCNC: 123 MG/DL (ref 65–105)
GLUCOSE BLD-MCNC: 239 MG/DL (ref 65–105)
GLUCOSE BLD-MCNC: 91 MG/DL (ref 70–99)
GLUCOSE BLD-MCNC: 97 MG/DL (ref 65–105)
HCT VFR BLD CALC: 21.1 % (ref 36.3–47.1)
HCT VFR BLD CALC: 21.5 % (ref 36.3–47.1)
HCT VFR BLD CALC: 22.8 % (ref 36.3–47.1)
HCT VFR BLD CALC: 22.9 % (ref 36.3–47.1)
HCT VFR BLD CALC: 27 % (ref 36.3–47.1)
HEMOGLOBIN: 7.1 G/DL (ref 11.9–15.1)
HEMOGLOBIN: 7.2 G/DL (ref 11.9–15.1)
HEMOGLOBIN: 7.8 G/DL (ref 11.9–15.1)
HEMOGLOBIN: 7.8 G/DL (ref 11.9–15.1)
HEMOGLOBIN: 9.1 G/DL (ref 11.9–15.1)
IMMATURE GRANULOCYTES: 0 %
LYMPHOCYTES # BLD: 10 % (ref 24–44)
MCH RBC QN AUTO: 30.5 PG (ref 25.2–33.5)
MCHC RBC AUTO-ENTMCNC: 34.1 G/DL (ref 28.4–34.8)
MCV RBC AUTO: 89.5 FL (ref 82.6–102.9)
MONOCYTES # BLD: 3 % (ref 1–7)
MORPHOLOGY: ABNORMAL
MORPHOLOGY: ABNORMAL
NRBC AUTOMATED: 0 PER 100 WBC
PARTIAL THROMBOPLASTIN TIME: 28.7 SEC (ref 20.5–30.5)
PDW BLD-RTO: 15.8 % (ref 11.8–14.4)
PLATELET # BLD: ABNORMAL K/UL (ref 138–453)
PLATELET ESTIMATE: ABNORMAL
PLATELET, FLUORESCENCE: NORMAL K/UL (ref 138–453)
PLATELET, IMMATURE FRACTION: NORMAL % (ref 1.1–10.3)
PMV BLD AUTO: ABNORMAL FL (ref 8.1–13.5)
POTASSIUM SERPL-SCNC: 5 MMOL/L (ref 3.7–5.3)
PROTHROMBIN G20210A MUTATION: NORMAL
RBC # BLD: 2.56 M/UL (ref 3.95–5.11)
RBC # BLD: ABNORMAL 10*6/UL
SEG NEUTROPHILS: 87 % (ref 36–66)
SEGMENTED NEUTROPHILS ABSOLUTE COUNT: 22.97 K/UL (ref 1.8–7.7)
SODIUM BLD-SCNC: 131 MMOL/L (ref 135–144)
TRANSFUSION STATUS: NORMAL
UNIT DIVISION: 0
UNIT NUMBER: NORMAL
WBC # BLD: 26.4 K/UL (ref 3.5–11.3)
WBC # BLD: ABNORMAL 10*3/UL

## 2019-04-17 PROCEDURE — 85018 HEMOGLOBIN: CPT

## 2019-04-17 PROCEDURE — 85055 RETICULATED PLATELET ASSAY: CPT

## 2019-04-17 PROCEDURE — 36415 COLL VENOUS BLD VENIPUNCTURE: CPT

## 2019-04-17 PROCEDURE — P9046 ALBUMIN (HUMAN), 25%, 20 ML: HCPCS | Performed by: INTERNAL MEDICINE

## 2019-04-17 PROCEDURE — 2060000000 HC ICU INTERMEDIATE R&B

## 2019-04-17 PROCEDURE — 2580000003 HC RX 258: Performed by: STUDENT IN AN ORGANIZED HEALTH CARE EDUCATION/TRAINING PROGRAM

## 2019-04-17 PROCEDURE — 6370000000 HC RX 637 (ALT 250 FOR IP): Performed by: STUDENT IN AN ORGANIZED HEALTH CARE EDUCATION/TRAINING PROGRAM

## 2019-04-17 PROCEDURE — 80048 BASIC METABOLIC PNL TOTAL CA: CPT

## 2019-04-17 PROCEDURE — 82947 ASSAY GLUCOSE BLOOD QUANT: CPT

## 2019-04-17 PROCEDURE — 85730 THROMBOPLASTIN TIME PARTIAL: CPT

## 2019-04-17 PROCEDURE — 99233 SBSQ HOSP IP/OBS HIGH 50: CPT | Performed by: INTERNAL MEDICINE

## 2019-04-17 PROCEDURE — 85014 HEMATOCRIT: CPT

## 2019-04-17 PROCEDURE — 6360000002 HC RX W HCPCS: Performed by: INTERNAL MEDICINE

## 2019-04-17 PROCEDURE — 90935 HEMODIALYSIS ONE EVALUATION: CPT

## 2019-04-17 PROCEDURE — 6370000000 HC RX 637 (ALT 250 FOR IP): Performed by: INTERNAL MEDICINE

## 2019-04-17 PROCEDURE — 2500000003 HC RX 250 WO HCPCS: Performed by: STUDENT IN AN ORGANIZED HEALTH CARE EDUCATION/TRAINING PROGRAM

## 2019-04-17 PROCEDURE — 2500000003 HC RX 250 WO HCPCS: Performed by: INTERNAL MEDICINE

## 2019-04-17 PROCEDURE — 99232 SBSQ HOSP IP/OBS MODERATE 35: CPT | Performed by: INTERNAL MEDICINE

## 2019-04-17 PROCEDURE — 85025 COMPLETE CBC W/AUTO DIFF WBC: CPT

## 2019-04-17 PROCEDURE — 6360000002 HC RX W HCPCS: Performed by: STUDENT IN AN ORGANIZED HEALTH CARE EDUCATION/TRAINING PROGRAM

## 2019-04-17 RX ORDER — 0.9 % SODIUM CHLORIDE 0.9 %
250 INTRAVENOUS SOLUTION INTRAVENOUS PRN
Status: DISCONTINUED | OUTPATIENT
Start: 2019-04-17 | End: 2019-05-04 | Stop reason: HOSPADM

## 2019-04-17 RX ORDER — 0.9 % SODIUM CHLORIDE 0.9 %
150 INTRAVENOUS SOLUTION INTRAVENOUS PRN
Status: DISCONTINUED | OUTPATIENT
Start: 2019-04-17 | End: 2019-05-04 | Stop reason: HOSPADM

## 2019-04-17 RX ORDER — ALBUMIN (HUMAN) 12.5 G/50ML
25 SOLUTION INTRAVENOUS ONCE
Status: COMPLETED | OUTPATIENT
Start: 2019-04-17 | End: 2019-04-17

## 2019-04-17 RX ORDER — NICOTINE 21 MG/24HR
1 PATCH, TRANSDERMAL 24 HOURS TRANSDERMAL DAILY
Status: DISCONTINUED | OUTPATIENT
Start: 2019-04-17 | End: 2019-04-17

## 2019-04-17 RX ORDER — NICOTINE 21 MG/24HR
1 PATCH, TRANSDERMAL 24 HOURS TRANSDERMAL DAILY
Status: DISCONTINUED | OUTPATIENT
Start: 2019-04-17 | End: 2019-04-30

## 2019-04-17 RX ORDER — MIDODRINE HYDROCHLORIDE 5 MG/1
10 TABLET ORAL 3 TIMES DAILY PRN
Status: DISCONTINUED | OUTPATIENT
Start: 2019-04-17 | End: 2019-04-20

## 2019-04-17 RX ADMIN — CYCLOBENZAPRINE 5 MG: 10 TABLET, FILM COATED ORAL at 21:14

## 2019-04-17 RX ADMIN — MORPHINE SULFATE 4 MG: 4 INJECTION INTRAVENOUS at 23:50

## 2019-04-17 RX ADMIN — Medication 3 ML: at 12:29

## 2019-04-17 RX ADMIN — ALBUMIN (HUMAN) 25 G: 0.25 INJECTION, SOLUTION INTRAVENOUS at 11:12

## 2019-04-17 RX ADMIN — Medication 10 ML: at 21:15

## 2019-04-17 RX ADMIN — Medication 10 ML: at 14:51

## 2019-04-17 RX ADMIN — CYCLOBENZAPRINE 5 MG: 10 TABLET, FILM COATED ORAL at 14:49

## 2019-04-17 RX ADMIN — HEPARIN SODIUM AND DEXTROSE 12 UNITS/KG/HR: 10000; 5 INJECTION INTRAVENOUS at 17:10

## 2019-04-17 RX ADMIN — OXYCODONE HYDROCHLORIDE 5 MG: 5 TABLET ORAL at 01:18

## 2019-04-17 RX ADMIN — OXYCODONE HYDROCHLORIDE 5 MG: 5 TABLET ORAL at 14:48

## 2019-04-17 RX ADMIN — PANTOPRAZOLE SODIUM 40 MG: 40 TABLET, DELAYED RELEASE ORAL at 17:08

## 2019-04-17 RX ADMIN — ASPIRIN 81 MG: 81 TABLET, CHEWABLE ORAL at 14:49

## 2019-04-17 RX ADMIN — NYSTATIN 500000 UNITS: 500000 SUSPENSION ORAL at 21:15

## 2019-04-17 RX ADMIN — MIDODRINE HYDROCHLORIDE 10 MG: 5 TABLET ORAL at 14:49

## 2019-04-17 RX ADMIN — OXYCODONE HYDROCHLORIDE 5 MG: 5 TABLET ORAL at 07:16

## 2019-04-17 RX ADMIN — OXYCODONE HYDROCHLORIDE 5 MG: 5 TABLET ORAL at 21:14

## 2019-04-17 RX ADMIN — PANTOPRAZOLE SODIUM 40 MG: 40 TABLET, DELAYED RELEASE ORAL at 06:14

## 2019-04-17 RX ADMIN — MIDODRINE HYDROCHLORIDE 10 MG: 5 TABLET ORAL at 17:09

## 2019-04-17 ASSESSMENT — PAIN SCALES - GENERAL
PAINLEVEL_OUTOF10: 8
PAINLEVEL_OUTOF10: 6
PAINLEVEL_OUTOF10: 8
PAINLEVEL_OUTOF10: 8
PAINLEVEL_OUTOF10: 0
PAINLEVEL_OUTOF10: 8
PAINLEVEL_OUTOF10: 3

## 2019-04-17 ASSESSMENT — PAIN DESCRIPTION - FREQUENCY: FREQUENCY: CONTINUOUS

## 2019-04-17 ASSESSMENT — PAIN DESCRIPTION - PAIN TYPE
TYPE: SURGICAL PAIN
TYPE: SURGICAL PAIN

## 2019-04-17 ASSESSMENT — PAIN DESCRIPTION - LOCATION: LOCATION: ABDOMEN

## 2019-04-17 ASSESSMENT — PAIN DESCRIPTION - ORIENTATION: ORIENTATION: LOWER

## 2019-04-17 ASSESSMENT — PAIN DESCRIPTION - DESCRIPTORS: DESCRIPTORS: CRAMPING

## 2019-04-17 ASSESSMENT — PAIN DESCRIPTION - ONSET: ONSET: ON-GOING

## 2019-04-17 NOTE — PROGRESS NOTES
Physical Therapy  DATE: 2019    NAME: Faheem Paul  MRN: [id number]   : 1965    Patient not seen this date for Physical Therapy due to:  [] Blood transfusion in progress  [x] Hemodialysis---will check back PM as able  []  Patient Declined  [] Spine Precautions   [] Strict Bedrest  [] Surgery/ Procedure  [] Testing      [] Other        [] PT being discontinued at this time. Patient independent. No further needs. [] PT being discontinued at this time as the patient has been transferred to palliative care. No further needs.     Kulwant Landa, PTA

## 2019-04-17 NOTE — PROGRESS NOTES
Dialysis Post Treatment Note  Patient tolerated treatment well. Denies complaints at time of discharge.    Vitals:    04/17/19 1230   BP: 105/61   Pulse: 111   Resp:    Temp: 98.5 °F (36.9 °C)   SpO2:      Pre-Weight = 81.3 kg; Bed scale  Post-weight = Weight: 177 lb 0.5 oz (80.3 kg)  Total Liters Processed = Total Liters Processed (l/min): 82.6 l/min  Rinseback Volume (mL) = Rinseback Volume (ml): 470 ml  Net Removal (mL) = 1060  Length of treatment= 210 minutes

## 2019-04-17 NOTE — PROGRESS NOTES
Notified by the RN which stated that 'patient is having moderate bloody discharge from vagina'. Came in to see patient and examined at bedside. Saw her diaper in the waste box and it was completely soaked with red mixed with brown fluid. Her vaginal area was cleaned by the RN. Powder was applied to the area. She had a new diaper on. Not sure whether the source of this gush of redy brown fluid was vaginal or rectal. Did a ELOINA and it looked clean. Her last Hb at 1630 was 7.8. Will check an H&H again. Notified the nurse to keep an eye and keep examining the diaper. Asked the nurse to page General surgery as well. Will hold heparin drip for now. And recheck Hb. Notified the RN and the on call resident.     Tripp Broussard MD  PGY-1, Department of Internal Medicine  [address], [place]  4/17/2019 6:40 PM

## 2019-04-17 NOTE — PROGRESS NOTES
[facility] - Progress Note    Today's Date and Time: 4/17/2019, 11:25 AM    Impression :   1. 48 y/o female with initial complaints of:  · Vomiting  · Diarrhea  · Abdominal pain  · Altered mental status  2. Acute kidney injury- on HD   3. Shock, presumptive septic plus hypovolemic, requiring Levophed- Resolved  4. Septicemia with Haemophilus influenzae 4-5-19--Resolved  5. Gastroenteritis--Resolved  6. Ischemic bowel. S/P laparotomy 4-6-19 with resection  7. S/p second look with resection of transverse colon, ileostomy creation, resection of rectal stump and abdominal closure. 8. Severe hyponatremia--Resolved   9. Transaminitis, shock liver.-Resolved  10. COPD  6. Arthritis  12. Chronic NSAID use  13. Funguria  14. Acral mottling of hands and feet  15. Allergy to penicillins: Hives and respiratory distress  16. Allergy to sulfa   17. S/P New tunnel HD catheter placement 4/16    Recommendations:   · Monitor off antibiotics (Completed Meropenem. Stop date 4-14-19)  · Consider telepsych treatment  · Start treatment for oral thrush    Medical Decision Making/Summary/Discussion:   · 48 y/o female with vomiting, diarrhea, and AMS. · Found to have gastroenteritis with dehydration, shock requiring pressors, transaminitis, MARY requiring emergent dialysis, lactic acidosis. · Intubated due to AMS  · CT chest shows atelactasis vs pneumonia  · Initially treated with vanc, levaquin, and aztreonam.   · Patient was felt to be critically ill with origin in GI tract . Was treated with meropenem despite Hx of penicillin allergy. · Had laparotomy 4-6-19 with findings of ischemic bowel from distal ileum to sigmoid colon. · S/P ileocecectomy with extended left hemicolectomy and placement of wound vac 4-6-19  · Overall improvement post surgery  · Off pressors and sedation  · Blood Culture grew Haemophilus influenza, beta lactamase negative.  THis finding is likely unrelated to intraabdominal 4-7-19. Pulse now in the low 100's. Abdomen softer. VAC in place  Skin cyanosis resolved. CXR 4-11-19 showed unchanged pulmonary edema and effusions. CXR 4-16-19 Rt side atelectasis     Duplex of the right upper extremity showed no evidence of deep venous thrombosis in the right upper extremity. Superficial thrombophlebitis of the right upper extremity involving the cephalic and basilic veins. Says that hand is not hurting her. Able to move the wrist and digits on command. Rt wrist with skin ulceration. Labs reviewed: 4/17/2019     WBC 23.9-->33.8->41.7->38.3->39.6->34.5-->26.4  Hb 8.3-->9.4->7.8  Plat 75-->119->264    BUN 84-->62  Cr 5.89-->4.86    Cultures:  Urine:  · NGTD  Blood:  · 4-5-19 : 1/2 haemophilus influenza, beta lactamase negative, sensitive to pcn and meropenem. Pt completed a 10 day course of meropenem  · 4/12 x 1 no growth to date  · 4/13 x 2 no growth to date  Wound:  · NA    MRSA- Neg    Discussed with RN, patient. I have personally reviewed the past medical history, past surgical history, medications, social history, and family history, and I have updated the database accordingly.   Past Medical History:     Past Medical History:   Diagnosis Date    Asthma     On inhaler    Back pain, chronic     Hypertension 2015    On Lisinopril    Snores     possible apnea but not tested    Wears glasses        Past Surgical  History:     Past Surgical History:   Procedure Laterality Date    KNEE ARTHROSCOPY Left 1980    KNEE ARTHROSCOPY Left 09/28/2016    with medial menisectomy    LAPAROTOMY EXPLORATORY N/A 4/6/2019    LAPAROTOMY EXPLORATORY, ILEOCECECTOMY, SUBTOTAL COLECTOMY, ABTHEHRA WOUND VAC PLACEMENT performed by Ana Rush MD [address] N/A 4/8/2019    2ND LOOK EXPLORATORY LAPAROTOMY, RESECTION TRANSVERSE COLON, ILEOSTOMY CREATION, RESECTION RECTAL STUMP, ABDOMINAL WASHOUT, OMENTECTOMY, ABDOMINAL WALL CLOSURE performed by Aleksandra Bustamante MD at [address] 2013       Medications:      nicotine  1 patch Transdermal Daily    midodrine  10 mg Oral TID WC    aspirin  81 mg Oral Daily    cyclobenzaprine  5 mg Oral TID    pantoprazole  40 mg Oral BID AC    sodium chloride flush  10 mL Intravenous 2 times per day       Social History:     Social History     Socioeconomic History    Marital status: Single     Spouse name: Not on file    Number of children: 0    Years of education: Not on file    Highest education level: Not on file   Occupational History    Occupation: [address] [place] [address] Financial resource strain: Not on file    Food insecurity:     Worry: Not on file     Inability: Not on file    Transportation needs:     Medical: Not on file     Non-medical: Not on file   Tobacco Use    Smoking status: Light Tobacco Smoker     Packs/day: 0.25     Types: Cigarettes     Start date: 9/19/1980    Smokeless tobacco: Never Used   Substance and Sexual Activity    Alcohol use: Yes     Comment: OCCASSIONAL    Drug use: No    Sexual activity: Yes     Partners: Female   Lifestyle    Physical activity:     Days per week: Not on file     Minutes per session: Not on file    Stress: Not on file   Relationships    Social connections:     Talks on phone: Not on file     Gets together: Not on file     Attends Orthodoxy service: Not on file     Active member of club or organization: Not on file     Attends meetings of clubs or organizations: Not on file     Relationship status: Not on file    Intimate partner violence:     Fear of current or ex partner: Not on file     Emotionally abused: Not on file     Physically abused: Not on file     Forced sexual activity: Not on file   Other Topics Concern    Not on file   Social History Narrative    Not on file       Family History:     Family History   Problem Relation Age of Onset    Heart Disease Mother     Stroke Mother    [place] Heart Surgery Mother     Diabetes Maternal Grandmother     Emphysema Maternal Grandfather     Diabetes Maternal Grandfather     Lung Cancer Maternal Uncle         Allergies:   Pcn [penicillins]; Sulfa antibiotics; and Tape [adhesive tape]     Review of Systems:   Unable to provide. Sedated on ventilator. Physical Examination :     Patient Vitals for the past 8 hrs:   BP Temp Temp src Pulse Resp SpO2 Weight   04/17/19 0953 90/68 -- -- 110 -- -- --   04/17/19 0923 109/67 -- -- 108 -- -- --   04/17/19 0853 114/64 -- -- 103 -- -- --   04/17/19 0848 110/64 99.3 °F (37.4 °C) -- 103 18 -- 179 lb 3.7 oz (81.3 kg)   04/17/19 0800 124/78 98.6 °F (37 °C) Oral 109 23 99 % --   04/17/19 0400 (!) 113/52 98.9 °F (37.2 °C) Oral 93 21 96 % --     General Appearance: Sedated, on ventilator  Head:  Normocephalic, no trauma  Eyes: Pupils equal, round, reactive to light; sclera anicteric; conjunctivae pink. No embolic phenomena. ENT: Oropharynx clear, without erythema, exudate, or thrush. No tenderness of sinuses. Mouth/throat: mucosa pink and moist. No lesions. Dentition in good repair. Neck:Supple, without lymphadenopathy. Thyroid normal, No bruits. Pulmonary/Chest: Clear to auscultation, without wheezes, rales, or rhonchi. No dullness to percussion. Cardiovascular: Regular rate and rhythm without murmurs, rubs, or gallops. Abdomen: Distended. Bowel sounds absent. Soft, mildly tender, surgical bandages in place. Wound vac removed. All four Extremities: Cyanosis of trunk, abdomen, distal extremities  Neurologic: Sedated  Skin: Cool and dry with poor turgor. Signs of peripheral arterial  insufficiency. No ulcerations. No open wounds. Skin purplish, cyanotic.     Medical Decision Making -Laboratory:   I have independently reviewed/ordered the following labs:    CBC with Differential:   Recent Labs     04/16/19  1629 04/17/19  0218 04/17/19  0640   WBC 31.8*  --  26.4*   HGB 6.9* 9.1* 7.8*   HCT 22.9* 27.0* 22.9*     --  See

## 2019-04-17 NOTE — PLAN OF CARE
Problem: Falls - Risk of:  Goal: Will remain free from falls  Description  Will remain free from falls  4/17/2019 0426 by Ivis Medrano RN  Outcome: Ongoing  Note:   Pt remains free of falls at this time. Bed locked in lowest position, siderails x2, call light in reach. Non-skid footwear applied. Encouraged pt to call for assistance as needed for safety. Falling star posted outside of room. Will continue to monitor.

## 2019-04-17 NOTE — CARE COORDINATION
Called [facility] and spoke with [facility]  She stated they are still waiting on insurance clearance for [employer] for OP dialysis  [facility] will advise once financial clearance obtained    1450 - Addendum  Received call from [facility] at [facility] Admissions  She stated it will take 7-14 days to get financial approval from [place]  Asked if this was because they were out of network - she was unsure  She stated pt does have a chair time - TTS @ 10:45a at [facility], she cannot start until she is financially cleared    [employer] and spoke with Joseph Ellis to inquire if [facility] is in-network or if pt needs to utilize a [facility] unit  She stated [facility] is in network and shared the [facility] locations that were included [employer] is included  She stated [facility] is out of network  Informed her of that Admissions stated it would take 7-14 days for approval - Joseph Ellis reported that she has never heard it taking that long

## 2019-04-17 NOTE — PROGRESS NOTES
cyclobenzaprine  5 mg Oral TID    pantoprazole  40 mg Oral BID AC    sodium chloride flush  10 mL Intravenous 2 times per day       PRN Medications  sodium chloride 250 mL PRN   sodium chloride 150 mL PRN   acetaminophen 650 mg Q4H PRN   glucose 15 g PRN   dextrose 12.5 g PRN   glucagon (rDNA) 1 mg PRN   dextrose 100 mL/hr PRN   glucose 15 g PRN   dextrose 12.5 g PRN   glucagon (rDNA) 1 mg PRN   dextrose 100 mL/hr PRN   heparin (porcine) 4,000 Units PRN   heparin (porcine) 2,000 Units PRN   oxyCODONE 5 mg Q4H PRN   morphine 4 mg Q3H PRN   Or     morphine 6 mg Q3H PRN   sodium chloride 250 mL PRN   sodium chloride 150 mL PRN   anticoagulant sodium citrate 3 mL PRN   anticoagulant sodium citrate 3 mL PRN   dextrose 25 g PRN   sodium chloride flush 10 mL PRN   magnesium hydroxide 30 mL Daily PRN   ondansetron 4 mg Q6H PRN       Diagnostic Labs and Imaging:  CBC:  Recent Labs     04/15/19  1030 04/16/19  1629 04/17/19  0218 04/17/19  0640 04/17/19  1630   WBC 34.5* 31.8*  --  26.4*  --    HGB 7.8* 6.9* 9.1* 7.8* 7.8*    411  --  See Reflexed IPF Result  --      BMP: Recent Labs     04/15/19  1030 04/16/19  1629 04/17/19  0640   * 135 131*   K 4.7 4.5 5.0   CL 90* 97* 95*   CO2 22 22 20   BUN 84* 54* 62*   CREATININE 5.89* 4.40* 4.86*   GLUCOSE 78 86 91     Hepatic: Recent Labs     04/16/19  1629   AST 37*   ALT 59*   BILITOT 0.58   ALKPHOS 91       Assessment and Plan:     Principal Problem:    Septic shock (HCC)  Active Problems:    Shock ([address]    Rhabdomyolysis    Hyponatremia    Lactic acidosis    MARY (acute kidney injury) ([facility]    Metabolic acidosis    Acute respiratory failure (HCC)    Elevated liver enzymes    Hyperkalemia    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HCC)    Small bowel ischemia (HCC)    Acute GI bleeding    Gram negative septic shock (HCC)    Protein-calorie malnutrition (HCC)    Gastroenteritis    Haemophilus influenzae septicemia (HCC)    Pleural effusion, bilateral    Splenic infarction    Leukemoid reaction    Mottled skin  Resolved Problems:    * No resolved hospital problems. *    · Levofloxacin discontinued. Patient completed a course of meropenem. Got one-time dose of vancomycin yesterday. Continue to monitor patient off antibiotics  · Continue to monitor Hb and Platelets. CBC daily  · BiPAP as needed. · Will consult cardiology again regarding transesophageal echocardiogram.  The patient's vital has been stable and she can undergo the procedure. · Continue Midodrine for low BP if needed  · PT/OT  · Pain control with Morphine and Roxicodone PRN  · Patient got tunnel cath placed in the right IJ. · Continue aspirin and heparin drip. · Hypercoagulable workup pending. · WBC is trending down.  Blood cultures have been negative so far. · Increased midodrine to 10 mg TD. · Started nystatin for oral thrush. · Continue Dental soft diet    Aaron Guaman MD  PGY-1, Department of Internal Medicine  [address] [place] [address]  4/17/2019 5:53 PM      Attending Physician Statement  I have discussed the care of Faheem Paul, including pertinent history and exam findings with the resident. I have reviewed the key elements of all parts of the encounter with the resident. I have seen and examined the patient with the resident. I agree with the assessment and plan and status of the problem list as documented. Patient was seen in hemodialysis unit this morning, I have reviewed the events from yesterday, chart seen and medications reviewed. She received 1 unit of packed RBCs yesterday as she is dropping her hemoglobin and then her hemoglobin was 7.8 since then there was no evidence of bleeding yesterday and today abdominal pain is better she denies any nausea vomiting she claims that she is able to eat better today she does not have any vomiting and abdominal is soft and mild tenderness as before nondistended.   Chest x-ray was done after dialysis catheter placement and shows right basilar atelectasis. She had a permacath placed yesterday and heparin drip was not started since permacath was placed, float nurses were informed after dialysis they should resume her heparin drip. Because of hypotension and fever yesterday her transesophageal echo was canceled, well informed cardiology to proceed with transesophageal echo  Her WBC count is improving, she had temperature yesterday and the night before, she was seen by infectious disease and off antibiotic per infectious disease, she continued to improve and platelets count. Hemodialysis today. Will monitor temperature and WBC count. So far and repeat blood cultures from yesterday were negative  Will monitor hemoglobin and hematocrit every 8 hours for next 24 hours. She is off Lopressor at this time because of hypotension and once her blood pressure remained stable then we will start with low dose of Lopressor as she is becoming tachycardic. She is currently on midodrine. Continue to monitor blood sugar for hypoglycemia  Jed Rodriguez MD  4/17/2019 7:40 PM      Please note that this chart was generated using voice recognition Dragon dictation software. Although every effort was made to ensure the accuracy of this automated transcription, some errors in transcription may have occurred.

## 2019-04-17 NOTE — PROGRESS NOTES
Nephrology Progress Note        Subjective:   Patient was seen and examined on HD. No new issues overnight. Tolerating the procedure well. T-max last 24 hours was 101.3 Fahrenheit, continues to maintain some urine output 950last 24hr, some edema still present    Objective:  CURRENT TEMPERATURE:  Temp: 98.5 °F (36.9 °C)  MAXIMUM TEMPERATURE OVER 24HRS:  Temp (24hrs), Av.9 °F (37.7 °C), Min:98.5 °F (36.9 °C), Max:101.5 °F (38.6 °C)    CURRENT RESPIRATORY RATE:  Resp: 18  CURRENT PULSE:  Pulse: 111  CURRENT BLOOD PRESSURE:  BP: 105/61  24HR BLOOD PRESSURE RANGE:  Systolic (75NGF), UAH:620 , Min:83 , TXZ:625   ; Diastolic (93VAZ), TE, Min:48, Max:92    24HR INTAKE/OUTPUT:      Intake/Output Summary (Last 24 hours) at 2019 1747  Last data filed at 2019 1729  Gross per 24 hour   Intake 4047.67 ml   Output 3530 ml   Net 517.67 ml     Patient Vitals for the past 96 hrs (Last 3 readings):   Weight   19 1230 177 lb 0.5 oz (80.3 kg)   19 0848 179 lb 3.7 oz (81.3 kg)   04/15/19 1350 179 lb 7.3 oz (81.4 kg)         Physical Exam:  General appearance:Awake, alert, in no acute distress  Skin: warm and dry, no rash or erythema  Eyes: conjunctivae normal and sclera anicteric  ENT:no thrush no pharyngeal congestion   Neck: supple  Pulmonary: clear to auscultation and no wheezing or rhonchi   Cardiovascular: normal S1 and S2. NO rubs and NO murmur. No S3 OR S4   Abdomen: soft nontender, bowel sounds present, no organomegaly,  no ascites   Extremities: no cyanosis, clubbing.  Some edema    Access:  Temporary right IJ vein catheter    Current Medications:      nicotine (NICODERM CQ) 21 MG/24HR 1 patch Daily   0.9 % sodium chloride bolus PRN   0.9 % sodium chloride bolus PRN   sodium chloride 154 mEq in dextrose 10 % 1,000 mL infusion Continuous   midodrine (PROAMATINE) tablet 10 mg TID WC   acetaminophen (TYLENOL) tablet 650 mg Q4H PRN   glucose (GLUTOSE) 40 % oral gel 15 g PRN   dextrose 50 % solution 12.5 g PRN   glucagon (rDNA) injection 1 mg PRN   dextrose 5 % solution PRN   glucose (GLUTOSE) 40 % oral gel 15 g PRN   dextrose 50 % solution 12.5 g PRN   glucagon (rDNA) injection 1 mg PRN   dextrose 5 % solution PRN   aspirin chewable tablet 81 mg Daily   heparin (porcine) injection 4,000 Units PRN   heparin (porcine) injection 2,000 Units PRN   heparin 25,000 units in dextrose 5% 250 mL infusion Continuous   oxyCODONE (ROXICODONE) immediate release tablet 5 mg Q4H PRN   cyclobenzaprine (FLEXERIL) tablet 5 mg TID   morphine injection 4 mg Q3H PRN   Or    morphine injection 6 mg Q3H PRN   0.9 % sodium chloride bolus PRN   0.9 % sodium chloride bolus PRN   pantoprazole (PROTONIX) tablet 40 mg BID AC   anticoagulant sodium citrate 4 % injection 3 mL PRN   anticoagulant sodium citrate 4 % injection 3 mL PRN   dextrose 50 % solution 25 g PRN   sodium chloride flush 0.9 % injection 10 mL 2 times per day   sodium chloride flush 0.9 % injection 10 mL PRN   magnesium hydroxide (MILK OF MAGNESIA) 400 MG/5ML suspension 30 mL Daily PRN   ondansetron (ZOFRAN) injection 4 mg Q6H PRN     Labs:   CBC:   Recent Labs     04/15/19  1030 04/16/19  1629 04/17/19  0218 04/17/19  0640 04/17/19  1630   WBC 34.5* 31.8*  --  26.4*  --    RBC 2.53* 2.26*  --  2.56*  --    HGB 7.8* 6.9* 9.1* 7.8* 7.8*   HCT 23.7* 22.9* 27.0* 22.9* 22.8*    411  --  See Reflexed IPF Result  --    MPV 12.2 11.8  --  NOT REPORTED  --       BMP:   Recent Labs     04/15/19  1030 04/16/19  1629 04/17/19  0640   * 135 131*   K 4.7 4.5 5.0   CL 90* 97* 95*   CO2 22 22 20   BUN 84* 54* 62*   CREATININE 5.89* 4.40* 4.86*   GLUCOSE 78 86 91   CALCIUM 7.7* 7.5* 7.5*      Albumin:   Recent Labs     04/16/19  1629   LABALBU 2.2*       Dialysis bath: Dialysis Bath  K+ (Potassium): 3  Ca+ (Calcium): 3  Na+ (Sodium): 140  HCO3 (Bicarb): 35    Radiology:  Reviewed as available.     Assessment:  1 Acute kidney injury on hemodialysis not showing signs of renal recovery. Currently undergoing dialysis Monday Wednesday Friday, via tunneled cath. making some urine  2. Ischemic bowel status post resection and ileostomy   3 H. influenzae sepsis on treatment  4 EDEMA : Lymphedema and mild volume overload  5. ANEMIA OF CHRONIC DISEASE: Hemoglobin fluctuates  6.suspected hypercarbic audible state workup in progress   7. Hyponatremia- stable    Plan:  1. Patient was seen on HD at bedside. Orders were confirmed with the HD nurse. 2. Cont pt on aranesp and zemplar per protocol. 3. Patient got a tunneled catheter placed in today. 4. Renal function not expected to recover any time soon. 5.  [facility] schedule but patient does not have clearance from the insurance yet which could take about 7-14 days prior  note. 6. Will follow. Please do not hesitate to call with questions. Sherman Zapata MD   Nephrology [address] [place] [address]

## 2019-04-18 ENCOUNTER — APPOINTMENT (OUTPATIENT)
Dept: ULTRASOUND IMAGING | Age: 54
DRG: 710 | End: 2019-04-18
Payer: MEDICAID

## 2019-04-18 LAB
ABSOLUTE EOS #: 0 K/UL (ref 0–0.4)
ABSOLUTE IMMATURE GRANULOCYTE: 0 K/UL (ref 0–0.3)
ABSOLUTE LYMPH #: 2.83 K/UL (ref 1–4.8)
ABSOLUTE MONO #: 0.77 K/UL (ref 0.1–0.8)
ANION GAP SERPL CALCULATED.3IONS-SCNC: 15 MMOL/L (ref 9–17)
BASOPHILS # BLD: 0 % (ref 0–2)
BASOPHILS ABSOLUTE: 0 K/UL (ref 0–0.2)
BUN BLDV-MCNC: 33 MG/DL (ref 6–20)
BUN/CREAT BLD: ABNORMAL (ref 9–20)
CALCIUM SERPL-MCNC: 7.3 MG/DL (ref 8.6–10.4)
CHLORIDE BLD-SCNC: 96 MMOL/L (ref 98–107)
CO2: 23 MMOL/L (ref 20–31)
CREAT SERPL-MCNC: 3.68 MG/DL (ref 0.5–0.9)
CULTURE: NORMAL
DIFFERENTIAL TYPE: ABNORMAL
DIRECT EXAM: NORMAL
EOSINOPHILS RELATIVE PERCENT: 0 % (ref 1–4)
GFR AFRICAN AMERICAN: 16 ML/MIN
GFR NON-AFRICAN AMERICAN: 13 ML/MIN
GFR SERPL CREATININE-BSD FRML MDRD: ABNORMAL ML/MIN/{1.73_M2}
GFR SERPL CREATININE-BSD FRML MDRD: ABNORMAL ML/MIN/{1.73_M2}
GLUCOSE BLD-MCNC: 109 MG/DL (ref 65–105)
GLUCOSE BLD-MCNC: 112 MG/DL (ref 70–99)
GLUCOSE BLD-MCNC: 114 MG/DL (ref 65–105)
GLUCOSE BLD-MCNC: 127 MG/DL (ref 65–105)
GLUCOSE BLD-MCNC: 139 MG/DL (ref 65–105)
GLUCOSE BLD-MCNC: 151 MG/DL (ref 65–105)
GLUCOSE BLD-MCNC: 29 MG/DL (ref 65–105)
GLUCOSE BLD-MCNC: 90 MG/DL (ref 65–105)
HCT VFR BLD CALC: 22.7 % (ref 36.3–47.1)
HCT VFR BLD CALC: 23.2 % (ref 36.3–47.1)
HEMOGLOBIN: 7.3 G/DL (ref 11.9–15.1)
HEMOGLOBIN: 8 G/DL (ref 11.9–15.1)
IMMATURE GRANULOCYTES: 0 %
LV EF: 58 %
LVEF MODALITY: NORMAL
LYMPHOCYTES # BLD: 11 % (ref 24–44)
Lab: NORMAL
Lab: NORMAL
MCH RBC QN AUTO: 30.2 PG (ref 25.2–33.5)
MCHC RBC AUTO-ENTMCNC: 32.2 G/DL (ref 28.4–34.8)
MCV RBC AUTO: 93.8 FL (ref 82.6–102.9)
MONOCYTES # BLD: 3 % (ref 1–7)
MORPHOLOGY: ABNORMAL
NRBC AUTOMATED: 0 PER 100 WBC
PARTIAL THROMBOPLASTIN TIME: 28.6 SEC (ref 20.5–30.5)
PARTIAL THROMBOPLASTIN TIME: 30.2 SEC (ref 20.5–30.5)
PDW BLD-RTO: 15.7 % (ref 11.8–14.4)
PLATELET # BLD: 428 K/UL (ref 138–453)
PLATELET ESTIMATE: ABNORMAL
PMV BLD AUTO: 11.3 FL (ref 8.1–13.5)
POTASSIUM SERPL-SCNC: 4 MMOL/L (ref 3.7–5.3)
RBC # BLD: 2.42 M/UL (ref 3.95–5.11)
RBC # BLD: ABNORMAL 10*6/UL
SEG NEUTROPHILS: 86 % (ref 36–66)
SEGMENTED NEUTROPHILS ABSOLUTE COUNT: 22.1 K/UL (ref 1.8–7.7)
SODIUM BLD-SCNC: 134 MMOL/L (ref 135–144)
SPECIMEN DESCRIPTION: NORMAL
SPECIMEN DESCRIPTION: NORMAL
WBC # BLD: 25.7 K/UL (ref 3.5–11.3)
WBC # BLD: ABNORMAL 10*3/UL

## 2019-04-18 PROCEDURE — 76856 US EXAM PELVIC COMPLETE: CPT

## 2019-04-18 PROCEDURE — 85025 COMPLETE CBC W/AUTO DIFF WBC: CPT

## 2019-04-18 PROCEDURE — 76937 US GUIDE VASCULAR ACCESS: CPT

## 2019-04-18 PROCEDURE — 6360000002 HC RX W HCPCS: Performed by: STUDENT IN AN ORGANIZED HEALTH CARE EDUCATION/TRAINING PROGRAM

## 2019-04-18 PROCEDURE — 2709999900 HC NON-CHARGEABLE SUPPLY

## 2019-04-18 PROCEDURE — 99253 IP/OBS CNSLTJ NEW/EST LOW 45: CPT | Performed by: OBSTETRICS & GYNECOLOGY

## 2019-04-18 PROCEDURE — 85730 THROMBOPLASTIN TIME PARTIAL: CPT

## 2019-04-18 PROCEDURE — 36415 COLL VENOUS BLD VENIPUNCTURE: CPT

## 2019-04-18 PROCEDURE — 87480 CANDIDA DNA DIR PROBE: CPT

## 2019-04-18 PROCEDURE — 2580000003 HC RX 258: Performed by: STUDENT IN AN ORGANIZED HEALTH CARE EDUCATION/TRAINING PROGRAM

## 2019-04-18 PROCEDURE — 87660 TRICHOMONAS VAGIN DIR PROBE: CPT

## 2019-04-18 PROCEDURE — 87510 GARDNER VAG DNA DIR PROBE: CPT

## 2019-04-18 PROCEDURE — 99233 SBSQ HOSP IP/OBS HIGH 50: CPT | Performed by: INTERNAL MEDICINE

## 2019-04-18 PROCEDURE — 2060000000 HC ICU INTERMEDIATE R&B

## 2019-04-18 PROCEDURE — 5A2204Z RESTORATION OF CARDIAC RHYTHM, SINGLE: ICD-10-PCS | Performed by: INTERNAL MEDICINE

## 2019-04-18 PROCEDURE — 85018 HEMOGLOBIN: CPT

## 2019-04-18 PROCEDURE — 6370000000 HC RX 637 (ALT 250 FOR IP): Performed by: STUDENT IN AN ORGANIZED HEALTH CARE EDUCATION/TRAINING PROGRAM

## 2019-04-18 PROCEDURE — 6370000000 HC RX 637 (ALT 250 FOR IP): Performed by: RADIOLOGY

## 2019-04-18 PROCEDURE — 80048 BASIC METABOLIC PNL TOTAL CA: CPT

## 2019-04-18 PROCEDURE — 93312 ECHO TRANSESOPHAGEAL: CPT

## 2019-04-18 PROCEDURE — 6360000002 HC RX W HCPCS

## 2019-04-18 PROCEDURE — 93325 DOPPLER ECHO COLOR FLOW MAPG: CPT

## 2019-04-18 PROCEDURE — 99232 SBSQ HOSP IP/OBS MODERATE 35: CPT | Performed by: INTERNAL MEDICINE

## 2019-04-18 PROCEDURE — 82947 ASSAY GLUCOSE BLOOD QUANT: CPT

## 2019-04-18 PROCEDURE — 87491 CHLMYD TRACH DNA AMP PROBE: CPT

## 2019-04-18 PROCEDURE — 87591 N.GONORRHOEAE DNA AMP PROB: CPT

## 2019-04-18 PROCEDURE — 97530 THERAPEUTIC ACTIVITIES: CPT

## 2019-04-18 PROCEDURE — 85014 HEMATOCRIT: CPT

## 2019-04-18 PROCEDURE — 97110 THERAPEUTIC EXERCISES: CPT

## 2019-04-18 PROCEDURE — 6370000000 HC RX 637 (ALT 250 FOR IP): Performed by: INTERNAL MEDICINE

## 2019-04-18 RX ORDER — SODIUM CHLORIDE 0.9 % (FLUSH) 0.9 %
10 SYRINGE (ML) INJECTION PRN
Status: CANCELLED | OUTPATIENT
Start: 2019-04-18

## 2019-04-18 RX ORDER — HEPARIN SODIUM 10000 [USP'U]/100ML
12 INJECTION, SOLUTION INTRAVENOUS CONTINUOUS
Status: DISCONTINUED | OUTPATIENT
Start: 2019-04-18 | End: 2019-04-23

## 2019-04-18 RX ORDER — SODIUM CHLORIDE 0.9 % (FLUSH) 0.9 %
10 SYRINGE (ML) INJECTION EVERY 12 HOURS SCHEDULED
Status: CANCELLED | OUTPATIENT
Start: 2019-04-18

## 2019-04-18 RX ADMIN — Medication 10 ML: at 20:59

## 2019-04-18 RX ADMIN — HEPARIN SODIUM AND DEXTROSE 18 UNITS/KG/HR: 10000; 5 INJECTION INTRAVENOUS at 20:46

## 2019-04-18 RX ADMIN — NYSTATIN 500000 UNITS: 500000 SUSPENSION ORAL at 20:46

## 2019-04-18 RX ADMIN — HEPARIN SODIUM AND DEXTROSE 14 UNITS/KG/HR: 10000; 5 INJECTION INTRAVENOUS at 09:49

## 2019-04-18 RX ADMIN — HEPARIN SODIUM 4000 UNITS: 1000 INJECTION INTRAVENOUS; SUBCUTANEOUS at 20:54

## 2019-04-18 RX ADMIN — ACETAMINOPHEN 650 MG: 325 TABLET ORAL at 07:08

## 2019-04-18 RX ADMIN — Medication 10 ML: at 09:33

## 2019-04-18 RX ADMIN — OXYCODONE HYDROCHLORIDE 5 MG: 5 TABLET ORAL at 18:22

## 2019-04-18 RX ADMIN — DEXTROSE MONOHYDRATE 12.5 G: 500 INJECTION PARENTERAL at 16:14

## 2019-04-18 RX ADMIN — NYSTATIN 500000 UNITS: 500000 SUSPENSION ORAL at 17:28

## 2019-04-18 RX ADMIN — OXYCODONE HYDROCHLORIDE 5 MG: 5 TABLET ORAL at 07:09

## 2019-04-18 RX ADMIN — HEPARIN SODIUM 4000 UNITS: 1000 INJECTION INTRAVENOUS; SUBCUTANEOUS at 00:54

## 2019-04-18 RX ADMIN — HEPARIN SODIUM AND DEXTROSE 12 UNITS/KG/HR: 10000; 5 INJECTION INTRAVENOUS at 00:55

## 2019-04-18 RX ADMIN — CYCLOBENZAPRINE 5 MG: 10 TABLET, FILM COATED ORAL at 20:46

## 2019-04-18 RX ADMIN — CYCLOBENZAPRINE 5 MG: 10 TABLET, FILM COATED ORAL at 14:23

## 2019-04-18 RX ADMIN — PANTOPRAZOLE SODIUM 40 MG: 40 TABLET, DELAYED RELEASE ORAL at 17:28

## 2019-04-18 RX ADMIN — NYSTATIN 500000 UNITS: 500000 SUSPENSION ORAL at 14:24

## 2019-04-18 RX ADMIN — MORPHINE SULFATE 4 MG: 4 INJECTION INTRAVENOUS at 21:18

## 2019-04-18 RX ADMIN — HEPARIN SODIUM 2000 UNITS: 1000 INJECTION INTRAVENOUS; SUBCUTANEOUS at 09:49

## 2019-04-18 RX ADMIN — SODIUM CHLORIDE: 234 INJECTION INTRAMUSCULAR; INTRAVENOUS; SUBCUTANEOUS at 00:01

## 2019-04-18 RX ADMIN — PANTOPRAZOLE SODIUM 40 MG: 40 TABLET, DELAYED RELEASE ORAL at 06:25

## 2019-04-18 ASSESSMENT — PAIN DESCRIPTION - ONSET: ONSET: ON-GOING

## 2019-04-18 ASSESSMENT — PAIN SCALES - GENERAL
PAINLEVEL_OUTOF10: 9
PAINLEVEL_OUTOF10: 7
PAINLEVEL_OUTOF10: 7
PAINLEVEL_OUTOF10: 9
PAINLEVEL_OUTOF10: 7

## 2019-04-18 ASSESSMENT — PAIN DESCRIPTION - FREQUENCY: FREQUENCY: CONTINUOUS

## 2019-04-18 ASSESSMENT — PAIN DESCRIPTION - ORIENTATION: ORIENTATION: LOWER

## 2019-04-18 ASSESSMENT — PAIN DESCRIPTION - DESCRIPTORS: DESCRIPTORS: CRAMPING

## 2019-04-18 ASSESSMENT — PAIN DESCRIPTION - LOCATION
LOCATION: ABDOMEN
LOCATION: ABDOMEN

## 2019-04-18 ASSESSMENT — PAIN DESCRIPTION - PAIN TYPE
TYPE: SURGICAL PAIN
TYPE: SURGICAL PAIN

## 2019-04-18 NOTE — PROGRESS NOTES
medical history of Asthma, Back pain, chronic, Hypertension, Snores, and Wears glasses. Past Surgical History:   has a past surgical history that includes Tonsillectomy; Knee arthroscopy (Left, 1980); Ulnar tunnel release (Right, 2013); Knee arthroscopy (Left, 09/28/2016); LAPAROTOMY EXPLORATORY (N/A, 4/6/2019); and LAPAROTOMY EXPLORATORY (N/A, 4/8/2019). Family History: family history includes Diabetes in her maternal grandfather and maternal grandmother; Emphysema in her maternal grandfather; Heart Disease in her mother; Heart Surgery in her mother; Andreina Santos in her maternal uncle; Stroke in her mother. Social History:   reports that she has been smoking cigarettes. She started smoking about 38 years ago. She has been smoking about 0.25 packs per day. She has never used smokeless tobacco. She reports that she drinks alcohol. She reports that she does not use drugs. Medications:    Reviewed in EPIC     Allergies:  Pcn [penicillins]; Sulfa antibiotics; and Tape [adhesive tape]    REVIEW OF SYSTEMS:      Review of Systems  General: no fever or night sweats, Weight is stable. Progressive fatigue  ENT: No double or blurred vision, no tinnitus or hearing problem, no dysphagia or sore throat   Respiratory: No chest pain, no shortness of breath, no cough or hemoptysis. Cardiovascular: Denies chest pain, PND or orthopnea. No L E swelling or palpitations. Gastrointestinal:    Abdominal discomfort, wound VAC in place, no bleeding. Genitourinary: Denies dysuria, hematuria, frequency, urgency or incontinence. Neurological: Denies headaches, decreased LOC, no sensory or motor focal deficits. Musculoskeletal:  No arthralgia no back pain or joint swelling. Skin: There are no rashes or bleeding. Psychiatric:  No anxiety, no depression. Endocrine: no diabetes or thyroid disease. Hematologic: no bleeding , no adenopathy.                 PHYSICAL EXAM:      /61   Pulse 111   Temp 98.5 °F (36.9 °C) Resp 18   Ht 5' 1.02\" (1.55 m)   Wt 177 lb 0.5 oz (80.3 kg)   SpO2 99%   BMI 33.42 kg/m²    Temp (24hrs), Av.7 °F (37.6 °C), Min:98.5 °F (36.9 °C), Max:101.3 °F (38.5 °C)      Physical Exam  Gen. Exam, showed a well-appearing patient without evidence of distress or pain  HEENT, normocephalic and atraumatic, PERRLA extraocular muscles are intact  Neck showed no JVD no carotid bruit, no cervical adenopathy  Chest is clear to auscultation bilaterally  Heart is regular without any murmur  Abdomen left upper quadrant tenderness but the abdomen was soft  Lower extremities showed no edema clubbing or cyanosis  Neurological examination was nonfocal, with intact cranial nerves  Skin  No rashes or bruising appreciated          DATA:      Labs:       CBC:   Recent Labs     19  1629  19  0640 19  1630   WBC 31.8*  --  26.4*  --    HGB 6.9*   < > 7.8* 7.8*   HCT 22.9*   < > 22.9* 22.8*     --  See Reflexed IPF Result  --     < > = values in this interval not displayed.      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]     Leukemoid reaction [D72.823]     Mottled skin [L81.9]     Pleural effusion, bilateral [J90]     Protein-calorie malnutrition ([address] [E46]     Septic shock ([address] [A41.9, R65.21]     Gastroenteritis [K52.9]     [facility] [A41.3]     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[facility] [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] 2019    Small bowel ischemia ([facility] [K55.9] 2019    Acute GI bleeding [K92.2] 04/07/2019    Gram negative septic shock (HCC) [A41.50, R65.21]     Rhabdomyolysis [M62.82] 04/06/2019    Hyponatremia [E87.1] 04/06/2019    Lactic acidosis [E87.2] 04/06/2019    MARY (acute kidney injury) ([facility] [N17.9] 73/88/4426    Metabolic acidosis [S03.3] 04/06/2019    Acute respiratory failure ([facility] [J96.00] 04/06/2019    Elevated liver enzymes [R74.8] 04/06/2019    Hyperkalemia [E87.5]     Shock ([facility] [R57.9] 04/05/2019       RECOMMENDATIONS:  The patient is doing well  Continue heparin and aspirin  She will need to transition to oral anticoagulant when if she is stable  Continue HD  Leukocytosis , cultures are negative   Jeanne Monzon MD   [phone]

## 2019-04-18 NOTE — PROGRESS NOTES
[facility]  Internal Medicine Residency Program  Inpatient Daily Progress Note  ______________________________________________________________________________    Patient: Winifred Bobo  YOB: 1965   MRN: [id number]    Acct: [de-identified]     Admit date: 4/5/2019  Today's date: 04/18/19  Number of days in the hospital: 13       Admitting Diagnosis: Septic shock ([address]    Subjective:   Patient had a vaginal bleed yesterday. Today noticed painful vaginal sores. Consulted Ob Gyn for further evaluation. She continues to be on Heparin drip. Hb stable. To get a EDWARD today. Objective:   Vital Sign:  /73   Pulse 104   Temp 98 °F (36.7 °C) (Oral)   Resp 20   Ht 5' 1.02\" (1.55 m)   Wt 199 lb 4.7 oz (90.4 kg)   SpO2 90%   BMI 37.63 kg/m²       Physical Exam:  General appearance:   alert, well appearing, and in no distress  Mental Status: alert, oriented to person, place, and time  Neurologic:  alert, oriented, normal speech, no focal findings or movement disorder noted  Lungs:  clear to auscultation, no wheezes, rales or rhonchi, symmetric air entry  Heart[de-identified] normal rate, regular rhythm, normal S1, S2, no murmurs, rubs, clicks or gallops  Abdomen:  soft, nontender, nondistended, no masses or organomegaly. Stoma site clear.    Extremities: peripheral pulses normal, no pedal edema, no clubbing or cyanosis   Skin: normal coloration and turgor, no rashes, no suspicious skin lesions noted    Medications:  Scheduled Medications   nicotine  1 patch Transdermal Daily    nystatin  5 mL Oral 4x Daily    aspirin  81 mg Oral Daily    cyclobenzaprine  5 mg Oral TID    pantoprazole  40 mg Oral BID AC    sodium chloride flush  10 mL Intravenous 2 times per day       PRN Medications    sodium chloride 250 mL PRN   sodium chloride 150 mL PRN   midodrine 10 mg TID PRN   acetaminophen 650 mg Q4H PRN   glucose 15 g PRN   dextrose 12.5 g PRN   glucagon (rDNA) 1 mg PRN antibiotics  · Patient to get EDWARD today. · Per nurse had a vaginal bleed yesterday and today noticed painful vaginal sores. Consulted Ob Gyn for further recs. · Continue to monitor Hb and Platelets. CBC daily  · BiPAP as needed. · Will consult cardiology again regarding transesophageal echocardiogram.  The patient's vital has been stable and she can undergo the procedure. · Continue Midodrine for low BP if needed  · PT/OT  · Pain control with Morphine and Roxicodone PRN  · Patient got tunnel cath placed in the right IJ. · Continue aspirin and heparin drip. · Hypercoagulable workup pending. · WBC is trending down.  Blood cultures have been negative so far. · Increased midodrine to 10 mg TD. · Started nystatin for oral thrush.     · Continue Dental soft diet    Vicky Muniz MD      Department of Internal Medicine  [facility]

## 2019-04-18 NOTE — PROGRESS NOTES
Physical Therapy  Facility/Department: [address] STEPDOWN  Daily Treatment Note  NAME: Brady Ham  : 1965  MRN: [id number]    Date of Service: 2019    Discharge Recommendations:  Further therapy recommended at discharge. PT Equipment Recommendations  Equipment Needed: No    Patient Diagnosis(es): The primary encounter diagnosis was Acute renal failure, unspecified acute renal failure type ([address] A diagnosis of Altered mental status, unspecified altered mental status type was also pertinent to this visit. has a past medical history of Asthma, Back pain, chronic, Hypertension, Snores, and Wears glasses. has a past surgical history that includes Tonsillectomy; Knee arthroscopy (Left, ); Ulnar tunnel release (Right, ); Knee arthroscopy (Left, 2016); LAPAROTOMY EXPLORATORY (N/A, 2019); and LAPAROTOMY EXPLORATORY (N/A, 2019). Restrictions  Restrictions/Precautions  Restrictions/Precautions: Fall Risk, Contact Precautions  Required Braces or Orthoses?: No  Position Activity Restriction  Other position/activity restrictions: up with assist. s/p  ILEOCECECTOMY, SUBTOTAL COLECTOMY ; s/p 2ND LOOK EXPLORATORY LAPAROTOMY with ileostomy creation   Subjective   General  Response To Previous Treatment: Patient with no complaints from previous session. Family / Caregiver Present: No  Subjective  Subjective: RN and pt agreed to PT, pt awake in bed upon arrival. Pt reports some abd pain /10. Pain Screening  Patient Currently in Pain: Yes  Pain Assessment  Pain Assessment: 0-10  Pain Level: 7  Pain Type: Surgical pain  Pain Location: Abdomen  Non-Pharmaceutical Pain Intervention(s): Repositioned; Ambulation/Increased Activity  Response to Pain Intervention: Patient Satisfied  Vital Signs  Patient Currently in Pain: Yes       Orientation  Orientation  Overall Orientation Status: Within Functional Limits  Cognition      Objective   Bed mobility  Rolling to Right: Minimal assistance  Supine to Sit: Minimal assistance  Sit to Supine: Minimal assistance  Comment: Rquires B LE support  Transfers  Sit to Stand: Moderate Assistance  Stand to sit: Moderate Assistance  Comment: Attempted sit to stand x 2 trials with RW, MOD A, poor tolerance  Ambulation  Ambulation?: No(Unable to take any steps)     Balance  Posture: Good  Sitting - Static: Good  Sitting - Dynamic: Good(Pt sitting EOB x 15 min total with good balance, once established)  Standing - Static: Fair;-  Standing - Dynamic: Poor;-  Comments: Pt stood 10sec each x 2 trials, MOD A. Pt quickly return to seated position stating \"my legs are cramping too much\". Exercise  Seated LE exercise program: Long Arc Quads, hip abduction/adduction, heel/toe raises, and marches. Reps: 10x while seated EOB                       Assessment   Body structures, Functions, Activity limitations: Decreased functional mobility ; Decreased strength;Decreased balance  Assessment: Pt limited by c/o cramping in B LE and decreased endurance. Stood with RW wtih mOD A, unable to take any steps  Prognosis: Good  REQUIRES PT FOLLOW UP: Yes  Activity Tolerance  Activity Tolerance: Patient Tolerated treatment well;Patient limited by endurance; Patient limited by fatigue     Goals  Short term goals  Time Frame for Short term goals: 14 visits  Short term goal 1: Pt will be Betty with bed mobility  Short term goal 2: Pt will be Betty transfers  Short term goal 3: Pt will be SBA amb 125' RW   Short term goal 4: Pt will be safe to attempt steps    Plan    Plan  Times per week: 5-6x/wk  Current Treatment Recommendations: Strengthening, Balance Training, Functional Mobility Training, Transfer Training, Endurance Training, Cognitive Reorientation, Gait Training, Stair training, Home Exercise Program, Safety Education & Training, Equipment Evaluation, Education, & procurement, Patient/Caregiver Education & Training  Safety Devices  Type of devices: Call light within reach, Gait belt, Nurse notified, Left in bed, All fall risk precautions in place  Restraints  Initially in place: No     Therapy Time   Individual Concurrent Group Co-treatment   Time In 1432         Time Out 1503         Minutes [address] [place], [place]

## 2019-04-18 NOTE — PROGRESS NOTES
Occupational Therapy    Occupational Therapy Not Seen Note    DATE: 2019  Name: Sin Ortega  : 1965  MRN: [id number]    Patient not available for Occupational Therapy due to:    Surgery/Procedure: Pt going for EDWARD.         Electronically signed by PILLO Winters on 2019 at 1:13 PM

## 2019-04-18 NOTE — CARE COORDINATION
Transitional Planning    1515 EDWARD completed today  WBC's remain elevated with Tmax 101  Monitoring off antibiotics, ID following  Heparin drip con't  Called [place] intake and spoke to Enedina, requests updated PT OT notes to update patient's insurance authorization. Whiteboard updated to request PT OT notes.

## 2019-04-18 NOTE — PROGRESS NOTES
[facility] - Progress Note    Today's Date and Time: 4/18/2019, 9:50 AM    Impression :   1. 46 y/o female with initial complaints of:  · Vomiting  · Diarrhea  · Abdominal pain  · Altered mental status  2. Acute kidney injury- on HD   3. Shock, presumptive septic plus hypovolemic, requiring Levophed- Resolved  4. Septicemia with Haemophilus influenzae 4-5-19--Resolved  5. Gastroenteritis--Resolved  6. Ischemic bowel. S/P laparotomy 4-6-19 with resection  7. S/p second look with resection of transverse colon, ileostomy creation, resection of rectal stump and abdominal closure. 8. Severe hyponatremia--Resolved   9. Transaminitis, shock liver.-Resolved  10. COPD  6. Arthritis  12. Chronic NSAID use  13. Funguria  14. Acral mottling of hands and feet  15. Allergy to penicillins: Hives and respiratory distress  16. Allergy to sulfa   17. S/P New tunnel HD catheter placement 4/16    Recommendations:   · Monitor off antibiotics (Completed Meropenem. Stop date 4-14-19)  · Consider telepsych treatment  · Continue treatment for oral thrush  · Follow up possible rectal bleeding  · Monitor mental status  · Trend hemoglobin    Medical Decision Making/Summary/Discussion:   · 46 y/o female with vomiting, diarrhea, and AMS. · Found to have gastroenteritis with dehydration, shock requiring pressors, transaminitis, MARY requiring emergent dialysis, lactic acidosis. · Intubated due to AMS  · CT chest shows atelactasis vs pneumonia  · Initially treated with vanc, levaquin, and aztreonam.   · Patient was felt to be critically ill with origin in GI tract . Was treated with meropenem despite Hx of penicillin allergy. · Had laparotomy 4-6-19 with findings of ischemic bowel from distal ileum to sigmoid colon.    · S/P ileocecectomy with extended left hemicolectomy and placement of wound vac 4-6-19  · Overall improvement post surgery  · Off pressors and sedation  · Blood Culture grew Haemophilus influenza, beta lactamase negative. THis finding is likely unrelated to intraabdominal process. · Treated with Meropenem through 4/14  · Has maintained leukocytosis and occasional fever. · Overall clinical improvement. Infection Control Recommendations   · [place] Precautions    Antimicrobial Stewardship Recommendations     · Discontinuation of therapy      Coordination of Outpatient Care:   · Estimated Length of IV antimicrobials: 4/14  · Patient will need Midline Catheter Insertion: No  · Patient will need PICC line Insertion: No  · Patient will need: Home IV , [place],  SNF,  LTAC TBD  · Patient will need outpatient wound care: TBD    Chief complaint/reason for consultation:   · Altered mental status and tender abdomen       History of Present Illness:   Lilliana Hamm is a 47y.o.-year-old  female who was initially admitted on 4/5/2019. Patient seen at the request of Dr. Deedee Frost:    Patient presented to ED via EMS due to altered mental status and vomiting. Patient has history significant for COPD, right knee osteoarthirtis s/p arthoplasty, and chronic NSAID use. Patient lives in [place], but was here to visit her significant other. Arrived Wednesday night, said she didn't feel good. Thought she might have had a bad burger at a fast food restaurant. Went to sleep and slept for almost 24 hours. When she awoke, she said she was vomiting, having diarrhea, and abdominal pain. Significant other and sister are unsure of the nature of the vomit, diarrhea, or abdominal pain. Then she slept a bit more, until her significant other found her unresponsive and that's when he called EMS to bring her to the hospital.     Afebrile on admission, but Tmax overnight was 39.3. Tachycardic on admission, 129. Became hypotensive after admission and levophed and vasopressin were started. Initial labs:     Admission labs significant for Na 125, K 8.0, CO2 9, BUN 62, Creatinine of 4.06, Heme/onc is recommending lifelong anticoagulation. Hypercoagulable workup thus far is negative. Had blood in diaper yesterday. Per night resident note, hard to tell if it was vaginal or from rectum. FOBT negative. Hemoglobin stable today 7.3. Got one unit of blood yesterday for Hgb 6.9. Patient admits to polysubstance use before coming in this admission. Says she would like to stop, but is feeling very anxious about her current health status and stopping substance use. She states she would like to see telepsych for evaluation. Had episodes of a fib with RVR. Shocked twice to control rhythm and rate on 4-7-19. Pulse now in the low 100's. Abdomen softer. VAC in place  Skin cyanosis resolved. CXR 4-11-19 showed unchanged pulmonary edema and effusions. CXR 4-16-19 Rt side atelectasis     Duplex of the right upper extremity showed no evidence of deep venous thrombosis in the right upper extremity. Superficial thrombophlebitis of the right upper extremity involving the cephalic and basilic veins. Says that hand is not hurting her. Able to move the wrist and digits on command. Rt wrist with skin ulceration. Labs reviewed: 4/18/2019     WBC 23.9-->33.8->41.7->38.3->39.6->34.5-->26.4 -> 25.7  Hb 8.3-->9.4->7.8 -> 7.3  Plat 75-->119->264 -> 428    BUN 84-->62 -> 33  Cr 5.89-->4.86 -> 3.68    Cultures:  Urine:  · NGTD  Blood:  · 4-5-19 : 1/2 haemophilus influenza, beta lactamase negative, sensitive to pcn and meropenem. Pt completed a 10 day course of meropenem  · 4/12 x 1 no growth to date  · 4/13 x 2 no growth to date  Wound:  · NA    MRSA- Neg    Discussed with RN, patient. I have personally reviewed the past medical history, past surgical history, medications, social history, and family history, and I have updated the database accordingly.   Past Medical History:     Past Medical History:   Diagnosis Date    Asthma     On inhaler    Back pain, chronic     Hypertension 2015    On Lisinopril    Snores     possible apnea but not tested    Wears glasses        Past Surgical  History:     Past Surgical History:   Procedure Laterality Date    KNEE ARTHROSCOPY Left 1980    KNEE ARTHROSCOPY Left 09/28/2016    with medial menisectomy    LAPAROTOMY EXPLORATORY N/A 4/6/2019    LAPAROTOMY EXPLORATORY, ILEOCECECTOMY, SUBTOTAL COLECTOMY, ABTHEHRA WOUND VAC PLACEMENT performed by Kevin Mccartney MD at [address] [place] [address] N/A 4/8/2019    2ND LOOK EXPLORATORY LAPAROTOMY, RESECTION TRANSVERSE COLON, ILEOSTOMY CREATION, RESECTION RECTAL STUMP, ABDOMINAL WASHOUT, OMENTECTOMY, ABDOMINAL WALL CLOSURE performed by Raven Anna MD at [address] [address]       Medications:      nicotine  1 patch Transdermal Daily    nystatin  5 mL Oral 4x Daily    aspirin  81 mg Oral Daily    cyclobenzaprine  5 mg Oral TID    pantoprazole  40 mg Oral BID AC    sodium chloride flush  10 mL Intravenous 2 times per day       Social History:     Social History     Socioeconomic History    Marital status: Single     Spouse name: Not on file    Number of children: 0    Years of education: Not on file    Highest education level: Not on file   Occupational History    Occupation: [address] [place] [address] Financial resource strain: Not on file    Food insecurity:     Worry: Not on file     Inability: Not on file    Transportation needs:     Medical: Not on file     Non-medical: Not on file   Tobacco Use    Smoking status: Light Tobacco Smoker     Packs/day: 0.25     Types: Cigarettes     Start date: 9/19/1980    Smokeless tobacco: Never Used   Substance and Sexual Activity    Alcohol use: Yes     Comment: OCCASSIONAL    Drug use: No    Sexual activity: Yes     Partners: Female   Lifestyle    Physical activity:     Days per week: Not on file     Minutes per session: Not on file    Stress: Not on file   Relationships    Social connections:     Talks on phone: Not on file     Gets together: Not on file     Attends [employer] service: Not on file     Active member of club or organization: Not on file     Attends meetings of clubs or organizations: Not on file     Relationship status: Not on file    Intimate partner violence:     Fear of current or ex partner: Not on file     Emotionally abused: Not on file     Physically abused: Not on file     Forced sexual activity: Not on file   Other Topics Concern    Not on file   Social History Narrative    Not on file       Family History:     Family History   Problem Relation Age of Onset    Heart Disease Mother     Stroke Mother     Heart Surgery Mother     Diabetes Maternal Grandmother     Emphysema Maternal Grandfather     Diabetes Maternal Grandfather     Lung Cancer Maternal Uncle         Allergies:   Pcn [penicillins]; Sulfa antibiotics; and Tape [adhesive tape]     Review of Systems:   Unable to provide. Sedated on ventilator. Physical Examination :     Patient Vitals for the past 8 hrs:   BP Temp Temp src Pulse Resp SpO2 Weight   04/18/19 0615 -- -- -- -- -- -- 199 lb 4.7 oz (90.4 kg)   04/18/19 0419 116/67 99 °F (37.2 °C) Oral 109 27 93 % --     General Appearance: Sedated, on ventilator  Head:  Normocephalic, no trauma  Eyes: Pupils equal, round, reactive to light; sclera anicteric; conjunctivae pink. No embolic phenomena. ENT: Oropharynx clear, without erythema, exudate, or thrush. No tenderness of sinuses. Mouth/throat: mucosa pink and moist. No lesions. Dentition in good repair. Neck:Supple, without lymphadenopathy. Thyroid normal, No bruits. Pulmonary/Chest: Clear to auscultation, without wheezes, rales, or rhonchi. No dullness to percussion. Cardiovascular: Regular rate and rhythm without murmurs, rubs, or gallops. Abdomen: Distended. Bowel sounds absent. Soft, mildly tender, surgical bandages in place. Wound vac removed.    All four Extremities: Cyanosis of trunk, abdomen, distal extremities  Neurologic: Sedated  Skin: Cool and dry with poor turgor. Signs of peripheral arterial  insufficiency. No ulcerations. No open wounds. Skin purplish, cyanotic. Medical Decision Making -Laboratory:   I have independently reviewed/ordered the following labs:    CBC with Differential:   Recent Labs     04/17/19  0640  04/17/19  2345 04/18/19  0757   WBC 26.4*  --   --  25.7*   HGB 7.8*   < > 7.1* 7.3*   HCT 22.9*   < > 21.1* 22.7*   PLT See Reflexed IPF Result  --   --  428   LYMPHOPCT 10*  --   --  11*   MONOPCT 3  --   --  3    < > = values in this interval not displayed. BMP:   Recent Labs     04/17/19  0640 04/18/19  0757   * 134*   K 5.0 4.0   CL 95* 96*   CO2 20 23   BUN 62* 33*   CREATININE 4.86* 3.68*     Hepatic Function Panel:   Recent Labs     04/16/19  1629   PROT 5.0*   LABALBU 2.2*   BILIDIR 0.38*   IBILI 0.20   BILITOT 0.58   ALKPHOS 91   ALT 59*   AST 37*     No results for input(s): RPR in the last 72 hours. No results for input(s): HIV in the last 72 hours. No results for input(s): BC in the last 72 hours. Lab Results   Component Value Date    MUCUS NOT REPORTED 04/06/2019    RBC 2.42 04/18/2019    TRICHOMONAS NOT REPORTED 04/06/2019    WBC 25.7 04/18/2019    YEAST NOT REPORTED 04/06/2019    TURBIDITY TURBID 04/06/2019     Lab Results   Component Value Date    CREATININE 3.68 04/18/2019    GLUCOSE 112 04/18/2019       Medical Decision Making-Imaging:     Abdominal xray:    Gas in mildly distended loops of large and small bowel likely reflecting an   ileus.       NG tube tip in the gastric fundus with the proximal side-port in the distal   thoracic esophagus, repositioning recommended.       Airspace disease left lung base.      CT scan chest:    Impression   Satisfactory position endotracheal tube.       Nasogastric tube needs advanced 10 cm.       Patchy perihilar opacities and bibasilar airspace disease.  Follow-up may be   beneficial following medical treatment course to document complete resolution.           EXAMINATION:   CTA OF THE ABDOMEN AND PELVIS WITH CONTRAST       4/5/2019 9:23 pm:       TECHNIQUE:   CTA of the abdomen and pelvis was performed with the administration of   intravenous contrast. Multiplanar reformatted images are provided for review. MIP images are provided for review. Dose modulation, iterative   reconstruction, and/or weight based adjustment of the mA/kV was utilized to   reduce the radiation dose to as low as reasonably achievable.       COMPARISON:   None.       HISTORY:   ORDERING SYSTEM PROVIDED HISTORY: suspected dissection of abdominal aorta       FINDINGS:       CTA ABDOMEN:       Bibasilar airspace disease.  Liver, spleen, pancreas, gallbladder normal.   Bilateral adrenal masses measuring 11 mm on the left and 18 x 28 mm on the   left.  Bilateral ill-defined hypodensities throughout the kidneys.  The small   bowel is somewhat distended with multiple air-fluid levels.  Multiple   air-fluid levels are also noted throughout the colon.  The stomach appears   normal.  Retroaortic left renal vein.       Bones normal.       Aorta appears normal without dissection.  The celiac artery and SMA appear   normal.  The renal arteries appear normal.           CTA PELVIS:       Bladder decompressed.  Uterus normal.  Catheter right groin terminates in the   common iliac vein.           Impression   Ileus.  No evidence of aortic dissection.  The bilateral adrenal masses, left   greater than right.  Recommend follow-up adrenal MRI non emergently.           Medical Decision Making-Other: Thank you for allowing us to participate in the care of this patient. Please call with questions.       Anita Guerrero MD.      Pager: [phone] - Office: [phone]

## 2019-04-18 NOTE — PROCEDURES
[facility]  Transesophageal Echocardiogram       Today's Date:  4/18/2019  Ordering Physician:  Dr. Merdis Hamman  Indication:   Septic Emboli    Operators:  Primary:   Dr. Frank Mijares (Attending Physician)  Assistant:   Memo Bolanos MD (Cardiovascular Fellow)      Pre Procedure Conscious Sedation Data:    ASA Class:    [] I [x] II [] III [] IV    Mallampati Class:  [] I [x] II [] III [] IV    Procedure:    Patient seen and examined. History and Physical reviewed. Labs reviewed. After informed consent was obtained with explanation of the risks and benefits, the patient was brought to cardiac cath lab. All sedation was administered by the cardiologist. The oropharynx was pre-anesthesized with viscous lidocaine and cetacaine spray. The ultrasound probe was passed without any difficulty. EDWARD findings:    LA:  Normal  RAMIREZ:  No thrombus  RA:  Normal  RV:   Normal  LV:  Normal  Estimated LVEF:  55%  Aorta:   Mild atheromatous disease arch  Pericardium: No pericardial effusion  Septum:  No intracardiac shunt via color Doppler. Valves:    Mitral Valve: Structurally normal. Moderate regurgitation is identified. Aortic Valve: The aortic valve is trileaflet and opens adequately. No regurgitiation is identified. Tricuspid valve: Structurally normal. No regurgitation is identified. Pulmonary valve: Normal. No significant regurgitation    No valvular vegetations or thrombus identified. Summary:     1. A EDWARD was performed without complications. 2. LVEF 55%  3. No thrombus or valvular vegetation identified  4. Moderate MR noted  5. There were no complications encountered. Electronically signed by Memo Bolanos MD on 4/18/2019 at 11:23 AM  Cardiovascular Diseases Fellow  [address] [place] [address] Zortman M.  Shu Anaya MD, Attending physician  [facility]

## 2019-04-18 NOTE — CONSULTS
[facility]    Patient Name: Papo Starks     Patient : 1965  Room/Bed: 6178/3248-68  Admission Date/Time: 2019  8:39 PM  Primary Care Physician: No primary care provider on file. Consulting Provider: Dr. Silas Miles  Reason for Consult: Vaginal bleeding    CC:   Chief Complaint   Patient presents with    Altered Mental Status                HPI: Papo Starks is a 47 y.o. female Gio Donaldson initially presented to ED for AMS, she was found unresponsive by her significant other. On admission she was noted to be in MARY (requiring emergent dialysis), Septic Shock with multi organ failure (intubated / AMS),  Septicemia with H. Influenzae, Liver shock (resolved now). She was suspected to have ischemic bowel requiring an initial exploratory laparotomy with ileocecectomy, subtotal colectomy on 19. She subsequently underwent a second look exploratory laparotomy with resection of transverse colon, ileostomy, resection of rectal stump, abdominal washout, and omentectomy on 19. Patient also underwent cardioversion twice for A. Fib (resolved). Vascular surgery was consulted due to suspicion of splenic infarct. Findings were inconsistent with macrovascular occlusion. She was started on plavix/ASA and heparin gtt. OB/GYN was consulted due to the RN noting moderate bloody discharge from vagina yesterday night. IM resident performed ELOIAN which \"looked clean\". Patient reports that she has gone through menopause 3 years ago. She denies any prior postmenopausal bleeding. Patient denies a history of abnormal pap smears. Reports last pap smear was at the age of 28. Denies any history of pelvic surgeries. Denies history of STD's. She recently started seeing an OB/GYN in [facility] at [employer]. Denies abdominal distention, N/V, CP, SOB, night sweats, change in bowel habits. Denies family history of uterine, ovarian, breast, colon cancer.        REVIEW OF SYSTEMS:   A minimum of an eleven point review of systems was completed. Constitutional: negative fever, negative chills  HEENT: negative visual disturbances, negative headaches  Respiratory: negative dyspnea, negative cough  Cardiovascular: negative chest pain,  negative palpitations  Gastrointestinal: negative abdominal pain, negative RUQ pain, negative N/V, negative diarrhea, negative constipation  Genitourinary: negative dysuria, negative vaginal discharge, positive vaginal bleeding  Dermatological: negative rash, negative wounds  Hematologic: negative bleeding/clotting disorder  Immunologic:  negative recent sick contact, negative allergic reactions  Lymphatic: negative lymph nodes  Musculoskeletal: negative back pain, negative myalgias, negative arthralgias  Neurological:  negative dizziness, negative weakness  Behavior/Psych: negative depression, negative anxiety    _______________________________________________________________________    GYNECOLOGICAL HISTORY:  Age of Menarche: 16  Age of Menopause: 53     Sexually Active: has sex with females   STD History: no past history    Pap History: Last pap smear was at the age of 28  Colposcopy History: denies    Permanent Sterilization: denies  Reversible Birth Control: denies  Hormone Replacement Exposure: denies    OBSTETRICAL HISTORY:   OB History   No data available       PAST MEDICAL HISTORY:   has a past medical history of Asthma, Back pain, chronic, Hypertension, Snores, and Wears glasses. PAST SURGICAL HISTORY:   has a past surgical history that includes Tonsillectomy; Knee arthroscopy (Left, 1980); Ulnar tunnel release (Right, 2013); Knee arthroscopy (Left, 09/28/2016); LAPAROTOMY EXPLORATORY (N/A, 4/6/2019); and LAPAROTOMY EXPLORATORY (N/A, 4/8/2019).     ALLERGIES:  Allergies as of 04/05/2019 - Review Complete 04/05/2019   Allergen Reaction Noted    Pcn [penicillins] Hives and Shortness Of Breath 08/31/2013    Sulfa antibiotics Hives and Shortness Of Breath 08/31/2013    Tape Issac Hubbard tape] Rash 09/19/2016       MEDICATIONS:  Current Facility-Administered Medications   Medication Dose Route Frequency Provider Last Rate Last Dose    heparin 25,000 units in dextrose 5% 250 mL infusion  12 Units/kg/hr Intravenous Continuous Alisa Ray MD 11.2 mL/hr at 04/18/19 0949 14 Units/kg/hr at 04/18/19 0949    nicotine (NICODERM CQ) 21 MG/24HR 1 patch  1 patch Transdermal Daily Radha Barreto MD   1 patch at 04/17/19 0616    0.9 % sodium chloride bolus  250 mL Intravenous PRN Tahmina Leone MD        0.9 % sodium chloride bolus  150 mL Intravenous PRN Tahmina Leone MD        nystatin (MYCOSTATIN) 388476 UNIT/ML suspension 500,000 Units  5 mL Oral 4x Daily Vinicius Smith MD   500,000 Units at 04/18/19 1424    midodrine (PROAMATINE) tablet 10 mg  10 mg Oral TID PRN Antonio Gorman MD        sodium chloride 154 mEq in dextrose 10 % 1,000 mL infusion   Intravenous Continuous Candida Moe MD 30 mL/hr at 04/18/19 0001      acetaminophen (TYLENOL) tablet 650 mg  650 mg Oral Q4H PRN Rogers Horta MD   650 mg at 04/18/19 0708    glucose (GLUTOSE) 40 % oral gel 15 g  15 g Oral PRN Era Armijo MD        dextrose 50 % solution 12.5 g  12.5 g Intravenous PRN Era Armijo MD        glucagon (rDNA) injection 1 mg  1 mg Intramuscular PRN Era Armijo MD        dextrose 5 % solution  100 mL/hr Intravenous PRN Era Armijo MD        glucose (GLUTOSE) 40 % oral gel 15 g  15 g Oral PRN Vinicius Smith MD        dextrose 50 % solution 12.5 g  12.5 g Intravenous PRN Vinicius Smith MD        glucagon (rDNA) injection 1 mg  1 mg Intramuscular PRN Vinicius Smith MD        dextrose 5 % solution  100 mL/hr Intravenous PRN Vinicius Smith MD        aspirin chewable tablet 81 mg  81 mg Oral Daily Alisa Ray MD   81 mg at 04/17/19 1449    heparin (porcine) injection 4,000 Units  4,000 Units Intravenous PRN Alisa aRy MD   4,000 Units at 04/18/19 0054    heparin (porcine) injection 2,000 Units  2,000 Units Intravenous PRN Conchita Biggs MD   2,000 Units at 04/18/19 0949    oxyCODONE (ROXICODONE) immediate release tablet 5 mg  5 mg Oral Q4H PRN Amy HENRY Imel, DO   5 mg at 04/18/19 3181    cyclobenzaprine (FLEXERIL) tablet 5 mg  5 mg Oral TID Amy HENRY Imel, DO   5 mg at 04/18/19 1423    morphine injection 4 mg  4 mg Intravenous Q3H PRN Amy HENRY Imel, DO   4 mg at 04/17/19 2350    Or    morphine injection 6 mg  6 mg Intravenous Q3H PRN Amy Mcallisterel, DO        0.9 % sodium chloride bolus  250 mL Intravenous PRN Rosie Benson MD   Stopped at 04/16/19 0542    0.9 % sodium chloride bolus  150 mL Intravenous PRN Rosie Benson MD        pantoprazole (PROTONIX) tablet 40 mg  40 mg Oral BID AC Sujey Sapp MD   40 mg at 04/18/19 8571    anticoagulant sodium citrate 4 % injection 3 mL  3 mL Intracatheter PRN Vahid Daniel MD   3 mL at 04/17/19 1229    anticoagulant sodium citrate 4 % injection 3 mL  3 mL Intracatheter PRN Rosie Benson MD   3 mL at 04/17/19 1229    dextrose 50 % solution 25 g  25 g Intravenous PRN Amy Montgomery, DO   25 g at 04/16/19 0737    sodium chloride flush 0.9 % injection 10 mL  10 mL Intravenous 2 times per day Amy HENRY Imel, DO   10 mL at 04/18/19 0933    sodium chloride flush 0.9 % injection 10 mL  10 mL Intravenous PRN Amy Montgomery, DO   10 mL at 04/07/19 1627    magnesium hydroxide (MILK OF MAGNESIA) 400 MG/5ML suspension 30 mL  30 mL Oral Daily PRN Amy HENRY Imel, DO        ondansetron (ZOFRAN) injection 4 mg  4 mg Intravenous Q6H PRN Amy Mcallisterel, DO   4 mg at 04/15/19 0101       FAMILY HISTORY:  Family History of Breast, Ovarian, Colon or Uterine Cancer: No   family history includes Diabetes in her maternal grandfather and maternal grandmother; Emphysema in her maternal grandfather; Heart Disease in her mother; Heart Surgery in her mother; [place] in her maternal uncle; Stroke in her mother.     SOCIAL 9:58 am COMPARISON: Chest radiograph dated 04/05/2019 HISTORY: ORDERING SYSTEM PROVIDED HISTORY: Distension TECHNOLOGIST PROVIDED HISTORY: Distension FINDINGS: NG tube tip is in the gastric fundus with the proximal side-port in the distal thoracic esophagus. Repositioning is recommended. There is airspace disease in the left lung base. Gas is seen in loops of large and small bowel suggesting a probable ileus. Right inguinal central venous catheter is in place tip in the right common iliac vein. Gas in mildly distended loops of large and small bowel likely reflecting an ileus. NG tube tip in the gastric fundus with the proximal side-port in the distal thoracic esophagus, repositioning recommended. Airspace disease left lung base. Ct Head Wo Contrast    Result Date: 4/6/2019  EXAMINATION: CT OF THE HEAD WITHOUT CONTRAST  4/6/2019 8:36 pm TECHNIQUE: CT of the head was performed without the administration of intravenous contrast. Dose modulation, iterative reconstruction, and/or weight based adjustment of the mA/kV was utilized to reduce the radiation dose to as low as reasonably achievable. COMPARISON: None. HISTORY: ORDERING SYSTEM PROVIDED HISTORY: history of L ear infection, AMS, r/o brain abscess, CVA TECHNOLOGIST PROVIDED HISTORY: Ordering Physician Provided Reason for Exam: AMS; R/O CVA OR ABSCESS Acuity: Acute Type of Exam: Initial FINDINGS: BRAIN/VENTRICLES: No acute intracranial hemorrhage. No mass effect. No midline shift. Ventricles and sulci are within normal limits. ORBITS: The visualized portion of the orbits demonstrate no acute abnormality. SINUSES: Mastoid air cells are clear. Small air-fluid level with mucosal thickening within the right maxillary sinus. Opacity within the ethmoid air cells. SOFT TISSUES/SKULL:  No acute abnormality of the visualized skull or soft tissues. No acute intracranial abnormality.      Cta Chest W Wo Contrast    Result Date: 4/5/2019  EXAMINATION: CTA OF THE CHEST WITH AND WITHOUT CONTRAST 4/5/2019 9:23 pm TECHNIQUE: CTA of the chest was performed before and after the administration of intravenous contrast.  Multiplanar reformatted images are provided for review. MIP images are provided for review. Dose modulation, iterative reconstruction, and/or weight based adjustment of the mA/kV was utilized to reduce the radiation dose to as low as reasonably achievable. 3D reconstructed images were performed on a separate workstation and provided for review. COMPARISON: Chest x-ray 04/05/2019 HISTORY: ORDERING SYSTEM PROVIDED HISTORY: AORTIC DZ, NON-TRAUMATIC, KNOWN OR SUSPECT FINDINGS: Aorta: No evidence of thoracic aortic aneurysm or dissection. No acute abnormality of the aorta. No intramural hematoma on the noncontrast examination. No mediastinal hemorrhage. Minimal aortic vascular calcifications. Mediastinum: Heart is mildly prominent size. Coronary calcifications. No pericardial effusion. No suspicious mediastinal, hilar, or atelectasis or adenopathy identified per CT criteria. Lungs/Pleura: The consolidative changes with prominent air bronchograms identified involving both lower lobes evidence of patchy opacity identified involving the lingula. No suspicious pulmonary nodule identified. No pleural effusion pneumothorax. Upper Abdomen: Diffuse hypoattenuation liver suggestive of fatty infiltration. Patchy areas of hypoenhancement identified involving the anterior aspect of the liver along the falciform ligament likely related to focal fatty infiltration versus transient hepatic attenuation difference. Right adrenal nodule identified central low attenuation, please refer to separately reported CT abdomen pelvis report. .  Dilated loops of bowel identified within the left upper quadrant with prominent air-fluid level. Mild wall thickening noted. Soft Tissues/Bones: No acute bone or soft tissue abnormality.      No evidence of aortic dissection or intramural hematoma. No central pulmonary emboli identified. Patchy consolidative changes both lower lobes involving lingula may be related to multifocal atelectasis versus developing pneumonia/infiltrates. Clinical correlation and follow-up may be beneficial.     Us Renal Limited    Result Date: 4/6/2019  EXAMINATION: ULTRASOUND OF THE KIDNEYS 4/6/2019 12:51 pm COMPARISON: CT abdomen and pelvis dated 04/05/2019 HISTORY: ORDERING SYSTEM PROVIDED HISTORY: RENAL FAILURE, ACUTE (KIDNEY INJURY) FINDINGS: The right kidney measures 10.4 cm in length and the left kidney measures 10.2 cm in length. Kidneys demonstrate normal cortical echogenicity and thickness. No hydronephrosis or intrarenal stones. No focal lesions. Incidental note is made of a couple tiny echogenic foci in the gallbladder wall, likely adenomyomatosis. Unremarkable ultrasound of the kidneys. Xr Chest Portable    Result Date: 4/11/2019  EXAMINATION: SINGLE XRAY VIEW OF THE CHEST 4/11/2019 6:35 am COMPARISON: 04/10/2019 HISTORY: ORDERING SYSTEM PROVIDED HISTORY: intubated TECHNOLOGIST PROVIDED HISTORY: intubated FINDINGS: Low lung volumes are present, resulting in crowding of the bronchopulmonary system. Cardiac silhouette is mildly prominent and there is pulmonary vascular congestion. Bilateral mid to lower lung airspace opacities likely combination of pleural effusions and adjacent airspace disease/atelectasis. Right-sided jugular venous catheter terminates overlying the expected location of the SVC. Again, the distal tip of the jugular venous catheter demonstrates linear hyperdensity. Enteric tube courses below the left hemidiaphragm. Overall stable examination demonstrating pulmonary vascular congestion, interstitial edema, and bilateral lower lobe airspace opacities.      Xr Chest Portable    Result Date: 4/10/2019  EXAMINATION: SINGLE XRAY VIEW OF THE CHEST 4/10/2019 6:15 am COMPARISON: 04/09/2019 HISTORY: ORDERING SYSTEM PROVIDED HISTORY: intubated TECHNOLOGIST PROVIDED HISTORY: intubated FINDINGS: Cardiomegaly and pulmonary vascular congestion. Bilateral pleural effusions and adjacent airspace disease, likely atelectasis. These findings remain essentially unchanged as compared to previous study. Right internal jugular vein catheter tip is in unchanged positioning of the cavoatrial junction. There is redemonstration of a linear density in the region of the distal catheter which most likely represents normal part of the catheter. However, as previously questioned, retained wire fragment cannot be definitively excluded. Endotracheal tube and enteric tube are in unchanged positioning. Unchanged findings of pulmonary edema. And pleural effusions. Tubes and catheters are in unchanged positioning. Similar linear density in the region of the distal jugular venous catheter. Xr Chest Portable    Result Date: 4/8/2019  EXAMINATION: SINGLE XRAY VIEW OF THE CHEST 4/8/2019 5:55 am COMPARISON: April 6, 2019. HISTORY: ORDERING SYSTEM PROVIDED HISTORY: intubated TECHNOLOGIST PROVIDED HISTORY: intubated FINDINGS: Right IJ central venous catheter tip near the superior atrial caval junction with redemonstration of linear density in the region of the distal right IJ central venous catheter. This linear density could represent a normal part of the catheter. However, retained wire fragment is not excluded. Endotracheal tube terminates 3.5 cm above the leann. Enteric tube tip in the region of the distal esophagus. Low lung volume. Persistent bilateral heterogeneous opacities, more confluent in the left lung base. Possible small bilateral pleural effusions. No pneumothorax. Stable cardiomediastinal silhouette and great vessels. 1.  Enteric tube tip in the region of the distal esophagus, recommend advancement into the stomach.  2.  Right IJ central venous catheter tip near the superior atrial caval junction with redemonstration of linear density associated with the distal right IJ central venous catheter. This linear density could represent a normal part of the catheter. However, retained wire fragment is not excluded. 3.  Persistent bilateral heterogeneous opacities, more confluent in the left lung base. Possible small bilateral pleural effusions. Findings relating to the enteric tube and right IJ central venous catheter were called by Dr. Ester Morris Sessions to Dr. Guido Zheng On 4/8/2019 at 06:45. Xr Chest Portable    Result Date: 4/6/2019  EXAMINATION: SINGLE XRAY VIEW OF THE CHEST 4/6/2019 2:10 am COMPARISON: April 5, 2019. HISTORY: ORDERING SYSTEM PROVIDED HISTORY: R IJ line placement TECHNOLOGIST PROVIDED HISTORY: R IJ line placement FINDINGS: Grossly stable endotracheal and enteric tubes. New right IJ central venous catheter with tip near the superior atrial caval junction. Low lung volume. Persistent bilateral airspace disease. No pleural effusion or pneumothorax. Stable cardiomediastinal silhouette and great vessels. Life support devices as above. No pneumothorax. Persistent bilateral airspace disease. Xr Chest Portable    Result Date: 4/5/2019  EXAMINATION: SINGLE XRAY VIEW OF THE CHEST 4/5/2019 10:16 pm COMPARISON: Chest x-ray 04/05/2019 at 2040 hours HISTORY: ORDERING SYSTEM PROVIDED HISTORY: intubation TECHNOLOGIST PROVIDED HISTORY: intubation FINDINGS: Endotracheal tube 2 cm above the leann. Mild cardiomegaly. Mild perihilar pulmonary vasculature. Patchy bibasilar airspace opacities. Nasogastric tube side port proximal to the GE junction is be advanced 10 cm. No pneumothorax. No pleural effusion. There nipple rings. Satisfactory position endotracheal tube. Nasogastric tube needs advanced 10 cm. Patchy perihilar opacities and bibasilar airspace disease. Follow-up may be beneficial following medical treatment course to document complete resolution.      Xr Chest Portable    Result Date: 4/5/2019  EXAMINATION: SINGLE XRAY tunnel to the venotomy site. A 0.035 guidewire was used to place a peel-away sheath. The catheter was then advanced through the sheath under fluoroscopic guidance to the right atrium. The catheter flushed easily and there was a good blood return. The catheter was sutured to the skin. The catheter was locked with heparinized saline. The patient tolerated the procedure well and there were no immediate complications. FINDINGS: Initial temp cath insertion too high. Fluoroscopic image following new tunneled PermCath placement demonstrates the tip of the catheter in the right atrium. Removal right IJ temp cath. Successful ultrasound and fluoroscopy guided tunneled catheter placement, as above. Catheter is ready for use at this time. Cta Abdomen Pelvis W Contrast    Result Date: 4/5/2019  EXAMINATION: CTA OF THE ABDOMEN AND PELVIS WITH CONTRAST 4/5/2019 9:23 pm: TECHNIQUE: CTA of the abdomen and pelvis was performed with the administration of intravenous contrast. Multiplanar reformatted images are provided for review. MIP images are provided for review. Dose modulation, iterative reconstruction, and/or weight based adjustment of the mA/kV was utilized to reduce the radiation dose to as low as reasonably achievable. COMPARISON: None. HISTORY: ORDERING SYSTEM PROVIDED HISTORY: suspected dissection of abdominal aorta FINDINGS: CTA ABDOMEN: Bibasilar airspace disease. Liver, spleen, pancreas, gallbladder normal. Bilateral adrenal masses measuring 11 mm on the left and 18 x 28 mm on the left. Bilateral ill-defined hypodensities throughout the kidneys. The small bowel is somewhat distended with multiple air-fluid levels. Multiple air-fluid levels are also noted throughout the colon. The stomach appears normal.  Retroaortic left renal vein. Bones normal. Aorta appears normal without dissection. The celiac artery and SMA appear normal.  The renal arteries appear normal. CTA PELVIS: Bladder decompressed. Uterus normal.  Catheter right groin terminates in the common iliac vein. Ileus. No evidence of aortic dissection. The bilateral adrenal masses, left greater than right. Recommend follow-up adrenal MRI non emergently. Vl Dup Upper Extremity Venous Right    Result Date: 4/10/2019    [facility]  Vascular Upper Extremities Veins Procedure   Patient Name   Ivan Perry      Date of Study           04/09/2019                 [facility]   Date of Birth  1965   Gender                  Female   Age            47 year(s)   Race                    Unknown   Room Number    0117   Corporate ID # [id number]   Weight:                 170 pounds, 77.1 kg   Patient Acct # [de-identified]   MR #           [id number]      Sonographer             Dayana Lopez   Accession #    [id number]    Interpreting Physician  Flora Gambino   Referring                   Referring Physician     Alexis Tao  Nurse  Practitioner  Procedure Type of Study:   Veins: Upper Extremities Veins, Venous Scan Upper Right. Indications for Study:Edema. Patient Status: In Patient. Technical Quality:Good visualization. Comments:Fabiola RONQUILLO notified at 6:20 p.m. at the bedside. Conclusions   Summary   No evidence of deep venous thrombosis in the right upper extremity. Superficial thrombophlebitis of the right upper extremity involving the  cephalic branch and basilic veins.    Signature   ----------------------------------------------------------------  Electronically signed by Iain Macias(Sonographer) on  04/09/2019 06:33 PM  ----------------------------------------------------------------   ----------------------------------------------------------------  Electronically signed by Orion HutchinsInterpreting physician)  on 04/10/2019 07:57 PM  ----------------------------------------------------------------  Findings:   Right Impression:  The right basilic vein from the mid forearm to the wrist is  non-compressible with mixed echoes. There is a superficial branch coming  off the cephalic that is partially compressible. There is a superficial vein in the hand that is non-compressible. Right internal jugular, subclavian, axillary, brachial, ulnar and radial  are compressible with normal doppler responses. Velocities are measured in cm/s ; Diameters are measured in cm Right UE Vein Measurements 2D Measurements +------------------------------------+----------+---------------+----------+ ! Location                            ! Visualized! Compressibility! Thrombosis! +------------------------------------+----------+---------------+----------+ ! Prox IJV                            ! Yes       ! Yes            ! None      ! +------------------------------------+----------+---------------+----------+ ! Dist IJV                            ! Yes       ! Yes            ! None      ! +------------------------------------+----------+---------------+----------+ ! Prox SCV                            ! Yes       ! Yes            ! None      ! +------------------------------------+----------+---------------+----------+ ! Dist SCV                            ! Yes       ! Yes            ! None      ! +------------------------------------+----------+---------------+----------+ ! Innominate                          ! No        !               !          ! +------------------------------------+----------+---------------+----------+ ! Prox Axillary                       ! Yes       ! Yes            ! None      ! +------------------------------------+----------+---------------+----------+ ! Dist Axillary                       ! Yes       ! Yes            ! None      ! +------------------------------------+----------+---------------+----------+ ! Prox Brachial                       !Yes       ! Yes            ! None      ! +------------------------------------+----------+---------------+----------+ ! Dist Brachial                       !Yes       ! Yes            ! None      ! +------------------------------------+----------+---------------+----------+ ! Prox Radial                         !Yes       ! Yes            ! None      ! +------------------------------------+----------+---------------+----------+ ! Dist Radial                         !Yes       ! Yes            ! None      ! +------------------------------------+----------+---------------+----------+ ! Prox Ulnar                          ! Yes       ! Yes            ! None      ! +------------------------------------+----------+---------------+----------+ ! Dist Ulnar                          ! Yes       ! Yes            ! None      ! +------------------------------------+----------+---------------+----------+ ! Basilic at UA                       ! Yes       ! Yes            ! None      ! +------------------------------------+----------+---------------+----------+ ! Basilic at AF                       ! Yes       ! Yes            ! None      ! +------------------------------------+----------+---------------+----------+ ! Basilic at [address]                       ! Yes       ! No             !Acute     ! +------------------------------------+----------+---------------+----------+ ! Cephalic at UA                      ! Yes       ! Yes            ! None      ! +------------------------------------+----------+---------------+----------+ ! Cephalic at AF                      ! Yes       ! Yes            ! None      ! +------------------------------------+----------+---------------+----------+ ! Cephalic at [address]                      ! Yes       ! Yes            ! None      ! +------------------------------------+----------+---------------+----------+ Doppler Measurements +-------------------------+-----------------------+------------------------+ ! Location                 ! Signal                 !Reflux                  ! +-------------------------+-----------------------+------------------------+ ! IJV                      ! Phasic                 !                        ! +-------------------------+-----------------------+------------------------+ ! SCV                      ! Phasic                 !                        ! +-------------------------+-----------------------+------------------------+ ! Axillary                 ! Phasic                 !                        ! +-------------------------+-----------------------+------------------------+ ! Brachial                 !Phasic                 !                        ! +-------------------------+-----------------------+------------------------+ Left UE Vein Measurements 2D Measurements +---------------+-----------------+----------------------+-----------------+ ! Location       ! Visualized       ! Compressibility       ! Thrombosis       ! +---------------+-----------------+----------------------+-----------------+ ! Prox SCV       ! Yes              !                      !                 ! +---------------+-----------------+----------------------+-----------------+ Doppler Measurements +-------------------------+-----------------------+------------------------+ ! Location                 ! Signal                 !Reflux                  ! +-------------------------+-----------------------+------------------------+ ! SCV                      ! Phasic                 !                        ! +-------------------------+-----------------------+------------------------+    Ct Chest Abdomen Pelvis Wo Contrast    Result Date: 4/13/2019  EXAMINATION: CT OF THE CHEST, ABDOMEN, AND PELVIS WITHOUT CONTRAST, 4/13/2019 12:04 pm TECHNIQUE: CT of the chest, abdomen and pelvis was performed without the administration of intravenous contrast. Multiplanar reformatted images are provided for review. Dose modulation, iterative reconstruction, and/or weight based adjustment of the mA/kV was utilized to reduce the radiation dose to as low as reasonably achievable.  COMPARISON: None HISTORY: ORDERING SYSTEM PROVIDED HISTORY: Leukocytosis - concern for infection TECHNOLOGIST PROVIDED HISTORY: Leukocytosis - concern for infection FINDINGS: Lack of IV contrast somewhat limits assessment given concern for infection. Chest: Mediastinum: Shotty mediastinal lymph nodes are present without maricel adenopathy. No acute aortic abnormality. Heart size is normal.  No pericardial effusion. Coronary artery calcification is noted. No epicardial lymph nodes are evident. Lungs/pleura: Small to moderate bilateral pleural effusions are evident. Consolidation is noted in the right lower lobe. Basilar compressive atelectasis is noted. The patient has patchy ground-glass and reticular infiltrates in the right upper lobe. Tracheobronchial tree appears fairly patent although bronchials in the right lower lobe are somewhat attenuated due to the consolidation. Right internal jugular catheter terminates in the distal superior vena cava. No extrapleural air is noted. Soft Tissues/Bones: Mild spondylosis is present. No acute osseous abnormality. Abdomen/Pelvis: Organs: Small amount of ascites is present in the upper abdomen. Spleen is heterogeneous. This may be related to infarct, mass or abscess, not noted on prior study. Liver is unremarkable. Gallbladder is contracted limiting assessment. Pancreas is unremarkable. Adrenals are stable. Left adrenal is bulky with hypodensities with adrenal MRI workup advised which can be performed as the patient's condition permits. Kidneys are unremarkable without evidence of hydronephrosis or nephrolithiasis. GI/Bowel: Ascitic fluid is noted as previously described above. No free air is noted. Right lower quadrant ostomy is noted, intact. No pneumatosis is seen. Mesenteric stranding is noted which may be on inflammatory or postoperative basis. There is mild prominence of multiple bowel loops likely ileus. Pelvis: Moderate pelvic fluid is present. No pelvic or inguinal adenopathy is noted.  Peritoneum/Retroperitoneum: No aortic aneurysm, retroperitoneal mass or retroperitoneal adenopathy is noted. There are some shotty mesenteric lymph nodes. Bones/Soft Tissues: No acute osseous abnormality. Midline abdominal skin clips are noted consistent with recent surgery. Haziness is present in the subcutaneous tissues in the lower abdomen which may be related to third spacing of fluid postoperatively. 1.  Airspace disease in the lungs predominantly right sided. Bilateral pleural effusions and compressive atelectasis. Right lower lobe consolidation. 2.  Within the abdomen, there is ascites, mild to moderate. There has been performance of an ostomy in the right lower abdomen. The spleen is heterogeneous, representing interval change from prior study. Possibilities would include infarct, abscess or mass of other etiology favoring infarct or abscess. 3.  Appearance of ileus. 4.  Subcutaneous edema in the lower abdomen which may be related to third spacing of fluid secondary to surgery. Fl Modified Barium Swallow W Video    Result Date: 4/12/2019  EXAMINATION: MODIFIED BARIUM SWALLOW WAS PERFORMED IN CONJUNCTION WITH SPEECH PATHOLOGY SERVICES TECHNIQUE: Fluoroscopic evaluation of the swallowing mechanism was performed with multiple consistency of barium product. FLUOROSCOPY DOSE AND TYPE OR TIME AND EXPOSURES: DAP [id number] COMPARISON: None HISTORY: ORDERING SYSTEM PROVIDED HISTORY: Swallowing assesment TECHNOLOGIST PROVIDED HISTORY: Swallowing assesment FINDINGS: Thick liquid, puree, soft solid: No laryngeal penetration or aspiration. Some spilling into the piriform sinus and vallecula. Thin liquid: Laryngeal penetration. Results with chin-tuck. Laryngeal penetration of thin liquid resolving with chin-tuck maneuver. No aspiration of administered consistencies. Please see separate speech pathology report for full discussion of findings and recommendations.        ASSESSMENT & PLAN:    Mckenna Wilhelm is a 47 y.o. female Sherwin Baumgarten with postmenopausal bleeding   - Afebrile, VSS   - Hgb stable 7.3   - SSE: scant amount of bleeding from the cervical os; clear yellowish discharge coming from os   - SVE: no masses palpated, mobile uterus, non enlarged   - Vaginitis probe and GC/Ch collected and sent   - Will obtain TVUS   - Patient on Heparin gtt; may contribute to uterine bleeding however need to rule out uterine cancer   - Will attempt to obtain EMB and pap smear tomorrow   - Patient needs to continue care with OB/GYN for well woman exams on discharge from hospital    Thank you for the consult. Will continue to follow. Please do not hesitate to call with any questions or concerns. Patient Active Problem List    Diagnosis Date Noted    Splenic infarction     Leukemoid reaction     Mottled skin     Pleural effusion, bilateral     Protein-calorie malnutrition ([address]     Septic shock (HCC)     Gastroenteritis     Haemophilus influenzae septicemia (HCC)     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[address] 04/07/2019    Small bowel ischemia (HCC) 04/07/2019    Acute GI bleeding 04/07/2019    Gram negative septic shock ([address]     Rhabdomyolysis 04/06/2019    Hyponatremia 04/06/2019    Lactic acidosis 04/06/2019    MARY (acute kidney injury) ([address] 95/92/7209    Metabolic acidosis 35/91/4240    Acute respiratory failure ([address] 04/06/2019    Elevated liver enzymes 04/06/2019    Hyperkalemia     Shock ([address] 04/05/2019    Arthritis of left knee     S/P total knee arthroplasty 01/26/2017    Post-traumatic osteoarthritis of left knee 11/14/2016    S/P left knee arthroscopy 11/14/2016    Dog bite 08/31/2013       Plan discussed with Dr. Lana Macias, who is agreeable.      Attending's Name: Dr. Betsey Desai DO  Ob/Gyn Resident   4/18/2019, 2:36 PM

## 2019-04-18 NOTE — PROGRESS NOTES
Renal Progress Note    Patient :  Nancy Downing; 47 y.o. MRN# [id number]  Location:  5831/3367-65  Attending:  Arline Monson MD  Admit Date:  4/5/2019   Hospital Day: 13      Subjective:     Oral intake good. Feels well. Overall in negative balance including insensible losses and urine output. Ileostomy losses continue. Clearances continue to report. Requiring hemodialysis. Tunnel catheter in place. IV heparin continues for presumed diagnosis of hypercoagulable state. Workup has been negative. Plan to transition to oral anticoagulation at some point. Outpatient Medications:     Medications Prior to Admission: ibuprofen (ADVIL;MOTRIN) 800 MG tablet, Take 800 mg by mouth every 6 hours as needed for Pain  ALPRAZolam (XANAX) 0.25 MG tablet, Take 0.25 mg by mouth nightly as needed for Sleep or Anxiety. venlafaxine (EFFEXOR) 75 MG tablet, Take 100 mg by mouth 2 times daily   [DISCONTINUED] celecoxib (CELEBREX) 200 MG capsule, Take 1 capsule by mouth 2 times daily  [DISCONTINUED] traMADol (ULTRAM) 50 MG tablet, 1 tablet PO BID PRN pain  [DISCONTINUED] oxyCODONE-acetaminophen (PERCOCET) 5-325 MG per tablet, Take 1 tablet by mouth 2 times daily as needed for Pain . [DISCONTINUED] diclofenac (VOLTAREN) 75 MG EC tablet, Take 1 tablet by mouth 2 times daily  [DISCONTINUED] oxyCODONE-acetaminophen (PERCOCET) 5-325 MG per tablet, Take 1 tablet by mouth every 6 hours as needed for Pain . [DISCONTINUED] aspirin 325 MG EC tablet, Take 1 tablet by mouth 2 times daily for 14 days  [DISCONTINUED] Misc. Devices (ROLLER WALKER) MISC, 1 each by Does not apply route daily  QVAR 40 MCG/ACT inhaler, Inhale 2 puffs into the lungs 2 times daily  [DISCONTINUED] Calcium Citrate-Vitamin D (CALCIUM + D PO), Take 1 tablet by mouth daily  [DISCONTINUED] Misc.  Devices MISC, As directed by physician daily  [DISCONTINUED] diclofenac (VOLTAREN) 50 MG EC tablet, Take 1 tablet by mouth 2 times daily  [DISCONTINUED] docusate sodium (COLACE) 100 MG capsule, Take 1 capsule by mouth 2 times daily as needed for Constipation  gabapentin (NEURONTIN) 100 MG capsule, Take 100 mg by mouth 3 times daily as needed   lisinopril-hydrochlorothiazide (PRINZIDE;ZESTORETIC) 10-12.5 MG per tablet, Take 1 tablet by mouth daily  albuterol (PROVENTIL) (2.5 MG/3ML) 0.083% nebulizer solution, Take 2.5 mg by nebulization every 6 hours as needed for Wheezing  albuterol sulfate  (90 BASE) MCG/ACT inhaler, Inhale 2 puffs into the lungs every 6 hours as needed for Wheezing    Current Medications:     Scheduled Meds:    nicotine  1 patch Transdermal Daily    nystatin  5 mL Oral 4x Daily    aspirin  81 mg Oral Daily    cyclobenzaprine  5 mg Oral TID    pantoprazole  40 mg Oral BID AC    sodium chloride flush  10 mL Intravenous 2 times per day     Continuous Infusions:    heparin (porcine) 14 Units/kg/hr (19 0949)    IV infusion builder 30 mL/hr at 19 0001    dextrose      dextrose       PRN Meds:  sodium chloride, sodium chloride, midodrine, acetaminophen, glucose, dextrose, glucagon (rDNA), dextrose, glucose, dextrose, glucagon (rDNA), dextrose, heparin (porcine), heparin (porcine), oxyCODONE, morphine **OR** morphine, sodium chloride, sodium chloride, anticoagulant sodium citrate, anticoagulant sodium citrate, dextrose, sodium chloride flush, magnesium hydroxide, ondansetron    Input/Output:       I/O last 3 completed shifts: In:  [P.O.:1160; I.V.:746]  Out:  [Urine:500; ILSGJ:7286].       Patient Vitals for the past 96 hrs (Last 3 readings):   Weight   19 0615 199 lb 4.7 oz (90.4 kg)   19 1230 177 lb 0.5 oz (80.3 kg)   19 0848 179 lb 3.7 oz (81.3 kg)       Vital Signs:   Temperature:  Temp: 98 °F (36.7 °C)  TMax:   Temp (24hrs), Av.1 °F (37.3 °C), Min:98 °F (36.7 °C), Max:101 °F (38.3 °C)    Respirations:  Resp: 20  Pulse:   Pulse: 104  BP:    BP: 105/73  BP Range: Systolic (14SQL), FCW:683 , Min:105 , Max:119 Diastolic (12ZPT), MSQ:37, Min:59, Max:73      Physical Examination:     General:  AAO x 3, speaking in full sentences, no accessory muscle use. HEENT: Atraumatic, normocephalic, no throat congestion, moist mucosa. Eyes:   Pupils equal, round and reactive to light, EOMI. Neck:   No JVD, no thyromegaly, no lymphadenopathy. Chest:  Bilateral vesicular breath sounds, no rales or wheezes. Cardiac:  S1 S2 RR, no murmurs, gallops or rubs, JVP not raised. Abdomen: Soft, non-tender, no masses or organomegaly, BS audible. :   No suprapubic or flank tenderness. Neuro:  AAO x 3, No FND. SKIN:  No rashes, good skin turgor. Extremities:  No edema, palpable peripheral pulses, no calf tenderness. Labs:       Recent Labs     04/16/19 1629 04/17/19 0640 04/17/19 2000 04/17/19  2345 04/18/19  0757   WBC 31.8*  --  26.4*  --   --   --  25.7*   RBC 2.26*  --  2.56*  --   --   --  2.42*   HGB 6.9*   < > 7.8*   < > 7.2* 7.1* 7.3*   HCT 22.9*   < > 22.9*   < > 21.5* 21.1* 22.7*   .3  --  89.5  --   --   --  93.8   MCH 30.5  --  30.5  --   --   --  30.2   MCHC 30.1  --  34.1  --   --   --  32.2   RDW 16.3*  --  15.8*  --   --   --  15.7*     --  See Reflexed IPF Result  --   --   --  428   MPV 11.8  --  NOT REPORTED  --   --   --  11.3    < > = values in this interval not displayed. BMP:   Recent Labs     04/16/19 1629 04/17/19  0640 04/18/19  0757    131* 134*   K 4.5 5.0 4.0   CL 97* 95* 96*   CO2 22 20 23   BUN 54* 62* 33*   CREATININE 4.40* 4.86* 3.68*   GLUCOSE 86 91 112*   CALCIUM 7.5* 7.5* 7.3*      Phosphorus:   No results for input(s): PHOS in the last 72 hours. Magnesium:  No results for input(s): MG in the last 72 hours.   Albumin:    Recent Labs     04/16/19  1629   LABALBU 2.2*     BNP:    No results found for: BNP  CALVIN:      Lab Results   Component Value Date    CALVIN NEGATIVE 04/13/2019     SPEP:  Lab Results   Component Value Date    PROT 5.0 04/16/2019    ALBCAL 2.8 04/05/2019    ALBPCT 63 04/05/2019    LABALPH 0.2 04/05/2019    LABALPH 0.6 04/05/2019    A1PCT 3 04/05/2019    A2PCT 12 04/05/2019    LABBETA 0.5 04/05/2019    BETAPCT 11 04/05/2019    GAMGLOB 0.5 04/05/2019    GGPCT 11 04/05/2019    PATH ELECTRONICALLY SIGNED. Tobi Reddy M.D. 04/06/2019     UPEP:     Lab Results   Component Value Date    LABPE  04/06/2019     ELEVATED PROTEIN CONCENTRATION. MOST SERUM PROTEINS ARE DETECTED IN THIS URINE.   USUALLY OBSERVED WITH MARKEDLY INCREASED NON-SELECTIVE GLOMERULAR PERMEABILITY (i.e. SEVERE GLOMERULAR DISEASE), CONTAMINATION OF     C3:     Lab Results   Component Value Date    C3 115 04/13/2019     C4:     Lab Results   Component Value Date    C4 24 04/13/2019     MPO ANCA:     Lab Results   Component Value Date    MPO 7 04/13/2019     PR3 ANCA:     Lab Results   Component Value Date    PR3 20 04/13/2019     Anti-GBM:   No results found for: GBMABIGG  Hep BsAg:         Lab Results   Component Value Date    HEPBSAG NONREACTIVE 04/09/2019     Hep C AB:          Lab Results   Component Value Date    HEPCAB NONREACTIVE 04/09/2019       Urinalysis/Chemistries:      Lab Results   Component Value Date    NITRU NEGATIVE 04/06/2019    COLORU DARK YELLOW 04/06/2019    PHUR 5.0 04/06/2019    WBCUA 50  04/06/2019    RBCUA 50  04/06/2019    MUCUS NOT REPORTED 04/06/2019    TRICHOMONAS NOT REPORTED 04/06/2019    YEAST NOT REPORTED 04/06/2019    BACTERIA NOT REPORTED 04/06/2019    SPECGRAV 1.032 04/06/2019    LEUKOCYTESUR NEGATIVE 04/06/2019    UROBILINOGEN Normal 04/06/2019    BILIRUBINUR NEGATIVE 04/06/2019    GLUCOSEU NEGATIVE 04/06/2019    KETUA TRACE 04/06/2019    AMORPHOUS NOT REPORTED 04/06/2019     Urine Sodium:     Lab Results   Component Value Date    JANEL <20 04/06/2019     Urine Potassium:  No results found for: KUR  Urine Chloride:    Lab Results   Component Value Date    CLUR <20 04/06/2019     Urine Osmolarity: No results found for: OSMOU  Urine Protein:

## 2019-04-19 ENCOUNTER — APPOINTMENT (OUTPATIENT)
Dept: ULTRASOUND IMAGING | Age: 54
DRG: 710 | End: 2019-04-19
Payer: MEDICAID

## 2019-04-19 ENCOUNTER — APPOINTMENT (OUTPATIENT)
Dept: DIALYSIS | Age: 54
DRG: 710 | End: 2019-04-19
Payer: MEDICAID

## 2019-04-19 LAB
ABSOLUTE EOS #: 0.3 K/UL (ref 0–0.44)
ABSOLUTE IMMATURE GRANULOCYTE: 0.24 K/UL (ref 0–0.3)
ABSOLUTE LYMPH #: 3.24 K/UL (ref 1.1–3.7)
ABSOLUTE MONO #: 1.27 K/UL (ref 0.1–1.2)
ALBUMIN SERPL-MCNC: 2.1 G/DL (ref 3.5–5.2)
ALBUMIN/GLOBULIN RATIO: 0.8 (ref 1–2.5)
ALP BLD-CCNC: 112 U/L (ref 35–104)
ALT SERPL-CCNC: 29 U/L (ref 5–33)
ANION GAP SERPL CALCULATED.3IONS-SCNC: 14 MMOL/L (ref 9–17)
AST SERPL-CCNC: 27 U/L
BASOPHILS # BLD: 0 % (ref 0–2)
BASOPHILS ABSOLUTE: 0.09 K/UL (ref 0–0.2)
BILIRUB SERPL-MCNC: 0.54 MG/DL (ref 0.3–1.2)
BILIRUBIN DIRECT: 0.39 MG/DL
BILIRUBIN, INDIRECT: 0.15 MG/DL (ref 0–1)
BUN BLDV-MCNC: 34 MG/DL (ref 6–20)
BUN/CREAT BLD: ABNORMAL (ref 9–20)
C TRACH DNA GENITAL QL NAA+PROBE: NEGATIVE
CALCIUM SERPL-MCNC: 7.2 MG/DL (ref 8.6–10.4)
CHLORIDE BLD-SCNC: 95 MMOL/L (ref 98–107)
CO2: 23 MMOL/L (ref 20–31)
CREAT SERPL-MCNC: 4.11 MG/DL (ref 0.5–0.9)
DIFFERENTIAL TYPE: ABNORMAL
EOSINOPHILS RELATIVE PERCENT: 1 % (ref 1–4)
GFR AFRICAN AMERICAN: 14 ML/MIN
GFR NON-AFRICAN AMERICAN: 11 ML/MIN
GFR SERPL CREATININE-BSD FRML MDRD: ABNORMAL ML/MIN/{1.73_M2}
GFR SERPL CREATININE-BSD FRML MDRD: ABNORMAL ML/MIN/{1.73_M2}
GLOBULIN: ABNORMAL G/DL (ref 1.5–3.8)
GLUCOSE BLD-MCNC: 108 MG/DL (ref 65–105)
GLUCOSE BLD-MCNC: 112 MG/DL (ref 65–105)
GLUCOSE BLD-MCNC: 119 MG/DL (ref 70–99)
GLUCOSE BLD-MCNC: 87 MG/DL (ref 65–105)
HCT VFR BLD CALC: 23.6 % (ref 36.3–47.1)
HCT VFR BLD CALC: 25 % (ref 36.3–47.1)
HEMOGLOBIN: 7.4 G/DL (ref 11.9–15.1)
HEMOGLOBIN: 8 G/DL (ref 11.9–15.1)
IMMATURE GRANULOCYTES: 1 %
LYMPHOCYTES # BLD: 13 % (ref 24–43)
MCH RBC QN AUTO: 29.8 PG (ref 25.2–33.5)
MCHC RBC AUTO-ENTMCNC: 31.4 G/DL (ref 28.4–34.8)
MCV RBC AUTO: 95.2 FL (ref 82.6–102.9)
MONOCYTES # BLD: 5 % (ref 3–12)
N. GONORRHOEAE DNA: NEGATIVE
NRBC AUTOMATED: 0 PER 100 WBC
PARTIAL THROMBOPLASTIN TIME: 34.6 SEC (ref 20.5–30.5)
PARTIAL THROMBOPLASTIN TIME: 38.6 SEC (ref 20.5–30.5)
PDW BLD-RTO: 15.4 % (ref 11.8–14.4)
PLATELET # BLD: 523 K/UL (ref 138–453)
PLATELET ESTIMATE: ABNORMAL
PMV BLD AUTO: 10.9 FL (ref 8.1–13.5)
POTASSIUM SERPL-SCNC: 3.8 MMOL/L (ref 3.7–5.3)
RBC # BLD: 2.48 M/UL (ref 3.95–5.11)
RBC # BLD: ABNORMAL 10*6/UL
SEG NEUTROPHILS: 79 % (ref 36–65)
SEGMENTED NEUTROPHILS ABSOLUTE COUNT: 19.72 K/UL (ref 1.5–8.1)
SODIUM BLD-SCNC: 132 MMOL/L (ref 135–144)
SPECIMEN DESCRIPTION: NORMAL
TOTAL PROTEIN: 4.9 G/DL (ref 6.4–8.3)
WBC # BLD: 24.9 K/UL (ref 3.5–11.3)
WBC # BLD: ABNORMAL 10*3/UL

## 2019-04-19 PROCEDURE — 2580000003 HC RX 258: Performed by: STUDENT IN AN ORGANIZED HEALTH CARE EDUCATION/TRAINING PROGRAM

## 2019-04-19 PROCEDURE — 36415 COLL VENOUS BLD VENIPUNCTURE: CPT

## 2019-04-19 PROCEDURE — 6370000000 HC RX 637 (ALT 250 FOR IP): Performed by: STUDENT IN AN ORGANIZED HEALTH CARE EDUCATION/TRAINING PROGRAM

## 2019-04-19 PROCEDURE — 6360000002 HC RX W HCPCS: Performed by: INTERNAL MEDICINE

## 2019-04-19 PROCEDURE — 82947 ASSAY GLUCOSE BLOOD QUANT: CPT

## 2019-04-19 PROCEDURE — 6370000000 HC RX 637 (ALT 250 FOR IP): Performed by: INTERNAL MEDICINE

## 2019-04-19 PROCEDURE — 2700000000 HC OXYGEN THERAPY PER DAY

## 2019-04-19 PROCEDURE — 80076 HEPATIC FUNCTION PANEL: CPT

## 2019-04-19 PROCEDURE — 6360000002 HC RX W HCPCS: Performed by: STUDENT IN AN ORGANIZED HEALTH CARE EDUCATION/TRAINING PROGRAM

## 2019-04-19 PROCEDURE — 85730 THROMBOPLASTIN TIME PARTIAL: CPT

## 2019-04-19 PROCEDURE — 97605 NEG PRS WND THER DME<=50SQCM: CPT

## 2019-04-19 PROCEDURE — 90935 HEMODIALYSIS ONE EVALUATION: CPT

## 2019-04-19 PROCEDURE — 99232 SBSQ HOSP IP/OBS MODERATE 35: CPT | Performed by: INTERNAL MEDICINE

## 2019-04-19 PROCEDURE — 99233 SBSQ HOSP IP/OBS HIGH 50: CPT | Performed by: INTERNAL MEDICINE

## 2019-04-19 PROCEDURE — 94762 N-INVAS EAR/PLS OXIMTRY CONT: CPT

## 2019-04-19 PROCEDURE — 76857 US EXAM PELVIC LIMITED: CPT

## 2019-04-19 PROCEDURE — 85014 HEMATOCRIT: CPT

## 2019-04-19 PROCEDURE — 2060000000 HC ICU INTERMEDIATE R&B

## 2019-04-19 PROCEDURE — 97535 SELF CARE MNGMENT TRAINING: CPT

## 2019-04-19 PROCEDURE — 85018 HEMOGLOBIN: CPT

## 2019-04-19 PROCEDURE — 80048 BASIC METABOLIC PNL TOTAL CA: CPT

## 2019-04-19 PROCEDURE — 85025 COMPLETE CBC W/AUTO DIFF WBC: CPT

## 2019-04-19 RX ORDER — 0.9 % SODIUM CHLORIDE 0.9 %
250 INTRAVENOUS SOLUTION INTRAVENOUS PRN
Status: DISCONTINUED | OUTPATIENT
Start: 2019-04-19 | End: 2019-05-04 | Stop reason: HOSPADM

## 2019-04-19 RX ORDER — LORAZEPAM 2 MG/ML
1 INJECTION INTRAMUSCULAR ONCE
Status: COMPLETED | OUTPATIENT
Start: 2019-04-19 | End: 2019-04-19

## 2019-04-19 RX ORDER — HEPARIN SODIUM 1000 [USP'U]/ML
1600 INJECTION, SOLUTION INTRAVENOUS; SUBCUTANEOUS PRN
Status: DISCONTINUED | OUTPATIENT
Start: 2019-04-19 | End: 2019-05-03

## 2019-04-19 RX ORDER — 0.9 % SODIUM CHLORIDE 0.9 %
150 INTRAVENOUS SOLUTION INTRAVENOUS PRN
Status: DISCONTINUED | OUTPATIENT
Start: 2019-04-19 | End: 2019-05-04 | Stop reason: HOSPADM

## 2019-04-19 RX ADMIN — HEPARIN SODIUM AND DEXTROSE 22 UNITS/KG/HR: 10000; 5 INJECTION INTRAVENOUS at 19:36

## 2019-04-19 RX ADMIN — HEPARIN SODIUM 1600 UNITS: 1000 INJECTION INTRAVENOUS; SUBCUTANEOUS at 11:14

## 2019-04-19 RX ADMIN — HEPARIN SODIUM AND DEXTROSE 20.05 UNITS/KG/HR: 10000; 5 INJECTION INTRAVENOUS at 13:31

## 2019-04-19 RX ADMIN — METOPROLOL TARTRATE 12.5 MG: 25 TABLET ORAL at 20:18

## 2019-04-19 RX ADMIN — OXYCODONE HYDROCHLORIDE 5 MG: 5 TABLET ORAL at 20:17

## 2019-04-19 RX ADMIN — CYCLOBENZAPRINE 5 MG: 10 TABLET, FILM COATED ORAL at 13:36

## 2019-04-19 RX ADMIN — METOPROLOL TARTRATE 12.5 MG: 25 TABLET ORAL at 17:32

## 2019-04-19 RX ADMIN — PANTOPRAZOLE SODIUM 40 MG: 40 TABLET, DELAYED RELEASE ORAL at 17:32

## 2019-04-19 RX ADMIN — NYSTATIN 500000 UNITS: 500000 SUSPENSION ORAL at 20:17

## 2019-04-19 RX ADMIN — OXYCODONE HYDROCHLORIDE 5 MG: 5 TABLET ORAL at 07:25

## 2019-04-19 RX ADMIN — OXYCODONE HYDROCHLORIDE 5 MG: 5 TABLET ORAL at 14:05

## 2019-04-19 RX ADMIN — HEPARIN SODIUM 2000 UNITS: 1000 INJECTION INTRAVENOUS; SUBCUTANEOUS at 04:47

## 2019-04-19 RX ADMIN — LORAZEPAM 1 MG: 2 INJECTION INTRAMUSCULAR; INTRAVENOUS at 17:33

## 2019-04-19 RX ADMIN — CYCLOBENZAPRINE 5 MG: 10 TABLET, FILM COATED ORAL at 20:17

## 2019-04-19 RX ADMIN — OXYCODONE HYDROCHLORIDE 5 MG: 5 TABLET ORAL at 03:17

## 2019-04-19 RX ADMIN — PANTOPRAZOLE SODIUM 40 MG: 40 TABLET, DELAYED RELEASE ORAL at 05:42

## 2019-04-19 RX ADMIN — NYSTATIN 500000 UNITS: 500000 SUSPENSION ORAL at 14:18

## 2019-04-19 RX ADMIN — HEPARIN SODIUM 2000 UNITS: 1000 INJECTION INTRAVENOUS; SUBCUTANEOUS at 19:40

## 2019-04-19 RX ADMIN — ASPIRIN 81 MG: 81 TABLET, CHEWABLE ORAL at 13:36

## 2019-04-19 RX ADMIN — Medication 10 ML: at 21:00

## 2019-04-19 ASSESSMENT — PAIN SCALES - GENERAL
PAINLEVEL_OUTOF10: 8
PAINLEVEL_OUTOF10: 0
PAINLEVEL_OUTOF10: 8
PAINLEVEL_OUTOF10: 0
PAINLEVEL_OUTOF10: 7
PAINLEVEL_OUTOF10: 7
PAINLEVEL_OUTOF10: 0
PAINLEVEL_OUTOF10: 7

## 2019-04-19 ASSESSMENT — PAIN DESCRIPTION - DESCRIPTORS: DESCRIPTORS: ACHING;CONSTANT;CRAMPING

## 2019-04-19 ASSESSMENT — PAIN DESCRIPTION - LOCATION
LOCATION: ABDOMEN
LOCATION: ABDOMEN

## 2019-04-19 ASSESSMENT — PAIN DESCRIPTION - ONSET: ONSET: ON-GOING

## 2019-04-19 ASSESSMENT — PAIN DESCRIPTION - ORIENTATION
ORIENTATION: LOWER
ORIENTATION: LOWER

## 2019-04-19 ASSESSMENT — PAIN DESCRIPTION - PAIN TYPE
TYPE: SURGICAL PAIN
TYPE: SURGICAL PAIN

## 2019-04-19 ASSESSMENT — PAIN DESCRIPTION - FREQUENCY: FREQUENCY: CONTINUOUS

## 2019-04-19 NOTE — PROGRESS NOTES
Occupational Therapy  Facility/Department: [facility] 4B STEPDOWN  Daily Treatment Note  NAME: Karishma Helton  : 1965  MRN: [id number]    Date of Service: 2019    Discharge Recommendations:       Further therapy recommended at discharge. Assessment   Performance deficits / Impairments: Decreased functional mobility ; Decreased ADL status; Decreased endurance;Decreased coordination;Decreased balance;Decreased safe awareness;Decreased high-level IADLs  Prognosis: Good  REQUIRES OT FOLLOW UP: Yes  Activity Tolerance  Activity Tolerance: Patient Tolerated treatment well  Safety Devices  Safety Devices in place: Yes  Type of devices: All fall risk precautions in place;Gait belt;Call light within reach; Left in bed  Restraints  Initially in place: No         Patient Diagnosis(es): The primary encounter diagnosis was Acute renal failure, unspecified acute renal failure type ([facility] [address] A diagnosis of Altered mental status, unspecified altered mental status type was also pertinent to this visit. has a past medical history of Asthma, Back pain, chronic, Hypertension, Snores, and Wears glasses. has a past surgical history that includes Tonsillectomy; Knee arthroscopy (Left, ); Ulnar tunnel release (Right, ); Knee arthroscopy (Left, 2016); LAPAROTOMY EXPLORATORY (N/A, 2019); and LAPAROTOMY EXPLORATORY (N/A, 2019).     Restrictions  Restrictions/Precautions  Restrictions/Precautions: Fall Risk, Contact Precautions  Required Braces or Orthoses?: No  Position Activity Restriction  Other position/activity restrictions: up with assist. s/p  ILEOCECECTOMY, SUBTOTAL COLECTOMY ; s/p 2ND LOOK EXPLORATORY LAPAROTOMY with ileostomy creation   Subjective   General  Patient assessed for rehabilitation services?: Yes  Family / Caregiver Present: Yes(Brother present at start and end of tx)  Pain Assessment  Pain Assessment: 0-10  Pain Level: 7  Pain Type: Surgical pain  Pain Location: Abdomen  Pain Orientation: Lower  Non-Pharmaceutical Pain Intervention(s): Distraction; Ambulation/Increased Activity; Other (ADLs)  Response to Pain Intervention: Patient Satisfied  Vital Signs  Patient Currently in Pain: Yes   Orientation     Objective    ADL  Feeding: Setup;Supervision(Seated EOB at tray table)  Grooming: Setup;Supervision(Facial hygiene )  UE Bathing: Setup;Minimal assistance(Writer assist with back)  LE Bathing: Setup; Moderate assistance(Pt able to bath hips to knees, writer assist with knees to feet)  UE Dressing: Setup;Minimal assistance(to manage gown)  LE Dressing: Setup;Maximum assistance(to doff/don footies)  Toileting: Increased time to complete;Maximum assistance(Pt with brief at this time)  Additional Comments: Pt completed ADLs while seated EOB, see above for LOF. Sx soap used appropriately. Balance  Sitting Balance: Stand by assistance(Seated EOB unsupported)  Standing Balance: Moderate assistance  Standing Balance  Time: ~35 seconds total, 1st trial-~10 seconds, 2nd trial~15 seconds, 3rd trial ~10 seconds  Activity: Pt stood bedside for brief placement and short steps to [facility]  Sit to stand:  Moderate assistance  Stand to sit: Minimal assistance  Comment: Pt presented with flexed posture, needed verbal cueing to right self  Functional Mobility  Functional - Mobility Device: No device  Activity: Other(2 short steps towards [facility])  Assist Level: Minimal assistance  Bed mobility  Supine to Sit: Contact guard assistance  Sit to Supine: Minimal assistance(for BLE progression)  Scooting: Minimal assistance      Plan   Plan  Times per week: 3-4x  Current Treatment Recommendations: Balance Training, Self-Care / ADL, Patient/Caregiver Education & Training, Equipment Evaluation, Education, & procurement, Functional Mobility Training, Endurance Training, Cognitive Reorientation, Positioning, Safety Education & Training    Goals  Short term goals  Time Frame for Short term goals: Pt will by discharge  Short term goal 1: Complete LB ADL task with AD PRN and MIN A. Short term goal 2: Demo 10 minutes of dynamic standing during functional activity performance w/ SBA. Short term goal 3: Tolerate 20 minutes of funct act performance. Short term goal 4: Demo 15 minutes of dynamic sitting balance during fucntional activity performance w/ SUP. Short term goal 5: Demo supine<>sit transfer w/ SUP. Therapy Time   Individual Concurrent Group Co-treatment   Time In 1400         Time Out 1456         Minutes 64             RN OK'ed tx and pt agreeable. Supine in bed upon writer arrival. See above for LOF for all tasks. Retired supine in bed, HOB elevated, call light within reach and RN in room on writer exit.      NELSON Fritz/KARLA

## 2019-04-19 NOTE — PROGRESS NOTES
[facility]  Internal Medicine Residency Program  Inpatient Daily Progress Note  ______________________________________________________________________________    Patient: Alanna Buck  YOB: 1965   MRN: [id number]    Acct: [de-identified]     Admit date: 4/5/2019  Today's date: 04/19/19  Number of days in the hospital: 14       Admitting Diagnosis: Septic shock ([address]    Subjective:   Patient seen and examined at bedside in the dialysis unit. Alert and oriented. No acute issues overnight. No more complains of vaginal bleed. Flank pain in the abdomen has improved. She is on Heparin gtt. EDWARD is normal.     Objective:   Vital Sign:  /65   Pulse 118   Temp 98.9 °F (37.2 °C)   Resp 18   Ht 5' 1.02\" (1.55 m)   Wt 175 lb 0.7 oz (79.4 kg)   SpO2 92%   BMI 33.05 kg/m²       Physical Exam:  General appearance:   alert, well appearing, and in no distress  Mental Status: alert, oriented to person, place, and time  Neurologic:  alert, oriented, normal speech, no focal findings or movement disorder noted  Lungs:  clear to auscultation, no wheezes, rales or rhonchi, symmetric air entry. Right IJ tunneled cath  Heart[de-identified] normal rate, regular rhythm, normal S1, S2, no murmurs, rubs, clicks or gallops  Abdomen:  soft, nontender, nondistended, no masses or organomegaly. Stoma on the right side.    Extremities: peripheral pulses normal, no pedal edema, no clubbing or cyanosis   Skin: normal coloration and turgor, no rashes, no suspicious skin lesions noted    Medications:  Scheduled Medications   nicotine  1 patch Transdermal Daily    nystatin  5 mL Oral 4x Daily    aspirin  81 mg Oral Daily    cyclobenzaprine  5 mg Oral TID    pantoprazole  40 mg Oral BID AC    sodium chloride flush  10 mL Intravenous 2 times per day       PRN Medications  sodium chloride 250 mL PRN   sodium chloride 150 mL PRN   heparin (porcine) 1,600 Units PRN   heparin (porcine) 1,600 Units PRN   sodium chloride 250 mL PRN   sodium chloride 150 mL PRN   midodrine 10 mg TID PRN   acetaminophen 650 mg Q4H PRN   glucose 15 g PRN   dextrose 12.5 g PRN   glucagon (rDNA) 1 mg PRN   dextrose 100 mL/hr PRN   glucose 15 g PRN   dextrose 12.5 g PRN   glucagon (rDNA) 1 mg PRN   dextrose 100 mL/hr PRN   heparin (porcine) 4,000 Units PRN   heparin (porcine) 2,000 Units PRN   oxyCODONE 5 mg Q4H PRN   morphine 4 mg Q3H PRN   Or     morphine 6 mg Q3H PRN   sodium chloride 250 mL PRN   sodium chloride 150 mL PRN   dextrose 25 g PRN   sodium chloride flush 10 mL PRN   magnesium hydroxide 30 mL Daily PRN   ondansetron 4 mg Q6H PRN       Diagnostic Labs and Imaging:  CBC:  Recent Labs     04/17/19  0640  04/18/19  0757 04/18/19 2002 04/19/19  0339   WBC 26.4*  --  25.7*  --  24.9*   HGB 7.8*   < > 7.3* 8.0* 7.4*   PLT See Reflexed IPF Result  --  428  --  523*    < > = values in this interval not displayed.      BMP: Recent Labs     04/17/19  0640 04/18/19  0757 04/19/19 0339   * 134* 132*   K 5.0 4.0 3.8   CL 95* 96* 95*   CO2 20 23 23   BUN 62* 33* 34*   CREATININE 4.86* 3.68* 4.11*   GLUCOSE 91 112* 119*     Hepatic: Recent Labs     04/16/19  1629 04/19/19  0339   AST 37* 27   ALT 59* 29   BILITOT 0.58 0.54   ALKPHOS 91 112*       Assessment and Plan:     Principal Problem:    Septic shock ([address]  Active Problems:    Shock ([address]    Rhabdomyolysis    Hyponatremia    Lactic acidosis    MARY (acute kidney injury) ([address]    Metabolic acidosis    Acute respiratory failure (HCC)    Elevated liver enzymes    Hyperkalemia    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HCC)    Small bowel ischemia (HCC)    Acute GI bleeding    Gram negative septic shock (HCC)    Protein-calorie malnutrition (HCC)    Gastroenteritis    Haemophilus influenzae septicemia (HCC)    Pleural effusion, bilateral    Splenic infarction    Leukemoid reaction    Mottled skin    Acute renal failure (HCC)    Altered mental status    PMB (postmenopausal bleeding)  Resolved Problems:    * No resolved hospital problems. *    · Levofloxacin discontinued. Patient completed a course of meropenem. Continue to monitor patient off antibiotics  · Ob/GYN plans to do pap smear and EMB today for the vaginal bleeding  · Continue to monitor Hb and Platelets. CBC daily  · BiPAP as needed. · Patient stable from cardiology stand point. · Continue Midodrine for low BP if needed  · PT/OT  · Pain control with Morphine and Roxicodone PRN  · Patient got tunnel cath placed in the right IJ. · Continue aspirin and heparin drip. · Hypercoagulable workup negative. · WBC is trending down.  Blood cultures have been negative so far. · Increased midodrine to 10 mg TD.   · Started nystatin for oral thrush. · Continue Dental soft diet    Alexsander Ramirez MD  PGY-1, Department of Internal Medicine  [address] [place] [address]  4/19/2019 1:26 PM    Attending Physician Statement  I have discussed the care of Rosi Rm, including pertinent history and exam findings with the resident. I have reviewed the key elements of all parts of the encounter with the resident. I have seen and examined the patient with the resident. I agree with the assessment and plan and status of the problem list as documented. Last 24 hours she is afebrile her T-max is 99.6, she denies nausea vomiting or abdominal pain, because of bleeding per vaginal she was seen by GYN and she is scheduled for Pap smear and endometrial see today by GYN at the bedside. Her WBC count is stable at 24.9, AST ALT is normal.  She is tachycardic and not hypotensive she hadn't not require midodrine for last more than 24 hour. Will restart Lopressor at a small dose and adjust.  We will discontinue midodrine and will determine the need for midodrine. Continue with heparin drip. So far blood cultures are negative. Will follow-up WBC count.       Margareth Bustos MD  4/19/2019 2:17 PM    Please note that this chart

## 2019-04-19 NOTE — PROGRESS NOTES
Dialysis Post Treatment Note  Patient tolerated treatment well. Denies complaints at time of discharge.    Vitals:    04/19/19 1153   BP: 124/65   Pulse: 118   Resp:    Temp:    SpO2:      Pre-Weight = 80.1kg  Post-weight = Weight: 175 lb 0.7 oz (79.4 kg)  Total Liters Processed = Total Liters Processed (l/min): 83 l/min  Rinseback Volume (mL) = Rinseback Volume (ml): 370 ml  Net Removal (mL) = 700  Length of treatment=210

## 2019-04-19 NOTE — PROGRESS NOTES
Nephrology Progress Note        Subjective: patient evaluated on dialysis. Pt is stable on dialysis. Access cannulated without problems. No new issues overnite. Stable hemodynamics. Tolerating treatment well. Await gynecological workup for vaginal bleeding. Ileostomy losses as before. Intravenous heparin continues. Hemodynamic stable. Tachycardia persists. Oral intake fair. Urine output still suboptimal.  Continues to need renal replacement therapy. Tunnel catheter works well. Objective:  CURRENT TEMPERATURE:  Temp: 98.3 °F (36.8 °C)  MAXIMUM TEMPERATURE OVER 24HRS:  Temp (24hrs), Av.8 °F (37.1 °C), Min:98 °F (36.7 °C), Max:99.8 °F (37.7 °C)    CURRENT RESPIRATORY RATE:  Resp: 18  CURRENT PULSE:  Pulse: 121  CURRENT BLOOD PRESSURE:  BP: 114/71  24HR BLOOD PRESSURE RANGE:  Systolic (10WUT), MYY:172 , Min:105 , LZP:643   ; Diastolic (15DCV), VTW:12, Min:62, Max:79    24HR INTAKE/OUTPUT:      Intake/Output Summary (Last 24 hours) at 2019 1137  Last data filed at 2019 0549  Gross per 24 hour   Intake 1852 ml   Output 1000 ml   Net 852 ml     Patient Vitals for the past 96 hrs (Last 3 readings):   Weight   19 0815 176 lb 9.4 oz (80.1 kg)   19 0615 199 lb 4.7 oz (90.4 kg)   19 1230 177 lb 0.5 oz (80.3 kg)         Physical Exam:  General appearance:Awake, alert, in no acute distress  Skin: warm and dry, no rash or erythema  Eyes: conjunctivae normal and sclera anicteric  ENT:no thrush no pharyngeal congestion   Neck:  No JVD, No Thyromegaly  Pulmonary: clear to auscultation and no wheezing or rhonchi   Cardiovascular: normal S1 and S2. NO rubs and NO murmur.  No S3 OR S4   Abdomen: soft nontender, bowel sounds present, no organomegaly,  no ascites   Extremities: no cyanosis, clubbing or edema     Access:  previous permacath    Current Medications:      0.9 % sodium chloride bolus PRN   0.9 % sodium chloride bolus PRN   heparin (porcine) injection 1,600 Units PRN   heparin (porcine) injection 1,600 Units PRN   heparin 25,000 units in dextrose 5% 250 mL infusion Continuous   nicotine (NICODERM CQ) 21 MG/24HR 1 patch Daily   0.9 % sodium chloride bolus PRN   0.9 % sodium chloride bolus PRN   nystatin (MYCOSTATIN) 666833 UNIT/ML suspension 500,000 Units 4x Daily   midodrine (PROAMATINE) tablet 10 mg TID PRN   sodium chloride 154 mEq in dextrose 10 % 1,000 mL infusion Continuous   acetaminophen (TYLENOL) tablet 650 mg Q4H PRN   glucose (GLUTOSE) 40 % oral gel 15 g PRN   dextrose 50 % solution 12.5 g PRN   glucagon (rDNA) injection 1 mg PRN   dextrose 5 % solution PRN   glucose (GLUTOSE) 40 % oral gel 15 g PRN   dextrose 50 % solution 12.5 g PRN   glucagon (rDNA) injection 1 mg PRN   dextrose 5 % solution PRN   aspirin chewable tablet 81 mg Daily   heparin (porcine) injection 4,000 Units PRN   heparin (porcine) injection 2,000 Units PRN   oxyCODONE (ROXICODONE) immediate release tablet 5 mg Q4H PRN   cyclobenzaprine (FLEXERIL) tablet 5 mg TID   morphine injection 4 mg Q3H PRN   Or    morphine injection 6 mg Q3H PRN   0.9 % sodium chloride bolus PRN   0.9 % sodium chloride bolus PRN   pantoprazole (PROTONIX) tablet 40 mg BID AC   dextrose 50 % solution 25 g PRN   sodium chloride flush 0.9 % injection 10 mL 2 times per day   sodium chloride flush 0.9 % injection 10 mL PRN   magnesium hydroxide (MILK OF MAGNESIA) 400 MG/5ML suspension 30 mL Daily PRN   ondansetron (ZOFRAN) injection 4 mg Q6H PRN     Labs:   CBC: Recent Labs     04/17/19  0640  04/18/19  0757 04/18/19 2002 04/19/19  0339   WBC 26.4*  --  25.7*  --  24.9*   RBC 2.56*  --  2.42*  --  2.48*   HGB 7.8*   < > 7.3* 8.0* 7.4*   HCT 22.9*   < > 22.7* 23.2* 23.6*   PLT See Reflexed IPF Result  --  428  --  523*   MPV NOT REPORTED  --  11.3  --  10.9    < > = values in this interval not displayed.       BMP: Recent Labs     04/17/19  0640 04/18/19  0757 04/19/19  0339   * 134* 132*   K 5.0 4.0 3.8   CL 95* 96* 95*   CO2 20 23 23 BUN 62* 33* 34*   CREATININE 4.86* 3.68* 4.11*   GLUCOSE 91 112* 119*   CALCIUM 7.5* 7.3* 7.2*        Phosphorus:  No results for input(s): PHOS in the last 72 hours. Magnesium: No results for input(s): MG in the last 72 hours. Albumin:   Recent Labs     04/16/19  1629 04/19/19  0339   LABALBU 2.2* 2.1*       Dialysis bath: Dialysis Bath  K+ (Potassium): 3  Ca+ (Calcium): 3  Na+ (Sodium): 138  HCO3 (Bicarb): 35    Radiology:  Reviewed as available. Assessment:  1 Acute Kidney Injury: Secondary to ischemic ATN dialysis dependent without any renal recovery, urine output fair, insensible losses high, clearances continue to lag behind  2. CK D stage III secondary to nephrosclerosis baseline 1.5-1.7  3. Ischemic bowel status post resection  4.  H. influenzae bacteremia resolved  5. Anemia of chronic disease and blood loss    Plan:  1. Wt removal and dialysis orders reviewed, minimal ultrafiltration today. 2.  Await gynecological workup  3. Cont pt on aranesp and zemplar per protocol. 4. Follow up labs ordered. 5.  Next dialysis on Monday      Please do not hesitate to call with questions.     Electronically signed by Linda Guo MD on 4/19/2019 at 11:37 AM

## 2019-04-19 NOTE — PROGRESS NOTES
[facility] Wound Ostomy  Nurse  Consult Note       NAME:  Sarah Arambula RECORD NUMBER:  [id number]  AGE: 47 y.o. GENDER: female  : 1965  TODAY'S DATE:  2019    Subjective   Reason for [id number] Nurse Evaluation and Assessment:   NPWT dressing change; ileostomy care       Objective    /65   Pulse 118   Temp 98.9 °F (37.2 °C)   Resp 18   Ht 5' 1.02\" (1.55 m)   Wt 175 lb 0.7 oz (79.4 kg)   SpO2 92%   BMI 33.05 kg/m²     LABS:  WBC:    Lab Results   Component Value Date    WBC 24.9 2019     H/H:    Lab Results   Component Value Date    HGB 7.4 2019    HCT 23.6 2019     PTT:    Lab Results   Component Value Date    APTT 38.6 2019   [APTT}  PT/INR:    Lab Results   Component Value Date    PROTIME 11.3 04/15/2019    INR 1.1 04/15/2019     HgBA1c:  No results found for: LABA1C    Assessment   Vickey Risk Score: Vickey Scale Score: 16    Patient Active Problem List   Diagnosis Code    Dog bite W54. 0XXA    Post-traumatic osteoarthritis of left knee M17.32    S/P left knee arthroscopy Z98.890    S/P total knee arthroplasty Z96.659    Arthritis of left knee M17.12    Shock (HCC) R57.9    Rhabdomyolysis M62.82    Hyponatremia E87.1    Lactic acidosis E87.2    MRAY (acute kidney injury) ([address] I72.4    Metabolic acidosis T89.0    Acute respiratory failure (HCC) J96.00    Elevated liver enzymes R74.8    Hyperkalemia E87.5    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HCC) K55.049    Small bowel ischemia (HCC) K55.9    Acute GI bleeding K92.2    Gram negative septic shock (HCC) A41.50, R65.21    Protein-calorie malnutrition (HCC) E46    Septic shock (HCC) A41.9, R65.21    Gastroenteritis K52.9    Haemophilus influenzae septicemia (HCC) A41.3    Pleural effusion, bilateral J90    Splenic infarction D73.5    Leukemoid reaction D72.823    Mottled skin L81.9    Acute renal failure (HCC) N17.9    Altered mental status R41.82    PMB (postmenopausal bleeding) Nikolas Isoflex  [] Fluid Immersion  [] Bariatric  [] Total Pressure Relief  [] Other:     Current Diet: DIET RENAL; Low Sodium (2 GM);  Dental Soft; Daily Fluid Restriction: 1200 ml  Dietary Nutrition Supplements: Renal Oral Supplement    Discharge Plan:  Placement for patient upon discharge: skilled nursing    Patient appropriate for Outpatient [address]: N/A

## 2019-04-19 NOTE — PROGRESS NOTES
Labs     19  0640 19  0757 19  0339   * 134* 132*   K 5.0 4.0 3.8   CL 95* 96* 95*   CO2 20 23 23   BUN 62* 33* 34*   CREATININE 4.86* 3.68* 4.11*   GLUCOSE 91 112* 119*   CALCIUM 7.5* 7.3* 7.2*   PROT  --   --  4.9*   LABALBU  --   --  2.1*   BILITOT  --   --  0.54   ALKPHOS  --   --  112*   AST  --   --  27   ALT  --   --  29        Assessment/Plan:  Dee Dee Eagle 47 y.o. female  HD# 15 with Postmenopausal Bleeding   - Doing well, vitals stable   - Hgb stable at 7.4 this AM   - Vaginitis probe negative; GC/C pending   - TVUS : uterus and ovaries not visualized due to bowel gas; patient could not tolerate transvaginal exam   - Plan for pap smear with EMB later today    - Continue care per primary, please page with any questions    Patient Active Problem List    Diagnosis Date Noted    Acute renal failure (HCC)     Altered mental status     PMB (postmenopausal bleeding)     Splenic infarction     Leukemoid reaction     Mottled skin     Pleural effusion, bilateral     Protein-calorie malnutrition ([address]     Septic shock ([address]     Gastroenteritis     Haemophilus influenzae septicemia ([address]     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[address] 2019    Small bowel ischemia ([address] 2019    Acute GI bleeding 2019    Gram negative septic shock ([address]     Rhabdomyolysis 2019    Hyponatremia 2019    Lactic acidosis 2019    MARY (acute kidney injury) ([address]     Metabolic acidosis 15/44/1130    Acute respiratory failure ([address] 2019    Elevated liver enzymes 2019    Hyperkalemia     Shock ([address] 2019    Arthritis of left knee     S/P total knee arthroplasty 2017    Post-traumatic osteoarthritis of left knee 2016    S/P left knee arthroscopy 2016    Dog bite 2013       Ivet Jose DO  Ob/Gyn Resident PGY2  [facility]  2019 7:19 AM

## 2019-04-19 NOTE — PROGRESS NOTES
Physical Therapy  DATE: 2019    NAME: Niall Hairston  MRN: [id number]   : 1965    Patient not seen this date for Physical Therapy due to:  [] Blood transfusion in progress  [x] Hemodialysis---will check PM as time allows  []  Patient Declined  [] Spine Precautions   [] Strict Bedrest  [] Surgery/ Procedure  [] Testing      [] Other        [] PT being discontinued at this time. Patient independent. No further needs. [] PT being discontinued at this time as the patient has been transferred to palliative care. No further needs.     Adam Irwin, PTA

## 2019-04-19 NOTE — PROGRESS NOTES
[facility] - Progress Note    Today's Date and Time: 4/19/2019, 7:31 AM    Impression :   1. 46 y/o female with initial complaints of:  · Vomiting  · Diarrhea  · Abdominal pain  · Altered mental status  2. Acute kidney injury- on HD   3. Shock, presumptive septic plus hypovolemic, requiring Levophed- Resolved  4. Septicemia with Haemophilus influenzae 4-5-19--Resolved  5. Gastroenteritis--Resolved  6. Ischemic bowel. S/P laparotomy 4-6-19 with resection  7. S/p second look with resection of transverse colon, ileostomy creation, resection of rectal stump and abdominal closure. 8. Severe hyponatremia--Resolved   9. Transaminitis, shock liver.-Resolved  10. COPD  6. Arthritis  12. Chronic NSAID use  13. Funguria  14. Acral mottling of hands and feet  15. Allergy to penicillins: Hives and respiratory distress  16. Allergy to sulfa   17. S/P New tunnel HD catheter placement 4/16  18. EDWARD with no vegetations    Recommendations:   · Monitor off antibiotics (Completed Meropenem. Stop date 4-14-19)  · Consider telepsych treatment  · Continue treatment for oral thrush  · Follow up possible rectal/vaginal bleeding  · Monitor mental status  · Trend hemoglobin    Medical Decision Making/Summary/Discussion:   · 46 y/o female with vomiting, diarrhea, and AMS. · Found to have gastroenteritis with dehydration, shock requiring pressors, transaminitis, MARY requiring emergent dialysis, lactic acidosis. · Intubated due to AMS  · CT chest shows atelactasis vs pneumonia  · Initially treated with vanc, levaquin, and aztreonam.   · Patient was felt to be critically ill with origin in GI tract . Was treated with meropenem despite Hx of penicillin allergy. · Had laparotomy 4-6-19 with findings of ischemic bowel from distal ileum to sigmoid colon.    · S/P ileocecectomy with extended left hemicolectomy and placement of wound vac 4-6-19  · Overall improvement post surgery  · Off pressors and sedation  · Blood Culture grew Haemophilus influenza, beta lactamase negative. THis finding is likely unrelated to intraabdominal process. · Treated with Meropenem through 4/14  · Has maintained leukocytosis and occasional fever. · Has some vaginal bleeding. Gyn evaluating  · Overall clinical improvement. Infection Control Recommendations   · [place] Precautions    Antimicrobial Stewardship Recommendations     · Discontinuation of therapy      Coordination of Outpatient Care:   · Estimated Length of IV antimicrobials: 4/14  · Patient will need Midline Catheter Insertion: No  · Patient will need PICC line Insertion: No  · Patient will need: Home IV , [place],  SNF,  LTAC TBD  · Patient will need outpatient wound care: TBD    Chief complaint/reason for consultation:   · Altered mental status and tender abdomen       History of Present Illness:   Laine Platt is a 47y.o.-year-old  female who was initially admitted on 4/5/2019. Patient seen at the request of Dr. Eliezer Dennis:    Patient presented to ED via EMS due to altered mental status and vomiting. Patient has history significant for COPD, right knee osteoarthirtis s/p arthoplasty, and chronic NSAID use. Patient lives in [place], but was here to visit her significant other. Arrived Wednesday night, said she didn't feel good. Thought she might have had a bad burger at a fast food restaurant. Went to sleep and slept for almost 24 hours. When she awoke, she said she was vomiting, having diarrhea, and abdominal pain. Significant other and sister are unsure of the nature of the vomit, diarrhea, or abdominal pain. Then she slept a bit more, until her significant other found her unresponsive and that's when he called EMS to bring her to the hospital.     Afebrile on admission, but Tmax overnight was 39.3. Tachycardic on admission, 129. Became hypotensive after admission and levophed and vasopressin were started.      Initial to tell if it was vaginal or from rectum. FOBT negative. Hemoglobin stable today 7.4. Got one unit of blood 4/17 for Hgb 6.9.     OBGYN seen and evaluated for vaginal bleeding. They sent for gonorrhea/chlamydia, pending. Vaginitis probe negative. TVUS attempted. Limited study due to gas in the bowel and patient intolerance. Planning for endometrial biopsy and pap smear today. Patient admits to polysubstance use before coming in this admission. Says she would like to stop, but is feeling very anxious about her current health status and stopping substance use. She states she would like to see telepsych for evaluation. Had episodes of a fib with RVR. Shocked twice to control rhythm and rate on 4-7-19. Pulse now in the low 100's. Abdomen softer. VAC in place  Skin cyanosis resolved. CXR 4-11-19 showed unchanged pulmonary edema and effusions. CXR 4-16-19 Rt side atelectasis     Duplex of the right upper extremity showed no evidence of deep venous thrombosis in the right upper extremity. Superficial thrombophlebitis of the right upper extremity involving the cephalic and basilic veins. Says that hand is not hurting her. Able to move the wrist and digits on command. Rt wrist with skin ulceration. Labs reviewed: 4/19/2019     WBC 23.9-->33.8->41.7->38.3->39.6->34.5-->26.4 -> 25.7 -> 24.9  Hb 8.3-->9.4->7.8 -> 7.3 -> 7.4  Plat 75-->119->264 -> 428 -> 523    BUN 84-->62 -> 33 -> 34 -> 34  Cr 5.89-->4.86 -> 3.68 -> 3.68 -> 4.11    Cultures:  Urine:  · NGTD  Blood:  · 4-5-19 : 1/2 haemophilus influenza, beta lactamase negative, sensitive to pcn and meropenem. Pt completed a 10 day course of meropenem  · 4/12 x 1 no growth to date  · 4/13 x 2 no growth to date  · 4/16 NGTD  Wound:  · NA    MRSA- Neg    Discussed with RN, patient.       I have personally reviewed the past medical history, past surgical history, medications, social history, and family history, and I have updated the database accordingly.   Past Medical History:     Past Medical History:   Diagnosis Date    Asthma     On inhaler    Back pain, chronic     Hypertension 2015    On Lisinopril    Snores     possible apnea but not tested    Wears glasses        Past Surgical  History:     Past Surgical History:   Procedure Laterality Date    KNEE ARTHROSCOPY Left 1980    KNEE ARTHROSCOPY Left 09/28/2016    with medial menisectomy    LAPAROTOMY EXPLORATORY N/A 4/6/2019    LAPAROTOMY EXPLORATORY, ILEOCECECTOMY, SUBTOTAL COLECTOMY, ABTHEHRA WOUND VAC PLACEMENT performed by Sarah Summers MD at [address] [place] [address] N/A 4/8/2019    2ND LOOK EXPLORATORY LAPAROTOMY, RESECTION TRANSVERSE COLON, ILEOSTOMY CREATION, RESECTION RECTAL STUMP, ABDOMINAL WASHOUT, OMENTECTOMY, ABDOMINAL WALL CLOSURE performed by America Albarado MD at [address] 2013       Medications:      nicotine  1 patch Transdermal Daily    nystatin  5 mL Oral 4x Daily    aspirin  81 mg Oral Daily    cyclobenzaprine  5 mg Oral TID    pantoprazole  40 mg Oral BID AC    sodium chloride flush  10 mL Intravenous 2 times per day       Social History:     Social History     Socioeconomic History    Marital status: Single     Spouse name: Not on file    Number of children: 0    Years of education: Not on file    Highest education level: Not on file   Occupational History    Occupation: [address] [place] [address] Financial resource strain: Not on file    Food insecurity:     Worry: Not on file     Inability: Not on file    Transportation needs:     Medical: Not on file     Non-medical: Not on file   Tobacco Use    Smoking status: Light Tobacco Smoker     Packs/day: 0.25     Types: Cigarettes     Start date: 9/19/1980    Smokeless tobacco: Never Used   Substance and Sexual Activity    Alcohol use: Yes     Comment: OCCASSIONAL    Drug use: No    Sexual activity: Yes     Partners: Female   Lifestyle  Physical activity:     Days per week: Not on file     Minutes per session: Not on file    Stress: Not on file   Relationships    Social connections:     Talks on phone: Not on file     Gets together: Not on file     Attends [employer] service: Not on file     Active member of club or organization: Not on file     Attends meetings of clubs or organizations: Not on file     Relationship status: Not on file    Intimate partner violence:     Fear of current or ex partner: Not on file     Emotionally abused: Not on file     Physically abused: Not on file     Forced sexual activity: Not on file   Other Topics Concern    Not on file   Social History Narrative    Not on file       Family History:     Family History   Problem Relation Age of Onset    Heart Disease Mother     Stroke Mother     Heart Surgery Mother     Diabetes Maternal Grandmother     Emphysema Maternal Grandfather     Diabetes Maternal Grandfather     Lung Cancer Maternal Uncle         Allergies:   Pcn [penicillins]; Sulfa antibiotics; and Tape [adhesive tape]     Review of Systems:   Unable to provide. Sedated on ventilator. Physical Examination :     Patient Vitals for the past 8 hrs:   BP Temp Temp src Pulse Resp SpO2   04/19/19 0300 124/62 99.6 °F (37.6 °C) Oral 118 22 92 %   04/19/19 0000 124/71 99.8 °F (37.7 °C) Oral 115 22 97 %     General Appearance: Sedated, on ventilator  Head:  Normocephalic, no trauma  Eyes: Pupils equal, round, reactive to light; sclera anicteric; conjunctivae pink. No embolic phenomena. ENT: Oropharynx clear, without erythema, exudate, or thrush. No tenderness of sinuses. Mouth/throat: mucosa pink and moist. No lesions. Dentition in good repair. Neck:Supple, without lymphadenopathy. Thyroid normal, No bruits. Pulmonary/Chest: Clear to auscultation, without wheezes, rales, or rhonchi. No dullness to percussion. Cardiovascular: Regular rate and rhythm without murmurs, rubs, or gallops.    Abdomen: Distended. Bowel sounds absent. Soft, mildly tender, surgical bandages in place. Wound vac removed. All four Extremities: Cyanosis of trunk, abdomen, distal extremities  Neurologic: Sedated  Skin: Cool and dry with poor turgor. Signs of peripheral arterial  insufficiency. No ulcerations. No open wounds. Skin purplish, cyanotic. Medical Decision Making -Laboratory:   I have independently reviewed/ordered the following labs:    CBC with Differential:   Recent Labs     04/18/19 0757 04/18/19 2002 04/19/19 0339   WBC 25.7*  --  24.9*   HGB 7.3* 8.0* 7.4*   HCT 22.7* 23.2* 23.6*     --  523*   LYMPHOPCT 11*  --  13*   MONOPCT 3  --  5     BMP:   Recent Labs     04/18/19 0757 04/19/19 0339   * 132*   K 4.0 3.8   CL 96* 95*   CO2 23 23   BUN 33* 34*   CREATININE 3.68* 4.11*     Hepatic Function Panel:   Recent Labs     04/16/19  1629 04/19/19 0339   PROT 5.0* 4.9*   LABALBU 2.2* 2.1*   BILIDIR 0.38* 0.39*   IBILI 0.20 0.15   BILITOT 0.58 0.54   ALKPHOS 91 112*   ALT 59* 29   AST 37* 27     No results for input(s): RPR in the last 72 hours. No results for input(s): HIV in the last 72 hours. No results for input(s): BC in the last 72 hours. Lab Results   Component Value Date    MUCUS NOT REPORTED 04/06/2019    RBC 2.48 04/19/2019    TRICHOMONAS NOT REPORTED 04/06/2019    WBC 24.9 04/19/2019    YEAST NOT REPORTED 04/06/2019    TURBIDITY TURBID 04/06/2019     Lab Results   Component Value Date    CREATININE 4.11 04/19/2019    GLUCOSE 119 04/19/2019       Medical Decision Making-Imaging:     Abdominal xray:    Gas in mildly distended loops of large and small bowel likely reflecting an   ileus.       NG tube tip in the gastric fundus with the proximal side-port in the distal   thoracic esophagus, repositioning recommended.       Airspace disease left lung base.      CT scan chest:    Impression   Satisfactory position endotracheal tube.       Nasogastric tube needs advanced 10 cm.       Patchy perihilar opacities and bibasilar airspace disease.  Follow-up may be   beneficial following medical treatment course to document complete resolution.           EXAMINATION:   CTA OF THE ABDOMEN AND PELVIS WITH CONTRAST       4/5/2019 9:23 pm:       TECHNIQUE:   CTA of the abdomen and pelvis was performed with the administration of   intravenous contrast. Multiplanar reformatted images are provided for review. MIP images are provided for review. Dose modulation, iterative   reconstruction, and/or weight based adjustment of the mA/kV was utilized to   reduce the radiation dose to as low as reasonably achievable.       COMPARISON:   None.       HISTORY:   ORDERING SYSTEM PROVIDED HISTORY: suspected dissection of abdominal aorta       FINDINGS:       CTA ABDOMEN:       Bibasilar airspace disease.  Liver, spleen, pancreas, gallbladder normal.   Bilateral adrenal masses measuring 11 mm on the left and 18 x 28 mm on the   left.  Bilateral ill-defined hypodensities throughout the kidneys.  The small   bowel is somewhat distended with multiple air-fluid levels.  Multiple   air-fluid levels are also noted throughout the colon.  The stomach appears   normal.  Retroaortic left renal vein.       Bones normal.       Aorta appears normal without dissection.  The celiac artery and SMA appear   normal.  The renal arteries appear normal.           CTA PELVIS:       Bladder decompressed.  Uterus normal.  Catheter right groin terminates in the   common iliac vein.           Impression   Ileus.  No evidence of aortic dissection.  The bilateral adrenal masses, left   greater than right.  Recommend follow-up adrenal MRI non emergently.           Medical Decision Making-Other: Thank you for allowing us to participate in the care of this patient. Please call with questions.       Afsaneh Maradiaga MD.      Pager: [phone] - Office: [phone]

## 2019-04-19 NOTE — PROGRESS NOTES
Patient Name: Johanny Sigala  Date of admission: 4/5/2019  8:39 PM  Patient's age: 47 y. o., 1965  Admission Dx: Shock ([address] [R57.9]      Requesting Physician: Anthony Gutierrez MD    CHIEF COMPLAINT:  Abdominal pain    History Obtained From:  patient  INTERIM HISTORY  The patient is seen and examined. She is feeling much better abdominal pain has improved. No active bleeding. She is off antibiotics. White cells and hemoglobin have been stable. She will need long-term anticoagulation, she is aware of that  HISTORY OF PRESENT ILLNESS:      The patient is a 47 y.o.  female who is admitted to the hospital for   for increased confusion and abdominal pain. She was found to have ischemic bowel and was taken to surgery on 4/6/18. Pathology showed ischemic bowel going to the margins. She was taken for a second surgery on 4/8/18 and more ischemic bowel was appreciated. The patient platelets were about 300,000 on admission and dropped about 98 with her multiple surgeries. Hit antibody was done and was negative. Platelets recovered since then. The patient continues to struggle with recovery. She underwent a CT scan of the abdomen and pelvis today that showed heterogenous changes in the spleen that the new. There was a suspicion of splenic infarction. Vascular were involved and started the patient in aspirin and Plavix as well as heparin. The patient is seen and examined. She is started on anticoagulation. She denies any active bleeding. She has very little insight about her disease and most of the information were obtained from the chart. It is no history of thromboembolism previously . family history is negative for arterial or venous thrombosis. The patient developed worsening renal failure and was started on hemodialysis.     Past Medical History:   has a past medical history of Asthma, Back pain, chronic, Hypertension, Snores, and Wears glasses. Past Surgical History:   has a past surgical history that includes Tonsillectomy; Knee arthroscopy (Left, 1980); Ulnar tunnel release (Right, 2013); Knee arthroscopy (Left, 09/28/2016); LAPAROTOMY EXPLORATORY (N/A, 4/6/2019); and LAPAROTOMY EXPLORATORY (N/A, 4/8/2019). Family History: family history includes Diabetes in her maternal grandfather and maternal grandmother; Emphysema in her maternal grandfather; Heart Disease in her mother; Heart Surgery in her mother; [place] in her maternal uncle; Stroke in her mother. Social History:   reports that she has been smoking cigarettes. She started smoking about 38 years ago. She has been smoking about 0.25 packs per day. She has never used smokeless tobacco. She reports that she drinks alcohol. She reports that she does not use drugs. Medications:    Reviewed in EPIC     Allergies:  Pcn [penicillins]; Sulfa antibiotics; and Tape [adhesive tape]    REVIEW OF SYSTEMS:      Review of Systems  General: no fever or night sweats, Weight is stable. Progressive fatigue  ENT: No double or blurred vision, no tinnitus or hearing problem, no dysphagia or sore throat   Respiratory: No chest pain, no shortness of breath, no cough or hemoptysis. Cardiovascular: Denies chest pain, PND or orthopnea. No L E swelling or palpitations. Gastrointestinal:    Abdominal discomfort, wound VAC in place, no bleeding. Genitourinary: Denies dysuria, hematuria, frequency, urgency or incontinence. Neurological: Denies headaches, decreased LOC, no sensory or motor focal deficits. Musculoskeletal:  No arthralgia no back pain or joint swelling. Skin: There are no rashes or bleeding. Psychiatric:  No anxiety, no depression. Endocrine: no diabetes or thyroid disease. Hematologic: no bleeding , no adenopathy.                 PHYSICAL EXAM:      /65   Pulse 118   Temp 98.9 °F (37.2 °C)   Resp 18   Ht 5' 1.02\" (1.55 m) Wt 175 lb 0.7 oz (79.4 kg)   SpO2 92%   BMI 33.05 kg/m²    Temp (24hrs), Av °F (37.2 °C), Min:98.3 °F (36.8 °C), Max:99.8 °F (37.7 °C)      Physical Exam  Gen. Exam, showed a well-appearing patient without evidence of distress or pain  HEENT, normocephalic and atraumatic, PERRLA extraocular muscles are intact  Neck showed no JVD no carotid bruit, no cervical adenopathy  Chest is clear to auscultation bilaterally  Heart is regular without any murmur  Abdomen left upper quadrant tenderness but the abdomen was soft  Lower extremities showed no edema clubbing or cyanosis  Neurological examination was nonfocal, with intact cranial nerves  Skin  No rashes or bruising appreciated          DATA:      Labs:       CBC:   Recent Labs     19  0757 19   WBC 25.7*  --  24.9*   HGB 7.3* 8.0* 7.4*   HCT 22.7* 23.2* 23.6*     --  523*     BMP:   Recent Labs     19  0757 19   * 132*   K 4.0 3.8   CO2 23 23   BUN 33* 34*   CREATININE 3.68* 4.11*   LABGLOM 13* 11*   GLUCOSE 112* 119*     PT/INR:   No results for input(s): PROTIME, INR in the last 72 hours.   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

## 2019-04-19 NOTE — PROGRESS NOTES
by mouth 2 times daily as needed for Constipation  gabapentin (NEURONTIN) 100 MG capsule, Take 100 mg by mouth 3 times daily as needed   lisinopril-hydrochlorothiazide (PRINZIDE;ZESTORETIC) 10-12.5 MG per tablet, Take 1 tablet by mouth daily  albuterol (PROVENTIL) (2.5 MG/3ML) 0.083% nebulizer solution, Take 2.5 mg by nebulization every 6 hours as needed for Wheezing  albuterol sulfate  (90 BASE) MCG/ACT inhaler, Inhale 2 puffs into the lungs every 6 hours as needed for Wheezing    Current Medications:     Scheduled Meds:    nicotine  1 patch Transdermal Daily    nystatin  5 mL Oral 4x Daily    aspirin  81 mg Oral Daily    cyclobenzaprine  5 mg Oral TID    pantoprazole  40 mg Oral BID AC    sodium chloride flush  10 mL Intravenous 2 times per day     Continuous Infusions:    heparin (porcine) 20 Units/kg/hr (19 0447)    IV infusion builder 30 mL/hr at 19 0001    dextrose      dextrose       PRN Meds:  sodium chloride, sodium chloride, heparin (porcine), heparin (porcine), sodium chloride, sodium chloride, midodrine, acetaminophen, glucose, dextrose, glucagon (rDNA), dextrose, glucose, dextrose, glucagon (rDNA), dextrose, heparin (porcine), heparin (porcine), oxyCODONE, morphine **OR** morphine, sodium chloride, sodium chloride, dextrose, sodium chloride flush, magnesium hydroxide, ondansetron    Input/Output:       I/O last 3 completed shifts: In: 1852 [P.O.:805; I.V.:1047]  Out: 1000 [Stool:1000].       Patient Vitals for the past 96 hrs (Last 3 readings):   Weight   19 0815 176 lb 9.4 oz (80.1 kg)   19 0615 199 lb 4.7 oz (90.4 kg)   19 1230 177 lb 0.5 oz (80.3 kg)       Vital Signs:   Temperature:  Temp: 98.3 °F (36.8 °C)  TMax:   Temp (24hrs), Av.8 °F (37.1 °C), Min:98 °F (36.7 °C), Max:99.8 °F (37.7 °C)    Respirations:  Resp: 18  Pulse:   Pulse: 119  BP:    BP: 124/73  BP Range: Systolic (09JWO), FWO:529 , Min:105 , SLA:849       Diastolic (34NRQ), HIE:95, Min:62, Max:79      Physical Examination:     General:  AAO x 3, speaking in full sentences, no accessory muscle use. HEENT: Atraumatic, normocephalic, no throat congestion, moist mucosa. Eyes:   Pupils equal, round and reactive to light, EOMI. Neck:   No JVD, no thyromegaly, no lymphadenopathy. Chest:  Bilateral vesicular breath sounds, no rales or wheezes. Cardiac:  S1 S2 RR, no murmurs, gallops or rubs, JVP not raised. Abdomen: Soft, non-tender, no masses or organomegaly, BS audible. :   No suprapubic or flank tenderness. Neuro:  AAO x 3, No FND. SKIN:  No rashes, good skin turgor. Extremities:  No edema, palpable peripheral pulses, no calf tenderness. Labs:       Recent Labs     04/17/19  0640 04/18/19 0757 04/18/19 2002 04/19/19 0339   WBC 26.4*  --  25.7*  --  24.9*   RBC 2.56*  --  2.42*  --  2.48*   HGB 7.8*   < > 7.3* 8.0* 7.4*   HCT 22.9*   < > 22.7* 23.2* 23.6*   MCV 89.5  --  93.8  --  95.2   MCH 30.5  --  30.2  --  29.8   MCHC 34.1  --  32.2  --  31.4   RDW 15.8*  --  15.7*  --  15.4*   PLT See Reflexed IPF Result  --  428  --  523*   MPV NOT REPORTED  --  11.3  --  10.9    < > = values in this interval not displayed. BMP:   Recent Labs     04/17/19  0640 04/18/19 0757 04/19/19 0339   * 134* 132*   K 5.0 4.0 3.8   CL 95* 96* 95*   CO2 20 23 23   BUN 62* 33* 34*   CREATININE 4.86* 3.68* 4.11*   GLUCOSE 91 112* 119*   CALCIUM 7.5* 7.3* 7.2*      Phosphorus:   No results for input(s): PHOS in the last 72 hours. Magnesium:  No results for input(s): MG in the last 72 hours.   Albumin:    Recent Labs     04/16/19  1629 04/19/19  0339   LABALBU 2.2* 2.1*     BNP:    No results found for: BNP  CALVIN:      Lab Results   Component Value Date    CALVIN NEGATIVE 04/13/2019     SPEP:  Lab Results   Component Value Date    PROT 4.9 04/19/2019    ALBCAL 2.8 04/05/2019    ALBPCT 63 04/05/2019    LABALPH 0.2 04/05/2019    LABALPH 0.6 04/05/2019    A1PCT 3 04/05/2019    A2PCT 12 04/05/2019 LABBETA 0.5 04/05/2019    BETAPCT 11 04/05/2019    GAMGLOB 0.5 04/05/2019    GGPCT 11 04/05/2019    PATH ELECTRONICALLY SIGNED. Vani Vega M.D. 04/06/2019     UPEP:     Lab Results   Component Value Date    LABPE  04/06/2019     ELEVATED PROTEIN CONCENTRATION. MOST SERUM PROTEINS ARE DETECTED IN THIS URINE.   USUALLY OBSERVED WITH MARKEDLY INCREASED NON-SELECTIVE GLOMERULAR PERMEABILITY (i.e. SEVERE GLOMERULAR DISEASE), CONTAMINATION OF     C3:     Lab Results   Component Value Date    C3 115 04/13/2019     C4:     Lab Results   Component Value Date    C4 24 04/13/2019     MPO ANCA:     Lab Results   Component Value Date    MPO 7 04/13/2019     PR3 ANCA:     Lab Results   Component Value Date    PR3 20 04/13/2019     Anti-GBM:   No results found for: GBMABIGG  Hep BsAg:         Lab Results   Component Value Date    HEPBSAG NONREACTIVE 04/09/2019     Hep C AB:          Lab Results   Component Value Date    HEPCAB NONREACTIVE 04/09/2019       Urinalysis/Chemistries:      Lab Results   Component Value Date    NITRU NEGATIVE 04/06/2019    COLORU DARK YELLOW 04/06/2019    PHUR 5.0 04/06/2019    WBCUA 50  04/06/2019    RBCUA 50  04/06/2019    MUCUS NOT REPORTED 04/06/2019    TRICHOMONAS NOT REPORTED 04/06/2019    YEAST NOT REPORTED 04/06/2019    BACTERIA NOT REPORTED 04/06/2019    SPECGRAV 1.032 04/06/2019    LEUKOCYTESUR NEGATIVE 04/06/2019    UROBILINOGEN Normal 04/06/2019    BILIRUBINUR NEGATIVE 04/06/2019    GLUCOSEU NEGATIVE 04/06/2019    KETUA TRACE 04/06/2019    AMORPHOUS NOT REPORTED 04/06/2019     Urine Sodium:     Lab Results   Component Value Date    JANEL <20 04/06/2019     Urine Potassium:  No results found for: KUR  Urine Chloride:    Lab Results   Component Value Date    CLUR <20 04/06/2019     Urine Osmolarity: No results found for: OSMOU  Urine Protein:   No components found for: TOTALPROTEIN, URINE   Urine Creatinine:     Lab Results   Component Value Date    LABCREA 94.4 04/06/2019 Urine Eosinophils:  No components found for: UEOS    Radiology:     CXR:     Assessment:     1. Acute Kidney Injury: Secondary to ischemic ATN dialysis dependent without any renal recovery, urine output fair, insensible losses high, clearances continue to lag behind   2. CK D stage III secondary to nephrosclerosis baseline 1.5-1.7  3. Ischemic bowel status post resection  4.  H. influenzae bacteremia resolved  5. Anemia of chronic disease and blood loss  6. Vaginal bleeding: OB consulted, unable to tolerate TVUS, planned for pap smear with EMB later today, vaginitis probe pending    Plan:   1. Hemodialysis today - found to be tachycardic, will hold any fluid removal.   2.  Increase oral intake  3. Await recommendations from hematology oncology for long-term anticoagulation  4. Will await renal recovery    Nutrition   Please ensure that patient is on a renal diet/TF. Avoid nephrotoxic drugs/contrast exposure. We will continue to follow along with you.

## 2019-04-19 NOTE — PROGRESS NOTES
Physical Therapy  DATE: 2019    NAME: Brady Ham  MRN: [id number]   : 1965    Patient not seen this date for Physical Therapy due to:  [] Blood transfusion in progress  [] Hemodialysis  []  Patient Declined  [] Spine Precautions   [] Strict Bedrest  [] Surgery/ Procedure  [] Testing      [x] Other---working with OT and then getting wound vac changed this PM.        [] PT being discontinued at this time. Patient independent. No further needs. [] PT being discontinued at this time as the patient has been transferred to palliative care. No further needs.     Jarvis Osuna, PTA

## 2019-04-19 NOTE — PROGRESS NOTES
Nutrition Assessment    Type and Reason for Visit: Reassess    Nutrition Recommendations:   - Continue current diet. Monitor intakes/tolerance and modify/liberalize as needed. - Will provide Nepro ONS x 2 per day. Nutrition Assessment: Pt improving nutritionally with intakes of at least 50% of meals. Pt has been started on dailysis. Will provide oral supplements with meals. Malnutrition Assessment:  · Malnutrition Status: At risk for malnutrition  · Context: Acute illness or injury  · Findings of the 6 clinical characteristics of malnutrition (Minimum of 2 out of 6 clinical characteristics is required to make the diagnosis of moderate or severe Protein Calorie Malnutrition based on AND/ASPEN Guidelines):  1. Energy Intake-Less than or equal to 75% of estimated energy requirement, Greater than or equal to 7 days    2. Weight Loss-Unable to assess  3. Fat Loss-No significant subcutaneous fat loss  4. Muscle Loss-No significant muscle mass loss  5. Fluid Accumulation-(Mild to moderate fluid accumulation), Extremities, Generalized    Nutrition Risk Level: Moderate    Nutrient Needs:  · Estimated Daily Total Kcal: 4868-6223 kcal/day  · Estimated Daily Protein (g): 70-95 g pro/day   E  Nutrition Diagnosis:   · Problem: Inadequate oral intake  · Etiology: related to recent abd surgery     Signs and symptoms:  as evidenced by Intake 50-75%    Objective Information:  · Nutrition-Focused Physical Findings: Ileostomy.   · Wound Type: Surgical Wound  · Current Nutrition Therapies:  · Oral Diet Orders: Renal, 2gm Sodium, Fluid Restriction   · Oral Diet intake: 51-75%  · Oral Nutrition Supplement (ONS) Orders: None  · Anthropometric Measures:  · Ht: 5' 1.02\" (155 cm)   · Current Body Wt: 175 lb 0.7 oz (79.4 kg)  · Admission Body Wt: 173 lb 15.1 oz (78.9 kg)  · Ideal Body Wt: 105 lb 13.1 oz (48 kg), % Ideal Body 165% (adm/ideal)  · BMI Classification: BMI 30.0 - 34.9 Obese Class I    Nutrition Interventions: Continue current diet, Start ONS  Continued Inpatient Monitoring, Education Not Indicated    Nutrition Evaluation:   · Evaluation: Progressing toward goals   · Goals: Meet % of estimated nutrition needs    · Monitoring: Meal Intake, Supplement Intake, Diet Tolerance, Skin Integrity, Weight, Pertinent Labs    Electronically signed by Aleks Smith RD, LD on 4/19/19 at 3:18 PM    Contact Number: [phone]

## 2019-04-19 NOTE — PROGRESS NOTES
Patient seen with in house attending. Discussed the need for TVUS due to postmenopausal bleeding. Denies any further vaginal bleeding. Patient agreeable if she is able to have something for anxiety. Discussed with IM and ok to give Ativan 1mg IV prior to TVUS. Patient needs papsmear however reports that she has orders as an outpatient for her OB/GYN provider at [facility]. Explained that depending on the [address] results may recommend EMB if endometrial lining thickened.      Patient agreeable     Danyell Loredo, PGY3  OB-GYN Resident

## 2019-04-19 NOTE — PLAN OF CARE
Problem: NUTRITION  Goal: Nutritional status is improving  Outcome: Ongoing     Problem: Nutrition  Goal: Optimal nutrition therapy  Description  Nutrition Problem: Inadequate oral intake  Intervention: Food and/or Nutrient Delivery: Continue NPO  Nutritional Goals: Meet % of estimated nutrition needs   Outcome: Ongoing     Problem: Fluid Volume - Imbalance:  Goal: Absence of imbalanced fluid volume signs and symptoms  Description  Absence of imbalanced fluid volume signs and symptoms  Outcome: Ongoing     Problem: Infection - Central Venous Catheter-Associated Bloodstream Infection:  Goal: Will show no infection signs and symptoms  Description  Will show no infection signs and symptoms  Outcome: Ongoing     Problem: Risk for Impaired Skin Integrity  Goal: Tissue integrity - skin and mucous membranes  Description  Structural intactness and normal physiological function of skin and  mucous membranes.   Outcome: Ongoing     Problem: Falls - Risk of:  Goal: Will remain free from falls  Description  Will remain free from falls  4/19/2019 1550 by Noa Gallagher RN  Outcome: Ongoing  4/19/2019 0251 by Cheyanne Grant RN  Outcome: Met This Shift  Goal: Absence of physical injury  Description  Absence of physical injury  4/19/2019 1550 by Noa Gallagher RN  Outcome: Ongoing  4/19/2019 0251 by Cheyanne Grant RN  Outcome: Met This Shift     Problem: Cardiac:  Goal: Ability to maintain an adequate cardiac output will improve  Description  Ability to maintain an adequate cardiac output will improve  Outcome: Ongoing  Goal: Complications related to the disease process, condition or treatment will be avoided or minimized  Description  Complications related to the disease process, condition or treatment will be avoided or minimized  Outcome: Ongoing     Problem: Safety:  Goal: Ability to remain free from injury will improve  Description  Ability to remain free from injury will improve  Outcome: Ongoing  Goal: Will show disturbance signs and symptoms  Description  Absence of psychomotor disturbance signs and symptoms  Outcome: Ongoing     Problem: Sensory Perception - Impaired:  Goal: Demonstrations of improved sensory functioning will increase  Description  Demonstrations of improved sensory functioning will increase  Outcome: Ongoing  Goal: Decrease in sensory misperception frequency  Description  Decrease in sensory misperception frequency  Outcome: Ongoing  Goal: Able to refrain from responding to false sensory perceptions  Description  Able to refrain from responding to false sensory perceptions  Outcome: Ongoing  Goal: Demonstrates accurate environmental perceptions  Description  Demonstrates accurate environmental perceptions  Outcome: Ongoing  Goal: Able to distinguish between reality-based and nonreality-based thinking  Description  Able to distinguish between reality-based and nonreality-based thinking  Outcome: Ongoing  Goal: Able to interrupt nonreality-based thinking  Description  Able to interrupt nonreality-based thinking  Outcome: Ongoing     Problem: Sleep Pattern Disturbance:  Goal: Appears well-rested  Description  Appears well-rested  Outcome: Ongoing     Problem: Musculor/Skeletal Functional Status  Goal: Highest potential functional level  Outcome: Ongoing

## 2019-04-20 LAB
ABSOLUTE EOS #: 0.47 K/UL (ref 0–0.44)
ABSOLUTE IMMATURE GRANULOCYTE: 0.35 K/UL (ref 0–0.3)
ABSOLUTE LYMPH #: 3.51 K/UL (ref 1.1–3.7)
ABSOLUTE MONO #: 1.47 K/UL (ref 0.1–1.2)
ANION GAP SERPL CALCULATED.3IONS-SCNC: 16 MMOL/L (ref 9–17)
BASOPHILS # BLD: 0 % (ref 0–2)
BASOPHILS ABSOLUTE: 0.1 K/UL (ref 0–0.2)
BUN BLDV-MCNC: 21 MG/DL (ref 6–20)
BUN/CREAT BLD: ABNORMAL (ref 9–20)
CALCIUM SERPL-MCNC: 7.5 MG/DL (ref 8.6–10.4)
CHLORIDE BLD-SCNC: 96 MMOL/L (ref 98–107)
CO2: 22 MMOL/L (ref 20–31)
CREAT SERPL-MCNC: 2.99 MG/DL (ref 0.5–0.9)
CULTURE: NORMAL
CULTURE: NORMAL
DIFFERENTIAL TYPE: ABNORMAL
EOSINOPHILS RELATIVE PERCENT: 2 % (ref 1–4)
GFR AFRICAN AMERICAN: 20 ML/MIN
GFR NON-AFRICAN AMERICAN: 16 ML/MIN
GFR SERPL CREATININE-BSD FRML MDRD: ABNORMAL ML/MIN/{1.73_M2}
GFR SERPL CREATININE-BSD FRML MDRD: ABNORMAL ML/MIN/{1.73_M2}
GLUCOSE BLD-MCNC: 111 MG/DL (ref 70–99)
GLUCOSE BLD-MCNC: 83 MG/DL (ref 65–105)
GLUCOSE BLD-MCNC: 83 MG/DL (ref 65–105)
GLUCOSE BLD-MCNC: 93 MG/DL (ref 65–105)
GLUCOSE BLD-MCNC: 99 MG/DL (ref 65–105)
HCT VFR BLD CALC: 22.9 % (ref 36.3–47.1)
HCT VFR BLD CALC: 24.2 % (ref 36.3–47.1)
HEMOGLOBIN: 7.3 G/DL (ref 11.9–15.1)
HEMOGLOBIN: 7.6 G/DL (ref 11.9–15.1)
IMMATURE GRANULOCYTES: 2 %
INR BLD: 1.3
LYMPHOCYTES # BLD: 15 % (ref 24–43)
Lab: NORMAL
Lab: NORMAL
MCH RBC QN AUTO: 30.3 PG (ref 25.2–33.5)
MCHC RBC AUTO-ENTMCNC: 31.9 G/DL (ref 28.4–34.8)
MCV RBC AUTO: 95 FL (ref 82.6–102.9)
MONOCYTES # BLD: 6 % (ref 3–12)
NRBC AUTOMATED: 0 PER 100 WBC
PARTIAL THROMBOPLASTIN TIME: 35.8 SEC (ref 20.5–30.5)
PARTIAL THROMBOPLASTIN TIME: 47 SEC (ref 20.5–30.5)
PARTIAL THROMBOPLASTIN TIME: 48.6 SEC (ref 20.5–30.5)
PDW BLD-RTO: 15.3 % (ref 11.8–14.4)
PLATELET # BLD: 620 K/UL (ref 138–453)
PLATELET ESTIMATE: ABNORMAL
PMV BLD AUTO: 10.6 FL (ref 8.1–13.5)
POTASSIUM SERPL-SCNC: 3.4 MMOL/L (ref 3.7–5.3)
PROTHROMBIN TIME: 13.2 SEC (ref 9–12)
RBC # BLD: 2.41 M/UL (ref 3.95–5.11)
RBC # BLD: ABNORMAL 10*6/UL
SEG NEUTROPHILS: 75 % (ref 36–65)
SEGMENTED NEUTROPHILS ABSOLUTE COUNT: 17.74 K/UL (ref 1.5–8.1)
SODIUM BLD-SCNC: 134 MMOL/L (ref 135–144)
SPECIMEN DESCRIPTION: NORMAL
SPECIMEN DESCRIPTION: NORMAL
WBC # BLD: 23.6 K/UL (ref 3.5–11.3)
WBC # BLD: ABNORMAL 10*3/UL

## 2019-04-20 PROCEDURE — 6370000000 HC RX 637 (ALT 250 FOR IP): Performed by: STUDENT IN AN ORGANIZED HEALTH CARE EDUCATION/TRAINING PROGRAM

## 2019-04-20 PROCEDURE — 82947 ASSAY GLUCOSE BLOOD QUANT: CPT

## 2019-04-20 PROCEDURE — 99232 SBSQ HOSP IP/OBS MODERATE 35: CPT | Performed by: INTERNAL MEDICINE

## 2019-04-20 PROCEDURE — 6360000002 HC RX W HCPCS: Performed by: STUDENT IN AN ORGANIZED HEALTH CARE EDUCATION/TRAINING PROGRAM

## 2019-04-20 PROCEDURE — 97530 THERAPEUTIC ACTIVITIES: CPT

## 2019-04-20 PROCEDURE — 85610 PROTHROMBIN TIME: CPT

## 2019-04-20 PROCEDURE — 85730 THROMBOPLASTIN TIME PARTIAL: CPT

## 2019-04-20 PROCEDURE — 97110 THERAPEUTIC EXERCISES: CPT

## 2019-04-20 PROCEDURE — 36415 COLL VENOUS BLD VENIPUNCTURE: CPT

## 2019-04-20 PROCEDURE — 2580000003 HC RX 258: Performed by: STUDENT IN AN ORGANIZED HEALTH CARE EDUCATION/TRAINING PROGRAM

## 2019-04-20 PROCEDURE — 80048 BASIC METABOLIC PNL TOTAL CA: CPT

## 2019-04-20 PROCEDURE — 2060000000 HC ICU INTERMEDIATE R&B

## 2019-04-20 PROCEDURE — 6370000000 HC RX 637 (ALT 250 FOR IP): Performed by: INTERNAL MEDICINE

## 2019-04-20 PROCEDURE — 85025 COMPLETE CBC W/AUTO DIFF WBC: CPT

## 2019-04-20 RX ORDER — WARFARIN SODIUM 5 MG/1
5 TABLET ORAL DAILY
Status: DISCONTINUED | OUTPATIENT
Start: 2019-04-20 | End: 2019-04-23 | Stop reason: DRUGHIGH

## 2019-04-20 RX ADMIN — PANTOPRAZOLE SODIUM 40 MG: 40 TABLET, DELAYED RELEASE ORAL at 18:13

## 2019-04-20 RX ADMIN — NYSTATIN 500000 UNITS: 500000 SUSPENSION ORAL at 21:18

## 2019-04-20 RX ADMIN — Medication 10 ML: at 21:00

## 2019-04-20 RX ADMIN — OXYCODONE HYDROCHLORIDE 5 MG: 5 TABLET ORAL at 00:22

## 2019-04-20 RX ADMIN — HEPARIN SODIUM AND DEXTROSE 26 UNITS/KG/HR: 10000; 5 INJECTION INTRAVENOUS at 11:43

## 2019-04-20 RX ADMIN — PANTOPRAZOLE SODIUM 40 MG: 40 TABLET, DELAYED RELEASE ORAL at 06:46

## 2019-04-20 RX ADMIN — HEPARIN SODIUM 2000 UNITS: 1000 INJECTION INTRAVENOUS; SUBCUTANEOUS at 21:22

## 2019-04-20 RX ADMIN — ASPIRIN 81 MG: 81 TABLET, CHEWABLE ORAL at 09:00

## 2019-04-20 RX ADMIN — HEPARIN SODIUM 2000 UNITS: 1000 INJECTION INTRAVENOUS; SUBCUTANEOUS at 11:46

## 2019-04-20 RX ADMIN — CYCLOBENZAPRINE 5 MG: 10 TABLET, FILM COATED ORAL at 21:18

## 2019-04-20 RX ADMIN — HEPARIN SODIUM 2000 UNITS: 1000 INJECTION INTRAVENOUS; SUBCUTANEOUS at 03:14

## 2019-04-20 RX ADMIN — MORPHINE SULFATE 4 MG: 4 INJECTION INTRAVENOUS at 03:21

## 2019-04-20 RX ADMIN — NYSTATIN 500000 UNITS: 500000 SUSPENSION ORAL at 13:03

## 2019-04-20 RX ADMIN — OXYCODONE HYDROCHLORIDE 5 MG: 5 TABLET ORAL at 18:24

## 2019-04-20 RX ADMIN — NYSTATIN 500000 UNITS: 500000 SUSPENSION ORAL at 09:00

## 2019-04-20 RX ADMIN — OXYCODONE HYDROCHLORIDE 5 MG: 5 TABLET ORAL at 13:01

## 2019-04-20 RX ADMIN — HEPARIN SODIUM AND DEXTROSE 24 UNITS/KG/HR: 10000; 5 INJECTION INTRAVENOUS at 03:15

## 2019-04-20 RX ADMIN — OXYCODONE HYDROCHLORIDE 5 MG: 5 TABLET ORAL at 04:26

## 2019-04-20 RX ADMIN — OXYCODONE HYDROCHLORIDE 5 MG: 5 TABLET ORAL at 08:52

## 2019-04-20 RX ADMIN — METOPROLOL TARTRATE 12.5 MG: 25 TABLET ORAL at 09:00

## 2019-04-20 RX ADMIN — OXYCODONE HYDROCHLORIDE 5 MG: 5 TABLET ORAL at 23:44

## 2019-04-20 RX ADMIN — WARFARIN SODIUM 5 MG: 5 TABLET ORAL at 18:13

## 2019-04-20 RX ADMIN — HEPARIN SODIUM AND DEXTROSE 26 UNITS/KG/HR: 10000; 5 INJECTION INTRAVENOUS at 18:19

## 2019-04-20 RX ADMIN — CYCLOBENZAPRINE 5 MG: 10 TABLET, FILM COATED ORAL at 13:02

## 2019-04-20 RX ADMIN — METOPROLOL TARTRATE 12.5 MG: 25 TABLET ORAL at 21:18

## 2019-04-20 RX ADMIN — CYCLOBENZAPRINE 5 MG: 10 TABLET, FILM COATED ORAL at 09:00

## 2019-04-20 RX ADMIN — NYSTATIN 500000 UNITS: 500000 SUSPENSION ORAL at 18:13

## 2019-04-20 ASSESSMENT — PAIN SCALES - GENERAL
PAINLEVEL_OUTOF10: 9
PAINLEVEL_OUTOF10: 9
PAINLEVEL_OUTOF10: 8
PAINLEVEL_OUTOF10: 6
PAINLEVEL_OUTOF10: 8

## 2019-04-20 NOTE — PROGRESS NOTES
PATIENT REFUSES TO WEAR BIPAP     [x] Risks and benefits explained to patient   [x] Patient refuses to wear Bipap stating \"no I will not wear it tonight, take it for someone else who can use it\"  [x] Patient verbalizes understanding of information presented.

## 2019-04-20 NOTE — PLAN OF CARE
Problem: Falls - Risk of:  Goal: Will remain free from falls  Description  Will remain free from falls  4/20/2019 0354 by Manjit Rush RN  Outcome: Ongoing  Note:   Pt remains free of falls at this time. Bed locked in lowest position, siderails x2, call light in reach. Non-skid footwear applied. Encouraged pt to call for assistance as needed for safety. Falling star posted outside of room. Will continue to monitor.

## 2019-04-20 NOTE — PROGRESS NOTES
[facility] [address],  [address] - Progress Note    Today's Date and Time: 4/20/2019, 8:16 AM    Impression :   1. 46 y/o female with initial complaints of:  · Vomiting  · Diarrhea  · Abdominal pain  · Altered mental status  2. Acute kidney injury- on HD   3. Shock, presumptive septic plus hypovolemic, requiring Levophed- Resolved  4. Septicemia with Haemophilus influenzae 4-5-19--Resolved  5. Gastroenteritis--Resolved  6. Ischemic bowel. S/P laparotomy 4-6-19 with resection  7. S/p second look with resection of transverse colon, ileostomy creation, resection of rectal stump and abdominal closure. 8. Severe hyponatremia--Resolved   9. Transaminitis, shock liver.-Resolved  10. COPD  6. Arthritis  12. Chronic NSAID use  13. Funguria  14. Acral mottling of hands and feet  15. Allergy to penicillins: Hives and respiratory distress  16. Allergy to sulfa   17. S/P New tunnel HD catheter placement 4/16  18. EDWARD with no vegetations    Recommendations:   · Monitor off antibiotics (Completed Meropenem. Stop date 4-14-19)  · Consider telepsych treatment  · Continue treatment for oral thrush  · Follow up possible rectal/vaginal bleeding  · Monitor mental status  · Trend hemoglobin    Medical Decision Making/Summary/Discussion:   · 46 y/o female with vomiting, diarrhea, and AMS. · Found to have gastroenteritis with dehydration, shock requiring pressors, transaminitis, MARY requiring emergent dialysis, lactic acidosis. · Intubated due to AMS  · CT chest shows atelactasis vs pneumonia  · Initially treated with vanc, levaquin, and aztreonam.   · Patient was felt to be critically ill with origin in GI tract . Was treated with meropenem despite Hx of penicillin allergy. · Had laparotomy 4-6-19 with findings of ischemic bowel from distal ileum to sigmoid colon.    · S/P ileocecectomy with extended left hemicolectomy and placement of wound vac 4-6-19  · Overall improvement post surgery  · Off pressors and sedation  · Blood Culture grew Haemophilus influenza, beta lactamase negative. THis finding is likely unrelated to intraabdominal process. · Treated with Meropenem through 4/14  · Has maintained leukocytosis and occasional fever. · Has some vaginal bleeding. Gyn evaluating  · Overall clinical improvement. Infection Control Recommendations   · [place] Precautions    Antimicrobial Stewardship Recommendations     · Discontinuation of therapy      Coordination of Outpatient Care:   · Estimated Length of IV antimicrobials: 4/14  · Patient will need Midline Catheter Insertion: No  · Patient will need PICC line Insertion: No  · Patient will need: Home IV , [place],  SNF,  LTAC TBD  · Patient will need outpatient wound care: TBD    Chief complaint/reason for consultation:   · Altered mental status and tender abdomen       History of Present Illness:   Sheyla Swift is a 47y.o.-year-old  female who was initially admitted on 4/5/2019. Patient seen at the request of Dr. Alonso Quigley:    Patient presented to ED via EMS due to altered mental status and vomiting. Patient has history significant for COPD, right knee osteoarthirtis s/p arthoplasty, and chronic NSAID use. Patient lives in [place], but was here to visit her significant other. Arrived Wednesday night, said she didn't feel good. Thought she might have had a bad burger at a fast food restaurant. Went to sleep and slept for almost 24 hours. When she awoke, she said she was vomiting, having diarrhea, and abdominal pain. Significant other and sister are unsure of the nature of the vomit, diarrhea, or abdominal pain. Then she slept a bit more, until her significant other found her unresponsive and that's when he called EMS to bring her to the hospital.     Afebrile on admission, but Tmax overnight was 39.3. Tachycardic on admission, 129. Became hypotensive after admission and levophed and vasopressin were started.      Initial labs:    Admission labs significant for Na 125, K 8.0, CO2 9, BUN 62, Creatinine of 4.06, Anion gap of 23, Lactic acid of 3.5, Glucose of 186, Ca 5.2, POC HCO3 15.9, CK 15,342, Troponins high sensitivity 89 and 94, Alk phos 168, , AST 1,122, Bili .37, lipase of 119, Urine tox positive for THC and Cocaine, WBC 23.5, Hgb 10.2, Urine and blood cultures pending, HIV negative, influenza negative, hepatitis panel non-reactive, urine Leukocyte esterase trace, urine nitrite -, urine protein 2+, urine Hgb large, urine RBCs 5 to 10, many urine yeast,  ABG 7.22, pCO2 27, HCO3 15.9. Initial imaging showed:     CXR: No acute cardiopulmonary process    Abdominal X-ray 4/6: No free air    CTA of chest: Negative for PE or dissection. Patchy consolidations of bilateral lower lung lobes representing developing pneumonia vs atelectasis. CT head: pending    Initial course:     Intubated and sedated in ED due to altered mental status. Currently on Vancomycin, Levaquin, and aztreonam for empiric coverage. Richar catheter was placed due to request by nephrology for emergent dialysis. Dialysis attempted several times, but due to high arterial pressures and line clotting, dialysis to be completed later today by Dr. Daniela Rosas. NG tube with bloody output. FOBT +. General surgery has been consulted for evaluation. CURRENT EVALUATION : 4/20/2019    Patient seen and examined. Feeling better today. Says she slept well, more awake today. Afebrile  VS stable     Afebrile, tachycardic. 110's to 120's. Continues on midodrine for low blood pressure. Exam revealed a yellow plaque on the center of the tongue consistent with thrush. Can be scraped off. PO nystatin started. No improving. EDWARD revealed no thrombus or vegetation, EF 55%, moderate MR. Was started on therapeutic heparin gtt for possible hypercoagulable state. Heme/onc is recommending lifelong anticoagulation. Hypercoagulable workup thus far is negative.  Had blood Comment: OCCASSIONAL    Drug use: No    Sexual activity: Yes     Partners: Female   Lifestyle    Physical activity:     Days per week: Not on file     Minutes per session: Not on file    Stress: Not on file   Relationships    Social connections:     Talks on phone: Not on file     Gets together: Not on file     Attends Jewish service: Not on file     Active member of club or organization: Not on file     Attends meetings of clubs or organizations: Not on file     Relationship status: Not on file    Intimate partner violence:     Fear of current or ex partner: Not on file     Emotionally abused: Not on file     Physically abused: Not on file     Forced sexual activity: Not on file   Other Topics Concern    Not on file   Social History Narrative    Not on file       Family History:     Family History   Problem Relation Age of Onset    Heart Disease Mother     Stroke Mother     Heart Surgery Mother     Diabetes Maternal Grandmother     Emphysema Maternal Grandfather     Diabetes Maternal Grandfather     Lung Cancer Maternal Uncle         Allergies:   Pcn [penicillins]; Sulfa antibiotics; and Tape [adhesive tape]     Review of Systems:   Unable to provide. Sedated on ventilator. Physical Examination :     Patient Vitals for the past 8 hrs:   BP Temp Temp src Pulse Resp SpO2 Weight   04/20/19 0645 -- -- -- -- -- -- 197 lb 8.5 oz (89.6 kg)   04/20/19 0321 119/68 98.9 °F (37.2 °C) Oral 107 20 99 % --     General Appearance: Sedated, on ventilator  Head:  Normocephalic, no trauma  Eyes: Pupils equal, round, reactive to light; sclera anicteric; conjunctivae pink. No embolic phenomena. ENT: Oropharynx clear, without erythema, exudate, or thrush. No tenderness of sinuses. Mouth/throat: mucosa pink and moist. No lesions. Dentition in good repair. Neck:Supple, without lymphadenopathy. Thyroid normal, No bruits. Pulmonary/Chest: Clear to auscultation, without wheezes, rales, or rhonchi.   No dullness to questions. Ernie    ATTESTATION:    I have discussed the case, including pertinent history and exam findings with the residents. I have seen and examined the patient and the key elements of the encounter have been performed by me. I have reviewed the laboratory data, other diagnostic studies and discussed them with the residents. I have updated the medical record where necessary. I agree with the assessment, plan and orders as documented by the resident.     Afsaneh Maradiaga MD.      Pager: [phone] - Office: [phone]

## 2019-04-20 NOTE — PROGRESS NOTES
[facility]  Internal Medicine Residency Program  Inpatient Daily Progress Note  ______________________________________________________________________________    Patient: Cesia Puente  YOB: 1965   MRN: [id number]    Acct: [de-identified]     Admit date: 4/5/2019  Today's date: 04/20/19  Number of days in the hospital: 15       Admitting Diagnosis: Septic shock ([address]    Subjective:   Patient seen and examined at bedside. Alert and oriented. Vitals are stable. Glucose is 111 this morning. WBCs 23.6. Hemoglobin is stable. The patient denies any vaginal bleed, pain in the limbs, pain in the flanks bilaterally. She is eating drinking fine. Afebrile. Objective:   Vital Sign:  /70   Pulse 104   Temp 99.4 °F (37.4 °C) (Oral)   Resp 18   Ht 5' 1.02\" (1.55 m)   Wt 197 lb 8.5 oz (89.6 kg)   SpO2 100%   BMI 37.29 kg/m²       Physical Exam:  General appearance:   alert, well appearing, and in no distress  Mental Status: alert, oriented to person, place, and time  Neurologic:  alert, oriented, normal speech, no focal findings or movement disorder noted  Lungs:  clear to auscultation, no wheezes, rales or rhonchi, symmetric air entry  Heart[de-identified] normal rate, regular rhythm, normal S1, S2, no murmurs, rubs, clicks or gallops  Abdomen:  soft, nontender, nondistended, no masses or organomegaly.  Stoma in place on the right  Extremities: peripheral pulses normal, no pedal edema, no clubbing or cyanosis   Skin: normal coloration and turgor, no rashes, no suspicious skin lesions noted    Medications:  Scheduled Medications   warfarin (COUMADIN) daily dosing (placeholder)   Other RX Placeholder    warfarin  5 mg Oral Daily    metoprolol tartrate  12.5 mg Oral BID    nicotine  1 patch Transdermal Daily    nystatin  5 mL Oral 4x Daily    aspirin  81 mg Oral Daily    cyclobenzaprine  5 mg Oral TID    pantoprazole  40 mg Oral BID AC    sodium chloride flush  10 mL Intravenous 2 times per day       PRN Medications  sodium chloride 250 mL PRN   sodium chloride 150 mL PRN   heparin (porcine) 1,600 Units PRN   heparin (porcine) 1,600 Units PRN   sodium chloride 250 mL PRN   sodium chloride 150 mL PRN   acetaminophen 650 mg Q4H PRN   glucose 15 g PRN   dextrose 12.5 g PRN   glucagon (rDNA) 1 mg PRN   dextrose 100 mL/hr PRN   heparin (porcine) 4,000 Units PRN   heparin (porcine) 2,000 Units PRN   oxyCODONE 5 mg Q4H PRN   morphine 4 mg Q3H PRN   Or     morphine 6 mg Q3H PRN   sodium chloride 250 mL PRN   sodium chloride 150 mL PRN   dextrose 25 g PRN   sodium chloride flush 10 mL PRN   magnesium hydroxide 30 mL Daily PRN   ondansetron 4 mg Q6H PRN       Diagnostic Labs and Imaging:  CBC:  Recent Labs     04/18/19  0757  04/19/19  0339 04/19/19  1635 04/19/19  2328 04/20/19  1044   WBC 25.7*  --  24.9*  --   --  23.6*   HGB 7.3*   < > 7.4* 8.0* 7.6* 7.3*     --  523*  --   --  620*    < > = values in this interval not displayed.      BMP: Recent Labs     04/18/19  0757 04/19/19  0339 04/20/19  1044   * 132* 134*   K 4.0 3.8 3.4*   CL 96* 95* 96*   CO2 23 23 22   BUN 33* 34* 21*   CREATININE 3.68* 4.11* 2.99*   GLUCOSE 112* 119* 111*     Hepatic: Recent Labs     04/19/19  0339   AST 27   ALT 29   BILITOT 0.54   ALKPHOS 112*       Assessment and Plan:     Principal Problem:    Septic shock ([address]  Active Problems:    Shock ([address]    Rhabdomyolysis    Hyponatremia    Lactic acidosis    MARY (acute kidney injury) ([facility]    Metabolic acidosis    Acute respiratory failure (HCC)    Elevated liver enzymes    Hyperkalemia    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HCC)    Small bowel ischemia (HCC)    Acute GI bleeding    Gram negative septic shock (HCC)    Protein-calorie malnutrition (HCC)    Gastroenteritis    Haemophilus influenzae septicemia (HCC)    Pleural effusion, bilateral    Splenic infarction    Leukemoid reaction    Mottled skin    Acute renal failure (HCC) Altered mental status    PMB (postmenopausal bleeding)  Resolved Problems:    * No resolved hospital problems. *    · Levofloxacin discontinued.  Patient completed a course of meropenem. Continue to monitor patient off antibiotics  · Transvaginal ultrasound was normal.  ObGyn plans to follow the patient outpatient. · Continue to monitor Hb and Platelets. CBC daily  · BiPAP as needed. · Patient stable from cardiology stand point. · Continue Midodrine for low BP if needed  · PT/OT  · Pain control with Morphine and Roxicodone PRN  · Continue aspirin and heparin drip. · Patient started on Coumadin 5 mg daily with pharmacy to dose. Patient will be discharged on Coumadin. · Hypercoagulable workup negative. · WBC is trending down.  Blood cultures have been negative so far. · Increased midodrine to 10 mg TD.   · Started nystatin for oral thrush.    · Continue renal diet    Raisa New MD  PGY-1, Department of Internal Medicine  [address] [place] [address]  4/20/2019 1:18 PM      Attending Physician Statement  I have discussed the care of Niall Hairston, including pertinent history and exam findings with the resident. I have reviewed the key elements of all parts of the encounter with the resident. I have seen and examined the patient with the resident. I agree with the assessment and plan and status of the problem list as documented. Events noted in last 24 hours, lab seen and medications reviewed.   She remained afebrile now and able to tolerate oral diet without much problem no complain some abdominal pain, no further vaginal bleeding, hemoglobin is stable, WBC count is elevated but stable platelet continue to increase, she is on heparin drip and will start Coumadin today and follow up INR, she had hemodialysis yesterday, heart rate is better and her own 100 and she is tolerating 12.5 Lopressor twice a day and will consider increasing to 25 if continuing to tolerate, she is off

## 2019-04-20 NOTE — PROGRESS NOTES
Pharmacy Note  Warfarin Consult    Gloria Olson is a 47 y.o. female for whom pharmacy has been consulted to manage warfarin therapy. Consulting Physician: Cory Boggs  Reason for Admission: septic shock    Warfarin dose prior to admission: new  Warfarin indication: splenic infarct  Target INR range: 2-3    Past Medical History:   Diagnosis Date    Asthma     On inhaler    Back pain, chronic     Hypertension 2015    On Lisinopril    Snores     possible apnea but not tested    Wears glasses                 No results for input(s): INR in the last 72 hours. Recent Labs     04/18/19  0757  04/19/19  0339 04/19/19  1635 04/19/19  2328 04/20/19  1044   HGB 7.3*   < > 7.4* 8.0* 7.6* 7.3*   HCT 22.7*   < > 23.6* 25.0* 24.2* 22.9*     --  523*  --   --  620*    < > = values in this interval not displayed. Current warfarin drug-drug interactions: heparin      Date             INR        Dose   4/20/2019         1.3        5 mg    Daily PT/INR while inpatient. Thank you for the consult. Will continue to follow. Keshia Clark, Pharm. [facility] Resident  [phone]  4/20/2019 1:16 PM

## 2019-04-20 NOTE — PLAN OF CARE
PATIENT REFUSES TO WEAR BIPAP     [x] Risks and benefits explained to patient   [x] Patient refuses to wear Bipap stating \"No, I'm ok. \"  [x] Patient verbalizes understanding of information presented.

## 2019-04-20 NOTE — PROGRESS NOTES
Renal Progress Note    Patient :  Ros Ledbetter; 47 y.o. MRN# [id number]  Location:  [id number]/0236-67  Attending:  Lizeth Tai MD  Admit Date:  4/5/2019   Hospital Day: 15      Subjective:     Oral intake good. Hemodynamic stable. Ileostomy losses continue as before. Intravenous heparin continues. Being transitioned to Coumadin now. Still has a fair urine output although suboptimal.  Last hemodialysis yesterday, about a liter of fluid removed. Outpatient spot being worked on by [employer]. Off antibiotics for now. Outpatient Medications:     Medications Prior to Admission: ibuprofen (ADVIL;MOTRIN) 800 MG tablet, Take 800 mg by mouth every 6 hours as needed for Pain  ALPRAZolam (XANAX) 0.25 MG tablet, Take 0.25 mg by mouth nightly as needed for Sleep or Anxiety. venlafaxine (EFFEXOR) 75 MG tablet, Take 100 mg by mouth 2 times daily   [DISCONTINUED] celecoxib (CELEBREX) 200 MG capsule, Take 1 capsule by mouth 2 times daily  [DISCONTINUED] traMADol (ULTRAM) 50 MG tablet, 1 tablet PO BID PRN pain  [DISCONTINUED] oxyCODONE-acetaminophen (PERCOCET) 5-325 MG per tablet, Take 1 tablet by mouth 2 times daily as needed for Pain . [DISCONTINUED] diclofenac (VOLTAREN) 75 MG EC tablet, Take 1 tablet by mouth 2 times daily  [DISCONTINUED] oxyCODONE-acetaminophen (PERCOCET) 5-325 MG per tablet, Take 1 tablet by mouth every 6 hours as needed for Pain . [DISCONTINUED] aspirin 325 MG EC tablet, Take 1 tablet by mouth 2 times daily for 14 days  [DISCONTINUED] Misc. Devices (ROLLER WALKER) MISC, 1 each by Does not apply route daily  QVAR 40 MCG/ACT inhaler, Inhale 2 puffs into the lungs 2 times daily  [DISCONTINUED] Calcium Citrate-Vitamin D (CALCIUM + D PO), Take 1 tablet by mouth daily  [DISCONTINUED] Misc.  Devices MISC, As directed by physician daily  [DISCONTINUED] diclofenac (VOLTAREN) 50 MG EC tablet, Take 1 tablet by mouth 2 times daily  [DISCONTINUED] docusate sodium (COLACE) 100 MG capsule, Take 1 capsule by mouth 2 times daily as needed for Constipation  gabapentin (NEURONTIN) 100 MG capsule, Take 100 mg by mouth 3 times daily as needed   lisinopril-hydrochlorothiazide (PRINZIDE;ZESTORETIC) 10-12.5 MG per tablet, Take 1 tablet by mouth daily  albuterol (PROVENTIL) (2.5 MG/3ML) 0.083% nebulizer solution, Take 2.5 mg by nebulization every 6 hours as needed for Wheezing  albuterol sulfate  (90 BASE) MCG/ACT inhaler, Inhale 2 puffs into the lungs every 6 hours as needed for Wheezing    Current Medications:     Scheduled Meds:    warfarin (COUMADIN) daily dosing (placeholder)   Other RX Placeholder    warfarin  5 mg Oral Daily    metoprolol tartrate  12.5 mg Oral BID    nicotine  1 patch Transdermal Daily    nystatin  5 mL Oral 4x Daily    aspirin  81 mg Oral Daily    cyclobenzaprine  5 mg Oral TID    pantoprazole  40 mg Oral BID AC    sodium chloride flush  10 mL Intravenous 2 times per day     Continuous Infusions:    heparin (porcine) 26 Units/kg/hr (19 1143)    IV infusion builder 30 mL/hr at 19 0001    dextrose       PRN Meds:  sodium chloride, sodium chloride, heparin (porcine), heparin (porcine), sodium chloride, sodium chloride, acetaminophen, glucose, dextrose, glucagon (rDNA), dextrose, heparin (porcine), heparin (porcine), oxyCODONE, morphine **OR** morphine, sodium chloride, sodium chloride, dextrose, sodium chloride flush, magnesium hydroxide, ondansetron    Input/Output:       I/O last 3 completed shifts: In: 2313 [P.O.:930; I.V.:1383]  Out: 2250 [Urine:500; Stool:1750].       Patient Vitals for the past 96 hrs (Last 3 readings):   Weight   19 0645 197 lb 8.5 oz (89.6 kg)   19 1153 175 lb 0.7 oz (79.4 kg)   19 0815 176 lb 9.4 oz (80.1 kg)       Vital Signs:   Temperature:  Temp: 99.4 °F (37.4 °C)  TMax:   Temp (24hrs), Av °F (37.2 °C), Min:98.2 °F (36.8 °C), Max:99.5 °F (37.5 °C)    Respirations:  Resp: 18  Pulse:   Pulse: 104  BP:    BP: 110/70  BP Range: Systolic (74WNR), JMV:233 , Min:110 , ZMX:827       Diastolic (87FMZ), LPJ:52, Min:68, Max:80      Physical Examination:     General:  AAO x 3, speaking in full sentences, no accessory muscle use. HEENT: Atraumatic, normocephalic, no throat congestion, moist mucosa. Eyes:   Pupils equal, round and reactive to light, EOMI. Neck:   No JVD, no thyromegaly, no lymphadenopathy. Chest:  Bilateral vesicular breath sounds, no rales or wheezes. Cardiac:  S1 S2 RR, no murmurs, gallops or rubs, JVP not raised. Abdomen: Soft, non-tender, no masses or organomegaly, BS audible. :   No suprapubic or flank tenderness. Neuro:  AAO x 3, No FND. SKIN:  No rashes, good skin turgor. Extremities:  + edema, palpable peripheral pulses, no calf tenderness. Labs:       Recent Labs     04/18/19 0757  04/19/19 0339 04/19/19  1635 04/19/19  2328 04/20/19  1044   WBC 25.7*  --  24.9*  --   --  23.6*   RBC 2.42*  --  2.48*  --   --  2.41*   HGB 7.3*   < > 7.4* 8.0* 7.6* 7.3*   HCT 22.7*   < > 23.6* 25.0* 24.2* 22.9*   MCV 93.8  --  95.2  --   --  95.0   MCH 30.2  --  29.8  --   --  30.3   MCHC 32.2  --  31.4  --   --  31.9   RDW 15.7*  --  15.4*  --   --  15.3*     --  523*  --   --  620*   MPV 11.3  --  10.9  --   --  10.6    < > = values in this interval not displayed. BMP:   Recent Labs     04/18/19 0757 04/19/19  0339 04/20/19  1044   * 132* 134*   K 4.0 3.8 3.4*   CL 96* 95* 96*   CO2 23 23 22   BUN 33* 34* 21*   CREATININE 3.68* 4.11* 2.99*   GLUCOSE 112* 119* 111*   CALCIUM 7.3* 7.2* 7.5*      Phosphorus:   No results for input(s): PHOS in the last 72 hours. Magnesium:  No results for input(s): MG in the last 72 hours.   Albumin:    Recent Labs     04/19/19  0339   LABALBU 2.1*     BNP:    No results found for: BNP  CALVIN:      Lab Results   Component Value Date    CALVIN NEGATIVE 04/13/2019     SPEP:  Lab Results   Component Value Date    PROT 4.9 04/19/2019    ALBCAL 2.8 04/05/2019    ALBPCT 63 04/05/2019    LABALPH 0.2 04/05/2019    LABALPH 0.6 04/05/2019    A1PCT 3 04/05/2019    A2PCT 12 04/05/2019    LABBETA 0.5 04/05/2019    BETAPCT 11 04/05/2019    GAMGLOB 0.5 04/05/2019    GGPCT 11 04/05/2019    PATH ELECTRONICALLY SIGNED. Dain Lazcano M.D. 04/06/2019     UPEP:     Lab Results   Component Value Date    LABPE  04/06/2019     ELEVATED PROTEIN CONCENTRATION. MOST SERUM PROTEINS ARE DETECTED IN THIS URINE.   USUALLY OBSERVED WITH MARKEDLY INCREASED NON-SELECTIVE GLOMERULAR PERMEABILITY (i.e. SEVERE GLOMERULAR DISEASE), CONTAMINATION OF     C3:     Lab Results   Component Value Date    C3 115 04/13/2019     C4:     Lab Results   Component Value Date    C4 24 04/13/2019     MPO ANCA:     Lab Results   Component Value Date    MPO 7 04/13/2019     PR3 ANCA:     Lab Results   Component Value Date    PR3 20 04/13/2019     Anti-GBM:   No results found for: GBMABIGG  Hep BsAg:         Lab Results   Component Value Date    HEPBSAG NONREACTIVE 04/09/2019     Hep C AB:          Lab Results   Component Value Date    HEPCAB NONREACTIVE 04/09/2019       Urinalysis/Chemistries:      Lab Results   Component Value Date    NITRU NEGATIVE 04/06/2019    COLORU DARK YELLOW 04/06/2019    PHUR 5.0 04/06/2019    WBCUA 50  04/06/2019    RBCUA 50  04/06/2019    MUCUS NOT REPORTED 04/06/2019    TRICHOMONAS NOT REPORTED 04/06/2019    YEAST NOT REPORTED 04/06/2019    BACTERIA NOT REPORTED 04/06/2019    SPECGRAV 1.032 04/06/2019    LEUKOCYTESUR NEGATIVE 04/06/2019    UROBILINOGEN Normal 04/06/2019    BILIRUBINUR NEGATIVE 04/06/2019    GLUCOSEU NEGATIVE 04/06/2019    KETUA TRACE 04/06/2019    AMORPHOUS NOT REPORTED 04/06/2019     Urine Sodium:     Lab Results   Component Value Date    JANEL <20 04/06/2019     Urine Potassium:  No results found for: KUR  Urine Chloride:    Lab Results   Component Value Date    CLUR <20 04/06/2019     Urine Osmolarity: No results found for: OSMOU  Urine Protein:   No components found for: TOTALPROTEIN, URINE   Urine Creatinine:     Lab Results   Component Value Date    LABCREA 94.4 04/06/2019     Urine Eosinophils:  No components found for: UEOS    Radiology:     CXR:     Assessment: 1. Acute Kidney Injury: Secondary to ischemic ATN dialysis dependent without any renal recovery, urine output fair, insensible losses high, clearances continue to lag behind  2.  CK D stage III secondary to nephrosclerosis baseline 1.5-1.7  3.  Ischemic bowel status post resection  4.  H. influenzae bacteremia resolved  5.  Anemia of chronic disease and blood loss        Plan:   1. Next hemodialysis on Monday  2. Increase oral intake  3. Await outpatient dialysis spot  4. Will follow with you    Nutrition   Please ensure that patient is on a renal diet/TF. Avoid nephrotoxic drugs/contrast exposure. We will continue to follow along with you.

## 2019-04-20 NOTE — PLAN OF CARE
Problem: Falls - Risk of:  Goal: Will remain free from falls  Description  Will remain free from falls  4/20/2019 0214 by Keshav Ring RN  Note:   Pt remains free of falls at this time. Bed locked in lowest position, siderails x2, call light in reach. Non-skid footwear applied. Encouraged pt to call for assistance as needed for safety. Falling star posted outside of room. Will continue to monitor.

## 2019-04-20 NOTE — PROGRESS NOTES
TVUS reviewed    Limited Exam. No gross uterine masses. ES 4.2mm; ovaries were not visualized. Endometrial stripe not thickened.    Patient can follow up out patient for well woman care ([facility])  Patient reports that she is set up with provider at [facility]     Thank you for this consult; OB/GYN will sign off at this time    [place], [place]  OB-GYN Resident

## 2019-04-20 NOTE — PROGRESS NOTES
Patient Name: Mckenna Wilhelm  Date of admission: 4/5/2019  8:39 PM  Patient's age: 47 y. o., 1965  Admission Dx: Shock ([address] [R57.9]      Requesting Physician: Susan Gracia MD    CHIEF COMPLAINT:  Abdominal pain  SUBJECTIVE:  . The patient was seen and examined. Continues to have abdominal pain. No active bleeding. Overall she is improving. No fever or chills. She will need long-term anticoagulation, she is aware of that    BRIEF CASE HISTORY:    The patient is a 47 y.o.  female who is admitted to the hospital for   for increased confusion and abdominal pain. She was found to have ischemic bowel and was taken to surgery on 4/6/18. Pathology showed ischemic bowel going to the margins. She was taken for a second surgery on 4/8/18 and more ischemic bowel was appreciated. The patient platelets were about 300,000 on admission and dropped about 98 with her multiple surgeries. Hit antibody was done and was negative. Platelets recovered since then. The patient continues to struggle with recovery. She underwent a CT scan of the abdomen and pelvis today that showed heterogenous changes in the spleen that the new. There was a suspicion of splenic infarction. Vascular were involved and started the patient in aspirin and Plavix as well as heparin. The patient is seen and examined. She is started on anticoagulation. She denies any active bleeding. She has very little insight about her disease and most of the information were obtained from the chart. It is no history of thromboembolism previously . family history is negative for arterial or venous thrombosis. The patient developed worsening renal failure and was started on hemodialysis. Past Medical History:   has a past medical history of Asthma, Back pain, chronic, Hypertension, Snores, and Wears glasses.     Past Surgical History:   has a past surgical history that includes Tonsillectomy; Knee arthroscopy (Left, 1980); Ulnar tunnel release (Right, 2013); Knee arthroscopy (Left, 09/28/2016); LAPAROTOMY EXPLORATORY (N/A, 4/6/2019); and LAPAROTOMY EXPLORATORY (N/A, 4/8/2019). Family History: family history includes Diabetes in her maternal grandfather and maternal grandmother; Emphysema in her maternal grandfather; Heart Disease in her mother; Heart Surgery in her mother; Gorge Makr in her maternal uncle; Stroke in her mother. Social History:   reports that she has been smoking cigarettes. She started smoking about 38 years ago. She has been smoking about 0.25 packs per day. She has never used smokeless tobacco. She reports that she drinks alcohol. She reports that she does not use drugs. Medications:    Reviewed in EPIC     Allergies:  Pcn [penicillins]; Sulfa antibiotics; and Tape [adhesive tape]    REVIEW OF SYSTEMS:      Review of Systems  General: no fever or night sweats, Weight is stable. Progressive fatigue  ENT: No double or blurred vision, no tinnitus or hearing problem, no dysphagia or sore throat   Respiratory: No chest pain, no shortness of breath, no cough or hemoptysis. Cardiovascular: Denies chest pain, PND or orthopnea. No L E swelling or palpitations. Gastrointestinal:    Abdominal discomfort, wound VAC in place, no bleeding. Genitourinary: Denies dysuria, hematuria, frequency, urgency or incontinence. Neurological: Denies headaches, decreased LOC, no sensory or motor focal deficits. Musculoskeletal:  No arthralgia no back pain or joint swelling. Skin: There are no rashes or bleeding. Psychiatric:  No anxiety, no depression. Endocrine: no diabetes or thyroid disease. Hematologic: no bleeding , no adenopathy.                 PHYSICAL EXAM:      BP 98/64   Pulse 101   Temp 98.3 °F (36.8 °C) (Oral)   Resp 22   Ht 5' 1.02\" (1.55 m)   Wt 197 lb 8.5 oz (89.6 kg)   SpO2 100%   BMI 37.29 kg/m²    Temp (24hrs), Av.9 °F (37.2 °C), Min:98.2 °F (36.8 °C), Max:99.5 °F (37.5 °C)      Physical Exam  Gen. Exam, showed a well-appearing patient without evidence of distress or pain  HEENT, normocephalic and atraumatic, PERRLA extraocular muscles are intact  Neck showed no JVD no carotid bruit, no cervical adenopathy  Chest is clear to auscultation bilaterally  Heart is regular without any murmur  Abdomen left upper quadrant tenderness but the abdomen was soft  Lower extremities showed no edema clubbing or cyanosis  Neurological examination was nonfocal, with intact cranial nerves  Skin  No rashes or bruising appreciated          DATA:      Labs:       CBC:   Recent Labs     19  03319  2328 19  1044   WBC 24.9*  --   --  23.6*   HGB 7.4*   < > 7.6* 7.3*   HCT 23.6*   < > 24.2* 22.9*   *  --   --  620*    < > = values in this interval not displayed.      BMP:   Recent Labs     19  0339 19  1044   * 134*   K 3.8 3.4*   CO2 23 22   BUN 34* 21*   CREATININE 4.11* 2.99*   LABGLOM 11* 16*   GLUCOSE 119* 111*     PT/INR:   Recent Labs     19  1044   PROTIME 13.2*   INR 1.3     APTT:  Recent Labs     19  0140 19  1044   APTT 48.6* 47.0*     LIVER PROFILE:  Recent Labs     19  0339   AST 27   ALT 29   LABALBU 2.1*               IMPRESSION:    Primary Problem  Septic shock [facility])    Active Hospital Problems    Diagnosis Date Noted    Acute renal failure ([address] [N17.9]     Altered mental status [R41.82]     PMB (postmenopausal bleeding) [N95.0]     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]     Leukemoid reaction [D72.823]     Mottled skin [L81.9]     Pleural effusion, bilateral [J90]     Protein-calorie malnutrition ([address] [E46]     Septic shock ([address] [A41.9, R65.21]     Gastroenteritis [K52.9]     [facility] [A41.3]     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[facility] [K55.049] 2019    Small bowel ischemia ([facility] [K55.9] 2019    Acute GI bleeding [K92.2] 04/07/2019    Gram negative septic shock (HCC) [A41.50, R65.21]     Rhabdomyolysis [M62.82] 04/06/2019    Hyponatremia [E87.1] 04/06/2019    Lactic acidosis [E87.2] 04/06/2019    MARY (acute kidney injury) ([facility] [N17.9] 82/53/1433    Metabolic acidosis [I65.3] 04/06/2019    Acute respiratory failure ([facility] [J96.00] 04/06/2019    Elevated liver enzymes [R74.8] 04/06/2019    Hyperkalemia [E87.5]     Shock ([facility] [R57.9] 04/05/2019       RECOMMENDATIONS:  The patient is doing well clinically stable. Slight improvement. .   Continue heparin and aspirin  She will need to transition to oral anticoagulant when if she is stable. She will need long-term use of anticoagulation. Patient is aware.     Leukocytosis , cultures are negative                                 Dominguez Agustin MD                          [facility]                          Cell: [phone]

## 2019-04-20 NOTE — PLAN OF CARE
Problem: NUTRITION  Goal: Nutritional status is improving  Outcome: Ongoing     Problem: Nutrition  Goal: Optimal nutrition therapy  Description  Nutrition Problem: Inadequate oral intake  Intervention: Food and/or Nutrient Delivery: Continue NPO  Nutritional Goals: Meet % of estimated nutrition needs   Outcome: Ongoing     Problem: Fluid Volume - Imbalance:  Goal: Absence of imbalanced fluid volume signs and symptoms  Description  Absence of imbalanced fluid volume signs and symptoms  Outcome: Ongoing     Problem: Infection - Central Venous Catheter-Associated Bloodstream Infection:  Goal: Will show no infection signs and symptoms  Description  Will show no infection signs and symptoms  Outcome: Ongoing     Problem: Risk for Impaired Skin Integrity  Goal: Tissue integrity - skin and mucous membranes  Description  Structural intactness and normal physiological function of skin and  mucous membranes.   Outcome: Ongoing     Problem: Falls - Risk of:  Goal: Will remain free from falls  Description  Will remain free from falls  Outcome: Ongoing  Goal: Absence of physical injury  Description  Absence of physical injury  Outcome: Ongoing     Problem: Cardiac:  Goal: Ability to maintain an adequate cardiac output will improve  Description  Ability to maintain an adequate cardiac output will improve  Outcome: Ongoing  Goal: Complications related to the disease process, condition or treatment will be avoided or minimized  Description  Complications related to the disease process, condition or treatment will be avoided or minimized  Outcome: Ongoing     Problem: Safety:  Goal: Ability to remain free from injury will improve  Description  Ability to remain free from injury will improve  Outcome: Ongoing  Goal: Will show no signs and symptoms of excessive bleeding  Description  Will show no signs and symptoms of excessive bleeding  Outcome: Ongoing     Problem: Pain:  Goal: Pain level will decrease  Description  Pain level will decrease  Outcome: Ongoing  Goal: Control of acute pain  Description  Control of acute pain  Outcome: Ongoing  Goal: Control of chronic pain  Description  Control of chronic pain  Outcome: Ongoing     Problem: Confusion - Acute:  Goal: Absence of continued neurological deterioration signs and symptoms  Description  Absence of continued neurological deterioration signs and symptoms  Outcome: Ongoing  Goal: Mental status will be restored to baseline  Description  Mental status will be restored to baseline  Outcome: Ongoing     Problem: Discharge Planning:  Goal: Ability to perform activities of daily living will improve  Description  Ability to perform activities of daily living will improve  Outcome: Ongoing  Goal: Participates in care planning  Description  Participates in care planning  Outcome: Ongoing     Problem: Injury - Risk of, Physical Injury:  Goal: Will remain free from falls  Description  Will remain free from falls  Outcome: Ongoing  Goal: Absence of physical injury  Description  Absence of physical injury  Outcome: Ongoing     Problem: Mood - Altered:  Goal: Mood stable  Description  Mood stable  Outcome: Ongoing  Goal: Absence of abusive behavior  Description  Absence of abusive behavior  Outcome: Ongoing  Goal: Verbalizations of feeling emotionally comfortable while being cared for will increase  Description  Verbalizations of feeling emotionally comfortable while being cared for will increase  Outcome: Ongoing     Problem: Psychomotor Activity - Altered:  Goal: Absence of psychomotor disturbance signs and symptoms  Description  Absence of psychomotor disturbance signs and symptoms  Outcome: Ongoing     Problem: Sensory Perception - Impaired:  Goal: Demonstrations of improved sensory functioning will increase  Description  Demonstrations of improved sensory functioning will increase  Outcome: Ongoing  Goal: Decrease in sensory misperception frequency  Description  Decrease in sensory misperception frequency  Outcome: Ongoing  Goal: Able to refrain from responding to false sensory perceptions  Description  Able to refrain from responding to false sensory perceptions  Outcome: Ongoing  Goal: Demonstrates accurate environmental perceptions  Description  Demonstrates accurate environmental perceptions  Outcome: Ongoing  Goal: Able to distinguish between reality-based and nonreality-based thinking  Description  Able to distinguish between reality-based and nonreality-based thinking  Outcome: Ongoing  Goal: Able to interrupt nonreality-based thinking  Description  Able to interrupt nonreality-based thinking  Outcome: Ongoing     Problem: Sleep Pattern Disturbance:  Goal: Appears well-rested  Description  Appears well-rested  Outcome: Ongoing     Problem: Musculor/Skeletal Functional Status  Goal: Highest potential functional level  Outcome: Ongoing

## 2019-04-20 NOTE — PROGRESS NOTES
Physical Therapy  Facility/Department: [facility] 4B STEPDOWN  Daily Treatment Note  NAME: Cesia Puente  : 1965  MRN: [id number]    Date of Service: 2019    Discharge Recommendations:  Further therapy recommended at discharge. Patient Diagnosis(es): The primary encounter diagnosis was Acute renal failure, unspecified acute renal failure type ([facility] A diagnosis of Altered mental status, unspecified altered mental status type was also pertinent to this visit. has a past medical history of Asthma, Back pain, chronic, Hypertension, Snores, and Wears glasses. has a past surgical history that includes Tonsillectomy; Knee arthroscopy (Left, ); Ulnar tunnel release (Right, ); Knee arthroscopy (Left, 2016); LAPAROTOMY EXPLORATORY (N/A, 2019); and LAPAROTOMY EXPLORATORY (N/A, 2019). Restrictions  Restrictions/Precautions  Restrictions/Precautions: Fall Risk, Contact Precautions  Required Braces or Orthoses?: No  Position Activity Restriction  Other position/activity restrictions: up with assist. s/p  ILEOCECECTOMY, SUBTOTAL COLECTOMY ; s/p 2ND LOOK EXPLORATORY LAPAROTOMY with ileostomy creation   Subjective   General  Chart Reviewed: Yes  Response To Previous Treatment: Patient with no complaints from previous session. Family / Caregiver Present: No  Subjective  Subjective: Per RN, patient okay for PT. Patient agreeable. Reports improved pain since previous treatment with writer. Orientation  Orientation  Overall Orientation Status: Within Functional Limits  Objective   Bed mobility  Supine to Sit: Stand by assistance  Scooting: Stand by assistance  Comment: Verbal cues for line management awareness. Transfers  Sit to Stand: Minimal Assistance  Stand to sit: Contact guard assistance  Stand Pivot Transfers: Minimal Assistance(with use of RW)  Comment: Verbal cues for hand placement to push.    Ambulation  Ambulation?: Yes  Ambulation 1  Surface: level tile  Device: Time Out 1435         Minutes [address] [place], [place]

## 2019-04-21 LAB
ABSOLUTE EOS #: 0.43 K/UL (ref 0–0.44)
ABSOLUTE IMMATURE GRANULOCYTE: 0.42 K/UL (ref 0–0.3)
ABSOLUTE LYMPH #: 3.72 K/UL (ref 1.1–3.7)
ABSOLUTE MONO #: 1.43 K/UL (ref 0.1–1.2)
ANION GAP SERPL CALCULATED.3IONS-SCNC: 15 MMOL/L (ref 9–17)
BASOPHILS # BLD: 0 % (ref 0–2)
BASOPHILS ABSOLUTE: 0.09 K/UL (ref 0–0.2)
BUN BLDV-MCNC: 25 MG/DL (ref 6–20)
BUN/CREAT BLD: ABNORMAL (ref 9–20)
CALCIUM SERPL-MCNC: 7.5 MG/DL (ref 8.6–10.4)
CHLORIDE BLD-SCNC: 95 MMOL/L (ref 98–107)
CO2: 23 MMOL/L (ref 20–31)
CREAT SERPL-MCNC: 3.56 MG/DL (ref 0.5–0.9)
DIFFERENTIAL TYPE: ABNORMAL
EOSINOPHILS RELATIVE PERCENT: 2 % (ref 1–4)
GFR AFRICAN AMERICAN: 16 ML/MIN
GFR NON-AFRICAN AMERICAN: 13 ML/MIN
GFR SERPL CREATININE-BSD FRML MDRD: ABNORMAL ML/MIN/{1.73_M2}
GFR SERPL CREATININE-BSD FRML MDRD: ABNORMAL ML/MIN/{1.73_M2}
GLUCOSE BLD-MCNC: 107 MG/DL (ref 65–105)
GLUCOSE BLD-MCNC: 76 MG/DL (ref 70–99)
GLUCOSE BLD-MCNC: 78 MG/DL (ref 65–105)
GLUCOSE BLD-MCNC: 83 MG/DL (ref 65–105)
GLUCOSE BLD-MCNC: 94 MG/DL (ref 65–105)
GLUCOSE BLD-MCNC: 97 MG/DL (ref 65–105)
HCT VFR BLD CALC: 23.3 % (ref 36.3–47.1)
HEMOGLOBIN: 7.2 G/DL (ref 11.9–15.1)
IMMATURE GRANULOCYTES: 2 %
INR BLD: 1.2
LYMPHOCYTES # BLD: 17 % (ref 24–43)
MCH RBC QN AUTO: 29.3 PG (ref 25.2–33.5)
MCHC RBC AUTO-ENTMCNC: 30.9 G/DL (ref 28.4–34.8)
MCV RBC AUTO: 94.7 FL (ref 82.6–102.9)
MONOCYTES # BLD: 7 % (ref 3–12)
NRBC AUTOMATED: 0 PER 100 WBC
PARTIAL THROMBOPLASTIN TIME: 57 SEC (ref 20.5–30.5)
PARTIAL THROMBOPLASTIN TIME: 65.3 SEC (ref 20.5–30.5)
PARTIAL THROMBOPLASTIN TIME: 78 SEC (ref 20.5–30.5)
PDW BLD-RTO: 15.1 % (ref 11.8–14.4)
PLATELET # BLD: 652 K/UL (ref 138–453)
PLATELET ESTIMATE: ABNORMAL
PMV BLD AUTO: 10.2 FL (ref 8.1–13.5)
POTASSIUM SERPL-SCNC: 3.7 MMOL/L (ref 3.7–5.3)
PROTHROMBIN TIME: 12.5 SEC (ref 9–12)
RBC # BLD: 2.46 M/UL (ref 3.95–5.11)
RBC # BLD: ABNORMAL 10*6/UL
SEG NEUTROPHILS: 72 % (ref 36–65)
SEGMENTED NEUTROPHILS ABSOLUTE COUNT: 15.26 K/UL (ref 1.5–8.1)
SODIUM BLD-SCNC: 133 MMOL/L (ref 135–144)
WBC # BLD: 21.4 K/UL (ref 3.5–11.3)
WBC # BLD: ABNORMAL 10*3/UL

## 2019-04-21 PROCEDURE — 85025 COMPLETE CBC W/AUTO DIFF WBC: CPT

## 2019-04-21 PROCEDURE — 6370000000 HC RX 637 (ALT 250 FOR IP): Performed by: INTERNAL MEDICINE

## 2019-04-21 PROCEDURE — 85730 THROMBOPLASTIN TIME PARTIAL: CPT

## 2019-04-21 PROCEDURE — 99232 SBSQ HOSP IP/OBS MODERATE 35: CPT | Performed by: INTERNAL MEDICINE

## 2019-04-21 PROCEDURE — 6360000002 HC RX W HCPCS: Performed by: STUDENT IN AN ORGANIZED HEALTH CARE EDUCATION/TRAINING PROGRAM

## 2019-04-21 PROCEDURE — 6370000000 HC RX 637 (ALT 250 FOR IP): Performed by: STUDENT IN AN ORGANIZED HEALTH CARE EDUCATION/TRAINING PROGRAM

## 2019-04-21 PROCEDURE — 36415 COLL VENOUS BLD VENIPUNCTURE: CPT

## 2019-04-21 PROCEDURE — 2060000000 HC ICU INTERMEDIATE R&B

## 2019-04-21 PROCEDURE — 85610 PROTHROMBIN TIME: CPT

## 2019-04-21 PROCEDURE — 80048 BASIC METABOLIC PNL TOTAL CA: CPT

## 2019-04-21 RX ADMIN — NYSTATIN 500000 UNITS: 500000 SUSPENSION ORAL at 18:35

## 2019-04-21 RX ADMIN — OXYCODONE HYDROCHLORIDE 5 MG: 5 TABLET ORAL at 18:35

## 2019-04-21 RX ADMIN — OXYCODONE HYDROCHLORIDE 5 MG: 5 TABLET ORAL at 13:14

## 2019-04-21 RX ADMIN — CYCLOBENZAPRINE 5 MG: 10 TABLET, FILM COATED ORAL at 14:33

## 2019-04-21 RX ADMIN — NYSTATIN 500000 UNITS: 500000 SUSPENSION ORAL at 21:03

## 2019-04-21 RX ADMIN — HEPARIN SODIUM AND DEXTROSE 28 UNITS/KG/HR: 10000; 5 INJECTION INTRAVENOUS at 04:00

## 2019-04-21 RX ADMIN — MORPHINE SULFATE 4 MG: 4 INJECTION INTRAVENOUS at 21:08

## 2019-04-21 RX ADMIN — ASPIRIN 81 MG: 81 TABLET, CHEWABLE ORAL at 08:10

## 2019-04-21 RX ADMIN — CYCLOBENZAPRINE 5 MG: 10 TABLET, FILM COATED ORAL at 21:03

## 2019-04-21 RX ADMIN — PANTOPRAZOLE SODIUM 40 MG: 40 TABLET, DELAYED RELEASE ORAL at 06:00

## 2019-04-21 RX ADMIN — HEPARIN SODIUM AND DEXTROSE 26.03 UNITS/KG/HR: 10000; 5 INJECTION INTRAVENOUS at 15:54

## 2019-04-21 RX ADMIN — NYSTATIN 500000 UNITS: 500000 SUSPENSION ORAL at 14:36

## 2019-04-21 RX ADMIN — PANTOPRAZOLE SODIUM 40 MG: 40 TABLET, DELAYED RELEASE ORAL at 14:33

## 2019-04-21 RX ADMIN — METOPROLOL TARTRATE 12.5 MG: 25 TABLET ORAL at 08:10

## 2019-04-21 RX ADMIN — OXYCODONE HYDROCHLORIDE 5 MG: 5 TABLET ORAL at 08:38

## 2019-04-21 RX ADMIN — METOPROLOL TARTRATE 25 MG: 25 TABLET ORAL at 21:03

## 2019-04-21 RX ADMIN — CYCLOBENZAPRINE 5 MG: 10 TABLET, FILM COATED ORAL at 08:10

## 2019-04-21 RX ADMIN — WARFARIN SODIUM 5 MG: 5 TABLET ORAL at 18:36

## 2019-04-21 RX ADMIN — MORPHINE SULFATE 4 MG: 4 INJECTION INTRAVENOUS at 02:42

## 2019-04-21 RX ADMIN — MORPHINE SULFATE 4 MG: 4 INJECTION INTRAVENOUS at 14:36

## 2019-04-21 RX ADMIN — NYSTATIN 500000 UNITS: 500000 SUSPENSION ORAL at 08:09

## 2019-04-21 ASSESSMENT — PAIN - FUNCTIONAL ASSESSMENT
PAIN_FUNCTIONAL_ASSESSMENT: FLACC
PAIN_FUNCTIONAL_ASSESSMENT: 0-10

## 2019-04-21 ASSESSMENT — PAIN SCALES - GENERAL
PAINLEVEL_OUTOF10: 6
PAINLEVEL_OUTOF10: 10
PAINLEVEL_OUTOF10: 9
PAINLEVEL_OUTOF10: 10

## 2019-04-21 ASSESSMENT — PAIN DESCRIPTION - DESCRIPTORS: DESCRIPTORS: ACHING

## 2019-04-21 ASSESSMENT — PAIN DESCRIPTION - LOCATION: LOCATION: ABDOMEN

## 2019-04-21 NOTE — PROGRESS NOTES
Pharmacy Note  Warfarin Consult follow-up      Recent Labs     04/21/19  0338   INR 1.2     Recent Labs     04/19/19  0339  04/19/19  2328 04/20/19  1044 04/21/19  0338   HGB 7.4*   < > 7.6* 7.3* 7.2*   HCT 23.6*   < > 24.2* 22.9* 23.3*   *  --   --  620* 652*    < > = values in this interval not displayed. Significant Drug-Drug Interactions: aspirin, heparin gtt    Date                 INR        Dose   4/20/2019         1.3            5 mg    4.21                  1.2            5 mg    Notes: Will give 5 mg this evening. Daily PT/INR while inpatient. Keshia Clark, Pharm. [address] [place] [address]  [phone]  4/21/2019 12:57 PM

## 2019-04-21 NOTE — PLAN OF CARE
Problem: NUTRITION  Goal: Nutritional status is improving  4/21/2019 1140 by Berta Lundberg RN  Outcome: Ongoing  4/21/2019 1137 by Berta Lundberg RN  Outcome: Ongoing     Problem: Nutrition  Goal: Optimal nutrition therapy  Description  Nutrition Problem: Inadequate oral intake  Intervention: Food and/or Nutrient Delivery: Continue NPO  Nutritional Goals: Meet % of estimated nutrition needs   4/21/2019 1140 by Berta Lundberg RN  Outcome: Ongoing  4/21/2019 1137 by Berta Lundebrg RN  Outcome: Ongoing     Problem: Fluid Volume - Imbalance:  Goal: Absence of imbalanced fluid volume signs and symptoms  Description  Absence of imbalanced fluid volume signs and symptoms  4/21/2019 1140 by Berta Lundberg RN  Outcome: Ongoing  4/21/2019 1137 by Berta Lundberg RN  Outcome: Ongoing     Problem: Falls - Risk of:  Goal: Will remain free from falls  Description  Will remain free from falls  4/21/2019 1140 by Berta Lundberg RN  Outcome: Ongoing  4/21/2019 1137 by Berta Lundberg RN  Outcome: Ongoing  Goal: Absence of physical injury  Description  Absence of physical injury  4/21/2019 1140 by Berta Lundberg RN  Outcome: Ongoing  4/21/2019 1137 by Berta Lundberg RN  Outcome: Ongoing     Problem: Cardiac:  Goal: Ability to maintain an adequate cardiac output will improve  Description  Ability to maintain an adequate cardiac output will improve  4/21/2019 1140 by Berta Lundberg RN  Outcome: Ongoing  4/21/2019 1137 by Berta Lundberg RN  Outcome: Ongoing  Goal: Complications related to the disease process, condition or treatment will be avoided or minimized  Description  Complications related to the disease process, condition or treatment will be avoided or minimized  4/21/2019 1140 by Berta Lundberg RN  Outcome: Ongoing  4/21/2019 1137 by Berta Lundberg RN  Outcome: Ongoing     Problem: Safety:  Goal: Ability to remain free from injury will improve  Description  Ability to remain free from injury will improve  4/21/2019 1140 by Azalea Rudd RN  Outcome: Ongoing  4/21/2019 1137 by Azalea Rudd RN  Outcome: Ongoing  Goal: Will show no signs and symptoms of excessive bleeding  Description  Will show no signs and symptoms of excessive bleeding  4/21/2019 1140 by Azalea Rudd RN  Outcome: Ongoing  4/21/2019 1137 by Azlaea Rudd RN  Outcome: Ongoing     Problem: Pain:  Goal: Pain level will decrease  Description  Pain level will decrease  4/21/2019 1140 by Azalea Rudd RN  Outcome: Ongoing  4/21/2019 1137 by Azalea Rudd RN  Outcome: Ongoing  Goal: Control of acute pain  Description  Control of acute pain  4/21/2019 1140 by Azalea Rudd RN  Outcome: Ongoing  4/21/2019 1137 by Azalea Rudd RN  Outcome: Ongoing     Problem: Injury - Risk of, Physical Injury:  Goal: Will remain free from falls  Description  Will remain free from falls  4/21/2019 1140 by Azalea Rudd RN  Outcome: Ongoing  4/21/2019 1137 by Azalea Rudd RN  Outcome: Ongoing  Goal: Absence of physical injury  Description  Absence of physical injury  4/21/2019 1140 by Azalea Rudd RN  Outcome: Ongoing  4/21/2019 1137 by Azalea Rudd RN  Outcome: Ongoing     Problem: Musculor/Skeletal Functional Status  Goal: Highest potential functional level  4/21/2019 1140 by Azalea Rudd RN  Outcome: Ongoing  4/21/2019 1137 by Azalea Rudd RN  Outcome: Ongoing

## 2019-04-21 NOTE — PROGRESS NOTES
No result for 6pm APTT noted. Patient and day nurse stated it was drawn around 7:10pm.  Will call lab.

## 2019-04-21 NOTE — PROGRESS NOTES
Patient Name: Dee Dee Eagle  Date of admission: 4/5/2019  8:39 PM  Patient's age: 47 y. o., 1965  Ad[facility] Dx: [facility] ([facility] [R57.9]      Requesting Physician: Alexei Duran MD    CHIEF COMPLAINT:  Abdominal pain  SUBJECTIVE:  . The patient was seen and examined. Continues to have abdominal pain. No active bleeding. Overall she is improving. No fever or chills. She will need long-term anticoagulation,   Started on Coumadin. Tolerated well so far. BRIEF CASE HISTORY:    The patient is a 47 y.o.  female who is admitted to the hospital for   for increased confusion and abdominal pain. She was found to have ischemic bowel and was taken to surgery on 4/6/18. Pathology showed ischemic bowel going to the margins. She was taken for a second surgery on 4/8/18 and more ischemic bowel was appreciated. The patient platelets were about 300,000 on admission and dropped about 98 with her multiple surgeries. Hit antibody was done and was negative. Platelets recovered since then. The patient continues to struggle with recovery. She underwent a CT scan of the abdomen and pelvis today that showed heterogenous changes in the spleen that the new. There was a suspicion of splenic infarction. Vascular were involved and started the patient in aspirin and Plavix as well as heparin. The patient is seen and examined. She is started on anticoagulation. She denies any active bleeding. She has very little insight about her disease and most of the information were obtained from the chart. It is no history of thromboembolism previously . family history is negative for arterial or venous thrombosis. The patient developed worsening renal failure and was started on hemodialysis. Past Medical History:   has a past medical history of Asthma, Back pain, chronic, Hypertension, Snores, and Wears glasses.     Past kg/m²    Temp (24hrs), Av.7 °F (37.1 °C), Min:98 °F (36.7 °C), Max:99.5 °F (37.5 °C)      Physical Exam  Gen.  Exam, showed a well-appearing patient without evidence of distress or pain  HEENT, normocephalic and atraumatic, PERRLA extraocular muscles are intact  Neck showed no JVD no carotid bruit, no cervical adenopathy  Chest is clear to auscultation bilaterally  Heart is regular without any murmur  Abdomen left upper quadrant tenderness but the abdomen was soft  Lower extremities showed no edema clubbing or cyanosis  Neurological examination was nonfocal, with intact cranial nerves  Skin  No rashes or bruising appreciated          DATA:      Labs:       CBC:   Recent Labs     19  1044 19  0338   WBC 23.6* 21.4*   HGB 7.3* 7.2*   HCT 22.9* 23.3*   * 652*     BMP:   Recent Labs     19  1044 19  0338   * 133*   K 3.4* 3.7   CO2 22 23   BUN 21* 25*   CREATININE 2.99* 3.56*   LABGLOM 16* 13*   GLUCOSE 111* 76     PT/INR:   Recent Labs     19  1044 19  0338   PROTIME 13.2* 12.5*   INR 1.3 1.2     APTT:  Recent Labs     19  0338 19  1214   APTT 78.0* 57.0*     LIVER PROFILE:  Recent Labs     19  0339   AST 27   ALT 29   LABALBU 2.1*               IMPRESSION:    Primary Problem  Septic shock [facility])    Active Hospital Problems    Diagnosis Date Noted    Acute renal failure (HCC) [N17.9]     Altered mental status [R41.82]     PMB (postmenopausal bleeding) [N95.0]     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]     Leukemoid reaction [D72.823]     Mottled skin [L81.9]     Pleural effusion, bilateral [J90]     Protein-calorie malnutrition ([address] [E46]     Septic shock ([address] [A41.9, R65.21]     Gastroenteritis [K52.9]     Haemophilus influenzae septicemia ([address] [A41.3]     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[facility] [K55.049] 2019    Small bowel ischemia (HCC) [K55.9] 2019    Acute GI bleeding [K92.2] 2019    Gram negative septic shock (HCC) [A41.50, R65.21]     Rhabdomyolysis [M62.82] 04/06/2019    Hyponatremia [E87.1] 04/06/2019    Lactic acidosis [E87.2] 04/06/2019    MARY (acute kidney [facility]) ([facility] [N17.9] 71/08/0809    Metabolic acidosis [O96.3] 04/06/2019    Acute respiratory failure ([facility] [J96.00] 04/06/2019    Elevated liver enzymes [R74.8] 04/06/2019    Hyperkalemia [E87.5]     Shock ([facility] [R57.9] 04/05/2019       RECOMMENDATIONS:  The patient is doing well clinically stable. Slight improvement. .   Continue heparin and aspirin  Started Coumadin. Target INR between 2 and 3. She will need long-term use of anticoagulation. Patient is aware.     Leukocytosis , cultures are negative                                 Dominguez Agustin MD                          [facility]                          Cell: [phone]
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
## 2019-04-21 NOTE — PROGRESS NOTES
Risa Garcia are taking 1 pair of black / grey underwear from patient's belongings for evidence purposes.

## 2019-04-21 NOTE — CARE COORDINATION
Transitional Planning  RN requesting update on outpatient dialysis placement - patient awaiting insurance approval for [employer]. Also awaiting pre-cert at [facility]. Will need updated therapy notes for Monday.

## 2019-04-21 NOTE — PROGRESS NOTES
Renal Progress Note    Patient :  Marielle Goodwin; 47 y.o. MRN# [id number]  Location:  7979/8030-62  Attending:  Sujey Spap MD  Admit Date:  4/5/2019   Hospital Day: 16      Subjective:       Pt seen and examined, no acute events overnight  Good oral intake, urine output appropriate at 2.5L, clearances continue to be suboptimal.   Tachycardic, metoprolol started  On coumadin, INR 1.3  Illeostomy losses stable  Hemodialysis last done 2d prior  Outpatient spot being worked on by social services. Off antibiotics for now. Outpatient Medications:     Medications Prior to Admission: ibuprofen (ADVIL;MOTRIN) 800 MG tablet, Take 800 mg by mouth every 6 hours as needed for Pain  ALPRAZolam (XANAX) 0.25 MG tablet, Take 0.25 mg by mouth nightly as needed for Sleep or Anxiety. venlafaxine (EFFEXOR) 75 MG tablet, Take 100 mg by mouth 2 times daily   [DISCONTINUED] celecoxib (CELEBREX) 200 MG capsule, Take 1 capsule by mouth 2 times daily  [DISCONTINUED] traMADol (ULTRAM) 50 MG tablet, 1 tablet PO BID PRN pain  [DISCONTINUED] oxyCODONE-acetaminophen (PERCOCET) 5-325 MG per tablet, Take 1 tablet by mouth 2 times daily as needed for Pain . [DISCONTINUED] diclofenac (VOLTAREN) 75 MG EC tablet, Take 1 tablet by mouth 2 times daily  [DISCONTINUED] oxyCODONE-acetaminophen (PERCOCET) 5-325 MG per tablet, Take 1 tablet by mouth every 6 hours as needed for Pain . [DISCONTINUED] aspirin 325 MG EC tablet, Take 1 tablet by mouth 2 times daily for 14 days  [DISCONTINUED] Misc. Devices (ROLLER WALKER) MISC, 1 each by Does not apply route daily  QVAR 40 MCG/ACT inhaler, Inhale 2 puffs into the lungs 2 times daily  [DISCONTINUED] Calcium Citrate-Vitamin D (CALCIUM + D PO), Take 1 tablet by mouth daily  [DISCONTINUED] Misc.  Devices MISC, As directed by physician daily  [DISCONTINUED] diclofenac (VOLTAREN) 50 MG EC tablet, Take 1 tablet by mouth 2 times daily  [DISCONTINUED] docusate sodium (COLACE) 100 MG capsule, Take 1 capsule by mouth 2 times daily as needed for Constipation  gabapentin (NEURONTIN) 100 MG capsule, Take 100 mg by mouth 3 times daily as needed   lisinopril-hydrochlorothiazide (PRINZIDE;ZESTORETIC) 10-12.5 MG per tablet, Take 1 tablet by mouth daily  albuterol (PROVENTIL) (2.5 MG/3ML) 0.083% nebulizer solution, Take 2.5 mg by nebulization every 6 hours as needed for Wheezing  albuterol sulfate  (90 BASE) MCG/ACT inhaler, Inhale 2 puffs into the lungs every 6 hours as needed for Wheezing    Current Medications:     Scheduled Meds:    metoprolol tartrate  25 mg Oral BID    warfarin (COUMADIN) daily dosing (placeholder)   Other RX Placeholder    warfarin  5 mg Oral Daily    nicotine  1 patch Transdermal Daily    nystatin  5 mL Oral 4x Daily    aspirin  81 mg Oral Daily    cyclobenzaprine  5 mg Oral TID    pantoprazole  40 mg Oral BID AC    sodium chloride flush  10 mL Intravenous 2 times per day     Continuous Infusions:    heparin (porcine) 26 Units/kg/hr (19 0432)    dextrose       PRN Meds:  sodium chloride, sodium chloride, heparin (porcine), heparin (porcine), sodium chloride, sodium chloride, acetaminophen, glucose, dextrose, glucagon (rDNA), dextrose, heparin (porcine), heparin (porcine), oxyCODONE, morphine **OR** morphine, sodium chloride, sodium chloride, dextrose, sodium chloride flush, magnesium hydroxide, ondansetron    Input/Output:       I/O last 3 completed shifts: In: 2564 [P.O.:1200; I.V.:1364]  Out: 2600 [Urine:2100; Stool:500].       Patient Vitals for the past 96 hrs (Last 3 readings):   Weight   19 0645 197 lb 8.5 oz (89.6 kg)   19 1153 175 lb 0.7 oz (79.4 kg)   19 0815 176 lb 9.4 oz (80.1 kg)       Vital Signs:   Temperature:  Temp: 98.9 °F (37.2 °C)  TMax:   Temp (24hrs), Av.8 °F (37.1 °C), Min:98.3 °F (36.8 °C), Max:99.5 °F (37.5 °C)    Respirations:  Resp: 15  Pulse:   Pulse: 110  BP:    BP: 131/75  BP Range: Systolic (87KVI), HTW:735 , Min:98 , Max:131 Diastolic (90ZUY), MSB:17, Min:64, Max:76      Physical Examination:     General:  AAO x 3, speaking in full sentences, no accessory muscle use. HEENT: Atraumatic, normocephalic, no throat congestion, moist mucosa. Eyes:   Pupils equal, round and reactive to light, EOMI. Neck:   No JVD, no thyromegaly, no lymphadenopathy. Chest:  Bilateral vesicular breath sounds, no rales or wheezes. Cardiac:  S1 S2 RR, no murmurs, gallops or rubs, JVP not raised. Abdomen: Soft, non-tender, no masses or organomegaly, BS audible. :   No suprapubic or flank tenderness. Neuro:  AAO x 3, No FND. SKIN:  No rashes, good skin turgor. Extremities:  + edema, palpable peripheral pulses, no calf tenderness. Labs:       Recent Labs     04/19/19 0339 04/19/19 2328 04/20/19 1044 04/21/19 0338   WBC 24.9*  --   --  23.6* 21.4*   RBC 2.48*  --   --  2.41* 2.46*   HGB 7.4*   < > 7.6* 7.3* 7.2*   HCT 23.6*   < > 24.2* 22.9* 23.3*   MCV 95.2  --   --  95.0 94.7   MCH 29.8  --   --  30.3 29.3   MCHC 31.4  --   --  31.9 30.9   RDW 15.4*  --   --  15.3* 15.1*   *  --   --  620* 652*   MPV 10.9  --   --  10.6 10.2    < > = values in this interval not displayed. BMP:   Recent Labs     04/19/19 0339 04/20/19 1044 04/21/19 0338   * 134* 133*   K 3.8 3.4* 3.7   CL 95* 96* 95*   CO2 23 22 23   BUN 34* 21* 25*   CREATININE 4.11* 2.99* 3.56*   GLUCOSE 119* 111* 76   CALCIUM 7.2* 7.5* 7.5*      Phosphorus:   No results for input(s): PHOS in the last 72 hours. Magnesium:  No results for input(s): MG in the last 72 hours.   Albumin:    Recent Labs     04/19/19  0339   LABALBU 2.1*     BNP:    No results found for: BNP  CALVIN:      Lab Results   Component Value Date    CALVIN NEGATIVE 04/13/2019     SPEP:  Lab Results   Component Value Date    PROT 4.9 04/19/2019    ALBCAL 2.8 04/05/2019    ALBPCT 63 04/05/2019    LABALPH 0.2 04/05/2019    LABALPH 0.6 04/05/2019    A1PCT 3 04/05/2019    A2PCT 12 04/05/2019    LABBETA 0.5 04/05/2019    BETAPCT 11 04/05/2019    GAMGLOB 0.5 04/05/2019    GGPCT 11 04/05/2019    PATH ELECTRONICALLY SIGNED. Mian Silver M.D. 04/06/2019     UPEP:     Lab Results   Component Value Date    LABPE  04/06/2019     ELEVATED PROTEIN CONCENTRATION. MOST SERUM PROTEINS ARE DETECTED IN THIS URINE.   USUALLY OBSERVED WITH MARKEDLY INCREASED NON-SELECTIVE GLOMERULAR PERMEABILITY (i.e. SEVERE GLOMERULAR DISEASE), CONTAMINATION OF     C3:     Lab Results   Component Value Date    C3 115 04/13/2019     C4:     Lab Results   Component Value Date    C4 24 04/13/2019     MPO ANCA:     Lab Results   Component Value Date    MPO 7 04/13/2019     PR3 ANCA:     Lab Results   Component Value Date    PR3 20 04/13/2019     Anti-GBM:   No results found for: GBMABIGG  Hep BsAg:         Lab Results   Component Value Date    HEPBSAG NONREACTIVE 04/09/2019     Hep C AB:          Lab Results   Component Value Date    HEPCAB NONREACTIVE 04/09/2019       Urinalysis/Chemistries:      Lab Results   Component Value Date    NITRU NEGATIVE 04/06/2019    COLORU DARK YELLOW 04/06/2019    PHUR 5.0 04/06/2019    WBCUA 50  04/06/2019    RBCUA 50  04/06/2019    MUCUS NOT REPORTED 04/06/2019    TRICHOMONAS NOT REPORTED 04/06/2019    YEAST NOT REPORTED 04/06/2019    BACTERIA NOT REPORTED 04/06/2019    SPECGRAV 1.032 04/06/2019    LEUKOCYTESUR NEGATIVE 04/06/2019    UROBILINOGEN Normal 04/06/2019    BILIRUBINUR NEGATIVE 04/06/2019    GLUCOSEU NEGATIVE 04/06/2019    KETUA TRACE 04/06/2019    AMORPHOUS NOT REPORTED 04/06/2019     Urine Sodium:     Lab Results   Component Value Date    JANEL <20 04/06/2019     Urine Potassium:  No results found for: KUR  Urine Chloride:    Lab Results   Component Value Date    CLUR <20 04/06/2019     Urine Osmolarity: No results found for: OSMOU  Urine Protein:   No components found for: TOTALPROTEIN, URINE   Urine Creatinine:     Lab Results   Component Value Date    LABCREA 94.4 04/06/2019     Urine Eosinophils:  No components found for: UEOS    Radiology:     CXR:     Assessment: 1. Acute Kidney Injury: Secondary to ischemic ATN dialysis dependent without any renal recovery, urine output fair, insensible losses high, clearances continue to lag behind  2.  CK D stage III secondary to nephrosclerosis baseline 1.5-1.7  3.  Ischemic bowel status post resection  4.  H. influenzae bacteremia resolved  5.  Anemia of chronic disease and blood loss        Plan:   1. Next hemodialysis tomorrow, for clearances, volume status ok  2. Increase oral intake  3. Await outpatient dialysis spot  4. Will follow with you  5. D/c once outpt arrangements complete    Nutrition   Please ensure that patient is on a renal diet/TF. Avoid nephrotoxic drugs/contrast exposure. We will continue to follow along with you.

## 2019-04-21 NOTE — DISCHARGE INSTR - COC
Care Documentation and Therapy:  Incision 01/24/17 Knee Left (Active)   Number of days: 700       Wound 04/04/19 Other (Comment) Medial deep eschar (Active)   Wound Image   5/3/2019  4:05 PM   Wound Pressure Unstageable 5/4/2019  8:00 AM   Wound Cleansed Wound cleanser 5/3/2019  4:05 PM   Wound Length (cm) 6 cm 5/3/2019  4:05 PM   Wound Width (cm) 2 cm 5/3/2019  4:05 PM   Wound Surface Area (cm^2) 12 cm^2 5/3/2019  4:05 PM   Wound Assessment Slough 5/4/2019  8:00 AM   Number of days: 29        Elimination:  Continence:   · Bowel: Yes  · Bladder: Yes  Urinary Catheter: None   Colostomy/Ileostomy/Ileal Conduit: Yes  Ileostomy Ileostomy RUQ-Stomal Appliance: 1 piece  Ileostomy Ileostomy RUQ-Flange Size (inches): 2.5 Inches  Ileostomy Ileostomy RUQ-Stoma  Assessment: Moist, Red, Flush  Ileostomy Ileostomy RUQ-Mucocutaneous Junction: Intact  Ileostomy Ileostomy RUQ-Peristomal Assessment: Red, Painful  Ileostomy Ileostomy RUQ-Treatment: Bag change, Liquid skin barrier(belt)  Ileostomy Ileostomy RUQ-Stool Appearance: Watery  Ileostomy Ileostomy RUQ-Stool Color: Brown  Ileostomy Ileostomy RUQ-Stool Amount: Medium  Ileostomy Ileostomy RUQ-Output (mL): (unmeasured-bag opened onto floor)    Date of Last BM: 5/4/19    Intake/Output Summary (Last 24 hours) at 5/4/2019 1024  Last data filed at 5/4/2019 0326  Gross per 24 hour   Intake 300 ml   Output 2325 ml   Net -2025 ml     I/O last 3 completed shifts: In: 300 [P.O.:300]  Out: 5289 [Urine:1800; Stool:775]    Safety Concerns: At Risk for Falls    Impairments/Disabilities:      None    Nutrition Therapy:  Current Nutrition Therapy:   - Oral Diet:  General    Routes of Feeding: Oral  Liquids: No Restrictions  Daily Fluid Restriction: no  Last Modified Barium Swallow with Video (Video Swallowing Test): not done    Treatments at the Time of Hospital Discharge:   Respiratory Treatments: ***  Oxygen Therapy:  is not on home oxygen therapy.   Ventilator:    - No ventilator support    Rehab Therapies: Physical Therapy and Occupational Therapy  Weight Bearing Status/Restrictions: No weight bearing restirctions  Other Medical Equipment (for information only, NOT a DME order):  walker  Other Treatments: ***    Patient's personal belongings (please select all that are sent with patient):  Radha    RN SIGNATURE:  Electronically signed by Gala Santos RN on 5/4/19 at 9:35 AM    CASE MANAGEMENT/SOCIAL WORK SECTION    Inpatient Status Date: 4/5/19    Readmission Risk Assessment Score:  Readmission Risk              Risk of Unplanned Readmission:        21           Discharging to Facility/ Agency   · Name:     [facility]  · Address:   [facility]  · Phone:      [phone]  · Fax:       [phone]    Dialysis Facility (if applicable)   · Name:  · Address:  · Dialysis Schedule:  · Phone:  · Fax:    / signature: Electronically signed by German Mathew RN on 5/4/19 at 9:32 AM    PHYSICIAN SECTION    Prognosis: Fair    Condition at Discharge: Stable    Rehab Potential (if transferring to Rehab): Fair    Recommended Labs or Other Treatments After Discharge: INR checks as the patient will be on Warfarin. CBC for Hb. Liquid Imodium one hour before meal and continue using metamucil for bulk forming of stoma output. Physician Certification: I certify the above information and transfer of Mckenna Wilhelm  is necessary for the continuing treatment of the diagnosis listed and that she requires [facility] for greater 30 days.      Update Admission H&P: No change in H&P    PHYSICIAN SIGNATURE:  Electronically signed by Landen Doherty MD on 5/4/2019 at 10:24 AM

## 2019-04-21 NOTE — PROGRESS NOTES
K 3.4 yesterday, not replaced, no orders to replace. Asked about IVF running at 30mL/hr. Paged internal med resident dada.

## 2019-04-21 NOTE — PROGRESS NOTES
[facility] - Progress Note    Today's Date and Time: 4/21/2019, 9:45 AM    Impression :   1. 48 y/o female with initial complaints of:  · Vomiting  · Diarrhea  · Abdominal pain  · Altered mental status  2. Acute kidney injury- on HD   3. Shock, presumptive septic plus hypovolemic, requiring Levophed- Resolved  4. Septicemia with Haemophilus influenzae 4-5-19--Resolved  5. Gastroenteritis--Resolved  6. Ischemic bowel. S/P laparotomy 4-6-19 with resection  7. S/p second look with resection of transverse colon, ileostomy creation, resection of rectal stump and abdominal closure. 8. Severe hyponatremia--Resolved   9. Transaminitis, shock liver.-Resolved  10. COPD  6. Arthritis  12. Chronic NSAID use  13. Funguria  14. Acral mottling of hands and feet  15. Allergy to penicillins: Hives and respiratory distress  16. Allergy to sulfa   17. S/P New tunnel HD catheter placement 4/16  18. EDWARD with no vegetations    Recommendations:   · Monitor off antibiotics (Completed Meropenem. Stop date 4-14-19)  · Consider telepsych treatment  · Continue treatment for oral thrush  · Follow up possible rectal/vaginal bleeding  · Monitor mental status  · Trend hemoglobin    Medical Decision Making/Summary/Discussion:   · 48 y/o female with vomiting, diarrhea, and AMS. · Found to have gastroenteritis with dehydration, shock requiring pressors, transaminitis, MARY requiring emergent dialysis, lactic acidosis. · Intubated due to AMS  · CT chest shows atelactasis vs pneumonia  · Initially treated with vanc, levaquin, and aztreonam.   · Patient was felt to be critically ill with origin in GI tract . Was treated with meropenem despite Hx of penicillin allergy. · Had laparotomy 4-6-19 with findings of ischemic bowel from distal ileum to sigmoid colon.    · S/P ileocecectomy with extended left hemicolectomy and placement of wound vac 4-6-19  · Overall improvement post surgery  · Off pressors and sedation  · Blood Culture grew Haemophilus influenza, beta lactamase negative. THis finding is likely unrelated to intraabdominal process. · Treated with Meropenem through 4/14  · Has maintained leukocytosis and occasional fever. · Has some vaginal bleeding. Gyn evaluating  · Overall clinical improvement. Infection Control Recommendations   · [place] Precautions    Antimicrobial Stewardship Recommendations     · Discontinuation of therapy      Coordination of Outpatient Care:   · Estimated Length of IV antimicrobials: 4/14  · Patient will need Midline Catheter Insertion: No  · Patient will need PICC line Insertion: No  · Patient will need: Home IV , [place],  SNF,  LTAC TBD  · Patient will need outpatient wound care: TBD    Chief complaint/reason for consultation:   · Altered mental status and tender abdomen       History of Present Illness:   Brady Ham is a 47y.o.-year-old  female who was initially admitted on 4/5/2019. Patient seen at the request of Dr. Viet Lui:    Patient presented to ED via EMS due to altered mental status and vomiting. Patient has history significant for COPD, right knee osteoarthirtis s/p arthoplasty, and chronic NSAID use. Patient lives in [place], but was here to visit her significant other. Arrived Wednesday night, said she didn't feel good. Thought she might have had a bad burger at a fast food restaurant. Went to sleep and slept for almost 24 hours. When she awoke, she said she was vomiting, having diarrhea, and abdominal pain. Significant other and sister are unsure of the nature of the vomit, diarrhea, or abdominal pain. Then she slept a bit more, until her significant other found her unresponsive and that's when he called EMS to bring her to the hospital.     Afebrile on admission, but Tmax overnight was 39.3. Tachycardic on admission, 129. Became hypotensive after admission and levophed and vasopressin were started.      Initial labs:    Admission labs significant for Na 125, K 8.0, CO2 9, BUN 62, Creatinine of 4.06, Anion gap of 23, Lactic acid of 3.5, Glucose of 186, Ca 5.2, POC HCO3 15.9, CK 15,342, Troponins high sensitivity 89 and 94, Alk phos 168, , AST 1,122, Bili .37, lipase of 119, Urine tox positive for THC and Cocaine, WBC 23.5, Hgb 10.2, Urine and blood cultures pending, HIV negative, influenza negative, hepatitis panel non-reactive, urine Leukocyte esterase trace, urine nitrite -, urine protein 2+, urine Hgb large, urine RBCs 5 to 10, many urine yeast,  ABG 7.22, pCO2 27, HCO3 15.9. Initial imaging showed:     CXR: No acute cardiopulmonary process    Abdominal X-ray 4/6: No free air    CTA of chest: Negative for PE or dissection. Patchy consolidations of bilateral lower lung lobes representing developing pneumonia vs atelectasis. CT head: pending    Initial course:     Intubated and sedated in ED due to altered mental status. Currently on Vancomycin, Levaquin, and aztreonam for empiric coverage. Richar catheter was placed due to request by nephrology for emergent dialysis. Dialysis attempted several times, but due to high arterial pressures and line clotting, dialysis to be completed later today by Dr. Heidi Trotter. NG tube with bloody output. FOBT +. General surgery has been consulted for evaluation. CURRENT EVALUATION : 4/21/2019    Patient seen and examined. Feeling better today. Afebrile  VS stable, but tachycardic. 110's to 120's. Continues on midodrine for low blood pressure. Exam revealed a yellow plaque on the center of the tongue consistent with thrush. PO nystatin started. Not improving yet. EDWARD revealed no thrombus or vegetation, EF 55%, moderate MR. Was started on therapeutic heparin gtt for possible hypercoagulable state. Heme/onc is recommending lifelong anticoagulation. Hypercoagulable workup thus far is negative.      OBGYN did pelvic ultrasound instead of TVUS because of patient anxiety. Study technically limited, but normal endometrial stripe. No plan to do endometrial biopsy now. They advise regular pap smear follow up. Gonorrhea and chlamydia negative. Patient admits to polysubstance use before coming in this admission. Says she would like to stop, but is feeling very anxious about her current health status and stopping substance use. She states she would like to see telepsych for evaluation. Had episodes of a fib with RVR. Shocked twice to control rhythm and rate on 4-7-19. Stable since then. Abdomen softer. VAC in place  Skin cyanosis resolved. Labs reviewed: 4/21/2019     WBC 23.9-->33.8->41.7->38.3->39.6->34.5-->26.4 -> 25.7 -> 21.4  Hb 8.3-->9.4->7.8 -> 7.3 -> 7.2  Plat 75-->119->264 -> 428 -> 523-->652    BUN 84-->62 -> 33 -> 34 -> 34-->25  Cr 5.89-->4.86 -> 3.68 -> 3.68 -> 4.11-->3.56    Cultures:  Urine:  · NGTD  Blood:  · 4-5-19 : 1/2 haemophilus influenza, beta lactamase negative, sensitive to pcn and meropenem. Pt completed a 10 day course of meropenem  · 4/12 x 1 no growth to date  · 4/13 x 2 no growth to date  · 4/16 NGTD  Wound:  · NA    MRSA- Neg  CXR 4-11-19 showed unchanged pulmonary edema and effusions. CXR 4-16-19 Rt side atelectasis     Duplex of the right upper extremity showed no evidence of deep venous thrombosis in the right upper extremity. Superficial thrombophlebitis of the right upper extremity involving the cephalic and basilic veins. Discussed with RN, patient. I have personally reviewed the past medical history, past surgical history, medications, social history, and family history, and I have updated the database accordingly.   Past Medical History:     Past Medical History:   Diagnosis Date    Asthma     On inhaler    Back pain, chronic     Hypertension 2015    On Lisinopril    Snores     possible apnea but not tested    Wears glasses        Past Surgical  History:     Past Surgical History:   Procedure Laterality Date    KNEE ARTHROSCOPY Left 1980    KNEE ARTHROSCOPY Left 09/28/2016    with medial menisectomy    LAPAROTOMY EXPLORATORY N/A 4/6/2019    LAPAROTOMY EXPLORATORY, ILEOCECECTOMY, SUBTOTAL COLECTOMY, ABTHEHRA WOUND VAC PLACEMENT performed by Jes Willis MD at [address] [place] [address] N/A 4/8/2019    2ND LOOK EXPLORATORY LAPAROTOMY, RESECTION TRANSVERSE COLON, ILEOSTOMY CREATION, RESECTION RECTAL STUMP, ABDOMINAL WASHOUT, OMENTECTOMY, ABDOMINAL WALL CLOSURE performed by Krissy Raza MD at [address] [place] [address]       Medications:      metoprolol tartrate  25 mg Oral BID    warfarin (COUMADIN) daily dosing (placeholder)   Other RX Placeholder    warfarin  5 mg Oral Daily    nicotine  1 patch Transdermal Daily    nystatin  5 mL Oral 4x Daily    aspirin  81 mg Oral Daily    cyclobenzaprine  5 mg Oral TID    pantoprazole  40 mg Oral BID AC    sodium chloride flush  10 mL Intravenous 2 times per day       Social History:     Social History     Socioeconomic History    Marital status: Single     Spouse name: Not on file    Number of children: 0    Years of education: Not on file    Highest education level: Not on file   Occupational History    Occupation: [address] [place] [address] Financial resource strain: Not on file    Food insecurity:     Worry: Not on file     Inability: Not on file    Transportation needs:     Medical: Not on file     Non-medical: Not on file   Tobacco Use    Smoking status: Light Tobacco Smoker     Packs/day: 0.25     Types: Cigarettes     Start date: 9/19/1980    Smokeless tobacco: Never Used   Substance and Sexual Activity    Alcohol use: Yes     Comment: OCCASSIONAL    Drug use: No    Sexual activity: Yes     Partners: Female   Lifestyle    Physical activity:     Days per week: Not on file     Minutes per session: Not on file    Stress: Not on file   Relationships    Social connections:     Talks on phone: Not mildly tender, surgical bandages in place. Wound vac removed. All four Extremities: Cyanosis of trunk, abdomen, distal extremities  Neurologic: Sedated  Skin: Cool and dry with poor turgor. Signs of peripheral arterial  insufficiency. No ulcerations. No open wounds. Skin purplish, cyanotic. Medical Decision Making -Laboratory:   I have independently reviewed/ordered the following labs:    CBC with Differential:   Recent Labs     04/20/19  1044 04/21/19  0338   WBC 23.6* 21.4*   HGB 7.3* 7.2*   HCT 22.9* 23.3*   * 652*   LYMPHOPCT 15* 17*   MONOPCT 6 7     BMP:   Recent Labs     04/20/19  1044 04/21/19  0338   * 133*   K 3.4* 3.7   CL 96* 95*   CO2 22 23   BUN 21* 25*   CREATININE 2.99* 3.56*     Hepatic Function Panel:   Recent Labs     04/19/19  0339   PROT 4.9*   LABALBU 2.1*   BILIDIR 0.39*   IBILI 0.15   BILITOT 0.54   ALKPHOS 112*   ALT 29   AST 27     No results for input(s): RPR in the last 72 hours. No results for input(s): HIV in the last 72 hours. No results for input(s): BC in the last 72 hours. Lab Results   Component Value Date    MUCUS NOT REPORTED 04/06/2019    RBC 2.46 04/21/2019    TRICHOMONAS NOT REPORTED 04/06/2019    WBC 21.4 04/21/2019    YEAST NOT REPORTED 04/06/2019    TURBIDITY TURBID 04/06/2019     Lab Results   Component Value Date    CREATININE 3.56 04/21/2019    GLUCOSE 76 04/21/2019       Medical Decision Making-Imaging:     Abdominal xray:    Gas in mildly distended loops of large and small bowel likely reflecting an   ileus.       NG tube tip in the gastric fundus with the proximal side-port in the distal   thoracic esophagus, repositioning recommended.       Airspace disease left lung base.      CT scan chest:    Impression   Satisfactory position endotracheal tube.       Nasogastric tube needs advanced 10 cm.       Patchy perihilar opacities and bibasilar airspace disease.  Follow-up may be   beneficial following medical treatment course to document complete

## 2019-04-22 ENCOUNTER — APPOINTMENT (OUTPATIENT)
Dept: DIALYSIS | Age: 54
DRG: 710 | End: 2019-04-22
Payer: MEDICAID

## 2019-04-22 LAB
ABSOLUTE EOS #: 0.19 K/UL (ref 0–0.4)
ABSOLUTE IMMATURE GRANULOCYTE: 0.56 K/UL (ref 0–0.3)
ABSOLUTE LYMPH #: 2.22 K/UL (ref 1–4.8)
ABSOLUTE MONO #: 1.48 K/UL (ref 0.1–0.8)
ALBUMIN SERPL-MCNC: 2.2 G/DL (ref 3.5–5.2)
ALBUMIN/GLOBULIN RATIO: 0.7 (ref 1–2.5)
ALP BLD-CCNC: 117 U/L (ref 35–104)
ALT SERPL-CCNC: 18 U/L (ref 5–33)
ANION GAP SERPL CALCULATED.3IONS-SCNC: 16 MMOL/L (ref 9–17)
AST SERPL-CCNC: 15 U/L
BASOPHILS # BLD: 0 % (ref 0–2)
BASOPHILS ABSOLUTE: 0 K/UL (ref 0–0.2)
BILIRUB SERPL-MCNC: 0.4 MG/DL (ref 0.3–1.2)
BILIRUBIN DIRECT: 0.23 MG/DL
BILIRUBIN, INDIRECT: 0.17 MG/DL (ref 0–1)
BLOOD BANK SPECIMEN: NORMAL
BUN BLDV-MCNC: 32 MG/DL (ref 6–20)
BUN/CREAT BLD: ABNORMAL (ref 9–20)
CALCIUM SERPL-MCNC: 7.6 MG/DL (ref 8.6–10.4)
CHLORIDE BLD-SCNC: 94 MMOL/L (ref 98–107)
CO2: 23 MMOL/L (ref 20–31)
CREAT SERPL-MCNC: 3.56 MG/DL (ref 0.5–0.9)
CULTURE: NORMAL
CULTURE: NORMAL
DIFFERENTIAL TYPE: ABNORMAL
EOSINOPHILS RELATIVE PERCENT: 1 % (ref 1–4)
GFR AFRICAN AMERICAN: 16 ML/MIN
GFR NON-AFRICAN AMERICAN: 13 ML/MIN
GFR SERPL CREATININE-BSD FRML MDRD: ABNORMAL ML/MIN/{1.73_M2}
GFR SERPL CREATININE-BSD FRML MDRD: ABNORMAL ML/MIN/{1.73_M2}
GLOBULIN: ABNORMAL G/DL (ref 1.5–3.8)
GLUCOSE BLD-MCNC: 103 MG/DL (ref 70–99)
GLUCOSE BLD-MCNC: 91 MG/DL (ref 65–105)
GLUCOSE BLD-MCNC: 91 MG/DL (ref 65–105)
GLUCOSE BLD-MCNC: 92 MG/DL (ref 65–105)
GLUCOSE BLD-MCNC: 98 MG/DL (ref 65–105)
HCT VFR BLD CALC: 22.1 % (ref 36.3–47.1)
HCT VFR BLD CALC: 25.9 % (ref 36.3–47.1)
HEMOGLOBIN: 6.9 G/DL (ref 11.9–15.1)
HEMOGLOBIN: 8.3 G/DL (ref 11.9–15.1)
IMMATURE GRANULOCYTES: 3 %
INR BLD: 1.6
LYMPHOCYTES # BLD: 12 % (ref 24–44)
Lab: NORMAL
Lab: NORMAL
MCH RBC QN AUTO: 29.1 PG (ref 25.2–33.5)
MCHC RBC AUTO-ENTMCNC: 31.2 G/DL (ref 28.4–34.8)
MCV RBC AUTO: 93.2 FL (ref 82.6–102.9)
MONOCYTES # BLD: 8 % (ref 1–7)
MORPHOLOGY: ABNORMAL
MORPHOLOGY: ABNORMAL
NRBC AUTOMATED: 0 PER 100 WBC
PARTIAL THROMBOPLASTIN TIME: 46.8 SEC (ref 20.5–30.5)
PARTIAL THROMBOPLASTIN TIME: 84.1 SEC (ref 20.5–30.5)
PARTIAL THROMBOPLASTIN TIME: 92.9 SEC (ref 20.5–30.5)
PDW BLD-RTO: 15.1 % (ref 11.8–14.4)
PLATELET # BLD: 774 K/UL (ref 138–453)
PLATELET ESTIMATE: ABNORMAL
PMV BLD AUTO: 10.2 FL (ref 8.1–13.5)
POTASSIUM SERPL-SCNC: 3.6 MMOL/L (ref 3.7–5.3)
PROTHROMBIN TIME: 16.7 SEC (ref 9–12)
RBC # BLD: 2.37 M/UL (ref 3.95–5.11)
RBC # BLD: ABNORMAL 10*6/UL
SEG NEUTROPHILS: 76 % (ref 36–66)
SEGMENTED NEUTROPHILS ABSOLUTE COUNT: 14.05 K/UL (ref 1.8–7.7)
SODIUM BLD-SCNC: 133 MMOL/L (ref 135–144)
SPECIMEN DESCRIPTION: NORMAL
SPECIMEN DESCRIPTION: NORMAL
TOTAL PROTEIN: 5.2 G/DL (ref 6.4–8.3)
WBC # BLD: 18.5 K/UL (ref 3.5–11.3)
WBC # BLD: ABNORMAL 10*3/UL

## 2019-04-22 PROCEDURE — 86850 RBC ANTIBODY SCREEN: CPT

## 2019-04-22 PROCEDURE — 85018 HEMOGLOBIN: CPT

## 2019-04-22 PROCEDURE — 6370000000 HC RX 637 (ALT 250 FOR IP): Performed by: INTERNAL MEDICINE

## 2019-04-22 PROCEDURE — 2580000003 HC RX 258: Performed by: STUDENT IN AN ORGANIZED HEALTH CARE EDUCATION/TRAINING PROGRAM

## 2019-04-22 PROCEDURE — 6370000000 HC RX 637 (ALT 250 FOR IP): Performed by: STUDENT IN AN ORGANIZED HEALTH CARE EDUCATION/TRAINING PROGRAM

## 2019-04-22 PROCEDURE — 97605 NEG PRS WND THER DME<=50SQCM: CPT

## 2019-04-22 PROCEDURE — 6360000002 HC RX W HCPCS: Performed by: INTERNAL MEDICINE

## 2019-04-22 PROCEDURE — 99232 SBSQ HOSP IP/OBS MODERATE 35: CPT | Performed by: INTERNAL MEDICINE

## 2019-04-22 PROCEDURE — 80076 HEPATIC FUNCTION PANEL: CPT

## 2019-04-22 PROCEDURE — P9047 ALBUMIN (HUMAN), 25%, 50ML: HCPCS | Performed by: INTERNAL MEDICINE

## 2019-04-22 PROCEDURE — 94762 N-INVAS EAR/PLS OXIMTRY CONT: CPT

## 2019-04-22 PROCEDURE — 6360000002 HC RX W HCPCS: Performed by: STUDENT IN AN ORGANIZED HEALTH CARE EDUCATION/TRAINING PROGRAM

## 2019-04-22 PROCEDURE — 90935 HEMODIALYSIS ONE EVALUATION: CPT

## 2019-04-22 PROCEDURE — 86920 COMPATIBILITY TEST SPIN: CPT

## 2019-04-22 PROCEDURE — 86900 BLOOD TYPING SEROLOGIC ABO: CPT

## 2019-04-22 PROCEDURE — 2060000000 HC ICU INTERMEDIATE R&B

## 2019-04-22 PROCEDURE — 80048 BASIC METABOLIC PNL TOTAL CA: CPT

## 2019-04-22 PROCEDURE — 85610 PROTHROMBIN TIME: CPT

## 2019-04-22 PROCEDURE — 85730 THROMBOPLASTIN TIME PARTIAL: CPT

## 2019-04-22 PROCEDURE — 86901 BLOOD TYPING SEROLOGIC RH(D): CPT

## 2019-04-22 PROCEDURE — 85025 COMPLETE CBC W/AUTO DIFF WBC: CPT

## 2019-04-22 PROCEDURE — 36415 COLL VENOUS BLD VENIPUNCTURE: CPT

## 2019-04-22 PROCEDURE — P9016 RBC LEUKOCYTES REDUCED: HCPCS

## 2019-04-22 PROCEDURE — 36430 TRANSFUSION BLD/BLD COMPNT: CPT

## 2019-04-22 PROCEDURE — 82947 ASSAY GLUCOSE BLOOD QUANT: CPT

## 2019-04-22 PROCEDURE — 97110 THERAPEUTIC EXERCISES: CPT

## 2019-04-22 PROCEDURE — 97530 THERAPEUTIC ACTIVITIES: CPT

## 2019-04-22 PROCEDURE — 85014 HEMATOCRIT: CPT

## 2019-04-22 RX ORDER — ALBUMIN (HUMAN) 12.5 G/50ML
25 SOLUTION INTRAVENOUS ONCE
Status: COMPLETED | OUTPATIENT
Start: 2019-04-22 | End: 2019-04-22

## 2019-04-22 RX ORDER — MIDODRINE HYDROCHLORIDE 5 MG/1
10 TABLET ORAL PRN
Status: DISPENSED | OUTPATIENT
Start: 2019-04-22 | End: 2019-04-22

## 2019-04-22 RX ORDER — ALPRAZOLAM 0.25 MG/1
0.25 TABLET ORAL ONCE
Status: COMPLETED | OUTPATIENT
Start: 2019-04-22 | End: 2019-04-22

## 2019-04-22 RX ADMIN — WARFARIN SODIUM 5 MG: 5 TABLET ORAL at 17:37

## 2019-04-22 RX ADMIN — MORPHINE SULFATE 4 MG: 4 INJECTION INTRAVENOUS at 23:31

## 2019-04-22 RX ADMIN — OXYCODONE HYDROCHLORIDE 5 MG: 5 TABLET ORAL at 17:02

## 2019-04-22 RX ADMIN — OXYCODONE HYDROCHLORIDE 5 MG: 5 TABLET ORAL at 22:13

## 2019-04-22 RX ADMIN — ASPIRIN 81 MG: 81 TABLET, CHEWABLE ORAL at 12:45

## 2019-04-22 RX ADMIN — OXYCODONE HYDROCHLORIDE 5 MG: 5 TABLET ORAL at 05:25

## 2019-04-22 RX ADMIN — CYCLOBENZAPRINE 5 MG: 10 TABLET, FILM COATED ORAL at 20:38

## 2019-04-22 RX ADMIN — HEPARIN SODIUM AND DEXTROSE 26.03 UNITS/KG/HR: 10000; 5 INJECTION INTRAVENOUS at 05:15

## 2019-04-22 RX ADMIN — MORPHINE SULFATE 4 MG: 4 INJECTION INTRAVENOUS at 20:21

## 2019-04-22 RX ADMIN — CYCLOBENZAPRINE 5 MG: 10 TABLET, FILM COATED ORAL at 15:45

## 2019-04-22 RX ADMIN — ALPRAZOLAM 0.25 MG: 0.25 TABLET ORAL at 19:02

## 2019-04-22 RX ADMIN — ONDANSETRON 4 MG: 2 INJECTION INTRAMUSCULAR; INTRAVENOUS at 13:46

## 2019-04-22 RX ADMIN — ALBUMIN (HUMAN) 25 G: 0.25 INJECTION, SOLUTION INTRAVENOUS at 09:54

## 2019-04-22 RX ADMIN — HEPARIN SODIUM AND DEXTROSE 28 UNITS/KG/HR: 10000; 5 INJECTION INTRAVENOUS at 17:03

## 2019-04-22 RX ADMIN — PANTOPRAZOLE SODIUM 40 MG: 40 TABLET, DELAYED RELEASE ORAL at 05:25

## 2019-04-22 RX ADMIN — MIDODRINE HYDROCHLORIDE 10 MG: 5 TABLET ORAL at 09:52

## 2019-04-22 RX ADMIN — NYSTATIN 500000 UNITS: 500000 SUSPENSION ORAL at 17:37

## 2019-04-22 RX ADMIN — PANTOPRAZOLE SODIUM 40 MG: 40 TABLET, DELAYED RELEASE ORAL at 15:53

## 2019-04-22 RX ADMIN — NYSTATIN 500000 UNITS: 500000 SUSPENSION ORAL at 20:38

## 2019-04-22 RX ADMIN — HEPARIN SODIUM 1600 UNITS: 1000 INJECTION INTRAVENOUS; SUBCUTANEOUS at 11:47

## 2019-04-22 RX ADMIN — NYSTATIN 500000 UNITS: 500000 SUSPENSION ORAL at 12:45

## 2019-04-22 RX ADMIN — Medication 10 ML: at 20:21

## 2019-04-22 RX ADMIN — OXYCODONE HYDROCHLORIDE 5 MG: 5 TABLET ORAL at 12:46

## 2019-04-22 RX ADMIN — METOPROLOL TARTRATE 25 MG: 25 TABLET ORAL at 20:38

## 2019-04-22 RX ADMIN — HEPARIN SODIUM 2000 UNITS: 1000 INJECTION INTRAVENOUS; SUBCUTANEOUS at 12:52

## 2019-04-22 RX ADMIN — OXYCODONE HYDROCHLORIDE 5 MG: 5 TABLET ORAL at 00:11

## 2019-04-22 ASSESSMENT — PAIN DESCRIPTION - DESCRIPTORS: DESCRIPTORS: ACHING;DISCOMFORT

## 2019-04-22 ASSESSMENT — PAIN DESCRIPTION - PAIN TYPE
TYPE: ACUTE PAIN

## 2019-04-22 ASSESSMENT — PAIN SCALES - GENERAL
PAINLEVEL_OUTOF10: 8
PAINLEVEL_OUTOF10: 0
PAINLEVEL_OUTOF10: 6
PAINLEVEL_OUTOF10: 3
PAINLEVEL_OUTOF10: 9
PAINLEVEL_OUTOF10: 8
PAINLEVEL_OUTOF10: 10
PAINLEVEL_OUTOF10: 9
PAINLEVEL_OUTOF10: 8
PAINLEVEL_OUTOF10: 6

## 2019-04-22 ASSESSMENT — PAIN DESCRIPTION - LOCATION
LOCATION: ABDOMEN

## 2019-04-22 ASSESSMENT — PAIN DESCRIPTION - ORIENTATION: ORIENTATION: LOWER

## 2019-04-22 NOTE — PROGRESS NOTES
Negative Pressure    NAME:  Eleanor Joya OF BIRTH:  1965  MEDICAL RECORD NUMBER:  [id number]  DATE:  4/5/2019     Applied Negative Pressure to intermittently stapled abdominal incision       04/22/19 1601   Negative Pressure Wound Therapy Abdomen Mid   Placement Date/Time: 04/12/19 1210   Location: Abdomen  Wound Location Orientation: Mid   Wound Type Surgical   Unit Type KCI VAC Ulta   Dressing Type Black foam   Number of pieces used 5   Cycle Continuous   Target Pressure (mmHg) 125   Canister changed? No   Dressing Changed Changed/New   Dressing Change Due 04/24/19   Incision 04/06/19 Abdomen   Date First Assessed/Time First Assessed: 04/06/19 1840   Location: Abdomen   Wound Image    Wound Assessment Clean;Red;Pink   Kevon-wound Assessment Dry; Intact   Closure Staples   Drainage Amount Small   Drainage Description Clear;Tan;Serosanguinous   Odor None   Dressing/Treatment Vacuum dressing   Dressing Changed Changed/New   Dressing Change Due 04/24/19      [x] Applied skin barrier prep to kevon-wound.  [x] Cut strips of plastic drape to picture frame wound so that kevon-wound is     covered with the drape.  [] If bridging dressing to less prominent site, cover any intact skin that will come in contact with the Negative Pressure Therapy sponge, gauze or channel drain with plastic drape. The sponge should never touch intact skin.  [x] Cut sponge, gauze or channel drain to size which will fit into the wound/ulcer bed without being forced.  [x] Be sure the sponge is large enough to hold the entire round plastic flange which is attached to the tubing. Never allow flange to be larger than the sponge or it will produce suction damaging intact skin.    Total number of individual pieces of foam used within the wound bed: 4  (4 plus bolster)   [] If bridging the dressing away from the primary site, be sure the bridge leads to a piece of sponge large enough to hold the entire flange without allowing any of the flange to overlap onto intact skin.  [x] Covered sponge, gauze or channel drain with plastic drape.  [x] Cut a hole in this plastic drape directly over the sponge the same size as the plastic drain tubing.  [x] Removed plastic liner from flange and apply it directly over the hole you cut.  [x] Removed the plastic cover from the flange.  [x] Attached the tubing to the wound/ulcer Negative Pressure Therapy and turn it on to be sure a vacuum is created and that there are no leaks.  [x] If air leaks occur, use plastic drape to patch them. [] Secured Negative Pressure Therapy dressing with ace wrap loosely if located on an extremity. Maintain tubing outside of ace wrap. Tubing must not exert pressure on intact skin. Applied per  Guidelines      Q ostomy pouching system changed with NPWT dressing change. Stark two piece flat cut to fit with a high volume pouch used. A ring barrier was applied to the cut edge. Pustula rash noted on peristomal skin. Antifungal powder applied and sealed into skin with liquid skin prep pad.        Electronically signed by Chuy Herrera, [address] on 4/22/2019 at 4:03 PM

## 2019-04-22 NOTE — PROGRESS NOTES
Dialysis Post Treatment Note  Patient tolerated treatment well. Denies complaints at time of discharge. Vitals:    04/22/19 1545   BP: (!) 101/59   Pulse: 102   Resp: 20   Temp: 98.2 °F (36.8 °C)   SpO2: 100%     Pre-Weight = 81  Post-weight = Weight: 176 lb 5.9 oz (80 kg)  Total Liters Processed = Total Liters Processed (l/min): 85.2 l/min  Rinseback Volume (mL) = Rinseback Volume (ml): 340 ml  Net Removal (mL) = 2570  Length of treatment=210    Pt received 1 unit of blod during tx, no reactions noted.

## 2019-04-22 NOTE — PROGRESS NOTES
Nephrology Progress Note        Subjective: patient evaluated on dialysis. Pt is stable on dialysis. Access working without problems. No new issues overnite. Stable hemodynamics. Patient is on long-term anticoagulation per heme-onc .  her ileostomy is working fine. The patient Denied any nausea, vomiting or fever. She still has a little bit of edema in the lower extremities. Patient tells me she has noticed a little increase in her urine volumes. todays creat is essentially unchanged from yesterday     Her hemoglobin is only at 6.9 today. She may need transfused on dialysis     Objective:  CURRENT TEMPERATURE:  Temp: 98.4 °F (36.9 °C)  MAXIMUM TEMPERATURE OVER 24HRS:  Temp (24hrs), Av.4 °F (36.9 °C), Min:98 °F (36.7 °C), Max:98.6 °F (37 °C)    CURRENT RESPIRATORY RATE:  Resp: 16  CURRENT PULSE:  Pulse: 100  CURRENT BLOOD PRESSURE:  BP: 102/61  24HR BLOOD PRESSURE RANGE:  Systolic (59LQO), SBX:222 , Min:102 , GMX:505   ; Diastolic (52GWN), DYA:24, Min:61, Max:73    24HR INTAKE/OUTPUT:      Intake/Output Summary (Last 24 hours) at 2019 1011  Last data filed at 2019 0526  Gross per 24 hour   Intake 1930 ml   Output 2750 ml   Net -820 ml     Patient Vitals for the past 96 hrs (Last 3 readings):   Weight   19 0810 178 lb 9.2 oz (81 kg)   19 0525 192 lb 7.4 oz (87.3 kg)   19 0645 197 lb 8.5 oz (89.6 kg)         Physical Exam:  General appearance:Awake, alert, in no acute distress  Skin: warm and dry, no rash or erythema  Eyes: conjunctivae normal and sclera anicteric  ENT:no thrush no pharyngeal congestion   Neck:  No JVD, No Thyromegaly  Pulmonary: clear to auscultation and no wheezing or rhonchi   Cardiovascular: normal S1 and S2. NO rubs and NO murmur. No S3 OR S4   Abdomen: soft , ostomy noted .   bowel sounds present,    Extremities: no cyanosis, clubbing + edema     Access:  previous permacath    Current Medications:      midodrine (PROAMATINE) tablet 10 mg PRN   metoprolol tartrate (LOPRESSOR) tablet 25 mg BID   warfarin (COUMADIN) daily dosing (placeholder) RX Placeholder   warfarin (COUMADIN) tablet 5 mg Daily   0.9 % sodium chloride bolus PRN   0.9 % sodium chloride bolus PRN   heparin (porcine) injection 1,600 Units PRN   heparin (porcine) injection 1,600 Units PRN   heparin 25,000 units in dextrose 5% 250 mL infusion Continuous   nicotine (NICODERM CQ) 21 MG/24HR 1 patch Daily   0.9 % sodium chloride bolus PRN   0.9 % sodium chloride bolus PRN   nystatin (MYCOSTATIN) 695582 UNIT/ML suspension 500,000 Units 4x Daily   acetaminophen (TYLENOL) tablet 650 mg Q4H PRN   glucose (GLUTOSE) 40 % oral gel 15 g PRN   dextrose 50 % solution 12.5 g PRN   glucagon (rDNA) injection 1 mg PRN   dextrose 5 % solution PRN   aspirin chewable tablet 81 mg Daily   heparin (porcine) injection 4,000 Units PRN   heparin (porcine) injection 2,000 Units PRN   oxyCODONE (ROXICODONE) immediate release tablet 5 mg Q4H PRN   cyclobenzaprine (FLEXERIL) tablet 5 mg TID   morphine injection 4 mg Q3H PRN   Or    morphine injection 6 mg Q3H PRN   0.9 % sodium chloride bolus PRN   0.9 % sodium chloride bolus PRN   pantoprazole (PROTONIX) tablet 40 mg BID AC   dextrose 50 % solution 25 g PRN   sodium chloride flush 0.9 % injection 10 mL 2 times per day   sodium chloride flush 0.9 % injection 10 mL PRN   magnesium hydroxide (MILK OF MAGNESIA) 400 MG/5ML suspension 30 mL Daily PRN   ondansetron (ZOFRAN) injection 4 mg Q6H PRN     Labs:   CBC: Recent Labs     04/20/19  1044 04/21/19  0338 04/22/19  0552   WBC 23.6* 21.4* 18.5*   RBC 2.41* 2.46* 2.37*   HGB 7.3* 7.2* 6.9*   HCT 22.9* 23.3* 22.1*   * 652* 774*   MPV 10.6 10.2 10.2      BMP: Recent Labs     04/20/19  1044 04/21/19  0338 04/22/19  0552   * 133* 133*   K 3.4* 3.7 3.6*   CL 96* 95* 94*   CO2 22 23 23   BUN 21* 25* 32*   CREATININE 2.99* 3.56* 3.56*   GLUCOSE 111* 76 103*   CALCIUM 7.5* 7.5* 7.6*      Albumin:   Recent Labs     04/22/19  0552 LABALBU 2.2*       Dialysis bath: Dialysis Bath  K+ (Potassium): 3  Ca+ (Calcium): 3  Na+ (Sodium): 138  HCO3 (Bicarb): 35      Assessment: 1. Acute Kidney Injury: Secondary to ischemic ATN dialysis dependent , urine output fair and per pt may be increasing in volume . clearances continue to lag behind  2.  CK D stage III secondary to nephrosclerosis baseline 1.5-1.7  3.  Ischemic bowel status post resection, has an ostomy   4.  H. influenzae bacteremia resolved  5.  Anemia of chronic disease and blood loss    Plan:  1. Wt removal and dialysis orders reviewed. 2. May need a blood transfusion. Will have primary address this. 3. Cont pt on aranesp and zemplar per protocol. 4. Follow up labs ordered. 5.  looking at out pt spot for her. Please do not hesitate to call with questions.     Electronically signed by Kirti Zaragoza MD on 4/22/2019 at 10:11 AM

## 2019-04-22 NOTE — PROGRESS NOTES
Physical Therapy  Facility/Department: [address] STEPDOWN  Daily Treatment Note  NAME: Alanna Buck  : 1965  MRN: [id number]    Date of Service: 2019    Discharge Recommendations:  Further therapy recommended at discharge. Patient Diagnosis(es): The primary encounter diagnosis was Acute renal failure, unspecified acute renal failure type ([facility] A diagnosis of Altered mental status, unspecified altered mental status type was also pertinent to this visit. has a past medical history of Asthma, Back pain, chronic, Hypertension, Snores, and Wears glasses. has a past surgical history that includes Tonsillectomy; Knee arthroscopy (Left, ); Ulnar tunnel release (Right, ); Knee arthroscopy (Left, 2016); LAPAROTOMY EXPLORATORY (N/A, 2019); and LAPAROTOMY EXPLORATORY (N/A, 2019). Restrictions  Restrictions/Precautions  Restrictions/Precautions: Fall Risk, Contact Precautions  Required Braces or Orthoses?: No  Position Activity Restriction  Other position/activity restrictions: up with assist. s/p  ILEOCECECTOMY, SUBTOTAL COLECTOMY ; s/p 2ND LOOK EXPLORATORY LAPAROTOMY with ileostomy creation     Subjective   General  Response To Previous Treatment: Patient with no complaints from previous session. Family / Caregiver Present: No  Subjective  Subjective: RN and pt agreeable to PT this date. Pt w/ complaints nausea upon therapist arrival and recieved Zofran from RN. Pain Screening  Patient Currently in Pain: Yes  Pain Assessment  Pain Assessment: 0-10  Pain Level: 9  Pain Type: Acute pain  Pain Location: Abdomen  Pain Orientation: Lower  Pain Descriptors: Aching;Discomfort  Non-Pharmaceutical Pain Intervention(s): Ambulation/Increased Activity; Distraction; Therapeutic presence  Vital Signs  Patient Currently in Pain: Yes       Orientation  Orientation  Overall Orientation Status: Within Functional Limits     Objective   Bed mobility  Rolling to Left: Minimal assistance  Supine to Sit: Minimal assistance  Scooting: Stand by assistance  Comment: HOB raised, bed rails and therapist assistance required for bed mobility and transfers. Increased time necessary to manage lines prior to bed mobility. Pt sat EOB~5min d/t increased pain and fatigue. Transfers  Sit to Stand: Minimal Assistance  Stand to sit: Contact guard assistance  Comment: Pt stood x2 w/x2~3min rest breaks required in between d/t cramping in calves and decreased endurance. Ambulation  Ambulation?: No     Balance  Posture: Good  Sitting - Static: Good  Sitting - Dynamic: Good  Standing - Static: Fair  Comments: Balance assessed standing w/RW and sitting EOB. Pt able to sit EOB ~5min w/no LOB. Exercises  Supine Exercises: Ankle Pumps, Gluteal sets, Hip ABD/ADD, Hip IR/ER, SLR. Reps: x15  Comments: Further exercise deferred d/t increased pt fatigue. Assessment   Body structures, Functions, Activity limitations: Decreased functional mobility ; Decreased strength;Decreased balance  Assessment: Pt able to stand ~30 sec x2 w/RW but unable to ambulate d/t \"cramping in calf,\" per pt. Increased time necessary for transfers and bed mobility for line management and increased pt pain level, and nausea from anesthesia this date. Pt unable to attempt sitting EOB until Zofran was administered by RN. Supine exercise performed well w/good return demo. Prognosis: Good  Patient Education: Increased ambulation as able. REQUIRES PT FOLLOW UP: Yes  Activity Tolerance  Activity Tolerance: Patient limited by endurance; Patient limited by fatigue      Goals  Short term goals  Time Frame for Short term goals: 14 visits  Short term goal 1: Pt will be Betty with bed mobility  Short term goal 2: Pt will be Betty transfers  Short term goal 3: Pt will be SBA amb 125' RW   Short term goal 4: Pt will be safe to attempt steps    Plan    Plan  Times per week: 5-6x/wk  Current Treatment Recommendations: Strengthening, Balance Training, Functional Mobility Training, Transfer Training, Endurance Training, Cognitive Reorientation, Gait Training, Stair training, Home Exercise Program, Safety Education & Training, Equipment Evaluation, Education, & procurement, Patient/Caregiver Education & Training  Safety Devices  Type of devices: Call light within reach, Gait belt, Left in chair, Nurse notified  Restraints  Initially in place: No     Therapy Time   Individual Concurrent Group Co-treatment   Time In 1332         Time Out [place], [place] Treatment performed by Student PTA under the supervision of co-signing PTA who agrees with all treatment and documentation.    Betty Schaffer, PTA

## 2019-04-22 NOTE — PROGRESS NOTES
Pharmacy Note  Warfarin Consult follow-up      Recent Labs     04/22/19  0631   INR 1.6     Recent Labs     04/20/19  1044 04/21/19  0338 04/22/19  0552   HGB 7.3* 7.2* 6.9*   HCT 22.9* 23.3* 22.1*   * 652* 774*       Significant Drug-Drug Interactions:  New warfarin drug-drug interactions: none  Discontinued drug-drug interactions: none  Current Drug-Drug Interactions: aspirin, heparin gtt     Date                 INR        Dose   4/20/2019         1.3            5 mg    4.21                  1.2            5 mg    4/22/19             1.6            5 mg    Notes:    Hemoglobin dropped to 6.9 today. Hematology paged. Ok to continue warfarin therapy. Daily PT/INR while inpatient. [address] [place] [address]  Ph., CACP, Clinical Pharmacist  Anticoagulation Services, [facility]  4/22/2019  10:06 AM

## 2019-04-22 NOTE — CARE COORDINATION
Transitional Planning    1280 Returned a call to patient's daughter Jarret Grant after leaving message at . Daughter requesting to come up tomorrow and have POA notarized. Clarified with her that it is a financial POA and not healthcare POA. Explained that the hospital cannot notarize financial documents and that she could arrange to have their own notary to come in and take care of that paperwork as long as patient is alert and oriented. Patient updated on above and awaiting insurance approval for dialysis seat and precert for [facility]. Need PT OT notes  Katiuska Clemons at [facility] intake awaiting therapy notes.

## 2019-04-22 NOTE — PROGRESS NOTES
[facility]  Internal Medicine Residency Program  Inpatient Daily Progress Note  ______________________________________________________________________________    Patient: Dee Dee Eagle  YOB: 1965   MRN: [id number]    Acct: [de-identified]     Admit date: 4/5/2019  Today's date: 04/22/19  Number of days in the hospital: 17       Admitting Diagnosis: Septic shock ([address]    Subjective:   Patient seen and examined at bedside. Alert and oriented. Vitals are stable. Myoglobin dropped to 6.9 this morning. To receive 1 unit of PRBC. Patient done for dialysis. No obvious site of active bleeding. WBC trending down. Objective:   Vital Sign:  /65   Pulse 109   Temp 98.4 °F (36.9 °C)   Resp 16   Ht 5' 1.02\" (1.55 m)   Wt 178 lb 9.2 oz (81 kg)   SpO2 100%   BMI 33.71 kg/m²       Physical Exam:  General appearance:   alert, well appearing, and in no distress  Mental Status: alert, oriented to person, place, and time  Neurologic:  alert, oriented, normal speech, no focal findings or movement disorder noted  Lungs:  clear to auscultation, no wheezes, rales or rhonchi, symmetric air entry  Heart[de-identified] normal rate, regular rhythm, normal S1, S2, no murmurs, rubs, clicks or gallops  Abdomen:  soft, nontender, nondistended, no masses or organomegaly.  Stoma in place on the right  Extremities: peripheral pulses normal, no pedal edema, no clubbing or cyanosis   Skin: normal coloration and turgor, no rashes, no suspicious skin lesions noted    Medications:  Scheduled Medications   metoprolol tartrate  25 mg Oral BID    warfarin (COUMADIN) daily dosing (placeholder)   Other RX Placeholder    warfarin  5 mg Oral Daily    nicotine  1 patch Transdermal Daily    nystatin  5 mL Oral 4x Daily    aspirin  81 mg Oral Daily    cyclobenzaprine  5 mg Oral TID    pantoprazole  40 mg Oral BID AC    sodium chloride flush  10 mL Intravenous 2 times per day       PRN Medications    midodrine 10 mg PRN   sodium chloride 250 mL PRN   sodium chloride 150 mL PRN   heparin (porcine) 1,600 Units PRN   heparin (porcine) 1,600 Units PRN   sodium chloride 250 mL PRN   sodium chloride 150 mL PRN   acetaminophen 650 mg Q4H PRN   glucose 15 g PRN   dextrose 12.5 g PRN   glucagon (rDNA) 1 mg PRN   dextrose 100 mL/hr PRN   heparin (porcine) 4,000 Units PRN   heparin (porcine) 2,000 Units PRN   oxyCODONE 5 mg Q4H PRN   morphine 4 mg Q3H PRN   Or     morphine 6 mg Q3H PRN   sodium chloride 250 mL PRN   sodium chloride 150 mL PRN   dextrose 25 g PRN   sodium chloride flush 10 mL PRN   magnesium hydroxide 30 mL Daily PRN   ondansetron 4 mg Q6H PRN       Diagnostic Labs and Imaging:  CBC:  Recent Labs     04/20/19  1044 04/21/19  0338 04/22/19  0552   WBC 23.6* 21.4* 18.5*   HGB 7.3* 7.2* 6.9*   * 652* 774*     BMP:   Recent Labs     04/20/19  1044 04/21/19  0338 04/22/19  0552   * 133* 133*   K 3.4* 3.7 3.6*   CL 96* 95* 94*   CO2 22 23 23   BUN 21* 25* 32*   CREATININE 2.99* 3.56* 3.56*   GLUCOSE 111* 76 103*     Hepatic:   Recent Labs     04/22/19  0552   AST 15   ALT 18   BILITOT 0.40   ALKPHOS 117*       Assessment and Plan:     Principal Problem:    Septic shock ([address]  Active Problems:    Shock ([address]    Rhabdomyolysis    Hyponatremia    Lactic acidosis    MARY (acute kidney injury) ([facility] [address]    Metabolic acidosis    Acute respiratory failure (HCC)    Elevated liver enzymes    Hyperkalemia    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HCC)    Small bowel ischemia (HCC)    Acute GI bleeding    Gram negative septic shock (HCC)    Protein-calorie malnutrition (HCC)    Gastroenteritis    Haemophilus influenzae septicemia (HCC)    Pleural effusion, bilateral    Splenic infarction    Leukemoid reaction    Mottled skin    Acute renal failure (HCC)    Altered mental status    PMB (postmenopausal bleeding)  Resolved Problems:    * No resolved hospital problems. *    · Hb 6.9 today.   No active sites of bleeding noted. To receive 1 unit of PRBC. Recheck hemoglobin. · Levofloxacin discontinued.  Patient completed a course of meropenem. Continue to monitor patient off antibiotics  · Transvaginal ultrasound was normal.  ObGyn plans to follow the patient outpatient. · Continue to monitor Hb and Platelets. CBC daily  · Patient stable from cardiology stand point. · Continue Midodrine for low BP if needed  · PT/OT  · Pain control with Morphine and Roxicodone PRN  · Continue aspirin and heparin drip. · Patient on Coumadin 5 mg daily with pharmacy to dose. Patient will be discharged on Coumadin. · Hypercoagulable workup negative. · WBC is trending down.  Blood cultures have been negative so far. · Started nystatin for oral thrush.    · Continue renal diet\  · Patient filed a complaint to the police about the rate attempt before admission.     Zane Guo MD      Department of Internal Medicine  [facility]

## 2019-04-22 NOTE — PROGRESS NOTES
Patient Name: Desi Boateng  Date of admission: 4/5/2019  8:39 PM  Patient's age: 47 y. o., 1965  [facility]: [facility] [R57.9]      Requesting Physician: Genesis Arenas MD    CHIEF COMPLAINT:  Abdominal pain  SUBJECTIVE:  . The patient was seen and examined. Continues to have abdominal pain. No active bleeding. Overall she is improving. No fever or chills. She will need long-term anticoagulation,   Started on Coumadin. Tolerated well so far. BRIEF CASE HISTORY:    The patient is a 47 y.o.  female who is admitted to the hospital for   for increased confusion and abdominal pain. She was found to have ischemic bowel and was taken to surgery on 4/6/18. Pathology showed ischemic bowel going to the margins. She was taken for a second surgery on 4/8/18 and more ischemic bowel was appreciated. The patient platelets were about 300,000 on admission and dropped about 98 with her multiple surgeries. Hit antibody was done and was negative. Platelets recovered since then. The patient continues to struggle with recovery. She underwent a CT scan of the abdomen and pelvis today that showed heterogenous changes in the spleen that the new. There was a suspicion of splenic infarction. Vascular were involved and started the patient in aspirin and Plavix as well as heparin. The patient is seen and examined. She is started on anticoagulation. She denies any active bleeding. She has very little insight about her disease and most of the information were obtained from the chart. It is no history of thromboembolism previously . family history is negative for arterial or venous thrombosis. The patient developed worsening renal failure and was started on hemodialysis. Past Medical History:   has a past medical history of Asthma, Back pain, chronic, Hypertension, Snores, and Wears glasses.     Past Surgical History:   has a past surgical history that includes Tonsillectomy; Knee arthroscopy (Left, 1980); Ulnar tunnel release (Right, 2013); Knee arthroscopy (Left, 09/28/2016); LAPAROTOMY EXPLORATORY (N/A, 4/6/2019); and LAPAROTOMY EXPLORATORY (N/A, 4/8/2019). Family History: family history includes Diabetes in her maternal grandfather and maternal grandmother; Emphysema in her maternal grandfather; Heart Disease in her mother; Heart Surgery in her mother; Manual Nickels in her maternal uncle; Stroke in her mother. Social History:   reports that she has been smoking cigarettes. She started smoking about 38 years ago. She has been smoking about 0.25 packs per day. She has never used smokeless tobacco. She reports that she drinks alcohol. She reports that she does not use drugs. Medications:    Reviewed in EPIC     Allergies:  Pcn [penicillins]; Sulfa antibiotics; and Tape [adhesive tape]    REVIEW OF SYSTEMS:      Review of Systems  General: no fever or night sweats, Weight is stable. Progressive fatigue  ENT: No double or blurred vision, no tinnitus or hearing problem, no dysphagia or sore throat   Respiratory: No chest pain, no shortness of breath, no cough or hemoptysis. Cardiovascular: Denies chest pain, PND or orthopnea. No L E swelling or palpitations. Gastrointestinal:    Abdominal discomfort, wound VAC in place, no bleeding. Genitourinary: Denies dysuria, hematuria, frequency, urgency or incontinence. Neurological: Denies headaches, decreased LOC, no sensory or motor focal deficits. Musculoskeletal:  No arthralgia no back pain or joint swelling. Skin: There are no rashes or bleeding. Psychiatric:  No anxiety, no depression. Endocrine: no diabetes or thyroid disease. Hematologic: no bleeding , no adenopathy.                 PHYSICAL EXAM:      BP (!) 101/59   Pulse 102   Temp 98.2 °F (36.8 °C) (Oral)   Resp 20   Ht 5' 1.02\" (1.55 m)   Wt 176 lb 5.9 oz (80 kg)   SpO2 100%   BMI 04/07/2019    Gram negative septic shock (HCC) [A41.50, R65.21]     Rhabdomyolysis [M62.82] 04/06/2019    Hyponatremia [E87.1] 04/06/2019    Lactic acidosis [E87.2] 04/06/2019    MARY (acute kidney injury) ([facility] [N17.9] 14/58/9127    Metabolic acidosis [P88.3] 04/06/2019    Acute respiratory failure ([facility] [J96.00] 04/06/2019    Elevated liver enzymes [R74.8] 04/06/2019    Hyperkalemia [E87.5]     Shock ([facility] [R57.9] 04/05/2019       RECOMMENDATIONS:  The patient is doing well clinically stable. Slight improvement. .   Continue heparin and aspirin  Started Coumadin. Target INR between 2 and 3. She will need long-term use of anticoagulation. Patient is aware. Leukocytosis , cultures are negative   Anemia. Blood transfusion today during dialysis.                                 [address] [place] [address] [facility]                          Cell: [phone]

## 2019-04-22 NOTE — PROGRESS NOTES
[facility] - Progress Note    Today's Date and Time: 4/22/2019, 9:33 AM    Impression :   1. 46 y/o female with initial complaints of:  · Vomiting  · Diarrhea  · Abdominal pain  · Altered mental status  2. Acute kidney injury- on HD   3. Shock, presumptive septic plus hypovolemic, requiring Levophed- Resolved  4. Septicemia with Haemophilus influenzae 4-5-19--Resolved  5. Gastroenteritis--Resolved  6. Ischemic bowel. S/P laparotomy 4-6-19 with resection  7. S/p second look with resection of transverse colon, ileostomy creation, resection of rectal stump and abdominal closure. 8. Severe hyponatremia--Resolved   9. Transaminitis, shock liver.-Resolved  10. COPD  6. Arthritis  12. Chronic NSAID use  13. Funguria  14. Acral mottling of hands and feet  15. Allergy to penicillins: Hives and respiratory distress  16. Allergy to sulfa   17. S/P New tunnel HD catheter placement 4/16  18. EDWARD with no vegetations    Recommendations:   · Monitor off antibiotics (Completed Meropenem. Stop date 4-14-19)  · Consider telepsych treatment  · Continue treatment for oral thrush  · Trend hemoglobin    Medical Decision Making/Summary/Discussion:   · 46 y/o female with vomiting, diarrhea, and AMS. · Found to have gastroenteritis with dehydration, shock requiring pressors, transaminitis, MARY requiring emergent dialysis, lactic acidosis. · Intubated due to AMS  · CT chest shows atelactasis vs pneumonia  · Initially treated with vanc, levaquin, and aztreonam.   · Patient was felt to be critically ill with origin in GI tract . Was treated with meropenem despite Hx of penicillin allergy. · Had laparotomy 4-6-19 with findings of ischemic bowel from distal ileum to sigmoid colon. · S/P ileocecectomy with extended left hemicolectomy and placement of wound vac 4-6-19  · Overall improvement post surgery  · Off pressors and sedation  · Blood Culture grew Haemophilus influenza, beta lactamase negative. THis finding is likely unrelated to intraabdominal process. · Treated with Meropenem through 4/14  · Has maintained leukocytosis and occasional fever. · Has some vaginal bleeding. Gyn evaluating  · Overall clinical improvement. Infection Control Recommendations   · [place] Precautions    Antimicrobial Stewardship Recommendations     · Discontinuation of therapy    Coordination of Outpatient Care:   · Estimated Length of IV antimicrobials: 4/14  · Patient will need Midline Catheter Insertion: No  · Patient will need PICC line Insertion: No  · Patient will need: Home IV , [place],  SNF,  LTAC TBD  · Patient will need outpatient wound care: TBD    Chief complaint/reason for consultation:   · Altered mental status and tender abdomen       History of Present Illness:   Tomas Camacho is a 47y.o.-year-old  female who was initially admitted on 4/5/2019. Patient seen at the request of Dr. Holly Clark:    Patient presented to ED via EMS due to altered mental status and vomiting. Patient has history significant for COPD, right knee osteoarthirtis s/p arthoplasty, and chronic NSAID use. Patient lives in [place], but was here to visit her significant other. Arrived Wednesday night, said she didn't feel good. Thought she might have had a bad burger at a fast food restaurant. Went to sleep and slept for almost 24 hours. When she awoke, she said she was vomiting, having diarrhea, and abdominal pain. Significant other and sister are unsure of the nature of the vomit, diarrhea, or abdominal pain. Then she slept a bit more, until her significant other found her unresponsive and that's when he called EMS to bring her to the hospital.     Afebrile on admission, but Tmax overnight was 39.3. Tachycardic on admission, 129. Became hypotensive after admission and levophed and vasopressin were started. Initial labs:     Admission labs significant for Na 125, K 8.0, CO2 9, BUN 62, Creatinine of 4.06, Anion gap of 23, Lactic acid of 3.5, Glucose of 186, Ca 5.2, POC HCO3 15.9, CK 15,342, Troponins high sensitivity 89 and 94, Alk phos 168, , AST 1,122, Bili .37, lipase of 119, Urine tox positive for THC and Cocaine, WBC 23.5, Hgb 10.2, Urine and blood cultures pending, HIV negative, influenza negative, hepatitis panel non-reactive, urine Leukocyte esterase trace, urine nitrite -, urine protein 2+, urine Hgb large, urine RBCs 5 to 10, many urine yeast,  ABG 7.22, pCO2 27, HCO3 15.9. Initial imaging showed:     CXR: No acute cardiopulmonary process    Abdominal X-ray 4/6: No free air    CTA of chest: Negative for PE or dissection. Patchy consolidations of bilateral lower lung lobes representing developing pneumonia vs atelectasis. CT head: pending    Initial course:     Intubated and sedated in ED due to altered mental status. Currently on Vancomycin, Levaquin, and aztreonam for empiric coverage. Richar catheter was placed due to request by nephrology for emergent dialysis. Dialysis attempted several times, but due to high arterial pressures and line clotting, dialysis to be completed later today by Dr. Corbin Calhoun. NG tube with bloody output. FOBT +. General surgery has been consulted for evaluation. CURRENT EVALUATION : 4/22/2019    Patient seen and examined. Feeling better today. Afebrile  VS stable, but tachycardia improving. Off midodrine for low blood pressure. Started on warfarin per heme/onc recommendations. INR 1.6 today. Transfused one unit for hemoglobin of 6.9. Exam revealed a yellow plaque on the center of the tongue consistent with thrush. PO nystatin started. Not improving yet. EDWARD revealed no thrombus or vegetation, EF 55%, moderate MR. YATES did pelvic ultrasound instead of TVUS because of patient anxiety. Study technically limited, but normal endometrial stripe. No plan to do endometrial biopsy now. They advise regular pap smear follow up.  Gonorrhea and chlamydia negative. Patient admits to polysubstance use before coming in this admission. Says she would like to stop, but is feeling very anxious about her current health status and stopping substance use. She states she would like to see telepsych for evaluation. Had episodes of a fib with RVR. Shocked twice to control rhythm and rate on 4-7-19. Stable since then. Abdomen softer. VAC in place  Skin cyanosis resolved. Labs reviewed: 4/22/2019     WBC 23.9-->33.8->41.7->38.3->39.6->34.5-->26.4 -> 25.7 -> 21.4 -> 18.5  Hb 8.3-->9.4->7.8 -> 7.3 -> 7.2 -> 6.9  Plat 75-->119->264 -> 428 -> 523-->652 -> 774    BUN 84-->62 -> 33 -> 34 -> 34-->25  Cr 5.89-->4.86 -> 3.68 -> 3.68 -> 4.11-->3.56    Cultures:  Urine:  · NGTD  Blood:  · 4-5-19 : 1/2 haemophilus influenza, beta lactamase negative, sensitive to pcn and meropenem. Pt completed a 10 day course of meropenem  · 4/12 x 1 no growth to date  · 4/13 x 2 no growth to date  · 4/16 NGTD  Wound:  · NA    MRSA- Neg  CXR 4-11-19 showed unchanged pulmonary edema and effusions. CXR 4-16-19 Rt side atelectasis     Duplex of the right upper extremity showed no evidence of deep venous thrombosis in the right upper extremity. Superficial thrombophlebitis of the right upper extremity involving the cephalic and basilic veins. Discussed with RN, patient. I have personally reviewed the past medical history, past surgical history, medications, social history, and family history, and I have updated the database accordingly.   Past Medical History:     Past Medical History:   Diagnosis Date    Asthma     On inhaler    Back pain, chronic     Hypertension 2015    On Lisinopril    Snores     possible apnea but not tested    Wears glasses        Past Surgical  History:     Past Surgical History:   Procedure Laterality Date    KNEE ARTHROSCOPY Left 1980    KNEE ARTHROSCOPY Left 09/28/2016    with medial menisectomy    LAPAROTOMY EXPLORATORY N/A 4/6/2019 LAPAROTOMY EXPLORATORY, ILEOCECECTOMY, SUBTOTAL COLECTOMY, ABTHEHRA WOUND VAC PLACEMENT performed by Nickey Duverney, MD at [facility] N/A 4/8/2019    2ND LOOK EXPLORATORY LAPAROTOMY, RESECTION TRANSVERSE COLON, ILEOSTOMY CREATION, RESECTION RECTAL STUMP, ABDOMINAL WASHOUT, OMENTECTOMY, ABDOMINAL WALL CLOSURE performed by Artem Wilson MD at [address] 2013       Medications:      metoprolol tartrate  25 mg Oral BID    warfarin (COUMADIN) daily dosing (placeholder)   Other RX Placeholder    warfarin  5 mg Oral Daily    nicotine  1 patch Transdermal Daily    nystatin  5 mL Oral 4x Daily    aspirin  81 mg Oral Daily    cyclobenzaprine  5 mg Oral TID    pantoprazole  40 mg Oral BID AC    sodium chloride flush  10 mL Intravenous 2 times per day       Social History:     Social History     Socioeconomic History    Marital status: Single     Spouse name: Not on file    Number of children: 0    Years of education: Not on file    Highest education level: Not on file   Occupational History    Occupation: [address] [place] [address] Ave Needs    Financial resource strain: Not on file    Food insecurity:     Worry: Not on file     Inability: Not on file    Transportation needs:     Medical: Not on file     Non-medical: Not on file   Tobacco Use    Smoking status: Light Tobacco Smoker     Packs/day: 0.25     Types: Cigarettes     Start date: 9/19/1980    Smokeless tobacco: Never Used   Substance and Sexual Activity    Alcohol use: Yes     Comment: OCCASSIONAL    Drug use: No    Sexual activity: Yes     Partners: Female   Lifestyle    Physical activity:     Days per week: Not on file     Minutes per session: Not on file    Stress: Not on file   Relationships    Social connections:     Talks on phone: Not on file     Gets together: Not on file     Attends [employer] service: Not on file     Active member of club or organization: Not on file     Attends meetings of clubs or organizations: Not on file     Relationship status: Not on file    Intimate partner violence:     Fear of current or ex partner: Not on file     Emotionally abused: Not on file     Physically abused: Not on file     Forced sexual activity: Not on file   Other Topics Concern    Not on file   Social History Narrative    Not on file       Family History:     Family History   Problem Relation Age of Onset    Heart Disease Mother     Stroke Mother     Heart Surgery Mother     Diabetes Maternal Grandmother     Emphysema Maternal Grandfather     Diabetes Maternal Grandfather     Lung Cancer Maternal Uncle         Allergies:   Pcn [penicillins]; Sulfa antibiotics; and Tape [adhesive tape]     Review of Systems:   Unable to provide. Sedated on ventilator. Physical Examination :     Patient Vitals for the past 8 hrs:   BP Temp Temp src Pulse Resp SpO2 Weight   04/22/19 0847 102/61 -- -- 100 -- -- --   04/22/19 0817 109/65 -- -- 96 -- -- --   04/22/19 0810 106/63 98.4 °F (36.9 °C) -- 96 16 -- 178 lb 9.2 oz (81 kg)   04/22/19 0525 -- -- -- -- -- -- 192 lb 7.4 oz (87.3 kg)   04/22/19 0415 115/72 98.6 °F (37 °C) Oral 93 18 100 % --     General Appearance: Sedated, on ventilator  Head:  Normocephalic, no trauma  Eyes: Pupils equal, round, reactive to light; sclera anicteric; conjunctivae pink. No embolic phenomena. ENT: Oropharynx clear, without erythema, exudate, or thrush. No tenderness of sinuses. Mouth/throat: mucosa pink and moist. No lesions. Dentition in good repair. Neck:Supple, without lymphadenopathy. Thyroid normal, No bruits. Pulmonary/Chest: Clear to auscultation, without wheezes, rales, or rhonchi. No dullness to percussion. Cardiovascular: Regular rate and rhythm without murmurs, rubs, or gallops. Abdomen: Distended. Bowel sounds absent. Soft, mildly tender, surgical bandages in place. Wound vac removed.    All four Extremities: Cyanosis of trunk, abdomen, distal extremities  Neurologic: Sedated  Skin: Cool and dry with poor turgor. Signs of peripheral arterial  insufficiency. No ulcerations. No open wounds. Skin purplish, cyanotic. Medical Decision Making -Laboratory:   I have independently reviewed/ordered the following labs:    CBC with Differential:   Recent Labs     04/21/19 0338 04/22/19  0552   WBC 21.4* 18.5*   HGB 7.2* 6.9*   HCT 23.3* 22.1*   * 774*   LYMPHOPCT 17* 12*   MONOPCT 7 8*     BMP:   Recent Labs     04/21/19 0338 04/22/19  0552   * 133*   K 3.7 3.6*   CL 95* 94*   CO2 23 23   BUN 25* 32*   CREATININE 3.56* 3.56*     Hepatic Function Panel:   Recent Labs     04/22/19  0552   PROT 5.2*   LABALBU 2.2*   BILIDIR 0.23   IBILI 0.17   BILITOT 0.40   ALKPHOS 117*   ALT 18   AST 15     No results for input(s): RPR in the last 72 hours. No results for input(s): HIV in the last 72 hours. No results for input(s): BC in the last 72 hours. Lab Results   Component Value Date    MUCUS NOT REPORTED 04/06/2019    RBC 2.37 04/22/2019    TRICHOMONAS NOT REPORTED 04/06/2019    WBC 18.5 04/22/2019    YEAST NOT REPORTED 04/06/2019    TURBIDITY TURBID 04/06/2019     Lab Results   Component Value Date    CREATININE 3.56 04/22/2019    GLUCOSE 103 04/22/2019       Medical Decision Making-Imaging:     Abdominal xray:    Gas in mildly distended loops of large and small bowel likely reflecting an   ileus.       NG tube tip in the gastric fundus with the proximal side-port in the distal   thoracic esophagus, repositioning recommended.       Airspace disease left lung base.      CT scan chest:    Impression   Satisfactory position endotracheal tube.       Nasogastric tube needs advanced 10 cm.       Patchy perihilar opacities and bibasilar airspace disease.  Follow-up may be   beneficial following medical treatment course to document complete resolution.           EXAMINATION:   CTA OF THE ABDOMEN AND PELVIS WITH CONTRAST       4/5/2019 9:23 pm:     TECHNIQUE:   CTA of the abdomen and pelvis was performed with the administration of   intravenous contrast. Multiplanar reformatted images are provided for review. MIP images are provided for review. Dose modulation, iterative   reconstruction, and/or weight based adjustment of the mA/kV was utilized to   reduce the radiation dose to as low as reasonably achievable.       COMPARISON:   None.       HISTORY:   ORDERING SYSTEM PROVIDED HISTORY: suspected dissection of abdominal aorta       FINDINGS:       CTA ABDOMEN:       Bibasilar airspace disease.  Liver, spleen, pancreas, gallbladder normal.   Bilateral adrenal masses measuring 11 mm on the left and 18 x 28 mm on the   left.  Bilateral ill-defined hypodensities throughout the kidneys.  The small   bowel is somewhat distended with multiple air-fluid levels.  Multiple   air-fluid levels are also noted throughout the colon.  The stomach appears   normal.  Retroaortic left renal vein.       Bones normal.       Aorta appears normal without dissection.  The celiac artery and SMA appear   normal.  The renal arteries appear normal.           CTA PELVIS:       Bladder decompressed.  Uterus normal.  Catheter right groin terminates in the   common iliac vein.           Impression   Ileus.  No evidence of aortic dissection.  The bilateral adrenal masses, left   greater than right.  Recommend follow-up adrenal MRI non emergently.           Medical Decision Making-Other: Thank you for allowing us to participate in the care of this patient. Please call with questions.     Stefany Desai MD.      Pager: [phone] - Office: [phone]

## 2019-04-23 LAB
ABO/RH: NORMAL
ABSOLUTE EOS #: 0.2 K/UL (ref 0–0.4)
ABSOLUTE IMMATURE GRANULOCYTE: 0.2 K/UL (ref 0–0.3)
ABSOLUTE LYMPH #: 4.18 K/UL (ref 1–4.8)
ABSOLUTE MONO #: 1.39 K/UL (ref 0.1–0.8)
ANION GAP SERPL CALCULATED.3IONS-SCNC: 16 MMOL/L (ref 9–17)
ANTIBODY SCREEN: NEGATIVE
ARM BAND NUMBER: NORMAL
BASOPHILS # BLD: 0 % (ref 0–2)
BASOPHILS ABSOLUTE: 0 K/UL (ref 0–0.2)
BLD PROD TYP BPU: NORMAL
BUN BLDV-MCNC: 15 MG/DL (ref 6–20)
BUN/CREAT BLD: ABNORMAL (ref 9–20)
CALCIUM SERPL-MCNC: 8.3 MG/DL (ref 8.6–10.4)
CHLORIDE BLD-SCNC: 94 MMOL/L (ref 98–107)
CO2: 24 MMOL/L (ref 20–31)
CREAT SERPL-MCNC: 2.63 MG/DL (ref 0.5–0.9)
CROSSMATCH RESULT: NORMAL
DIFFERENTIAL TYPE: ABNORMAL
DISPENSE STATUS BLOOD BANK: NORMAL
EOSINOPHILS RELATIVE PERCENT: 1 % (ref 1–4)
EXPIRATION DATE: NORMAL
GFR AFRICAN AMERICAN: 23 ML/MIN
GFR NON-AFRICAN AMERICAN: 19 ML/MIN
GFR SERPL CREATININE-BSD FRML MDRD: ABNORMAL ML/MIN/{1.73_M2}
GFR SERPL CREATININE-BSD FRML MDRD: ABNORMAL ML/MIN/{1.73_M2}
GLUCOSE BLD-MCNC: 105 MG/DL (ref 70–99)
GLUCOSE BLD-MCNC: 95 MG/DL (ref 65–105)
HCT VFR BLD CALC: 26.9 % (ref 36.3–47.1)
HEMOGLOBIN: 8.4 G/DL (ref 11.9–15.1)
IMMATURE GRANULOCYTES: 1 %
INR BLD: 2.7
LYMPHOCYTES # BLD: 21 % (ref 24–44)
MCH RBC QN AUTO: 29.4 PG (ref 25.2–33.5)
MCHC RBC AUTO-ENTMCNC: 31.2 G/DL (ref 28.4–34.8)
MCV RBC AUTO: 94.1 FL (ref 82.6–102.9)
MONOCYTES # BLD: 7 % (ref 1–7)
MORPHOLOGY: ABNORMAL
MORPHOLOGY: ABNORMAL
NRBC AUTOMATED: 0 PER 100 WBC
PARTIAL THROMBOPLASTIN TIME: 72.5 SEC (ref 20.5–30.5)
PDW BLD-RTO: 15.5 % (ref 11.8–14.4)
PLATELET # BLD: 784 K/UL (ref 138–453)
PLATELET ESTIMATE: ABNORMAL
PMV BLD AUTO: 9.8 FL (ref 8.1–13.5)
POTASSIUM SERPL-SCNC: 3.9 MMOL/L (ref 3.7–5.3)
PROTHROMBIN TIME: 26.6 SEC (ref 9–12)
RBC # BLD: 2.86 M/UL (ref 3.95–5.11)
RBC # BLD: ABNORMAL 10*6/UL
SEG NEUTROPHILS: 70 % (ref 36–66)
SEGMENTED NEUTROPHILS ABSOLUTE COUNT: 13.93 K/UL (ref 1.8–7.7)
SODIUM BLD-SCNC: 134 MMOL/L (ref 135–144)
TRANSFUSION STATUS: NORMAL
UNIT DIVISION: 0
UNIT NUMBER: NORMAL
WBC # BLD: 19.9 K/UL (ref 3.5–11.3)
WBC # BLD: ABNORMAL 10*3/UL

## 2019-04-23 PROCEDURE — 6360000002 HC RX W HCPCS: Performed by: STUDENT IN AN ORGANIZED HEALTH CARE EDUCATION/TRAINING PROGRAM

## 2019-04-23 PROCEDURE — 36415 COLL VENOUS BLD VENIPUNCTURE: CPT

## 2019-04-23 PROCEDURE — 76937 US GUIDE VASCULAR ACCESS: CPT

## 2019-04-23 PROCEDURE — 6370000000 HC RX 637 (ALT 250 FOR IP): Performed by: STUDENT IN AN ORGANIZED HEALTH CARE EDUCATION/TRAINING PROGRAM

## 2019-04-23 PROCEDURE — 80048 BASIC METABOLIC PNL TOTAL CA: CPT

## 2019-04-23 PROCEDURE — 99232 SBSQ HOSP IP/OBS MODERATE 35: CPT | Performed by: INTERNAL MEDICINE

## 2019-04-23 PROCEDURE — 2580000003 HC RX 258: Performed by: STUDENT IN AN ORGANIZED HEALTH CARE EDUCATION/TRAINING PROGRAM

## 2019-04-23 PROCEDURE — 85025 COMPLETE CBC W/AUTO DIFF WBC: CPT

## 2019-04-23 PROCEDURE — 85730 THROMBOPLASTIN TIME PARTIAL: CPT

## 2019-04-23 PROCEDURE — 97535 SELF CARE MNGMENT TRAINING: CPT

## 2019-04-23 PROCEDURE — 82947 ASSAY GLUCOSE BLOOD QUANT: CPT

## 2019-04-23 PROCEDURE — 2060000000 HC ICU INTERMEDIATE R&B

## 2019-04-23 PROCEDURE — 6370000000 HC RX 637 (ALT 250 FOR IP): Performed by: INTERNAL MEDICINE

## 2019-04-23 PROCEDURE — 85610 PROTHROMBIN TIME: CPT

## 2019-04-23 PROCEDURE — 97530 THERAPEUTIC ACTIVITIES: CPT

## 2019-04-23 PROCEDURE — 94760 N-INVAS EAR/PLS OXIMETRY 1: CPT

## 2019-04-23 RX ORDER — MIDODRINE HYDROCHLORIDE 5 MG/1
10 TABLET ORAL
Status: DISCONTINUED | OUTPATIENT
Start: 2019-04-23 | End: 2019-04-27

## 2019-04-23 RX ORDER — MIDODRINE HYDROCHLORIDE 5 MG/1
5 TABLET ORAL
Status: DISCONTINUED | OUTPATIENT
Start: 2019-04-23 | End: 2019-04-23

## 2019-04-23 RX ADMIN — HEPARIN SODIUM AND DEXTROSE 25 UNITS/KG/HR: 10000; 5 INJECTION INTRAVENOUS at 06:33

## 2019-04-23 RX ADMIN — ASPIRIN 81 MG: 81 TABLET, CHEWABLE ORAL at 09:05

## 2019-04-23 RX ADMIN — CYCLOBENZAPRINE 5 MG: 10 TABLET, FILM COATED ORAL at 20:13

## 2019-04-23 RX ADMIN — Medication 10 ML: at 08:57

## 2019-04-23 RX ADMIN — OXYCODONE HYDROCHLORIDE 5 MG: 5 TABLET ORAL at 22:49

## 2019-04-23 RX ADMIN — MIDODRINE HYDROCHLORIDE 5 MG: 5 TABLET ORAL at 12:40

## 2019-04-23 RX ADMIN — OXYCODONE HYDROCHLORIDE 5 MG: 5 TABLET ORAL at 11:24

## 2019-04-23 RX ADMIN — OXYCODONE HYDROCHLORIDE 5 MG: 5 TABLET ORAL at 19:00

## 2019-04-23 RX ADMIN — METOPROLOL TARTRATE 25 MG: 25 TABLET ORAL at 09:05

## 2019-04-23 RX ADMIN — MORPHINE SULFATE 4 MG: 4 INJECTION INTRAVENOUS at 08:57

## 2019-04-23 RX ADMIN — Medication 10 ML: at 20:14

## 2019-04-23 RX ADMIN — NYSTATIN 500000 UNITS: 500000 SUSPENSION ORAL at 12:40

## 2019-04-23 RX ADMIN — MORPHINE SULFATE 4 MG: 4 INJECTION INTRAVENOUS at 14:07

## 2019-04-23 RX ADMIN — CYCLOBENZAPRINE 5 MG: 10 TABLET, FILM COATED ORAL at 14:07

## 2019-04-23 RX ADMIN — PANTOPRAZOLE SODIUM 40 MG: 40 TABLET, DELAYED RELEASE ORAL at 06:38

## 2019-04-23 RX ADMIN — PANTOPRAZOLE SODIUM 40 MG: 40 TABLET, DELAYED RELEASE ORAL at 16:36

## 2019-04-23 RX ADMIN — NYSTATIN 500000 UNITS: 500000 SUSPENSION ORAL at 16:37

## 2019-04-23 RX ADMIN — NYSTATIN 500000 UNITS: 500000 SUSPENSION ORAL at 09:05

## 2019-04-23 RX ADMIN — METOPROLOL TARTRATE 25 MG: 25 TABLET ORAL at 20:13

## 2019-04-23 RX ADMIN — NYSTATIN 500000 UNITS: 500000 SUSPENSION ORAL at 20:13

## 2019-04-23 RX ADMIN — OXYCODONE HYDROCHLORIDE 5 MG: 5 TABLET ORAL at 04:08

## 2019-04-23 RX ADMIN — CYCLOBENZAPRINE 5 MG: 10 TABLET, FILM COATED ORAL at 09:05

## 2019-04-23 ASSESSMENT — PAIN SCALES - GENERAL
PAINLEVEL_OUTOF10: 8
PAINLEVEL_OUTOF10: 7
PAINLEVEL_OUTOF10: 9
PAINLEVEL_OUTOF10: 9
PAINLEVEL_OUTOF10: 7
PAINLEVEL_OUTOF10: 6
PAINLEVEL_OUTOF10: 6
PAINLEVEL_OUTOF10: 4

## 2019-04-23 ASSESSMENT — PAIN DESCRIPTION - PAIN TYPE
TYPE: ACUTE PAIN

## 2019-04-23 ASSESSMENT — PAIN DESCRIPTION - LOCATION
LOCATION: ABDOMEN

## 2019-04-23 ASSESSMENT — PAIN DESCRIPTION - ORIENTATION: ORIENTATION: LOWER

## 2019-04-23 NOTE — PROGRESS NOTES
Occupational Therapy  Facility/Department: [address] STEPDOWN  Daily Treatment Note  NAME: Davina Adjutant  : 1965  MRN: [id number]    Date of Service: 2019    Discharge Recommendations:       Further therapy recommended at discharge. Assessment   Performance deficits / Impairments: Decreased functional mobility ; Decreased strength;Decreased endurance;Decreased safe awareness;Decreased ADL status; Decreased balance  Prognosis: Good  REQUIRES OT FOLLOW UP: Yes  Activity Tolerance  Activity Tolerance: Patient Tolerated treatment well  Safety Devices  Safety Devices in place: Yes  Type of devices: All fall risk precautions in place;Gait belt;Call light within reach; Left in bed;Bed alarm in place; Patient at risk for falls  Restraints  Initially in place: No         Patient Diagnosis(es): The primary encounter diagnosis was Acute renal failure, unspecified acute renal failure type ([facility] [address] A diagnosis of Altered mental status, unspecified altered mental status type was also pertinent to this visit. has a past medical history of Asthma, Back pain, chronic, Hypertension, Snores, and Wears glasses. has a past surgical history that includes Tonsillectomy; Knee arthroscopy (Left, ); Ulnar tunnel release (Right, ); Knee arthroscopy (Left, 2016); LAPAROTOMY EXPLORATORY (N/A, 2019); and LAPAROTOMY EXPLORATORY (N/A, 2019).     Restrictions  Restrictions/Precautions  Restrictions/Precautions: Fall Risk, Contact Precautions  Required Braces or Orthoses?: No  Position Activity Restriction  Other position/activity restrictions: up with assist. s/p  ILEOCECECTOMY, SUBTOTAL COLECTOMY ; s/p 2ND LOOK EXPLORATORY LAPAROTOMY with ileostomy creation   Subjective   General  Patient assessed for rehabilitation services?: Yes  Family / Caregiver Present: No  Pain Assessment  Pain Assessment: 0-10  Pain Level: 6  Pain Type: Acute pain  Pain Location: Abdomen  Pain Orientation: Lower  Non-Pharmaceutical Pain Intervention(s): Distraction; Ambulation/Increased Activity; Other (ADLs)  Response to Pain Intervention: Patient Satisfied  Vital Signs  Patient Currently in Pain: Yes   Orientation     Objective    ADL  Feeding: Setup;Supervision(supine in bed, HOB elevated at tray table)  Grooming: Setup;Supervision; Moderate assistance(Facial hygiene. Hair washing/brushing while seated EOB, writer provided mod A to brush knot out of back of hair)  UE Bathing: Setup;Minimal assistance(Writer assist with back)  LE Bathing: Maximum assistance(Writer assist RN with kevon care/bottom care while pt supine in bed during brief change)  UE Dressing: Setup;Minimal assistance(to manage gown)  Toileting: Maximum assistance(Brief and external female cath at this time)  Additional Comments: Pt completed ADLs while seated EOB, see above for LOF. Sx soap used appropriately. Balance  Sitting Balance: Stand by assistance(Seated EOB unsupported ~35 minutes)  Standing Balance: Minimal assistance  Standing Balance  Time: ~1 minute total  Activity: Pt requesting to complete sit<>stand 8 times for ~7-8 seconds each trial. During last trial, pt stood for 15 seconds before requesting to sit back down d/t cramping in calves.  ~40-50 second rest breaks given between each trial.     Sit to stand: Minimal assistance  Stand to sit: Contact guard assistance  Comment: Pt presented with flexed posture, needed verbal cueing to right self  Functional Mobility  Functional - Mobility Device: Standard Walker  Activity: Other(3 steps to L towards HOB)  Assist Level: Contact guard assistance  Bed mobility  Rolling to Left: Contact guard assistance  Rolling to Right: Contact guard assistance  Supine to Sit: Minimal assistance(Hand held assist)  Sit to Supine: Contact guard assistance  Scooting: Stand by assistance  Comment: HOB raised, increased time to complete bed moblity on this date d/t increase in pain      Plan   Plan  Times per

## 2019-04-23 NOTE — PROGRESS NOTES
Pharmacy Note  Warfarin Consult follow-up      Recent Labs     04/23/19  0610   INR 2.7     Recent Labs     04/21/19  0338 04/22/19  0552 04/22/19  1805 04/23/19  0610   HGB 7.2* 6.9* 8.3* 8.4*   HCT 23.3* 22.1* 25.9* 26.9*   * 774*  --  784*       Significant Drug-Drug Interactions:  New warfarin drug-drug interactions: none  Discontinued drug-drug interactions: none  Current  Drug-Drug Interactions: aspirin, heparin gtt     Date                 INR        Dose   4/20/2019         1.3            5 mg    4.21                  1.2            5 mg    4/22/19             1.6            5 mg    4/23/19             2.7            Hold    Notes:        Due to spike in INR today will hold warfarin today. Daily PT/INR while inpatient. [address] [place] [address]  Ph., CACP, Clinical Pharmacist  Anticoagulation Services, [facility]  4/23/2019  7:58 AM

## 2019-04-23 NOTE — PROGRESS NOTES
[facility]  Internal Medicine Residency Program  Inpatient Daily Progress Note  ______________________________________________________________________________    Patient: Ferna Kussmaul  YOB: 1965   MRN: [id number]    Acct: [de-identified]     Admit date: 4/5/2019  Today's date: 04/23/19  Number of days in the hospital: 18       Admitting Diagnosis: Septic shock ([address]    Subjective:   Patient seen and examined at bedside. Alert and oriented. Afebrile. Blood pressure on the lower side. Eating and drinking fine. No acute issues overnight. Her hemoglobin was low yesterday. She received 1 unit of packed red blood cells. Her INR this morning is 2.7. Objective:   Vital Sign:  /68   Pulse 86   Temp 98.1 °F (36.7 °C) (Oral)   Resp 21   Ht 5' 1.02\" (1.55 m)   Wt 176 lb 5.9 oz (80 kg)   SpO2 95%   BMI 33.30 kg/m²       Physical Exam:  General appearance:   alert, well appearing, and in no distress  Mental Status: alert, oriented to person, place, and time  Neurologic:  alert, oriented, normal speech, no focal findings or movement disorder noted  Lungs:  clear to auscultation, no wheezes, rales or rhonchi, symmetric air entry  Heart[de-identified] normal rate, regular rhythm, normal S1, S2, no murmurs, rubs, clicks or gallops  Abdomen:  soft, nontender, nondistended, no masses or organomegaly. Stoma in place in the right side of abdomen  Extremities: peripheral pulses normal, no pedal edema, no clubbing or cyanosis   Skin: normal coloration and turgor, no rashes, no suspicious skin lesions noted  Review of Systems -  General ROS: negative for - chills, fatigue, fever or weight loss  ENT ROS: negative for - headaches, oral lesions or sore throat  Cardiovascular ROS: no chest pain , orthopnea or pnd   Gastrointestinal ROS: no abdominal pain, change in bowel habits, or black or bloody stools  Skin - no rash   Neuro - no blurry vision , no loc .  No focal weakness msk - no jt tenderness or swelling    Vascular - no claudication , rest completed and negative   Lymphatic - complete and negative   Hematology - oncology - complete and negative   Allergy immunology - complete and negative    no burning or hematuria    Medications:  Scheduled Medications   midodrine  5 mg Oral TID WC    metoprolol tartrate  25 mg Oral BID    warfarin (COUMADIN) daily dosing (placeholder)   Other RX Placeholder    nicotine  1 patch Transdermal Daily    nystatin  5 mL Oral 4x Daily    aspirin  81 mg Oral Daily    cyclobenzaprine  5 mg Oral TID    pantoprazole  40 mg Oral BID AC    sodium chloride flush  10 mL Intravenous 2 times per day       PRN Medications  sodium chloride 250 mL PRN   sodium chloride 150 mL PRN   heparin (porcine) 1,600 Units PRN   heparin (porcine) 1,600 Units PRN   sodium chloride 250 mL PRN   sodium chloride 150 mL PRN   acetaminophen 650 mg Q4H PRN   glucose 15 g PRN   dextrose 12.5 g PRN   glucagon (rDNA) 1 mg PRN   dextrose 100 mL/hr PRN   heparin (porcine) 4,000 Units PRN   heparin (porcine) 2,000 Units PRN   oxyCODONE 5 mg Q4H PRN   morphine 4 mg Q3H PRN   Or     morphine 6 mg Q3H PRN   sodium chloride 250 mL PRN   sodium chloride 150 mL PRN   dextrose 25 g PRN   sodium chloride flush 10 mL PRN   magnesium hydroxide 30 mL Daily PRN   ondansetron 4 mg Q6H PRN       Diagnostic Labs and Imaging:  CBC:  Recent Labs     04/21/19  0338 04/22/19  0552 04/22/19  1805 04/23/19  0610   WBC 21.4* 18.5*  --  19.9*   HGB 7.2* 6.9* 8.3* 8.4*   * 774*  --  784*     BMP: Recent Labs     04/21/19  0338 04/22/19  0552 04/23/19  0610   * 133* 134*   K 3.7 3.6* 3.9   CL 95* 94* 94*   CO2 23 23 24   BUN 25* 32* 15   CREATININE 3.56* 3.56* 2.63*   GLUCOSE 76 103* 105*     Hepatic: Recent Labs     04/22/19  0552   AST 15   ALT 18   BILITOT 0.40   ALKPHOS 117*       Assessment and Plan:     Principal Problem:    Septic shock (HCC)  Active Problems:    Shock ([address] Rhabdomyolysis    Hyponatremia    Lactic acidosis    MARY (acute kidney injury) ([facility]    Metabolic acidosis    Acute respiratory failure (HCC)    Elevated liver enzymes    Hyperkalemia    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HCC)    Small bowel ischemia (HCC)    Acute GI bleeding    Gram negative septic shock (HCC)    Protein-calorie malnutrition (HCC)    Gastroenteritis    Haemophilus influenzae septicemia (HCC)    Pleural effusion, bilateral    Splenic infarction    Leukemoid reaction    Mottled skin    Acute renal failure (HCC)    Altered mental status    PMB (postmenopausal bleeding)  Resolved Problems:    * No resolved hospital problems. *    · Hb is 8.4 this morning. will continue to monitor hemoglobin. · Levofloxacin discontinued.  Patient completed a course of meropenem. Continue to monitor patient off antibiotics  · Transvaginal ultrasound was normal.  ObGyn plans to follow the patient outpatient. · Patient stable from cardiology stand point. · Continue Midodrine for low BP if needed  · PT/OT  · Pain control with Morphine and Roxicodone PRN  · Continue aspirin. Heparin drip discontinued. · Patient on Coumadin 5 mg daily with pharmacy to dose. Patient will be discharged on Coumadin. INR checks will be done at [facility] where the patient is going. · Hypercoagulable workup negative. · Started nystatin for oral thrush.    · Continue renal diet  · Outpatient dialysis placement is pending. Patient will be discharged after dialysis placement and physical therapy, occupational therapy evaluation. John Ledezma MD  PGY-1, Department of Internal Medicine  [address] [place] [address]  4/23/2019 12:11 PM  Attending Physician Statement  I have discussed the care of Ferna Kussmaul, including pertinent history and exam findings,  with the resident. I have seen and examined the patient and the key elements of all parts of the encounter have been performed by me.   I agree with the assessment, plan and orders as documented by the resident with additions . Treatment plan Discussed with nursing staff in detail , all questions answered . Electronically signed by Omid Morales MD on   4/23/19 at 2:14 PM    Please note that this chart was generated using voice recognition Dragon dictation software. Although every effort was made to ensure the accuracy of this automated transcription, some errors in transcription may have occurred.

## 2019-04-23 NOTE — PROGRESS NOTES
PRN   heparin (porcine) injection 1,600 Units PRN   nicotine (NICODERM CQ) 21 MG/24HR 1 patch Daily   0.9 % sodium chloride bolus PRN   0.9 % sodium chloride bolus PRN   nystatin (MYCOSTATIN) 924049 UNIT/ML suspension 500,000 Units 4x Daily   acetaminophen (TYLENOL) tablet 650 mg Q4H PRN   glucose (GLUTOSE) 40 % oral gel 15 g PRN   dextrose 50 % solution 12.5 g PRN   glucagon (rDNA) injection 1 mg PRN   dextrose 5 % solution PRN   aspirin chewable tablet 81 mg Daily   heparin (porcine) injection 4,000 Units PRN   heparin (porcine) injection 2,000 Units PRN   oxyCODONE (ROXICODONE) immediate release tablet 5 mg Q4H PRN   cyclobenzaprine (FLEXERIL) tablet 5 mg TID   morphine injection 4 mg Q3H PRN   Or    morphine injection 6 mg Q3H PRN   0.9 % sodium chloride bolus PRN   0.9 % sodium chloride bolus PRN   pantoprazole (PROTONIX) tablet 40 mg BID AC   dextrose 50 % solution 25 g PRN   sodium chloride flush 0.9 % injection 10 mL 2 times per day   sodium chloride flush 0.9 % injection 10 mL PRN   magnesium hydroxide (MILK OF MAGNESIA) 400 MG/5ML suspension 30 mL Daily PRN   ondansetron (ZOFRAN) injection 4 mg Q6H PRN     Labs:   CBC:   Recent Labs     04/21/19  0338 04/22/19  0552 04/22/19  1805 04/23/19  0610   WBC 21.4* 18.5*  --  19.9*   RBC 2.46* 2.37*  --  2.86*   HGB 7.2* 6.9* 8.3* 8.4*   HCT 23.3* 22.1* 25.9* 26.9*   * 774*  --  784*   MPV 10.2 10.2  --  9.8      BMP:   Recent Labs     04/21/19  0338 04/22/19  0552 04/23/19  0610   * 133* 134*   K 3.7 3.6* 3.9   CL 95* 94* 94*   CO2 23 23 24   BUN 25* 32* 15   CREATININE 3.56* 3.56* 2.63*   GLUCOSE 76 103* 105*   CALCIUM 7.5* 7.6* 8.3*      Albumin:   Recent Labs     04/22/19  0552   LABALBU 2.2*         Assessment: 1. Acute Kidney Injury: Secondary to ischemic ATN dialysis dependent , pt reports improving urination, clearances appear to be improving  2.  CK D stage III secondary to nephrosclerosis baseline 1.5-1.7  3.  Ischemic bowel status post resection, has an ostomy   4.  H. influenzae bacteremia resolved  5.  Anemia of chronic disease and blood loss,received pbcs yesterday    Plan:  1. Assess need for HD in am, if creat continues to show improvement and UO remains good,may not need RRT in am  2. Increase oral intake  3. Increase proamatine 10 tid  4.  looking at out pt spot for her, working on [facility]. 5.will follow    Please do not hesitate to call with questions.     Electronically signed by Neda Rodriguez MD on 4/23/2019 at 11:54 AM

## 2019-04-23 NOTE — CARE COORDINATION
Transitional Planning    1500 Rec'ed call from [facility] / [facility] intake approval from insurance received and they can accept patient today. [address] back and let her know the barrier to an OP HD seat approval is still pending. Exploring option of patient returning to our facility for dialysis until insurance/financial clearance is approved  Await return call from Social Work.

## 2019-04-23 NOTE — PROGRESS NOTES
Nutrition Assessment    Type and Reason for Visit: Reassess    Nutrition Recommendations: Will continue current diet and supplements. Nutrition Assessment: pt is stable. HD continues. See recommendations. Malnutrition Assessment:  · Malnutrition Status: At risk for malnutrition  · Context: Acute illness or injury  · Findings of the 6 clinical characteristics of malnutrition (Minimum of 2 out of 6 clinical characteristics is required to make the diagnosis of moderate or severe Protein Calorie Malnutrition based on AND/ASPEN Guidelines):  1. Energy Intake-Less than or equal to 75% of estimated energy requirement, Greater than or equal to 7 days    2. Weight Loss-Unable to assess,    3. Fat Loss-No significant subcutaneous fat loss,    4. Muscle Loss-No significant muscle mass loss,    5. Fluid Accumulation-(Mild to moderate fluid accumulation), Extremities, Generalized  6.  Strength-Not measured    Nutrition Risk Level: Moderate    Nutrient Needs:  · Estimated Daily Total Kcal: 5659-8911 kcal/day  · Estimated Daily Protein (g): 70-95 g pro/day   Nutrition Diagnosis:   · Problem: Inadequate oral intake  · Etiology: related to (recent abd surgery)     Signs and symptoms:  as evidenced by Intake 50-75%    Objective Information:  · Nutrition-Focused Physical Findings: Ileostomy.   · Wound Type: Surgical Wound  · Current Nutrition Therapies:  · Oral Diet Orders: Renal, 2gm Sodium, Fluid Restriction, Dental Soft   · Oral Diet intake: 51-75%  · Anthropometric Measures:  · Ht: 5' 1.02\" (155 cm)   · Current Body Wt: 176 lb (79.8 kg)  · Admission Body Wt: 173 lb 15.1 oz (78.9 kg)  · Ideal Body Wt: 105 lb 13.1 oz (48 kg), % Ideal Body 165% (adm/ideal)  · BMI Classification: BMI 30.0 - 34.9 Obese Class I    Nutrition Interventions:   Continue current diet, Continue current ONS  Continued Inpatient Monitoring, Education Not Indicated    Nutrition Evaluation:   · Evaluation: Progressing toward goals   · Goals: Meet % of estimated nutrition needs    · Monitoring: Meal Intake, Supplement Intake, Wound Healing, Pertinent Labs      Electronically signed by Timur Cohen RD, MAGGY on 4/23/19 at 4:05 PM    Contact Number: [phone]

## 2019-04-23 NOTE — PROGRESS NOTES
[facility] - Progress Note    Today's Date and Time: 4/23/2019, 8:27 AM    Impression :   1. 46 y/o female with initial complaints of:  · Vomiting  · Diarrhea  · Abdominal pain  · Altered mental status  2. Acute kidney injury- on HD   3. Shock, presumptive septic plus hypovolemic, requiring Levophed- Resolved  4. Septicemia with Haemophilus influenzae 4-5-19--Resolved  5. Gastroenteritis--Resolved  6. Ischemic bowel. S/P laparotomy 4-6-19 with resection  7. S/p second look with resection of transverse colon, ileostomy creation, resection of rectal stump and abdominal closure. 8. Severe hyponatremia--Resolved   9. Transaminitis, shock liver.-Resolved  10. COPD  6. Arthritis  12. Chronic NSAID use  13. Funguria  14. Acral mottling of hands and feet  15. Allergy to penicillins: Hives and respiratory distress  16. Allergy to sulfa   17. S/P New tunnel HD catheter placement 4/16  18. EDWARD with no vegetations    Recommendations:   · Monitor off antibiotics (Completed Meropenem. Stop date 4-14-19)  · Consider telepsych treatment  · Continue treatment for oral thrush  · Trend hemoglobin    Medical Decision Making/Summary/Discussion:   · 46 y/o female with vomiting, diarrhea, and AMS. · Found to have gastroenteritis with dehydration, shock requiring pressors, transaminitis, MARY requiring emergent dialysis, lactic acidosis. · Intubated due to AMS  · CT chest shows atelactasis vs pneumonia  · Initially treated with vanc, levaquin, and aztreonam.   · Patient was felt to be critically ill with origin in GI tract . Was treated with meropenem despite Hx of penicillin allergy. · Had laparotomy 4-6-19 with findings of ischemic bowel from distal ileum to sigmoid colon. · S/P ileocecectomy with extended left hemicolectomy and placement of wound vac 4-6-19  · Overall improvement post surgery  · Off pressors and sedation  · Blood Culture grew Haemophilus influenza, beta lactamase negative. THis finding is likely unrelated to intraabdominal process. · Treated with Meropenem through 4/14  · Has maintained leukocytosis and occasional fever. · Has some vaginal bleeding. Gyn evaluating  · Overall clinical improvement. Infection Control Recommendations   · Hospers Precautions    Antimicrobial Stewardship Recommendations     · Discontinuation of therapy    Coordination of Outpatient Care:   · Estimated Length of IV antimicrobials: 4/14  · Patient will need Midline Catheter Insertion: No  · Patient will need PICC line Insertion: No  · Patient will need: Home IV , [place],  SNF,  LTAC TBD  · Patient will need outpatient wound care: TBD    Chief complaint/reason for consultation:   · Altered mental status and tender abdomen       History of Present Illness:   Nancy Downing is a 47y.o.-year-old  female who was initially admitted on 4/5/2019. Patient seen at the request of Dr. Wesley Escobar:    Patient presented to ED via EMS due to altered mental status and vomiting. Patient has history significant for COPD, right knee osteoarthirtis s/p arthoplasty, and chronic NSAID use. Patient lives in [place], but was here to visit her significant other. Arrived Wednesday night, said she didn't feel good. Thought she might have had a bad burger at a fast food restaurant. Went to sleep and slept for almost 24 hours. When she awoke, she said she was vomiting, having diarrhea, and abdominal pain. Significant other and sister are unsure of the nature of the vomit, diarrhea, or abdominal pain. Then she slept a bit more, until her significant other found her unresponsive and that's when he called EMS to bring her to the hospital.     Afebrile on admission, but Tmax overnight was 39.3. Tachycardic on admission, 129. Became hypotensive after admission and levophed and vasopressin were started. Initial labs:     Admission labs significant for Na 125, K 8.0, CO2 9, BUN 62, Creatinine of 4.06, Anion gap of 23, Lactic acid of 3.5, Glucose of 186, Ca 5.2, POC HCO3 15.9, CK 15,342, Troponins high sensitivity 89 and 94, Alk phos 168, , AST 1,122, Bili .37, lipase of 119, Urine tox positive for THC and Cocaine, WBC 23.5, Hgb 10.2, Urine and blood cultures pending, HIV negative, influenza negative, hepatitis panel non-reactive, urine Leukocyte esterase trace, urine nitrite -, urine protein 2+, urine Hgb large, urine RBCs 5 to 10, many urine yeast,  ABG 7.22, pCO2 27, HCO3 15.9. Initial imaging showed:     CXR: No acute cardiopulmonary process    Abdominal X-ray 4/6: No free air    CTA of chest: Negative for PE or dissection. Patchy consolidations of bilateral lower lung lobes representing developing pneumonia vs atelectasis. CT head: pending    Initial course:     Intubated and sedated in ED due to altered mental status. Currently on Vancomycin, Levaquin, and aztreonam for empiric coverage. Richar catheter was placed due to request by nephrology for emergent dialysis. Dialysis attempted several times, but due to high arterial pressures and line clotting, dialysis to be completed later today by Dr. Tobi Wills. NG tube with bloody output. FOBT +. General surgery has been consulted for evaluation. CURRENT EVALUATION : 4/23/2019    Patient seen and examined. Says she continues to feel better. Says every day she feels like she is gaining more strength. Given . 25 of xanax last night for agitation. Afebrile  VS stable, but tachycardia improving. Off midodrine for low blood pressure. Started on warfarin per heme/onc recommendations. INR 2.7 today. Currently being held. Transfused one unit for hemoglobin of 6.9. Exam revealed a yellow plaque on the center of the tongue consistent with thrush. PO nystatin started. Not improving yet. EDWARD revealed no thrombus or vegetation, EF 55%, moderate MR. YATES did pelvic ultrasound instead of TVUS because of patient anxiety.  Study Not on file     Gets together: Not on file     Attends [employer] service: Not on file     Active member of club or organization: Not on file     Attends meetings of clubs or organizations: Not on file     Relationship status: Not on file    Intimate partner violence:     Fear of current or ex partner: Not on file     Emotionally abused: Not on file     Physically abused: Not on file     Forced sexual activity: Not on file   Other Topics Concern    Not on file   Social History Narrative    Not on file       Family History:     Family History   Problem Relation Age of Onset    Heart Disease Mother     Stroke Mother     Heart Surgery Mother     Diabetes Maternal Grandmother     Emphysema Maternal Grandfather     Diabetes Maternal Grandfather     Lung Cancer Maternal Uncle         Allergies:   Pcn [penicillins]; Sulfa antibiotics; and Tape [adhesive tape]     Review of Systems:   Unable to provide. Sedated on ventilator. Physical Examination :     Patient Vitals for the past 8 hrs:   BP Temp Temp src Pulse Resp SpO2   04/23/19 0400 95/63 98.4 °F (36.9 °C) Oral 96 18 99 %     General Appearance: Sedated, on ventilator  Head:  Normocephalic, no trauma  Eyes: Pupils equal, round, reactive to light; sclera anicteric; conjunctivae pink. No embolic phenomena. ENT: Oropharynx clear, without erythema, exudate, or thrush. No tenderness of sinuses. Mouth/throat: mucosa pink and moist. No lesions. Dentition in good repair. Neck:Supple, without lymphadenopathy. Thyroid normal, No bruits. Pulmonary/Chest: Clear to auscultation, without wheezes, rales, or rhonchi. No dullness to percussion. Cardiovascular: Regular rate and rhythm without murmurs, rubs, or gallops. Abdomen: Distended. Bowel sounds absent. Soft, mildly tender, surgical bandages in place. Wound vac removed. All four Extremities: Cyanosis of trunk, abdomen, distal extremities  Neurologic: Sedated  Skin: Cool and dry with poor turgor.  Signs of peripheral arterial  insufficiency. No ulcerations. No open wounds. Skin purplish, cyanotic. Medical Decision Making -Laboratory:   I have independently reviewed/ordered the following labs:    CBC with Differential:   Recent Labs     04/22/19  0552 04/22/19  1805 04/23/19  0610   WBC 18.5*  --  19.9*   HGB 6.9* 8.3* 8.4*   HCT 22.1* 25.9* 26.9*   *  --  784*   LYMPHOPCT 12*  --  PENDING   MONOPCT 8*  --  PENDING     BMP:   Recent Labs     04/22/19  0552 04/23/19  0610   * 134*   K 3.6* 3.9   CL 94* 94*   CO2 23 24   BUN 32* 15   CREATININE 3.56* 2.63*     Hepatic Function Panel:   Recent Labs     04/22/19  0552   PROT 5.2*   LABALBU 2.2*   BILIDIR 0.23   IBILI 0.17   BILITOT 0.40   ALKPHOS 117*   ALT 18   AST 15     No results for input(s): RPR in the last 72 hours. No results for input(s): HIV in the last 72 hours. No results for input(s): BC in the last 72 hours. Lab Results   Component Value Date    MUCUS NOT REPORTED 04/06/2019    RBC 2.86 04/23/2019    TRICHOMONAS NOT REPORTED 04/06/2019    WBC 19.9 04/23/2019    YEAST NOT REPORTED 04/06/2019    TURBIDITY TURBID 04/06/2019     Lab Results   Component Value Date    CREATININE 2.63 04/23/2019    GLUCOSE 105 04/23/2019       Medical Decision Making-Imaging:     Abdominal xray:    Gas in mildly distended loops of large and small bowel likely reflecting an   ileus.       NG tube tip in the gastric fundus with the proximal side-port in the distal   thoracic esophagus, repositioning recommended.       Airspace disease left lung base.      CT scan chest:    Impression   Satisfactory position endotracheal tube.       Nasogastric tube needs advanced 10 cm.       Patchy perihilar opacities and bibasilar airspace disease.  Follow-up may be   beneficial following medical treatment course to document complete resolution.           EXAMINATION:   CTA OF THE ABDOMEN AND PELVIS WITH CONTRAST       4/5/2019 9:23 pm:       TECHNIQUE:   CTA of the abdomen and pelvis was performed with the administration of   intravenous contrast. Multiplanar reformatted images are provided for review. MIP images are provided for review. Dose modulation, iterative   reconstruction, and/or weight based adjustment of the mA/kV was utilized to   reduce the radiation dose to as low as reasonably achievable.       COMPARISON:   None.       HISTORY:   ORDERING SYSTEM PROVIDED HISTORY: suspected dissection of abdominal aorta       FINDINGS:       CTA ABDOMEN:       Bibasilar airspace disease.  Liver, spleen, pancreas, gallbladder normal.   Bilateral adrenal masses measuring 11 mm on the left and 18 x 28 mm on the   left.  Bilateral ill-defined hypodensities throughout the kidneys.  The small   bowel is somewhat distended with multiple air-fluid levels.  Multiple   air-fluid levels are also noted throughout the colon.  The stomach appears   normal.  Retroaortic left renal vein.       Bones normal.       Aorta appears normal without dissection.  The celiac artery and SMA appear   normal.  The renal arteries appear normal.           CTA PELVIS:       Bladder decompressed.  Uterus normal.  Catheter right groin terminates in the   common iliac vein.           Impression   Ileus.  No evidence of aortic dissection.  The bilateral adrenal masses, left   greater than right.  Recommend follow-up adrenal MRI non emergently.           Medical Decision Making-Other: Thank you for allowing us to participate in the care of this patient. Please call with questions.     Elayne Gregory MD.      Pager: [phone] - Office: [phone]

## 2019-04-23 NOTE — PROGRESS NOTES
Physical Therapy  Facility/Department: [address] STEPDOWN  Daily Treatment Note  NAME: Sheyla Swift  : 1965  MRN: [id number]    Date of Service: 2019    Discharge Recommendations:  Further therapy recommended at discharge. Patient Diagnosis(es): The primary encounter diagnosis was Acute renal failure, unspecified acute renal failure type ([facility] [address] A diagnosis of Altered mental status, unspecified altered mental status type was also pertinent to this visit. has a past medical history of Asthma, Back pain, chronic, Hypertension, Snores, and Wears glasses. has a past surgical history that includes Tonsillectomy; Knee arthroscopy (Left, ); Ulnar tunnel release (Right, ); Knee arthroscopy (Left, 2016); LAPAROTOMY EXPLORATORY (N/A, 2019); and LAPAROTOMY EXPLORATORY (N/A, 2019). Restrictions  Restrictions/Precautions  Restrictions/Precautions: Fall Risk, Contact Precautions  Required Braces or Orthoses?: No  Position Activity Restriction  Other position/activity restrictions: up with assist. s/p  ILEOCECECTOMY, SUBTOTAL COLECTOMY ; s/p 2ND LOOK EXPLORATORY LAPAROTOMY with ileostomy creation   Subjective   General  Chart Reviewed: Yes  Response To Previous Treatment: Patient with no complaints from previous session. Family / Caregiver Present: No  Subjective  Subjective: RN and pt agreeable to PT. Pt alert in bed upon arrival.  Pain Screening  Patient Currently in Pain: Yes  Pain Assessment  Pain Assessment: 0-10  Pain Level: 7  Pain Type: Acute pain  Pain Location: Abdomen  Non-Pharmaceutical Pain Intervention(s): Ambulation/Increased Activity;Repositioned; Emotional support  Response to Pain Intervention: Patient Satisfied  Vital Signs  Patient Currently in Pain: Yes  Pre Treatment Pain Screening  Intervention List: Patient able to continue with treatment    Orientation  Orientation  Overall Orientation Status: Within Functional Limits  Cognition   Cognition  Overall

## 2019-04-24 LAB
ABSOLUTE EOS #: 0.21 K/UL (ref 0–0.4)
ABSOLUTE IMMATURE GRANULOCYTE: 0.62 K/UL (ref 0–0.3)
ABSOLUTE LYMPH #: 4.53 K/UL (ref 1–4.8)
ABSOLUTE MONO #: 1.03 K/UL (ref 0.1–0.8)
ANION GAP SERPL CALCULATED.3IONS-SCNC: 14 MMOL/L (ref 9–17)
BASOPHILS # BLD: 0 % (ref 0–2)
BASOPHILS ABSOLUTE: 0 K/UL (ref 0–0.2)
BUN BLDV-MCNC: 24 MG/DL (ref 6–20)
BUN/CREAT BLD: ABNORMAL (ref 9–20)
CALCIUM SERPL-MCNC: 8.7 MG/DL (ref 8.6–10.4)
CHLORIDE BLD-SCNC: 93 MMOL/L (ref 98–107)
CO2: 26 MMOL/L (ref 20–31)
CREAT SERPL-MCNC: 3.32 MG/DL (ref 0.5–0.9)
DIFFERENTIAL TYPE: ABNORMAL
EOSINOPHILS RELATIVE PERCENT: 1 % (ref 1–4)
GFR AFRICAN AMERICAN: 18 ML/MIN
GFR NON-AFRICAN AMERICAN: 15 ML/MIN
GFR SERPL CREATININE-BSD FRML MDRD: ABNORMAL ML/MIN/{1.73_M2}
GFR SERPL CREATININE-BSD FRML MDRD: ABNORMAL ML/MIN/{1.73_M2}
GLUCOSE BLD-MCNC: 108 MG/DL (ref 65–105)
GLUCOSE BLD-MCNC: 108 MG/DL (ref 70–99)
GLUCOSE BLD-MCNC: 89 MG/DL (ref 65–105)
HCT VFR BLD CALC: 27.3 % (ref 36.3–47.1)
HEMOGLOBIN: 8.8 G/DL (ref 11.9–15.1)
IMMATURE GRANULOCYTES: 3 %
INR BLD: 2.4
LACTIC ACID, WHOLE BLOOD: 2.9 MMOL/L (ref 0.7–2.1)
LYMPHOCYTES # BLD: 22 % (ref 24–44)
MCH RBC QN AUTO: 29.4 PG (ref 25.2–33.5)
MCHC RBC AUTO-ENTMCNC: 32.2 G/DL (ref 28.4–34.8)
MCV RBC AUTO: 91.3 FL (ref 82.6–102.9)
MONOCYTES # BLD: 5 % (ref 1–7)
MORPHOLOGY: ABNORMAL
MORPHOLOGY: ABNORMAL
NRBC AUTOMATED: 0 PER 100 WBC
PDW BLD-RTO: 15.2 % (ref 11.8–14.4)
PLATELET # BLD: 928 K/UL (ref 138–453)
PLATELET ESTIMATE: ABNORMAL
PMV BLD AUTO: 9.3 FL (ref 8.1–13.5)
POTASSIUM SERPL-SCNC: 4.2 MMOL/L (ref 3.7–5.3)
PROTHROMBIN TIME: 23.3 SEC (ref 9–12)
RBC # BLD: 2.99 M/UL (ref 3.95–5.11)
RBC # BLD: ABNORMAL 10*6/UL
SEG NEUTROPHILS: 69 % (ref 36–66)
SEGMENTED NEUTROPHILS ABSOLUTE COUNT: 14.21 K/UL (ref 1.8–7.7)
SODIUM BLD-SCNC: 133 MMOL/L (ref 135–144)
WBC # BLD: 20.6 K/UL (ref 3.5–11.3)
WBC # BLD: ABNORMAL 10*3/UL

## 2019-04-24 PROCEDURE — 6370000000 HC RX 637 (ALT 250 FOR IP): Performed by: RADIOLOGY

## 2019-04-24 PROCEDURE — 6370000000 HC RX 637 (ALT 250 FOR IP): Performed by: STUDENT IN AN ORGANIZED HEALTH CARE EDUCATION/TRAINING PROGRAM

## 2019-04-24 PROCEDURE — 97530 THERAPEUTIC ACTIVITIES: CPT

## 2019-04-24 PROCEDURE — 99232 SBSQ HOSP IP/OBS MODERATE 35: CPT | Performed by: INTERNAL MEDICINE

## 2019-04-24 PROCEDURE — 6360000002 HC RX W HCPCS: Performed by: STUDENT IN AN ORGANIZED HEALTH CARE EDUCATION/TRAINING PROGRAM

## 2019-04-24 PROCEDURE — 36415 COLL VENOUS BLD VENIPUNCTURE: CPT

## 2019-04-24 PROCEDURE — 82947 ASSAY GLUCOSE BLOOD QUANT: CPT

## 2019-04-24 PROCEDURE — 85025 COMPLETE CBC W/AUTO DIFF WBC: CPT

## 2019-04-24 PROCEDURE — 6370000000 HC RX 637 (ALT 250 FOR IP): Performed by: INTERNAL MEDICINE

## 2019-04-24 PROCEDURE — 85610 PROTHROMBIN TIME: CPT

## 2019-04-24 PROCEDURE — 97607 NEG PRS WND THR NDME<=50SQCM: CPT

## 2019-04-24 PROCEDURE — 2060000000 HC ICU INTERMEDIATE R&B

## 2019-04-24 PROCEDURE — 80048 BASIC METABOLIC PNL TOTAL CA: CPT

## 2019-04-24 PROCEDURE — 2580000003 HC RX 258: Performed by: STUDENT IN AN ORGANIZED HEALTH CARE EDUCATION/TRAINING PROGRAM

## 2019-04-24 PROCEDURE — 97110 THERAPEUTIC EXERCISES: CPT

## 2019-04-24 PROCEDURE — 99233 SBSQ HOSP IP/OBS HIGH 50: CPT | Performed by: INTERNAL MEDICINE

## 2019-04-24 PROCEDURE — 83605 ASSAY OF LACTIC ACID: CPT

## 2019-04-24 RX ORDER — BUMETANIDE 1 MG/1
1 TABLET ORAL DAILY
Status: DISCONTINUED | OUTPATIENT
Start: 2019-04-24 | End: 2019-04-27

## 2019-04-24 RX ORDER — WARFARIN SODIUM 2.5 MG/1
2.5 TABLET ORAL DAILY
Status: DISCONTINUED | OUTPATIENT
Start: 2019-04-24 | End: 2019-04-26

## 2019-04-24 RX ADMIN — METOPROLOL TARTRATE 25 MG: 25 TABLET ORAL at 09:13

## 2019-04-24 RX ADMIN — MORPHINE SULFATE 4 MG: 4 INJECTION INTRAVENOUS at 19:15

## 2019-04-24 RX ADMIN — NYSTATIN 500000 UNITS: 500000 SUSPENSION ORAL at 12:13

## 2019-04-24 RX ADMIN — MORPHINE SULFATE 4 MG: 4 INJECTION INTRAVENOUS at 06:11

## 2019-04-24 RX ADMIN — CYCLOBENZAPRINE 5 MG: 10 TABLET, FILM COATED ORAL at 20:30

## 2019-04-24 RX ADMIN — OXYCODONE HYDROCHLORIDE 5 MG: 5 TABLET ORAL at 03:10

## 2019-04-24 RX ADMIN — WARFARIN SODIUM 2.5 MG: 2.5 TABLET ORAL at 17:50

## 2019-04-24 RX ADMIN — Medication 10 ML: at 09:14

## 2019-04-24 RX ADMIN — NYSTATIN 500000 UNITS: 500000 SUSPENSION ORAL at 20:30

## 2019-04-24 RX ADMIN — PANTOPRAZOLE SODIUM 40 MG: 40 TABLET, DELAYED RELEASE ORAL at 06:12

## 2019-04-24 RX ADMIN — METOPROLOL TARTRATE 25 MG: 25 TABLET ORAL at 20:30

## 2019-04-24 RX ADMIN — NYSTATIN 500000 UNITS: 500000 SUSPENSION ORAL at 09:14

## 2019-04-24 RX ADMIN — ACETAMINOPHEN 650 MG: 325 TABLET ORAL at 07:27

## 2019-04-24 RX ADMIN — MORPHINE SULFATE 4 MG: 4 INJECTION INTRAVENOUS at 11:22

## 2019-04-24 RX ADMIN — ASPIRIN 81 MG: 81 TABLET, CHEWABLE ORAL at 09:13

## 2019-04-24 RX ADMIN — MORPHINE SULFATE 4 MG: 4 INJECTION INTRAVENOUS at 16:11

## 2019-04-24 RX ADMIN — MIDODRINE HYDROCHLORIDE 10 MG: 5 TABLET ORAL at 16:11

## 2019-04-24 RX ADMIN — BUMETANIDE 1 MG: 1 TABLET ORAL at 12:13

## 2019-04-24 RX ADMIN — NYSTATIN 500000 UNITS: 500000 SUSPENSION ORAL at 16:11

## 2019-04-24 RX ADMIN — Medication 10 ML: at 20:30

## 2019-04-24 RX ADMIN — OXYCODONE HYDROCHLORIDE 5 MG: 5 TABLET ORAL at 09:13

## 2019-04-24 RX ADMIN — MIDODRINE HYDROCHLORIDE 10 MG: 5 TABLET ORAL at 09:14

## 2019-04-24 RX ADMIN — ACETAMINOPHEN 650 MG: 325 TABLET ORAL at 20:30

## 2019-04-24 RX ADMIN — CYCLOBENZAPRINE 5 MG: 10 TABLET, FILM COATED ORAL at 16:11

## 2019-04-24 RX ADMIN — CYCLOBENZAPRINE 5 MG: 10 TABLET, FILM COATED ORAL at 09:13

## 2019-04-24 RX ADMIN — PANTOPRAZOLE SODIUM 40 MG: 40 TABLET, DELAYED RELEASE ORAL at 16:11

## 2019-04-24 ASSESSMENT — PAIN SCALES - GENERAL
PAINLEVEL_OUTOF10: 8
PAINLEVEL_OUTOF10: 7
PAINLEVEL_OUTOF10: 8
PAINLEVEL_OUTOF10: 7
PAINLEVEL_OUTOF10: 6
PAINLEVEL_OUTOF10: 2
PAINLEVEL_OUTOF10: 6
PAINLEVEL_OUTOF10: 3
PAINLEVEL_OUTOF10: 8

## 2019-04-24 ASSESSMENT — PAIN DESCRIPTION - FREQUENCY: FREQUENCY: CONTINUOUS

## 2019-04-24 ASSESSMENT — PAIN DESCRIPTION - ONSET: ONSET: ON-GOING

## 2019-04-24 ASSESSMENT — PAIN - FUNCTIONAL ASSESSMENT: PAIN_FUNCTIONAL_ASSESSMENT: PREVENTS OR INTERFERES SOME ACTIVE ACTIVITIES AND ADLS

## 2019-04-24 ASSESSMENT — PAIN DESCRIPTION - PAIN TYPE: TYPE: ACUTE PAIN

## 2019-04-24 ASSESSMENT — PAIN DESCRIPTION - ORIENTATION: ORIENTATION: LOWER

## 2019-04-24 ASSESSMENT — PAIN DESCRIPTION - PROGRESSION: CLINICAL_PROGRESSION: NOT CHANGED

## 2019-04-24 ASSESSMENT — PAIN DESCRIPTION - DESCRIPTORS: DESCRIPTORS: ACHING;DISCOMFORT

## 2019-04-24 ASSESSMENT — PAIN DESCRIPTION - LOCATION: LOCATION: ABDOMEN

## 2019-04-24 NOTE — PROGRESS NOTES
Physical Therapy  Facility/Department: [address] STEPDOWN  Daily Treatment Note  NAME: Alok Nevarez  : 1965  MRN: [id number]    Date of Service: 2019    Discharge Recommendations:  Further therapy recommended at discharge. Patient Diagnosis(es): The primary encounter diagnosis was Acute renal failure, unspecified acute renal failure type ([facility] [address] A diagnosis of Altered mental status, unspecified altered mental status type was also pertinent to this visit. has a past medical history of Asthma, Back pain, chronic, Hypertension, Snores, and Wears glasses. has a past surgical history that includes Tonsillectomy; Knee arthroscopy (Left, ); Ulnar tunnel release (Right, ); Knee arthroscopy (Left, 2016); LAPAROTOMY EXPLORATORY (N/A, 2019); and LAPAROTOMY EXPLORATORY (N/A, 2019). Restrictions  Restrictions/Precautions  Restrictions/Precautions: Fall Risk, Contact Precautions  Required Braces or Orthoses?: No  Position Activity Restriction  Other position/activity restrictions: up with assist. s/p  ILEOCECECTOMY, SUBTOTAL COLECTOMY ; s/p 2ND LOOK EXPLORATORY LAPAROTOMY with ileostomy creation   Subjective   General  Response To Previous Treatment: Patient with no complaints from previous session. Family / Caregiver Present: No  Subjective  Subjective: RN and pt agreeable to PT. Pt alert in bed upon arrival. Pt reports pain 7/0 in abdomen upon therapist arrival.   Pain Screening  Patient Currently in Pain: Yes  Pain Assessment  Pain Assessment: 0-10  Pain Level: 7  Pain Type: Acute pain  Pain Location: Abdomen  Pain Orientation: Lower  Pain Descriptors: Aching;Discomfort  Pain Frequency: Continuous  Pain Onset: On-going  Clinical Progression: Not changed  Functional Pain Assessment: Prevents or interferes some active activities and ADLs  Non-Pharmaceutical Pain Intervention(s): Ambulation/Increased Activity; Distraction; Therapeutic presence  Response to Pain Intervention: Patient Satisfied  Vital Signs  Patient Currently in Pain: Yes       Orientation  Orientation  Overall Orientation Status: Within Normal Limits     Objective   Bed mobility  Rolling to Left: Contact guard assistance  Supine to Sit: Minimal assistance(w/HHA)  Sit to Supine: Minimal assistance  Scooting: Contact guard assistance  Comment: HOB raised use of HHA to complete bed mobility. Transfers  Sit to Stand: Contact guard assistance  Stand to sit: Contact guard assistance  Comments: Sit<>Stand x5 w/increased time necessary in between d/t calf cramping. Pt sat EOB~2 min in between each sit<>stand attempt to stretch bilat gastrocs and rest d/t muscle tightness. Ambulation  Ambulation?: No     Balance  Posture: Good  Sitting - Static: Good  Sitting - Dynamic: Good;-  Standing - Static: Fair;+  Standing - Dynamic: Fair  Comments: RW used while assessing standing balance; pt seated EOB~10 in between sit<>stand attempts SBA  Exercises  Seated LE exercise program: Long Arc Quads, hip abduction/adduction, heel/toe raises, and marches. Reps: x15  Manual gastroc stretch: 3 x30 sec  AA gastroc stretch w/sheet: 3 x30 sec  sit<>stand x5  Comments: Increased time necessary to complete exercise d/t R calf cramping. All exercise performed w/good carry over. Assessment   Body structures, Functions, Activity limitations: Decreased functional mobility ; Decreased strength;Decreased balance  Assessment: Pt grossly CGA t/o treatment. Amb deferred d/t R calf cramping upon standing and pt needing to sit and rest after each attempt. EDU pt on self stretching techniques using sheet with good carry over. Manual gastroc stretch 3x 30sec before attempting sit<>stand but pt still limited by R calf cramping. Increased time necessary to complete t/x this date d/t pt requiring sitting EOB ~2 min x5 SBA d/t pain.    Prognosis: Good  Patient Education: Rationale and purpose of stretching gastroc  REQUIRES PT FOLLOW UP: Yes  Activity Tolerance  Activity Tolerance: Patient limited by pain     Goals  Short term goals  Time Frame for Short term goals: 14 visits  Short term goal 1: Pt will be Betty with bed mobility  Short term goal 2: Pt will be Betty transfers  Short term goal 3: Pt will be SBA amb 125' RW   Short term goal 4: Pt will be safe to attempt steps    Plan    Plan  Times per week: 5-6x/wk  Current Treatment Recommendations: Strengthening, Balance Training, Functional Mobility Training, Transfer Training, Endurance Training, Cognitive Reorientation, Gait Training, Stair training, Home Exercise Program, Safety Education & Training, Equipment Evaluation, Education, & procurement, Patient/Caregiver Education & Training  Safety Devices  Type of devices: Call light within reach, Gait belt, Nurse notified, Bed alarm in place, Left in bed, Patient at risk for falls, All fall risk precautions in place  Restraints  Initially in place: No     Therapy Time   Individual Concurrent Group Co-treatment   Time In 1327         Time Out 1412         Minutes [address] [place], [place] Treatment performed by Student PTA under the supervision of co-signing PTA who agrees with all treatment and documentation.    Pamella Friend, PT

## 2019-04-24 NOTE — PROGRESS NOTES
Negative Pressure Wound Therapy Dressing Change and ileostomty care and eductaion    NAME:  Vonda Georgian OF BIRTH:  1965  MEDICAL RECORD NUMBER:  [id number]  DATE:  4/5/2019 04/24/19 1211   Ileostomy Ileostomy RUQ   Placement Date/Time: 04/08/19 1342   Pre-existing: No  Timeout: Patient; Appropriate Equipment;Sterile Technique using full body drape;Procedure  Inserted by: Dr Tereso Hernandez  Ileostomy Type: Ileostomy  Location: RUQ   Stomal Appliance 2 piece; Changed   Flange Size (inches) 2.25 Inches   Stoma  Assessment Moist;Red;Flush   Mucocutaneous Junction Intact   Peristomal Assessment Clean; Intact  (fading, scattered rash)   Treatment Bag change;Stoma powder;Liquid skin barrier  (ring barrier)   Stool Color Brown   Stool Appearance Loose; Soft   Output (mL) 125 ml   Incision 04/06/19 Abdomen   Date First Assessed/Time First Assessed: 04/06/19 1840   Location: Abdomen   Wound Assessment Clean;Granulation tissue; Red   Kevon-wound Assessment Dry; Intact   Closure Staples  (few intermittent staples remain)   Drainage Amount Small   Drainage Description Serosanguinous; Serous   Odor None   Dressing/Treatment Vacuum dressing   Dressing Changed Changed/New   Dressing Change Due 04/26/19   Negative Pressure Wound Therapy Abdomen Mid   Placement Date/Time: 04/12/19 1210   Location: Abdomen  Wound Location Orientation: Mid   Wound Type Surgical   Unit Type KCI Vac Ulta   Dressing Type Black foam   Number of pieces used 5   Cycle Continuous   Target Pressure (mmHg) 125   Canister changed? Yes   Dressing Status Changed   Dressing Changed Changed/New   Dressing Change Due 04/26/19     The abdominal incision is healing well without any s/s infection or degradation of the wound condition.  Applied Negative Pressure to abdominal wound/ incision   [x] Applied skin barrier prep to kevon-wound.  [x] Cut strips of plastic drape to picture frame wound so that kevon-wound is     covered with the drape.     [] If bridging dressing to less prominent site, cover any intact skin that will come in contact with the Negative Pressure Therapy sponge, gauze or channel drain with plastic drape. The sponge should never touch intact skin.  [x] Cut sponge, gauze or channel drain to size which will fit into the wound/ulcer bed without being forced.  [x] Be sure the sponge is large enough to hold the entire round plastic flange which is attached to the tubing. Never allow flange to be larger than the sponge or it will produce suction damaging intact skin.  Total number of individual pieces of foam used within the wound bed: 4  Plus one bolster piece to accommodate disc   [] If bridging the dressing away from the primary site, be sure the bridge leads to a piece of sponge large enough to hold the entire flange without allowing any of the flange to overlap onto intact skin.  [x] Covered sponge, gauze or channel drain with plastic drape.  [x] Cut a hole in this plastic drape directly over the sponge the same size as the plastic drain tubing.  [x] Removed plastic liner from flange and apply it directly over the hole you cut.  [x] Removed the plastic cover from the flange.  [x] Attached the tubing to the wound/ulcer Negative Pressure Therapy and turn it on to be sure a vacuum is created and that there are no leaks.  [x] If air leaks occur, use plastic drape to patch them.  [] Secured Negative Pressure Therapy dressing with ace wrap loosely if located on an extremity. Maintain tubing outside of ace wrap. Tubing must not exert pressure on intact skin. Routine ostomy care:    Cut barrier to fit stoma with no more than 1/8\" of exposed skin  Apply an Adapt Barrier Ring to the cut edge of the pouching system. Empty the pouch when 1/3 to 1/2 full. Change the pouching system twice weekly and prn  Our goal is to keep the skin around the stoma intact and to have a dependable pouching system without leaks.   The skin around the stoma should be intact and appear just like the skin on the opposite side of the abdomen. Recommendations for high volume ostomy system and a home ostomy system reviewed with [employer] rep., Linus Olvera at bedside. Call the [address] [place] [address] at [phone] with questions or concerns. Negative pressure dressing applied per  guidelines.       Electronically signed by Marlo Yousif W [address] [place] [address] on 4/24/2019 at 12:15 PM

## 2019-04-24 NOTE — CARE COORDINATION
[address] [place] [address] for update on authorization for outpatient dialysis. [employer] states it was approved on 4/23/2019 and sent to [facility] Admissions. Contacted [facility] admissions and spoke with coordinator [place]. Anthon states letter was sent at 4:33pm on 4/23/2019 to [facility]. Confirmed chair time, chair time incorrect. Requested updated letter with correct start time and date. Letter provided to patient. Informed of chair time and to arrive 30 minutes prior. Provided letter and updated  [facility].

## 2019-04-24 NOTE — PROGRESS NOTES
Nephrology Progress Note      SUBJECTIVE      Pt was seen and examined, no acute overnight. Reports urine output continues to improve  Continues to have some mild pain in abd, unchanged from previous  Denies fevers, chills, night sweats . Her creat is 3.3 today . OBJECTIVE      CURRENT TEMPERATURE:  Temp: 98.2 °F (36.8 °C)  MAXIMUM TEMPERATURE OVER 24HRS:  Temp (24hrs), Av.5 °F (36.9 °C), Min:98.1 °F (36.7 °C), Max:99.1 °F (37.3 °C)    CURRENT RESPIRATORY RATE:  Resp: 12  CURRENT PULSE:  Pulse: 96  CURRENT BLOOD PRESSURE:  BP: 106/68  24HR BLOOD PRESSURE RANGE:  Systolic (17MRX), HWC:337 , Min:102 , RBZ:177   ; Diastolic (90MVY), UST:05, Min:68, Max:72    24HR INTAKE/OUTPUT:      Intake/Output Summary (Last 24 hours) at 2019 0851  Last data filed at 2019 0730  Gross per 24 hour   Intake 650 ml   Output 3800 ml   Net -3150 ml     WEIGHT :  Patient Vitals for the past 96 hrs (Last 3 readings):   Weight   19 1200 176 lb 5.9 oz (80 kg)   19 0810 178 lb 9.2 oz (81 kg)   19 0525 192 lb 7.4 oz (87.3 kg)     PHYSICAL EXAM      GENERAL APPEARANCE:Awake, alert, in no acute distress  SKIN: warm and dry, no rash or erythema  EYES: conjunctivae normal and sclera anicteric  ENT: no thrush no pharyngeal congestion   NECK:   No JVD. No carotid bruits and no carotid lymphadenopathy . PULMONARY: lungs are clear to auscultation. No Wheezing, no ronchi . CADRDIOVASCULAR: S1 and S2 normal NO S3 and NO S4 . No rubs , no murmur.    ABDOMEN: soft nontender, bowel sounds present, ostomy noted   EXTREMITIES: no cyanosis, clubbing + thigh edema     CURRENT MEDICATIONS        midodrine (PROAMATINE) tablet 10 mg TID WC   metoprolol tartrate (LOPRESSOR) tablet 25 mg BID   warfarin (COUMADIN) daily dosing (placeholder) RX Placeholder   0.9 % sodium chloride bolus PRN   0.9 % sodium chloride bolus PRN   heparin (porcine) injection 1,600 Units PRN   heparin (porcine) injection 1,600 Units PRN   nicotine ischemic ATN and has needed multiple dialysis treatments however pt reports improving urination,   2. CKD stage III secondary to nephrosclerosis baseline 1.5-1.7  3. Ischemic bowel status post resection, has an ostomy   4.  H. influenzae bacteremia resolved  5. Anemia of chronic disease and blood loss    PLAN      1. No dialysis today , will see how her labs look tomorrow. 2. Increase oral intake  3. Continue proamatine 10 TID  4.  looking at out pt spot for her, working on [facility] dialysis. 5. She will need lasix orally as well. 6. Following   7. Please do not hesitate to call with questions.

## 2019-04-24 NOTE — PROGRESS NOTES
Pharmacy Note  Warfarin Consult follow-up      Recent Labs     04/24/19  0847   INR 2.4     Recent Labs     04/22/19  0552 04/22/19  1805 04/23/19  0610 04/24/19  0847   HGB 6.9* 8.3* 8.4* 8.8*   HCT 22.1* 25.9* 26.9* 27.3*   *  --  784* 928*       Significant Drug-Drug Interactions:  New warfarin drug-drug interactions: none  Discontinued drug-drug interactions: none  Current  Drug-Drug Interactions:   acetaminophen, aspirin, heparin gtt     Date                 INR        Dose   4/20/2019         1.3            5 mg    4.21                  1.2            5 mg    4/22/19             1.6            5 mg    4/23/19             2.7            Hold    4/24/19             2.4            2.5 mg    Notes:          Warfarin 5 mg daily caused INR to go up to fast.  Will reduce dosage to warfarin 2.5 mg daily starting today. Daily PT/INR while inpatient. [address] [place] [address]  Ph., CACP, Clinical Pharmacist  Anticoagulation Services, [facility]  4/24/2019  9:29 AM
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
## 2019-04-24 NOTE — PLAN OF CARE
Patient's bed was set to the lowest height. Non-skid footwear on. Call light and side table were within reach. Patient remained free from falls this shift.     Electronically signed by Kevin Lopes RN on 4/24/2019 at 2:33 AM

## 2019-04-24 NOTE — CARE COORDINATION
Transitional Planning    1050 Still requiring dialysis  Left vm for Carter Siad [phone] to follow up on OP HD insurance authorization  Patient has insurance approval for [facility]      2207 Approval for OP HD spot obtained for [facility]. T TH S  WBC 20 and fever today  No plan for DC today RN reports  Lore Sessions / [place] updated

## 2019-04-24 NOTE — PROGRESS NOTES
[facility]  Internal Medicine Residency Program  Inpatient Daily Progress Note  ______________________________________________________________________________    Patient: Cesia Puente  YOB: 1965   MRN: [id number]    Acct: [de-identified]     Admit date: 4/5/2019  Today's date: 04/24/19  Number of days in the hospital: 19       Admitting Diagnosis: Septic shock ([address]    Subjective:   Patient seen and examined at bedside. Alert and oriented. Stable vitals this AM.  No acute issues overnight. Patient is eating and drinking fine. On Coumadin. Heparin stopped    Objective:   Vital Sign:  /66   Pulse 100   Temp 100.6 °F (38.1 °C) (Temporal)   Resp 19   Ht 5' 1.02\" (1.55 m)   Wt 176 lb 5.9 oz (80 kg)   SpO2 98%   BMI 33.30 kg/m²       Physical Exam:  General appearance:   alert, well appearing, and in no distress  Mental Status: alert, oriented to person, place, and time  Neurologic:  alert, oriented, normal speech, no focal findings or movement disorder noted  Lungs:  clear to auscultation, no wheezes, rales or rhonchi, symmetric air entry  Heart[de-identified] normal rate, regular rhythm, normal S1, S2, no murmurs, rubs, clicks or gallops  Abdomen:  soft, nontender, nondistended, no masses or organomegaly.  Stoma on the right  Extremities: peripheral pulses normal, no pedal edema, no clubbing or cyanosis   Skin: normal coloration and turgor, no rashes, no suspicious skin lesions noted    Medications:  Scheduled Medications   warfarin  2.5 mg Oral Daily    bumetanide  1 mg Oral Daily    midodrine  10 mg Oral TID WC    metoprolol tartrate  25 mg Oral BID    warfarin (COUMADIN) daily dosing (placeholder)   Other RX Placeholder    nicotine  1 patch Transdermal Daily    nystatin  5 mL Oral 4x Daily    aspirin  81 mg Oral Daily    cyclobenzaprine  5 mg Oral TID    pantoprazole  40 mg Oral BID AC    sodium chloride flush  10 mL Intravenous 2 times per day morning. Will continue to monitor hemoglobin. · Levofloxacin discontinued.  Patient completed a course of meropenem. Continue to monitor patient off antibiotics  · Transvaginal ultrasound was normal.  ObGyn plans to follow the patient outpatient. · Patient stable from cardiology stand point. · Continue Midodrine for low BP if needed  · PT/OT  · Pain control with Morphine and Roxicodone PRN  · Continue aspirin. Heparin drip discontinued. · Patient on Coumadin 5 mg daily with pharmacy to dose. Durga Thomas will be discharged on Coumadin. INR checks will be done at [facility] where the patient is going. · Hypercoagulable workup negative. · Started nystatin for oral thrush.    · Continue renal diet  · Outpatient dialysis placement is pending. Patient will be discharged after dialysis placement and physical therapy, occupational therapy evaluation. John Ledezma MD  PGY-1, Department of Internal Medicine  [place], [place]  4/24/2019 11:16 AM Attending Physician Statement  I have discussed the care of Ferna Kussmaul, including pertinent history and exam findings,  with the resident. I have seen and examined the patient and the key elements of all parts of the encounter have been performed by me. I agree with the assessment, plan and orders as documented by the resident with additions . Treatment plan Discussed with nursing staff in detail , all questions answered . Electronically signed by Alcira Sandoval MD on   4/24/19 at 3:40 PM    Please note that this chart was generated using voice recognition Dragon dictation software. Although every effort was made to ensure the accuracy of this automated transcription, some errors in transcription may have occurred.

## 2019-04-24 NOTE — PROGRESS NOTES
Patient Name: Karl Rivera  Date of admission: 4/5/2019  8:39 PM  Patient's age: 47 y. o., 1965  Ad[facility] Dx: [facility] [R57.9]      Requesting Physician: Cathi Murphy MD    CHIEF COMPLAINT:  Abdominal pain  SUBJECTIVE:  . The patient was seen and examined. Continues to have abdominal pain. No active bleeding. Overall she is improving. No fever or chills. She will need long-term anticoagulation,   Started on Coumadin. Tolerated well so far. BRIEF CASE HISTORY:    The patient is a 47 y.o.  female who is admitted to the hospital for   for increased confusion and abdominal pain. She was found to have ischemic bowel and was taken to surgery on 4/6/18. Pathology showed ischemic bowel going to the margins. She was taken for a second surgery on 4/8/18 and more ischemic bowel was appreciated. The patient platelets were about 300,000 on admission and dropped about 98 with her multiple surgeries. Hit antibody was done and was negative. Platelets recovered since then. The patient continues to struggle with recovery. She underwent a CT scan of the abdomen and pelvis today that showed heterogenous changes in the spleen that the new. There was a suspicion of splenic infarction. Vascular were involved and started the patient in aspirin and Plavix as well as heparin. The patient is seen and examined. She is started on anticoagulation. She denies any active bleeding. She has very little insight about her disease and most of the information were obtained from the chart. It is no history of thromboembolism previously . family history is negative for arterial or venous thrombosis. The patient developed worsening renal failure and was started on hemodialysis. Past Medical History:   has a past medical history of Asthma, Back pain, chronic, Hypertension, Snores, and Wears glasses.     Past Surgical History:   has a past surgical history that includes Tonsillectomy; Knee arthroscopy (Left, 1980); Ulnar tunnel release (Right, 2013); Knee arthroscopy (Left, 09/28/2016); LAPAROTOMY EXPLORATORY (N/A, 4/6/2019); and LAPAROTOMY EXPLORATORY (N/A, 4/8/2019). Family History: family history includes Diabetes in her maternal grandfather and maternal grandmother; Emphysema in her maternal grandfather; Heart Disease in her mother; Heart Surgery in her mother; Vinod Hasmukh in her maternal uncle; Stroke in her mother. Social History:   reports that she has been smoking cigarettes. She started smoking about 38 years ago. She has been smoking about 0.25 packs per day. She has never used smokeless tobacco. She reports that she drinks alcohol. She reports that she does not use drugs. Medications:    Reviewed in EPIC     Allergies:  Pcn [penicillins]; Sulfa antibiotics; and Tape [adhesive tape]    REVIEW OF SYSTEMS:      Review of Systems  General: no fever or night sweats, Weight is stable. Progressive fatigue  ENT: No double or blurred vision, no tinnitus or hearing problem, no dysphagia or sore throat   Respiratory: No chest pain, no shortness of breath, no cough or hemoptysis. Cardiovascular: Denies chest pain, PND or orthopnea. No L E swelling or palpitations. Gastrointestinal:    Abdominal discomfort, wound VAC in place, no bleeding. Genitourinary: Denies dysuria, hematuria, frequency, urgency or incontinence. Neurological: Denies headaches, decreased LOC, no sensory or motor focal deficits. Musculoskeletal:  No arthralgia no back pain or joint swelling. Skin: There are no rashes or bleeding. Psychiatric:  No anxiety, no depression. Endocrine: no diabetes or thyroid disease. Hematologic: no bleeding , no adenopathy.                 PHYSICAL EXAM:      /72   Pulse 102   Temp 99.1 °F (37.3 °C) (Oral)   Resp 18   Ht 5' 1.02\" (1.55 m)   Wt 176 lb 5.9 oz (80 kg)   SpO2 96%   BMI 33.30  Gram negative septic shock (HCC) [A41.50, R65.21]     Rhabdomyolysis [M62.82] 04/06/2019    Hyponatremia [E87.1] 04/06/2019    Lactic acidosis [E87.2] 04/06/2019    MARY (acute kidney [facility]) ([facility] [N17.9] 47/47/6190    Metabolic acidosis [Z39.6] 04/06/2019    Acute respiratory failure ([facility] [J96.00] 04/06/2019    Elevated liver enzymes [R74.8] 04/06/2019    Hyperkalemia [E87.5]     Shock ([facility] [R57.9] 04/05/2019       RECOMMENDATIONS:  The patient is doing well clinically stable. Slight improvement. .   Continue heparin and aspirin  Started Coumadin. Target INR between 2 and 3. She will need long-term use of anticoagulation. Patient is aware. Leukocytosis , cultures are negative   Anemia. Status post blood transfusion yesterday. Hemoglobin is stable now.                                 [address] [place] [address] [facility]                          Cell: [phone]

## 2019-04-24 NOTE — CARE COORDINATION
Spoke with EDUARDO James. D/C not scheduled for today due labs being drawn. Patient scheduled to start outpatient dialysis 4/24/2019. Spoke with [facility] to update of potential change of start date. Spoke with dialysis manager Xander Mosher to inform her that patient may need dialysis for following day.

## 2019-04-25 ENCOUNTER — APPOINTMENT (OUTPATIENT)
Dept: DIALYSIS | Age: 54
DRG: 710 | End: 2019-04-25
Payer: MEDICAID

## 2019-04-25 LAB
ABSOLUTE EOS #: 0.87 K/UL (ref 0–0.4)
ABSOLUTE IMMATURE GRANULOCYTE: 0.87 K/UL (ref 0–0.3)
ABSOLUTE LYMPH #: 4.77 K/UL (ref 1–4.8)
ABSOLUTE MONO #: 0.65 K/UL (ref 0.1–0.8)
ALBUMIN SERPL-MCNC: 2.4 G/DL (ref 3.5–5.2)
ALBUMIN/GLOBULIN RATIO: 0.6 (ref 1–2.5)
ALP BLD-CCNC: 167 U/L (ref 35–104)
ALT SERPL-CCNC: 16 U/L (ref 5–33)
ANION GAP SERPL CALCULATED.3IONS-SCNC: 18 MMOL/L (ref 9–17)
AST SERPL-CCNC: 16 U/L
BASOPHILS # BLD: 0 % (ref 0–2)
BASOPHILS ABSOLUTE: 0 K/UL (ref 0–0.2)
BILIRUB SERPL-MCNC: 0.48 MG/DL (ref 0.3–1.2)
BILIRUBIN DIRECT: 0.21 MG/DL
BILIRUBIN, INDIRECT: 0.27 MG/DL (ref 0–1)
BUN BLDV-MCNC: 26 MG/DL (ref 6–20)
BUN/CREAT BLD: ABNORMAL (ref 9–20)
CALCIUM SERPL-MCNC: 8.4 MG/DL (ref 8.6–10.4)
CHLORIDE BLD-SCNC: 95 MMOL/L (ref 98–107)
CO2: 22 MMOL/L (ref 20–31)
CREAT SERPL-MCNC: 3.42 MG/DL (ref 0.5–0.9)
DIFFERENTIAL TYPE: ABNORMAL
EOSINOPHILS RELATIVE PERCENT: 4 % (ref 1–4)
GFR AFRICAN AMERICAN: 17 ML/MIN
GFR NON-AFRICAN AMERICAN: 14 ML/MIN
GFR SERPL CREATININE-BSD FRML MDRD: ABNORMAL ML/MIN/{1.73_M2}
GFR SERPL CREATININE-BSD FRML MDRD: ABNORMAL ML/MIN/{1.73_M2}
GLOBULIN: ABNORMAL G/DL (ref 1.5–3.8)
GLUCOSE BLD-MCNC: 113 MG/DL (ref 70–99)
HCT VFR BLD CALC: 29.3 % (ref 36.3–47.1)
HEMOGLOBIN: 9.1 G/DL (ref 11.9–15.1)
IMMATURE GRANULOCYTES: 4 %
INR BLD: 2.4
LYMPHOCYTES # BLD: 22 % (ref 24–44)
MCH RBC QN AUTO: 29.4 PG (ref 25.2–33.5)
MCHC RBC AUTO-ENTMCNC: 31.1 G/DL (ref 28.4–34.8)
MCV RBC AUTO: 94.8 FL (ref 82.6–102.9)
MONOCYTES # BLD: 3 % (ref 1–7)
MORPHOLOGY: ABNORMAL
MORPHOLOGY: ABNORMAL
NRBC AUTOMATED: 0 PER 100 WBC
PDW BLD-RTO: 15.1 % (ref 11.8–14.4)
PLATELET # BLD: 961 K/UL (ref 138–453)
PLATELET ESTIMATE: ABNORMAL
PMV BLD AUTO: 9 FL (ref 8.1–13.5)
POTASSIUM SERPL-SCNC: 3.8 MMOL/L (ref 3.7–5.3)
PROTHROMBIN TIME: 24 SEC (ref 9–12)
RBC # BLD: 3.09 M/UL (ref 3.95–5.11)
RBC # BLD: ABNORMAL 10*6/UL
SEG NEUTROPHILS: 67 % (ref 36–66)
SEGMENTED NEUTROPHILS ABSOLUTE COUNT: 14.54 K/UL (ref 1.8–7.7)
SODIUM BLD-SCNC: 135 MMOL/L (ref 135–144)
TOTAL PROTEIN: 6.2 G/DL (ref 6.4–8.3)
WBC # BLD: 21.7 K/UL (ref 3.5–11.3)
WBC # BLD: ABNORMAL 10*3/UL

## 2019-04-25 PROCEDURE — 99232 SBSQ HOSP IP/OBS MODERATE 35: CPT | Performed by: INTERNAL MEDICINE

## 2019-04-25 PROCEDURE — 2060000000 HC ICU INTERMEDIATE R&B

## 2019-04-25 PROCEDURE — 80048 BASIC METABOLIC PNL TOTAL CA: CPT

## 2019-04-25 PROCEDURE — 6370000000 HC RX 637 (ALT 250 FOR IP): Performed by: RADIOLOGY

## 2019-04-25 PROCEDURE — 36415 COLL VENOUS BLD VENIPUNCTURE: CPT

## 2019-04-25 PROCEDURE — 97116 GAIT TRAINING THERAPY: CPT

## 2019-04-25 PROCEDURE — 6370000000 HC RX 637 (ALT 250 FOR IP): Performed by: STUDENT IN AN ORGANIZED HEALTH CARE EDUCATION/TRAINING PROGRAM

## 2019-04-25 PROCEDURE — 85610 PROTHROMBIN TIME: CPT

## 2019-04-25 PROCEDURE — 85025 COMPLETE CBC W/AUTO DIFF WBC: CPT

## 2019-04-25 PROCEDURE — 80076 HEPATIC FUNCTION PANEL: CPT

## 2019-04-25 PROCEDURE — 6370000000 HC RX 637 (ALT 250 FOR IP): Performed by: INTERNAL MEDICINE

## 2019-04-25 PROCEDURE — 2580000003 HC RX 258: Performed by: STUDENT IN AN ORGANIZED HEALTH CARE EDUCATION/TRAINING PROGRAM

## 2019-04-25 PROCEDURE — 94762 N-INVAS EAR/PLS OXIMTRY CONT: CPT

## 2019-04-25 PROCEDURE — 6360000002 HC RX W HCPCS: Performed by: STUDENT IN AN ORGANIZED HEALTH CARE EDUCATION/TRAINING PROGRAM

## 2019-04-25 PROCEDURE — 97110 THERAPEUTIC EXERCISES: CPT

## 2019-04-25 PROCEDURE — 99212 OFFICE O/P EST SF 10 MIN: CPT

## 2019-04-25 RX ORDER — SODIUM CHLORIDE 9 MG/ML
INJECTION, SOLUTION INTRAVENOUS CONTINUOUS
Status: DISCONTINUED | OUTPATIENT
Start: 2019-04-25 | End: 2019-04-28

## 2019-04-25 RX ADMIN — ACETAMINOPHEN 650 MG: 325 TABLET ORAL at 13:19

## 2019-04-25 RX ADMIN — CYCLOBENZAPRINE 5 MG: 10 TABLET, FILM COATED ORAL at 13:19

## 2019-04-25 RX ADMIN — ASPIRIN 81 MG: 81 TABLET, CHEWABLE ORAL at 13:19

## 2019-04-25 RX ADMIN — METOPROLOL TARTRATE 25 MG: 25 TABLET ORAL at 22:13

## 2019-04-25 RX ADMIN — Medication 10 ML: at 13:22

## 2019-04-25 RX ADMIN — MIDODRINE HYDROCHLORIDE 10 MG: 5 TABLET ORAL at 17:12

## 2019-04-25 RX ADMIN — MIDODRINE HYDROCHLORIDE 10 MG: 5 TABLET ORAL at 13:18

## 2019-04-25 RX ADMIN — NYSTATIN 500000 UNITS: 500000 SUSPENSION ORAL at 17:12

## 2019-04-25 RX ADMIN — MORPHINE SULFATE 4 MG: 4 INJECTION INTRAVENOUS at 13:25

## 2019-04-25 RX ADMIN — MORPHINE SULFATE 4 MG: 4 INJECTION INTRAVENOUS at 06:33

## 2019-04-25 RX ADMIN — SODIUM CHLORIDE: 9 INJECTION, SOLUTION INTRAVENOUS at 13:35

## 2019-04-25 RX ADMIN — OXYCODONE HYDROCHLORIDE 5 MG: 5 TABLET ORAL at 22:12

## 2019-04-25 RX ADMIN — PANTOPRAZOLE SODIUM 40 MG: 40 TABLET, DELAYED RELEASE ORAL at 17:12

## 2019-04-25 RX ADMIN — SODIUM CHLORIDE: 9 INJECTION, SOLUTION INTRAVENOUS at 22:37

## 2019-04-25 RX ADMIN — OXYCODONE HYDROCHLORIDE 5 MG: 5 TABLET ORAL at 00:57

## 2019-04-25 RX ADMIN — BUMETANIDE 1 MG: 1 TABLET ORAL at 13:19

## 2019-04-25 RX ADMIN — OXYCODONE HYDROCHLORIDE 5 MG: 5 TABLET ORAL at 17:12

## 2019-04-25 RX ADMIN — NYSTATIN 500000 UNITS: 500000 SUSPENSION ORAL at 13:18

## 2019-04-25 RX ADMIN — ACETAMINOPHEN 650 MG: 325 TABLET ORAL at 22:12

## 2019-04-25 RX ADMIN — PANTOPRAZOLE SODIUM 40 MG: 40 TABLET, DELAYED RELEASE ORAL at 06:33

## 2019-04-25 RX ADMIN — METOPROLOL TARTRATE 25 MG: 25 TABLET ORAL at 13:19

## 2019-04-25 RX ADMIN — WARFARIN SODIUM 2.5 MG: 2.5 TABLET ORAL at 17:12

## 2019-04-25 RX ADMIN — OXYCODONE HYDROCHLORIDE 5 MG: 5 TABLET ORAL at 08:03

## 2019-04-25 RX ADMIN — NYSTATIN 500000 UNITS: 500000 SUSPENSION ORAL at 22:13

## 2019-04-25 RX ADMIN — ACETAMINOPHEN 650 MG: 325 TABLET ORAL at 17:12

## 2019-04-25 RX ADMIN — ACETAMINOPHEN 650 MG: 325 TABLET ORAL at 06:36

## 2019-04-25 RX ADMIN — CYCLOBENZAPRINE 5 MG: 10 TABLET, FILM COATED ORAL at 22:12

## 2019-04-25 ASSESSMENT — PAIN - FUNCTIONAL ASSESSMENT: PAIN_FUNCTIONAL_ASSESSMENT: PREVENTS OR INTERFERES SOME ACTIVE ACTIVITIES AND ADLS

## 2019-04-25 ASSESSMENT — PAIN SCALES - GENERAL
PAINLEVEL_OUTOF10: 8
PAINLEVEL_OUTOF10: 8
PAINLEVEL_OUTOF10: 7
PAINLEVEL_OUTOF10: 7
PAINLEVEL_OUTOF10: 8
PAINLEVEL_OUTOF10: 7
PAINLEVEL_OUTOF10: 8

## 2019-04-25 ASSESSMENT — PAIN DESCRIPTION - DESCRIPTORS: DESCRIPTORS: ACHING;DISCOMFORT

## 2019-04-25 ASSESSMENT — PAIN DESCRIPTION - ONSET: ONSET: ON-GOING

## 2019-04-25 ASSESSMENT — PAIN DESCRIPTION - PAIN TYPE: TYPE: ACUTE PAIN

## 2019-04-25 ASSESSMENT — PAIN DESCRIPTION - FREQUENCY: FREQUENCY: CONTINUOUS

## 2019-04-25 ASSESSMENT — PAIN DESCRIPTION - ORIENTATION: ORIENTATION: LOWER

## 2019-04-25 ASSESSMENT — PAIN DESCRIPTION - LOCATION: LOCATION: ABDOMEN
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
## 2019-04-25 NOTE — PROGRESS NOTES
Pharmacy Note  Warfarin Consult follow-up      Recent Labs     04/25/19  0631   INR 2.4     Recent Labs     04/23/19  0610 04/24/19  0847 04/25/19  0631   HGB 8.4* 8.8* 9.1*   HCT 26.9* 27.3* 29.3*   * 928* 961*     Significant Drug-Drug Interactions:  New warfarin drug-drug interactions: none  Discontinued drug-drug interactions: heparin  Current  Drug-Drug Interactions:   acetaminophen, aspirin     Date                 INR        Dose   4/20/2019         1.3            5 mg    4.21                  1.2            5 mg    4/22/19             1.6            5 mg    4/23/19             2.7            Hold    4/24/19             2.4            2.5 mg    4/25/19             2.4            2.5mg    Notes:   Continue with warfarin 2.5mg daily. Daily PT/INR while inpatient.       Eliza Medina RPh, JADA  Clinical Pharmacist Medication Management  4/25/2019  7:55 AM

## 2019-04-25 NOTE — PROGRESS NOTES
Occupational Therapy    Occupational Therapy Not Seen Note    DATE: 2019  Name: Karl Rivera  : 1965  MRN: [id number]    Patient not available for Occupational Therapy due to:    Hemodialysis        Electronically signed by PILLO Jo on 2019 at 2:47 PM

## 2019-04-25 NOTE — PROGRESS NOTES
High Volume system removed, two piece roll closure applied due to pieces of formed stool occluding drainage spout. 04/25/19 1442   Ileostomy Ileostomy RUQ   Placement Date/Time: 04/08/19 1342   Pre-existing: No  Timeout: Patient; Appropriate Equipment;Sterile Technique using full body drape;Procedure  Inserted by: Dr David Mejia  Ileostomy Type: Ileostomy  Location: RUQ   Stomal Appliance 2 piece;Leaking; Changed   Flange Size (inches) 2.25 Inches   Stoma  Assessment Red;Moist;Flush   Mucocutaneous Junction Intact   Peristomal Assessment Denuded;Clean   Treatment Bag change;Site care  [facility] #[id number] pouch, #[id number] skin barrier, #[id number])   Stool Color Green;Brown   Stool Appearance Loose; Soft     ADDITIONAL OSTOMY SYSTEMS ARE AVAILABLE IN HER ROOM  A two piece high output system (the skin barrier will need to be cut to fit) and a one piece convex system which is already cut to fit. NPWT dressing intact. Minimal output in cannister. Cut barrier to fit stoma with no more than 1/8\" of exposed skin. Apply an [employer] Barrier Ring to the cut edge of the pouching system. Empty the pouch when 1/3 to 1/2 full. Change the pouching system twice weekly and prn  Our goal is to keep the skin around the stoma intact and to have a dependable pouching system without leaks. The skin around the stoma should be intact and appear just like the skin on the opposite side of the abdomen. Call the [address] [place] [address] at [phone] with questions or concerns.

## 2019-04-25 NOTE — PLAN OF CARE
Problem: Pain:  Goal: Control of acute pain  Description  Control of acute pain  4/25/2019 0249 by Manuel Estrada RN  Outcome: Ongoing  Note:   Pt able to communicate pain as needed, uses call light appropriately. Pt satisfied with pain interventions.

## 2019-04-25 NOTE — PROGRESS NOTES
NEPHROLOGY PROGRESS NOTE      SUBJECTIVE   Hospitalized with the delirium associated with abdominal pain nausea vomiting.  Initial assessment showed hypotensive lady with the imaging studies revealing evidence of ileus.  Further investigations demonstrated neutrophilic leukocytosis along with acute hepatitis/acute kidney injury and elevated lactic acid.  Underwent surgical resection for ischemic gut.  Hospital course further complicated by persistent atrial fibrillation needing DC cardioversion.  She was started on dialysis in view of life-threatening hyperkalemia and acute kidney injury      Urine output is improving. Nearly 4 L recorded. Also has high stomal output. Currently on hemodialysis predominantly for clearances. Volume status is improved significantly. Blood pressure stable. Appetite descent. OBJECTIVE     Vitals:    04/25/19 0932 04/25/19 1002 04/25/19 1032 04/25/19 1102   BP: 136/83 123/81 130/85 131/85   Pulse: 108 109 113 112   Resp:       Temp:       TempSrc:       SpO2:       Weight:       Height:         24HR INTAKE/OUTPUT:      Intake/Output Summary (Last 24 hours) at 4/25/2019 1125  Last data filed at 4/25/2019 0526  Gross per 24 hour   Intake 1050 ml   Output 3725 ml   Net -2675 ml       General appearance:Awake, alert, in no acute distress  HEENT: PERRLA  Respiratory::vesicular breath sounds,no wheeze/crackles  Cardiovascular:S1 S2 normal,no gallop or organic murmur. Abdomen: Present stoma  Extremities: No Cyanosis or Clubbing,Lower extremity edema  Neurological:Alert and oriented. No abnormalities of mood, affect, memory, mentation, or behavior are noted      MEDICATIONS     Scheduled Meds:    warfarin  2.5 mg Oral Daily    bumetanide  1 mg Oral Daily    midodrine  10 mg Oral TID     metoprolol tartrate  25 mg Oral BID    warfarin (COUMADIN) daily dosing (placeholder)   Other RX Placeholder    nicotine  1 patch Transdermal Daily    nystatin  5 mL Oral 4x Daily    aspirin  81 mg Oral Daily    cyclobenzaprine  5 mg Oral TID    pantoprazole  40 mg Oral BID AC    sodium chloride flush  10 mL Intravenous 2 times per day     Continuous Infusions:    dextrose       PRN Meds:  sodium chloride, sodium chloride, heparin (porcine), heparin (porcine), sodium chloride, sodium chloride, acetaminophen, glucose, dextrose, glucagon (rDNA), dextrose, oxyCODONE, morphine **OR** morphine, sodium chloride, sodium chloride, dextrose, sodium chloride flush, magnesium hydroxide, ondansetron  Home Meds:                Medications Prior to Admission: ibuprofen (ADVIL;MOTRIN) 800 MG tablet, Take 800 mg by mouth every 6 hours as needed for Pain  ALPRAZolam (XANAX) 0.25 MG tablet, Take 0.25 mg by mouth nightly as needed for Sleep or Anxiety. venlafaxine (EFFEXOR) 75 MG tablet, Take 100 mg by mouth 2 times daily   [DISCONTINUED] celecoxib (CELEBREX) 200 MG capsule, Take 1 capsule by mouth 2 times daily  [DISCONTINUED] traMADol (ULTRAM) 50 MG tablet, 1 tablet PO BID PRN pain  [DISCONTINUED] oxyCODONE-acetaminophen (PERCOCET) 5-325 MG per tablet, Take 1 tablet by mouth 2 times daily as needed for Pain . [DISCONTINUED] diclofenac (VOLTAREN) 75 MG EC tablet, Take 1 tablet by mouth 2 times daily  [DISCONTINUED] oxyCODONE-acetaminophen (PERCOCET) 5-325 MG per tablet, Take 1 tablet by mouth every 6 hours as needed for Pain . [DISCONTINUED] aspirin 325 MG EC tablet, Take 1 tablet by mouth 2 times daily for 14 days  [DISCONTINUED] Misc. Devices (ROLLER WALKER) MISC, 1 each by Does not apply route daily  QVAR 40 MCG/ACT inhaler, Inhale 2 puffs into the lungs 2 times daily  [DISCONTINUED] Calcium Citrate-Vitamin D (CALCIUM + D PO), Take 1 tablet by mouth daily  [DISCONTINUED] Misc.  Devices MISC, As directed by physician daily  [DISCONTINUED] diclofenac (VOLTAREN) 50 MG EC tablet, Take 1 tablet by mouth 2 times daily  [DISCONTINUED] docusate sodium (COLACE) 100 MG capsule, Take 1 capsule by mouth 2 times daily as needed for Constipation  gabapentin (NEURONTIN) 100 MG capsule, Take 100 mg by mouth 3 times daily as needed   lisinopril-hydrochlorothiazide (PRINZIDE;ZESTORETIC) 10-12.5 MG per tablet, Take 1 tablet by mouth daily  albuterol (PROVENTIL) (2.5 MG/3ML) 0.083% nebulizer solution, Take 2.5 mg by nebulization every 6 hours as needed for Wheezing  albuterol sulfate  (90 BASE) MCG/ACT inhaler, Inhale 2 puffs into the lungs every 6 hours as needed for Wheezing    INVESTIGATIONS     Last 3 CMP:    Recent Labs     04/23/19  0610 04/24/19  0847 04/25/19  0631   * 133* 135   K 3.9 4.2 3.8   CL 94* 93* 95*   CO2 24 26 22   BUN 15 24* 26*   CREATININE 2.63* 3.32* 3.42*   CALCIUM 8.3* 8.7 8.4*   PROT  --   --  6.2*   LABALBU  --   --  2.4*   BILITOT  --   --  0.48   ALKPHOS  --   --  167*   AST  --   --  16   ALT  --   --  16       Last 3 CBC:  Recent Labs     04/23/19  0610 04/24/19  0847 04/25/19  0631   WBC 19.9* 20.6* 21.7*   RBC 2.86* 2.99* 3.09*   HGB 8.4* 8.8* 9.1*   HCT 26.9* 27.3* 29.3*   MCV 94.1 91.3 94.8   MCH 29.4 29.4 29.4   MCHC 31.2 32.2 31.1   RDW 15.5* 15.2* 15.1*   * 928* 961*   MPV 9.8 9.3 9.0       ASSESSMENT     1.  Acute kidney injury nonoliguric secondary to ischemic - hypoperfusion (Hypotension/arrthymia/ischemic gut ) and nephrotoxic (contrast) ATN aggravated further by concomitant NSAID and diuretic use along with cocaine - hemodialysis dependent - tunnel catheter in place TTS  Creat peaked at 4 with hyperkalemia 8  Baseline creat in Jan 2017 was normal  Ultrasound shows right kidney 10.4 cm and left 10.2 cm  Serologies neg  2.  Volume overload- iatrogenic/capillary leak syndrome - resolved  3.  Ischemic gut status post Ileocecectomy, extended left hemicolectomy, placement of  ABThera wound VAC on 4/6 4/8 Second Look exploratory upper, with resection of transverse colon AND ileostomy  4.  Sepsis - blood culture positive for H.infleuenza  5.  Bilateral adrenal masses 11 mm left and 18 mm on the right  6.  Anemia  7.  Persistent Afib S/o cardioversion  8. 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 PLAN     1. Seen and examined on hemodialysis. Orders reviewed with the nursing staff. 2.  Increased urine output noted anticipating renal recovery. We'll hold of hemodialysis over the weekend. If renal function is improving we will plan to discontinue tunnel catheter on Monday. She is on anticoagulation which will need to be stopped prior to removal of tunneled catheter  3.   Watch for renal recovery  Please do not hesitate to call with questions    This note is created with the assistance of a speech-recognition program. While intending to generate a document that actually reflects the content of the visit, no guarantees can be provided that every mistake has been identified and corrected by editing    Sloan Perdomo MD, [facility] Irina Winkler, [address]   4/25/2019 11:25 AM  [facility] [place]

## 2019-04-25 NOTE — PROGRESS NOTES
Physical Therapy  DATE: 2019    NAME: Desi Boateng  MRN: [id number]   : 1965    Patient not seen this date for Physical Therapy due to:  [] Blood transfusion in progress  [x] Hemodialysis (will check back in the PM as time allows)  []  Patient Declined  [] Spine Precautions   [] Strict Bedrest  [] Surgery/ Procedure  [] Testing      [] Other        [] PT being discontinued at this time. Patient independent. No further needs. [] PT being discontinued at this time as the patient has been transferred to palliative care. No further needs.     Julia Calixto, PTA

## 2019-04-25 NOTE — PROGRESS NOTES
Physical Therapy  Facility/Department: [address] STEPDOWN  Daily Treatment Note  NAME: Tomas Camacho  : 1965  MRN: [id number]    Date of Service: 2019    Discharge Recommendations:  Further therapy recommended at discharge. Patient Diagnosis(es): The primary encounter diagnosis was Acute renal failure, unspecified acute renal failure type ([facility] [address] A diagnosis of Altered mental status, unspecified altered mental status type was also pertinent to this visit. has a past medical history of Asthma, Back pain, chronic, Hypertension, Snores, and Wears glasses. has a past surgical history that includes Tonsillectomy; Knee arthroscopy (Left, ); Ulnar tunnel release (Right, ); Knee arthroscopy (Left, 2016); LAPAROTOMY EXPLORATORY (N/A, 2019); and LAPAROTOMY EXPLORATORY (N/A, 2019). Restrictions  Restrictions/Precautions  Restrictions/Precautions: Fall Risk, Contact Precautions  Required Braces or Orthoses?: No  Position Activity Restriction  Other position/activity restrictions: up with assist. s/p  ILEOCECECTOMY, SUBTOTAL COLECTOMY ; s/p 2ND LOOK EXPLORATORY LAPAROTOMY with ileostomy creation   Subjective   General  Response To Previous Treatment: Patient with no complaints from previous session. Family / Caregiver Present: No  Subjective  Subjective: RN and pt agreeable to PT. Pt alert in bed upon arrival. Pt reports pain 7/0 in abdomen upon therapist arrival.   Pain Screening  Patient Currently in Pain: Yes  Pain Assessment  Pain Assessment: 0-10  Pain Level: 7  Pain Type: Acute pain  Pain Location: Abdomen  Pain Orientation: Lower  Pain Descriptors: Aching;Discomfort  Pain Frequency: Continuous  Pain Onset: On-going  Functional Pain Assessment: Prevents or interferes some active activities and ADLs  Non-Pharmaceutical Pain Intervention(s): Ambulation/Increased Activity; Distraction  Response to Pain Intervention: Patient Satisfied  Vital Signs  Patient Currently in Pain: Yes       Orientation  Orientation  Overall Orientation Status: Within Normal Limits     Objective   Bed mobility  Rolling to Left: Contact guard assistance  Supine to Sit: Minimal assistance(w/HHA)  Sit to Supine: Minimal assistance  Scooting: Contact guard assistance  Comment: HOB raised use of HHA to complete bed mobility. Transfers  Sit to Stand: Contact guard assistance  Stand to sit: Contact guard assistance  Comment: Sit<>stand x3 CGA. Pt able to maintain standing 3 trials x ~1 min before R calf cramping. Ambulation  Ambulation?: Yes  Ambulation 1  Surface: level tile  Device: Rolling Walker  Assistance: Contact guard assistance  Quality of Gait: felxed posture, slow flaco   Distance: 5ft  Comments: Distance limited to \"leg cramps\" in RLE per pt. Stairs/Curb  Stairs?: No     Balance  Posture: Fair  Sitting - Static: Good  Sitting - Dynamic: Good;-  Standing - Static: Fair;+  Standing - Dynamic: Fair;+  Comments: RW used while assessing standing balance; pt seated EOB ~5min in between sit<>stand attempts SBA  Exercises  Supine Exercises: Ankle Pumps, Gluteal sets, Hamstring Sets, Heel Slides, Hip ABD/ADD, Hip IR/ER, Quad Sets, SAQ, SLR. Reps: x15  Manual gastroc stretch 3x30 sec  Sit<>stand x2 for ~1min each trial   Comments: Exercise tolerated well w/good return demo and carry over. Assessment   Body structures, Functions, Activity limitations: Decreased functional mobility ; Decreased strength;Decreased balance  Assessment: Pt grossly CGA t/o treatment. Pt amb 5ft w/RW CGA before R calf cramping required pt to return to EOB. Attempted amb x2 more trials but pt deferred d/t calf cramping. Supine exercise tolerated well w/good return demo. Prognosis: Good  Patient Education: Importance of ambulation   REQUIRES PT FOLLOW UP: Yes  Activity Tolerance  Activity Tolerance: Patient limited by pain  Activity Tolerance: Cramping in R calf, per pt it used to be L calf as well but has improved since. Goals  Short term goals  Time Frame for Short term goals: 14 visits  Short term goal 1: Pt will be Betty with bed mobility  Short term goal 2: Pt will be Betty transfers  Short term goal 3: Pt will be SBA amb 125' RW   Short term goal 4: Pt will be safe to attempt steps    Plan    Plan  Times per week: 5-6x/wk  Current Treatment Recommendations: Strengthening, Balance Training, Functional Mobility Training, Transfer Training, Endurance Training, Cognitive Reorientation, Gait Training, Stair training, Home Exercise Program, Safety Education & Training, Equipment Evaluation, Education, & procurement, Patient/Caregiver Education & Training  Safety Devices  Type of devices: Call light within reach, Gait belt, Nurse notified, Bed alarm in place, Left in bed, Patient at risk for falls, All fall risk precautions in place  Restraints  Initially in place: No     Therapy Time   Individual Concurrent Group Co-treatment   Time In 1501         Time Out 1539         Minutes [address], [place] Treatment performed by Student PTA under the supervision of co-signing PTA who agrees with all treatment and documentation.    David Cisneros PTA

## 2019-04-25 NOTE — PROGRESS NOTES
Patient Name: Alok Nevarez  Date of admission: 4/5/2019  8:39 PM  Patient's age: 47 y. o., 1965  Admission Dx: Shock ([address] [R57.9]      Requesting Physician: Renan Albarran MD    CHIEF COMPLAINT:  Abdominal pain  SUBJECTIVE:  . The patient was seen and examined. Continues to have abdominal pain. No active bleeding. Overall she is improving. No fever or chills. She will need long-term anticoagulation,   Started on Coumadin. Tolerated well so far. BRIEF CASE HISTORY:    The patient is a 47 y.o.  female who is admitted to the hospital for   for increased confusion and abdominal pain. She was found to have ischemic bowel and was taken to surgery on 4/6/18. Pathology showed ischemic bowel going to the margins. She was taken for a second surgery on 4/8/18 and more ischemic bowel was appreciated. The patient platelets were about 300,000 on admission and dropped about 98 with her multiple surgeries. Hit antibody was done and was negative. Platelets recovered since then. The patient continues to struggle with recovery. She underwent a CT scan of the abdomen and pelvis today that showed heterogenous changes in the spleen that the new. There was a suspicion of splenic infarction. Vascular were involved and started the patient in aspirin and Plavix as well as heparin. The patient is seen and examined. She is started on anticoagulation. She denies any active bleeding. She has very little insight about her disease and most of the information were obtained from the chart. It is no history of thromboembolism previously . family history is negative for arterial or venous thrombosis. The patient developed worsening renal failure and was started on hemodialysis. Past Medical History:   has a past medical history of Asthma, Back pain, chronic, Hypertension, Snores, and Wears glasses.     Past Surgical History:   has a past surgical history that includes Tonsillectomy; Knee arthroscopy (Left, 1980); Ulnar tunnel release (Right, 2013); Knee arthroscopy (Left, 09/28/2016); LAPAROTOMY EXPLORATORY (N/A, 4/6/2019); and LAPAROTOMY EXPLORATORY (N/A, 4/8/2019). Family History: family history includes Diabetes in her maternal grandfather and maternal grandmother; Emphysema in her maternal grandfather; Heart Disease in her mother; Heart Surgery in her mother; Willia Albe in her maternal uncle; Stroke in her mother. Social History:   reports that she has been smoking cigarettes. She started smoking about 38 years ago. She has been smoking about 0.25 packs per day. She has never used smokeless tobacco. She reports that she drinks alcohol. She reports that she does not use drugs. Medications:    Reviewed in EPIC     Allergies:  Pcn [penicillins]; Sulfa antibiotics; and Tape [adhesive tape]    REVIEW OF SYSTEMS:      Review of Systems  General: no fever or night sweats, Weight is stable. Progressive fatigue  ENT: No double or blurred vision, no tinnitus or hearing problem, no dysphagia or sore throat   Respiratory: No chest pain, no shortness of breath, no cough or hemoptysis. Cardiovascular: Denies chest pain, PND or orthopnea. No L E swelling or palpitations. Gastrointestinal:    Abdominal discomfort, wound VAC in place, no bleeding. Genitourinary: Denies dysuria, hematuria, frequency, urgency or incontinence. Neurological: Denies headaches, decreased LOC, no sensory or motor focal deficits. Musculoskeletal:  No arthralgia no back pain or joint swelling. Skin: There are no rashes or bleeding. Psychiatric:  No anxiety, no depression. Endocrine: no diabetes or thyroid disease. Hematologic: no bleeding , no adenopathy.                 PHYSICAL EXAM:      /78   Pulse 101   Temp 99.1 °F (37.3 °C) (Axillary)   Resp 21   Ht 5' 1.02\" (1.55 m)   Wt 176 lb 5.9 oz (80 kg)   SpO2 98%   BMI 33.30 kg/m²    Temp (24hrs), Av.1 °F (37.3 °C), Min:98.1 °F (36.7 °C), Max:100.6 °F (38.1 °C)      Physical Exam  Gen.  Exam, showed a well-appearing patient without evidence of distress or pain  HEENT, normocephalic and atraumatic, PERRLA extraocular muscles are intact  Neck showed no JVD no carotid bruit, no cervical adenopathy  Chest is clear to auscultation bilaterally  Heart is regular without any murmur  Abdomen left upper quadrant tenderness but the abdomen was soft  Lower extremities showed no edema clubbing or cyanosis  Neurological examination was nonfocal, with intact cranial nerves  Skin  No rashes or bruising appreciated          DATA:      Labs:       CBC:   Recent Labs     19  0610 19  0847   WBC 19.9* 20.6*   HGB 8.4* 8.8*   HCT 26.9* 27.3*   * 928*     BMP:   Recent Labs     19  0610 19  0847   * 133*   K 3.9 4.2   CO2 24 26   BUN 15 24*   CREATININE 2.63* 3.32*   LABGLOM 19* 15*   GLUCOSE 105* 108*     PT/INR:   Recent Labs     19  0610 19  0847   PROTIME 26.6* 23.3*   INR 2.7 2.4     APTT:  Recent Labs     19  2245 19  0610   APTT 92.9* 72.5*     LIVER PROFILE:  Recent Labs     19  0552   AST 15   ALT 18   LABALBU 2.2*               IMPRESSION:    Primary Problem  Septic shock [facility])    Active Hospital Problems    Diagnosis Date Noted    Acute renal failure (HCC) [N17.9]     Altered mental status [R41.82]     PMB (postmenopausal bleeding) [N95.0]     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]     Leukemoid reaction [D72.823]     Mottled skin [L81.9]     Pleural effusion, bilateral [J90]     Protein-calorie malnutrition ([address] [E46]     Septic shock ([address] [A41.9, R65.21]     Gastroenteritis [K52.9]     [facility]enzae [facility] [A41.3]     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[facility] [K55.049] 2019    Small bowel ischemia (HCC) [K55.9] 2019    Acute GI bleeding [K92.2] 2019    Gram negative septic shock ([facility] [A41.50, R65.21]     Rhabdomyolysis [M62.82] 04/06/2019    Hyponatremia [E87.1] 04/06/2019    Lactic acidosis [E87.2] 04/06/2019    MARY (acute kidney injury) ([facility] [N17.9] 91/25/0360    Metabolic acidosis [H05.8] 04/06/2019    Acute respiratory failure ([facility] [J96.00] 04/06/2019    Elevated liver enzymes [R74.8] 04/06/2019    Hyperkalemia [E87.5]     Shock ([facility] [R57.9] 04/05/2019       RECOMMENDATIONS:  The patient is doing well clinically stable. Slight improvement. .   Continue heparin and aspirin  Coumadin. Target INR between 2 and 3. She will need long-term use of anticoagulation. Patient is aware. Leukocytosis , cultures are negative   Anemia. Status post blood transfusion yesterday. Hemoglobin is stable now.                                 [address] [place] [address] [facility]                          Cell: [phone]

## 2019-04-25 NOTE — PROGRESS NOTES
[facility]  Internal Medicine Residency Program  Inpatient Daily Progress Note  ______________________________________________________________________________    Patient: Johanny Sigala  YOB: 1965   MRN: [id number]    Acct: [de-identified]     Admit date: 4/5/2019  Today's date: 04/25/19  Number of days in the hospital: 20       Admitting Diagnosis: Septic shock ([address]    Subjective:   Patient seen and examined at bedside and again in the Dialysis unit. Alert and oriented. Stable vitals this AM.  No acute issues overnight. Patient is eating and drinking fine. She stated that her appetite is increasing. No complains of pain in the abdomen. Output from the stoma is becoming solid. Output is high. 1.4 L in 24 hours. She is spiking fevers which are managed by Tylenol. Review of Systems -  General ROS: negative for - chills, fatigue,+ fever   ENT ROS: negative for - headaches, oral lesions or sore throat  Cardiovascular ROS: no chest pain , orthopnea or pnd   Gastrointestinal ROS: abdominal pain, change in bowel habits, or black or bloody stools  Skin - no rash   Neuro - no blurry vision , no loc .  No focal weakness   msk - no jt tenderness or swelling    Vascular - no claudication , rest completed and negative   Lymphatic - complete and negative   Hematology - oncology - complete and negative   Allergy immunology - complete and negative    no burning or hematuria    Objective:   Vital Sign:  /85   Pulse 112   Temp 98.4 °F (36.9 °C)   Resp 19   Ht 5' 1.02\" (1.55 m)   Wt 158 lb 8.2 oz (71.9 kg)   SpO2 98%   BMI 29.93 kg/m²       Physical Exam:  General appearance:   alert, well appearing, and in no distress  Mental Status: alert, oriented to person, place, and time  Neurologic:  alert, oriented, normal speech, no focal findings or movement disorder noted  Lungs:  clear to auscultation, no wheezes, rales or rhonchi, symmetric air entry  Heart[de-identified] normal rate, regular rhythm, normal S1, S2, no murmurs, rubs, clicks or gallops  Abdomen:  soft, nontender, nondistended, no masses or organomegaly. Stoma on the right.  Wound vac in place  Extremities: peripheral pulses normal, no pedal edema, no clubbing or cyanosis   Skin: normal coloration and turgor, no rashes, no suspicious skin lesions noted    Medications:  Scheduled Medications   warfarin  2.5 mg Oral Daily    bumetanide  1 mg Oral Daily    midodrine  10 mg Oral TID WC    metoprolol tartrate  25 mg Oral BID    warfarin (COUMADIN) daily dosing (placeholder)   Other RX Placeholder    nicotine  1 patch Transdermal Daily    nystatin  5 mL Oral 4x Daily    aspirin  81 mg Oral Daily    cyclobenzaprine  5 mg Oral TID    pantoprazole  40 mg Oral BID AC    sodium chloride flush  10 mL Intravenous 2 times per day       PRN Medications  sodium chloride 250 mL PRN   sodium chloride 150 mL PRN   heparin (porcine) 1,600 Units PRN   heparin (porcine) 1,600 Units PRN   sodium chloride 250 mL PRN   sodium chloride 150 mL PRN   acetaminophen 650 mg Q4H PRN   glucose 15 g PRN   dextrose 12.5 g PRN   glucagon (rDNA) 1 mg PRN   dextrose 100 mL/hr PRN   oxyCODONE 5 mg Q4H PRN   morphine 4 mg Q3H PRN   Or     morphine 6 mg Q3H PRN   sodium chloride 250 mL PRN   sodium chloride 150 mL PRN   dextrose 25 g PRN   sodium chloride flush 10 mL PRN   magnesium hydroxide 30 mL Daily PRN   ondansetron 4 mg Q6H PRN       Diagnostic Labs and Imaging:  CBC:  Recent Labs     04/23/19  0610 04/24/19  0847 04/25/19  0631   WBC 19.9* 20.6* 21.7*   HGB 8.4* 8.8* 9.1*   * 928* 961*     BMP: Recent Labs     04/23/19  0610 04/24/19  0847 04/25/19  0631   * 133* 135   K 3.9 4.2 3.8   CL 94* 93* 95*   CO2 24 26 22   BUN 15 24* 26*   CREATININE 2.63* 3.32* 3.42*   GLUCOSE 105* 108* 113*     Hepatic: Recent Labs     04/25/19  0631   AST 16   ALT 16   BILITOT 0.48   ALKPHOS 167*       Assessment and Plan:     Principal Problem: Septic shock (HCC)  Active Problems:    Shock ([facility] [address]    Rhabdomyolysis    Hyponatremia    Lactic acidosis    MARY (acute kidney injury) ([facility] [address]    Metabolic acidosis    Acute respiratory failure (HCC)    Elevated liver enzymes    Hyperkalemia    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HCC)    Small bowel ischemia (HCC)    Acute GI bleeding    Gram negative septic shock (HCC)    Protein-calorie malnutrition (HCC)    Gastroenteritis    Haemophilus influenzae septicemia (HCC)    Pleural effusion, bilateral    Splenic infarction    Leukemoid reaction    Mottled skin    Acute renal failure (HCC)    Altered mental status    PMB (postmenopausal bleeding)  Resolved Problems:    * No resolved hospital problems. *    · Hb is 9.1 this morning. Will continue to monitor hemoglobin. · Levofloxacin discontinued.  Patient completed a course of meropenem. Continue to monitor patient off antibiotics. · Transvaginal ultrasound was normal.  ObGyn plans to follow the patient outpatient. · Patient stable from cardiology stand point. · Continue Midodrine for low BP if needed  · Pain control with Morphine and Roxicodone PRN  · Continue aspirin.  Heparin drip discontinued. · Patient on Coumadin daily with pharmacy to dose. Ирина Lyon will be discharged on Coumadin.  INR checks will be done at [facility] the patient is going. · Hypercoagulable workup negative. · Started nystatin for oral thrush.    · Continue renal diet  · Outpatient dialysis approval at [address] recommendations from General Surgery regarding wound vac. · Nephrology plans to hold off dialysis over the weekend. If renal function is improving we will plan to discontinue tunnel catheter on Monday.   She is on anticoagulation which will need to be stopped prior to removal of tunneled catheter    Juan Carlos Petty MD  PGY-1, Department of Internal Medicine  [place], [place]  4/25/2019 11:36 AM    Attending Physician

## 2019-04-25 NOTE — PLAN OF CARE
Problem: Pain:  Goal: Control of acute pain  Description  Control of acute pain  Outcome: Ongoing  Note:   Pt able to communicate pain as needed, uses call light appropriately. Pt satisfied with pain interventions.

## 2019-04-25 NOTE — CARE COORDINATION
Transitional Planning    1500 Insurance auth for SNF is good thru Monday per Mary Alice Samuels / Mary Lo  Approval for OP HD spot obtained for [facility]. T [facility], scheduled to start on Saturday. Per renal note following for possible removal of perm cath if renal function cont to improve.   ID monitoring off of ATB - WBC 21.7 today Tmax past 24 hours 101.1

## 2019-04-26 LAB
ABSOLUTE EOS #: 0 K/UL (ref 0–0.4)
ABSOLUTE IMMATURE GRANULOCYTE: 0.9 K/UL (ref 0–0.3)
ABSOLUTE LYMPH #: 2.46 K/UL (ref 1–4.8)
ABSOLUTE MONO #: 2.24 K/UL (ref 0.1–0.8)
ANION GAP SERPL CALCULATED.3IONS-SCNC: 14 MMOL/L (ref 9–17)
BASOPHILS # BLD: 0 % (ref 0–2)
BASOPHILS ABSOLUTE: 0 K/UL (ref 0–0.2)
BUN BLDV-MCNC: 15 MG/DL (ref 6–20)
BUN/CREAT BLD: ABNORMAL (ref 9–20)
CALCIUM SERPL-MCNC: 8.3 MG/DL (ref 8.6–10.4)
CHLORIDE BLD-SCNC: 101 MMOL/L (ref 98–107)
CO2: 23 MMOL/L (ref 20–31)
CREAT SERPL-MCNC: 2.33 MG/DL (ref 0.5–0.9)
DIFFERENTIAL TYPE: ABNORMAL
EOSINOPHILS RELATIVE PERCENT: 0 % (ref 1–4)
GFR AFRICAN AMERICAN: 27 ML/MIN
GFR NON-AFRICAN AMERICAN: 22 ML/MIN
GFR SERPL CREATININE-BSD FRML MDRD: ABNORMAL ML/MIN/{1.73_M2}
GFR SERPL CREATININE-BSD FRML MDRD: ABNORMAL ML/MIN/{1.73_M2}
GLUCOSE BLD-MCNC: 83 MG/DL (ref 70–99)
HCT VFR BLD CALC: 26.5 % (ref 36.3–47.1)
HEMOGLOBIN: 8.3 G/DL (ref 11.9–15.1)
IMMATURE GRANULOCYTES: 4 %
INR BLD: 2.8
LYMPHOCYTES # BLD: 11 % (ref 24–44)
MCH RBC QN AUTO: 29.6 PG (ref 25.2–33.5)
MCHC RBC AUTO-ENTMCNC: 31.3 G/DL (ref 28.4–34.8)
MCV RBC AUTO: 94.6 FL (ref 82.6–102.9)
MONOCYTES # BLD: 10 % (ref 1–7)
MORPHOLOGY: ABNORMAL
NRBC AUTOMATED: 0 PER 100 WBC
PDW BLD-RTO: 15.1 % (ref 11.8–14.4)
PLATELET # BLD: 932 K/UL (ref 138–453)
PLATELET ESTIMATE: ABNORMAL
PMV BLD AUTO: 9 FL (ref 8.1–13.5)
POTASSIUM SERPL-SCNC: 3.6 MMOL/L (ref 3.7–5.3)
PROTHROMBIN TIME: 27.3 SEC (ref 9–12)
RBC # BLD: 2.8 M/UL (ref 3.95–5.11)
RBC # BLD: ABNORMAL 10*6/UL
SEG NEUTROPHILS: 75 % (ref 36–66)
SEGMENTED NEUTROPHILS ABSOLUTE COUNT: 16.8 K/UL (ref 1.8–7.7)
SODIUM BLD-SCNC: 138 MMOL/L (ref 135–144)
WBC # BLD: 22.4 K/UL (ref 3.5–11.3)
WBC # BLD: ABNORMAL 10*3/UL

## 2019-04-26 PROCEDURE — 6370000000 HC RX 637 (ALT 250 FOR IP): Performed by: STUDENT IN AN ORGANIZED HEALTH CARE EDUCATION/TRAINING PROGRAM

## 2019-04-26 PROCEDURE — 97535 SELF CARE MNGMENT TRAINING: CPT

## 2019-04-26 PROCEDURE — 99232 SBSQ HOSP IP/OBS MODERATE 35: CPT | Performed by: INTERNAL MEDICINE

## 2019-04-26 PROCEDURE — 85025 COMPLETE CBC W/AUTO DIFF WBC: CPT

## 2019-04-26 PROCEDURE — 6360000002 HC RX W HCPCS: Performed by: STUDENT IN AN ORGANIZED HEALTH CARE EDUCATION/TRAINING PROGRAM

## 2019-04-26 PROCEDURE — 6370000000 HC RX 637 (ALT 250 FOR IP): Performed by: INTERNAL MEDICINE

## 2019-04-26 PROCEDURE — 97530 THERAPEUTIC ACTIVITIES: CPT

## 2019-04-26 PROCEDURE — 6370000000 HC RX 637 (ALT 250 FOR IP): Performed by: RADIOLOGY

## 2019-04-26 PROCEDURE — 2580000003 HC RX 258: Performed by: STUDENT IN AN ORGANIZED HEALTH CARE EDUCATION/TRAINING PROGRAM

## 2019-04-26 PROCEDURE — 94762 N-INVAS EAR/PLS OXIMTRY CONT: CPT

## 2019-04-26 PROCEDURE — 97605 NEG PRS WND THER DME<=50SQCM: CPT

## 2019-04-26 PROCEDURE — 2060000000 HC ICU INTERMEDIATE R&B

## 2019-04-26 PROCEDURE — 97110 THERAPEUTIC EXERCISES: CPT

## 2019-04-26 PROCEDURE — 85610 PROTHROMBIN TIME: CPT

## 2019-04-26 PROCEDURE — 2700000000 HC OXYGEN THERAPY PER DAY

## 2019-04-26 PROCEDURE — 80048 BASIC METABOLIC PNL TOTAL CA: CPT

## 2019-04-26 PROCEDURE — 36415 COLL VENOUS BLD VENIPUNCTURE: CPT

## 2019-04-26 RX ORDER — WARFARIN SODIUM 2 MG/1
2 TABLET ORAL DAILY
Status: DISCONTINUED | OUTPATIENT
Start: 2019-04-26 | End: 2019-04-27

## 2019-04-26 RX ADMIN — MORPHINE SULFATE 4 MG: 4 INJECTION INTRAVENOUS at 09:03

## 2019-04-26 RX ADMIN — ACETAMINOPHEN 650 MG: 325 TABLET ORAL at 20:21

## 2019-04-26 RX ADMIN — OXYCODONE HYDROCHLORIDE 5 MG: 5 TABLET ORAL at 20:21

## 2019-04-26 RX ADMIN — ACETAMINOPHEN 650 MG: 325 TABLET ORAL at 02:07

## 2019-04-26 RX ADMIN — OXYCODONE HYDROCHLORIDE 5 MG: 5 TABLET ORAL at 02:07

## 2019-04-26 RX ADMIN — Medication 10 ML: at 09:03

## 2019-04-26 RX ADMIN — MIDODRINE HYDROCHLORIDE 10 MG: 5 TABLET ORAL at 13:57

## 2019-04-26 RX ADMIN — MIDODRINE HYDROCHLORIDE 10 MG: 5 TABLET ORAL at 17:53

## 2019-04-26 RX ADMIN — OXYCODONE HYDROCHLORIDE 5 MG: 5 TABLET ORAL at 15:36

## 2019-04-26 RX ADMIN — MIDODRINE HYDROCHLORIDE 10 MG: 5 TABLET ORAL at 09:02

## 2019-04-26 RX ADMIN — ACETAMINOPHEN 650 MG: 325 TABLET ORAL at 06:57

## 2019-04-26 RX ADMIN — CYCLOBENZAPRINE 5 MG: 10 TABLET, FILM COATED ORAL at 20:21

## 2019-04-26 RX ADMIN — ACETAMINOPHEN 650 MG: 325 TABLET ORAL at 11:11

## 2019-04-26 RX ADMIN — CYCLOBENZAPRINE 5 MG: 10 TABLET, FILM COATED ORAL at 09:02

## 2019-04-26 RX ADMIN — MORPHINE SULFATE 4 MG: 4 INJECTION INTRAVENOUS at 00:33

## 2019-04-26 RX ADMIN — SODIUM CHLORIDE: 9 INJECTION, SOLUTION INTRAVENOUS at 08:56

## 2019-04-26 RX ADMIN — CYCLOBENZAPRINE 5 MG: 10 TABLET, FILM COATED ORAL at 13:57

## 2019-04-26 RX ADMIN — SODIUM CHLORIDE: 9 INJECTION, SOLUTION INTRAVENOUS at 22:36

## 2019-04-26 RX ADMIN — BUMETANIDE 1 MG: 1 TABLET ORAL at 09:03

## 2019-04-26 RX ADMIN — MORPHINE SULFATE 4 MG: 4 INJECTION INTRAVENOUS at 22:58

## 2019-04-26 RX ADMIN — METOPROLOL TARTRATE 25 MG: 25 TABLET ORAL at 09:02

## 2019-04-26 RX ADMIN — PANTOPRAZOLE SODIUM 40 MG: 40 TABLET, DELAYED RELEASE ORAL at 06:57

## 2019-04-26 RX ADMIN — OXYCODONE HYDROCHLORIDE 5 MG: 5 TABLET ORAL at 06:57

## 2019-04-26 RX ADMIN — OXYCODONE HYDROCHLORIDE 5 MG: 5 TABLET ORAL at 11:11

## 2019-04-26 RX ADMIN — ASPIRIN 81 MG: 81 TABLET, CHEWABLE ORAL at 09:03

## 2019-04-26 RX ADMIN — WARFARIN SODIUM 2 MG: 2.5 TABLET ORAL at 17:53

## 2019-04-26 RX ADMIN — PANTOPRAZOLE SODIUM 40 MG: 40 TABLET, DELAYED RELEASE ORAL at 15:36

## 2019-04-26 RX ADMIN — NYSTATIN 500000 UNITS: 500000 SUSPENSION ORAL at 09:02

## 2019-04-26 RX ADMIN — METOPROLOL TARTRATE 25 MG: 25 TABLET ORAL at 20:21

## 2019-04-26 RX ADMIN — ACETAMINOPHEN 650 MG: 325 TABLET ORAL at 15:37

## 2019-04-26 ASSESSMENT — PAIN SCALES - GENERAL
PAINLEVEL_OUTOF10: 7
PAINLEVEL_OUTOF10: 7
PAINLEVEL_OUTOF10: 5
PAINLEVEL_OUTOF10: 7
PAINLEVEL_OUTOF10: 9
PAINLEVEL_OUTOF10: 7
PAINLEVEL_OUTOF10: 4
PAINLEVEL_OUTOF10: 8
PAINLEVEL_OUTOF10: 7

## 2019-04-26 ASSESSMENT — ENCOUNTER SYMPTOMS
GASTROINTESTINAL NEGATIVE: 1
EYES NEGATIVE: 1
RESPIRATORY NEGATIVE: 1

## 2019-04-26 NOTE — PROGRESS NOTES
Pharmacy Note  Warfarin Consult follow-up      Recent Labs     04/26/19  0653   INR 2.8     Recent Labs     04/24/19  0847 04/25/19  0631 04/26/19  0653   HGB 8.8* 9.1* 8.3*   HCT 27.3* 29.3* 26.5*   * 961* 932*       Significant Drug-Drug Interactions:  New warfarin drug-drug interactions: none  Discontinued drug-drug interactions: heparin    Date                 INR        Dose   4/20/2019         1.3            5 mg    4.21                  1.2            5 mg    4/22/19             1.6            5 mg    4/23/19             2.7            Hold    4/24/19             2.4            2.5 mg    4/25/19             2.4            2.5mg    4/26/19             2.8            2.5 mg      Notes:   INR elevated to 2.8 today. Will decrease Coumadin dose to 2 mg daily starting tonight. Daily PT/INR while inpatient.

## 2019-04-26 NOTE — PROGRESS NOTES
[facility]  Internal Medicine Residency Program  Inpatient Daily Progress Note  ______________________________________________________________________________    Patient: Papo Starks  YOB: 1965   MRN: [id number]    Acct: [de-identified]     Admit date: 4/5/2019  Today's date: 04/26/19  Number of days in the hospital: 21       Admitting Diagnosis: Septic shock ([address]    Subjective:   Patient seen and examined at bedside. Alert and oriented. Stable vitals this AM.  No acute issues overnight. Patient is eating and drinking fine. Urine output has been improving. Anticipated renal recovery. Stoma output is 550 in the last 24 hours. Review of Systems   Constitutional: Negative. HENT: Negative. Eyes: Negative. Respiratory: Negative. Cardiovascular: Negative. Gastrointestinal: Negative. Musculoskeletal: Negative. Skin: Negative. Psychiatric/Behavioral: Negative. Objective:   Vital Sign:  /70   Pulse 87   Temp 100.3 °F (37.9 °C) (Oral)   Resp 16   Ht 5' 1.02\" (1.55 m)   Wt 157 lb 3 oz (71.3 kg)   SpO2 100%   BMI 29.68 kg/m²       Physical Exam:  General appearance:   alert, well appearing, and in no distress  Mental Status: alert, oriented to person, place, and time  Neurologic:  alert, oriented, normal speech, no focal findings or movement disorder noted  Lungs:  clear to auscultation, no wheezes, rales or rhonchi, symmetric air entry  Heart[de-identified] normal rate, regular rhythm, normal S1, S2, no murmurs, rubs, clicks or gallops  Abdomen:  soft, nontender, nondistended, no masses or organomegaly. Stoma on the right.   Extremities: peripheral pulses normal, no pedal edema, no clubbing or cyanosis   Skin: normal coloration and turgor, no rashes, no suspicious skin lesions noted    Medications:  Scheduled Medications   warfarin  2 mg Oral Daily    bumetanide  1 mg Oral Daily    midodrine  10 mg Oral TID WC    metoprolol tartrate  25 mg Oral BID    warfarin (COUMADIN) daily dosing (placeholder)   Other RX Placeholder    nicotine  1 patch Transdermal Daily    aspirin  81 mg Oral Daily    cyclobenzaprine  5 mg Oral TID    pantoprazole  40 mg Oral BID AC    sodium chloride flush  10 mL Intravenous 2 times per day       PRN Medications  sodium chloride 250 mL PRN   sodium chloride 150 mL PRN   heparin (porcine) 1,600 Units PRN   heparin (porcine) 1,600 Units PRN   sodium chloride 250 mL PRN   sodium chloride 150 mL PRN   acetaminophen 650 mg Q4H PRN   glucose 15 g PRN   dextrose 12.5 g PRN   glucagon (rDNA) 1 mg PRN   dextrose 100 mL/hr PRN   oxyCODONE 5 mg Q4H PRN   morphine 4 mg Q3H PRN   Or     morphine 6 mg Q3H PRN   sodium chloride 250 mL PRN   sodium chloride 150 mL PRN   dextrose 25 g PRN   sodium chloride flush 10 mL PRN   magnesium hydroxide 30 mL Daily PRN   ondansetron 4 mg Q6H PRN       Diagnostic Labs and Imaging:  CBC:  Recent Labs     04/24/19  0847 04/25/19  0631 04/26/19  0653   WBC 20.6* 21.7* 22.4*   HGB 8.8* 9.1* 8.3*   * 961* 932*     BMP: Recent Labs     04/24/19  0847 04/25/19  0631 04/26/19  0653   * 135 138   K 4.2 3.8 3.6*   CL 93* 95* 101   CO2 26 22 23   BUN 24* 26* 15   CREATININE 3.32* 3.42* 2.33*   GLUCOSE 108* 113* 83     Hepatic: Recent Labs     04/25/19  0631   AST 16   ALT 16   BILITOT 0.48   ALKPHOS 167*       Assessment and Plan:     Principal Problem:    Septic shock ([facility] [address]  Active Problems:    Shock ([address]    Rhabdomyolysis    Hyponatremia    Lactic acidosis    MARY (acute kidney injury) ([facility] [address]    Metabolic acidosis    Acute respiratory failure (HCC)    Elevated liver enzymes    Hyperkalemia    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HCC)    Ischemic bowel syndrome (HCC)    Acute GI bleeding    Gram negative septic shock (HCC)    Protein-calorie malnutrition (HCC)    Gastroenteritis    Septicemia due to Gram negative organism (HCC)    Pleural effusion, bilateral    Splenic infarction Leukemoid reaction    Mottled skin    Acute renal failure (HCC)    Altered mental status    PMB (postmenopausal bleeding)  Resolved Problems:    * No resolved hospital problems. *    · Hb is 8.3 this morning. Will continue to monitor hemoglobin. · Levofloxacin discontinued.  Patient completed a course of meropenem. Continue to monitor patient off antibiotics. · Transvaginal ultrasound was normal.  ObGyn plans to follow the patient outpatient. · Patient stable from cardiology stand point. · Continue Midodrine for low BP if needed  · Pain control with Morphine and Roxicodone PRN  · Continue aspirin.  Heparin drip discontinued. · Hypercoagulable workup negative. · Started nystatin for oral thrush.    · Continue renal diet  · Outpatient dialysis approval at [address] recommendations from General Surgery regarding wound vac. · Nephrology plans to hold off dialysis over the weekend. If renal function is improving we will plan to discontinue tunnel catheter on Monday.  She is on anticoagulation which will need to be stopped prior to removal of tunneled catheter  · Patient on Coumadin with pharmacy to dose. Pascual Trejo will be discharged on Coumadin.  INR checks will be done at [facility] the patient is going. Artur Rodriguez MD  PGY-1, Department of Internal Medicine  [address] [place] [address]  4/26/2019 12:12 PM  Attending Physician Statement  I have discussed the care of Cesia Puente, including pertinent history and exam findings,  with the resident. I have seen and examined the patient and the key elements of all parts of the encounter have been performed by me. I agree with the assessment, plan and orders as documented by the resident with additions . Ostomy out put dec   Cont iv fluids     Treatment plan Discussed with nursing staff in detail , all questions answered .    Electronically signed by Connee Dandy, MD on   4/26/19 at 12:17 PM    Please note

## 2019-04-26 NOTE — PROGRESS NOTES
Physical Therapy  Facility/Department: [address] STEPDOWN  Daily Treatment Note  NAME: Ferna Kussmaul  : 1965  MRN: [id number]    Date of Service: 2019    Discharge Recommendations:Further therapy recommended at discharge. PT Equipment Recommendations  Equipment Needed: No    Patient Diagnosis(es): The primary encounter diagnosis was Acute renal failure, unspecified acute renal failure type ([address] A diagnosis of Altered mental status, unspecified altered mental status type was also pertinent to this visit. has a past medical history of Asthma, Back pain, chronic, Hypertension, Snores, and Wears glasses. has a past surgical history that includes Tonsillectomy; Knee arthroscopy (Left, ); Ulnar tunnel release (Right, ); Knee arthroscopy (Left, 2016); LAPAROTOMY EXPLORATORY (N/A, 2019); and LAPAROTOMY EXPLORATORY (N/A, 2019). Restrictions  Restrictions/Precautions  Restrictions/Precautions: Fall Risk, Contact Precautions  Required Braces or Orthoses?: No  Position Activity Restriction  Other position/activity restrictions: up with assist. s/p  ILEOCECECTOMY, SUBTOTAL COLECTOMY ; s/p 2ND LOOK EXPLORATORY LAPAROTOMY with ileostomy creation   Subjective   General  Chart Reviewed: Yes  Response To Previous Treatment: Patient with no complaints from previous session. Subjective  Subjective: RN and pt agreeable to PT. Pt alert in bed upon arrival with other [address] [place] [address]. Left pt in chair with call light in reach. Chair alarm in place.   General Comment  Comments: writer assisted in Pt care /ADLs  Pain Screening  Patient Currently in Pain: Denies  Vital Signs  Patient Currently in Pain: Denies       Orientation  Orientation  Overall Orientation Status: Within Normal Limits  Cognition      Objective   Bed mobility  Rolling to Left: Contact guard assistance  Rolling to Right: Contact guard assistance  Supine to Sit: Minimal assistance  Scooting: Contact guard assistance  Transfers  Sit to Stand: Contact guard assistance  Stand to sit: Contact guard assistance  Bed to Chair: Contact guard assistance  Comment: Multple S<>S perform with pt c/o cramping in Bilat calf during first trail. gentle stretching applied to bilat Gastrocs 30\"x3 before attempting gait. Ambulation  Ambulation?: Yes  Ambulation 1  Surface: level tile  Device: Rolling Walker  Assistance: Contact guard assistance  Quality of Gait: felxed posture, slow flaco   Distance: 8ft from bed to chair  Comments: Pt limited by cramping and fatigue. Stairs/Curb  Stairs?: No     Balance  Posture: Fair  Sitting - Static: Good(Sat at EOB for ~6 minutes preping for transfer and stretching)  Sitting - Dynamic: Good;-  Standing - Static: Fair;+  Standing - Dynamic: Fair;+  Comments: RW used while assessing standing balance  Exercises; Seated LE exercise program: Long Arc Quads, hip abduction/adduction, heel/toe raises, and marches. Reps:10  Upper extremity exercises: Bicep curl, shoulder flexion/extension, punches, tricep curl, shoulder abduction/adduction. Reps:10  Comments: .sit, Manual  bilat. gastrocs stretch 3x30\" . Assessment   Body structures, Functions, Activity limitations: Decreased functional mobility ; Decreased strength;Decreased balance  Assessment: Pt c/o cramping upon standing, streching applied to BIlat gastrocs and pt was able to Amb 8ft to chair using RW and CGA. Pt is unsafe to amb without assist at this timed/t pt being a fall risk when cramping starts. Prognosis: Good  Patient Education: Safe mobility. REQUIRES PT FOLLOW UP: Yes  Activity Tolerance  Activity Tolerance: Patient limited by endurance; Patient limited by fatigue  Activity Tolerance: Cramping in calfs with standing.     Goals  Short term goals  Time Frame for Short term goals: 14 visits  Short term goal 1: Pt will be Betty with bed mobility  Short term goal 2: Pt will be Betty transfers  Short term goal 3: Pt will be SBA amb 125' RW Short term goal 4: Pt will be safe to attempt steps    Plan    Plan  Times per week: 5-6x/wk  Current Treatment Recommendations: Strengthening, Balance Training, Functional Mobility Training, Transfer Training, Endurance Training, Cognitive Reorientation, Gait Training, Stair training, Home Exercise Program, Safety Education & Training, Equipment Evaluation, Education, & procurement, Patient/Caregiver Education & Training  Safety Devices  Type of devices: Call light within reach, Gait belt, Nurse notified, Patient at risk for falls, All fall risk precautions in place, Left in chair, Chair alarm in place  Restraints  Initially in place: No     Therapy Time   Individual Concurrent Group Co-treatment   Time In 1115         Time Out 1158         Minutes [address], P[place]

## 2019-04-26 NOTE — PROGRESS NOTES
skin.    [x] Cut sponge, gauze or channel drain to size which will fit into the wound/ulcer bed without being forced.  [x] Be sure the sponge is large enough to hold the entire round plastic flange which is attached to the tubing. Never allow flange to be larger than the sponge or it will produce suction damaging intact skin.  Total number of individual pieces of foam used within the wound bed: 3     [] If bridging the dressing away from the primary site, be sure the bridge leads to a piece of sponge large enough to hold the entire flange without allowing any of the flange to overlap onto intact skin.  [x] Covered sponge, gauze or channel drain with plastic drape.  [x] Cut a hole in this plastic drape directly over the sponge the same size as the plastic drain tubing.  [x] Removed plastic liner from flange and apply it directly over the hole you cut.  [x] Removed the plastic cover from the flange.  [x] Attached the tubing to the wound/ulcer Negative Pressure Therapy and turn it on to be sure a vacuum is created and that there are no leaks.  [x] If air leaks occur, use plastic drape to patch them.  [] Secured Negative Pressure Therapy dressing with ace wrap loosely if located on an extremity. Maintain tubing outside of ace wrap. Tubing must not exert pressure on intact skin. Applied per  Guidelines    PLAN:  Recommend removal of the NPWT system at discharge and begin use of silver hydrofiber to the open wound, cover with an island dressing. Change at least every 48 hours and prn saturation. Recommendations for high volume ostomy system and a home ostomy system reviewed with [employer] rep., Marj Pollock at bedside on Wednesday. An additional one piece convex system is available for use in patient's room. [employer] #[id number]. This system is appropriate for in facility use but is not covered well by [facility] to make it a practical option for home use.    Call the [facility] Nurses at [phone] with questions or concerns.           Electronically signed by Anoop Thurman RN, CWON on 4/26/2019 at 10:41 AM

## 2019-04-26 NOTE — PLAN OF CARE
Problem: Falls - Risk of:  Goal: Will remain free from falls  Description  Will remain free from falls  Outcome: Met This Shift     Problem: Falls - Risk of:  Goal: Absence of physical injury  Description  Absence of physical injury  Outcome: Met This Shift     Problem: Injury - Risk of, Physical Injury:  Goal: Will remain free from falls  Description  Will remain free from falls  Outcome: Met This Shift     Problem: Injury - Risk of, Physical Injury:  Goal: Absence of physical injury  DescriptionPT. WITH SIDE RAILS TIMES 2 WHILE IN BED, NON SKID FOOT WEAR IN PLACE, PT. EDUCATED ON FALL SAFETY, PT. USING CALL LIGHT APPROPRIATELY, AND CALL LIGHT WITHIN REACH.    Absence of physical injury  Outcome: Met This Shift

## 2019-04-26 NOTE — PROGRESS NOTES
directed by physician daily  [DISCONTINUED] diclofenac (VOLTAREN) 50 MG EC tablet, Take 1 tablet by mouth 2 times daily  [DISCONTINUED] docusate sodium (COLACE) 100 MG capsule, Take 1 capsule by mouth 2 times daily as needed for Constipation  gabapentin (NEURONTIN) 100 MG capsule, Take 100 mg by mouth 3 times daily as needed   lisinopril-hydrochlorothiazide (PRINZIDE;ZESTORETIC) 10-12.5 MG per tablet, Take 1 tablet by mouth daily  albuterol (PROVENTIL) (2.5 MG/3ML) 0.083% nebulizer solution, Take 2.5 mg by nebulization every 6 hours as needed for Wheezing  albuterol sulfate  (90 BASE) MCG/ACT inhaler, Inhale 2 puffs into the lungs every 6 hours as needed for Wheezing    INVESTIGATIONS     Last 3 CMP:    Recent Labs     04/24/19  0847 04/25/19  0631 04/26/19  0653   * 135 138   K 4.2 3.8 3.6*   CL 93* 95* 101   CO2 26 22 23   BUN 24* 26* 15   CREATININE 3.32* 3.42* 2.33*   CALCIUM 8.7 8.4* 8.3*   PROT  --  6.2*  --    LABALBU  --  2.4*  --    BILITOT  --  0.48  --    ALKPHOS  --  167*  --    AST  --  16  --    ALT  --  16  --        Last 3 CBC:  Recent Labs     04/24/19  0847 04/25/19  0631 04/26/19  0653   WBC 20.6* 21.7* 22.4*   RBC 2.99* 3.09* 2.80*   HGB 8.8* 9.1* 8.3*   HCT 27.3* 29.3* 26.5*   MCV 91.3 94.8 94.6   MCH 29.4 29.4 29.6   MCHC 32.2 31.1 31.3   RDW 15.2* 15.1* 15.1*   * 961* 932*   MPV 9.3 9.0 9.0       ASSESSMENT     1.  Acute kidney injury nonoliguric secondary to ischemic - hypoperfusion (Hypotension/arrthymia/ischemic gut ) and nephrotoxic (contrast) ATN aggravated further by concomitant NSAID and diuretic use along with cocaine - hemodialysis dependent - tunnel catheter in place TTS  Creat peaked at 4 with hyperkalemia 8  Baseline creat in Jan 2017 was normal  Ultrasound shows right kidney 10.4 cm and left 10.2 cm  Serologies neg  2.  Volume overload- iatrogenic/capillary leak syndrome - resolved  3.  Ischemic gut status post Ileocecectomy, extended left hemicolectomy, placement of  ABThera wound VAC on 4/6 4/8 Second Look exploratory upper, with resection of transverse colon AND ileostomy  4. Sepsis - blood culture positive for H.infleuenza  5.  Bilateral adrenal masses 11 mm left and 18 mm on the right  6.  Anemia  7.  Persistent Afib S/o cardioversion  8. 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 PLAN     1. Hold dialysis over the weekend, observe creatinine  2. If renal function improves, will discontinue tunnel catheter soon. If so, we will need to hold anticoagulation. 3. Continue monitoring all intake and output  4. BMP in am.  5. Will follow. This note is created with the assistance of a speech-recognition program. While intending to generate a document that actually reflects the content of the visit, no guarantees can be provided that every mistake has been identified and corrected by editing    Neda Rodriguez MD,   PGY-1   4/26/2019 1:47 PM    Attending Physician Statement  I have discussed the care of Ros Ledbetter, including pertinent history and exam findings,  with the Resident/CNP. I have reviewed the key elements of all parts of the encounter with the Fellow/Resident/CNP. I agree with the assessment, plan and orders as documented by the Resident/CNP. Sherman Acosta MD   Nephrology [address] [place] [address]

## 2019-04-26 NOTE — PROGRESS NOTES
Occupational Therapy  Facility/Department: [facility] 4B STEPDOWN  Daily Treatment Note  NAME: Papo Starks  : 1965  MRN: [id number]    Date of Service: 2019    Discharge Recommendations:       Further therapy recommended at discharge. Assessment   Performance deficits / Impairments: Decreased functional mobility ; Decreased strength;Decreased endurance;Decreased safe awareness;Decreased ADL status; Decreased balance  Prognosis: Good  REQUIRES OT FOLLOW UP: Yes  Activity Tolerance  Activity Tolerance: Patient Tolerated treatment well  Safety Devices  Safety Devices in place: Yes  Type of devices: All fall risk precautions in place;Gait belt;Call light within reach; Patient at risk for falls; Chair alarm in place; Left in chair  Restraints  Initially in place: No         Patient Diagnosis(es): The primary encounter diagnosis was Acute renal failure, unspecified acute renal failure type ([facility], [place] [address] A diagnosis of Altered mental status, unspecified altered mental status type was also pertinent to this visit. has a past medical history of Asthma, Back pain, chronic, Hypertension, Snores, and Wears glasses. has a past surgical history that includes Tonsillectomy; Knee arthroscopy (Left, ); Ulnar tunnel release (Right, ); Knee arthroscopy (Left, 2016); LAPAROTOMY EXPLORATORY (N/A, 2019); and LAPAROTOMY EXPLORATORY (N/A, 2019).     Restrictions  Restrictions/Precautions  Restrictions/Precautions: Fall Risk, Contact Precautions  Required Braces or Orthoses?: No  Position Activity Restriction  Other position/activity restrictions: up with assist. s/p  ILEOCECECTOMY, SUBTOTAL COLECTOMY ; s/p 2ND LOOK EXPLORATORY LAPAROTOMY with ileostomy creation   Subjective   General  Patient assessed for rehabilitation services?: Yes  Family / Caregiver Present: No  Pain Assessment  Response to Pain Intervention: Patient Satisfied  Vital Signs  Patient Currently in Pain: Denies   Orientation Objective    ADL  Feeding: Setup;Supervision(To drink from cup)  Grooming: Setup;Supervision(Oral hygiene)  Additional Comments: Pt completed simple grooming ADLs while seated in recliner, see above for LOF. Per patient, just completed bathing ADLs with aide prior to writer arrival, confirmed. Plan   Plan  Times per week: 3-4x  Current Treatment Recommendations: Balance Training, Self-Care / ADL, Patient/Caregiver Education & Training, Equipment Evaluation, Education, & procurement, Functional Mobility Training, Endurance Training, Cognitive Reorientation, Positioning, Safety Education & Training    Goals  Short term goals  Time Frame for Short term goals: Pt will by discharge  Short term goal 1: Complete LB ADL task with AD PRN and MIN A. Short term goal 2: Demo 10 minutes of dynamic standing during functional activity performance w/ SBA. Short term goal 3: Tolerate 20 minutes of funct act performance. Short term goal 4: Demo 15 minutes of dynamic sitting balance during fucntional activity performance w/ SUP. Short term goal 5: Demo supine<>sit transfer w/ SUP. Therapy Time   Individual Concurrent Group Co-treatment   Time In 1416         Time Out 1440         Minutes 24             RN OK'ed tx and pt agreeable. Seated in recliner upon writer arrival. See above for LOF for all tasks. Pt properly demo use of IS. Pt requesting to stay in chair at this time. Retired seated in [facility], call light within reach, chair alarm activated and RN notified.      NELSON Pyle/KARLA

## 2019-04-26 NOTE — PROGRESS NOTES
influenza, beta lactamase negative. THis finding is likely unrelated to intraabdominal process. · Treated with Meropenem through 4/14  · Has maintained leukocytosis and occasional fever. · Has some vaginal bleeding. Gyn evaluating  · Overall clinical improvement. Infection Control Recommendations   · [place] Precautions    Antimicrobial Stewardship Recommendations     · Discontinuation of therapy    Coordination of Outpatient Care:   · Estimated Length of IV antimicrobials: 4/14  · Patient will need Midline Catheter Insertion: No  · Patient will need PICC line Insertion: No  · Patient will need: Home IV , [place],  SNF,  LTAC TBD  · Patient will need outpatient wound care: TBD    Chief complaint/reason for consultation:   · Altered mental status and tender abdomen       History of Present Illness:   Desi Boateng is a 47y.o.-year-old  female who was initially admitted on 4/5/2019. Patient seen at the request of Dr. Cesilia Osman:    Patient presented to ED via EMS due to altered mental status and vomiting. Patient has history significant for COPD, right knee osteoarthirtis s/p arthoplasty, and chronic NSAID use. Patient lives in [place], but was here to visit her significant other. Arrived Wednesday night, said she didn't feel good. Thought she might have had a bad burger at a fast food restaurant. Went to sleep and slept for almost 24 hours. When she awoke, she said she was vomiting, having diarrhea, and abdominal pain. Significant other and sister are unsure of the nature of the vomit, diarrhea, or abdominal pain. Then she slept a bit more, until her significant other found her unresponsive and that's when he called EMS to bring her to the hospital.     Afebrile on admission, but Tmax overnight was 39.3. Tachycardic on admission, 129. Became hypotensive after admission and levophed and vasopressin were started. Initial labs:     Admission labs significant for Na 125, K 8.0, CO2 9, BUN 62, Creatinine of 4.06, Anion gap of 23, Lactic acid of 3.5, Glucose of 186, Ca 5.2, POC HCO3 15.9, CK 15,342, Troponins high sensitivity 89 and 94, Alk phos 168, , AST 1,122, Bili .37, lipase of 119, Urine tox positive for THC and Cocaine, WBC 23.5, Hgb 10.2, Urine and blood cultures pending, HIV negative, influenza negative, hepatitis panel non-reactive, urine Leukocyte esterase trace, urine nitrite -, urine protein 2+, urine Hgb large, urine RBCs 5 to 10, many urine yeast,  ABG 7.22, pCO2 27, HCO3 15.9. Initial imaging showed:     CXR: No acute cardiopulmonary process    Abdominal X-ray 4/6: No free air    CTA of chest: Negative for PE or dissection. Patchy consolidations of bilateral lower lung lobes representing developing pneumonia vs atelectasis. CT head: pending    Initial course:     Intubated and sedated in ED due to altered mental status. Currently on Vancomycin, Levaquin, and aztreonam for empiric coverage. Richar catheter was placed due to request by nephrology for emergent dialysis. Dialysis attempted several times, but due to high arterial pressures and line clotting, dialysis to be completed later today by Dr. Gorge Coon. NG tube with bloody output. FOBT +. General surgery has been consulted for evaluation. CURRENT EVALUATION : 4/26/2019    Patient seen and examined. Says she continues to feel better. Says every day she feels like she is gaining more strength. Nephrology to hold off on dialysis over the weekend. Anticipating renal recovery with possible tunneled cathter removal on Monday. One fever of 38.4  C in last 24 hours. VS stable. Started on warfarin per heme/onc recommendations. INR 2.8 today. EDWARD revealed no thrombus or vegetation, EF 55%, moderate MR. Patient admits to polysubstance use before coming in this admission. Says she would like to stop, but is feeling very anxious about her current health status and stopping substance use. Had episodes of a fib with RVR. Shocked twice to control rhythm and rate on 4-7-19. Stable since then. Labs reviewed: 4/26/2019     WBC 23.9-->33.8->41.7->38.3->39.6->34.5 -> 18.5 > 19.9 > 20.6 > 21.7 > 22.4  Hb 8.3-->9.4->7.8 -> 7.3 -> 7.2 -> 6.9 > 8.4 > 8.8 > 9.1 > 8.3  Plat 75-->119->264 -> 428 -> 523-->652 -> 774 > 784 > 928 > 961 > 932    BUN 84-->62 -> 33 -> 34 -> 34-->25 > 15 > 26  Cr 5.89-->4.86 -> 3.68 -> 3.68 -> 4.11-->3.56 > 2.63 > 3.42    Cultures:  Urine:  · NGTD  Blood:  · 4-5-19 : 1/2 haemophilus influenza, beta lactamase negative, sensitive to pcn and meropenem. Pt completed a 10 day course of meropenem  · 4/12 x 1 no growth to date  · 4/13 x 2 no growth to date  · 4/16 NGTD  Wound:  · NA    MRSA- Neg  CXR 4-11-19 showed unchanged pulmonary edema and effusions. CXR 4-16-19 Rt side atelectasis     Duplex of the right upper extremity showed no evidence of deep venous thrombosis in the right upper extremity. Superficial thrombophlebitis of the right upper extremity involving the cephalic and basilic veins. Discussed with RN, patient. I have personally reviewed the past medical history, past surgical history, medications, social history, and family history, and I have updated the database accordingly.   Past Medical History:     Past Medical History:   Diagnosis Date    Asthma     On inhaler    Back pain, chronic     Hypertension 2015    On Lisinopril    Snores     possible apnea but not tested    Wears glasses        Past Surgical  History:     Past Surgical History:   Procedure Laterality Date    KNEE ARTHROSCOPY Left 1980    KNEE ARTHROSCOPY Left 09/28/2016    with medial menisectomy    LAPAROTOMY EXPLORATORY N/A 4/6/2019    LAPAROTOMY EXPLORATORY, ILEOCECECTOMY, SUBTOTAL COLECTOMY, ABTHEHRA WOUND VAC PLACEMENT performed by Bassam Vazquez MD at [address] [place] [address] 4/8/2019    2ND LOOK EXPLORATORY LAPAROTOMY, RESECTION TRANSVERSE COLON, ILEOSTOMY CREATION, RESECTION RECTAL STUMP, ABDOMINAL WASHOUT, OMENTECTOMY, ABDOMINAL WALL CLOSURE performed by Ange Santos MD at [address] 2013       Medications:      warfarin  2.5 mg Oral Daily    bumetanide  1 mg Oral Daily    midodrine  10 mg Oral TID WC    metoprolol tartrate  25 mg Oral BID    warfarin (COUMADIN) daily dosing (placeholder)   Other RX Placeholder    nicotine  1 patch Transdermal Daily    nystatin  5 mL Oral 4x Daily    aspirin  81 mg Oral Daily    cyclobenzaprine  5 mg Oral TID    pantoprazole  40 mg Oral BID AC    sodium chloride flush  10 mL Intravenous 2 times per day       Social History:     Social History     Socioeconomic History    Marital status: Single     Spouse name: Not on file    Number of children: 0    Years of education: Not on file    Highest education level: Not on file   Occupational History    Occupation: [employer] Financial resource strain: Not on file    Food insecurity:     Worry: Not on file     Inability: Not on file    Transportation needs:     Medical: Not on file     Non-medical: Not on file   Tobacco Use    Smoking status: Light Tobacco Smoker     Packs/day: 0.25     Types: Cigarettes     Start date: 9/19/1980    Smokeless tobacco: Never Used   Substance and Sexual Activity    Alcohol use: Yes     Comment: OCCASSIONAL    Drug use: No    Sexual activity: Yes     Partners: Female   Lifestyle    Physical activity:     Days per week: Not on file     Minutes per session: Not on file    Stress: Not on file   Relationships    Social connections:     Talks on phone: Not on file     Gets together: Not on file     Attends Muslim service: Not on file     Active member of club or organization: Not on file     Attends meetings of clubs or organizations: Not on file     Relationship status: Not on file    Intimate partner violence:     Fear of current or ex partner: Not on file * 932*   LYMPHOPCT 22* PENDING   MONOPCT 3 PENDING     BMP:   Recent Labs     04/24/19  0847 04/25/19  0631   * 135   K 4.2 3.8   CL 93* 95*   CO2 26 22   BUN 24* 26*   CREATININE 3.32* 3.42*     Hepatic Function Panel:   Recent Labs     04/25/19  0631   PROT 6.2*   LABALBU 2.4*   BILIDIR 0.21   IBILI 0.27   BILITOT 0.48   ALKPHOS 167*   ALT 16   AST 16     No results for input(s): RPR in the last 72 hours. No results for input(s): HIV in the last 72 hours. No results for input(s): BC in the last 72 hours. Lab Results   Component Value Date    MUCUS NOT REPORTED 04/06/2019    RBC 2.80 04/26/2019    TRICHOMONAS NOT REPORTED 04/06/2019    WBC 22.4 04/26/2019    YEAST NOT REPORTED 04/06/2019    TURBIDITY TURBID 04/06/2019     Lab Results   Component Value Date    CREATININE 3.42 04/25/2019    GLUCOSE 113 04/25/2019       Medical Decision Making-Imaging:     Abdominal xray:    Gas in mildly distended loops of large and small bowel likely reflecting an   ileus.       NG tube tip in the gastric fundus with the proximal side-port in the distal   thoracic esophagus, repositioning recommended.       Airspace disease left lung base. CT scan chest:    Impression   Satisfactory position endotracheal tube.       Nasogastric tube needs advanced 10 cm.       Patchy perihilar opacities and bibasilar airspace disease.  Follow-up may be   beneficial following medical treatment course to document complete resolution.           EXAMINATION:   CTA OF THE ABDOMEN AND PELVIS WITH CONTRAST       4/5/2019 9:23 pm:       TECHNIQUE:   CTA of the abdomen and pelvis was performed with the administration of   intravenous contrast. Multiplanar reformatted images are provided for review. MIP images are provided for review.  Dose modulation, iterative   reconstruction, and/or weight based adjustment of the mA/kV was utilized to   reduce the radiation dose to as low as reasonably achievable.       COMPARISON:   None.     HISTORY:   ORDERING SYSTEM PROVIDED HISTORY: suspected dissection of abdominal aorta       FINDINGS:       CTA ABDOMEN:       Bibasilar airspace disease.  Liver, spleen, pancreas, gallbladder normal.   Bilateral adrenal masses measuring 11 mm on the left and 18 x 28 mm on the   left.  Bilateral ill-defined hypodensities throughout the kidneys.  The small   bowel is somewhat distended with multiple air-fluid levels.  Multiple   air-fluid levels are also noted throughout the colon.  The stomach appears   normal.  Retroaortic left renal vein.       Bones normal.       Aorta appears normal without dissection.  The celiac artery and SMA appear   normal.  The renal arteries appear normal.           CTA PELVIS:       Bladder decompressed.  Uterus normal.  Catheter right groin terminates in the   common iliac vein.           Impression   Ileus.  No evidence of aortic dissection.  The bilateral adrenal masses, left   greater than right.  Recommend follow-up adrenal MRI non emergently.           Medical Decision Making-Other: Thank you for allowing us to participate in the care of this patient. Please call with questions.     Anthony Lange MD.      Pager: [phone] - Office: [phone]

## 2019-04-27 LAB
ABSOLUTE EOS #: 0.9 K/UL (ref 0–0.4)
ABSOLUTE IMMATURE GRANULOCYTE: 1.79 K/UL (ref 0–0.3)
ABSOLUTE LYMPH #: 4.03 K/UL (ref 1–4.8)
ABSOLUTE MONO #: 1.34 K/UL (ref 0.1–0.8)
ANION GAP SERPL CALCULATED.3IONS-SCNC: 18 MMOL/L (ref 9–17)
BASOPHILS # BLD: 0 % (ref 0–2)
BASOPHILS ABSOLUTE: 0 K/UL (ref 0–0.2)
BUN BLDV-MCNC: 21 MG/DL (ref 6–20)
BUN/CREAT BLD: ABNORMAL (ref 9–20)
CALCIUM SERPL-MCNC: 8.3 MG/DL (ref 8.6–10.4)
CHLORIDE BLD-SCNC: 102 MMOL/L (ref 98–107)
CO2: 21 MMOL/L (ref 20–31)
CREAT SERPL-MCNC: 2.66 MG/DL (ref 0.5–0.9)
DIFFERENTIAL TYPE: ABNORMAL
EOSINOPHILS RELATIVE PERCENT: 4 % (ref 1–4)
GFR AFRICAN AMERICAN: 23 ML/MIN
GFR NON-AFRICAN AMERICAN: 19 ML/MIN
GFR SERPL CREATININE-BSD FRML MDRD: ABNORMAL ML/MIN/{1.73_M2}
GFR SERPL CREATININE-BSD FRML MDRD: ABNORMAL ML/MIN/{1.73_M2}
GLUCOSE BLD-MCNC: 89 MG/DL (ref 70–99)
HCT VFR BLD CALC: 28 % (ref 36.3–47.1)
HEMOGLOBIN: 8.7 G/DL (ref 11.9–15.1)
IMMATURE GRANULOCYTES: 8 %
INR BLD: 3.9
LYMPHOCYTES # BLD: 18 % (ref 24–44)
MCH RBC QN AUTO: 29.4 PG (ref 25.2–33.5)
MCHC RBC AUTO-ENTMCNC: 31.1 G/DL (ref 28.4–34.8)
MCV RBC AUTO: 94.6 FL (ref 82.6–102.9)
MONOCYTES # BLD: 6 % (ref 1–7)
MORPHOLOGY: ABNORMAL
MORPHOLOGY: ABNORMAL
NRBC AUTOMATED: 0 PER 100 WBC
PDW BLD-RTO: 15.2 % (ref 11.8–14.4)
PLATELET # BLD: 961 K/UL (ref 138–453)
PLATELET ESTIMATE: ABNORMAL
PMV BLD AUTO: 8.6 FL (ref 8.1–13.5)
POTASSIUM SERPL-SCNC: 3.9 MMOL/L (ref 3.7–5.3)
PROTHROMBIN TIME: 36.6 SEC (ref 9–12)
RBC # BLD: 2.96 M/UL (ref 3.95–5.11)
RBC # BLD: ABNORMAL 10*6/UL
SEG NEUTROPHILS: 64 % (ref 36–66)
SEGMENTED NEUTROPHILS ABSOLUTE COUNT: 14.34 K/UL (ref 1.8–7.7)
SODIUM BLD-SCNC: 141 MMOL/L (ref 135–144)
WBC # BLD: 22.4 K/UL (ref 3.5–11.3)
WBC # BLD: ABNORMAL 10*3/UL

## 2019-04-27 PROCEDURE — 99225 PR SBSQ OBSERVATION CARE/DAY 25 MINUTES: CPT | Performed by: INTERNAL MEDICINE

## 2019-04-27 PROCEDURE — 6370000000 HC RX 637 (ALT 250 FOR IP): Performed by: RADIOLOGY

## 2019-04-27 PROCEDURE — 81270 JAK2 GENE: CPT

## 2019-04-27 PROCEDURE — 36415 COLL VENOUS BLD VENIPUNCTURE: CPT

## 2019-04-27 PROCEDURE — 97110 THERAPEUTIC EXERCISES: CPT

## 2019-04-27 PROCEDURE — 2580000003 HC RX 258: Performed by: INTERNAL MEDICINE

## 2019-04-27 PROCEDURE — 2060000000 HC ICU INTERMEDIATE R&B

## 2019-04-27 PROCEDURE — 6370000000 HC RX 637 (ALT 250 FOR IP): Performed by: STUDENT IN AN ORGANIZED HEALTH CARE EDUCATION/TRAINING PROGRAM

## 2019-04-27 PROCEDURE — 99232 SBSQ HOSP IP/OBS MODERATE 35: CPT | Performed by: INTERNAL MEDICINE

## 2019-04-27 PROCEDURE — 6360000002 HC RX W HCPCS: Performed by: STUDENT IN AN ORGANIZED HEALTH CARE EDUCATION/TRAINING PROGRAM

## 2019-04-27 PROCEDURE — 80048 BASIC METABOLIC PNL TOTAL CA: CPT

## 2019-04-27 PROCEDURE — 6370000000 HC RX 637 (ALT 250 FOR IP): Performed by: INTERNAL MEDICINE

## 2019-04-27 PROCEDURE — 85610 PROTHROMBIN TIME: CPT

## 2019-04-27 PROCEDURE — 85025 COMPLETE CBC W/AUTO DIFF WBC: CPT

## 2019-04-27 PROCEDURE — 94760 N-INVAS EAR/PLS OXIMETRY 1: CPT

## 2019-04-27 RX ORDER — LOPERAMIDE HYDROCHLORIDE 2 MG/1
2 CAPSULE ORAL 2 TIMES DAILY
Status: DISCONTINUED | OUTPATIENT
Start: 2019-04-27 | End: 2019-04-28

## 2019-04-27 RX ORDER — BUMETANIDE 1 MG/1
2 TABLET ORAL DAILY
Status: DISCONTINUED | OUTPATIENT
Start: 2019-04-28 | End: 2019-05-01

## 2019-04-27 RX ORDER — ALPRAZOLAM 0.5 MG/1
0.5 TABLET ORAL ONCE
Status: COMPLETED | OUTPATIENT
Start: 2019-04-27 | End: 2019-04-27

## 2019-04-27 RX ORDER — MIDODRINE HYDROCHLORIDE 5 MG/1
5 TABLET ORAL
Status: DISCONTINUED | OUTPATIENT
Start: 2019-04-27 | End: 2019-05-04 | Stop reason: HOSPADM

## 2019-04-27 RX ADMIN — MORPHINE SULFATE 4 MG: 4 INJECTION INTRAVENOUS at 19:03

## 2019-04-27 RX ADMIN — PANTOPRAZOLE SODIUM 40 MG: 40 TABLET, DELAYED RELEASE ORAL at 17:10

## 2019-04-27 RX ADMIN — OXYCODONE HYDROCHLORIDE 5 MG: 5 TABLET ORAL at 00:30

## 2019-04-27 RX ADMIN — OXYCODONE HYDROCHLORIDE 5 MG: 5 TABLET ORAL at 04:32

## 2019-04-27 RX ADMIN — ALPRAZOLAM 0.5 MG: 0.5 TABLET ORAL at 12:09

## 2019-04-27 RX ADMIN — BUMETANIDE 1 MG: 1 TABLET ORAL at 10:20

## 2019-04-27 RX ADMIN — CYCLOBENZAPRINE 5 MG: 10 TABLET, FILM COATED ORAL at 20:15

## 2019-04-27 RX ADMIN — MORPHINE SULFATE 4 MG: 4 INJECTION INTRAVENOUS at 07:26

## 2019-04-27 RX ADMIN — SODIUM CHLORIDE: 9 INJECTION, SOLUTION INTRAVENOUS at 23:46

## 2019-04-27 RX ADMIN — ACETAMINOPHEN 650 MG: 325 TABLET ORAL at 20:15

## 2019-04-27 RX ADMIN — MORPHINE SULFATE 4 MG: 4 INJECTION INTRAVENOUS at 22:27

## 2019-04-27 RX ADMIN — LOPERAMIDE HYDROCHLORIDE 2 MG: 2 CAPSULE ORAL at 20:15

## 2019-04-27 RX ADMIN — OXYCODONE HYDROCHLORIDE 5 MG: 5 TABLET ORAL at 20:16

## 2019-04-27 RX ADMIN — OXYCODONE HYDROCHLORIDE 5 MG: 5 TABLET ORAL at 11:17

## 2019-04-27 RX ADMIN — METOPROLOL TARTRATE 25 MG: 25 TABLET ORAL at 10:20

## 2019-04-27 RX ADMIN — PANTOPRAZOLE SODIUM 40 MG: 40 TABLET, DELAYED RELEASE ORAL at 06:03

## 2019-04-27 RX ADMIN — CYCLOBENZAPRINE 5 MG: 10 TABLET, FILM COATED ORAL at 10:20

## 2019-04-27 RX ADMIN — ACETAMINOPHEN 650 MG: 325 TABLET ORAL at 04:32

## 2019-04-27 RX ADMIN — CYCLOBENZAPRINE 5 MG: 10 TABLET, FILM COATED ORAL at 14:12

## 2019-04-27 RX ADMIN — ACETAMINOPHEN 650 MG: 325 TABLET ORAL at 00:30

## 2019-04-27 RX ADMIN — ASPIRIN 81 MG: 81 TABLET, CHEWABLE ORAL at 10:20

## 2019-04-27 RX ADMIN — LOPERAMIDE HYDROCHLORIDE 2 MG: 2 CAPSULE ORAL at 12:08

## 2019-04-27 RX ADMIN — METOPROLOL TARTRATE 25 MG: 25 TABLET ORAL at 20:15

## 2019-04-27 RX ADMIN — OXYCODONE HYDROCHLORIDE 5 MG: 5 TABLET ORAL at 16:11

## 2019-04-27 ASSESSMENT — ENCOUNTER SYMPTOMS
COUGH: 0
ABDOMINAL PAIN: 1
SHORTNESS OF BREATH: 0
ABDOMINAL DISTENTION: 0
CHEST TIGHTNESS: 0
COLOR CHANGE: 0
NAUSEA: 0
STRIDOR: 0
VOMITING: 0
BACK PAIN: 0

## 2019-04-27 ASSESSMENT — PAIN DESCRIPTION - LOCATION
LOCATION: ABDOMEN
LOCATION: ABDOMEN

## 2019-04-27 ASSESSMENT — PAIN DESCRIPTION - DESCRIPTORS
DESCRIPTORS: DISCOMFORT;CONSTANT
DESCRIPTORS: ACHING;DISCOMFORT

## 2019-04-27 ASSESSMENT — PAIN SCALES - GENERAL
PAINLEVEL_OUTOF10: 8
PAINLEVEL_OUTOF10: 8
PAINLEVEL_OUTOF10: 7
PAINLEVEL_OUTOF10: 8
PAINLEVEL_OUTOF10: 7
PAINLEVEL_OUTOF10: 7
PAINLEVEL_OUTOF10: 8
PAINLEVEL_OUTOF10: 9
PAINLEVEL_OUTOF10: 5
PAINLEVEL_OUTOF10: 8

## 2019-04-27 ASSESSMENT — PAIN DESCRIPTION - FREQUENCY
FREQUENCY: CONTINUOUS
FREQUENCY: CONTINUOUS

## 2019-04-27 ASSESSMENT — PAIN - FUNCTIONAL ASSESSMENT: PAIN_FUNCTIONAL_ASSESSMENT: PREVENTS OR INTERFERES SOME ACTIVE ACTIVITIES AND ADLS

## 2019-04-27 ASSESSMENT — PAIN DESCRIPTION - PAIN TYPE
TYPE: ACUTE PAIN
TYPE: ACUTE PAIN

## 2019-04-27 ASSESSMENT — PAIN DESCRIPTION - ONSET
ONSET: ON-GOING
ONSET: ON-GOING

## 2019-04-27 ASSESSMENT — PAIN DESCRIPTION - ORIENTATION: ORIENTATION: LOWER

## 2019-04-27 ASSESSMENT — PAIN DESCRIPTION - PROGRESSION: CLINICAL_PROGRESSION: GRADUALLY IMPROVING

## 2019-04-27 NOTE — PROGRESS NOTES
NEPHROLOGY PROGRESS NOTE      SUBJECTIVE   Hospitalized with the delirium associated with abdominal pain nausea vomiting.  Initial assessment showed hypotensive lady with the imaging studies revealing evidence of ileus.  Further investigations demonstrated neutrophilic leukocytosis along with acute hepatitis/acute kidney injury and elevated lactic acid.  Underwent surgical resection for ischemic gut.  Hospital course further complicated by persistent atrial fibrillation needing DC cardioversion. Kev Lerma was started on dialysis in view of life-threatening hyperkalemia and acute kidney injury      Last HD on 4/25. Decent diuresis. Has stomal output also. BP stable on Midodrine. PO decent. OBJECTIVE     Vitals:    04/26/19 2330 04/27/19 0330 04/27/19 0800 04/27/19 1020   BP: 130/67 125/65 132/77 119/68   Pulse: 86 92 97 100   Resp: 18 16 18    Temp: 98.6 °F (37 °C) 98.2 °F (36.8 °C) 97.5 °F (36.4 °C)    TempSrc: Oral Oral Oral    SpO2: 99% 97% 97%    Weight:       Height:         24HR INTAKE/OUTPUT:      Intake/Output Summary (Last 24 hours) at 4/27/2019 1140  Last data filed at 4/27/2019 0519  Gross per 24 hour   Intake 2280 ml   Output 1950 ml   Net 330 ml       General appearance:Awake, alert, in no acute distress  HEENT: PERRLA  Respiratory::vesicular breath sounds,reduced AE  Cardiovascular:S1 S2 normal,no gallop or organic murmur. Abdomen: Present stoma  Extremities: No Cyanosis or Clubbing,present Lower extremity edema  Neurological:Alert and oriented. No abnormalities of mood, affect, memory, mentation, or behavior are noted      MEDICATIONS     Scheduled Meds:    loperamide  2 mg Oral BID    [START ON 4/28/2019] bumetanide  2 mg Oral Daily    midodrine  5 mg Oral TID     metoprolol tartrate  25 mg Oral BID    warfarin (COUMADIN) daily dosing (placeholder)   Other RX Placeholder    nicotine  1 patch Transdermal Daily    aspirin  81 mg Oral Daily    cyclobenzaprine  5 mg Oral TID    pantoprazole  40 mg Oral BID AC    sodium chloride flush  10 mL Intravenous 2 times per day     Continuous Infusions:    sodium chloride 100 mL/hr at 04/26/19 2236    dextrose       PRN Meds:  sodium chloride, sodium chloride, heparin (porcine), heparin (porcine), sodium chloride, sodium chloride, acetaminophen, glucose, dextrose, glucagon (rDNA), dextrose, oxyCODONE, morphine **OR** morphine, sodium chloride, sodium chloride, dextrose, sodium chloride flush, magnesium hydroxide, ondansetron  Home Meds:                Medications Prior to Admission: ibuprofen (ADVIL;MOTRIN) 800 MG tablet, Take 800 mg by mouth every 6 hours as needed for Pain  ALPRAZolam (XANAX) 0.25 MG tablet, Take 0.25 mg by mouth nightly as needed for Sleep or Anxiety. venlafaxine (EFFEXOR) 75 MG tablet, Take 100 mg by mouth 2 times daily   [DISCONTINUED] celecoxib (CELEBREX) 200 MG capsule, Take 1 capsule by mouth 2 times daily  [DISCONTINUED] traMADol (ULTRAM) 50 MG tablet, 1 tablet PO BID PRN pain  [DISCONTINUED] oxyCODONE-acetaminophen (PERCOCET) 5-325 MG per tablet, Take 1 tablet by mouth 2 times daily as needed for Pain . [DISCONTINUED] diclofenac (VOLTAREN) 75 MG EC tablet, Take 1 tablet by mouth 2 times daily  [DISCONTINUED] oxyCODONE-acetaminophen (PERCOCET) 5-325 MG per tablet, Take 1 tablet by mouth every 6 hours as needed for Pain . [DISCONTINUED] aspirin 325 MG EC tablet, Take 1 tablet by mouth 2 times daily for 14 days  [DISCONTINUED] Misc. Devices (ROLLER WALKER) MISC, 1 each by Does not apply route daily  QVAR 40 MCG/ACT inhaler, Inhale 2 puffs into the lungs 2 times daily  [DISCONTINUED] Calcium Citrate-Vitamin D (CALCIUM + D PO), Take 1 tablet by mouth daily  [DISCONTINUED] Misc.  Devices MISC, As directed by physician daily  [DISCONTINUED] diclofenac (VOLTAREN) 50 MG EC tablet, Take 1 tablet by mouth 2 times daily  [DISCONTINUED] docusate sodium (COLACE) 100 MG capsule, Take 1 capsule by mouth 2 times daily as needed for right  6.  Anemia  7.  Persistent Afib S/o cardioversion        PLAN     1. Reduce fluid rate  2. Increased urine output noted anticipating renal recovery. We'll hold of hemodialysis over the weekend. If renal function is improving we will plan to discontinue tunnel catheter on Monday. She is on anticoagulation which will need to be stopped prior to removal of tunneled catheter  3.   Watch for renal recovery  Please do not hesitate to call with questions    This note is created with the assistance of a speech-recognition program. While intending to generate a document that actually reflects the content of the visit, no guarantees can be provided that every mistake has been identified and corrected by editing    Carlita Ledbetter MD, MRCP Simona Dates), [address]   4/27/2019 11:40 AM  [facility]

## 2019-04-27 NOTE — PROGRESS NOTES
Physical Therapy    DATE: 2019  NAME: Johanny Sigala  MRN: [id number]   : 1965    Discharge Recommendations: Continue to Assess (pending progress)     Subjective: Per RN, patient okay for PT. Patient agreeable. Very tired - stated she was given ativan. (Patient normally very alert, talkative, and motivated to participate in PT with writer). Pain: Did not report pain   Patient follows: All Commands  Is patient on ventilator: No  Is patient on sedation: No  Precautions: Fall risk, contact precautions, up with assist. s/p  ILEOCECECTOMY, SUBTOTAL COLECTOMY ; s/p 2ND LOOK EXPLORATORY LAPAROTOMY with ileostomy creation         Therapeutic exercises:  B Gastroc Stretch 3x30\"; R>L muscle spasms   Upper extremity exercises: Bicep curl, shoulder flexion/extension, punches, tricep curl, shoulder abduction/adduction, shoulder HAB. Reps: 10  Long seated LE: ankle pumps, heel slides, SLR, hip abd/add. Reps: 10      Goals  Short Term Goals  Short term goal 1: Pt will be Betty with bed mobility  Short term goal 2: Pt will be Betty transfers  Short term goal 3: Pt will be SBA amb 125' RW   Short term goal 4: Pt will be safe to attempt steps       Plan: Progress functional mobility as medically appropriate.    Time In: 3651  Time Out: 1253  Time Coded Minutes (treatment minutes): 16  Rehab Potential: Good  Treatments/week: 5-6x/wk    Celestino Mahan, PTA

## 2019-04-27 NOTE — PROGRESS NOTES
occasional fever. · Has some vaginal bleeding. Gyn evaluating  ·   Infection Control Recommendations   · [place] Precautions     Antimicrobial Stewardship Recommendations      · Discontinuation of therapy     Coordination of Outpatient Care:   · Estimated Length of IV antimicrobials: 4/14  · Patient will need Midline Catheter Insertion: No  · Patient will need PICC line Insertion: No  · Patient will need: Home IV , [place],  SNF,  LTAC TBD  · Patient will need outpatient wound care: TBD     Chief complaint/reason for consultation:   · Altered mental status and tender abdomen         History of Present Illness:        INITIAL HISTORY:     Patient presented to ED via EMS due to altered mental status and vomiting.      Patient has history significant for COPD, right knee osteoarthirtis s/p arthoplasty, and chronic NSAID use. Patient lives in [place], but was here to visit her significant other. Arrived Wednesday night, said she didn't feel good. Thought she might have had a bad burger at a fast food restaurant. Went to sleep and slept for almost 24 hours. When she awoke, she said she was vomiting, having diarrhea, and abdominal pain. Significant other and sister are unsure of the nature of the vomit, diarrhea, or abdominal pain. Then she slept a bit more, until her significant other found her unresponsive and that's when he called EMS to bring her to the hospital.      Afebrile on admission, but Tmax overnight was 39.3. Tachycardic on admission, 129. Became hypotensive after admission and levophed and vasopressin were started.       Interval changes  4/27/2019   No fever  abd sore and right ostomy w liquid green stools. surg wound exposed and clean dry  No chest pain or cough  Some edema both ankles and some crackles on exam -   No dysuria and no chest pain - no skin rash  Right chest HD line site clean and not discolored.   Urine output large - had 900 cc earlier  HD to be held over the weekend -    WBC still up 22 stalling x 2 days after intial improvement     Summary of relevant labs:  Labs:  W 30 -22  Micro:  blood culture H flu BL neg, S PNC and meropenem  Repeat blood cx neg  Imaging:  EDWARD neg vegetation  CT chest no PE but bilat LL infiltrates  US pelvis fluid in the cul de sac 4/19/19      I have personally reviewed the past medical history, past surgical history, medications, social history, and family history, and I haveupdated the database accordingly.   Past Medical History:     Past Medical History:   Diagnosis Date    Asthma     On inhaler    Back pain, chronic     Hypertension 2015    On Lisinopril    Snores     possible apnea but not tested    Wears glasses        Past Surgical  History:     Past Surgical History:   Procedure Laterality Date    KNEE ARTHROSCOPY Left 1980    KNEE ARTHROSCOPY Left 09/28/2016    with medial menisectomy    LAPAROTOMY EXPLORATORY N/A 4/6/2019    LAPAROTOMY EXPLORATORY, ILEOCECECTOMY, SUBTOTAL COLECTOMY, ABTHEHRA WOUND VAC PLACEMENT performed by Nico Sutherland MD at [address] [place] [address] N/A 4/8/2019    2ND LOOK EXPLORATORY LAPAROTOMY, RESECTION TRANSVERSE COLON, ILEOSTOMY CREATION, RESECTION RECTAL STUMP, ABDOMINAL WASHOUT, OMENTECTOMY, ABDOMINAL WALL CLOSURE performed by Basia Hussein MD at [address] [place] [address] 2013       Medications:      warfarin  2 mg Oral Daily    bumetanide  1 mg Oral Daily    midodrine  10 mg Oral TID WC    metoprolol tartrate  25 mg Oral BID    warfarin (COUMADIN) daily dosing (placeholder)   Other RX Placeholder    nicotine  1 patch Transdermal Daily    aspirin  81 mg Oral Daily    cyclobenzaprine  5 mg Oral TID    pantoprazole  40 mg Oral BID AC    sodium chloride flush  10 mL Intravenous 2 times per day       Social History:     Social History     Socioeconomic History    Marital status: Single     Spouse name: Not on file    Number of children: 0    Years of education: Not on file    Highest education level: Not on file   Occupational History    Occupation: [address] [employer] resource strain: Not on file    Food insecurity:     Worry: Not on file     Inability: Not on file    Transportation needs:     Medical: Not on file     Non-medical: Not on file   Tobacco Use    Smoking status: Light Tobacco Smoker     Packs/day: 0.25     Types: Cigarettes     Start date: 9/19/1980    Smokeless tobacco: Never Used   Substance and Sexual Activity    Alcohol use: Yes     Comment: OCCASSIONAL    Drug use: No    Sexual activity: Yes     Partners: Female   Lifestyle    Physical activity:     Days per week: Not on file     Minutes per session: Not on file    Stress: Not on file   Relationships    Social connections:     Talks on phone: Not on file     Gets together: Not on file     Attends Religion service: Not on file     Active member of club or organization: Not on file     Attends meetings of clubs or organizations: Not on file     Relationship status: Not on file    Intimate partner violence:     Fear of current or ex partner: Not on file     Emotionally abused: Not on file     Physically abused: Not on file     Forced sexual activity: Not on file   Other Topics Concern    Not on file   Social History Narrative    Not on file       Family History:     Family History   Problem Relation Age of Onset    Heart Disease Mother     Stroke Mother     Heart Surgery Mother     Diabetes Maternal Grandmother     Emphysema Maternal Grandfather     Diabetes Maternal Grandfather     Lung Cancer Maternal Uncle         Allergies:   Pcn [penicillins]; Sulfa antibiotics; and Tape [adhesive tape]     Review of Systems:     Review of Systems   Constitutional: Negative for activity change and appetite change. HENT: Negative for congestion. Respiratory: Negative for chest tightness. Cardiovascular: Negative for leg swelling.    Gastrointestinal: Positive for abdominal pain. Endocrine: Negative for heat intolerance. Genitourinary: Negative for dysuria. Musculoskeletal: Negative for arthralgias. Skin: Positive for wound. Negative for color change. Allergic/Immunologic: Negative for immunocompromised state. Neurological: Negative for dizziness. Hematological: Negative for adenopathy. Psychiatric/Behavioral: Negative for agitation. Physical Examination :     Patient Vitals for the past 8 hrs:   BP Temp Temp src Pulse Resp SpO2   04/26/19 2330 130/67 98.6 °F (37 °C) Oral 86 18 99 %   04/26/19 1925 127/74 98.2 °F (36.8 °C) Oral 87 16 99 %       Physical Exam   Constitutional: She appears well-developed and well-nourished. No distress. HENT:   Head: Normocephalic and atraumatic. Mouth/Throat: No oropharyngeal exudate. Eyes: Pupils are equal, round, and reactive to light. Conjunctivae are normal. No scleral icterus. Neck: Neck supple. No JVD present. No tracheal deviation present. Cardiovascular: Normal rate and regular rhythm. Exam reveals no gallop and no friction rub. No murmur heard. Pulmonary/Chest: Effort normal. No stridor. No respiratory distress. She has no wheezes. She has rales. Abdominal: Soft. Bowel sounds are normal. She exhibits no distension. There is no tenderness. Genitourinary:   Genitourinary Comments: Urine clear   Musculoskeletal: She exhibits edema. She exhibits no deformity. Neurological: She is alert. No cranial nerve deficit. Coordination normal.   Skin: Skin is warm and dry. She is not diaphoretic. No erythema. No pallor. Mid abd surg wound site clean and dry - VAC off   Psychiatric: She has a normal mood and affect.  Her behavior is normal.         Medical Decision Making:   I have independently reviewed/ordered the following labs:    CBC with Differential:   Recent Labs     04/25/19  0631 04/26/19  0653   WBC 21.7* 22.4*   HGB 9.1* 8.3*   HCT 29.3* 26.5*   * 932*   LYMPHOPCT 22* 11* MONOPCT 3 10*     BMP:  Recent Labs     04/25/19  0631 04/26/19  0653    138   K 3.8 3.6*   CL 95* 101   CO2 22 23   BUN 26* 15   CREATININE 3.42* 2.33*     Hepatic Function Panel:   Recent Labs     04/25/19  0631   PROT 6.2*   LABALBU 2.4*   BILIDIR 0.21   IBILI 0.27   BILITOT 0.48   ALKPHOS 167*   ALT 16   AST 16     No results for input(s): RPR in the last 72 hours. No results for input(s): HIV in the last 72 hours. No results for input(s): BC in the last 72 hours. Lab Results   Component Value Date    CREATININE 2.33 04/26/2019    GLUCOSE 83 04/26/2019       Detailed results: Thank you for allowing us to participate in the care of this patient. Please call with questions. This note is created with the assistance of a speech recognition program.  While intending to generate adocument that actually reflects the content of the visit, the document can still have some errors including those of syntax and sound a like substitutions which may escape proof reading. It such instances, actual meaningcan be extrapolated by contextual diversion.     Moshe Jay MD  Office: [phone]

## 2019-04-27 NOTE — PLAN OF CARE
Problem: Nutrition  Goal: Optimal nutrition therapy  Description  Nutrition Problem: Inadequate oral intake  Intervention: Food and/or Nutrient Delivery: Continue NPO  Nutritional Goals: Meet % of estimated nutrition needs   4/26/2019 1419 by Anton Cooley RD, LD  Outcome: Ongoing     Problem: Risk for Impaired Skin Integrity  Goal: Tissue integrity - skin and mucous membranes  Description  Structural intactness and normal physiological function of skin and  mucous membranes. Outcome: Ongoing  Note:   Pts skin maintains structural intactness and physiologic function. Pt able to reposition independently in bed/ Assisting pt with turns every 2 hours and heels elevated off bed. Linens remain clean and dry. Will continue to monitor. Problem: Falls - Risk of:  Goal: Will remain free from falls  Description  Will remain free from falls  Outcome: Ongoing  Note:   Pt remains free of falls at this time. Bed locked in lowest position, siderails x2, call light in reach. Non-skid footwear applied. Pt ambulates in room with steady gait. Encouraged pt to call for assistance as needed for safety. Falling star posted outside of room. Will continue to monitor. Problem: Injury - Risk of, Physical Injury:  Goal: Will remain free from falls  Description  Will remain free from falls  Outcome: Ongoing  Note:   Pt remains free of falls at this time. Bed locked in lowest position, siderails x2, call light in reach. Non-skid footwear applied. Pt ambulates in room with steady gait. Encouraged pt to call for assistance as needed for safety. Falling star posted outside of room. Will continue to monitor.

## 2019-04-27 NOTE — PROGRESS NOTES
Patient Name: Nancy Downing  Date of admission: 4/5/2019  8:39 PM  Patient's age: 47 y. o., 1965  [facility] [R57.9]      Requesting Physician: Arline Monson MD    CHIEF COMPLAINT:  Abdominal pain  SUBJECTIVE:  . The patient was seen and examined. Continues to have abdominal pain. No active bleeding. Overall she is improving. No fever or chills. She will need long-term anticoagulation,   Started on Coumadin. Tolerated well so far. BRIEF CASE HISTORY:    The patient is a 47 y.o.  female who is admitted to the hospital for   for increased confusion and abdominal pain. She was found to have ischemic bowel and was taken to surgery on 4/6/18. Pathology showed ischemic bowel going to the margins. She was taken for a second surgery on 4/8/18 and more ischemic bowel was appreciated. The patient platelets were about 300,000 on admission and dropped about 98 with her multiple surgeries. Hit antibody was done and was negative. Platelets recovered since then. The patient continues to struggle with recovery. She underwent a CT scan of the abdomen and pelvis today that showed heterogenous changes in the spleen that the new. There was a suspicion of splenic infarction. Vascular were involved and started the patient in aspirin and Plavix as well as heparin. The patient is seen and examined. She is started on anticoagulation. She denies any active bleeding. She has very little insight about her disease and most of the information were obtained from the chart. It is no history of thromboembolism previously . family history is negative for arterial or venous thrombosis. The patient developed worsening renal failure and was started on hemodialysis. Past Medical History:   has a past medical history of Asthma, Back pain, chronic, Hypertension, Snores, and Wears glasses.     Past kg/m²    Temp (24hrs), Av.8 °F (37.1 °C), Min:98.2 °F (36.8 °C), Max:100.3 °F (37.9 °C)      Physical Exam  Gen. Exam, showed a well-appearing patient without evidence of distress or pain  HEENT, normocephalic and atraumatic, PERRLA extraocular muscles are intact  Neck showed no JVD no carotid bruit, no cervical adenopathy  Chest is clear to auscultation bilaterally  Heart is regular without any murmur  Abdomen left upper quadrant tenderness but the abdomen was soft  Lower extremities showed no edema clubbing or cyanosis  Neurological examination was nonfocal, with intact cranial nerves  Skin  No rashes or bruising appreciated          DATA:      Labs:       CBC:   Recent Labs     19  0631 19  0653   WBC 21.7* 22.4*   HGB 9.1* 8.3*   HCT 29.3* 26.5*   * 932*     BMP:   Recent Labs     19  0631 19  0653    138   K 3.8 3.6*   CO2 22 23   BUN 26* 15   CREATININE 3.42* 2.33*   LABGLOM 14* 22*   GLUCOSE 113* 83     PT/INR:   Recent Labs     19  0631 19  0653   PROTIME 24.0* 27.3*   INR 2.4 2.8     APTT:  No results for input(s): APTT in the last 72 hours.   LIVER PROFILE:  Recent Labs     19  0631   AST 16   ALT 16   LABALBU 2.4*               IMPRESSION:    Primary Problem  Septic shock 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]     Leukemoid reaction [D72.823]     Mottled skin [L81.9]     Pleural effusion, bilateral [J90]     Protein-calorie malnutrition ([address] [E46]     Septic shock ([address] [A41.9, R65.21]     Gastroenteritis [K52.9]     Septicemia due to Gram negative organism ([address] [A41.50]     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[facility] [K55.049] 2019    Ischemic bowel syndrome ([facility] [K55.9] 2019    Acute GI bleeding [K92.2] 2019    Gram negative septic shock ([facility] [A41.50, R65.21]     Rhabdomyolysis [M62.82] 04/06/2019    Hyponatremia [E87.1] 04/06/2019    Lactic acidosis [E87.2] 04/06/2019    MARY (acute kidney injury) ([facility] [N17.9] 04/71/4799    Metabolic acidosis [H77.6] 04/06/2019    [facility] [J96.00] 04/06/2019    Elevated liver enzymes [R74.8] 04/06/2019    Hyperkalemia [E87.5]     Shock ([facility] [R57.9] 04/05/2019       RECOMMENDATIONS:  The patient is doing well clinically stable. Slight improvement. .   Continue heparin and aspirin  Coumadin. Target INR between 2 and 3. She will need long-term use of anticoagulation. Patient is aware. Leukocytosis and thrombocytosis are likely reactive. However since there are persistent I will check VOLODYMYR 2 Gene mutation. Anemia. Status post blood transfusion with dialysis  Hemoglobin is stable now.                                 [address] [place] [address] [facility]                          Cell: [phone]

## 2019-04-27 NOTE — PROGRESS NOTES
Stoma on the right. Abdominal wound Vac removed.   Extremities: peripheral pulses normal, no pedal edema, no clubbing or cyanosis   Skin: normal coloration and turgor, no rashes, no suspicious skin lesions noted    Medications:  Scheduled Medications   warfarin  2 mg Oral Daily    bumetanide  1 mg Oral Daily    midodrine  10 mg Oral TID WC    metoprolol tartrate  25 mg Oral BID    warfarin (COUMADIN) daily dosing (placeholder)   Other RX Placeholder    nicotine  1 patch Transdermal Daily    aspirin  81 mg Oral Daily    cyclobenzaprine  5 mg Oral TID    pantoprazole  40 mg Oral BID AC    sodium chloride flush  10 mL Intravenous 2 times per day       PRN Medications  sodium chloride 250 mL PRN   sodium chloride 150 mL PRN   heparin (porcine) 1,600 Units PRN   heparin (porcine) 1,600 Units PRN   sodium chloride 250 mL PRN   sodium chloride 150 mL PRN   acetaminophen 650 mg Q4H PRN   glucose 15 g PRN   dextrose 12.5 g PRN   glucagon (rDNA) 1 mg PRN   dextrose 100 mL/hr PRN   oxyCODONE 5 mg Q4H PRN   morphine 4 mg Q3H PRN   Or     morphine 6 mg Q3H PRN   sodium chloride 250 mL PRN   sodium chloride 150 mL PRN   dextrose 25 g PRN   sodium chloride flush 10 mL PRN   magnesium hydroxide 30 mL Daily PRN   ondansetron 4 mg Q6H PRN       Diagnostic Labs and Imaging:  CBC:  Recent Labs     04/25/19  0631 04/26/19  0653 04/27/19  0551   WBC 21.7* 22.4* 22.4*   HGB 9.1* 8.3* 8.7*   * 932* 961*     BMP: Recent Labs     04/25/19  0631 04/26/19  0653 04/27/19  0551    138 141   K 3.8 3.6* 3.9   CL 95* 101 102   CO2 22 23 21   BUN 26* 15 21*   CREATININE 3.42* 2.33* 2.66*   GLUCOSE 113* 83 89     Hepatic: Recent Labs     04/25/19  0631   AST 16   ALT 16   BILITOT 0.48   ALKPHOS 167*       Assessment and Plan:     Principal Problem:    Septic shock (HCC)  Active Problems:    Shock (HCC)    Rhabdomyolysis    Hyponatremia    Lactic acidosis    MARY (acute kidney injury) (HCC)    Metabolic acidosis    Acute respiratory failure (HCC)    Elevated liver enzymes    Hyperkalemia    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HCC)    Ischemic bowel syndrome (HCC)    Acute GI bleeding    Gram negative septic shock (HCC)    Protein-calorie malnutrition (HCC)    Gastroenteritis    Haemophilus influenzae septicemia (HCC)    Pleural effusion, bilateral    Splenic infarction    Leukemoid reaction    Mottled skin    Acute renal failure (HCC)    Altered mental status    PMB (postmenopausal bleeding)  Resolved Problems:    * No resolved hospital problems. *    · Hb is 8.7 this morning. Will continue to monitor hemoglobin. · Levofloxacin discontinued.  Patient completed a course of meropenem.  Continue to monitor patient off antibiotics.   · Transvaginal ultrasound was normal.  ObGyn plans to follow the patient outpatient. · Patient stable from cardiology stand point. · Continue Midodrine for low BP if needed  · Pain control with Morphine and Roxicodone PRN  · Continue aspirin.  Heparin drip discontinued. · Hypercoagulable workup negative. · Started nystatin for oral thrush.    · Continue renal diet  · Outpatient dialysis approval at [facility]. · Awaiting recommendations from Nephrology regarding dialysis over the weekend. If renal function is improving we will plan to discontinue tunnel catheter on Monday.  She is on anticoagulation which will need to be stopped prior to removal of tunneled catheter  · Patient on Coumadin with pharmacy to dose. Cynthia Martinez will be discharged on Coumadin.  [facility]. · Started Imodium for stool bulk forming. Latia Eli MD  PGY-1, Department of Internal Medicine  [place], [place]  4/27/2019 8:24 AM  Attending Physician Statement  I have discussed the care of Gloria Olson, including pertinent history and exam findings,  with the resident.  I have seen and examined the patient and the key elements of all parts of the encounter have been performed by me. I agree with the assessment, plan and orders as documented by the resident with additions . Treatment plan Discussed with nursing staff in detail , all questions answered . Electronically signed by Tania Boudreaux MD on   4/27/19 at 4:02 PM    Please note that this chart was generated using voice recognition Dragon dictation software. Although every effort was made to ensure the accuracy of this automated transcription, some errors in transcription may have occurred.

## 2019-04-27 NOTE — PROGRESS NOTES
Pharmacy Note  Warfarin Consult follow-up    Consulting Physician: Alondra Beltran  Reason for Admission: septic shock     Warfarin dose prior to admission: new  Warfarin indication: Splenic infarct  Target INR range: 2-3    Recent Labs     04/27/19  0551   INR 3.9     Recent Labs     04/25/19  0631 04/26/19  0653 04/27/19  0551   HGB 9.1* 8.3* 8.7*   HCT 29.3* 26.5* 28.0*   * 932* 961*       Significant Drug-Drug Interactions:  Current  Drug-Drug Interactions: acetaminophen, aspirin    Date                 INR        Dose   4/20/2019         1.3            5 mg  4.21                  1.2            5 mg  4/22/19             1.6            5 mg  4/23/19             2.7            Hold  4/24/19             2.4            2.5 mg  4/25/19             2.4            2.5mg  4/26/19             2.8            2 mg  4/27/19             3.9            Hold    Notes:  Patient's INR increased significantly and is supratherapeutic today at 3.9. Will hold warfarin for tonight. Continue daily PT/INR while inpatient.      Sedrick Thomas, PharmD   PGY-1 Pharmacy Resident  4/27/2019 11:24 AM

## 2019-04-27 NOTE — PLAN OF CARE
Nutrition: Pt nutrition status is improving. Pt ate at least 50% of each meal this shift  SKIN:Pt free from new skin issues. Pt turned and repositioned every two hours to prevent skin breakdown. Pt brief checked and changed every two hours. FALLS  Patient assessed for fall risk and fall precautions initiated as needed. Patient and family  instructed about safety devices and allowed to make decisions related to safey. Environment kept free of clutter and adequate lighting provided. Bed in lowest position with brakes locked. Call light in reach. Patient ID band correct and in place. Patient transferred with appropriate methods. Will continue to monitor.

## 2019-04-28 LAB
ABSOLUTE EOS #: 0 K/UL (ref 0–0.4)
ABSOLUTE IMMATURE GRANULOCYTE: 0.95 K/UL (ref 0–0.3)
ABSOLUTE LYMPH #: 4.98 K/UL (ref 1–4.8)
ABSOLUTE MONO #: 2.37 K/UL (ref 0.1–0.8)
ALBUMIN SERPL-MCNC: 2.5 G/DL (ref 3.5–5.2)
ALBUMIN/GLOBULIN RATIO: 0.8 (ref 1–2.5)
ALP BLD-CCNC: 120 U/L (ref 35–104)
ALT SERPL-CCNC: 12 U/L (ref 5–33)
ANION GAP SERPL CALCULATED.3IONS-SCNC: 15 MMOL/L (ref 9–17)
AST SERPL-CCNC: 13 U/L
BASOPHILS # BLD: 0 % (ref 0–2)
BASOPHILS ABSOLUTE: 0 K/UL (ref 0–0.2)
BILIRUB SERPL-MCNC: 0.26 MG/DL (ref 0.3–1.2)
BILIRUBIN DIRECT: 0.12 MG/DL
BILIRUBIN, INDIRECT: 0.14 MG/DL (ref 0–1)
BUN BLDV-MCNC: 21 MG/DL (ref 6–20)
BUN/CREAT BLD: ABNORMAL (ref 9–20)
CALCIUM SERPL-MCNC: 7.9 MG/DL (ref 8.6–10.4)
CHLORIDE BLD-SCNC: 102 MMOL/L (ref 98–107)
CO2: 21 MMOL/L (ref 20–31)
CREAT SERPL-MCNC: 2.59 MG/DL (ref 0.5–0.9)
DIFFERENTIAL TYPE: ABNORMAL
EOSINOPHILS RELATIVE PERCENT: 0 % (ref 1–4)
GFR AFRICAN AMERICAN: 24 ML/MIN
GFR NON-AFRICAN AMERICAN: 19 ML/MIN
GFR SERPL CREATININE-BSD FRML MDRD: ABNORMAL ML/MIN/{1.73_M2}
GFR SERPL CREATININE-BSD FRML MDRD: ABNORMAL ML/MIN/{1.73_M2}
GLOBULIN: ABNORMAL G/DL (ref 1.5–3.8)
GLUCOSE BLD-MCNC: 98 MG/DL (ref 70–99)
HCT VFR BLD CALC: 26.7 % (ref 36.3–47.1)
HEMOGLOBIN: 8.1 G/DL (ref 11.9–15.1)
IMMATURE GRANULOCYTES: 4 %
INR BLD: 3.8
LYMPHOCYTES # BLD: 21 % (ref 24–44)
MCH RBC QN AUTO: 29.3 PG (ref 25.2–33.5)
MCHC RBC AUTO-ENTMCNC: 30.3 G/DL (ref 28.4–34.8)
MCV RBC AUTO: 96.7 FL (ref 82.6–102.9)
MONOCYTES # BLD: 10 % (ref 1–7)
MORPHOLOGY: ABNORMAL
NRBC AUTOMATED: 0 PER 100 WBC
PDW BLD-RTO: 15.1 % (ref 11.8–14.4)
PLATELET # BLD: 823 K/UL (ref 138–453)
PLATELET ESTIMATE: ABNORMAL
PMV BLD AUTO: 8.6 FL (ref 8.1–13.5)
POTASSIUM SERPL-SCNC: 3.7 MMOL/L (ref 3.7–5.3)
PROTHROMBIN TIME: 36.5 SEC (ref 9–12)
RBC # BLD: 2.76 M/UL (ref 3.95–5.11)
RBC # BLD: ABNORMAL 10*6/UL
SEG NEUTROPHILS: 65 % (ref 36–66)
SEGMENTED NEUTROPHILS ABSOLUTE COUNT: 15.4 K/UL (ref 1.8–7.7)
SODIUM BLD-SCNC: 138 MMOL/L (ref 135–144)
TOTAL PROTEIN: 5.7 G/DL (ref 6.4–8.3)
WBC # BLD: 23.7 K/UL (ref 3.5–11.3)
WBC # BLD: ABNORMAL 10*3/UL

## 2019-04-28 PROCEDURE — 6370000000 HC RX 637 (ALT 250 FOR IP): Performed by: STUDENT IN AN ORGANIZED HEALTH CARE EDUCATION/TRAINING PROGRAM

## 2019-04-28 PROCEDURE — 36415 COLL VENOUS BLD VENIPUNCTURE: CPT

## 2019-04-28 PROCEDURE — 2580000003 HC RX 258: Performed by: STUDENT IN AN ORGANIZED HEALTH CARE EDUCATION/TRAINING PROGRAM

## 2019-04-28 PROCEDURE — 99232 SBSQ HOSP IP/OBS MODERATE 35: CPT | Performed by: INTERNAL MEDICINE

## 2019-04-28 PROCEDURE — 6360000002 HC RX W HCPCS: Performed by: STUDENT IN AN ORGANIZED HEALTH CARE EDUCATION/TRAINING PROGRAM

## 2019-04-28 PROCEDURE — 2060000000 HC ICU INTERMEDIATE R&B

## 2019-04-28 PROCEDURE — 87040 BLOOD CULTURE FOR BACTERIA: CPT

## 2019-04-28 PROCEDURE — 80048 BASIC METABOLIC PNL TOTAL CA: CPT

## 2019-04-28 PROCEDURE — 80076 HEPATIC FUNCTION PANEL: CPT

## 2019-04-28 PROCEDURE — 6370000000 HC RX 637 (ALT 250 FOR IP): Performed by: INTERNAL MEDICINE

## 2019-04-28 PROCEDURE — 85610 PROTHROMBIN TIME: CPT

## 2019-04-28 PROCEDURE — 85025 COMPLETE CBC W/AUTO DIFF WBC: CPT

## 2019-04-28 PROCEDURE — 94762 N-INVAS EAR/PLS OXIMTRY CONT: CPT

## 2019-04-28 PROCEDURE — 6370000000 HC RX 637 (ALT 250 FOR IP): Performed by: RADIOLOGY

## 2019-04-28 RX ORDER — LOPERAMIDE HYDROCHLORIDE 2 MG/1
2 CAPSULE ORAL 3 TIMES DAILY
Status: DISCONTINUED | OUTPATIENT
Start: 2019-04-28 | End: 2019-04-28

## 2019-04-28 RX ORDER — LOPERAMIDE HYDROCHLORIDE 2 MG/1
2 CAPSULE ORAL
Status: DISCONTINUED | OUTPATIENT
Start: 2019-04-28 | End: 2019-05-03

## 2019-04-28 RX ADMIN — MORPHINE SULFATE 4 MG: 4 INJECTION INTRAVENOUS at 07:07

## 2019-04-28 RX ADMIN — PANTOPRAZOLE SODIUM 40 MG: 40 TABLET, DELAYED RELEASE ORAL at 06:01

## 2019-04-28 RX ADMIN — ACETAMINOPHEN 650 MG: 325 TABLET ORAL at 18:03

## 2019-04-28 RX ADMIN — METOPROLOL TARTRATE 25 MG: 25 TABLET ORAL at 09:35

## 2019-04-28 RX ADMIN — MORPHINE SULFATE 4 MG: 4 INJECTION INTRAVENOUS at 15:13

## 2019-04-28 RX ADMIN — OXYCODONE HYDROCHLORIDE 5 MG: 5 TABLET ORAL at 09:35

## 2019-04-28 RX ADMIN — MORPHINE SULFATE 4 MG: 4 INJECTION INTRAVENOUS at 20:18

## 2019-04-28 RX ADMIN — LOPERAMIDE HYDROCHLORIDE 2 MG: 2 CAPSULE ORAL at 09:35

## 2019-04-28 RX ADMIN — OXYCODONE HYDROCHLORIDE 5 MG: 5 TABLET ORAL at 18:02

## 2019-04-28 RX ADMIN — OXYCODONE HYDROCHLORIDE 5 MG: 5 TABLET ORAL at 00:43

## 2019-04-28 RX ADMIN — ACETAMINOPHEN 650 MG: 325 TABLET ORAL at 06:01

## 2019-04-28 RX ADMIN — MORPHINE SULFATE 4 MG: 4 INJECTION INTRAVENOUS at 03:46

## 2019-04-28 RX ADMIN — ASPIRIN 81 MG: 81 TABLET, CHEWABLE ORAL at 09:35

## 2019-04-28 RX ADMIN — Medication 10 ML: at 20:19

## 2019-04-28 RX ADMIN — BUMETANIDE 2 MG: 1 TABLET ORAL at 09:35

## 2019-04-28 RX ADMIN — LOPERAMIDE HYDROCHLORIDE 2 MG: 2 CAPSULE ORAL at 18:02

## 2019-04-28 RX ADMIN — ACETAMINOPHEN 650 MG: 325 TABLET ORAL at 00:43

## 2019-04-28 RX ADMIN — CYCLOBENZAPRINE 5 MG: 10 TABLET, FILM COATED ORAL at 20:18

## 2019-04-28 RX ADMIN — CYCLOBENZAPRINE 5 MG: 10 TABLET, FILM COATED ORAL at 15:12

## 2019-04-28 RX ADMIN — CYCLOBENZAPRINE 5 MG: 10 TABLET, FILM COATED ORAL at 09:35

## 2019-04-28 RX ADMIN — OXYCODONE HYDROCHLORIDE 5 MG: 5 TABLET ORAL at 06:01

## 2019-04-28 RX ADMIN — PANTOPRAZOLE SODIUM 40 MG: 40 TABLET, DELAYED RELEASE ORAL at 15:12

## 2019-04-28 RX ADMIN — MORPHINE SULFATE 4 MG: 4 INJECTION INTRAVENOUS at 11:23

## 2019-04-28 RX ADMIN — METOPROLOL TARTRATE 25 MG: 25 TABLET ORAL at 20:18

## 2019-04-28 RX ADMIN — OXYCODONE HYDROCHLORIDE 5 MG: 5 TABLET ORAL at 23:04

## 2019-04-28 ASSESSMENT — ENCOUNTER SYMPTOMS
COLOR CHANGE: 0
VOMITING: 0
COUGH: 0
PHOTOPHOBIA: 0
ABDOMINAL PAIN: 1
DIARRHEA: 0
SHORTNESS OF BREATH: 0
NAUSEA: 0
ABDOMINAL DISTENTION: 0
WHEEZING: 0
BACK PAIN: 0
CHEST TIGHTNESS: 0
CONSTIPATION: 0

## 2019-04-28 ASSESSMENT — PAIN SCALES - GENERAL
PAINLEVEL_OUTOF10: 8
PAINLEVEL_OUTOF10: 0
PAINLEVEL_OUTOF10: 7
PAINLEVEL_OUTOF10: 7
PAINLEVEL_OUTOF10: 4
PAINLEVEL_OUTOF10: 9
PAINLEVEL_OUTOF10: 4
PAINLEVEL_OUTOF10: 9
PAINLEVEL_OUTOF10: 8
PAINLEVEL_OUTOF10: 7
PAINLEVEL_OUTOF10: 8
PAINLEVEL_OUTOF10: 7

## 2019-04-28 ASSESSMENT — PAIN DESCRIPTION - FREQUENCY
FREQUENCY: CONTINUOUS

## 2019-04-28 ASSESSMENT — PAIN DESCRIPTION - PAIN TYPE
TYPE: ACUTE PAIN
TYPE: SURGICAL PAIN
TYPE: SURGICAL PAIN
TYPE: ACUTE PAIN
TYPE: SURGICAL PAIN

## 2019-04-28 ASSESSMENT — PAIN DESCRIPTION - ORIENTATION
ORIENTATION: LOWER;MID
ORIENTATION: MID
ORIENTATION: LOWER;MID

## 2019-04-28 ASSESSMENT — PAIN DESCRIPTION - DESCRIPTORS
DESCRIPTORS: DISCOMFORT;CONSTANT
DESCRIPTORS: DISCOMFORT;ACHING
DESCRIPTORS: DISCOMFORT;CONSTANT

## 2019-04-28 ASSESSMENT — PAIN - FUNCTIONAL ASSESSMENT
PAIN_FUNCTIONAL_ASSESSMENT: PREVENTS OR INTERFERES WITH MANY ACTIVE NOT PASSIVE ACTIVITIES
PAIN_FUNCTIONAL_ASSESSMENT: PREVENTS OR INTERFERES SOME ACTIVE ACTIVITIES AND ADLS
PAIN_FUNCTIONAL_ASSESSMENT: PREVENTS OR INTERFERES WITH MANY ACTIVE NOT PASSIVE ACTIVITIES

## 2019-04-28 ASSESSMENT — PAIN DESCRIPTION - PROGRESSION
CLINICAL_PROGRESSION: GRADUALLY IMPROVING

## 2019-04-28 ASSESSMENT — PAIN DESCRIPTION - LOCATION
LOCATION: ABDOMEN

## 2019-04-28 ASSESSMENT — PAIN DESCRIPTION - ONSET
ONSET: ON-GOING

## 2019-04-28 NOTE — PROGRESS NOTES
bleeding. Gyn evaluating  ·   Infection Control Recommendations   · [place] Precautions     Antimicrobial Stewardship Recommendations      · Discontinuation of therapy     Coordination of Outpatient Care:   · Estimated Length of IV antimicrobials: 4/14  · Patient will need Midline Catheter Insertion: No  · Patient will need PICC line Insertion: No  · Patient will need: Home IV , [place],  SNF,  LTAC TBD  · Patient will need outpatient wound care: TBD     Chief complaint/reason for consultation:   · Altered mental status and tender abdomen         History of Present Illness:        INITIAL HISTORY:     Patient presented to ED via EMS due to altered mental status and vomiting.      Patient has history significant for COPD, right knee osteoarthirtis s/p arthoplasty, and chronic NSAID use. Patient lives in [place], but was here to visit her significant other. Arrived Wednesday night, said she didn't feel good. Thought she might have had a bad burger at a fast food restaurant. Went to sleep and slept for almost 24 hours. When she awoke, she said she was vomiting, having diarrhea, and abdominal pain. Significant other and sister are unsure of the nature of the vomit, diarrhea, or abdominal pain. Then she slept a bit more, until her significant other found her unresponsive and that's when he called EMS to bring her to the hospital.      Afebrile on admission, but Tmax overnight was 39.3. Tachycardic on admission, 129. Became hypotensive after admission and levophed and vasopressin were started.       Interval changes  4/28/2019   No fever - feels better in general but abd tender left more than right  No more vaginal bleed and US pelvis neg  WBC 23 still and off antibiotics     Right chest HD line site clean and not discolored.   HD to be held over the weekend -    WBC still up 22 stalling x 2 days after intial improvement     Summary of relevant labs:  Labs:  W 30 -22  Micro:  blood culture H flu BL neg, S PNC and strain: Not on file    Food insecurity:     Worry: Not on file     Inability: Not on file    Transportation needs:     Medical: Not on file     Non-medical: Not on file   Tobacco Use    Smoking status: Light Tobacco Smoker     Packs/day: 0.25     Types: Cigarettes     Start date: 9/19/1980    Smokeless tobacco: Never Used   Substance and Sexual Activity    Alcohol use: Yes     Comment: OCCASSIONAL    Drug use: No    Sexual activity: Yes     Partners: Female   Lifestyle    Physical activity:     Days per week: Not on file     Minutes per session: Not on file    Stress: Not on file   Relationships    Social connections:     Talks on phone: Not on file     Gets together: Not on file     Attends L[employer] service: Not on file     Active member of club or organization: Not on file     Attends meetings of clubs or organizations: Not on file     Relationship status: Not on file    Intimate partner violence:     Fear of current or ex partner: Not on file     Emotionally abused: Not on file     Physically abused: Not on file     Forced sexual activity: Not on file   Other Topics Concern    Not on file   Social History Narrative    Not on file       Family History:     Family History   Problem Relation Age of Onset    Heart Disease Mother     Stroke Mother     Heart Surgery Mother     Diabetes Maternal Grandmother     Emphysema Maternal Grandfather     Diabetes Maternal Grandfather     Lung Cancer Maternal Uncle         Allergies:   Pcn [penicillins]; Sulfa antibiotics; and Tape [adhesive tape]     Review of Systems:     Review of Systems   Constitutional: Negative for activity change, appetite change, chills and fever. HENT: Negative for congestion. Eyes: Negative for photophobia and visual disturbance. Respiratory: Negative for chest tightness. Cardiovascular: Negative for leg swelling. Gastrointestinal: Positive for abdominal pain. Endocrine: Negative for heat intolerance.    Genitourinary: Negative for dysuria. Musculoskeletal: Negative for arthralgias. Skin: Positive for wound. Negative for color change. Allergic/Immunologic: Negative for immunocompromised state. Neurological: Negative for dizziness. Hematological: Negative for adenopathy. Psychiatric/Behavioral: Negative for agitation. Physical Examination :     Patient Vitals for the past 8 hrs:   BP Temp Temp src Pulse Resp SpO2   04/28/19 0935 115/63 98.6 °F (37 °C) Axillary 103 23 97 %   04/28/19 0740 109/69 98.6 °F (37 °C) Oral 97 19 95 %   04/28/19 0338 116/63 98.5 °F (36.9 °C) Temporal 95 22 95 %       Physical Exam   Constitutional: She appears well-developed and well-nourished. No distress. HENT:   Head: Normocephalic and atraumatic. Nose: Nose normal.   Mouth/Throat: No oropharyngeal exudate. Eyes: Pupils are equal, round, and reactive to light. Conjunctivae are normal. No scleral icterus. Neck: Neck supple. No JVD present. No tracheal deviation present. Cardiovascular: Normal rate and regular rhythm. Exam reveals no gallop and no friction rub. No murmur heard. Pulmonary/Chest: Effort normal. No stridor. No respiratory distress. She has no wheezes. She has rales. She exhibits no tenderness. Abdominal: Soft. Bowel sounds are normal. She exhibits no distension. There is no tenderness. Genitourinary:   Genitourinary Comments: Urine clear   Musculoskeletal: She exhibits edema. She exhibits no deformity. Neurological: She is alert. No cranial nerve deficit. Coordination normal.   Skin: Skin is warm and dry. She is not diaphoretic. No erythema. No pallor. Mid abd surg wound site clean and dry - VAC off   Psychiatric: She has a normal mood and affect.  Her behavior is normal.         Medical Decision Making:   I have independently reviewed/ordered the following labs:    CBC with Differential:   Recent Labs     04/27/19  0551 04/28/19  0636   WBC 22.4* 23.7*   HGB 8.7* 8.1*   HCT 28.0* 26.7*   * 823* LYMPHOPCT 18* 21*   MONOPCT 6 10*     BMP:  Recent Labs     04/27/19  0551 04/28/19  0636    138   K 3.9 3.7    102   CO2 21 21   BUN 21* 21*   CREATININE 2.66* 2.59*     Hepatic Function Panel:   Recent Labs     04/28/19  0636   PROT 5.7*   LABALBU 2.5*   BILIDIR 0.12   IBILI 0.14   BILITOT 0.26*   ALKPHOS 120*   ALT 12   AST 13     No results for input(s): RPR in the last 72 hours. No results for input(s): HIV in the last 72 hours. No results for input(s): BC in the last 72 hours. Lab Results   Component Value Date    CREATININE 2.59 04/28/2019    GLUCOSE 98 04/28/2019       Detailed results: Thank you for allowing us to participate in the care of this patient. Please call with questions. This note is created with the assistance of a speech recognition program.  While intending to generate adocument that actually reflects the content of the visit, the document can still have some errors including those of syntax and sound a like substitutions which may escape proof reading. It such instances, actual meaningcan be extrapolated by contextual diversion.     Won Malone MD  Office: [phone]

## 2019-04-28 NOTE — PROGRESS NOTES
[facility]  Internal Medicine Residency Program  Inpatient Daily Progress Note  ______________________________________________________________________________    Patient: Kandice Vyas  YOB: 1965   MRN: [id number]    Acct: [de-identified]     Admit date: 4/5/2019  Today's date: 04/28/19  Number of days in the hospital: 23       Admitting Diagnosis: Septic shock ([address]    Subjective:   Patient seen and examined at bedside. Alert and oriented. Stable vitals this AM.  No acute issues overnight. Patient is eating and drinking fine. Discussed with nephrology. Coumadin will be held. Plan for catheter removal once INR goes down. Review of Systems   Constitutional: Negative for activity change, chills, diaphoresis, fatigue and fever. HENT: Negative. Respiratory: Negative for cough, chest tightness, shortness of breath and wheezing. Cardiovascular: Negative for chest pain, palpitations and leg swelling. Gastrointestinal: Negative for abdominal distention, constipation, diarrhea, nausea and vomiting. Musculoskeletal: Negative for back pain, gait problem, joint swelling and myalgias. Skin: Negative for color change, pallor, rash and wound. Neurological: Negative. Hematological: Negative. Psychiatric/Behavioral: Negative.       Objective:   Vital Sign:  /63   Pulse 103   Temp 98.6 °F (37 °C) (Axillary)   Resp 22   Ht 5' 1.02\" (1.55 m)   Wt 157 lb 3 oz (71.3 kg)   SpO2 98%   BMI 29.68 kg/m²       Physical Exam:  General appearance:   alert, well appearing, and in no distress  Mental Status: alert, oriented to person, place, and time  Neurologic:  alert, oriented, normal speech, no focal findings or movement disorder noted  Lungs:  clear to auscultation, no wheezes, rales or rhonchi, symmetric air entry  Heart[de-identified] normal rate, regular rhythm, normal S1, S2, no murmurs, rubs, clicks or gallops  Abdomen:  soft, nontender, nondistended, no masses or organomegaly.  Stoma in the right side of abdomen  Extremities: peripheral pulses normal, no pedal edema, no clubbing or cyanosis   Skin: normal coloration and turgor, no rashes, no suspicious skin lesions noted    Medications:  Scheduled Medications   loperamide  2 mg Oral TID WC    bumetanide  2 mg Oral Daily    midodrine  5 mg Oral TID WC    metoprolol tartrate  25 mg Oral BID    nicotine  1 patch Transdermal Daily    aspirin  81 mg Oral Daily    cyclobenzaprine  5 mg Oral TID    pantoprazole  40 mg Oral BID AC    sodium chloride flush  10 mL Intravenous 2 times per day       PRN Medications  sodium chloride 250 mL PRN   sodium chloride 150 mL PRN   heparin (porcine) 1,600 Units PRN   heparin (porcine) 1,600 Units PRN   sodium chloride 250 mL PRN   sodium chloride 150 mL PRN   acetaminophen 650 mg Q4H PRN   glucose 15 g PRN   dextrose 12.5 g PRN   glucagon (rDNA) 1 mg PRN   dextrose 100 mL/hr PRN   oxyCODONE 5 mg Q4H PRN   morphine 4 mg Q3H PRN   Or     morphine 6 mg Q3H PRN   sodium chloride 250 mL PRN   sodium chloride 150 mL PRN   dextrose 25 g PRN   sodium chloride flush 10 mL PRN   magnesium hydroxide 30 mL Daily PRN   ondansetron 4 mg Q6H PRN       Diagnostic Labs and Imaging:  CBC:  Recent Labs     04/26/19  0653 04/27/19  0551 04/28/19  0636   WBC 22.4* 22.4* 23.7*   HGB 8.3* 8.7* 8.1*   * 961* 823*     BMP: Recent Labs     04/26/19  0653 04/27/19  0551 04/28/19  0636    141 138   K 3.6* 3.9 3.7    102 102   CO2 23 21 21   BUN 15 21* 21*   CREATININE 2.33* 2.66* 2.59*   GLUCOSE 83 89 98     Hepatic: Recent Labs     04/28/19  0636   AST 13   ALT 12   BILITOT 0.26*   ALKPHOS 120*       Assessment and Plan:     Principal Problem:    Septic shock (HCC)  Active Problems:    Shock (HCC)    Rhabdomyolysis    Hyponatremia    Lactic acidosis    MARY (acute kidney injury) (HCC)    Metabolic acidosis    Acute respiratory failure (HCC)    Elevated liver enzymes Hyperkalemia    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HCC)    Ischemic bowel syndrome (HCC)    Acute GI bleeding    Gram negative septic shock (HCC)    Protein-calorie malnutrition (HCC)    Gastroenteritis    Haemophilus influenzae septicemia (HCC)    Pleural effusion, bilateral    Splenic infarction    Leukemoid reaction    Mottled skin    Acute renal failure (HCC)    Altered mental status    PMB (postmenopausal bleeding)  Resolved Problems:    * No resolved hospital problems. *    · Levofloxacin discontinued.  Patient completed a course of meropenem.  Continue to monitor patient off antibiotics.   · Transvaginal ultrasound was normal.  ObGyn plans to follow the patient outpatient. · Patient stable from cardiology stand point. · Continue Midodrine for low BP if needed  · Pain control with Morphine and Roxicodone PRN  · Continue aspirin.  Heparin drip discontinued. · Hypercoagulable workup negative. · Started nystatin for oral thrush.    · Continue renal diet  · Outpatient dialysis approval at [facility]. · Started Imodium for stool bulk forming. Imodium 2 mg 3 times a day with meals. · Coumadin will be held. Discussed with nephrology. Plan for catheter removal once INR is controlled. · Continue Bumex 2 mg orally daily    Qian Duncan MD  PGY-1, Department of Internal Medicine  [facility], [facility], [facility]  4/28/2019 12:03 PM  Attending Physician Statement  I have discussed the care of Dee Dee Eagle, including pertinent history and exam findings,  with the resident. I have seen and examined the patient and the key elements of all parts of the encounter have been performed by me. I agree with the assessment, plan and orders as documented by the resident with additions . Treatment plan Discussed with nursing staff in detail , all questions answered .    Electronically signed by Beatris Goel MD on   4/28/19 at 4:09 PM    Please note that this chart was generated using voice

## 2019-04-28 NOTE — PROGRESS NOTES
3.9 3.8     APTT:  No results for input(s): APTT in the last 72 hours. LIVER PROFILE:  Recent Labs     04/28/19  0636   AST 13   ALT 12   LABALBU 2.5*     IMPRESSION:    Primary Problem  Septic shock [facility])    Active Hospital Problems    Diagnosis Date Noted    Acute renal failure ([address] [N17.9]     Altered mental status [R41.82]     PMB (postmenopausal bleeding) [N95.0]     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]     Leukemoid reaction [D72.823]     Mottled skin [L81.9]     Pleural effusion, bilateral [J90]     Protein-calorie malnutrition ([address] [E46]     Septic shock ([address] [A41.9, R65.21]     Gastroenteritis [K52.9]     Haemophilus influenzae septicemia ([address] [A41.3]     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[address] [K55.049] 04/07/2019    Ischemic bowel syndrome ([address] [K55.9] 04/07/2019    Acute GI bleeding [K92.2] 04/07/2019    Gram negative septic shock ([address] [A41.50, R65.21]     Rhabdomyolysis [M62.82] 04/06/2019    Hyponatremia [E87.1] 04/06/2019    Lactic acidosis [E87.2] 04/06/2019    MARY (acute kidney injury) ([address] [N17.9] 46/29/3296    Metabolic acidosis [X10.0] 04/06/2019    Acute respiratory failure ([address] [J96.00] 04/06/2019    Elevated liver enzymes [R74.8] 04/06/2019    Hyperkalemia [E87.5]     Shock ([address] [R57.9] 04/05/2019     RECOMMENDATIONS:  The patient is doing well clinically stable. Slight improvement. .   Continue heparin and aspirin  Coumadin. Target INR between 2 and 3. She will need long-term use of anticoagulation. Patient is aware. Leukocytosis and thrombocytosis are likely reactive. Awaiting  VOLODYMYR 2 Gene mutation. Anemia. Status post blood transfusion with dialysis  Hemoglobin is stable now. José Miguel Smith MD  Hematologist/Medical Oncologist    Cell: [phone]      This note is created with the assistance of a speech recognition program.  While intending to generate a document that actually reflects the content of the visit, the document can still have some errors including those of syntax and sound a like substitutions which may escape proof reading. It such instances, actual meaning can be extrapolated by contextual diversion.

## 2019-04-28 NOTE — PLAN OF CARE
Problem: NUTRITION  Goal: Nutritional status is improving  4/27/2019 1844 by Cathie Perea RN  Outcome: Met This Shift     Problem: Risk for Impaired Skin Integrity  Goal: Tissue integrity - skin and mucous membranes  Description  Structural intactness and normal physiological function of skin and  mucous membranes. 4/28/2019 0036 by Gema Douglass RN  Outcome: Ongoing  Note:   Pts skin maintains structural intactness and physiologic function. Pt able to reposition independently in bed/ Assisting pt with turns every 2 hours and heels elevated off bed. Linens remain clean and dry. Will continue to monitor. 4/27/2019 1844 by Cathie Perea RN  Outcome: Met This Shift     Problem: Falls - Risk of:  Goal: Will remain free from falls  Description  Will remain free from falls  4/28/2019 0036 by Gema Douglass RN  Outcome: Ongoing  Note:   Pt remains free of falls at this time. Bed locked in lowest position, siderails x2, call light in reach. Non-skid footwear applied. Pt ambulates in room with steady gait. Encouraged pt to call for assistance as needed for safety. Falling star posted outside of room. Will continue to monitor. 4/27/2019 1844 by Cathie Perea RN  Outcome: Met This Shift     Problem: Injury - Risk of, Physical Injury:  Goal: Will remain free from falls  Description  Will remain free from falls  4/28/2019 0036 by Gema Douglass RN  Outcome: Ongoing  Note:   Pt remains free of falls at this time. Bed locked in lowest position, siderails x2, call light in reach. Non-skid footwear applied. Pt ambulates in room with steady gait. Encouraged pt to call for assistance as needed for safety. Falling star posted outside of room. Will continue to monitor.    4/27/2019 1844 by Cathie Perea RN  Outcome: Met This Shift

## 2019-04-28 NOTE — PROGRESS NOTES
NEPHROLOGY PROGRESS NOTE    Initial History  Hospitalized with the delirium associated with abdominal pain nausea vomiting.  Initial assessment showed hypotensive lady with the imaging studies revealing evidence of ileus.  Further investigations demonstrated neutrophilic leukocytosis along with acute hepatitis/acute kidney injury and elevated lactic acid.  Underwent surgical resection for ischemic gut.  Hospital course further complicated by persistent atrial fibrillation needing DC cardioversion. Samara Roberts was started on dialysis in view of life-threatening hyperkalemia and acute kidney injury    SUBJECTIVE     Pt seen and examined, no acute events overnight  Urine output continues to be appropriate and improving, ostomy output good  Creatinine improving, last HD on 4/25. OBJECTIVE     Vitals:    04/28/19 0338 04/28/19 0740 04/28/19 0935 04/28/19 0950   BP: 116/63 109/69 115/63    Pulse: 95 97 103 103   Resp: 22 19 23 22   Temp: 98.5 °F (36.9 °C) 98.6 °F (37 °C) 98.6 °F (37 °C)    TempSrc: Temporal Oral Axillary    SpO2: 95% 95% 97% 98%   Weight:       Height:         24HR INTAKE/OUTPUT:      Intake/Output Summary (Last 24 hours) at 4/28/2019 1002  Last data filed at 4/28/2019 0608  Gross per 24 hour   Intake 2159 ml   Output 4200 ml   Net -2041 ml       General appearance:Awake, alert, in no acute distress  HEENT: PERRLA  Respiratory::vesicular breath sounds,reduced AE  Cardiovascular:S1 S2 normal,no gallop or organic murmur. Abdomen: Present stoma  Extremities: No Cyanosis or Clubbing,present Lower extremity edema  Neurological:Alert and oriented. No abnormalities of mood, affect, memory, mentation, or behavior are noted      MEDICATIONS     Scheduled Meds:    loperamide  2 mg Oral TID    bumetanide  2 mg Oral Daily    midodrine  5 mg Oral TID     metoprolol tartrate  25 mg Oral BID    warfarin (COUMADIN) daily dosing (placeholder)   Other RX Placeholder    nicotine  1 patch Transdermal Daily    aspirin Take 1 capsule by mouth 2 times daily as needed for Constipation  gabapentin (NEURONTIN) 100 MG capsule, Take 100 mg by mouth 3 times daily as needed   lisinopril-hydrochlorothiazide (PRINZIDE;ZESTORETIC) 10-12.5 MG per tablet, Take 1 tablet by mouth daily  albuterol (PROVENTIL) (2.5 MG/3ML) 0.083% nebulizer solution, Take 2.5 mg by nebulization every 6 hours as needed for Wheezing  albuterol sulfate  (90 BASE) MCG/ACT inhaler, Inhale 2 puffs into the lungs every 6 hours as needed for Wheezing    INVESTIGATIONS     Last 3 CMP:    Recent Labs     04/26/19  0653 04/27/19  0551 04/28/19  0636    141 138   K 3.6* 3.9 3.7    102 102   CO2 23 21 21   BUN 15 21* 21*   CREATININE 2.33* 2.66* 2.59*   CALCIUM 8.3* 8.3* 7.9*   PROT  --   --  5.7*   LABALBU  --   --  2.5*   BILITOT  --   --  0.26*   ALKPHOS  --   --  120*   AST  --   --  13   ALT  --   --  12       Last 3 CBC:  Recent Labs     04/26/19  0653 04/27/19  0551 04/28/19  0636   WBC 22.4* 22.4* 23.7*   RBC 2.80* 2.96* 2.76*   HGB 8.3* 8.7* 8.1*   HCT 26.5* 28.0* 26.7*   MCV 94.6 94.6 96.7   MCH 29.6 29.4 29.3   MCHC 31.3 31.1 30.3   RDW 15.1* 15.2* 15.1*   * 961* 823*   MPV 9.0 8.6 8.6       ASSESSMENT     1.  Acute kidney injury nonoliguric secondary to ischemic - hypoperfusion (Hypotension/arrthymia/ischemic gut ) and nephrotoxic (contrast) ATN aggravated further by concomitant NSAID and diuretic use along with cocaine - hemodialysis dependent - tunnel catheter in place TTS  Creat peaked at 4 with hyperkalemia 8  Baseline creat in Jan 2017 was normal  Ultrasound shows right kidney 10.4 cm and left 10.2 cm  Serologies neg  2.  Volume overload- iatrogenic/capillary leak syndrome - improving  3.  Ischemic gut status post Ileocecectomy, extended left hemicolectomy, placement of  ABThera wound VAC on 4/6 4/8 Second Look exploratory upper, with resection of transverse colon AND ileostomy  4. Sepsis - blood culture positive for H.infleuenza  5.

## 2019-04-29 ENCOUNTER — APPOINTMENT (OUTPATIENT)
Dept: INTERVENTIONAL RADIOLOGY/VASCULAR | Age: 54
DRG: 710 | End: 2019-04-29
Payer: MEDICAID

## 2019-04-29 LAB
ABO/RH: NORMAL
ABSOLUTE EOS #: 0 K/UL (ref 0–0.4)
ABSOLUTE IMMATURE GRANULOCYTE: 1.05 K/UL (ref 0–0.3)
ABSOLUTE LYMPH #: 6.03 K/UL (ref 1–4.8)
ABSOLUTE MONO #: 1.05 K/UL (ref 0.1–0.8)
ANION GAP SERPL CALCULATED.3IONS-SCNC: 16 MMOL/L (ref 9–17)
ANTIBODY SCREEN: NEGATIVE
ARM BAND NUMBER: NORMAL
BASOPHILS # BLD: 0 % (ref 0–2)
BASOPHILS ABSOLUTE: 0 K/UL (ref 0–0.2)
BLOOD BANK SPECIMEN: NORMAL
BUN BLDV-MCNC: 22 MG/DL (ref 6–20)
BUN/CREAT BLD: ABNORMAL (ref 9–20)
CALCIUM SERPL-MCNC: 8.3 MG/DL (ref 8.6–10.4)
CHLORIDE BLD-SCNC: 98 MMOL/L (ref 98–107)
CO2: 23 MMOL/L (ref 20–31)
CREAT SERPL-MCNC: 2.56 MG/DL (ref 0.5–0.9)
DIFFERENTIAL TYPE: ABNORMAL
EOSINOPHILS RELATIVE PERCENT: 0 % (ref 1–4)
EXPIRATION DATE: NORMAL
GFR AFRICAN AMERICAN: 24 ML/MIN
GFR NON-AFRICAN AMERICAN: 20 ML/MIN
GFR SERPL CREATININE-BSD FRML MDRD: ABNORMAL ML/MIN/{1.73_M2}
GFR SERPL CREATININE-BSD FRML MDRD: ABNORMAL ML/MIN/{1.73_M2}
GLUCOSE BLD-MCNC: 99 MG/DL (ref 70–99)
HCT VFR BLD CALC: 28.9 % (ref 36.3–47.1)
HEMOGLOBIN: 8.8 G/DL (ref 11.9–15.1)
IMMATURE GRANULOCYTES: 4 %
INR BLD: 1.5
INR BLD: 2.2
LYMPHOCYTES # BLD: 23 % (ref 24–44)
MCH RBC QN AUTO: 28.9 PG (ref 25.2–33.5)
MCHC RBC AUTO-ENTMCNC: 30.4 G/DL (ref 28.4–34.8)
MCV RBC AUTO: 94.8 FL (ref 82.6–102.9)
MONOCYTES # BLD: 4 % (ref 1–7)
MORPHOLOGY: ABNORMAL
NRBC AUTOMATED: 0 PER 100 WBC
PDW BLD-RTO: 15.4 % (ref 11.8–14.4)
PLATELET # BLD: 832 K/UL (ref 138–453)
PLATELET ESTIMATE: ABNORMAL
PMV BLD AUTO: 8.6 FL (ref 8.1–13.5)
POTASSIUM SERPL-SCNC: 3.9 MMOL/L (ref 3.7–5.3)
PROTHROMBIN TIME: 15.5 SEC (ref 9–12)
PROTHROMBIN TIME: 22.3 SEC (ref 9–12)
RBC # BLD: 3.05 M/UL (ref 3.95–5.11)
RBC # BLD: ABNORMAL 10*6/UL
SEG NEUTROPHILS: 69 % (ref 36–66)
SEGMENTED NEUTROPHILS ABSOLUTE COUNT: 18.07 K/UL (ref 1.8–7.7)
SODIUM BLD-SCNC: 137 MMOL/L (ref 135–144)
WBC # BLD: 26.2 K/UL (ref 3.5–11.3)
WBC # BLD: ABNORMAL 10*3/UL

## 2019-04-29 PROCEDURE — 2580000003 HC RX 258: Performed by: STUDENT IN AN ORGANIZED HEALTH CARE EDUCATION/TRAINING PROGRAM

## 2019-04-29 PROCEDURE — 97535 SELF CARE MNGMENT TRAINING: CPT

## 2019-04-29 PROCEDURE — 97116 GAIT TRAINING THERAPY: CPT

## 2019-04-29 PROCEDURE — 6370000000 HC RX 637 (ALT 250 FOR IP): Performed by: STUDENT IN AN ORGANIZED HEALTH CARE EDUCATION/TRAINING PROGRAM

## 2019-04-29 PROCEDURE — 85610 PROTHROMBIN TIME: CPT

## 2019-04-29 PROCEDURE — 86901 BLOOD TYPING SEROLOGIC RH(D): CPT

## 2019-04-29 PROCEDURE — 6370000000 HC RX 637 (ALT 250 FOR IP): Performed by: RADIOLOGY

## 2019-04-29 PROCEDURE — 36589 REMOVAL TUNNELED CV CATH: CPT | Performed by: RADIOLOGY

## 2019-04-29 PROCEDURE — 36415 COLL VENOUS BLD VENIPUNCTURE: CPT

## 2019-04-29 PROCEDURE — 97110 THERAPEUTIC EXERCISES: CPT

## 2019-04-29 PROCEDURE — 86900 BLOOD TYPING SEROLOGIC ABO: CPT

## 2019-04-29 PROCEDURE — 02PAX3Z REMOVAL OF INFUSION DEVICE FROM HEART, EXTERNAL APPROACH: ICD-10-PCS | Performed by: RADIOLOGY

## 2019-04-29 PROCEDURE — 2060000000 HC ICU INTERMEDIATE R&B

## 2019-04-29 PROCEDURE — 6370000000 HC RX 637 (ALT 250 FOR IP): Performed by: INTERNAL MEDICINE

## 2019-04-29 PROCEDURE — 36430 TRANSFUSION BLD/BLD COMPNT: CPT

## 2019-04-29 PROCEDURE — 6360000002 HC RX W HCPCS: Performed by: STUDENT IN AN ORGANIZED HEALTH CARE EDUCATION/TRAINING PROGRAM

## 2019-04-29 PROCEDURE — P9017 PLASMA 1 DONOR FRZ W/IN 8 HR: HCPCS

## 2019-04-29 PROCEDURE — 2709999900 HC NON-CHARGEABLE SUPPLY

## 2019-04-29 PROCEDURE — 99212 OFFICE O/P EST SF 10 MIN: CPT

## 2019-04-29 PROCEDURE — 99232 SBSQ HOSP IP/OBS MODERATE 35: CPT | Performed by: INTERNAL MEDICINE

## 2019-04-29 PROCEDURE — 94762 N-INVAS EAR/PLS OXIMTRY CONT: CPT

## 2019-04-29 PROCEDURE — 86850 RBC ANTIBODY SCREEN: CPT

## 2019-04-29 PROCEDURE — 80048 BASIC METABOLIC PNL TOTAL CA: CPT

## 2019-04-29 PROCEDURE — 85025 COMPLETE CBC W/AUTO DIFF WBC: CPT

## 2019-04-29 PROCEDURE — 86927 PLASMA FRESH FROZEN: CPT

## 2019-04-29 RX ORDER — 0.9 % SODIUM CHLORIDE 0.9 %
250 INTRAVENOUS SOLUTION INTRAVENOUS ONCE
Status: COMPLETED | OUTPATIENT
Start: 2019-04-29 | End: 2019-04-29

## 2019-04-29 RX ORDER — ALPRAZOLAM 0.5 MG/1
0.5 TABLET ORAL 2 TIMES DAILY PRN
Status: DISCONTINUED | OUTPATIENT
Start: 2019-04-29 | End: 2019-05-04 | Stop reason: HOSPADM

## 2019-04-29 RX ADMIN — METOPROLOL TARTRATE 25 MG: 25 TABLET ORAL at 21:04

## 2019-04-29 RX ADMIN — OXYCODONE HYDROCHLORIDE 5 MG: 5 TABLET ORAL at 23:52

## 2019-04-29 RX ADMIN — LOPERAMIDE HYDROCHLORIDE 2 MG: 2 CAPSULE ORAL at 09:09

## 2019-04-29 RX ADMIN — Medication 10 ML: at 21:04

## 2019-04-29 RX ADMIN — MORPHINE SULFATE 4 MG: 4 INJECTION INTRAVENOUS at 01:02

## 2019-04-29 RX ADMIN — ASPIRIN 81 MG: 81 TABLET, CHEWABLE ORAL at 09:11

## 2019-04-29 RX ADMIN — MIDODRINE HYDROCHLORIDE 5 MG: 5 TABLET ORAL at 11:44

## 2019-04-29 RX ADMIN — ALPRAZOLAM 0.5 MG: 0.5 TABLET ORAL at 09:11

## 2019-04-29 RX ADMIN — MORPHINE SULFATE 4 MG: 4 INJECTION INTRAVENOUS at 05:26

## 2019-04-29 RX ADMIN — OXYCODONE HYDROCHLORIDE 5 MG: 5 TABLET ORAL at 07:00

## 2019-04-29 RX ADMIN — MORPHINE SULFATE 4 MG: 4 INJECTION INTRAVENOUS at 21:04

## 2019-04-29 RX ADMIN — BUMETANIDE 2 MG: 1 TABLET ORAL at 09:11

## 2019-04-29 RX ADMIN — ACETAMINOPHEN 650 MG: 325 TABLET ORAL at 09:11

## 2019-04-29 RX ADMIN — SODIUM CHLORIDE 250 ML: 9 INJECTION, SOLUTION INTRAVENOUS at 10:49

## 2019-04-29 RX ADMIN — LOPERAMIDE HYDROCHLORIDE 2 MG: 2 CAPSULE ORAL at 13:50

## 2019-04-29 RX ADMIN — ALPRAZOLAM 0.5 MG: 0.5 TABLET ORAL at 22:02

## 2019-04-29 RX ADMIN — OXYCODONE HYDROCHLORIDE 5 MG: 5 TABLET ORAL at 18:19

## 2019-04-29 RX ADMIN — CYCLOBENZAPRINE 5 MG: 10 TABLET, FILM COATED ORAL at 18:19

## 2019-04-29 RX ADMIN — OXYCODONE HYDROCHLORIDE 5 MG: 5 TABLET ORAL at 14:46

## 2019-04-29 RX ADMIN — ACETAMINOPHEN 650 MG: 325 TABLET ORAL at 21:30

## 2019-04-29 RX ADMIN — LOPERAMIDE HYDROCHLORIDE 2 MG: 2 CAPSULE ORAL at 18:19

## 2019-04-29 RX ADMIN — MORPHINE SULFATE 4 MG: 4 INJECTION INTRAVENOUS at 09:11

## 2019-04-29 RX ADMIN — PANTOPRAZOLE SODIUM 40 MG: 40 TABLET, DELAYED RELEASE ORAL at 05:26

## 2019-04-29 RX ADMIN — METOPROLOL TARTRATE 25 MG: 25 TABLET ORAL at 09:09

## 2019-04-29 RX ADMIN — CYCLOBENZAPRINE 5 MG: 10 TABLET, FILM COATED ORAL at 21:04

## 2019-04-29 RX ADMIN — OXYCODONE HYDROCHLORIDE 5 MG: 5 TABLET ORAL at 03:10

## 2019-04-29 RX ADMIN — MORPHINE SULFATE 4 MG: 4 INJECTION INTRAVENOUS at 17:14

## 2019-04-29 RX ADMIN — CYCLOBENZAPRINE 5 MG: 10 TABLET, FILM COATED ORAL at 09:11

## 2019-04-29 ASSESSMENT — ENCOUNTER SYMPTOMS
VOMITING: 0
WHEEZING: 0
ABDOMINAL DISTENTION: 0
DIARRHEA: 0
BACK PAIN: 0
CHEST TIGHTNESS: 0
COLOR CHANGE: 0
NAUSEA: 0
SHORTNESS OF BREATH: 0
CONSTIPATION: 0

## 2019-04-29 ASSESSMENT — PAIN DESCRIPTION - LOCATION
LOCATION: ABDOMEN

## 2019-04-29 ASSESSMENT — PAIN DESCRIPTION - PAIN TYPE
TYPE: SURGICAL PAIN

## 2019-04-29 ASSESSMENT — PAIN SCALES - GENERAL
PAINLEVEL_OUTOF10: 9
PAINLEVEL_OUTOF10: 8
PAINLEVEL_OUTOF10: 9
PAINLEVEL_OUTOF10: 9
PAINLEVEL_OUTOF10: 8
PAINLEVEL_OUTOF10: 9
PAINLEVEL_OUTOF10: 8
PAINLEVEL_OUTOF10: 10
PAINLEVEL_OUTOF10: 0
PAINLEVEL_OUTOF10: 7
PAINLEVEL_OUTOF10: 8
PAINLEVEL_OUTOF10: 8
PAINLEVEL_OUTOF10: 10
PAINLEVEL_OUTOF10: 7
PAINLEVEL_OUTOF10: 10

## 2019-04-29 ASSESSMENT — PAIN DESCRIPTION - DESCRIPTORS: DESCRIPTORS: ACHING;DISCOMFORT;SORE

## 2019-04-29 ASSESSMENT — PAIN DESCRIPTION - FREQUENCY: FREQUENCY: CONTINUOUS

## 2019-04-29 ASSESSMENT — PAIN DESCRIPTION - ORIENTATION
ORIENTATION: LOWER;MID

## 2019-04-29 NOTE — PROGRESS NOTES
[facility] Continence Nurse  Consult Note       NAME:  Sarah Arambula RECORD NUMBER:  [id number]  AGE: 47 y.o. GENDER: female  : 1965  TODAY'S DATE:  2019    Subjective:     Reason for WOCN Evaluation and Assessment: mid abdomen incisional wound, right side abdomen ileostomy      Dee Dee Eagle is a 47 y.o. female referred by:   [x] Physician  [] Nursing  [] Other:     Wound Identification:  Wound Type: surgical  Contributing Factors: decreased mobility, decreased tissue oxygenation, anticoagulation therapy, non-adherence and substance abuse     Ostomy pouch overfilled, leaked, and contaminated the NPWT dressing on Saturday. It was removed. The silver hydrofiber dressing planned for post d/c was initiated.          PAST MEDICAL HISTORY        Diagnosis Date    Asthma     On inhaler    Back pain, chronic     Hypertension     On Lisinopril    Snores     possible apnea but not tested    Wears glasses        PAST SURGICAL HISTORY    Past Surgical History:   Procedure Laterality Date    KNEE ARTHROSCOPY Left     KNEE ARTHROSCOPY Left 2016    with medial menisectomy    LAPAROTOMY EXPLORATORY N/A 2019    LAPAROTOMY EXPLORATORY, ILEOCECECTOMY, SUBTOTAL COLECTOMY, ABTHEHRA WOUND VAC PLACEMENT performed by Geo Crespo MD at [address] [place] [address] N/A 2019    2ND LOOK EXPLORATORY LAPAROTOMY, RESECTION TRANSVERSE COLON, ILEOSTOMY CREATION, RESECTION RECTAL STUMP, ABDOMINAL WASHOUT, OMENTECTOMY, ABDOMINAL WALL CLOSURE performed by Mike Perez MD at [address] [place] [address]       FAMILY HISTORY    Family History   Problem Relation Age of Onset    Heart Disease Mother     Stroke Mother     Heart Surgery Mother     Diabetes Maternal Grandmother     Emphysema Maternal Grandfather     Diabetes Maternal Grandfather     Lung Cancer Maternal Uncle        SOCIAL HISTORY    Social History     Tobacco Use  Smoking status: Light Tobacco Smoker     Packs/day: 0.25     Types: Cigarettes     Start date: 9/19/1980    Smokeless tobacco: Never Used   Substance Use Topics    Alcohol use: Yes     Comment: OCCASSIONAL    Drug use: No       ALLERGIES    Allergies   Allergen Reactions    Pcn [Penicillins] Hives and Shortness Of Breath     Tolerated cefazolin 1/13    Sulfa Antibiotics Hives and Shortness Of Breath    Tape Armand Neth Tape] Rash     redness           Objective:      BP (!) 91/56   Pulse 88   Temp 98.5 °F (36.9 °C) (Axillary)   Resp 22   Ht 5' 1.02\" (1.55 m)   Wt 163 lb 5.8 oz (74.1 kg)   SpO2 95%   BMI 30.84 kg/m²   Vickey Risk Score: Vickey Scale Score: 18    LABS    CBC:   Lab Results   Component Value Date    WBC 26.2 04/29/2019    RBC 3.05 04/29/2019    HGB 8.8 04/29/2019     CMP:  Albumin:    Lab Results   Component Value Date    LABALBU 2.5 04/28/2019     PT/INR:    Lab Results   Component Value Date    PROTIME 22.3 04/29/2019    INR 2.2 04/29/2019     HgBA1c:  No results found for: LABA1C  PTT: No components found for: LABPTT      Assessment:     Patient Active Problem List   Diagnosis    Dog bite    Post-traumatic osteoarthritis of left knee    S/P left knee arthroscopy    S/P total knee arthroplasty    Arthritis of left knee    [facility]    Rhabdomyolysis    Hyponatremia    Lactic acidosis    MARY (acute kidney injury) ([facility]    Metabolic acidosis    Acute respiratory failure (HCC)    Elevated liver enzymes    Hyperkalemia    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HCC)    Ischemic bowel syndrome (HCC)    Acute GI bleeding    Gram negative septic shock (HCC)    Protein-calorie malnutrition (HCC)    Septic shock (HCC)    Gastroenteritis    Haemophilus influenzae septicemia (HCC)    Pleural effusion, bilateral    Splenic infarct    Leukocytosis    Mottled skin    Acute renal failure (HCC)    Altered mental status    PMB (postmenopausal bleeding)    Thrombocytosis SNF    Patient/Caregiver Teaching:    [] Indicates understanding       [] Needs reinforcement  [] Unsuccessful      [x] Verbal Understanding  [] Demonstrated understanding       [] No evidence of learning  [] Refused teaching         [] N/A       Electronically signed by Jocelyne Quintanilla RN, CWON on 4/29/2019 at 2:50 PM

## 2019-04-29 NOTE — PLAN OF CARE
Problem: Falls - Risk of:  Goal: Will remain free from falls  Description  Will remain free from falls  Outcome: Met This Shift     Problem: Pain:  Goal: Pain level will decrease  Description  Pain level will decrease  Outcome: Met This Shift     Problem: Injury - Risk of, Physical Injury:  Goal: Will remain free from falls  Description  Will remain free from falls  Outcome: Met This Shift
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
## 2019-04-29 NOTE — CARE COORDINATION
Call to [facility] intake - [place] - updated with HD plan and cath removal planned for today with INR reversal. Will need new precert for admission after today.

## 2019-04-29 NOTE — PROGRESS NOTES
[facility]  Internal Medicine Residency Program  Inpatient Daily Progress Note  ______________________________________________________________________________    Patient: Andrew Lewis  YOB: 1965   MRN: [id number]    Acct: [de-identified]     Admit date: 4/5/2019  Today's date: 04/29/19  Number of days in the hospital: 24       Admitting Diagnosis: Septic shock ([address]    Subjective:   Patient seen and examined at bedside. Alert and oriented. Vitals stable. Afebrile. No acute issues overnight. She is eating and drinking fine. She had complaints of anxiety yesterday. Plan for removal of dialysis catheter. Discussed with the patient and the nurse. Creatinine is stable. Urine output is 1800 in the last 24 hours. Output from the stoma is 575. INR 2.2. Review of Systems   Constitutional: Negative for activity change, chills and fever. HENT: Negative. Respiratory: Negative for chest tightness, shortness of breath and wheezing. Cardiovascular: Negative for chest pain, palpitations and leg swelling. Gastrointestinal: Negative for abdominal distention, constipation, diarrhea, nausea and vomiting. Musculoskeletal: Negative for arthralgias, back pain, joint swelling and neck pain. Skin: Negative for color change, pallor, rash and wound. Neurological: Negative for dizziness, seizures, light-headedness and headaches. Psychiatric/Behavioral: Negative for agitation, behavioral problems and confusion.      Objective:   Vital Sign:  BP (!) 91/56   Pulse 88   Temp 98.5 °F (36.9 °C) (Axillary)   Resp 22   Ht 5' 1.02\" (1.55 m)   Wt 163 lb 5.8 oz (74.1 kg)   SpO2 95%   BMI 30.84 kg/m²       Physical Exam:  General appearance:   alert, well appearing, and in no distress  Mental Status: alert, oriented to person, place, and time  Neurologic:  alert, oriented, normal speech, no focal findings or movement disorder noted  Lungs:  clear to auscultation, no wheezes, rales or rhonchi, symmetric air entry  Heart[de-identified] normal rate, regular rhythm, normal S1, S2, no murmurs, rubs, clicks or gallops  Abdomen:  soft, nontender, nondistended, no masses or organomegaly.  Stoma in the right side of the abdomen  Extremities: peripheral pulses normal, no pedal edema, no clubbing or cyanosis   Skin: normal coloration and turgor, no rashes, no suspicious skin lesions noted    Medications:  Scheduled Medications   sodium chloride  250 mL Intravenous Once    loperamide  2 mg Oral TID WC    bumetanide  2 mg Oral Daily    midodrine  5 mg Oral TID WC    metoprolol tartrate  25 mg Oral BID    nicotine  1 patch Transdermal Daily    aspirin  81 mg Oral Daily    cyclobenzaprine  5 mg Oral TID    pantoprazole  40 mg Oral BID AC    sodium chloride flush  10 mL Intravenous 2 times per day       PRN Medications  ALPRAZolam 0.5 mg BID PRN   sodium chloride 250 mL PRN   sodium chloride 150 mL PRN   heparin (porcine) 1,600 Units PRN   heparin (porcine) 1,600 Units PRN   sodium chloride 250 mL PRN   sodium chloride 150 mL PRN   acetaminophen 650 mg Q4H PRN   glucose 15 g PRN   dextrose 12.5 g PRN   glucagon (rDNA) 1 mg PRN   dextrose 100 mL/hr PRN   oxyCODONE 5 mg Q4H PRN   morphine 4 mg Q3H PRN   Or     morphine 6 mg Q3H PRN   sodium chloride 250 mL PRN   sodium chloride 150 mL PRN   dextrose 25 g PRN   sodium chloride flush 10 mL PRN   magnesium hydroxide 30 mL Daily PRN   ondansetron 4 mg Q6H PRN       Diagnostic Labs and Imaging:  CBC:  Recent Labs     04/27/19  0551 04/28/19  0636 04/29/19  0647   WBC 22.4* 23.7* 26.2*   HGB 8.7* 8.1* 8.8*   * 823* 832*     BMP: Recent Labs     04/27/19  0551 04/28/19  0636 04/29/19  0647    138 137   K 3.9 3.7 3.9    102 98   CO2 21 21 23   BUN 21* 21* 22*   CREATININE 2.66* 2.59* 2.56*   GLUCOSE 89 98 99     Hepatic: Recent Labs     04/28/19  0636   AST 13   ALT 12   BILITOT 0.26*   ALKPHOS 120*       Assessment and Plan:     Principal Problem:    Septic shock ([address]  Active Problems:    Shock ([address]    Rhabdomyolysis    Hyponatremia    Lactic acidosis    MARY (acute kidney injury) ([address]    Metabolic acidosis    Acute respiratory failure (HCC)    Elevated liver enzymes    Hyperkalemia    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HCC)    Ischemic bowel syndrome (HCC)    Acute GI bleeding    Gram negative septic shock (HCC)    Protein-calorie malnutrition (HCC)    Gastroenteritis    Haemophilus influenzae septicemia (HCC)    Pleural effusion, bilateral    Splenic infarct    Leukocytosis    Mottled skin    Acute renal failure (HCC)    Altered mental status    PMB (postmenopausal bleeding)    Thrombocytosis ([address]  Resolved Problems:    * No resolved hospital problems. *    · Levofloxacin discontinued.  Patient completed a course of meropenem.  Continue to monitor patient off antibiotics.   · Transvaginal ultrasound was normal.  ObGyn plans to follow the patient outpatient. · Patient stable from cardiology stand point. · Continue Midodrine for low BP if needed  · Pain control with Morphine and Roxicodone PRN  · Continue aspirin.  Heparin drip discontinued. · Hypercoagulable workup negative. · Started nystatin for oral thrush.    · Continue renal diet  · Outpatient dialysis approval at [facility]  · Started Imodium for ostomy out put   Imodium 2 mg 3 times a day with meals. · Coumadin will be held. Discussed with nephrology. Plan for catheter removal today. · Patient's INR this morning is 2.2. She will receive 1 unit of FFP's. INR will be redrawn. If INR is less than 2 she will go to IR for catheter removal.   · Continue Bumex 2 mg orally daily    Rajendra Bustamante MD  PGY-1, Department of Internal Medicine  [place], [place]  4/29/2019 1:40 PM  Attending Physician Statement  I have discussed the care of Brady Ham, including pertinent history and exam findings,  with the resident.  I have seen and examined the patient and the key elements of all parts of the encounter have been performed by me. I agree with the assessment, plan and orders as documented by the resident with additions . Ostomy output dec but will cont imodium tid   Plan for pemcath removal       Treatment plan Discussed with nursing staff in detail , all questions answered . Electronically signed by Sabina Slade MD on   4/29/19 at 2:46 PM    Please note that this chart was generated using voice recognition Dragon dictation software. Although every effort was made to ensure the accuracy of this automated transcription, some errors in transcription may have occurred.

## 2019-04-29 NOTE — PROGRESS NOTES
Here to remove perm cath. Mallorie OWUSU in room. Right chest prepped and draped. Sutures out and perm cath pulled without difficulty. Hand held pressure applied. Dressing on. Report called to floor. Patient back to room.

## 2019-04-29 NOTE — PROGRESS NOTES
Patient to IR holding for perm cath removal. Site prepped and drape in sterile fashion. Suture cut and catheter removed without difficulty. Pressure held to site for 5 minutes. No bleeding noted. Dressing applied. Patient sat up and remained stable with no bleeding. Patient to return to floor. Electronically signed by UMER Gomez at 4:29 PM on 4/29/2019.

## 2019-04-29 NOTE — PROGRESS NOTES
aspirin  81 mg Oral Daily    cyclobenzaprine  5 mg Oral TID    pantoprazole  40 mg Oral BID AC    sodium chloride flush  10 mL Intravenous 2 times per day     Continuous Infusions:    dextrose       PRN Meds:  ALPRAZolam, sodium chloride, sodium chloride, heparin (porcine), heparin (porcine), sodium chloride, sodium chloride, acetaminophen, glucose, dextrose, glucagon (rDNA), dextrose, oxyCODONE, morphine **OR** morphine, sodium chloride, sodium chloride, dextrose, sodium chloride flush, magnesium hydroxide, ondansetron  Home Meds:                Medications Prior to Admission: ibuprofen (ADVIL;MOTRIN) 800 MG tablet, Take 800 mg by mouth every 6 hours as needed for Pain  ALPRAZolam (XANAX) 0.25 MG tablet, Take 0.25 mg by mouth nightly as needed for Sleep or Anxiety. venlafaxine (EFFEXOR) 75 MG tablet, Take 100 mg by mouth 2 times daily   [DISCONTINUED] celecoxib (CELEBREX) 200 MG capsule, Take 1 capsule by mouth 2 times daily  [DISCONTINUED] traMADol (ULTRAM) 50 MG tablet, 1 tablet PO BID PRN pain  [DISCONTINUED] oxyCODONE-acetaminophen (PERCOCET) 5-325 MG per tablet, Take 1 tablet by mouth 2 times daily as needed for Pain . [DISCONTINUED] diclofenac (VOLTAREN) 75 MG EC tablet, Take 1 tablet by mouth 2 times daily  [DISCONTINUED] oxyCODONE-acetaminophen (PERCOCET) 5-325 MG per tablet, Take 1 tablet by mouth every 6 hours as needed for Pain . [DISCONTINUED] aspirin 325 MG EC tablet, Take 1 tablet by mouth 2 times daily for 14 days  [DISCONTINUED] Misc. Devices (ROLLER WALKER) MISC, 1 each by Does not apply route daily  QVAR 40 MCG/ACT inhaler, Inhale 2 puffs into the lungs 2 times daily  [DISCONTINUED] Calcium Citrate-Vitamin D (CALCIUM + D PO), Take 1 tablet by mouth daily  [DISCONTINUED] Misc.  Devices MISC, As directed by physician daily  [DISCONTINUED] diclofenac (VOLTAREN) 50 MG EC tablet, Take 1 tablet by mouth 2 times daily  [DISCONTINUED] docusate sodium (COLACE) 100 MG capsule, Take 1 capsule by mouth 2 times daily as needed for Constipation  gabapentin (NEURONTIN) 100 MG capsule, Take 100 mg by mouth 3 times daily as needed   lisinopril-hydrochlorothiazide (PRINZIDE;ZESTORETIC) 10-12.5 MG per tablet, Take 1 tablet by mouth daily  albuterol (PROVENTIL) (2.5 MG/3ML) 0.083% nebulizer solution, Take 2.5 mg by nebulization every 6 hours as needed for Wheezing  albuterol sulfate  (90 BASE) MCG/ACT inhaler, Inhale 2 puffs into the lungs every 6 hours as needed for Wheezing    INVESTIGATIONS     Last 3 CMP:    Recent Labs     04/27/19  0551 04/28/19  0636 04/29/19  0647    138 137   K 3.9 3.7 3.9    102 98   CO2 21 21 23   BUN 21* 21* 22*   CREATININE 2.66* 2.59* 2.56*   CALCIUM 8.3* 7.9* 8.3*   PROT  --  5.7*  --    LABALBU  --  2.5*  --    BILITOT  --  0.26*  --    ALKPHOS  --  120*  --    AST  --  13  --    ALT  --  12  --        Last 3 CBC:  Recent Labs     04/27/19  0551 04/28/19  0636 04/29/19  0647   WBC 22.4* 23.7* 26.2*   RBC 2.96* 2.76* 3.05*   HGB 8.7* 8.1* 8.8*   HCT 28.0* 26.7* 28.9*   MCV 94.6 96.7 94.8   MCH 29.4 29.3 28.9   MCHC 31.1 30.3 30.4   RDW 15.2* 15.1* 15.4*   * 823* 832*   MPV 8.6 8.6 8.6       ASSESSMENT     1.  Acute kidney injury nonoliguric secondary to ischemic - hypoperfusion (Hypotension/arrthymia/ischemic gut ) and nephrotoxic (contrast) ATN aggravated further by concomitant NSAID and diuretic use along with cocaine - hemodialysis dependent - tunnel catheter in place TTS - last hemodialysis 2 days back.   Currently renal function is improving to the point of not needing dialysis  Creat peaked at 4 with hyperkalemia 8  Baseline creat in Jan 2017 was normal  Ultrasound shows right kidney 10.4 cm and left 10.2 cm  Serologies neg  2.  Volume overload- iatrogenic/capillary leak syndrome - improving  3.  Ischemic gut status post Ileocecectomy, extended left hemicolectomy, placement of  ABThera wound VAC on 4/6 4/8 Second Look exploratory upper, with resection of

## 2019-04-29 NOTE — PLAN OF CARE
Problem: Falls - Risk of:  Goal: Will remain free from falls  Description  Will remain free from falls  4/29/2019 0204 by Kai Stevens RN  Outcome: Ongoing  Note:   Pt remains free of falls at this time. Bed locked in lowest position, siderails x2, call light in reach. Non-skid footwear applied. Bed alarm on. Encouraged pt to call for assistance as needed for safety. Falling star posted outside of room. Will continue to monitor.     4/28/2019 1820 by Peterson Lazcano RN  Outcome: Met This Shift

## 2019-04-29 NOTE — CARE COORDINATION
[address] location to update them that patient will no longer be requiring outpatient dialysis

## 2019-04-29 NOTE — PROGRESS NOTES
Occupational Therapy  Facility/Department: [address] STEPDOWN  Daily Treatment Note  NAME: Nilal Hairston  : 1965  MRN: [id number]    Date of Service: 2019    Discharge Recommendations: Further therapy recommended at discharge. Assessment   Performance deficits / Impairments: Decreased functional mobility ; Decreased strength;Decreased endurance;Decreased safe awareness;Decreased ADL status; Decreased balance;Decreased high-level IADLs  Prognosis: Good  Patient Education: OT POC, transfer safety, importance of OOB activity, EC/WS - pt verbalized understanding. Activity Tolerance  Activity Tolerance: Patient Tolerated treatment well  Safety Devices  Safety Devices in place: Yes  Type of devices: Call light within reach; Left in bed;Nurse notified         Patient Diagnosis(es): The primary encounter diagnosis was Acute renal failure, unspecified acute renal failure type ([address] A diagnosis of Altered mental status, unspecified altered mental status type was also pertinent to this visit. has a past medical history of Asthma, Back pain, chronic, Hypertension, Snores, and Wears glasses. has a past surgical history that includes Tonsillectomy; Knee arthroscopy (Left, ); Ulnar tunnel release (Right, ); Knee arthroscopy (Left, 2016); LAPAROTOMY EXPLORATORY (N/A, 2019); and LAPAROTOMY EXPLORATORY (N/A, 2019).     Restrictions  Restrictions/Precautions  Restrictions/Precautions: Fall Risk, Contact Precautions  Required Braces or Orthoses?: No  Position Activity Restriction  Other position/activity restrictions: up with assist. s/p  ILEOCECECTOMY, SUBTOTAL COLECTOMY ; s/p 2ND LOOK EXPLORATORY LAPAROTOMY with ileostomy creation   Subjective   General  Patient assessed for rehabilitation services?: Yes  Family / Caregiver Present: No  Pain Assessment  Pain Assessment: 0-10  Pain Level: 8  Pain Type: Surgical pain  Pain Location: Abdomen  Pain Orientation: Lower;Mid  Pain Descriptors: Aching;Discomfort; Sore  Pain Frequency: Continuous  Non-Pharmaceutical Pain Intervention(s): Therapeutic presence;Rest;Distraction; Emotional support  Vital Signs  Patient Currently in Pain: Yes   Orientation  Orientation  Overall Orientation Status: Within Functional Limits  Objective    ADL  Grooming: Setup;Supervision(Oral care standing at sink.)  UE Bathing: Setup;Stand by assistance(Max A for back standing at sink.)  LE Bathing: Setup;Contact guard assistance(Seated in chair.)  UE Dressing: Minimal assistance(To manage gown.)  LE Dressing: Maximum assistance  Toileting: Setup;Supervision(Percare seated on toilet, pericare bathing standing at sink SBA)  Additional Comments: Pt completed ADL care standing/seated at sink, surgical soap used appropriately. Balance  Sitting Balance: Supervision(Seated EOB unsupported.)  Standing Balance: Contact guard assistance  Standing Balance  Time: 12 minutes with seated rest break needed x2. Activity: Functional mobility to/from bathroom w/o device, ADL care standing at sink, toilet transfer.   Sit to stand: Contact guard assistance(Completed x4)  Stand to sit: Stand by assistance(Completed x4)  Functional Mobility  Functional - Mobility Device: No device  Activity: To/from bathroom  Assist Level: Contact guard assistance  Toilet Transfers  Toilet - Technique: Ambulating  Equipment Used: Grab bars  Toilet Transfer: Stand by assistance  Bed mobility  Supine to Sit: Supervision  Sit to Supine: Stand by assistance  Scooting: Modified independent  Cognition  Overall Cognitive Status: [facility]     Plan   Plan  Times per week: 3-4x  Current Treatment Recommendations: Balance Training, Self-Care / ADL, Patient/Caregiver Education & Training, Equipment Evaluation, Education, & procurement, Functional Mobility Training, Endurance Training, Cognitive Reorientation, Positioning, Safety Education & Training    Goals  Short term goals  Time Frame for Short term goals: Pt will by discharge  Short term goal 1: Complete LB ADL task with AD PRN and MIN A. Short term goal 2: Demo 10 minutes of dynamic standing during functional activity performance w/ SBA. Short term goal 3: Tolerate 20 minutes of funct act performance. Short term goal 4: Demo 15 minutes of dynamic sitting balance during fucntional activity performance w/ SUP. Short term goal 5: Demo supine<>sit transfer w/ SUP. Therapy Time   Individual Concurrent Group Co-treatment   Time In 1505         Time Out 1547         Minutes 42            Pt supine in bed upon therapist arrival. Pleasant and agreeable to therapy. See above for LOF for all tasks. Pt retired to supine in bed at end of session with call light within reach.     NELSON Moses/KARLA

## 2019-04-30 LAB
ABSOLUTE EOS #: 0.73 K/UL (ref 0–0.4)
ABSOLUTE IMMATURE GRANULOCYTE: 0.73 K/UL (ref 0–0.3)
ABSOLUTE LYMPH #: 4.13 K/UL (ref 1–4.8)
ABSOLUTE MONO #: 0.73 K/UL (ref 0.1–0.8)
ANION GAP SERPL CALCULATED.3IONS-SCNC: 19 MMOL/L (ref 9–17)
BASOPHILS # BLD: 0 % (ref 0–2)
BASOPHILS ABSOLUTE: 0 K/UL (ref 0–0.2)
BLD PROD TYP BPU: NORMAL
BUN BLDV-MCNC: 22 MG/DL (ref 6–20)
BUN/CREAT BLD: ABNORMAL (ref 9–20)
CALCIUM IONIZED: 1.01 MMOL/L (ref 1.13–1.33)
CALCIUM SERPL-MCNC: 8.1 MG/DL (ref 8.6–10.4)
CHLORIDE BLD-SCNC: 95 MMOL/L (ref 98–107)
CO2: 25 MMOL/L (ref 20–31)
CREAT SERPL-MCNC: 2.49 MG/DL (ref 0.5–0.9)
DIFFERENTIAL TYPE: ABNORMAL
DISPENSE STATUS BLOOD BANK: NORMAL
EOSINOPHILS RELATIVE PERCENT: 3 % (ref 1–4)
GFR AFRICAN AMERICAN: 25 ML/MIN
GFR NON-AFRICAN AMERICAN: 20 ML/MIN
GFR SERPL CREATININE-BSD FRML MDRD: ABNORMAL ML/MIN/{1.73_M2}
GFR SERPL CREATININE-BSD FRML MDRD: ABNORMAL ML/MIN/{1.73_M2}
GLUCOSE BLD-MCNC: 85 MG/DL (ref 70–99)
HCT VFR BLD CALC: 27.4 % (ref 36.3–47.1)
HCT VFR BLD CALC: 29.4 % (ref 36.3–47.1)
HEMOGLOBIN: 8.7 G/DL (ref 11.9–15.1)
HEMOGLOBIN: 9.1 G/DL (ref 11.9–15.1)
IMMATURE GRANULOCYTES: 3 %
INR BLD: 1.5
LYMPHOCYTES # BLD: 17 % (ref 24–44)
MAGNESIUM: 1.4 MG/DL (ref 1.6–2.6)
MCH RBC QN AUTO: 29.1 PG (ref 25.2–33.5)
MCH RBC QN AUTO: 29.4 PG (ref 25.2–33.5)
MCHC RBC AUTO-ENTMCNC: 31 G/DL (ref 28.4–34.8)
MCHC RBC AUTO-ENTMCNC: 31.8 G/DL (ref 28.4–34.8)
MCV RBC AUTO: 92.6 FL (ref 82.6–102.9)
MCV RBC AUTO: 93.9 FL (ref 82.6–102.9)
MONOCYTES # BLD: 3 % (ref 1–7)
MORPHOLOGY: ABNORMAL
NRBC AUTOMATED: 0 PER 100 WBC
NRBC AUTOMATED: 0 PER 100 WBC
PARTIAL THROMBOPLASTIN TIME: 34.2 SEC (ref 20.5–30.5)
PARTIAL THROMBOPLASTIN TIME: 46.2 SEC (ref 20.5–30.5)
PDW BLD-RTO: 15.1 % (ref 11.8–14.4)
PDW BLD-RTO: 15.2 % (ref 11.8–14.4)
PHOSPHORUS: 5.5 MG/DL (ref 2.6–4.5)
PLATELET # BLD: 679 K/UL (ref 138–453)
PLATELET # BLD: 754 K/UL (ref 138–453)
PLATELET ESTIMATE: ABNORMAL
PMV BLD AUTO: 8.5 FL (ref 8.1–13.5)
PMV BLD AUTO: 8.7 FL (ref 8.1–13.5)
POTASSIUM SERPL-SCNC: 3.3 MMOL/L (ref 3.7–5.3)
PROTHROMBIN TIME: 15.7 SEC (ref 9–12)
RBC # BLD: 2.96 M/UL (ref 3.95–5.11)
RBC # BLD: 3.13 M/UL (ref 3.95–5.11)
RBC # BLD: ABNORMAL 10*6/UL
SEG NEUTROPHILS: 74 % (ref 36–66)
SEGMENTED NEUTROPHILS ABSOLUTE COUNT: 17.98 K/UL (ref 1.8–7.7)
SODIUM BLD-SCNC: 139 MMOL/L (ref 135–144)
TRANSFUSION STATUS: NORMAL
UNIT DIVISION: 0
UNIT NUMBER: NORMAL
WBC # BLD: 22.5 K/UL (ref 3.5–11.3)
WBC # BLD: 24.3 K/UL (ref 3.5–11.3)
WBC # BLD: ABNORMAL 10*3/UL

## 2019-04-30 PROCEDURE — 99232 SBSQ HOSP IP/OBS MODERATE 35: CPT | Performed by: INTERNAL MEDICINE

## 2019-04-30 PROCEDURE — 6360000002 HC RX W HCPCS: Performed by: STUDENT IN AN ORGANIZED HEALTH CARE EDUCATION/TRAINING PROGRAM

## 2019-04-30 PROCEDURE — 85027 COMPLETE CBC AUTOMATED: CPT

## 2019-04-30 PROCEDURE — 2060000000 HC ICU INTERMEDIATE R&B

## 2019-04-30 PROCEDURE — 85730 THROMBOPLASTIN TIME PARTIAL: CPT

## 2019-04-30 PROCEDURE — 6370000000 HC RX 637 (ALT 250 FOR IP): Performed by: STUDENT IN AN ORGANIZED HEALTH CARE EDUCATION/TRAINING PROGRAM

## 2019-04-30 PROCEDURE — 82330 ASSAY OF CALCIUM: CPT

## 2019-04-30 PROCEDURE — 6370000000 HC RX 637 (ALT 250 FOR IP): Performed by: RADIOLOGY

## 2019-04-30 PROCEDURE — 6370000000 HC RX 637 (ALT 250 FOR IP): Performed by: INTERNAL MEDICINE

## 2019-04-30 PROCEDURE — 85610 PROTHROMBIN TIME: CPT

## 2019-04-30 PROCEDURE — 2580000003 HC RX 258: Performed by: STUDENT IN AN ORGANIZED HEALTH CARE EDUCATION/TRAINING PROGRAM

## 2019-04-30 PROCEDURE — 85025 COMPLETE CBC W/AUTO DIFF WBC: CPT

## 2019-04-30 PROCEDURE — 80048 BASIC METABOLIC PNL TOTAL CA: CPT

## 2019-04-30 PROCEDURE — 2500000003 HC RX 250 WO HCPCS: Performed by: STUDENT IN AN ORGANIZED HEALTH CARE EDUCATION/TRAINING PROGRAM

## 2019-04-30 PROCEDURE — 84100 ASSAY OF PHOSPHORUS: CPT

## 2019-04-30 PROCEDURE — 97110 THERAPEUTIC EXERCISES: CPT

## 2019-04-30 PROCEDURE — 97535 SELF CARE MNGMENT TRAINING: CPT

## 2019-04-30 PROCEDURE — 94762 N-INVAS EAR/PLS OXIMTRY CONT: CPT

## 2019-04-30 PROCEDURE — 83735 ASSAY OF MAGNESIUM: CPT

## 2019-04-30 PROCEDURE — 97116 GAIT TRAINING THERAPY: CPT

## 2019-04-30 PROCEDURE — 99212 OFFICE O/P EST SF 10 MIN: CPT

## 2019-04-30 PROCEDURE — 36415 COLL VENOUS BLD VENIPUNCTURE: CPT

## 2019-04-30 RX ORDER — HEPARIN SODIUM 1000 [USP'U]/ML
4000 INJECTION, SOLUTION INTRAVENOUS; SUBCUTANEOUS PRN
Status: DISCONTINUED | OUTPATIENT
Start: 2019-04-30 | End: 2019-05-03

## 2019-04-30 RX ORDER — POTASSIUM CHLORIDE 20 MEQ/1
40 TABLET, EXTENDED RELEASE ORAL 2 TIMES DAILY WITH MEALS
Status: COMPLETED | OUTPATIENT
Start: 2019-04-30 | End: 2019-04-30

## 2019-04-30 RX ORDER — 0.9 % SODIUM CHLORIDE 0.9 %
250 INTRAVENOUS SOLUTION INTRAVENOUS ONCE
Status: COMPLETED | OUTPATIENT
Start: 2019-04-30 | End: 2019-04-30

## 2019-04-30 RX ORDER — NICOTINE 21 MG/24HR
1 PATCH, TRANSDERMAL 24 HOURS TRANSDERMAL DAILY
Status: DISCONTINUED | OUTPATIENT
Start: 2019-04-30 | End: 2019-05-02

## 2019-04-30 RX ORDER — HEPARIN SODIUM 10000 [USP'U]/100ML
12 INJECTION, SOLUTION INTRAVENOUS CONTINUOUS
Status: DISCONTINUED | OUTPATIENT
Start: 2019-04-30 | End: 2019-05-03

## 2019-04-30 RX ORDER — HEPARIN SODIUM 1000 [USP'U]/ML
4000 INJECTION, SOLUTION INTRAVENOUS; SUBCUTANEOUS ONCE
Status: COMPLETED | OUTPATIENT
Start: 2019-04-30 | End: 2019-04-30

## 2019-04-30 RX ORDER — WARFARIN SODIUM 2 MG/1
2 TABLET ORAL DAILY
Status: DISCONTINUED | OUTPATIENT
Start: 2019-04-30 | End: 2019-05-02 | Stop reason: DRUGHIGH

## 2019-04-30 RX ORDER — HEPARIN SODIUM 1000 [USP'U]/ML
2000 INJECTION, SOLUTION INTRAVENOUS; SUBCUTANEOUS PRN
Status: DISCONTINUED | OUTPATIENT
Start: 2019-04-30 | End: 2019-05-03

## 2019-04-30 RX ADMIN — MORPHINE SULFATE 4 MG: 4 INJECTION INTRAVENOUS at 08:56

## 2019-04-30 RX ADMIN — LOPERAMIDE HYDROCHLORIDE 2 MG: 2 CAPSULE ORAL at 12:07

## 2019-04-30 RX ADMIN — ACETAMINOPHEN 650 MG: 325 TABLET ORAL at 08:56

## 2019-04-30 RX ADMIN — OXYCODONE HYDROCHLORIDE 5 MG: 5 TABLET ORAL at 12:07

## 2019-04-30 RX ADMIN — Medication 1 G: at 14:11

## 2019-04-30 RX ADMIN — MORPHINE SULFATE 4 MG: 4 INJECTION INTRAVENOUS at 14:11

## 2019-04-30 RX ADMIN — METOPROLOL TARTRATE 25 MG: 25 TABLET ORAL at 20:16

## 2019-04-30 RX ADMIN — HEPARIN SODIUM AND DEXTROSE 12 UNITS/KG/HR: 10000; 5 INJECTION INTRAVENOUS at 15:44

## 2019-04-30 RX ADMIN — MORPHINE SULFATE 4 MG: 4 INJECTION INTRAVENOUS at 18:35

## 2019-04-30 RX ADMIN — MORPHINE SULFATE 4 MG: 4 INJECTION INTRAVENOUS at 22:03

## 2019-04-30 RX ADMIN — ALPRAZOLAM 0.5 MG: 0.5 TABLET ORAL at 12:07

## 2019-04-30 RX ADMIN — BUMETANIDE 2 MG: 1 TABLET ORAL at 08:55

## 2019-04-30 RX ADMIN — HEPARIN SODIUM 4000 UNITS: 1000 INJECTION, SOLUTION INTRAVENOUS; SUBCUTANEOUS at 15:44

## 2019-04-30 RX ADMIN — CYCLOBENZAPRINE 5 MG: 10 TABLET, FILM COATED ORAL at 20:16

## 2019-04-30 RX ADMIN — LOPERAMIDE HYDROCHLORIDE 2 MG: 2 CAPSULE ORAL at 08:55

## 2019-04-30 RX ADMIN — OXYCODONE HYDROCHLORIDE 5 MG: 5 TABLET ORAL at 03:51

## 2019-04-30 RX ADMIN — CYCLOBENZAPRINE 5 MG: 10 TABLET, FILM COATED ORAL at 08:55

## 2019-04-30 RX ADMIN — OXYCODONE HYDROCHLORIDE 5 MG: 5 TABLET ORAL at 17:06

## 2019-04-30 RX ADMIN — Medication 10 ML: at 08:58

## 2019-04-30 RX ADMIN — SODIUM CHLORIDE 250 ML: 9 INJECTION, SOLUTION INTRAVENOUS at 08:56

## 2019-04-30 RX ADMIN — ACETAMINOPHEN 650 MG: 325 TABLET ORAL at 20:16

## 2019-04-30 RX ADMIN — POTASSIUM CHLORIDE 40 MEQ: 1500 TABLET, EXTENDED RELEASE ORAL at 17:03

## 2019-04-30 RX ADMIN — Medication 10 ML: at 20:17

## 2019-04-30 RX ADMIN — MIDODRINE HYDROCHLORIDE 5 MG: 5 TABLET ORAL at 08:55

## 2019-04-30 RX ADMIN — METOPROLOL TARTRATE 25 MG: 25 TABLET ORAL at 08:55

## 2019-04-30 RX ADMIN — WARFARIN SODIUM 2 MG: 2 TABLET ORAL at 18:35

## 2019-04-30 RX ADMIN — PANTOPRAZOLE SODIUM 40 MG: 40 TABLET, DELAYED RELEASE ORAL at 06:50

## 2019-04-30 RX ADMIN — HEPARIN SODIUM AND DEXTROSE 14 UNITS/KG/HR: 10000; 5 INJECTION INTRAVENOUS at 23:19

## 2019-04-30 RX ADMIN — MIDODRINE HYDROCHLORIDE 5 MG: 5 TABLET ORAL at 17:03

## 2019-04-30 RX ADMIN — HEPARIN SODIUM 2000 UNITS: 1000 INJECTION, SOLUTION INTRAVENOUS; SUBCUTANEOUS at 23:18

## 2019-04-30 RX ADMIN — POTASSIUM CHLORIDE 40 MEQ: 1500 TABLET, EXTENDED RELEASE ORAL at 09:05

## 2019-04-30 RX ADMIN — OXYCODONE HYDROCHLORIDE 5 MG: 5 TABLET ORAL at 07:32

## 2019-04-30 RX ADMIN — PANTOPRAZOLE SODIUM 40 MG: 40 TABLET, DELAYED RELEASE ORAL at 15:51

## 2019-04-30 RX ADMIN — MIDODRINE HYDROCHLORIDE 5 MG: 5 TABLET ORAL at 12:07

## 2019-04-30 RX ADMIN — CYCLOBENZAPRINE 5 MG: 10 TABLET, FILM COATED ORAL at 14:12

## 2019-04-30 RX ADMIN — LOPERAMIDE HYDROCHLORIDE 2 MG: 2 CAPSULE ORAL at 17:03

## 2019-04-30 RX ADMIN — ASPIRIN 81 MG: 81 TABLET, CHEWABLE ORAL at 08:55

## 2019-04-30 ASSESSMENT — ENCOUNTER SYMPTOMS
DIARRHEA: 0
COUGH: 0
NAUSEA: 0
WHEEZING: 0
SHORTNESS OF BREATH: 0
CHEST TIGHTNESS: 0
ABDOMINAL DISTENTION: 0
COLOR CHANGE: 0
CONSTIPATION: 0
VOMITING: 0
BACK PAIN: 0

## 2019-04-30 ASSESSMENT — PAIN DESCRIPTION - ORIENTATION
ORIENTATION: LOWER;MID
ORIENTATION: LOWER;MID

## 2019-04-30 ASSESSMENT — PAIN DESCRIPTION - LOCATION
LOCATION: ABDOMEN
LOCATION: ABDOMEN

## 2019-04-30 ASSESSMENT — PAIN SCALES - GENERAL
PAINLEVEL_OUTOF10: 0
PAINLEVEL_OUTOF10: 9
PAINLEVEL_OUTOF10: 7
PAINLEVEL_OUTOF10: 8
PAINLEVEL_OUTOF10: 7
PAINLEVEL_OUTOF10: 8
PAINLEVEL_OUTOF10: 7
PAINLEVEL_OUTOF10: 7
PAINLEVEL_OUTOF10: 8

## 2019-04-30 ASSESSMENT — PAIN DESCRIPTION - PAIN TYPE
TYPE: SURGICAL PAIN
TYPE: SURGICAL PAIN

## 2019-04-30 NOTE — PROGRESS NOTES
Occupational Therapy  Facility/Department: [address] STEPDOWN  Daily Treatment Note  NAME: Sin Ortega  : 1965  MRN: [id number]    Date of Service: 2019    Discharge Recommendations:       Further therapy recommended at discharge. Assessment   Performance deficits / Impairments: Decreased functional mobility ; Decreased strength;Decreased endurance;Decreased safe awareness;Decreased ADL status; Decreased balance;Decreased high-level IADLs  Prognosis: Good  Patient Education: OT POC, transfer safety, importance of OOB activity, EC/WS - pt verbalized understanding. REQUIRES OT FOLLOW UP: Yes  Activity Tolerance  Activity Tolerance: Patient Tolerated treatment well  Safety Devices  Safety Devices in place: Yes  Type of devices: Call light within reach; Left in bed;Nurse notified  Restraints  Initially in place: No         Patient Diagnosis(es): The primary encounter diagnosis was Acute renal failure, unspecified acute renal failure type ([address] Diagnoses of Altered mental status, unspecified altered mental status type and MARY (acute kidney injury) ([address] were also pertinent to this visit. has a past medical history of Asthma, Back pain, chronic, Hypertension, Snores, and Wears glasses. has a past surgical history that includes Tonsillectomy; Knee arthroscopy (Left, ); Ulnar tunnel release (Right, ); Knee arthroscopy (Left, 2016); LAPAROTOMY EXPLORATORY (N/A, 2019); and LAPAROTOMY EXPLORATORY (N/A, 2019).     Restrictions  Restrictions/Precautions  Restrictions/Precautions: Fall Risk, Contact Precautions  Required Braces or Orthoses?: No  Position Activity Restriction  Other position/activity restrictions: up with assist. s/p  ILEOCECECTOMY, SUBTOTAL COLECTOMY ; s/p 2ND LOOK EXPLORATORY LAPAROTOMY with ileostomy creation   Subjective   General  Patient assessed for rehabilitation services?: Yes  Family / Caregiver Present: No  Pain Assessment  Pain Assessment: 0-10  Pain Level: 8  Pain Type: Surgical pain  Pain Location: Abdomen  Pain Orientation: Lower;Mid  Non-Pharmaceutical Pain Intervention(s): Distraction; Emotional support; Other (ADLs); Ambulation/Increased Activity  Response to Pain Intervention: Patient Satisfied  Vital Signs  Patient Currently in Pain: Yes   Orientation     Objective    ADL  Feeding: Setup; Independent(Seated in recliner at tray table)  Grooming: Setup;Supervision(Oral care standing at sink)  UE Bathing: Setup;Stand by assistance(Max A for back while standing at sink, otherwise pt SBA)  UE Dressing: Minimal assistance(to manage gown while seated EOB)  Toileting: Setup;Supervision(Pericare completed while seated on toilet)  Additional Comments: Pt completed ADL care standing/seated at sink, surgical soap used appropriately. See above for LOF. Per pt, pt bathed mid section and LE earlier with assist with RN and aide d/t leak in colostomy bag. Balance  Sitting Balance: Supervision(Seated EOB unsupported)  Standing Balance: Contact guard assistance  Standing Balance  Time: ~12-14 minutes with seated break needed x2  Activity: Functional mobility to/from bathroom w/IV pole, ADL care standing at sink, toilet transfer.   Sit to stand: Contact guard assistance(Completed x4)  Stand to sit: Stand by assistance(Completed x4)  Functional Mobility  Functional - Mobility Device: Other(IV pole)  Activity: To/from bathroom  Assist Level: Contact guard assistance  Functional Mobility Comments: No LOB or unsteadiness noted  Toilet Transfers  Toilet - Technique: Ambulating  Equipment Used: Grab bars  Toilet Transfer: Stand by assistance  Bed mobility  Supine to Sit: Unable to assess(Pt seated in recliner upon writer arrival )  Sit to Supine: Stand by assistance  Scooting: Supervision      Plan   Plan  Times per week: 3-4x  Current Treatment Recommendations: Balance Training, Self-Care / ADL, Patient/Caregiver Education & Training, Equipment Evaluation, Education, & procurement, Functional Mobility Training, Endurance Training, Cognitive Reorientation, Positioning, Safety Education & Training    Goals  Short term goals  Time Frame for Short term goals: Pt will by discharge  Short term goal 1: Complete LB ADL task with AD PRN and MIN A. Short term goal 2: Demo 10 minutes of dynamic standing during functional activity performance w/ SBA. Short term goal 3: Tolerate 20 minutes of funct act performance. Short term goal 4: Demo 15 minutes of dynamic sitting balance during fucntional activity performance w/ SUP. Short term goal 5: Demo supine<>sit transfer w/ SUP. Therapy Time   Individual Concurrent Group Co-treatment   Time In 3718         Time Out 1872         Minutes 45             RN OK'ed tx and pt agreeable. Seated in recliner upon writer arrival. See above for LOF for all tasks. Pt eager and motivated to participate in therapy on this date. Retired supine in bed d/t fatigue, call light within reach, bed alarm activated and RN notified.      NELSON Lacey Che/KARLA

## 2019-04-30 NOTE — PROGRESS NOTES
Perfect serve by the nurse. The patient is having orange bloody liquid drainage from the ileostomy. Patient seen and examined at bedside. As per the nurse, her ostomy bag has been changed multiple times overnight for the same reason. Wound ostomy and gen surgery for patient's valuation. Will hold on Heparin gtt for now until their evaluation. Will continue to monitor.     Juanito Clark MD  PGY-1, Department of Internal Medicine  [place], [place]  4/30/2019 12:22 PM
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
## 2019-04-30 NOTE — PROGRESS NOTES
 bumetanide  2 mg Oral Daily    midodrine  5 mg Oral TID WC    metoprolol tartrate  25 mg Oral BID    aspirin  81 mg Oral Daily    cyclobenzaprine  5 mg Oral TID    pantoprazole  40 mg Oral BID AC    sodium chloride flush  10 mL Intravenous 2 times per day     Continuous Infusions:    heparin (porcine)      dextrose       PRN Meds:  heparin (porcine), heparin (porcine), ALPRAZolam, sodium chloride, sodium chloride, heparin (porcine), heparin (porcine), sodium chloride, sodium chloride, acetaminophen, glucose, dextrose, glucagon (rDNA), dextrose, oxyCODONE, morphine **OR** morphine, sodium chloride, sodium chloride, dextrose, sodium chloride flush, magnesium hydroxide, ondansetron  Home Meds:                Medications Prior to Admission: ibuprofen (ADVIL;MOTRIN) 800 MG tablet, Take 800 mg by mouth every 6 hours as needed for Pain  ALPRAZolam (XANAX) 0.25 MG tablet, Take 0.25 mg by mouth nightly as needed for Sleep or Anxiety. venlafaxine (EFFEXOR) 75 MG tablet, Take 100 mg by mouth 2 times daily   [DISCONTINUED] celecoxib (CELEBREX) 200 MG capsule, Take 1 capsule by mouth 2 times daily  [DISCONTINUED] traMADol (ULTRAM) 50 MG tablet, 1 tablet PO BID PRN pain  [DISCONTINUED] oxyCODONE-acetaminophen (PERCOCET) 5-325 MG per tablet, Take 1 tablet by mouth 2 times daily as needed for Pain . [DISCONTINUED] diclofenac (VOLTAREN) 75 MG EC tablet, Take 1 tablet by mouth 2 times daily  [DISCONTINUED] oxyCODONE-acetaminophen (PERCOCET) 5-325 MG per tablet, Take 1 tablet by mouth every 6 hours as needed for Pain . [DISCONTINUED] aspirin 325 MG EC tablet, Take 1 tablet by mouth 2 times daily for 14 days  [DISCONTINUED] Misc. Devices (ROLLER WALKER) MISC, 1 each by Does not apply route daily  QVAR 40 MCG/ACT inhaler, Inhale 2 puffs into the lungs 2 times daily  [DISCONTINUED] Calcium Citrate-Vitamin D (CALCIUM + D PO), Take 1 tablet by mouth daily  [DISCONTINUED] Misc.  Devices MISC, As directed by physician daily  [DISCONTINUED] diclofenac (VOLTAREN) 50 MG EC tablet, Take 1 tablet by mouth 2 times daily  [DISCONTINUED] docusate sodium (COLACE) 100 MG capsule, Take 1 capsule by mouth 2 times daily as needed for Constipation  gabapentin (NEURONTIN) 100 MG capsule, Take 100 mg by mouth 3 times daily as needed   lisinopril-hydrochlorothiazide (PRINZIDE;ZESTORETIC) 10-12.5 MG per tablet, Take 1 tablet by mouth daily  albuterol (PROVENTIL) (2.5 MG/3ML) 0.083% nebulizer solution, Take 2.5 mg by nebulization every 6 hours as needed for Wheezing  albuterol sulfate  (90 BASE) MCG/ACT inhaler, Inhale 2 puffs into the lungs every 6 hours as needed for Wheezing    INVESTIGATIONS     Last 3 CMP:    Recent Labs     04/28/19  0636 04/29/19  0647 04/30/19 0628    137 139   K 3.7 3.9 3.3*    98 95*   CO2 21 23 25   BUN 21* 22* 22*   CREATININE 2.59* 2.56* 2.49*   CALCIUM 7.9* 8.3* 8.1*   PROT 5.7*  --   --    LABALBU 2.5*  --   --    BILITOT 0.26*  --   --    ALKPHOS 120*  --   --    AST 13  --   --    ALT 12  --   --        Last 3 CBC:  Recent Labs     04/28/19  0636 04/29/19  0647 04/30/19  0628   WBC 23.7* 26.2* 24.3*   RBC 2.76* 3.05* 2.96*   HGB 8.1* 8.8* 8.7*   HCT 26.7* 28.9* 27.4*   MCV 96.7 94.8 92.6   MCH 29.3 28.9 29.4   MCHC 30.3 30.4 31.8   RDW 15.1* 15.4* 15.1*   * 832* 679*   MPV 8.6 8.6 8.7       ASSESSMENT     1.  Acute kidney injury nonoliguric secondary to ischemic - hypoperfusion (Hypotension/arrthymia/ischemic gut ) and nephrotoxic (contrast) ATN aggravated further by concomitant NSAID and diuretic use along with cocaine - hemodialysis dependent - tunnel catheter in place TTS - last hemodialysis 2 days back.   Currently renal function is improving to the point of not needing dialysis  Creat peaked at 4 with hyperkalemia 8  Baseline creat in Jan 2017 was normal  Ultrasound shows right kidney 10.4 cm and left 10.2 cm  Serologies neg  2.  Volume overload- iatrogenic/capillary leak syndrome - improving  3.  Ischemic gut status post Ileocecectomy, extended left hemicolectomy, placement of  ABThera wound VAC on 4/6 4/8 Second Look exploratory upper, with resection of transverse colon AND ileostomy  4. Sepsis - blood culture positive for H.infleuenza  5.  Bilateral adrenal masses 11 mm left and 18 mm on the right  6.  Anemia  7.  Persistent Afib S/o cardioversion        PLAN     1. Richar cath dc yesterday  2. Continue oral diuretics  3. Avoid nephrotoxin  4.   If dc will need BMP weekly for 2 weeks and FU in 3-4 weeks    Donna Castle MD, MRCP Diana Flanagan), [address]   4/30/2019 12:14 PM    Nephrology [address]

## 2019-04-30 NOTE — PROGRESS NOTES
Pharmacy Note  Warfarin Consult    Nancy Dwoning is a 47 y.o. female for whom pharmacy has been consulted to manage warfarin therapy. Consulting Physician: Alla Noguera MD   Reason for Admission: mental status changes, abdominal pain    Warfarin dose prior to admission: none - new start with this admission  Warfarin indication: splenic infarct  Target INR range: 2-3     Past Medical History:   Diagnosis Date    Asthma     On inhaler    Back pain, chronic     Hypertension 2015    On Lisinopril    Snores     possible apnea but not tested    Wears glasses      Recent Labs     04/30/19  0628   INR 1.5     Recent Labs     04/28/19  0636 04/29/19  0647 04/30/19  0628   HGB 8.1* 8.8* 8.7*   HCT 26.7* 28.9* 27.4*   * 832* 679*     Current warfarin drug-drug interactions: acetaminophen, aspirin, heparin    Date INR Dose   4/24/19 2.4 2.5mg   4/25/19 2.4 2.5mg   4/26/19 2.8 2mg   4/27/19 3.9 none   4/28/19 3.8 none   4/29/19 1.5 none   4/30/19 1.5 2mg     Notes:  Start warfarin 2mg daily. Daily PT/INR while inpatient. Thank you for the consult. Will continue to follow.      Willam Sepulveda RPh, CACP  Clinical Pharmacist Medication Management  4/30/2019  12:17 PM

## 2019-04-30 NOTE — PROGRESS NOTES
[facility]  Internal Medicine Residency Program  Inpatient Daily Progress Note  ______________________________________________________________________________    Patient: Desi Boateng  YOB: 1965   MRN: [id number]    Acct: [de-identified]     Admit date: 4/5/2019  Today's date: 04/30/19  Number of days in the hospital: 25       Admitting Diagnosis: Septic shock ([address]    Subjective:   Patient seen and examined at bedside. Afebrile this morning. Blood pressure in 90s. Tachycardic. Urine output is good. The patient is self ambulating. Output from the stoma is 1025. The patient got the dialysis catheter removed yesterday. The site looks clean, no discharge noted. INR is 1.5 this morning. Hemoglobin is stable. Creatinine is 2.49. Stoma site is draining bloody red fluid. No pain in the abdomen. Patient had her stoma bag changed 3-4 times overnight. Review of Systems   Constitutional: Positive for fever. Negative for chills and fatigue. Respiratory: Negative for cough, chest tightness, shortness of breath and wheezing. Cardiovascular: Negative for chest pain, palpitations and leg swelling. Gastrointestinal: Negative for abdominal distention, constipation, diarrhea, nausea and vomiting. Musculoskeletal: Negative for arthralgias, back pain and myalgias. Skin: Negative for color change, pallor, rash and wound. Neurological: Negative for dizziness, numbness and headaches. Psychiatric/Behavioral: Negative for agitation, behavioral problems and confusion.      Objective:   Vital Sign:  BP (!) 94/59   Pulse 89   Temp 98.1 °F (36.7 °C) (Oral)   Resp 22   Ht 5' 1.02\" (1.55 m)   Wt 165 lb 2 oz (74.9 kg)   SpO2 96%   BMI 31.18 kg/m²       Physical Exam:  General appearance:   alert, well appearing, and in no distress  Mental Status: alert, oriented to person, place, and time  Neurologic:  alert, oriented, normal speech, no focal 04/28/19  0636 04/29/19  0647 04/30/19  0628    137 139   K 3.7 3.9 3.3*    98 95*   CO2 21 23 25   BUN 21* 22* 22*   CREATININE 2.59* 2.56* 2.49*   GLUCOSE 98 99 85     Hepatic: Recent Labs     04/28/19  0636   AST 13   ALT 12   BILITOT 0.26*   ALKPHOS 120*       Assessment and Plan:     Principal Problem:    Septic shock ([facility] [address]  Active Problems:    Shock ([facility] [address]    Rhabdomyolysis    Hyponatremia    Lactic acidosis    MARY (acute kidney injury) ([facility] [address]    Metabolic acidosis    Acute respiratory failure (HCC)    Elevated liver enzymes    Hyperkalemia    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HCC)    Ischemic bowel syndrome (HCC)    Acute GI bleeding    Gram negative septic shock (HCC)    Protein-calorie malnutrition (HCC)    Gastroenteritis    Haemophilus influenzae septicemia (HCC)    Pleural effusion, bilateral    Splenic infarct    Leukocytosis    Mottled skin    Acute renal failure (HCC)    Altered mental status    PMB (postmenopausal bleeding)    Thrombocytosis (HCC)  Resolved Problems:    * No resolved hospital problems. *    · Levofloxacin discontinued.  Patient completed a course of meropenem.  Continue to monitor patient off antibiotics.   · Transvaginal ultrasound was normal.  ObGyn plans to follow the patient outpatient. · Patient stable from cardiology stand point. · Continue Midodrine for low BP if needed  · Pain control with Morphine and Roxicodone PRN  · Continue aspirin.   · Hypercoagulable workup negative. · Started nystatin for oral thrush.    · Continue renal diet  · Patient got dialysis catheter removed yesterday. No need for outpatient analysis. · Started Imodium for ostomy out put   Imodium 2 mg 3 times a day with meals. · Will wait for evaluation from wound ostomy and Gen surg for evaluation of the stoma before starting patient on heparin drip for bridging with Coumadin. · Patient's INR is 1.5 this morning. We will start Coumadin. INR goal 2-3.   Pharmacy to dose Coumadin. · Continue Bumex 2 mg orally daily  · Discussed with nephrology. The patient is cleared from nephrology for discharge. · BMP every week for 2 weeks and then follow up in 3-4 weeks with nephrology as outpatient. · Dose cut down for nicotine patch. · Discharge planning. The patient will need a new preset for [facility]. Valencia Washington MD  PGY-1, Department of Internal Medicine  [address] [place] [address]  4/30/2019 12:25 PM  Attending Physician Statement  I have discussed the care of Catalina Fabian, including pertinent history and exam findings,  with the resident. I have seen and examined the patient and the key elements of all parts of the encounter have been performed by me. I agree with the assessment, plan and orders as documented by the resident with additions . Heparin gtt for bridge held   gs to eval for bloody drainage in ostomy     Treatment plan Discussed with nursing staff in detail , all questions answered . Electronically signed by Sadaf Duran MD on   4/30/19 at 2:49 PM    Please note that this chart was generated using voice recognition Dragon dictation software. Although every effort was made to ensure the accuracy of this automated transcription, some errors in transcription may have occurred.

## 2019-04-30 NOTE — PROGRESS NOTES
Today Date:   04/29/19  Patient Name: Michelle Forrester  Date of admission: 4/5/2019  8:39 PM  Patient's age: 47 y. o., 1965  Admission Dx: Shock ([address] [R57.9]    Requesting Physician: Irving Horta MD    CHIEF COMPLAINT:  Abdominal pain    SUBJECTIVE:  The patient was seen and examined. Abd pain comntrolled  No active bleeding. Overall she is improving. No fever or chills. Started on Coumadin. INR 1.5  Plts stable    BRIEF CASE HISTORY:    The patient is a 47 y.o.  female who is admitted to the hospital for   for increased confusion and abdominal pain. She was found to have ischemic bowel and was taken to surgery on 4/6/18. Pathology showed ischemic bowel going to the margins. She was taken for a second surgery on 4/8/18 and more ischemic bowel was appreciated. The patient platelets were about 300,000 on admission and dropped about 98 with her multiple surgeries. Hit antibody was done and was negative. Platelets recovered since then. The patient continues to struggle with recovery. She underwent a CT scan of the abdomen and pelvis today that showed heterogenous changes in the spleen that the new. There was a suspicion of splenic infarction. Vascular were involved and started the patient in aspirin and Plavix as well as heparin. The patient is seen and examined. She is started on anticoagulation. She denies any active bleeding. She has very little insight about her disease and most of the information were obtained from the chart. It is no history of thromboembolism previously . family history is negative for arterial or venous thrombosis. The patient developed worsening renal failure and was started on hemodialysis. Medications:    Reviewed in EPIC     Allergies:  Pcn [penicillins]; Sulfa antibiotics; and Tape [adhesive tape]    REVIEW OF SYSTEMS:    Review of Systems  General: no fever or night sweats, Weight is stable.   Progressive fatigue  ENT: No double or blurred vision, no tinnitus or hearing problem, no dysphagia or sore throat   Respiratory: No chest pain, no shortness of breath, no cough or hemoptysis. Cardiovascular: Denies chest pain, PND or orthopnea. No L E swelling or palpitations. Gastrointestinal:    Abdominal discomfort, wound VAC in place, no bleeding. Genitourinary: Denies dysuria, hematuria, frequency, urgency or incontinence. Neurological: Denies headaches, decreased LOC, no sensory or motor focal deficits. Musculoskeletal:  No arthralgia no back pain or joint swelling. Skin: There are no rashes or bleeding. Psychiatric:  No anxiety, no depression. Endocrine: no diabetes or thyroid disease. Hematologic: no bleeding , no adenopathy. PHYSICAL EXAM:      /61   Pulse 113   Temp 101.3 °F (38.5 °C) (Oral)   Resp 26   Ht 5' 1.02\" (1.55 m)   Wt 163 lb 5.8 oz (74.1 kg)   SpO2 96%   BMI 30.84 kg/m²    Temp (24hrs), Av.1 °F (37.3 °C), Min:98.2 °F (36.8 °C), Max:101.3 °F (38.5 °C)  Physical Exam  Gen.  Exam, showed a well-appearing patient without evidence of distress or pain  HEENT, normocephalic and atraumatic, PERRLA extraocular muscles are intact  Neck showed no JVD no carotid bruit, no cervical adenopathy  Chest is clear to auscultation bilaterally  Heart is regular without any murmur  Abdomen left upper quadrant tenderness but the abdomen was soft  Lower extremities showed no edema clubbing or cyanosis  Neurological examination was nonfocal, with intact cranial nerves  Skin  No rashes or bruising appreciated  DATA:    Labs:     CBC:   Recent Labs     19  0636 19  0647   WBC 23.7* 26.2*   HGB 8.1* 8.8*   HCT 26.7* 28.9*   * 832*     BMP:   Recent Labs     19  0636 19  0647    137   K 3.7 3.9   CO2 21 23   BUN 21* 22*   CREATININE 2.59* 2.56*   LABGLOM 19* 20*   GLUCOSE 98 99     PT/INR:   Recent Labs     19  0647 19  1455   PROTIME 22.3* 15.5*   INR 2.2 1.5 APTT:  No results for input(s): APTT in the last 72 hours. 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] 04/07/2019    Ischemic bowel syndrome ([facility] [K55.9] 04/07/2019    Acute GI bleeding [K92.2] 04/07/2019    Gram negative septic shock ([facility] [A41.50, R65.21]     Rhabdomyolysis [M62.82] 04/06/2019    Hyponatremia [E87.1] 04/06/2019    Lactic acidosis [E87.2] 04/06/2019    MARY (acute kidney injury) ([facility] [N17.9] 97/54/0272    Metabolic acidosis [I30.5] 04/06/2019    Acute respiratory failure ([facility] [J96.00] 04/06/2019    Elevated liver enzymes [R74.8] 04/06/2019    Hyperkalemia [E87.5]     Shock ([address] [R57.9] 04/05/2019     RECOMMENDATIONS:  The patient is doing well clinically stable. Coumadin. Target INR between 2 and 3. She will need long-term use of anticoagulation. Patient is aware. Leukocytosis and thrombocytosis are likely reactive. Awaiting  VOLODYMYR 2 Gene mutation. Anemia. Stable   will follow      José Miguel Wheatley MD  Hematologist/Medical Oncologist    Cell: [phone]      This note is created with the assistance of a speech recognition program.  While intending to generate a document that actually reflects the content of the visit, the document can still have some errors including those of syntax and sound a like substitutions which may escape proof

## 2019-04-30 NOTE — PROGRESS NOTES
Ostomy Care and Education:   Recommend a convex skin barrier, drainable pouching system, and a belt as follows:    Ostomy appliance order:  Dx: ischemic necrosis of the large intestine  ICD-10-CM: K55.049  Type of Surgery: ileocecetomy  Type of stoma:  ileostomy  ICD-10-CM:  Z93.2     Brand:  Coloplast  Product Number: [id number]  Product Description: Assura deep convex maxi drainable pouch  Hcpcs: [id number]  Amount per month: 20 per month      Brand:  Coloplast  Product Number: [id number]  Product Description: Ferol   Hcpcs:   Amount per month: as allowed     Brand:  Coloplast  Product Number: [id number]  Product Description: Brava strip paste  Hcpcs: [id number]  Amount per month: 4 oz/ 20 strips per month          04/30/19 1130   Ileostomy Ileostomy RUQ   Placement Date/Time: 04/08/19 1342   Pre-existing: No  Timeout: Patient; Appropriate Equipment;Sterile Technique using full body drape;Procedure  Inserted by: Dr Janak Caal  Ileostomy Type: Ileostomy  Location: RUQ   Stomal Appliance Changed   Stoma  Assessment Moist;Red;Flush   Mucocutaneous Junction Intact   Peristomal Assessment Denuded;Red   Treatment Bag change; Liquid skin barrier;Stoma powder  (convex skin barrier, belt)   Stool Color Brown   Stool Appearance Loose; Soft   Stool Amount Medium       Cut barrier to fit stoma with no more than 1/8\" of exposed skin  Empty the pouch when 1/3 to 1/2 full. Change the pouching system twice weekly and prn  Our goal is to keep the skin around the stoma intact and to have a dependable pouching system without leaks. The skin around the stoma should be intact and appear just like the skin on the opposite side of the abdomen. Call the [address] [place] [address] at [phone] with questions or concerns.

## 2019-04-30 NOTE — PROGRESS NOTES
Physical Therapy  Facility/Department: [address] STEPDOWN  Daily Treatment Note  NAME: Lilliana Hamm  : 1965  MRN: [id number]    Date of Service: 2019    Discharge Recommendations  Further therapy recommended at discharge. Patient Diagnosis(es): The primary encounter diagnosis was Acute renal failure, unspecified acute renal failure type ([facility] Diagnoses of Altered mental status, unspecified altered mental status type and MARY (acute kidney injury) ([facility] [facility] were also pertinent to this visit. has a past medical history of Asthma, Back pain, chronic, Hypertension, Snores, and Wears glasses. has a past surgical history that includes Tonsillectomy; Knee arthroscopy (Left, ); Ulnar tunnel release (Right, ); Knee arthroscopy (Left, 2016); LAPAROTOMY EXPLORATORY (N/A, 2019); and LAPAROTOMY EXPLORATORY (N/A, 2019). Restrictions  Restrictions/Precautions  Restrictions/Precautions: Fall Risk, Contact Precautions  Required Braces or Orthoses?: No  Position Activity Restriction  Other position/activity restrictions: up with assist. s/p  ILEOCECECTOMY, SUBTOTAL COLECTOMY ; s/p 2ND LOOK EXPLORATORY LAPAROTOMY with ileostomy creation      Subjective   General  Response To Previous Treatment: Patient with no complaints from previous session.   Family / Caregiver Present: No  Subjective  Subjective: Patient states that she is eager to get up and walk  Pain Screening  Patient Currently in Pain: Yes  Vital Signs  Patient Currently in Pain: Yes       Orientation  Orientation  Overall Orientation Status: Within Normal Limits       Objective   Bed mobility  Rolling to Left: Contact guard assistance  Rolling to Right: Contact guard assistance  Scooting: Supervision  Transfers  Sit to Stand: Contact guard assistance  Stand to sit: Contact guard assistance  Bed to Chair: Contact guard assistance  Ambulation  Ambulation?: Yes  Ambulation 1  Surface: level tile  Device: Rolling Walker  Assistance: Contact guard assistance  Quality of Gait: Patient demos slow, steady gait this session with less forward flexed posture this session despite pain in incision site  Distance: 50 feet x 2  Comments: Patient with less cramping in B LE today  Stairs/Curb  Stairs?: No     Balance  Posture: Fair  Sitting - Static: Good  Sitting - Dynamic: Good;-  Standing - Static: Fair;+  Standing - Dynamic: Fair  Exercises  Comments: Patient performed 10-15 reps of seated B LE exercises to all joints/available planes of motion to promote increased strength for future mobility          Assessment   Body structures, Functions, Activity limitations: Decreased functional mobility ; Decreased strength;Decreased balance;Decreased safe awareness;Decreased endurance    Patient tolerated session well with minimal deficits noted in bed mobility, transfers, ambulation, balance, and endurance this session. Patient's pain and LE cramping are limiting factors with mobility at this time and patient demos minimal deficits as compared to prior level of function. At current level of function, patient will benefit from continued inpatient PT services.      Prognosis: Good  Patient Education: PT POC, LE exercises  REQUIRES PT FOLLOW UP: Yes  Activity Tolerance  Activity Tolerance: Patient Tolerated treatment well       AM-PAC Score  AM-PAC Inpatient Mobility Raw Score : 16  AM-PAC Inpatient T-Scale Score : 40.78  Mobility Inpatient CMS 0-100% Score: 54.16  Mobility Inpatient CMS G-Code Modifier : CK          Goals  Short term goals  Time Frame for Short term goals: 14 visits  Short term goal 1: Pt will be Betty with bed mobility  Short term goal 2: Pt will be Betty transfers  Short term goal 3: Pt will be SBA amb 125' RW   Short term goal 4: Pt will be safe to attempt steps    Plan    Plan  Times per week: 5-6x/wk  Current Treatment Recommendations: Strengthening, Balance Training, Functional Mobility Training, Transfer Training, Endurance Training, Cognitive Reorientation, Gait Training, Stair training, Home Exercise Program, Safety Education & Training, Equipment Evaluation, Education, & procurement, Patient/Caregiver Education & Training  Safety Devices  Type of devices:  All fall risk precautions in place, Call light within reach, Gait belt, Left in bed, Nurse notified  Restraints  Initially in place: No     Therapy Time   Individual Concurrent Group Co-treatment   Time In 0912         Time Out 0943         Minutes Aguilar [address] [place] [address]

## 2019-05-01 LAB
ABSOLUTE EOS #: 0 K/UL (ref 0–0.4)
ABSOLUTE IMMATURE GRANULOCYTE: 0 K/UL (ref 0–0.3)
ABSOLUTE LYMPH #: 6.8 K/UL (ref 1–4.8)
ABSOLUTE MONO #: 0.73 K/UL (ref 0.1–0.8)
ALBUMIN SERPL-MCNC: 3 G/DL (ref 3.5–5.2)
ALBUMIN/GLOBULIN RATIO: 0.8 (ref 1–2.5)
ALP BLD-CCNC: 270 U/L (ref 35–104)
ALT SERPL-CCNC: 48 U/L (ref 5–33)
ANION GAP SERPL CALCULATED.3IONS-SCNC: 16 MMOL/L (ref 9–17)
AST SERPL-CCNC: 68 U/L
BASOPHILS # BLD: 0 % (ref 0–2)
BASOPHILS ABSOLUTE: 0 K/UL (ref 0–0.2)
BILIRUB SERPL-MCNC: 0.36 MG/DL (ref 0.3–1.2)
BILIRUBIN DIRECT: 0.12 MG/DL
BILIRUBIN, INDIRECT: 0.24 MG/DL (ref 0–1)
BUN BLDV-MCNC: 20 MG/DL (ref 6–20)
BUN/CREAT BLD: ABNORMAL (ref 9–20)
CALCIUM SERPL-MCNC: 8.4 MG/DL (ref 8.6–10.4)
CHLORIDE BLD-SCNC: 95 MMOL/L (ref 98–107)
CO2: 24 MMOL/L (ref 20–31)
CREAT SERPL-MCNC: 2.06 MG/DL (ref 0.5–0.9)
DIFFERENTIAL TYPE: ABNORMAL
EOSINOPHILS RELATIVE PERCENT: 0 % (ref 1–4)
GFR AFRICAN AMERICAN: 31 ML/MIN
GFR NON-AFRICAN AMERICAN: 25 ML/MIN
GFR SERPL CREATININE-BSD FRML MDRD: ABNORMAL ML/MIN/{1.73_M2}
GFR SERPL CREATININE-BSD FRML MDRD: ABNORMAL ML/MIN/{1.73_M2}
GLOBULIN: ABNORMAL G/DL (ref 1.5–3.8)
GLUCOSE BLD-MCNC: 81 MG/DL (ref 70–99)
HCT VFR BLD CALC: 29 % (ref 36.3–47.1)
HEMOGLOBIN: 9.7 G/DL (ref 11.9–15.1)
IMMATURE GRANULOCYTES: 0 %
INR BLD: 1.5
LYMPHOCYTES # BLD: 28 % (ref 24–44)
MCH RBC QN AUTO: 29.4 PG (ref 25.2–33.5)
MCHC RBC AUTO-ENTMCNC: 33.4 G/DL (ref 28.4–34.8)
MCV RBC AUTO: 87.9 FL (ref 82.6–102.9)
MONOCYTES # BLD: 3 % (ref 1–7)
MORPHOLOGY: ABNORMAL
NRBC AUTOMATED: 0.1 PER 100 WBC
PARTIAL THROMBOPLASTIN TIME: 42.6 SEC (ref 20.5–30.5)
PARTIAL THROMBOPLASTIN TIME: 53.9 SEC (ref 20.5–30.5)
PARTIAL THROMBOPLASTIN TIME: 69.2 SEC (ref 20.5–30.5)
PDW BLD-RTO: 15.1 % (ref 11.8–14.4)
PLATELET # BLD: 647 K/UL (ref 138–453)
PLATELET ESTIMATE: ABNORMAL
PMV BLD AUTO: 8.7 FL (ref 8.1–13.5)
POTASSIUM SERPL-SCNC: 4.6 MMOL/L (ref 3.7–5.3)
PROTHROMBIN TIME: 15.7 SEC (ref 9–12)
RBC # BLD: 3.3 M/UL (ref 3.95–5.11)
RBC # BLD: ABNORMAL 10*6/UL
SEG NEUTROPHILS: 69 % (ref 36–66)
SEGMENTED NEUTROPHILS ABSOLUTE COUNT: 16.77 K/UL (ref 1.8–7.7)
SODIUM BLD-SCNC: 135 MMOL/L (ref 135–144)
TOTAL PROTEIN: 6.7 G/DL (ref 6.4–8.3)
WBC # BLD: 24.3 K/UL (ref 3.5–11.3)
WBC # BLD: ABNORMAL 10*3/UL

## 2019-05-01 PROCEDURE — 6370000000 HC RX 637 (ALT 250 FOR IP): Performed by: INTERNAL MEDICINE

## 2019-05-01 PROCEDURE — 85730 THROMBOPLASTIN TIME PARTIAL: CPT

## 2019-05-01 PROCEDURE — 99232 SBSQ HOSP IP/OBS MODERATE 35: CPT | Performed by: INTERNAL MEDICINE

## 2019-05-01 PROCEDURE — 6370000000 HC RX 637 (ALT 250 FOR IP): Performed by: RADIOLOGY

## 2019-05-01 PROCEDURE — 99211 OFF/OP EST MAY X REQ PHY/QHP: CPT

## 2019-05-01 PROCEDURE — 80076 HEPATIC FUNCTION PANEL: CPT

## 2019-05-01 PROCEDURE — 6370000000 HC RX 637 (ALT 250 FOR IP): Performed by: STUDENT IN AN ORGANIZED HEALTH CARE EDUCATION/TRAINING PROGRAM

## 2019-05-01 PROCEDURE — 85610 PROTHROMBIN TIME: CPT

## 2019-05-01 PROCEDURE — 94640 AIRWAY INHALATION TREATMENT: CPT

## 2019-05-01 PROCEDURE — 2580000003 HC RX 258: Performed by: STUDENT IN AN ORGANIZED HEALTH CARE EDUCATION/TRAINING PROGRAM

## 2019-05-01 PROCEDURE — 6360000002 HC RX W HCPCS: Performed by: STUDENT IN AN ORGANIZED HEALTH CARE EDUCATION/TRAINING PROGRAM

## 2019-05-01 PROCEDURE — 97110 THERAPEUTIC EXERCISES: CPT

## 2019-05-01 PROCEDURE — 2060000000 HC ICU INTERMEDIATE R&B

## 2019-05-01 PROCEDURE — 94762 N-INVAS EAR/PLS OXIMTRY CONT: CPT

## 2019-05-01 PROCEDURE — 97116 GAIT TRAINING THERAPY: CPT

## 2019-05-01 PROCEDURE — 97535 SELF CARE MNGMENT TRAINING: CPT

## 2019-05-01 PROCEDURE — 80048 BASIC METABOLIC PNL TOTAL CA: CPT

## 2019-05-01 PROCEDURE — 99220 PR INITIAL OBSERVATION CARE/DAY 70 MINUTES: CPT | Performed by: INTERNAL MEDICINE

## 2019-05-01 PROCEDURE — 36415 COLL VENOUS BLD VENIPUNCTURE: CPT

## 2019-05-01 PROCEDURE — 85025 COMPLETE CBC W/AUTO DIFF WBC: CPT

## 2019-05-01 RX ORDER — 0.9 % SODIUM CHLORIDE 0.9 %
250 INTRAVENOUS SOLUTION INTRAVENOUS ONCE
Status: COMPLETED | OUTPATIENT
Start: 2019-05-01 | End: 2019-05-01

## 2019-05-01 RX ORDER — BUMETANIDE 1 MG/1
1 TABLET ORAL DAILY
Status: DISCONTINUED | OUTPATIENT
Start: 2019-05-02 | End: 2019-05-04 | Stop reason: HOSPADM

## 2019-05-01 RX ORDER — 0.9 % SODIUM CHLORIDE 0.9 %
500 INTRAVENOUS SOLUTION INTRAVENOUS ONCE
Status: COMPLETED | OUTPATIENT
Start: 2019-05-01 | End: 2019-05-01

## 2019-05-01 RX ADMIN — IPRATROPIUM BROMIDE 2 PUFF: 17 AEROSOL, METERED RESPIRATORY (INHALATION) at 19:22

## 2019-05-01 RX ADMIN — MIDODRINE HYDROCHLORIDE 5 MG: 5 TABLET ORAL at 08:55

## 2019-05-01 RX ADMIN — ASPIRIN 81 MG: 81 TABLET, CHEWABLE ORAL at 08:55

## 2019-05-01 RX ADMIN — MORPHINE SULFATE 4 MG: 4 INJECTION INTRAVENOUS at 18:24

## 2019-05-01 RX ADMIN — OXYCODONE HYDROCHLORIDE 5 MG: 5 TABLET ORAL at 11:04

## 2019-05-01 RX ADMIN — SODIUM CHLORIDE 500 ML: 9 INJECTION, SOLUTION INTRAVENOUS at 08:56

## 2019-05-01 RX ADMIN — HEPARIN SODIUM 2000 UNITS: 1000 INJECTION, SOLUTION INTRAVENOUS; SUBCUTANEOUS at 06:51

## 2019-05-01 RX ADMIN — ACETAMINOPHEN 650 MG: 325 TABLET ORAL at 06:34

## 2019-05-01 RX ADMIN — CYCLOBENZAPRINE 5 MG: 10 TABLET, FILM COATED ORAL at 20:30

## 2019-05-01 RX ADMIN — MIDODRINE HYDROCHLORIDE 5 MG: 5 TABLET ORAL at 11:51

## 2019-05-01 RX ADMIN — Medication 10 ML: at 08:58

## 2019-05-01 RX ADMIN — OXYCODONE HYDROCHLORIDE 5 MG: 5 TABLET ORAL at 23:19

## 2019-05-01 RX ADMIN — OXYCODONE HYDROCHLORIDE 5 MG: 5 TABLET ORAL at 06:35

## 2019-05-01 RX ADMIN — LOPERAMIDE HYDROCHLORIDE 2 MG: 2 CAPSULE ORAL at 18:49

## 2019-05-01 RX ADMIN — MORPHINE SULFATE 4 MG: 4 INJECTION INTRAVENOUS at 15:26

## 2019-05-01 RX ADMIN — CYCLOBENZAPRINE 5 MG: 10 TABLET, FILM COATED ORAL at 15:29

## 2019-05-01 RX ADMIN — PANTOPRAZOLE SODIUM 40 MG: 40 TABLET, DELAYED RELEASE ORAL at 15:29

## 2019-05-01 RX ADMIN — BUMETANIDE 2 MG: 1 TABLET ORAL at 08:54

## 2019-05-01 RX ADMIN — METOPROLOL TARTRATE 25 MG: 25 TABLET ORAL at 20:30

## 2019-05-01 RX ADMIN — IPRATROPIUM BROMIDE 2 PUFF: 17 AEROSOL, METERED RESPIRATORY (INHALATION) at 15:48

## 2019-05-01 RX ADMIN — CYCLOBENZAPRINE 5 MG: 10 TABLET, FILM COATED ORAL at 08:55

## 2019-05-01 RX ADMIN — HEPARIN SODIUM AND DEXTROSE 16 UNITS/KG/HR: 10000; 5 INJECTION INTRAVENOUS at 06:52

## 2019-05-01 RX ADMIN — PANTOPRAZOLE SODIUM 40 MG: 40 TABLET, DELAYED RELEASE ORAL at 06:34

## 2019-05-01 RX ADMIN — MORPHINE SULFATE 4 MG: 4 INJECTION INTRAVENOUS at 11:53

## 2019-05-01 RX ADMIN — MORPHINE SULFATE 4 MG: 4 INJECTION INTRAVENOUS at 08:55

## 2019-05-01 RX ADMIN — MOMETASONE FUROATE AND FORMOTEROL FUMARATE DIHYDRATE 2 PUFF: 200; 5 AEROSOL RESPIRATORY (INHALATION) at 10:49

## 2019-05-01 RX ADMIN — WARFARIN SODIUM 2 MG: 2 TABLET ORAL at 18:46

## 2019-05-01 RX ADMIN — MOMETASONE FUROATE AND FORMOTEROL FUMARATE DIHYDRATE 2 PUFF: 200; 5 AEROSOL RESPIRATORY (INHALATION) at 19:22

## 2019-05-01 RX ADMIN — SODIUM CHLORIDE 250 ML: 9 INJECTION, SOLUTION INTRAVENOUS at 18:47

## 2019-05-01 RX ADMIN — LOPERAMIDE HYDROCHLORIDE 2 MG: 2 CAPSULE ORAL at 11:51

## 2019-05-01 RX ADMIN — LOPERAMIDE HYDROCHLORIDE 2 MG: 2 CAPSULE ORAL at 08:55

## 2019-05-01 RX ADMIN — ACETAMINOPHEN 650 MG: 325 TABLET ORAL at 18:21

## 2019-05-01 RX ADMIN — ALPRAZOLAM 0.5 MG: 0.5 TABLET ORAL at 11:51

## 2019-05-01 RX ADMIN — IPRATROPIUM BROMIDE 2 PUFF: 17 AEROSOL, METERED RESPIRATORY (INHALATION) at 10:47

## 2019-05-01 RX ADMIN — METOPROLOL TARTRATE 25 MG: 25 TABLET ORAL at 08:54

## 2019-05-01 ASSESSMENT — PAIN SCALES - GENERAL
PAINLEVEL_OUTOF10: 8
PAINLEVEL_OUTOF10: 6
PAINLEVEL_OUTOF10: 7
PAINLEVEL_OUTOF10: 9
PAINLEVEL_OUTOF10: 0
PAINLEVEL_OUTOF10: 0
PAINLEVEL_OUTOF10: 7
PAINLEVEL_OUTOF10: 7
PAINLEVEL_OUTOF10: 8
PAINLEVEL_OUTOF10: 0
PAINLEVEL_OUTOF10: 8
PAINLEVEL_OUTOF10: 8

## 2019-05-01 ASSESSMENT — PAIN DESCRIPTION - ORIENTATION: ORIENTATION: MID;LOWER

## 2019-05-01 ASSESSMENT — PAIN DESCRIPTION - FREQUENCY: FREQUENCY: CONTINUOUS

## 2019-05-01 ASSESSMENT — PAIN DESCRIPTION - DESCRIPTORS: DESCRIPTORS: ACHING;DISCOMFORT;SORE

## 2019-05-01 ASSESSMENT — PAIN DESCRIPTION - LOCATION: LOCATION: ABDOMEN

## 2019-05-01 NOTE — PROGRESS NOTES
Pharmacy Note  Warfarin Consult follow-up      Recent Labs     05/01/19  0618   INR 1.5     Recent Labs     04/30/19  0628 04/30/19  1411 05/01/19  0618   HGB 8.7* 9.1* 9.7*   HCT 27.4* 29.4* 29.0*   * 754* 647*       Significant Drug-Drug Interactions:  New warfarin drug-drug interactions: none  Discontinued drug-drug interactions: none  Current warfarin drug-drug interactions: acetaminophen, aspirin, heparin drip    Date INR Dose   4/24/19 2.4 2.5mg   4/25/19 2.4 2.5mg   4/26/19 2.8 2mg   4/27/19 3.9 none   4/28/19 3.8 none   4/29/19 1.5 none   4/30/19 1.5 2mg   4/1/19 1.5 2 mg       Notes:             INR has not moved yet which is expected with resuming warfarin therapy just yesterday. Will likely need to decrease the dosage since she became supra therapeutic on warfarin 2.5 mg daily. Daily PT/INR while inpatient. [address] [place] [address]  Ph., CACP, Clinical Pharmacist  Anticoagulation Services, [facility]  5/1/2019  7:58 AM

## 2019-05-01 NOTE — PROGRESS NOTES
influenza, beta lactamase negative. This finding is likely unrelated to intraabdominal process. · Treated with Meropenem through 4/14  · Has maintained leukocytosis and occasional fever. · Has some vaginal bleeding. Gyn evaluating  · Overall clinical improvement. · Discharge plans in progress      Note:  · Labs, medications, radiologic studies were reviewed with personal review of films  · Moderate amounts of data were reviewed  · Discussed with nursing Staff, Discharge planner  · Infection Control and Prevention measures reviewed  · All prior entries were reviewed  · Administer medications as ordered  · Prognosis: Good  · Discharge planning reviewed  · Follow up as outpatient. Infection Control Recommendations   · [place] Precautions    Antimicrobial Stewardship Recommendations     · Discontinuation of therapy    Coordination of Outpatient Care:   · Estimated Length of IV antimicrobials: 4/14  · Patient will need Midline Catheter Insertion: No  · Patient will need PICC line Insertion: No  · Patient will need: Home IV , [place],  SNF,  LTAC TBD  · Patient will need outpatient wound care: TBD    Chief complaint/reason for consultation:   · Altered mental status and tender abdomen       History of Present Illness:   Faheem Paul is a 47y.o.-year-old  female who was initially admitted on 4/5/2019. Patient seen at the request of Dr. Destinee Willis:    Patient presented to ED via EMS due to altered mental status and vomiting. Patient has history significant for COPD, right knee osteoarthirtis s/p arthoplasty, and chronic NSAID use. Patient lives in [place], but was here to visit her significant other. Arrived Wednesday night, said she didn't feel good. Thought she might have had a bad burger at a fast food restaurant. Went to sleep and slept for almost 24 hours. When she awoke, she said she was vomiting, having diarrhea, and abdominal pain.  Significant other and sister are unsure of the nature of the vomit, diarrhea, or abdominal pain. Then she slept a bit more, until her significant other found her unresponsive and that's when he called EMS to bring her to the hospital.     Afebrile on admission, but Tmax overnight was 39.3. Tachycardic on admission, 129. Became hypotensive after admission and levophed and vasopressin were started. Initial labs: Admission labs significant for Na 125, K 8.0, CO2 9, BUN 62, Creatinine of 4.06, Anion gap of 23, Lactic acid of 3.5, Glucose of 186, Ca 5.2, POC HCO3 15.9, CK 15,342, Troponins high sensitivity 89 and 94, Alk phos 168, , AST 1,122, Bili .37, lipase of 119, Urine tox positive for THC and Cocaine, WBC 23.5, Hgb 10.2, Urine and blood cultures pending, HIV negative, influenza negative, hepatitis panel non-reactive, urine Leukocyte esterase trace, urine nitrite -, urine protein 2+, urine Hgb large, urine RBCs 5 to 10, many urine yeast,  ABG 7.22, pCO2 27, HCO3 15.9. Initial imaging showed:     CXR: No acute cardiopulmonary process    Abdominal X-ray 4/6: No free air    CTA of chest: Negative for PE or dissection. Patchy consolidations of bilateral lower lung lobes representing developing pneumonia vs atelectasis. CT head: pending    Initial course:     Intubated and sedated in ED due to altered mental status. Currently on Vancomycin, Levaquin, and aztreonam for empiric coverage. Richar catheter was placed due to request by nephrology for emergent dialysis. Dialysis attempted several times, but due to high arterial pressures and line clotting, dialysis to be completed later today by Dr. Sintia Florez. NG tube with bloody output. FOBT +. General surgery has been consulted for evaluation. CURRENT EVALUATION : 5/1/2019    Patient seen and examined. Says she feels better. Every day she feels like she is gaining more strength. Patient overall improving daily.   Mentation back to normal    Self ambulating   Food intake normal  No signs of relapse of gastrointestinal symptoms. No further nausea or vomiting. No diarrhea    Afebrile  VS stable    Discharge plans underway. Discussed with Dr Eleazar Medellin. EDWARD revealed no thrombus or vegetation, EF 55%, moderate MRKatiana Patient admits to polysubstance use before coming in this admission. Says she would like to stop, but is feeling very anxious about her current health status and stopping substance use. Labs and X rays reviewed: 5/1/2019     WBC 23.9-->33.8->41.7->38.3->39.6->34.5 > 20.6 > 21.7 > 26.2 >>> 24.3  Hb 8.3-->9.4->7.8 -> 7.3 -> 7.2 -> 6.9 > 8.4 > 8.8 > 9.1 > 8.8 >>> 9.7  Plat 75-->119->264 -> 428 -> 523-->652 -> 774 > 784 > 928 > 961 > 832 >>> 647    BUN 84-->62 -> 33 -> 34 -> 34-->25 > 15 > 22  Cr 5.89-->4.86 -> 3.68 -> 3.68 -> 4.11-->3.56 > 2.63 > 2.56 >>> 2.06    Cultures:  Urine:  · NGTD  Blood:  · 4-5-19 : 1/2 haemophilus influenza, beta lactamase negative, sensitive to pcn and meropenem. Pt completed a 10 day course of meropenem  · 4/12 x 1 no growth to date  · 4/13 x 2 no growth to date  · 4/16 NGTD  Wound:  · NA    MRSA- Neg  CXR 4-11-19 showed unchanged pulmonary edema and effusions. CXR 4-16-19 Rt side atelectasis     Duplex of the right upper extremity showed no evidence of deep venous thrombosis in the right upper extremity. Superficial thrombophlebitis of the right upper extremity involving the cephalic and basilic veins. Discussed with RN, patient. IM      I have personally reviewed the past medical history, past surgical history, medications, social history, and family history, and I have updated the database accordingly.   Past Medical History:     Past Medical History:   Diagnosis Date    Asthma     On inhaler    Back pain, chronic     Hypertension 2015    On Lisinopril    Snores     possible apnea but not tested    Wears glasses        Past Surgical  History:     Past Surgical History:   Procedure Laterality Date    KNEE ARTHROSCOPY Left 1980    KNEE ARTHROSCOPY Left 09/28/2016    with medial menisectomy    LAPAROTOMY EXPLORATORY N/A 4/6/2019    LAPAROTOMY EXPLORATORY, ILEOCECECTOMY, SUBTOTAL COLECTOMY, ABTHEHRA WOUND VAC PLACEMENT performed by Tashia Pinon MD at [place] [address] N/A 4/8/2019    2ND LOOK EXPLORATORY LAPAROTOMY, RESECTION TRANSVERSE COLON, ILEOSTOMY CREATION, RESECTION RECTAL STUMP, ABDOMINAL WASHOUT, OMENTECTOMY, ABDOMINAL WALL CLOSURE performed by Yvette Enriquez MD at [address] [place] [address]       Medications:      sodium chloride  500 mL Intravenous Once    ipratropium  2 puff Inhalation 4x daily    mometasone-formoterol  2 puff Inhalation BID    nicotine  1 patch Transdermal Daily    warfarin (COUMADIN) daily dosing (placeholder)   Other RX Placeholder    warfarin  2 mg Oral Daily    loperamide  2 mg Oral TID WC    bumetanide  2 mg Oral Daily    midodrine  5 mg Oral TID WC    metoprolol tartrate  25 mg Oral BID    aspirin  81 mg Oral Daily    cyclobenzaprine  5 mg Oral TID    pantoprazole  40 mg Oral BID AC    sodium chloride flush  10 mL Intravenous 2 times per day       Social History:     Social History     Socioeconomic History    Marital status: Single     Spouse name: Not on file    Number of children: 0    Years of education: Not on file    Highest education level: Not on file   Occupational History    Occupation: [address] [place] [address] Financial resource strain: Not on file    Food insecurity:     Worry: Not on file     Inability: Not on file    Transportation needs:     Medical: Not on file     Non-medical: Not on file   Tobacco Use    Smoking status: Light Tobacco Smoker     Packs/day: 0.25     Types: Cigarettes     Start date: 9/19/1980    Smokeless tobacco: Never Used   Substance and Sexual Activity    Alcohol use: Yes     Comment: OCCASSIONAL    Drug use: No    Sexual activity: Yes     Partners: Female   Lifestyle    Physical activity:     Days per week: Not on file     Minutes per session: Not on file    Stress: Not on file   Relationships    Social connections:     Talks on phone: Not on file     Gets together: Not on file     Attends Caodaism service: Not on file     Active member of club or organization: Not on file     Attends meetings of clubs or organizations: Not on file     Relationship status: Not on file    Intimate partner violence:     Fear of current or ex partner: Not on file     Emotionally abused: Not on file     Physically abused: Not on file     Forced sexual activity: Not on file   Other Topics Concern    Not on file   Social History Narrative    Not on file       Family History:     Family History   Problem Relation Age of Onset    Heart Disease Mother     Stroke Mother     Heart Surgery Mother     Diabetes Maternal Grandmother     Emphysema Maternal Grandfather     Diabetes Maternal Grandfather     Lung Cancer Maternal Uncle         Allergies:   Pcn [penicillins]; Sulfa antibiotics; and Tape [adhesive tape]     Review of Systems:     Constitutional: fevers, chills Resolved. No systemic complaints  Head: No headaches  Eyes: No double vision or blurry vision. No conjunctival inflammation. ENT: No sore throat or runny nose. . No hearing loss, tinnitus or vertigo. Cardiovascular: No chest pain or palpitations. No shortness of breath. No MARTE  Lung: No shortness of breath or cough. No sputum production  Abdomen: No nausea, vomiting, diarrhea, or abdominal pain. Alaina Anjel No cramps. Genitourinary: No increased urinary frequency. No hematuria. Musculoskeletal: No muscle aches or pains. No joint effusions, swelling or deformities  Hematologic: No bleeding or bruising. Neurologic: No headache, weakness, numbness, or tingling. Integument: No rash, no ulcers. Psychiatric: No depression.    Endocrine: No polyuria, no polydipsia, no polyphagia.       Physical Examination :     Patient Vitals for the past 8 hrs:   BP Temp Temp src Pulse Resp SpO2 Weight   05/01/19 0854 109/66 -- -- 106 -- -- --   05/01/19 0800 109/66 102.1 °F (38.9 °C) Temporal 103 22 97 % --   05/01/19 0515 -- -- -- -- -- -- 159 lb 6.3 oz (72.3 kg)   05/01/19 0300 (!) 92/50 98.9 °F (37.2 °C) Oral 94 17 91 % --     General Appearance: Awake, cogent  Head:  Normocephalic, no trauma  Eyes: Pupils equal, round, reactive to light; sclera anicteric; conjunctivae pink. No embolic phenomena. ENT: Oropharynx clear, without erythema, exudate, or thrush. No tenderness of sinuses. Mouth/throat: mucosa pink and moist. No lesions. Dentition in good repair. Neck:Supple, without lymphadenopathy. Thyroid normal, No bruits. Pulmonary/Chest: Clear to auscultation, without wheezes, rales, or rhonchi. Cardiovascular: Regular rate and rhythm without murmurs, rubs, or gallops. Abdomen: Distended. Bowel sounds absent. Soft, non tender. Wound vac removed. .   All four Extremities: Cyanosis of trunk, abdomen, distal extremities  Neurologic: Alert, motor and sensory function normal  Skin: Cool and dry with poor turgor. Signs of peripheral arterial  insufficiency. No ulcerations. No open wounds. Skin improved. Medical Decision Making -Laboratory:   I have independently reviewed/ordered the following labs:    CBC with Differential:   Recent Labs     04/30/19 0628 04/30/19  1411 05/01/19 0618   WBC 24.3* 22.5* 24.3*   HGB 8.7* 9.1* 9.7*   HCT 27.4* 29.4* 29.0*   * 754* 647*   LYMPHOPCT 17*  --  28   MONOPCT 3  --  3     BMP:   Recent Labs     04/30/19 0628 05/01/19 0618    135   K 3.3* 4.6   CL 95* 95*   CO2 25 24   BUN 22* 20   CREATININE 2.49* 2.06*   MG 1.4*  --      Hepatic Function Panel:   Recent Labs     05/01/19  0618   PROT 6.7   LABALBU 3.0*   BILIDIR 0.12   IBILI 0.24   BILITOT 0.36   ALKPHOS 270*   ALT 48*   AST 68*     No results for input(s): RPR in the last 72 hours. No results for input(s): HIV in the last 72 hours.   No results for input(s): BC in the  Retroaortic left renal vein.       Bones normal.       Aorta appears normal without dissection.  The celiac artery and SMA appear   normal.  The renal arteries appear normal.           CTA PELVIS:       Bladder decompressed.  Uterus normal.  Catheter right groin terminates in the   common iliac vein.           Impression   Ileus.  No evidence of aortic dissection.  The bilateral adrenal masses, left   greater than right.  Recommend follow-up adrenal MRI non emergently.           Medical Decision Making-Other: Thank you for allowing us to participate in the care of this patient. Please call with questions.     Yuniel Sheikh MD.      Pager: [phone] - Office: [phone]

## 2019-05-01 NOTE — PLAN OF CARE
Problem: Falls - Risk of:  Goal: Will remain free from falls  Description  Will remain free from falls  Outcome: Met This Shift  Note:   Pt remains free from falls at this time. Floor free from obstacles, and bed is locked and in lowest position. Adequate lighting provided. Call light within reach; pt encouraged to call before getting OOB for any need. Will continue to monitor needs during hourly rounding. Problem: Pain:  Goal: Pain level will decrease  Description  Pain level will decrease  Outcome: Ongoing  Note:   Pt's pain assessed frequently with hourly rounding; assessed all pain characteristics including level, type, location, frequency, and onset. Pt medicated by RN per PRN orders. Non-pharmacologic interventions offered to pt as well. Pt states pain is tolerable at this time. Will continue to monitor.

## 2019-05-01 NOTE — PROGRESS NOTES
Follow up to assess efficacy of pouching system. 05/01/19 1038   Ileostomy Ileostomy RUQ   Placement Date/Time: 04/08/19 1342   Pre-existing: No  Timeout: Patient; Appropriate Equipment;Sterile Technique using full body drape;Procedure  Inserted by: Dr Malathi Chiu  Ileostomy Type: Ileostomy  Location: RUQ   Stomal Appliance 1 piece; Intact   Stool Color Brown   Stool Appearance Watery   Output (mL) 75 ml     Recommended pouching systems for outpatient use provided to the liaison for [facility]. Cut barrier to fit stoma with no more than 1/8\" of exposed skin. Currently 1\" circular to slightly oval.  apply an [employer] Barrier Ring to the cut edge of the pouching system prn. Empty the pouch when 1/3 to 1/2 full. Change the pouching system twice weekly and prn  Our goal is to keep the skin around the stoma intact and to have a dependable pouching system without leaks. The skin around the stoma should be intact and appear just like the skin on the opposite side of the abdomen. Call the [address] [place] [address] at [phone] or [facility] message by searching \"wound\" under \"all\" and selecting the on call clinician with questions or concerns.

## 2019-05-01 NOTE — CARE COORDINATION
Transitional Planning    1840 confirmed with [address] [place] [address], that they do have precert.   WBC elevated and fever con't

## 2019-05-01 NOTE — PROGRESS NOTES
[facility]  Internal Medicine Residency Program  Inpatient Daily Progress Note  ______________________________________________________________________________    Patient: Gloria Olson  YOB: 1965   MRN: [id number]    Acct: [de-identified]     Admit date: 4/5/2019  Today's date: 05/01/19  Number of days in the hospital: 26       Admitting Diagnosis: Septic shock ([address]    Subjective:   Patient seen and examined at bedside. Alert and oriented. Vitals are stable. Creatinine improved to 2.06. Glucose 81 this morning. INR 1.5 this morning. Patient is on heparin drip. Stoma output was 1700 in the last 24 hours. Urine output is good. The patient is self ambulating and going to the restroom. She is eating and drinking fine. No complains of nausea and vomiting. Objective:   Vital Sign:  /61   Pulse 92   Temp 98 °F (36.7 °C) (Oral)   Resp 23   Ht 5' 1.02\" (1.55 m)   Wt 159 lb 6.3 oz (72.3 kg)   SpO2 96%   BMI 30.09 kg/m²       Physical Exam:  General appearance:   alert, well appearing, and in no distress  Mental Status: alert, oriented to person, place, and time  Neurologic:  alert, oriented, normal speech, no focal findings or movement disorder noted  Lungs:  clear to auscultation, no wheezes, rales or rhonchi, symmetric air entry  Heart[de-identified] normal rate, regular rhythm, normal S1, S2, no murmurs, rubs, clicks or gallops  Abdomen:  soft, nontender, nondistended, no masses or organomegaly. Stoma in the right side of the abdomen  Extremities: peripheral pulses normal, no pedal edema, no clubbing or cyanosis   Skin: normal coloration and turgor, no rashes, no suspicious skin lesions noted    Medications:  Scheduled Medications   ipratropium  2 puff Inhalation 4x daily    mometasone-formoterol  2 puff Inhalation BID    nicotine  1 patch Transdermal Daily    warfarin (COUMADIN) daily dosing (placeholder)   Other RX Placeholder    warfarin  2 mg Oral Daily    loperamide  2 mg Oral TID     bumetanide  2 mg Oral Daily    midodrine  5 mg Oral TID     metoprolol tartrate  25 mg Oral BID    aspirin  81 mg Oral Daily    cyclobenzaprine  5 mg Oral TID    pantoprazole  40 mg Oral BID AC    sodium chloride flush  10 mL Intravenous 2 times per day       PRN Medications  heparin (porcine) 4,000 Units PRN   heparin (porcine) 2,000 Units PRN   ALPRAZolam 0.5 mg BID PRN   sodium chloride 250 mL PRN   sodium chloride 150 mL PRN   heparin (porcine) 1,600 Units PRN   heparin (porcine) 1,600 Units PRN   sodium chloride 250 mL PRN   sodium chloride 150 mL PRN   acetaminophen 650 mg Q4H PRN   glucose 15 g PRN   dextrose 12.5 g PRN   glucagon (rDNA) 1 mg PRN   dextrose 100 mL/hr PRN   oxyCODONE 5 mg Q4H PRN   morphine 4 mg Q3H PRN   Or     morphine 6 mg Q3H PRN   sodium chloride 250 mL PRN   sodium chloride 150 mL PRN   dextrose 25 g PRN   sodium chloride flush 10 mL PRN   magnesium hydroxide 30 mL Daily PRN   ondansetron 4 mg Q6H PRN       Diagnostic Labs and Imaging:  CBC:  Recent Labs     04/30/19  0628 04/30/19  1411 05/01/19  0618   WBC 24.3* 22.5* 24.3*   HGB 8.7* 9.1* 9.7*   * 754* 647*     BMP: Recent Labs     04/29/19  0647 04/30/19  0628 05/01/19  0618    139 135   K 3.9 3.3* 4.6   CL 98 95* 95*   CO2 23 25 24   BUN 22* 22* 20   CREATININE 2.56* 2.49* 2.06*   GLUCOSE 99 85 81     Hepatic: Recent Labs     05/01/19  0618   AST 68*   ALT 48*   BILITOT 0.36   ALKPHOS 270*       Assessment and Plan:     Principal Problem:    Septic shock ([facility]  Active Problems:    Shock ([facility]    Rhabdomyolysis    Hyponatremia    Lactic acidosis    MARY (acute kidney injury) ([facility]    Metabolic acidosis    Acute respiratory failure (HCC)    Elevated liver enzymes    Hyperkalemia    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HCC)    Ischemic bowel syndrome (HCC)    Acute GI bleeding    Gram negative septic shock (HCC)    Protein-calorie malnutrition ([facility] Gastroenteritis    Haemophilus influenzae septicemia (HCC)    Pleural effusion, bilateral    Splenic infarct    Leukocytosis    Mottled skin    Acute renal failure (HCC)    Altered mental status    PMB (postmenopausal bleeding)    Thrombocytosis (HCC)  Resolved Problems:    * No resolved hospital problems. *    · Levofloxacin discontinued.  Patient completed a course of meropenem.  Continue to monitor patient off antibiotics.   · Transvaginal ultrasound was normal.  ObGyn plans to follow the patient outpatient. · Patient stable from cardiology stand point. · Continue Midodrine for low BP if needed  · Pain control with Morphine and Roxicodone PRN  · Continue aspirin.   · Hypercoagulable workup negative. · Started nystatin for oral thrush.    · Continue renal diet  · Patient got dialysis catheter removed. No need for outpatient analysis. · Started Imodium for ostomy out put   Imodium 2 mg 3 times a day with meals. · Patient's INR is 1.5 this morning. We will start Coumadin. INR goal 2-3. Pharmacy to dose Coumadin. Started heparin drip for bridging. · Continue Bumex 2 mg orally daily  · BMP every week for 2 weeks and then follow up in 3-4 weeks with nephrology as outpatient. · Discharge planning. The patient will need a new preset for [place]. The patient has been cleared by Nephrology and ID for discharge. Omero Cook MD  PGY-1, Department of Internal Medicine  [address] [place] [address]  5/1/2019 12:04 PM   Attending Physician Statement  I have discussed the care of Papo Starks, including pertinent history and exam findings,  with the resident. I have seen and examined the patient and the key elements of all parts of the encounter have been performed by me. I agree with the assessment, plan and orders as documented by the resident with additions . CC abd. Pain  Improving well. Still has significant drainage. Renal failure,on dialysis.   Shock and sepsis has resolved  Acute resp. Has improved. Will need home O2  Treatment plan Discussed with nursing staff in detail , all questions answered . Electronically signed by Eulalio Marshall MD on   5/1/19 at 3:31 PM    Please note that this chart was generated using voice recognition Dragon dictation software. Although every effort was made to ensure the accuracy of this automated transcription, some errors in transcription may have occurred.

## 2019-05-01 NOTE — PROGRESS NOTES
Nutrition Assessment    Type and Reason for Visit: Reassess    Nutrition Recommendations: Modify to a No Added Salt, Fluid Restricted diet. Continue Nepro ONS. Encourage/monitor PO intakes as tolerated. Nutrition Assessment: Pt remains stable. Tolerating oral intakes. Improved renal function with no additional HD planned. Malnutrition Assessment:  · Malnutrition Status: At risk for malnutrition    Nutrition Risk Level: Moderate    Nutrient Needs:  · Estimated Daily Total Kcal: 1485-5487 kcal/day  · Estimated Daily Protein (g): 70-95 g pro/day     Nutrition Diagnosis:   · Problem: Inadequate oral intake  · Etiology: related to (recent GI surgery)     Signs and symptoms:  as evidenced by Intake 50-75%    Objective Information:  · Nutrition-Focused Physical Findings: Ileostomy. · Wound Type: Surgical Wound  · Current Nutrition Therapies:  · Oral Diet Orders: Renal, 2gm Sodium, Dental Soft, Fluid Restriction   · Oral Diet intake: 51-75%, %  · Oral Nutrition Supplement (ONS) Orders: Renal Oral Supplement  · ONS intake: 1-25%  · Anthropometric Measures:  · Ht: 5' 1.02\" (155 cm)   · Current Body Wt: 159 lb 6.3 oz (72.3 kg)  · Admission Body Wt: 173 lb 15.1 oz (78.9 kg)  · % Weight Change:  ,  Weight fluctuations noted - may be due to fluid shifts from dialysis  · Ideal Body Wt: 105 lb 13.1 oz (48 kg), % Ideal Body 165% (adm/ideal)  · BMI Classification: BMI 25.0 - 29.9 Overweight    Nutrition Interventions:   Modify current diet - Liberalize to a No Added Salt, Fluid Restricted diet.   Continued Inpatient Monitoring, Education Not Indicated    Nutrition Evaluation:   · Evaluation: Progressing toward goals   · Goals: Meet % of estimated nutrition needs    · Monitoring: Meal Intake, Supplement Intake, Diet Tolerance, Skin Integrity, Wound Healing, Weight, Pertinent Labs    Electronically signed by Jed Mckeon RD, MAGGY on 5/1/19 at 11:53 AM    Contact Number: [phone]

## 2019-05-01 NOTE — PROGRESS NOTES
Occupational Therapy  Facility/Department: [address] STEPDOWN  Daily Treatment Note  NAME: Winifred Broussard  : 1965  MRN: [id number]    Date of Service: 2019    Discharge Recommendations: Further therapy recommended at discharge. Assessment   Performance deficits / Impairments: Decreased functional mobility ; Decreased strength;Decreased endurance;Decreased safe awareness;Decreased ADL status; Decreased balance;Decreased high-level IADLs  Prognosis: Good  Patient Education: OT POC, transfer safety, EC/WS, importance of OOB activity, BLE stretching prior to functional mobility - pt verbalized understanding. Activity Tolerance  Activity Tolerance: Patient Tolerated treatment well  Safety Devices  Safety Devices in place: Yes  Type of devices: Call light within reach; Left in bed;Nurse notified         Patient Diagnosis(es): The primary encounter diagnosis was Acute renal failure, unspecified acute renal failure type ([address] Diagnoses of Altered mental status, unspecified altered mental status type and MARY (acute kidney injury) ([address] were also pertinent to this visit. has a past medical history of Asthma, Back pain, chronic, Hypertension, Snores, and Wears glasses. has a past surgical history that includes Tonsillectomy; Knee arthroscopy (Left, ); Ulnar tunnel release (Right, ); Knee arthroscopy (Left, 2016); LAPAROTOMY EXPLORATORY (N/A, 2019); and LAPAROTOMY EXPLORATORY (N/A, 2019).     Restrictions  Restrictions/Precautions  Restrictions/Precautions: Fall Risk, Contact Precautions  Required Braces or Orthoses?: No  Position Activity Restriction  Other position/activity restrictions: up with assist. s/p  ILEOCECECTOMY, SUBTOTAL COLECTOMY ; s/p 2ND LOOK EXPLORATORY LAPAROTOMY with ileostomy creation   Subjective   General  Patient assessed for rehabilitation services?: Yes  Family / Caregiver Present: No  Pain Assessment  Pain Assessment: 0-10  Pain Level: 8  Pain Location: Abdomen  Pain Orientation: Mid;Lower  Pain Descriptors: Aching;Discomfort; Sore  Pain Frequency: Continuous  Non-Pharmaceutical Pain Intervention(s): Therapeutic presence; Emotional support;Rest;Distraction  Vital Signs  Patient Currently in Pain: Yes   Orientation  Orientation  Overall Orientation Status: Within Functional Limits  Objective    ADL  Grooming: Setup;Supervision(Oral care standing at sink.)  UE Bathing: Setup;Stand by assistance(Seated at sink.)  LE Bathing: Setup;Stand by assistance(Seated at sink.)  UE Dressing: Minimal assistance(To manage gown.)  LE Dressing: Maximum assistance  Additional Comments: Pt completed ADL care standing/seated at sink, surgical soap used appropriately. Pt stated needing to sit during ADL care d/t feeling of B calves starting to cramp. Balance  Sitting Balance: Supervision(Seated EOB unsupported.)  Standing Balance: Contact guard assistance  Standing Balance  Time: 5 minutes  Activity: Functional mobility to/from bathroom w/o device, ADL care standing at sink. Sit to stand: Contact guard assistance(Completed x3)  Stand to sit: Stand by assistance(Completed x3.)  Functional Mobility  Functional - Mobility Device: No device  Activity: To/from bathroom  Assist Level: Contact guard assistance  Functional Mobility Comments: No LOB or unsteadiness noted. Pt completed B foot flexion prior to standing, pt states it helps to prevent cramping feeling when completing functional mobility and dynamic standing activities.   Bed mobility  Supine to Sit: Contact guard assistance  Sit to Supine: Stand by assistance  Scooting: Supervision  Cognition  Overall Cognitive Status: [facility]     Plan   Plan  Times per week: 3-4x  Current Treatment Recommendations: Balance Training, Self-Care / ADL, Patient/Caregiver Education & Training, Equipment Evaluation, Education, & procurement, Functional Mobility Training, Endurance Training, Cognitive Reorientation, Positioning, Safety Education & Training    Goals  Short term goals  Time Frame for Short term goals: Pt will by discharge  Short term goal 1: Complete LB ADL task with AD PRN and MIN A. Short term goal 2: Demo 10 minutes of dynamic standing during functional activity performance w/ SBA. Short term goal 3: Tolerate 20 minutes of funct act performance. Short term goal 4: Demo 15 minutes of dynamic sitting balance during fucntional activity performance w/ SUP. Short term goal 5: Demo supine<>sit transfer w/ SUP. Therapy Time   Individual Concurrent Group Co-treatment   Time In 1320         Time Out 1417         Minutes 57            Pt supine in bed upon therapist arrival. Pleasant and agreeable to therapy. See above for LOF for all tasks. Pt retired to supine in bed at end of session with call light within reach.     NELSON Jordan/L

## 2019-05-01 NOTE — PROGRESS NOTES
Today Date:   04/30/19  Patient Name: Emeriat Pang  Date of admission: 4/5/2019  8:39 PM  Patient's age: 47 y. o., 1965  Admission Dx: Shock ([facility] [R57.9]    Requesting Physician: Ailyn Springer MD    CHIEF COMPLAINT:  Abdominal pain    SUBJECTIVE:  The patient was seen and examined. Marilia 2 awaited. Patient reports she had seen hematologist 20 yrs ago for elevated plts and was on Hydrea? As well. Abd pain comntrolled  No active bleeding. Overall she is improving. No fever or chills. Started on Coumadin. INR 1.5  Plts stable    BRIEF CASE HISTORY:    The patient is a 47 y.o.  female who is admitted to the hospital for   for increased confusion and abdominal pain. She was found to have ischemic bowel and was taken to surgery on 4/6/18. Pathology showed ischemic bowel going to the margins. She was taken for a second surgery on 4/8/18 and more ischemic bowel was appreciated. The patient platelets were about 300,000 on admission and dropped about 98 with her multiple surgeries. Hit antibody was done and was negative. Platelets recovered since then. The patient continues to struggle with recovery. She underwent a CT scan of the abdomen and pelvis today that showed heterogenous changes in the spleen that the new. There was a suspicion of splenic infarction. Vascular were involved and started the patient in aspirin and Plavix as well as heparin. The patient is seen and examined. She is started on anticoagulation. She denies any active bleeding. She has very little insight about her disease and most of the information were obtained from the chart. It is no history of thromboembolism previously . family history is negative for arterial or venous thrombosis. The patient developed worsening renal failure and was started on hemodialysis.     Medications:    Reviewed in EPIC     Allergies:  Pcn [penicillins]; Sulfa antibiotics; and Tape [place] tape]    REVIEW OF SYSTEMS:    Review of Systems  General: no fever or night sweats, Weight is stable. Progressive fatigue  ENT: No double or blurred vision, no tinnitus or hearing problem, no dysphagia or sore throat   Respiratory: No chest pain, no shortness of breath, no cough or hemoptysis. Cardiovascular: Denies chest pain, PND or orthopnea. No L E swelling or palpitations. Gastrointestinal:    Abdominal discomfort, wound VAC in place, no bleeding. Genitourinary: Denies dysuria, hematuria, frequency, urgency or incontinence. Neurological: Denies headaches, decreased LOC, no sensory or motor focal deficits. Musculoskeletal:  No arthralgia no back pain or joint swelling. Skin: There are no rashes or bleeding. Psychiatric:  No anxiety, no depression. Endocrine: no diabetes or thyroid disease. Hematologic: no bleeding , no adenopathy. PHYSICAL EXAM:      BP (!) 106/58   Pulse 110   Temp 100.8 °F (38.2 °C) (Oral)   Resp 18   Ht 5' 1.02\" (1.55 m)   Wt 165 lb 2 oz (74.9 kg)   SpO2 96%   BMI 31.18 kg/m²    Temp (24hrs), Av.3 °F (37.4 °C), Min:98.1 °F (36.7 °C), Max:100.8 °F (38.2 °C)  Physical Exam  Gen.  Exam, showed a well-appearing patient without evidence of distress or pain  HEENT, normocephalic and atraumatic, PERRLA extraocular muscles are intact  Neck showed no JVD no carotid bruit, no cervical adenopathy  Chest is clear to auscultation bilaterally  Heart is regular without any murmur  Abdomen left upper quadrant tenderness but the abdomen was soft  Lower extremities showed no edema clubbing or cyanosis  Neurological examination was nonfocal, with intact cranial nerves  Skin  No rashes or bruising appreciated  DATA:    Labs:     CBC:   Recent Labs     19  0628 19  1411   WBC 24.3* 22.5*   HGB 8.7* 9.1*   HCT 27.4* 29.4*   * 754*     BMP:   Recent Labs     19  0647 19  0628    139   K 3.9 3.3*   CO2 23 25   BUN 22* 22*   CREATININE 2.56* 2.49* LABGLOM 20* 20*   GLUCOSE 99 85     PT/INR:   Recent Labs     04/29/19  1455 04/30/19  0628   PROTIME 15.5* 15.7*   INR 1.5 1.5     APTT:  Recent Labs     04/30/19  1411   APTT 34.2*     LIVER PROFILE:  Recent Labs     04/28/19  0636   AST 13   ALT 12   LABALBU 2.5*     IMPRESSION:    Primary Problem  Septic shock [facility])    Active Hospital Problems    Diagnosis Date Noted    Thrombocytosis ([address] [D47.3]     Acute renal failure (HCC) [N17.9]     Altered mental status [R41.82]     PMB (postmenopausal bleeding) [N95.0]     Splenic infarct [D73.5]     Leukocytosis [D72.829]     Mottled skin [L81.9]     Pleural effusion, bilateral [J90]     Protein-calorie malnutrition ([address] [E46]     Septic shock ([address] [A41.9, R65.21]     Gastroenteritis [K52.9]     Haemophilus influenzae septicemia ([address] [A41.3]     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[address] [K55.049] 04/07/2019    Ischemic bowel syndrome ([address] [K55.9] 04/07/2019    Acute GI bleeding [K92.2] 04/07/2019    Gram negative septic shock ([address] [A41.50, R65.21]     Rhabdomyolysis [M62.82] 04/06/2019    Hyponatremia [E87.1] 04/06/2019    Lactic acidosis [E87.2] 04/06/2019    MARY (acute kidney injury) ([address] [N17.9] 85/88/0200    Metabolic acidosis [P85.7] 04/06/2019    Acute respiratory failure ([address] [J96.00] 04/06/2019    Elevated liver enzymes [R74.8] 04/06/2019    Hyperkalemia [E87.5]     Shock ([address] [R57.9] 04/05/2019     RECOMMENDATIONS:  The patient is doing well clinically stable. Coumadin. Target INR between 2 and 3. She will need long-term use of anticoagulation. Leukocytosis and thrombocytosis are likely reactive. However given high plts in the pasta nd use ?hydrea in thye past, BM pathology needs to be ruled out  Awaiting  VOLODYMYR 2 Gene mutation. Anemia. Stable   will follow      José Miguel Kahn MD  Hematologist/Medical Oncologist    Cell: [phone]      This note is created with the assistance of a speech recognition program. While intending to generate a document that actually reflects the content of the visit, the document can still have some errors including those of syntax and sound a like substitutions which may escape proof reading. It such instances, actual meaning can be extrapolated by contextual diversion.

## 2019-05-01 NOTE — PLAN OF CARE
Problem: Falls - Risk of:  Goal: Will remain free from falls  Description  Will remain free from falls  Note:   No falls this shift. Precautions include posted falling star sign, nonskid footwear on, bed locked in lowest position, siderails up x2, table and call light within reach. Bed alarm on. Patient calls out appropriately for assistance. Hourly rounding to assess for needs. Sandy Rios RN         Problem: Pain:  Goal: Pain level will decrease  Description  Pain level will decrease  Note:   Patient educated on available pain control measures including non-pharmacologic and medications. Pain goal discussed. Whiteboard updated for available time of next administration PRN. Will continue ordered interventions & assess pain.  Kyrie Mcbride RN

## 2019-05-01 NOTE — PROGRESS NOTES
Yes  Ambulation 1  Surface: level tile  Device: No Device  Quality of Gait: shuffling steps, steady, flexed posture  Distance: 10ft to commode CGA  Ambulation 2  Surface - 2: level tile  Device 2: Rolling Walker  Assistance 2: Contact guard assistance  Quality of Gait 2: Decreased heel/toe gait, flexed posture, slow flaco,  Distance: 300ft  Comments: pt reporting minimal pain in gastrocs with ambulation this date     Balance  Posture: Fair  Sitting - Static: Good  Sitting - Dynamic: Good;-  Standing - Static: Fair;+  Standing - Dynamic: Fair  Exercises  Seated LE exercise program: Long Arc Quads, hip abduction/adduction, heel/toe raises, and marches. Reps: x 20  B gastroc stretches 30\" x 4 in supine prior to mobility             Assessment   Body structures, Functions, Activity limitations: Decreased functional mobility ; Decreased strength;Decreased balance;Decreased safe awareness;Decreased endurance  Assessment: pt able to amb 300ft w/RW CGA this date with minimal pain or muscle spasms  Prognosis: Good  REQUIRES PT FOLLOW UP: Yes  Activity Tolerance  Activity Tolerance: Patient Tolerated treatment well   e             Goals  Short term goals  Time Frame for Short term goals: 14 visits  Short term goal 1: Pt will be Betty with bed mobility  Short term goal 2: Pt will be Betty transfers  Short term goal 3: Pt will be SBA amb 125' RW   Short term goal 4: Pt will be safe to attempt steps    Plan    Plan  Times per week: 5-6x/wk  Current Treatment Recommendations: Strengthening, Balance Training, Functional Mobility Training, Transfer Training, Endurance Training, Cognitive Reorientation, Gait Training, Stair training, Home Exercise Program, Safety Education & Training, Equipment Evaluation, Education, & procurement, Patient/Caregiver Education & Training  Safety Devices  Type of devices:  All fall risk precautions in place, Call light within reach, Gait belt, Nurse notified, Left in chair, Chair alarm in place  Restraints  Initially in place: No     Therapy Time   Individual Concurrent Group Co-treatment   Time In 1620         Time Out 1644         Minutes 24                 Donna Mcbride, PTA

## 2019-05-01 NOTE — PROGRESS NOTES
NEPHROLOGY PROGRESS NOTE    Initial History  Hospitalized with the delirium associated with abdominal pain nausea vomiting.  Initial assessment showed hypotensive lady with the imaging studies revealing evidence of ileus.  Further investigations demonstrated neutrophilic leukocytosis along with acute hepatitis/acute kidney injury and elevated lactic acid.  Underwent surgical resection for ischemic gut.  Hospital course further complicated by persistent atrial fibrillation needing DC cardioversion. Farhana Chris was started on dialysis in view of life-threatening hyperkalemia and acute kidney injury    SUBJECTIVE     Pt seen and examined, no acute events overnight  Last hemodialysis on 25th of April. Rojean Duos catheter has been discontinued. Urine output around 2 L last 24 hours. Continues to have a high stomal output. Blood pressure stable on midodrine. Renal function continues to improve. OBJECTIVE     Vitals:    05/01/19 0515 05/01/19 0800 05/01/19 0854 05/01/19 1115   BP:  109/66 109/66 102/61   Pulse:  103 106 92   Resp:  22  23   Temp:  102.1 °F (38.9 °C)  98 °F (36.7 °C)   TempSrc:  Temporal  Oral   SpO2:  97%  96%   Weight: 159 lb 6.3 oz (72.3 kg)      Height:         24HR INTAKE/OUTPUT:      Intake/Output Summary (Last 24 hours) at 5/1/2019 1249  Last data filed at 5/1/2019 1038  Gross per 24 hour   Intake 2033.51 ml   Output 1825 ml   Net 208.51 ml       General appearance:Awake, alert, in no acute distress  HEENT: PERRLA  Respiratory::vesicular breath sounds,reduced AE  Cardiovascular:S1 S2 normal,no gallop or organic murmur. Abdomen: Present stoma  Extremities: No Cyanosis or Clubbing, Lower extremity edema  Neurological:Alert and oriented. No abnormalities of mood, affect, memory, mentation, or behavior are noted      MEDICATIONS     Scheduled Meds:    ipratropium  2 puff Inhalation 4x daily    mometasone-formoterol  2 puff Inhalation BID    [START ON 5/2/2019] bumetanide  1 mg Oral Daily    nicotine  1 patch Transdermal Daily    warfarin (COUMADIN) daily dosing (placeholder)   Other RX Placeholder    warfarin  2 mg Oral Daily    loperamide  2 mg Oral TID WC    midodrine  5 mg Oral TID WC    metoprolol tartrate  25 mg Oral BID    aspirin  81 mg Oral Daily    cyclobenzaprine  5 mg Oral TID    pantoprazole  40 mg Oral BID AC    sodium chloride flush  10 mL Intravenous 2 times per day     Continuous Infusions:    heparin (porcine) 16 Units/kg/hr (05/01/19 0205)    dextrose       PRN Meds:  heparin (porcine), heparin (porcine), ALPRAZolam, sodium chloride, sodium chloride, heparin (porcine), heparin (porcine), sodium chloride, sodium chloride, acetaminophen, glucose, dextrose, glucagon (rDNA), dextrose, oxyCODONE, morphine **OR** morphine, sodium chloride, sodium chloride, dextrose, sodium chloride flush, magnesium hydroxide, ondansetron  Home Meds:                Medications Prior to Admission: ibuprofen (ADVIL;MOTRIN) 800 MG tablet, Take 800 mg by mouth every 6 hours as needed for Pain  ALPRAZolam (XANAX) 0.25 MG tablet, Take 0.25 mg by mouth nightly as needed for Sleep or Anxiety. venlafaxine (EFFEXOR) 75 MG tablet, Take 100 mg by mouth 2 times daily   [DISCONTINUED] celecoxib (CELEBREX) 200 MG capsule, Take 1 capsule by mouth 2 times daily  [DISCONTINUED] traMADol (ULTRAM) 50 MG tablet, 1 tablet PO BID PRN pain  [DISCONTINUED] oxyCODONE-acetaminophen (PERCOCET) 5-325 MG per tablet, Take 1 tablet by mouth 2 times daily as needed for Pain . [DISCONTINUED] diclofenac (VOLTAREN) 75 MG EC tablet, Take 1 tablet by mouth 2 times daily  [DISCONTINUED] oxyCODONE-acetaminophen (PERCOCET) 5-325 MG per tablet, Take 1 tablet by mouth every 6 hours as needed for Pain . [DISCONTINUED] aspirin 325 MG EC tablet, Take 1 tablet by mouth 2 times daily for 14 days  [DISCONTINUED] Misc.  Devices (ROLLER WALKER) MISC, 1 each by Does not apply route daily  QVAR 40 MCG/ACT inhaler, Inhale 2 puffs into the lungs 2 times daily  [DISCONTINUED] Calcium Citrate-Vitamin D (CALCIUM + D PO), Take 1 tablet by mouth daily  [DISCONTINUED] Misc. Devices MISC, As directed by physician daily  [DISCONTINUED] diclofenac (VOLTAREN) 50 MG EC tablet, Take 1 tablet by mouth 2 times daily  [DISCONTINUED] docusate sodium (COLACE) 100 MG capsule, Take 1 capsule by mouth 2 times daily as needed for Constipation  gabapentin (NEURONTIN) 100 MG capsule, Take 100 mg by mouth 3 times daily as needed   lisinopril-hydrochlorothiazide (PRINZIDE;ZESTORETIC) 10-12.5 MG per tablet, Take 1 tablet by mouth daily  albuterol (PROVENTIL) (2.5 MG/3ML) 0.083% nebulizer solution, Take 2.5 mg by nebulization every 6 hours as needed for Wheezing  albuterol sulfate  (90 BASE) MCG/ACT inhaler, Inhale 2 puffs into the lungs every 6 hours as needed for Wheezing    INVESTIGATIONS     Last 3 CMP:    Recent Labs     04/29/19  0647 04/30/19  0628 05/01/19 0618    139 135   K 3.9 3.3* 4.6   CL 98 95* 95*   CO2 23 25 24   BUN 22* 22* 20   CREATININE 2.56* 2.49* 2.06*   CALCIUM 8.3* 8.1* 8.4*   PROT  --   --  6.7   LABALBU  --   --  3.0*   BILITOT  --   --  0.36   ALKPHOS  --   --  270*   AST  --   --  68*   ALT  --   --  48*       Last 3 CBC:  Recent Labs     04/30/19  0628 04/30/19  1411 05/01/19  0618   WBC 24.3* 22.5* 24.3*   RBC 2.96* 3.13* 3.30*   HGB 8.7* 9.1* 9.7*   HCT 27.4* 29.4* 29.0*   MCV 92.6 93.9 87.9   MCH 29.4 29.1 29.4   MCHC 31.8 31.0 33.4   RDW 15.1* 15.2* 15.1*   * 754* 647*   MPV 8.7 8.5 8.7       ASSESSMENT     1.  Acute kidney injury nonoliguric secondary to ischemic - hypoperfusion (Hypotension/arrthymia/ischemic gut ) and nephrotoxic (contrast) ATN aggravated further by concomitant NSAID and diuretic use along with cocaine - hemodialysis dependent - tunnel catheter in place TTS - last hemodialysis 2 days back.   Currently renal function  Improved  to the point of not needing dialysis  Creat peaked at 4 with hyperkalemia 8  Baseline creat in Jan 2017 was normal  Ultrasound shows right kidney 10.4 cm and left 10.2 cm  Serologies neg  2.  Volume overload- iatrogenic/capillary leak syndrome - improving  3.  Ischemic gut status post Ileocecectomy, extended left hemicolectomy, placement of  ABThera wound VAC on 4/6 4/8 Second Look exploratory upper, with resection of transverse colon AND ileostomy  4. Sepsis - blood culture positive for H.infleuenza  5.  Bilateral adrenal masses 11 mm left and 18 mm on the right  6.  Anemia  7.  Persistent Afib S/o cardioversion        PLAN     1. Reduced Bumex dose  2. Avoid nephrotoxin  3. If dc will need BMP weekly for 2 weeks and FU in 3-4 weeks  4.   Will follow if still in hospital      Victor M Burrell MD, MRCP Yajaira Maria, Marino Banks   5/1/2019 12:49 PM    Nephrology [facility]

## 2019-05-02 LAB
ABSOLUTE EOS #: 0.25 K/UL (ref 0–0.4)
ABSOLUTE IMMATURE GRANULOCYTE: 0.25 K/UL (ref 0–0.3)
ABSOLUTE LYMPH #: 6.13 K/UL (ref 1–4.8)
ABSOLUTE MONO #: 0.74 K/UL (ref 0.1–0.8)
ANION GAP SERPL CALCULATED.3IONS-SCNC: 20 MMOL/L (ref 9–17)
BASOPHILS # BLD: 0 % (ref 0–2)
BASOPHILS ABSOLUTE: 0 K/UL (ref 0–0.2)
BUN BLDV-MCNC: 23 MG/DL (ref 6–20)
BUN/CREAT BLD: ABNORMAL (ref 9–20)
CALCIUM SERPL-MCNC: 8.4 MG/DL (ref 8.6–10.4)
CHLORIDE BLD-SCNC: 91 MMOL/L (ref 98–107)
CO2: 23 MMOL/L (ref 20–31)
CREAT SERPL-MCNC: 2.01 MG/DL (ref 0.5–0.9)
DIFFERENTIAL TYPE: ABNORMAL
EOSINOPHILS RELATIVE PERCENT: 1 % (ref 1–4)
GFR AFRICAN AMERICAN: 32 ML/MIN
GFR NON-AFRICAN AMERICAN: 26 ML/MIN
GFR SERPL CREATININE-BSD FRML MDRD: ABNORMAL ML/MIN/{1.73_M2}
GFR SERPL CREATININE-BSD FRML MDRD: ABNORMAL ML/MIN/{1.73_M2}
GLUCOSE BLD-MCNC: 103 MG/DL (ref 70–99)
HCT VFR BLD CALC: 28.5 % (ref 36.3–47.1)
HEMOGLOBIN: 9 G/DL (ref 11.9–15.1)
IMMATURE GRANULOCYTES: 1 %
INR BLD: 1.5
LYMPHOCYTES # BLD: 25 % (ref 24–44)
MCH RBC QN AUTO: 28.8 PG (ref 25.2–33.5)
MCHC RBC AUTO-ENTMCNC: 31.6 G/DL (ref 28.4–34.8)
MCV RBC AUTO: 91.1 FL (ref 82.6–102.9)
MONOCYTES # BLD: 3 % (ref 1–7)
MORPHOLOGY: ABNORMAL
NRBC AUTOMATED: 0 PER 100 WBC
PARTIAL THROMBOPLASTIN TIME: 58 SEC (ref 20.5–30.5)
PDW BLD-RTO: 15.3 % (ref 11.8–14.4)
PLATELET # BLD: 713 K/UL (ref 138–453)
PLATELET ESTIMATE: ABNORMAL
PMV BLD AUTO: 9 FL (ref 8.1–13.5)
POTASSIUM SERPL-SCNC: 3.8 MMOL/L (ref 3.7–5.3)
PROTHROMBIN TIME: 15.8 SEC (ref 9–12)
RBC # BLD: 3.13 M/UL (ref 3.95–5.11)
RBC # BLD: ABNORMAL 10*6/UL
SEG NEUTROPHILS: 70 % (ref 36–66)
SEGMENTED NEUTROPHILS ABSOLUTE COUNT: 17.13 K/UL (ref 1.8–7.7)
SODIUM BLD-SCNC: 134 MMOL/L (ref 135–144)
WBC # BLD: 24.5 K/UL (ref 3.5–11.3)
WBC # BLD: ABNORMAL 10*3/UL

## 2019-05-02 PROCEDURE — 6370000000 HC RX 637 (ALT 250 FOR IP): Performed by: STUDENT IN AN ORGANIZED HEALTH CARE EDUCATION/TRAINING PROGRAM

## 2019-05-02 PROCEDURE — 87040 BLOOD CULTURE FOR BACTERIA: CPT

## 2019-05-02 PROCEDURE — 85730 THROMBOPLASTIN TIME PARTIAL: CPT

## 2019-05-02 PROCEDURE — 80048 BASIC METABOLIC PNL TOTAL CA: CPT

## 2019-05-02 PROCEDURE — 36415 COLL VENOUS BLD VENIPUNCTURE: CPT

## 2019-05-02 PROCEDURE — 6360000002 HC RX W HCPCS: Performed by: STUDENT IN AN ORGANIZED HEALTH CARE EDUCATION/TRAINING PROGRAM

## 2019-05-02 PROCEDURE — 97110 THERAPEUTIC EXERCISES: CPT

## 2019-05-02 PROCEDURE — 94760 N-INVAS EAR/PLS OXIMETRY 1: CPT

## 2019-05-02 PROCEDURE — 6370000000 HC RX 637 (ALT 250 FOR IP): Performed by: RADIOLOGY

## 2019-05-02 PROCEDURE — 2060000000 HC ICU INTERMEDIATE R&B

## 2019-05-02 PROCEDURE — 99232 SBSQ HOSP IP/OBS MODERATE 35: CPT | Performed by: INTERNAL MEDICINE

## 2019-05-02 PROCEDURE — 85025 COMPLETE CBC W/AUTO DIFF WBC: CPT

## 2019-05-02 PROCEDURE — 6370000000 HC RX 637 (ALT 250 FOR IP): Performed by: INTERNAL MEDICINE

## 2019-05-02 PROCEDURE — 85610 PROTHROMBIN TIME: CPT

## 2019-05-02 PROCEDURE — 94640 AIRWAY INHALATION TREATMENT: CPT

## 2019-05-02 PROCEDURE — 97530 THERAPEUTIC ACTIVITIES: CPT

## 2019-05-02 RX ORDER — WARFARIN SODIUM 2.5 MG/1
2.5 TABLET ORAL DAILY
Status: DISCONTINUED | OUTPATIENT
Start: 2019-05-02 | End: 2019-05-04

## 2019-05-02 RX ADMIN — OXYCODONE HYDROCHLORIDE 5 MG: 5 TABLET ORAL at 06:03

## 2019-05-02 RX ADMIN — IPRATROPIUM BROMIDE 2 PUFF: 17 AEROSOL, METERED RESPIRATORY (INHALATION) at 07:49

## 2019-05-02 RX ADMIN — OXYCODONE HYDROCHLORIDE 5 MG: 5 TABLET ORAL at 23:57

## 2019-05-02 RX ADMIN — LOPERAMIDE HYDROCHLORIDE 2 MG: 2 CAPSULE ORAL at 15:05

## 2019-05-02 RX ADMIN — ALPRAZOLAM 0.5 MG: 0.5 TABLET ORAL at 10:23

## 2019-05-02 RX ADMIN — LOPERAMIDE HYDROCHLORIDE 2 MG: 2 CAPSULE ORAL at 08:18

## 2019-05-02 RX ADMIN — IPRATROPIUM BROMIDE 2 PUFF: 17 AEROSOL, METERED RESPIRATORY (INHALATION) at 16:12

## 2019-05-02 RX ADMIN — MIDODRINE HYDROCHLORIDE 5 MG: 5 TABLET ORAL at 20:12

## 2019-05-02 RX ADMIN — IPRATROPIUM BROMIDE 2 PUFF: 17 AEROSOL, METERED RESPIRATORY (INHALATION) at 12:21

## 2019-05-02 RX ADMIN — CYCLOBENZAPRINE 5 MG: 10 TABLET, FILM COATED ORAL at 08:19

## 2019-05-02 RX ADMIN — MORPHINE SULFATE 4 MG: 4 INJECTION INTRAVENOUS at 12:00

## 2019-05-02 RX ADMIN — MIDODRINE HYDROCHLORIDE 5 MG: 5 TABLET ORAL at 15:06

## 2019-05-02 RX ADMIN — ACETAMINOPHEN 650 MG: 325 TABLET ORAL at 06:38

## 2019-05-02 RX ADMIN — CYCLOBENZAPRINE 5 MG: 10 TABLET, FILM COATED ORAL at 15:06

## 2019-05-02 RX ADMIN — PANTOPRAZOLE SODIUM 40 MG: 40 TABLET, DELAYED RELEASE ORAL at 06:03

## 2019-05-02 RX ADMIN — MORPHINE SULFATE 4 MG: 4 INJECTION INTRAVENOUS at 19:44

## 2019-05-02 RX ADMIN — PSYLLIUM HUSK 1 PACKET: 3.4 POWDER ORAL at 15:05

## 2019-05-02 RX ADMIN — METOPROLOL TARTRATE 25 MG: 25 TABLET ORAL at 08:18

## 2019-05-02 RX ADMIN — PSYLLIUM HUSK 1 PACKET: 3.4 POWDER ORAL at 20:12

## 2019-05-02 RX ADMIN — LOPERAMIDE HYDROCHLORIDE 2 MG: 2 CAPSULE ORAL at 20:12

## 2019-05-02 RX ADMIN — MOMETASONE FUROATE AND FORMOTEROL FUMARATE DIHYDRATE 2 PUFF: 200; 5 AEROSOL RESPIRATORY (INHALATION) at 07:49

## 2019-05-02 RX ADMIN — WARFARIN SODIUM 2.5 MG: 2.5 TABLET ORAL at 17:16

## 2019-05-02 RX ADMIN — HEPARIN SODIUM AND DEXTROSE 16 UNITS/KG/HR: 10000; 5 INJECTION INTRAVENOUS at 12:01

## 2019-05-02 RX ADMIN — MIDODRINE HYDROCHLORIDE 5 MG: 5 TABLET ORAL at 08:19

## 2019-05-02 RX ADMIN — CYCLOBENZAPRINE 5 MG: 10 TABLET, FILM COATED ORAL at 20:12

## 2019-05-02 RX ADMIN — ACETAMINOPHEN 650 MG: 325 TABLET ORAL at 23:57

## 2019-05-02 RX ADMIN — METOPROLOL TARTRATE 25 MG: 25 TABLET ORAL at 20:12

## 2019-05-02 RX ADMIN — MOMETASONE FUROATE AND FORMOTEROL FUMARATE DIHYDRATE 2 PUFF: 200; 5 AEROSOL RESPIRATORY (INHALATION) at 20:08

## 2019-05-02 RX ADMIN — BUMETANIDE 1 MG: 1 TABLET ORAL at 08:18

## 2019-05-02 RX ADMIN — OXYCODONE HYDROCHLORIDE 5 MG: 5 TABLET ORAL at 15:02

## 2019-05-02 RX ADMIN — ASPIRIN 81 MG: 81 TABLET, CHEWABLE ORAL at 08:19

## 2019-05-02 RX ADMIN — PANTOPRAZOLE SODIUM 40 MG: 40 TABLET, DELAYED RELEASE ORAL at 15:07

## 2019-05-02 RX ADMIN — OXYCODONE HYDROCHLORIDE 5 MG: 5 TABLET ORAL at 10:18

## 2019-05-02 RX ADMIN — ACETAMINOPHEN 650 MG: 325 TABLET ORAL at 15:02

## 2019-05-02 RX ADMIN — IPRATROPIUM BROMIDE 2 PUFF: 17 AEROSOL, METERED RESPIRATORY (INHALATION) at 20:08

## 2019-05-02 ASSESSMENT — PAIN SCALES - GENERAL
PAINLEVEL_OUTOF10: 0
PAINLEVEL_OUTOF10: 8
PAINLEVEL_OUTOF10: 8
PAINLEVEL_OUTOF10: 7
PAINLEVEL_OUTOF10: 8
PAINLEVEL_OUTOF10: 9
PAINLEVEL_OUTOF10: 3
PAINLEVEL_OUTOF10: 5
PAINLEVEL_OUTOF10: 9
PAINLEVEL_OUTOF10: 0
PAINLEVEL_OUTOF10: 5
PAINLEVEL_OUTOF10: 3
PAINLEVEL_OUTOF10: 4

## 2019-05-02 ASSESSMENT — PAIN DESCRIPTION - LOCATION
LOCATION: ABDOMEN

## 2019-05-02 ASSESSMENT — PAIN DESCRIPTION - PAIN TYPE
TYPE: SURGICAL PAIN

## 2019-05-02 ASSESSMENT — PAIN DESCRIPTION - FREQUENCY
FREQUENCY: CONTINUOUS

## 2019-05-02 NOTE — PROGRESS NOTES
capsule 2 mg TID WC   midodrine (PROAMATINE) tablet 5 mg TID WC   metoprolol tartrate (LOPRESSOR) tablet 25 mg BID   0.9 % sodium chloride bolus PRN   0.9 % sodium chloride bolus PRN   heparin (porcine) injection 1,600 Units PRN   heparin (porcine) injection 1,600 Units PRN   0.9 % sodium chloride bolus PRN   0.9 % sodium chloride bolus PRN   acetaminophen (TYLENOL) tablet 650 mg Q4H PRN   glucose (GLUTOSE) 40 % oral gel 15 g PRN   dextrose 50 % solution 12.5 g PRN   glucagon (rDNA) injection 1 mg PRN   dextrose 5 % solution PRN   aspirin chewable tablet 81 mg Daily   oxyCODONE (ROXICODONE) immediate release tablet 5 mg Q4H PRN   cyclobenzaprine (FLEXERIL) tablet 5 mg TID   morphine injection 4 mg Q3H PRN   Or    morphine injection 6 mg Q3H PRN   0.9 % sodium chloride bolus PRN   0.9 % sodium chloride bolus PRN   pantoprazole (PROTONIX) tablet 40 mg BID AC   dextrose 50 % solution 25 g PRN   sodium chloride flush 0.9 % injection 10 mL 2 times per day   sodium chloride flush 0.9 % injection 10 mL PRN   magnesium hydroxide (MILK OF MAGNESIA) 400 MG/5ML suspension 30 mL Daily PRN   ondansetron (ZOFRAN) injection 4 mg Q6H PRN         LABS      CBC: Recent Labs     04/30/19  1411 05/01/19  0618 05/02/19  0640   WBC 22.5* 24.3* 24.5*   RBC 3.13* 3.30* 3.13*   HGB 9.1* 9.7* 9.0*   HCT 29.4* 29.0* 28.5*   MCV 93.9 87.9 91.1   MCH 29.1 29.4 28.8   MCHC 31.0 33.4 31.6   RDW 15.2* 15.1* 15.3*   * 647* 713*   MPV 8.5 8.7 9.0      BMP: Recent Labs     04/30/19  0628 05/01/19  0618 05/02/19  0640    135 134*   K 3.3* 4.6 3.8   CL 95* 95* 91*   CO2 25 24 23   BUN 22* 20 23*   CREATININE 2.49* 2.06* 2.01*   GLUCOSE 85 81 103*   CALCIUM 8.1* 8.4* 8.4*    PHOSPHORUS:    Recent Labs     04/30/19  0628   PHOS 5.5*     MAGNESIUM:   Recent Labs     04/30/19  0628   MG 1.4*     ALBUMIN:   Recent Labs     05/01/19  0618   LABALBU 3.0*       RADIOLOGY      Reviewed as available. ASSESSMENT    1.   Acute kidney injury patient was on dialysis via tunnel catheter. Renal function has improved and creatinine is stable at 2.0 mg/dL  2. Volume overload improved  3. Fever workup is in progress  4. Anemia of chronic disease    PLAN      1. BMP and CBC in a.m. 2.  Continue Bumex  3. We will follow with you    Please do not hesitate to call with questions.     Electronically signed by Siddharth Neely MD on 5/2/2019 at 12:06 PM

## 2019-05-02 NOTE — PROGRESS NOTES
Pharmacy Note  Warfarin Consult follow-up      Recent Labs     05/02/19  0640   INR 1.5     Recent Labs     04/30/19  1411 05/01/19  0618 05/02/19  0640   HGB 9.1* 9.7* 9.0*   HCT 29.4* 29.0* 28.5*   * 647* 713*       Significant Drug-Drug Interactions:  New warfarin drug-drug interactions: none  Discontinued drug-drug interactions: none  Current warfarin drug-drug interactions: acetaminophen, aspirin, heparin drip    Date INR Dose   4/24/19 2.4 2.5mg   4/25/19 2.4 2.5mg   4/26/19 2.8 2mg   4/27/19 3.9 none   4/28/19 3.8 none   4/29/19 1.5 none   4/30/19 1.5 2mg   5/1/19 1.5 2 mg   5/2/19 1.5 2.5 mg       Notes:     INR is unchanged, will increase dose to warfarin 2.5 mg daily. Daily PT/INR while inpatient. [address] [place] [address]  Ph., CACP, Clinical Pharmacist  Anticoagulation Services, [facility]  5/2/2019  8:32 AM
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
## 2019-05-02 NOTE — PROGRESS NOTES
Today Date:   05/01/19  Patient Name: Brady Ham  Date of admission: 4/5/2019  8:39 PM  Patient's age: 47 y. o., 1965  Admission Dx: Shock ([address] [R57.9]    Requesting Physician: Laure Elliott MD    CHIEF COMPLAINT:  Abdominal pain    SUBJECTIVE:  The patient was seen and examined. Marilia 2 awaited. Plts trending down   No bleeding symptoms  Abd pain comntrolled  No active bleeding. No fever or chills. Started on Coumadin. INR 1.5    BRIEF CASE HISTORY:    The patient is a 47 y.o.  female who is admitted to the hospital for   for increased confusion and abdominal pain. She was found to have ischemic bowel and was taken to surgery on 4/6/18. Pathology showed ischemic bowel going to the margins. She was taken for a second surgery on 4/8/18 and more ischemic bowel was appreciated. The patient platelets were about 300,000 on admission and dropped about 98 with her multiple surgeries. Hit antibody was done and was negative. Platelets recovered since then. The patient continues to struggle with recovery. She underwent a CT scan of the abdomen and pelvis today that showed heterogenous changes in the spleen that the new. There was a suspicion of splenic infarction. Vascular were involved and started the patient in aspirin and Plavix as well as heparin. The patient is seen and examined. She is started on anticoagulation. She denies any active bleeding. She has very little insight about her disease and most of the information were obtained from the chart. It is no history of thromboembolism previously . family history is negative for arterial or venous thrombosis. The patient developed worsening renal failure and was started on hemodialysis. Medications:    Reviewed in EPIC     Allergies:  Pcn [penicillins]; Sulfa antibiotics; and Tape [adhesive tape]    REVIEW OF SYSTEMS:    Review of Systems  General: no fever or night sweats, Weight is stable. 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]     Leukocytosis [D72.829]     Mottled skin [L81.9]     Pleural effusion, bilateral [J90]     Protein-calorie malnutrition ([address] [E46]     Septic shock ([address] [A41.9, R65.21]     Gastroenteritis [K52.9]     Haemophilus influenzae septicemia ([address] [A41.3]     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[address] [K55.049] 04/07/2019    Ischemic bowel syndrome ([address] [K55.9] 04/07/2019    Acute GI bleeding [K92.2] 04/07/2019    Gram negative septic shock ([address] [A41.50, R65.21]     Rhabdomyolysis [M62.82] 04/06/2019    Hyponatremia [E87.1] 04/06/2019    Lactic acidosis [E87.2] 04/06/2019    MARY (acute kidney injury) ([address] [N17.9] 02/68/5410    Metabolic acidosis [M72.7] 04/06/2019    Acute respiratory failure ([address] [J96.00] 04/06/2019    Elevated liver enzymes [R74.8] 04/06/2019    Hyperkalemia [E87.5]     Shock ([address] [R57.9] 04/05/2019     RECOMMENDATIONS:  The patient is doing well clinically stable. Coumadin. Target INR between 2 and 3. She will need long-term use of anticoagulation. Leukocytosis and thrombocytosis are likely reactive. However given high plts in the pasta nd use ?hydrea in thye past, BM pathology needs to be ruled out  Awaiting  VOLODYMYR 2 Gene mutation. Anemia. Stable   will follow      José Miguel Sanchez MD  Hematologist/Medical Oncologist    Cell: [phone]      This note is created with the assistance of a speech recognition program.  While intending to generate a document that actually reflects the content of the visit, the document can still have some errors including those of syntax and sound a like substitutions which may escape proof reading. It such instances, actual meaning can be extrapolated by contextual diversion.

## 2019-05-02 NOTE — PROGRESS NOTES
Therapy Time   Individual Concurrent Group Co-treatment   Time In 1133         Time Out 1212         Minutes [id number] [place], [place]

## 2019-05-02 NOTE — PROGRESS NOTES
dosing (placeholder)   Other RX Placeholder    loperamide  2 mg Oral TID WC    midodrine  5 mg Oral TID WC    metoprolol tartrate  25 mg Oral BID    aspirin  81 mg Oral Daily    cyclobenzaprine  5 mg Oral TID    pantoprazole  40 mg Oral BID AC    sodium chloride flush  10 mL Intravenous 2 times per day       PRN Medications  heparin (porcine) 4,000 Units PRN   heparin (porcine) 2,000 Units PRN   ALPRAZolam 0.5 mg BID PRN   sodium chloride 250 mL PRN   sodium chloride 150 mL PRN   heparin (porcine) 1,600 Units PRN   heparin (porcine) 1,600 Units PRN   sodium chloride 250 mL PRN   sodium chloride 150 mL PRN   acetaminophen 650 mg Q4H PRN   glucose 15 g PRN   dextrose 12.5 g PRN   glucagon (rDNA) 1 mg PRN   dextrose 100 mL/hr PRN   oxyCODONE 5 mg Q4H PRN   morphine 4 mg Q3H PRN   Or     morphine 6 mg Q3H PRN   sodium chloride 250 mL PRN   sodium chloride 150 mL PRN   dextrose 25 g PRN   sodium chloride flush 10 mL PRN   magnesium hydroxide 30 mL Daily PRN   ondansetron 4 mg Q6H PRN       Diagnostic Labs and Imaging:  CBC:  Recent Labs     04/30/19  1411 05/01/19  0618 05/02/19  0640   WBC 22.5* 24.3* 24.5*   HGB 9.1* 9.7* 9.0*   * 647* 713*     BMP: Recent Labs     04/30/19  0628 05/01/19  0618 05/02/19  0640    135 134*   K 3.3* 4.6 3.8   CL 95* 95* 91*   CO2 25 24 23   BUN 22* 20 23*   CREATININE 2.49* 2.06* 2.01*   GLUCOSE 85 81 103*     Hepatic: Recent Labs     05/01/19  0618   AST 68*   ALT 48*   BILITOT 0.36   ALKPHOS 270*       Assessment and Plan:     Principal Problem:    Septic shock ([facility] [facility]  Active Problems:    Shock ([facility] [facility]    Rhabdomyolysis    Hyponatremia    Lactic acidosis    MARY (acute kidney injury) ([facility] [facility]    Metabolic acidosis    Acute respiratory failure (HCC)    Elevated liver enzymes    Hyperkalemia    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HCC)    Ischemic bowel syndrome (HCC)    Acute GI bleeding    Gram negative septic shock (HCC)    Protein-calorie malnutrition ([facility] [facility] Gastroenteritis    Haemophilus influenzae septicemia (HCC)    Pleural effusion, bilateral    Splenic infarct    Leukocytosis    Mottled skin    Acute renal failure (HCC)    Altered mental status    PMB (postmenopausal bleeding)    Thrombocytosis (HCC)  Resolved Problems:    * No resolved hospital problems. *    · Levofloxacin discontinued.  Patient completed a course of meropenem.  Continue to monitor patient off antibiotics.   · Transvaginal ultrasound was normal.  ObGyn plans to follow the patient outpatient. · Patient stable from cardiology stand point. · Continue Midodrine for low BP if needed  · Pain control with Morphine and Roxicodone PRN  · Continue aspirin.   · Hypercoagulable workup negative. · Started nystatin for oral thrush.    · Continue renal diet  · Patient got dialysis catheter removed.  No need for outpatient analysis. · Started Imodium for ostomy out put   Imodium 2 mg 3 times a day with meals. Started patient on Metamucil as well. Continue Pedialyte. · Patient's INR is 1.5 this morning.  We will start Coumadin.  INR goal 2-3.  Pharmacy to dose Coumadin. Started heparin drip for bridging. · Dietitian consult regarding counseling and dietary habits for stoma. · Continue Bumex 1 mg orally daily  · BMP every week for 2 weeks and then follow up in 3-4 weeks with nephrology as outpatient. · Discharge planning.  The patient will need a new preset for [facility]. The patient has been cleared by Nephrology and ID for discharge. Rajendra Bustamante MD  PGY-1, Department of Internal Medicine  [address] [place] [address]  5/2/2019 1:11 PM    Attending Physician Statement  I have discussed the care of Brady Ham, including pertinent history and exam findings,  with the resident. I have seen and examined the patient and the key elements of all parts of the encounter have been performed by me.   I agree with the assessment, plan and orders as documented by the resident with additions . Add metamucil   Cont imodium   Stoma care education   Dietary education   Use pedialyte for hydration       Treatment plan Discussed with nursing staff in detail , all questions answered . Electronically signed by Yaniv Triana MD on   5/2/19 at 3:59 PM    Please note that this chart was generated using voice recognition Dragon dictation software. Although every effort was made to ensure the accuracy of this automated transcription, some errors in transcription may have occurred.

## 2019-05-03 ENCOUNTER — APPOINTMENT (OUTPATIENT)
Dept: GENERAL RADIOLOGY | Age: 54
DRG: 710 | End: 2019-05-03
Payer: MEDICAID

## 2019-05-03 LAB
ABSOLUTE EOS #: 0.23 K/UL (ref 0–0.4)
ABSOLUTE EOS #: 0.54 K/UL (ref 0–0.4)
ABSOLUTE IMMATURE GRANULOCYTE: 0 K/UL (ref 0–0.3)
ABSOLUTE IMMATURE GRANULOCYTE: 0 K/UL (ref 0–0.3)
ABSOLUTE LYMPH #: 3.96 K/UL (ref 1–4.8)
ABSOLUTE LYMPH #: 6.26 K/UL (ref 1–4.8)
ABSOLUTE MONO #: 0.47 K/UL (ref 0.1–0.8)
ABSOLUTE MONO #: 1.36 K/UL (ref 0.1–0.8)
ANION GAP SERPL CALCULATED.3IONS-SCNC: 19 MMOL/L (ref 9–17)
ANION GAP SERPL CALCULATED.3IONS-SCNC: 20 MMOL/L (ref 9–17)
BASOPHILS # BLD: 0 % (ref 0–2)
BASOPHILS # BLD: 0 % (ref 0–2)
BASOPHILS ABSOLUTE: 0 K/UL (ref 0–0.2)
BASOPHILS ABSOLUTE: 0 K/UL (ref 0–0.2)
BUN BLDV-MCNC: 25 MG/DL (ref 6–20)
BUN BLDV-MCNC: 25 MG/DL (ref 6–20)
BUN/CREAT BLD: ABNORMAL (ref 9–20)
BUN/CREAT BLD: ABNORMAL (ref 9–20)
CALCIUM SERPL-MCNC: 8.5 MG/DL (ref 8.6–10.4)
CALCIUM SERPL-MCNC: 9 MG/DL (ref 8.6–10.4)
CHLORIDE BLD-SCNC: 90 MMOL/L (ref 98–107)
CHLORIDE BLD-SCNC: 91 MMOL/L (ref 98–107)
CO2: 25 MMOL/L (ref 20–31)
CO2: 26 MMOL/L (ref 20–31)
CREAT SERPL-MCNC: 1.76 MG/DL (ref 0.5–0.9)
CREAT SERPL-MCNC: 1.87 MG/DL (ref 0.5–0.9)
DIFFERENTIAL TYPE: ABNORMAL
DIFFERENTIAL TYPE: ABNORMAL
EOSINOPHILS RELATIVE PERCENT: 1 % (ref 1–4)
EOSINOPHILS RELATIVE PERCENT: 2 % (ref 1–4)
GFR AFRICAN AMERICAN: 34 ML/MIN
GFR AFRICAN AMERICAN: 37 ML/MIN
GFR NON-AFRICAN AMERICAN: 28 ML/MIN
GFR NON-AFRICAN AMERICAN: 30 ML/MIN
GFR SERPL CREATININE-BSD FRML MDRD: ABNORMAL ML/MIN/{1.73_M2}
GLUCOSE BLD-MCNC: 109 MG/DL (ref 70–99)
GLUCOSE BLD-MCNC: 89 MG/DL (ref 70–99)
HCT VFR BLD CALC: 27.4 % (ref 36.3–47.1)
HCT VFR BLD CALC: 29.9 % (ref 36.3–47.1)
HEMOGLOBIN: 8.5 G/DL (ref 11.9–15.1)
HEMOGLOBIN: 9.7 G/DL (ref 11.9–15.1)
IMMATURE GRANULOCYTES: 0 %
IMMATURE GRANULOCYTES: 0 %
INR BLD: 2
LYMPHOCYTES # BLD: 17 % (ref 24–44)
LYMPHOCYTES # BLD: 23 % (ref 24–44)
MCH RBC QN AUTO: 28.7 PG (ref 25.2–33.5)
MCH RBC QN AUTO: 28.9 PG (ref 25.2–33.5)
MCHC RBC AUTO-ENTMCNC: 31 G/DL (ref 28.4–34.8)
MCHC RBC AUTO-ENTMCNC: 32.4 G/DL (ref 28.4–34.8)
MCV RBC AUTO: 89 FL (ref 82.6–102.9)
MCV RBC AUTO: 92.6 FL (ref 82.6–102.9)
MONOCYTES # BLD: 2 % (ref 1–7)
MONOCYTES # BLD: 5 % (ref 1–7)
MORPHOLOGY: ABNORMAL
MORPHOLOGY: ABNORMAL
NRBC AUTOMATED: 0 PER 100 WBC
NRBC AUTOMATED: 0 PER 100 WBC
PARTIAL THROMBOPLASTIN TIME: 49.1 SEC (ref 20.5–30.5)
PDW BLD-RTO: 15 % (ref 11.8–14.4)
PDW BLD-RTO: 15.4 % (ref 11.8–14.4)
PLATELET # BLD: 643 K/UL (ref 138–453)
PLATELET # BLD: 728 K/UL (ref 138–453)
PLATELET ESTIMATE: ABNORMAL
PLATELET ESTIMATE: ABNORMAL
PMV BLD AUTO: 9.2 FL (ref 8.1–13.5)
PMV BLD AUTO: 9.3 FL (ref 8.1–13.5)
POTASSIUM SERPL-SCNC: 3.9 MMOL/L (ref 3.7–5.3)
POTASSIUM SERPL-SCNC: 4 MMOL/L (ref 3.7–5.3)
PROTHROMBIN TIME: 20.3 SEC (ref 9–12)
RBC # BLD: 2.96 M/UL (ref 3.95–5.11)
RBC # BLD: 3.36 M/UL (ref 3.95–5.11)
RBC # BLD: ABNORMAL 10*6/UL
RBC # BLD: ABNORMAL 10*6/UL
SEG NEUTROPHILS: 70 % (ref 36–66)
SEG NEUTROPHILS: 80 % (ref 36–66)
SEGMENTED NEUTROPHILS ABSOLUTE COUNT: 18.64 K/UL (ref 1.8–7.7)
SEGMENTED NEUTROPHILS ABSOLUTE COUNT: 19.04 K/UL (ref 1.8–7.7)
SODIUM BLD-SCNC: 135 MMOL/L (ref 135–144)
SODIUM BLD-SCNC: 136 MMOL/L (ref 135–144)
WBC # BLD: 23.3 K/UL (ref 3.5–11.3)
WBC # BLD: 27.2 K/UL (ref 3.5–11.3)
WBC # BLD: ABNORMAL 10*3/UL
WBC # BLD: ABNORMAL 10*3/UL

## 2019-05-03 PROCEDURE — 6370000000 HC RX 637 (ALT 250 FOR IP): Performed by: STUDENT IN AN ORGANIZED HEALTH CARE EDUCATION/TRAINING PROGRAM

## 2019-05-03 PROCEDURE — 76937 US GUIDE VASCULAR ACCESS: CPT

## 2019-05-03 PROCEDURE — 2060000000 HC ICU INTERMEDIATE R&B

## 2019-05-03 PROCEDURE — 94762 N-INVAS EAR/PLS OXIMTRY CONT: CPT

## 2019-05-03 PROCEDURE — 94640 AIRWAY INHALATION TREATMENT: CPT

## 2019-05-03 PROCEDURE — 99232 SBSQ HOSP IP/OBS MODERATE 35: CPT | Performed by: INTERNAL MEDICINE

## 2019-05-03 PROCEDURE — 6370000000 HC RX 637 (ALT 250 FOR IP): Performed by: OTOLARYNGOLOGY

## 2019-05-03 PROCEDURE — 80048 BASIC METABOLIC PNL TOTAL CA: CPT

## 2019-05-03 PROCEDURE — 2580000003 HC RX 258: Performed by: STUDENT IN AN ORGANIZED HEALTH CARE EDUCATION/TRAINING PROGRAM

## 2019-05-03 PROCEDURE — 6360000002 HC RX W HCPCS: Performed by: STUDENT IN AN ORGANIZED HEALTH CARE EDUCATION/TRAINING PROGRAM

## 2019-05-03 PROCEDURE — 99212 OFFICE O/P EST SF 10 MIN: CPT

## 2019-05-03 PROCEDURE — 94760 N-INVAS EAR/PLS OXIMETRY 1: CPT

## 2019-05-03 PROCEDURE — 36415 COLL VENOUS BLD VENIPUNCTURE: CPT

## 2019-05-03 PROCEDURE — 88312 SPECIAL STAINS GROUP 1: CPT

## 2019-05-03 PROCEDURE — 97116 GAIT TRAINING THERAPY: CPT

## 2019-05-03 PROCEDURE — 85025 COMPLETE CBC W/AUTO DIFF WBC: CPT

## 2019-05-03 PROCEDURE — 99233 SBSQ HOSP IP/OBS HIGH 50: CPT | Performed by: INTERNAL MEDICINE

## 2019-05-03 PROCEDURE — 6370000000 HC RX 637 (ALT 250 FOR IP): Performed by: INTERNAL MEDICINE

## 2019-05-03 PROCEDURE — 6370000000 HC RX 637 (ALT 250 FOR IP): Performed by: RADIOLOGY

## 2019-05-03 PROCEDURE — 88305 TISSUE EXAM BY PATHOLOGIST: CPT

## 2019-05-03 PROCEDURE — 97110 THERAPEUTIC EXERCISES: CPT

## 2019-05-03 PROCEDURE — 71046 X-RAY EXAM CHEST 2 VIEWS: CPT

## 2019-05-03 PROCEDURE — 85610 PROTHROMBIN TIME: CPT

## 2019-05-03 PROCEDURE — 0CB7XZX EXCISION OF TONGUE, EXTERNAL APPROACH, DIAGNOSTIC: ICD-10-PCS | Performed by: OTOLARYNGOLOGY

## 2019-05-03 PROCEDURE — 85730 THROMBOPLASTIN TIME PARTIAL: CPT

## 2019-05-03 RX ORDER — CHLORHEXIDINE GLUCONATE 0.12 MG/ML
15 RINSE ORAL 2 TIMES DAILY
Status: DISCONTINUED | OUTPATIENT
Start: 2019-05-03 | End: 2019-05-03

## 2019-05-03 RX ORDER — CHLORHEXIDINE GLUCONATE 0.12 MG/ML
15 RINSE ORAL 2 TIMES DAILY
Status: DISCONTINUED | OUTPATIENT
Start: 2019-05-03 | End: 2019-05-04 | Stop reason: HOSPADM

## 2019-05-03 RX ADMIN — IPRATROPIUM BROMIDE 2 PUFF: 17 AEROSOL, METERED RESPIRATORY (INHALATION) at 17:23

## 2019-05-03 RX ADMIN — CHLORHEXIDINE GLUCONATE 0.12% ORAL RINSE 15 ML: 1.2 LIQUID ORAL at 15:46

## 2019-05-03 RX ADMIN — CYCLOBENZAPRINE 5 MG: 10 TABLET, FILM COATED ORAL at 20:57

## 2019-05-03 RX ADMIN — CYCLOBENZAPRINE 5 MG: 10 TABLET, FILM COATED ORAL at 15:49

## 2019-05-03 RX ADMIN — IPRATROPIUM BROMIDE 2 PUFF: 17 AEROSOL, METERED RESPIRATORY (INHALATION) at 20:24

## 2019-05-03 RX ADMIN — PANTOPRAZOLE SODIUM 40 MG: 40 TABLET, DELAYED RELEASE ORAL at 08:54

## 2019-05-03 RX ADMIN — MOMETASONE FUROATE AND FORMOTEROL FUMARATE DIHYDRATE 2 PUFF: 200; 5 AEROSOL RESPIRATORY (INHALATION) at 20:24

## 2019-05-03 RX ADMIN — MORPHINE SULFATE 4 MG: 4 INJECTION INTRAVENOUS at 22:53

## 2019-05-03 RX ADMIN — CYCLOBENZAPRINE 5 MG: 10 TABLET, FILM COATED ORAL at 08:54

## 2019-05-03 RX ADMIN — OXYCODONE HYDROCHLORIDE 5 MG: 5 TABLET ORAL at 23:59

## 2019-05-03 RX ADMIN — BUMETANIDE 1 MG: 1 TABLET ORAL at 08:53

## 2019-05-03 RX ADMIN — PSYLLIUM HUSK 1 PACKET: 3.4 POWDER ORAL at 08:54

## 2019-05-03 RX ADMIN — METOPROLOL TARTRATE 25 MG: 25 TABLET ORAL at 20:57

## 2019-05-03 RX ADMIN — LOPERAMIDE HYDROCHLORIDE 4 MG: 1 SOLUTION ORAL at 15:46

## 2019-05-03 RX ADMIN — PSYLLIUM HUSK 1 PACKET: 3.4 POWDER ORAL at 15:49

## 2019-05-03 RX ADMIN — PSYLLIUM HUSK 1 PACKET: 3.4 POWDER ORAL at 20:55

## 2019-05-03 RX ADMIN — MORPHINE SULFATE 4 MG: 4 INJECTION INTRAVENOUS at 08:47

## 2019-05-03 RX ADMIN — HEPARIN SODIUM AND DEXTROSE 16 UNITS/KG/HR: 10000; 5 INJECTION INTRAVENOUS at 08:57

## 2019-05-03 RX ADMIN — ASPIRIN 81 MG: 81 TABLET, CHEWABLE ORAL at 08:54

## 2019-05-03 RX ADMIN — MIDODRINE HYDROCHLORIDE 5 MG: 5 TABLET ORAL at 08:53

## 2019-05-03 RX ADMIN — LOPERAMIDE HYDROCHLORIDE 4 MG: 1 SOLUTION ORAL at 20:59

## 2019-05-03 RX ADMIN — IPRATROPIUM BROMIDE 2 PUFF: 17 AEROSOL, METERED RESPIRATORY (INHALATION) at 07:47

## 2019-05-03 RX ADMIN — ACETAMINOPHEN 650 MG: 325 TABLET ORAL at 06:31

## 2019-05-03 RX ADMIN — OXYCODONE HYDROCHLORIDE 5 MG: 5 TABLET ORAL at 16:05

## 2019-05-03 RX ADMIN — LOPERAMIDE HYDROCHLORIDE 2 MG: 2 CAPSULE ORAL at 10:25

## 2019-05-03 RX ADMIN — Medication 10 ML: at 20:59

## 2019-05-03 RX ADMIN — LOPERAMIDE HYDROCHLORIDE 2 MG: 2 CAPSULE ORAL at 08:54

## 2019-05-03 RX ADMIN — ALPRAZOLAM 0.5 MG: 0.5 TABLET ORAL at 21:01

## 2019-05-03 RX ADMIN — WARFARIN SODIUM 2.5 MG: 2.5 TABLET ORAL at 17:12

## 2019-05-03 RX ADMIN — METOPROLOL TARTRATE 25 MG: 25 TABLET ORAL at 08:53

## 2019-05-03 RX ADMIN — OXYCODONE HYDROCHLORIDE 5 MG: 5 TABLET ORAL at 06:31

## 2019-05-03 RX ADMIN — PANTOPRAZOLE SODIUM 40 MG: 40 TABLET, DELAYED RELEASE ORAL at 15:50

## 2019-05-03 ASSESSMENT — PAIN SCALES - GENERAL
PAINLEVEL_OUTOF10: 6
PAINLEVEL_OUTOF10: 7
PAINLEVEL_OUTOF10: 7
PAINLEVEL_OUTOF10: 6
PAINLEVEL_OUTOF10: 0
PAINLEVEL_OUTOF10: 5
PAINLEVEL_OUTOF10: 6
PAINLEVEL_OUTOF10: 6

## 2019-05-03 NOTE — PROGRESS NOTES
Pharmacy Note  Warfarin Consult follow-up      Recent Labs     05/03/19  0855   INR 2.0     Recent Labs     05/01/19  0618 05/02/19  0640 05/03/19  0855   HGB 9.7* 9.0* 8.5*   HCT 29.0* 28.5* 27.4*   * 713* 643*       Significant Drug-Drug Interactions:  New warfarin drug-drug interactions: None  Discontinued drug-drug interactions: None  Current warfarin drug-drug interactions: acetaminophen, aspirin, heparin drip    Date INR Dose   4/24/19 2.4 2.5 mg   4/25/19 2.4 2.5 mg   4/26/19 2.8 2 mg   4/27/19 3.9 None   4/28/19 3.8 None   4/29/19 1.5 None   4/30/19 1.5 2 mg   5/1/19 1.5 2 mg   5/2/19 1.5 2.5 mg   5/3/19 2.0 2.5 mg     Notes: INR therapeutic, will continue 2.5 mg dose today. Daily PT/INR while inpatient.

## 2019-05-03 NOTE — PROGRESS NOTES
Nutrition Assessment    Type and Reason for Visit: Reassess    Nutrition Recommendations: Recommend continue current diet and change supplements to Ensure Clear all trays. Nutrition Assessment: pt is progressing. Renal labs improved. See plans. Malnutrition Assessment:  · Malnutrition Status: No malnutrition  · Context: Acute illness or injury  · Findings of the 6 clinical characteristics of malnutrition (Minimum of 2 out of 6 clinical characteristics is required to make the diagnosis of moderate or severe Protein Calorie Malnutrition based on AND/ASPEN Guidelines):  1. Energy Intake-Less than or equal to 75% of estimated energy requirement, Greater than or equal to 7 days    2. Weight Loss-Unable to assess,    3. Fat Loss-No significant subcutaneous fat loss,    4. Muscle Loss-No significant muscle mass loss,    5. Fluid Accumulation-(Mild to moderate fluid accumulation), Extremities, Generalized  6.  Strength-Not measured    Nutrition Risk Level: Moderate    Nutrient Needs:  · Estimated Daily Total Kcal: 7910-2675 kcal/day  · Estimated Daily Protein (g): 70-95 g pro/day     Nutrition Diagnosis:   · Problem: Inadequate oral intake  · Etiology: related to (recent GI surgery)     Signs and symptoms:  as evidenced by Intake 50-75%    Objective Information:  · Nutrition-Focused Physical Findings: Ileostomy.   · Wound Type: Surgical Wound  · Current Nutrition Therapies:  · Oral Diet Orders: No Added Salt (3-4gm), Dental Soft, Fluid Restriction   · Oral Diet intake: 51-75%  · Oral Nutrition Supplement (ONS) Orders: Renal Oral Supplement  · ONS intake: 1-25%  · Anthropometric Measures:  · Ht: 5' 1.02\" (155 cm)   · Current Body Wt: 156 lb (70.8 kg)  · Admission Body Wt: 173 lb 15.1 oz (78.9 kg)  · % Weight Change:  ,  Weight fluctuations noted - may be due to fluid shifts from dialysis  · Ideal Body Wt: 105 lb 13.1 oz (48 kg), % Ideal Body 165% (adm/ideal)  · BMI Classification: BMI 25.0 - 29.9

## 2019-05-03 NOTE — PROGRESS NOTES
Physical Therapy  Facility/Department: [facility] 4B STEPDOWN  Daily Treatment Note  NAME: Mckenna Wilhelm  : 1965  MRN: [id number]    Date of Service: 5/3/2019    Discharge Recommendations:  Further therapy recommended at discharge. Patient Diagnosis(es): The primary encounter diagnosis was Acute renal failure, unspecified acute renal failure type ([facility] [address] Diagnoses of Altered mental status, unspecified altered mental status type and MARY (acute kidney injury) ([facility] [address] were also pertinent to this visit. has a past medical history of Asthma, Back pain, chronic, Hypertension, Snores, and Wears glasses. has a past surgical history that includes Tonsillectomy; Knee arthroscopy (Left, ); Ulnar tunnel release (Right, ); Knee arthroscopy (Left, 2016); LAPAROTOMY EXPLORATORY (N/A, 2019); and LAPAROTOMY EXPLORATORY (N/A, 2019). Restrictions  Restrictions/Precautions  Restrictions/Precautions: Fall Risk, Contact Precautions  Required Braces or Orthoses?: No  Position Activity Restriction  Other position/activity restrictions: up with assist. s/p  ILEOCECECTOMY, SUBTOTAL COLECTOMY ; s/p 2ND LOOK EXPLORATORY LAPAROTOMY with ileostomy creation   Subjective   General  Response To Previous Treatment: Patient with no complaints from previous session.   Family / Caregiver Present: No  Subjective  Subjective: RN and pt agreed to PT< pt awake in bed upon arrival and just finished taking a shower  Pain Screening  Patient Currently in Pain: Yes  Vital Signs  Patient Currently in Pain: Yes       Orientation  Orientation  Overall Orientation Status: Within Normal Limits    Objective   Bed mobility  Rolling to Left: Modified independent  Rolling to Right: Modified independent  Supine to Sit: Modified independent  Scooting: Modified independent  Transfers  Sit to Stand: Stand by assistance  Stand to sit: Stand by assistance  Ambulation  Ambulation?: Yes  Ambulation 1  Surface: level tile  Device: [address]: Stand by assistance  Quality of Gait: Very slight forward flexed posture, wide MARGI  Distance: 300ft  Stairs/Curb  Stairs?: No     Balance  Posture: Fair  Sitting - Static: Good  Sitting - Dynamic: Good;-  Standing - Static: Good;-  Standing - Dynamic: Fair;+  Comments: pt sat EOB x 5mins SBA   Exercises  Seated LE exercise program: Long Arc Quads, hip abduction/adduction, heel/toe raises, and marches. Reps:x 20 standing marches x 8 HHA           Assessment   Body structures, Functions, Activity limitations: Decreased functional mobility ; Decreased strength;Decreased balance;Decreased safe awareness;Decreased endurance  Assessment: Patient demonstrated increased tolerance and independence with functional mobility. Further progressed exercises to increase strength.    Prognosis: Good  Patient Education: Yuri Lackey PT FOLLOW UP: Yes  Activity Tolerance  Activity Tolerance: Patient Tolerated treatment well                Goals  Short term goals  Time Frame for Short term goals: 14 visits  Short term goal 1: Pt will be Betty with bed mobility  Short term goal 2: Pt will be Betty transfers  Short term goal 3: Pt will be SBA amb 125' RW   Short term goal 4: Pt will be safe to attempt steps    Plan    Plan  Times per week: 5-6x/wk  Current Treatment Recommendations: Strengthening, Balance Training, Functional Mobility Training, Transfer Training, Endurance Training, Cognitive Reorientation, Gait Training, Stair training, Home Exercise Program, Safety Education & Training, Equipment Evaluation, Education, & procurement, Patient/Caregiver Education & Training  Safety Devices  Type of devices: Call light within reach, Gait belt, Nurse notified, Left in chair  Restraints  Initially in place: No     Therapy Time   Individual Concurrent Group Co-treatment   Time In 1625         Time Out 1650         [facility]

## 2019-05-03 NOTE — CONSULTS
ABDOMINAL WALL CLOSURE performed by Latisha Aldrich MD at [address] Right 2013          Medications: Current Facility-Administered Medications: loperamide (IMODIUM) 1 MG/5ML solution 4 mg, 4 mg, Oral, TID  warfarin (COUMADIN) tablet 2.5 mg, 2.5 mg, Oral, Daily  psyllium (METAMUCIL) 58.12 % packet 1 packet, 1 packet, Oral, TID  nicotine (NICODERM CQ) 7 MG/24HR 1 patch, 1 patch, Transdermal, Daily  ipratropium (ATROVENT HFA) 17 MCG/ACT inhaler 2 puff, 2 puff, Inhalation, 4x daily  mometasone-formoterol (DULERA) 200-5 MCG/ACT inhaler 2 puff, 2 puff, Inhalation, BID **AND** [CANCELED] MDI Treatment, , , BID  bumetanide (BUMEX) tablet 1 mg, 1 mg, Oral, Daily  warfarin (COUMADIN) daily dosing (placeholder), , Other, RX Placeholder  ALPRAZolam (XANAX) tablet 0.5 mg, 0.5 mg, Oral, BID PRN  midodrine (PROAMATINE) tablet 5 mg, 5 mg, Oral, TID WC  metoprolol tartrate (LOPRESSOR) tablet 25 mg, 25 mg, Oral, BID  0.9 % sodium chloride bolus, 250 mL, Intravenous, PRN  0.9 % sodium chloride bolus, 150 mL, Intravenous, PRN  0.9 % sodium chloride bolus, 250 mL, Intravenous, PRN  0.9 % sodium chloride bolus, 150 mL, Intravenous, PRN  acetaminophen (TYLENOL) tablet 650 mg, 650 mg, Oral, Q4H PRN  glucose (GLUTOSE) 40 % oral gel 15 g, 15 g, Oral, PRN  dextrose 50 % solution 12.5 g, 12.5 g, Intravenous, PRN  glucagon (rDNA) injection 1 mg, 1 mg, Intramuscular, PRN  dextrose 5 % solution, 100 mL/hr, Intravenous, PRN  aspirin chewable tablet 81 mg, 81 mg, Oral, Daily  oxyCODONE (ROXICODONE) immediate release tablet 5 mg, 5 mg, Oral, Q4H PRN  cyclobenzaprine (FLEXERIL) tablet 5 mg, 5 mg, Oral, TID  morphine injection 4 mg, 4 mg, Intravenous, Q3H PRN **OR** morphine injection 6 mg, 6 mg, Intravenous, Q3H PRN  0.9 % sodium chloride bolus, 250 mL, Intravenous, PRN  0.9 % sodium chloride bolus, 150 mL, Intravenous, PRN  pantoprazole (PROTONIX) tablet 40 mg, 40 mg, Oral, BID AC  dextrose 50 % solution 25 g, 25 g, Intravenous, PRN  sodium chloride flush 0.9 % injection 10 mL, 10 mL, Intravenous, 2 times per day  sodium chloride flush 0.9 % injection 10 mL, 10 mL, Intravenous, PRN  magnesium hydroxide (MILK OF MAGNESIA) 400 MG/5ML suspension 30 mL, 30 mL, Oral, Daily PRN  ondansetron (ZOFRAN) injection 4 mg, 4 mg, Intravenous, Q6H PRN     Allergies:      Pcn [penicillins]; Sulfa antibiotics; and Tape Elwyn Alpers tape]    Social History:    Social History     Socioeconomic History    Marital status: Single     Spouse name: None    Number of children: 0    Years of education: None    Highest education level: None   Occupational History    Occupation: [address] [place] [address] Financial resource strain: None    Food insecurity:     Worry: None     Inability: None    Transportation needs:     Medical: None     Non-medical: None   Tobacco Use    Smoking status: Light Tobacco Smoker     Packs/day: 0.25     Types: Cigarettes     Start date: 9/19/1980    Smokeless tobacco: Never Used   Substance and Sexual Activity    Alcohol use: Yes     Comment: OCCASSIONAL    Drug use: No    Sexual activity: Yes     Partners: Female   Lifestyle    Physical activity:     Days per week: None     Minutes per session: None    Stress: None   Relationships    Social connections:     Talks on phone: None     Gets together: None     Attends Jewish service: None     Active member of club or organization: None     Attends meetings of clubs or organizations: None     Relationship status: None    Intimate partner violence:     Fear of current or ex partner: None     Emotionally abused: None     Physically abused: None     Forced sexual activity: None   Other Topics Concern    None   Social History Narrative    None       Family   History:       Problem Relation Age of Onset    Heart Disease Mother     Stroke Mother     Heart Surgery Mother     Diabetes Maternal Grandmother     Emphysema Maternal and symmetric, lungs CTA  CV: Heart RRR  MUSCULOSKELETAL:      There   is   no   redness,   warmth,   or   swelling   of   the   joints. Full   range   of motion   noted. Motor   strength   is   5   out   of   5   all   extremities   bilaterally. Tone   is   normal.   NEUROLOGIC:      Awake,   alert,   oriented   to   name,   place   and   time. Cranial   nerves   II-XII   are grossly   intact. Motor   is   5   out   of   5   bilaterally. Sensory   is   Intact.   PSYCH: Normal Mood and affect  SKIN: Normal turgor, no rash      Electronically   signed   by   Paola Ruiz MD   on   5/3/2019   at   12:05 PM

## 2019-05-03 NOTE — PLAN OF CARE
misperception frequency  Outcome: Ongoing  Goal: Able to refrain from responding to false sensory perceptions  Description  Able to refrain from responding to false sensory perceptions  Outcome: Ongoing  Goal: Demonstrates accurate environmental perceptions  Description  Demonstrates accurate environmental perceptions  Outcome: Ongoing  Goal: Able to distinguish between reality-based and nonreality-based thinking  Description  Able to distinguish between reality-based and nonreality-based thinking  Outcome: Ongoing  Goal: Able to interrupt nonreality-based thinking  Description  Able to interrupt nonreality-based thinking  Outcome: Ongoing     Problem: Sleep Pattern Disturbance:  Goal: Appears well-rested  Description  Appears well-rested  Outcome: Ongoing     Problem: Musculor/Skeletal Functional Status  Goal: Highest potential functional level  Outcome: Ongoing

## 2019-05-03 NOTE — PROGRESS NOTES
SUBJECTIVE    Patient was seen and examined. Continues to feel better. Able to go to the bathroom by herself  Fever up to 38.1 noted    OBJECTIVE      CURRENT TEMPERATURE:  Temp: 100.6 °F (38.1 °C)  MAXIMUM TEMPERATURE OVER 24HRS:  Temp (24hrs), Av.6 °F (37.6 °C), Min:98.8 °F (37.1 °C), Max:100.6 °F (38.1 °C)    CURRENT RESPIRATORY RATE:  Resp: 20  CURRENT PULSE:  Pulse: 97  CURRENT BLOOD PRESSURE:  BP: 100/61  24HR BLOOD PRESSURE RANGE:  Systolic (40YFX), ENRIQUE:982 , Min:100 , JO   ; Diastolic (68VSE), AM, Min:61, Max:73    24HR INTAKE/OUTPUT:      Intake/Output Summary (Last 24 hours) at 5/3/2019 1049  Last data filed at 5/3/2019 4108  Gross per 24 hour   Intake 721 ml   Output 1000 ml   Net -279 ml     WEIGHT :  Patient Vitals for the past 96 hrs (Last 3 readings):   Weight   19 0620 156 lb 1.4 oz (70.8 kg)   19 0515 159 lb 6.3 oz (72.3 kg)   19 0315 165 lb 2 oz (74.9 kg)     PHYSICAL EXAM      GENERAL APPEARANCE: Alert and awake ×3  SKIN: Warm to touch  ENT: No thrush  NECK:  No carotid bruits .   PULMONARY: Bilateral air entry and clear to auscultation no crackles or wheezes  CADRDIOVASCULAR: S1 and S2 audible no S3  ABDOMEN: Soft and nontender EXTREMITIES: Trace edema    CURRENT MEDICATIONS        chlorhexidine (PERIDEX) 0.12 % solution 15 mL BID   loperamide (IMODIUM) 1 MG/5ML solution 4 mg TID   warfarin (COUMADIN) tablet 2.5 mg Daily   psyllium (METAMUCIL) 58.12 % packet 1 packet TID   nicotine (NICODERM CQ) 7 MG/24HR 1 patch Daily   ipratropium (ATROVENT HFA) 17 MCG/ACT inhaler 2 puff 4x daily   mometasone-formoterol (DULERA) 200-5 MCG/ACT inhaler 2 puff BID   bumetanide (BUMEX) tablet 1 mg Daily   warfarin (COUMADIN) daily dosing (placeholder) RX Placeholder   ALPRAZolam (XANAX) tablet 0.5 mg BID PRN   midodrine (PROAMATINE) tablet 5 mg TID WC   metoprolol tartrate (LOPRESSOR) tablet 25 mg BID   0.9 % sodium chloride bolus PRN   0.9 % sodium chloride bolus PRN   0.9 % sodium chloride bolus PRN   0.9 % sodium chloride bolus PRN   acetaminophen (TYLENOL) tablet 650 mg Q4H PRN   glucose (GLUTOSE) 40 % oral gel 15 g PRN   dextrose 50 % solution 12.5 g PRN   glucagon (rDNA) injection 1 mg PRN   dextrose 5 % solution PRN   aspirin chewable tablet 81 mg Daily   oxyCODONE (ROXICODONE) immediate release tablet 5 mg Q4H PRN   cyclobenzaprine (FLEXERIL) tablet 5 mg TID   morphine injection 4 mg Q3H PRN   Or    morphine injection 6 mg Q3H PRN   0.9 % sodium chloride bolus PRN   0.9 % sodium chloride bolus PRN   pantoprazole (PROTONIX) tablet 40 mg BID AC   dextrose 50 % solution 25 g PRN   sodium chloride flush 0.9 % injection 10 mL 2 times per day   sodium chloride flush 0.9 % injection 10 mL PRN   magnesium hydroxide (MILK OF MAGNESIA) 400 MG/5ML suspension 30 mL Daily PRN   ondansetron (ZOFRAN) injection 4 mg Q6H PRN         LABS      CBC:   Recent Labs     05/01/19 0618 05/02/19  0640 05/03/19  0855   WBC 24.3* 24.5* 23.3*   RBC 3.30* 3.13* 2.96*   HGB 9.7* 9.0* 8.5*   HCT 29.0* 28.5* 27.4*   MCV 87.9 91.1 92.6   MCH 29.4 28.8 28.7   MCHC 33.4 31.6 31.0   RDW 15.1* 15.3* 15.4*   * 713* 643*   MPV 8.7 9.0 9.2      BMP:   Recent Labs     05/01/19 0618 05/02/19  0640 05/03/19  0855    134* 136   K 4.6 3.8 4.0   CL 95* 91* 91*   CO2 24 23 25   BUN 20 23* 25*   CREATININE 2.06* 2.01* 1.87*   GLUCOSE 81 103* 109*   CALCIUM 8.4* 8.4* 8.5*    PHOSPHORUS:    No results for input(s): PHOS in the last 72 hours. MAGNESIUM:   No results for input(s): MG in the last 72 hours. ALBUMIN:   Recent Labs     05/01/19 0618   LABALBU 3.0*       RADIOLOGY      Reviewed as available. ASSESSMENT    1. Acute kidney injury patient was on dialysis via tunnel catheter. Renal function continues to improve 2. Volume overload improved  3. Fever workup is in progress  4. Anemia of chronic disease    PLAN      1. No changes in medication  2. Renal will sign off at this point  3.   Please call if you have any question    Please do not hesitate to call with questions.     Electronically signed by Juliette Sacks, MD on 5/3/2019 at 10:49 AM
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
## 2019-05-03 NOTE — PROGRESS NOTES
[address] [address] Continence Nurse  Consult Note       NAME:  Sarah Arambula RECORD NUMBER:  [id number]  AGE: 47 y.o. GENDER: female  : 1965  TODAY'S DATE:  5/3/2019    Subjective:     Reason for WOCN Evaluation and Assessment: ileostomy care and education, mid abdomen incision care. Johanny Sigala is a 47 y.o. female referred by:   [x] Physician  [] Nursing  [] Other:     Wound Identification:  Wound Type: surgical  Contributing Factors: decreased mobility and decreased tissue oxygenation        PAST MEDICAL HISTORY        Diagnosis Date    Asthma     On inhaler    Back pain, chronic     Hypertension     On Lisinopril    Snores     possible apnea but not tested    Wears glasses        PAST SURGICAL HISTORY    Past Surgical History:   Procedure Laterality Date    KNEE ARTHROSCOPY Left     KNEE ARTHROSCOPY Left 2016    with medial menisectomy    LAPAROTOMY EXPLORATORY N/A 2019    LAPAROTOMY EXPLORATORY, ILEOCECECTOMY, SUBTOTAL COLECTOMY, ABTHEHRA WOUND VAC PLACEMENT performed by Olivia Lynn MD at [address] [place] [address] N/A 2019    2ND LOOK EXPLORATORY LAPAROTOMY, RESECTION TRANSVERSE COLON, ILEOSTOMY CREATION, RESECTION RECTAL STUMP, ABDOMINAL WASHOUT, OMENTECTOMY, ABDOMINAL WALL CLOSURE performed by Mac Lui MD at [address] [place] [address]       FAMILY HISTORY    Family History   Problem Relation Age of Onset    Heart Disease Mother     Stroke Mother     Heart Surgery Mother     Diabetes Maternal Grandmother     Emphysema Maternal Grandfather     Diabetes Maternal Grandfather     Lung Cancer Maternal Uncle        SOCIAL HISTORY    Social History     Tobacco Use    Smoking status: Light Tobacco Smoker     Packs/day: 0.25     Types: Cigarettes     Start date: 1980    Smokeless tobacco: Never Used   Substance Use Topics    Alcohol use: Yes     Comment: OCCASSIONAL    Drug use:  No Novacovic  Ileostomy Type: Ileostomy  Location: RUQ   Stomal Appliance 1 piece; Changed   Stoma  Assessment Moist;Red;Flush  (1\" circular)   Mucocutaneous Junction Intact   Peristomal Assessment Red;Painful  (4-7 o'clock)   Treatment Bag change; Liquid skin barrier  (belt)   Stool Color Brown   Stool Appearance Watery   Output (mL) 75 ml   Incision 04/06/19 Abdomen   Date First Assessed/Time First Assessed: 04/06/19 1840   Location: Abdomen   Wound Assessment Clean;Pink;Granulation tissue   Karly-wound Assessment Dry; Intact   Closure None   Drainage Amount Small   Drainage Description Serosanguinous   Odor None   Dressing/Treatment Alginate with Ag;ABD   Dressing Changed Changed/New   Dressing Change Due 05/04/19     Abdominal wound healing very well. Decreasing in size. No s/sinfection. Assisted patient to shower today. Tongue wound reported. Photo obtained for chart at request of patient's RN. Treatment per ENT physician. Response to treatment:  Well tolerated by patient. Plan:     Plan of Care: Turn every 2 hours  Float heels off of bed with pillows under calves      Routine incontinence care with incontinence barrier cloths and zinc oxide cream. Apply zinc oxide cream BID and prn incontinence. Moisture wicking under pads vs cloth backed briefs  Encourage out of bed activity     Silver hydrofiber under ABD pad to the abdomen wound. Change at least every 48 hours and prn saturation     Routine ostomy care:   Convex pouching system with a belt. Please see the plan of care note dated 4/11/19 for home system recommendations. empty when 1/3 to 1/2 full  Change the pouch twice weekly.         Specialty Bed Required : Yes   [] Low Air Loss   [x] Pressure Redistribution  [] Fluid Immersion  [] Bariatric  [] Total Pressure Relief  [] Other:      Discharge Plan:   To SNF          Patient/Caregiver Teaching:    [] Indicates understanding       [] Needs reinforcement  [] Unsuccessful      [x] Verbal Understanding  [] Demonstrated understanding       [] No evidence of learning  [] Refused teaching         [] N/A       Electronically signed by Clark Flores RN, CWON on 5/3/2019 at 4:06 PM

## 2019-05-03 NOTE — PLAN OF CARE
Problem: NUTRITION  Goal: Nutritional status is improving  Outcome: Ongoing     Problem: Nutrition  Goal: Optimal nutrition therapy  Description  Nutrition Problem: Inadequate oral intake  Intervention: Food and/or Nutrient Delivery: Continue NPO  Nutritional Goals: Meet % of estimated nutrition needs   Outcome: Ongoing     Problem: Fluid Volume - Imbalance:  Goal: Absence of imbalanced fluid volume signs and symptoms  Description  Absence of imbalanced fluid volume signs and symptoms  Outcome: Ongoing     Problem: Infection - Central Venous Catheter-Associated Bloodstream Infection:  Goal: Will show no infection signs and symptoms  Description  Will show no infection signs and symptoms  Outcome: Ongoing     Problem: Risk for Impaired Skin Integrity  Goal: Tissue integrity - skin and mucous membranes  Description  Structural intactness and normal physiological function of skin and  mucous membranes.   Outcome: Ongoing     Problem: Falls - Risk of:  Goal: Will remain free from falls  Description  Will remain free from falls  Outcome: Ongoing  Goal: Absence of physical injury  Description  Absence of physical injury  Outcome: Ongoing     Problem: Cardiac:  Goal: Ability to maintain an adequate cardiac output will improve  Description  Ability to maintain an adequate cardiac output will improve  Outcome: Ongoing  Goal: Complications related to the disease process, condition or treatment will be avoided or minimized  Description  Complications related to the disease process, condition or treatment will be avoided or minimized  Outcome: Ongoing     Problem: Safety:  Goal: Ability to remain free from injury will improve  Description  Ability to remain free from injury will improve  Outcome: Ongoing  Goal: Will show no signs and symptoms of excessive bleeding  Description  Will show no signs and symptoms of excessive bleeding  Outcome: Ongoing     Problem: Pain:  Goal: Pain level will decrease  Description  Pain level will decrease  Outcome: Ongoing  Goal: Control of acute pain  Description  Control of acute pain  Outcome: Ongoing  Goal: Control of chronic pain  Description  Control of chronic pain  Outcome: Ongoing     Problem: Discharge Planning:  Goal: Ability to perform activities of daily living will improve  Description  Ability to perform activities of daily living will improve  Outcome: Ongoing  Goal: Participates in care planning  Description  Participates in care planning  Outcome: Ongoing     Problem: Injury - Risk of, Physical Injury:  Goal: Will remain free from falls  Description  Will remain free from falls  Outcome: Ongoing  Goal: Absence of physical injury  Description  Absence of physical injury  Outcome: Ongoing     Problem: Sleep Pattern Disturbance:  Goal: Appears well-rested  Description  Appears well-rested  Outcome: Ongoing     Problem: Musculor/Skeletal Functional Status  Goal: Highest potential functional level  Outcome: Ongoing     Problem: NUTRITION  Goal: Nutritional status is improving  Outcome: Ongoing     Problem: Nutrition  Goal: Optimal nutrition therapy  Description  Nutrition Problem: Inadequate oral intake  Intervention: Food and/or Nutrient Delivery: Continue NPO  Nutritional Goals: Meet % of estimated nutrition needs   Outcome: Ongoing     Problem: Fluid Volume - Imbalance:  Goal: Absence of imbalanced fluid volume signs and symptoms  Description  Absence of imbalanced fluid volume signs and symptoms  Outcome: Ongoing     Problem: Infection - Central Venous Catheter-Associated Bloodstream Infection:  Goal: Will show no infection signs and symptoms  Description  Will show no infection signs and symptoms  Outcome: Ongoing     Problem: Risk for Impaired Skin Integrity  Goal: Tissue integrity - skin and mucous membranes  Description  Structural intactness and normal physiological function of skin and  mucous membranes.   Outcome: Ongoing     Problem: Falls - Risk of:  Goal: Will remain free from falls  Description  Will remain free from falls  Outcome: Ongoing  Goal: Absence of physical injury  Description  Absence of physical injury  Outcome: Ongoing     Problem: Cardiac:  Goal: Ability to maintain an adequate cardiac output will improve  Description  Ability to maintain an adequate cardiac output will improve  Outcome: Ongoing  Goal: Complications related to the disease process, condition or treatment will be avoided or minimized  Description  Complications related to the disease process, condition or treatment will be avoided or minimized  Outcome: Ongoing     Problem: Safety:  Goal: Ability to remain free from injury will improve  Description  Ability to remain free from injury will improve  Outcome: Ongoing  Goal: Will show no signs and symptoms of excessive bleeding  Description  Will show no signs and symptoms of excessive bleeding  Outcome: Ongoing     Problem: Pain:  Goal: Pain level will decrease  Description  Pain level will decrease  Outcome: Ongoing  Goal: Control of acute pain  Description  Control of acute pain  Outcome: Ongoing  Goal: Control of chronic pain  Description  Control of chronic pain  Outcome: Ongoing     Problem: Discharge Planning:  Goal: Ability to perform activities of daily living will improve  Description  Ability to perform activities of daily living will improve  Outcome: Ongoing  Goal: Participates in care planning  Description  Participates in care planning  Outcome: Ongoing     Problem: Injury - Risk of, Physical Injury:  Goal: Will remain free from falls  Description  Will remain free from falls  Outcome: Ongoing  Goal: Absence of physical injury  Description  Absence of physical injury  Outcome: Ongoing     Problem: Sleep Pattern Disturbance:  Goal: Appears well-rested  Description  Appears well-rested  Outcome: Ongoing     Problem: Musculor/Skeletal Functional Status  Goal: Highest potential functional level  Outcome: Ongoing

## 2019-05-03 NOTE — CARE COORDINATION
Transitional Planning    [id number] Nephrology signed off  Today's ID note states to follow up outpatient  INR today is 2.0 and heparin drip off  Message sent to clarify if ready for discharge to Dr. Anne Jaramillo / Sarah Nguy[facility]n to find out current insurance authorization status    Bedside RN reports concern regarding high output out of ileostomy and starting imodium tid today. Doubt patient will be cleared to discharge today. [facility] / Sarah Nguy[facility]n called and they have insurance authorization good thru Sunday. [facility] is able to accept patient thru the weekend.

## 2019-05-03 NOTE — FLOWSHEET NOTE
05/03/19 1605   Wound 04/04/19 Other (Comment) Medial deep eschar   Date First Assessed/Time First Assessed: 04/04/19 1803   Primary Wound Type: Pressure Injury  Location: (c) Other (Comment)  Wound Location Orientation: Medial  Wound Description (Comments): deep eschar   Wound Image    Wound Pressure Unstageable   Wound Cleansed Wound cleanser   Wound Length (cm) 6 cm   Wound Width (cm) 2 cm   Wound Surface Area (cm^2) 12 cm^2

## 2019-05-04 ENCOUNTER — TELEPHONE (OUTPATIENT)
Dept: INTERNAL MEDICINE CLINIC | Age: 54
End: 2019-05-04

## 2019-05-04 VITALS
WEIGHT: 153 LBS | OXYGEN SATURATION: 98 % | HEART RATE: 82 BPM | SYSTOLIC BLOOD PRESSURE: 101 MMHG | TEMPERATURE: 97.9 F | DIASTOLIC BLOOD PRESSURE: 51 MMHG | RESPIRATION RATE: 21 BRPM | BODY MASS INDEX: 28.89 KG/M2 | HEIGHT: 61 IN

## 2019-05-04 LAB
ABSOLUTE EOS #: 0.37 K/UL (ref 0–0.44)
ABSOLUTE IMMATURE GRANULOCYTE: 0.18 K/UL (ref 0–0.3)
ABSOLUTE LYMPH #: 4.76 K/UL (ref 1.1–3.7)
ABSOLUTE MONO #: 0.92 K/UL (ref 0.1–1.2)
ALBUMIN SERPL-MCNC: 3.3 G/DL (ref 3.5–5.2)
ALBUMIN/GLOBULIN RATIO: 0.7 (ref 1–2.5)
ALP BLD-CCNC: 235 U/L (ref 35–104)
ALT SERPL-CCNC: 21 U/L (ref 5–33)
ANION GAP SERPL CALCULATED.3IONS-SCNC: 19 MMOL/L (ref 9–17)
AST SERPL-CCNC: 28 U/L
BASOPHILS # BLD: 1 % (ref 0–2)
BASOPHILS ABSOLUTE: 0.18 K/UL (ref 0–0.2)
BILIRUB SERPL-MCNC: 0.41 MG/DL (ref 0.3–1.2)
BILIRUBIN DIRECT: 0.12 MG/DL
BILIRUBIN, INDIRECT: 0.29 MG/DL (ref 0–1)
BUN BLDV-MCNC: 24 MG/DL (ref 6–20)
BUN/CREAT BLD: ABNORMAL (ref 9–20)
CALCIUM SERPL-MCNC: 9 MG/DL (ref 8.6–10.4)
CHLORIDE BLD-SCNC: 89 MMOL/L (ref 98–107)
CO2: 25 MMOL/L (ref 20–31)
CREAT SERPL-MCNC: 1.61 MG/DL (ref 0.5–0.9)
CULTURE: NORMAL
CULTURE: NORMAL
DIFFERENTIAL TYPE: ABNORMAL
EOSINOPHILS RELATIVE PERCENT: 2 % (ref 1–4)
GFR AFRICAN AMERICAN: 41 ML/MIN
GFR NON-AFRICAN AMERICAN: 34 ML/MIN
GFR SERPL CREATININE-BSD FRML MDRD: ABNORMAL ML/MIN/{1.73_M2}
GFR SERPL CREATININE-BSD FRML MDRD: ABNORMAL ML/MIN/{1.73_M2}
GLOBULIN: ABNORMAL G/DL (ref 1.5–3.8)
GLUCOSE BLD-MCNC: 75 MG/DL (ref 70–99)
HCT VFR BLD CALC: 32.9 % (ref 36.3–47.1)
HEMOGLOBIN: 10.1 G/DL (ref 11.9–15.1)
IMMATURE GRANULOCYTES: 1 %
INR BLD: 2.4
LYMPHOCYTES # BLD: 26 % (ref 24–43)
Lab: NORMAL
Lab: NORMAL
MCH RBC QN AUTO: 28.1 PG (ref 25.2–33.5)
MCHC RBC AUTO-ENTMCNC: 30.7 G/DL (ref 28.4–34.8)
MCV RBC AUTO: 91.6 FL (ref 82.6–102.9)
MONOCYTES # BLD: 5 % (ref 3–12)
MORPHOLOGY: ABNORMAL
NRBC AUTOMATED: 0 PER 100 WBC
PARTIAL THROMBOPLASTIN TIME: 40.6 SEC (ref 20.5–30.5)
PDW BLD-RTO: 15.1 % (ref 11.8–14.4)
PLATELET # BLD: 637 K/UL (ref 138–453)
PLATELET ESTIMATE: ABNORMAL
PMV BLD AUTO: 9.2 FL (ref 8.1–13.5)
POTASSIUM SERPL-SCNC: 4.2 MMOL/L (ref 3.7–5.3)
PROTHROMBIN TIME: 23.3 SEC (ref 9–12)
RBC # BLD: 3.59 M/UL (ref 3.95–5.11)
RBC # BLD: ABNORMAL 10*6/UL
SEG NEUTROPHILS: 65 % (ref 36–65)
SEGMENTED NEUTROPHILS ABSOLUTE COUNT: 11.89 K/UL (ref 1.5–8.1)
SODIUM BLD-SCNC: 133 MMOL/L (ref 135–144)
SPECIMEN DESCRIPTION: NORMAL
SPECIMEN DESCRIPTION: NORMAL
TOTAL PROTEIN: 7.9 G/DL (ref 6.4–8.3)
WBC # BLD: 18.3 K/UL (ref 3.5–11.3)
WBC # BLD: ABNORMAL 10*3/UL

## 2019-05-04 PROCEDURE — 85025 COMPLETE CBC W/AUTO DIFF WBC: CPT

## 2019-05-04 PROCEDURE — 6370000000 HC RX 637 (ALT 250 FOR IP): Performed by: STUDENT IN AN ORGANIZED HEALTH CARE EDUCATION/TRAINING PROGRAM

## 2019-05-04 PROCEDURE — 6370000000 HC RX 637 (ALT 250 FOR IP): Performed by: OTOLARYNGOLOGY

## 2019-05-04 PROCEDURE — 80076 HEPATIC FUNCTION PANEL: CPT

## 2019-05-04 PROCEDURE — 36415 COLL VENOUS BLD VENIPUNCTURE: CPT

## 2019-05-04 PROCEDURE — 85730 THROMBOPLASTIN TIME PARTIAL: CPT

## 2019-05-04 PROCEDURE — 2580000003 HC RX 258: Performed by: STUDENT IN AN ORGANIZED HEALTH CARE EDUCATION/TRAINING PROGRAM

## 2019-05-04 PROCEDURE — 99239 HOSP IP/OBS DSCHRG MGMT >30: CPT | Performed by: INTERNAL MEDICINE

## 2019-05-04 PROCEDURE — 85610 PROTHROMBIN TIME: CPT

## 2019-05-04 PROCEDURE — 94640 AIRWAY INHALATION TREATMENT: CPT

## 2019-05-04 PROCEDURE — 80048 BASIC METABOLIC PNL TOTAL CA: CPT

## 2019-05-04 PROCEDURE — 6360000002 HC RX W HCPCS: Performed by: STUDENT IN AN ORGANIZED HEALTH CARE EDUCATION/TRAINING PROGRAM

## 2019-05-04 PROCEDURE — 94762 N-INVAS EAR/PLS OXIMTRY CONT: CPT

## 2019-05-04 PROCEDURE — 6370000000 HC RX 637 (ALT 250 FOR IP): Performed by: INTERNAL MEDICINE

## 2019-05-04 PROCEDURE — 99232 SBSQ HOSP IP/OBS MODERATE 35: CPT | Performed by: INTERNAL MEDICINE

## 2019-05-04 PROCEDURE — 6370000000 HC RX 637 (ALT 250 FOR IP): Performed by: RADIOLOGY

## 2019-05-04 RX ORDER — OXYCODONE HYDROCHLORIDE 5 MG/1
5 TABLET ORAL EVERY 4 HOURS PRN
Qty: 15 TABLET | Refills: 0 | Status: SHIPPED | OUTPATIENT
Start: 2019-05-04 | End: 2019-05-07

## 2019-05-04 RX ORDER — ASPIRIN 81 MG/1
81 TABLET, CHEWABLE ORAL DAILY
Qty: 30 TABLET | Refills: 3 | Status: SHIPPED | OUTPATIENT
Start: 2019-05-05

## 2019-05-04 RX ORDER — WARFARIN SODIUM 2 MG/1
2 TABLET ORAL
Status: DISCONTINUED | OUTPATIENT
Start: 2019-05-04 | End: 2019-05-04 | Stop reason: HOSPADM

## 2019-05-04 RX ORDER — CYCLOBENZAPRINE HCL 5 MG
5 TABLET ORAL 3 TIMES DAILY
Qty: 30 TABLET | Refills: 0 | Status: SHIPPED | OUTPATIENT
Start: 2019-05-04 | End: 2019-05-14

## 2019-05-04 RX ORDER — CHLORHEXIDINE GLUCONATE 0.12 MG/ML
15 RINSE ORAL 2 TIMES DAILY
Qty: 420 ML | Refills: 0 | Status: SHIPPED | OUTPATIENT
Start: 2019-05-04 | End: 2019-05-18

## 2019-05-04 RX ORDER — WARFARIN SODIUM 2.5 MG/1
TABLET ORAL
Qty: 30 TABLET | Refills: 3 | Status: SHIPPED | OUTPATIENT
Start: 2019-05-04

## 2019-05-04 RX ORDER — BUMETANIDE 1 MG/1
1 TABLET ORAL DAILY
Qty: 30 TABLET | Refills: 3 | Status: SHIPPED | OUTPATIENT
Start: 2019-05-05

## 2019-05-04 RX ORDER — MIDODRINE HYDROCHLORIDE 5 MG/1
5 TABLET ORAL
Qty: 90 TABLET | Refills: 3 | Status: SHIPPED | OUTPATIENT
Start: 2019-05-04

## 2019-05-04 RX ORDER — PANTOPRAZOLE SODIUM 40 MG/1
40 TABLET, DELAYED RELEASE ORAL
Qty: 30 TABLET | Refills: 3 | Status: SHIPPED | OUTPATIENT
Start: 2019-05-04

## 2019-05-04 RX ORDER — ALPRAZOLAM 0.5 MG/1
0.5 TABLET ORAL 2 TIMES DAILY PRN
Qty: 10 TABLET | Refills: 0 | Status: SHIPPED | OUTPATIENT
Start: 2019-05-04 | End: 2019-06-03

## 2019-05-04 RX ORDER — WARFARIN SODIUM 2.5 MG/1
TABLET ORAL
Qty: 30 TABLET | Refills: 3 | Status: SHIPPED | OUTPATIENT
Start: 2019-05-04 | End: 2019-05-04

## 2019-05-04 RX ADMIN — LOPERAMIDE HYDROCHLORIDE 4 MG: 1 SOLUTION ORAL at 09:25

## 2019-05-04 RX ADMIN — CYCLOBENZAPRINE 5 MG: 10 TABLET, FILM COATED ORAL at 09:27

## 2019-05-04 RX ADMIN — Medication 10 ML: at 09:29

## 2019-05-04 RX ADMIN — MIDODRINE HYDROCHLORIDE 5 MG: 5 TABLET ORAL at 11:16

## 2019-05-04 RX ADMIN — MIDODRINE HYDROCHLORIDE 5 MG: 5 TABLET ORAL at 09:27

## 2019-05-04 RX ADMIN — MOMETASONE FUROATE AND FORMOTEROL FUMARATE DIHYDRATE 2 PUFF: 200; 5 AEROSOL RESPIRATORY (INHALATION) at 07:06

## 2019-05-04 RX ADMIN — MORPHINE SULFATE 4 MG: 4 INJECTION INTRAVENOUS at 11:09

## 2019-05-04 RX ADMIN — ACETAMINOPHEN 650 MG: 325 TABLET ORAL at 08:07

## 2019-05-04 RX ADMIN — CHLORHEXIDINE GLUCONATE 0.12% ORAL RINSE 15 ML: 1.2 LIQUID ORAL at 09:26

## 2019-05-04 RX ADMIN — ALPRAZOLAM 0.5 MG: 0.5 TABLET ORAL at 11:16

## 2019-05-04 RX ADMIN — IPRATROPIUM BROMIDE 2 PUFF: 17 AEROSOL, METERED RESPIRATORY (INHALATION) at 07:06

## 2019-05-04 RX ADMIN — BUMETANIDE 1 MG: 1 TABLET ORAL at 09:27

## 2019-05-04 RX ADMIN — MORPHINE SULFATE 4 MG: 4 INJECTION INTRAVENOUS at 02:28

## 2019-05-04 RX ADMIN — ASPIRIN 81 MG: 81 TABLET, CHEWABLE ORAL at 09:27

## 2019-05-04 RX ADMIN — PANTOPRAZOLE SODIUM 40 MG: 40 TABLET, DELAYED RELEASE ORAL at 09:27

## 2019-05-04 RX ADMIN — METOPROLOL TARTRATE 25 MG: 25 TABLET ORAL at 09:27

## 2019-05-04 RX ADMIN — MORPHINE SULFATE 4 MG: 4 INJECTION INTRAVENOUS at 08:06

## 2019-05-04 RX ADMIN — PSYLLIUM HUSK 1 PACKET: 3.4 POWDER ORAL at 09:26

## 2019-05-04 ASSESSMENT — PAIN SCALES - GENERAL
PAINLEVEL_OUTOF10: 6
PAINLEVEL_OUTOF10: 8
PAINLEVEL_OUTOF10: 8
PAINLEVEL_OUTOF10: 5

## 2019-05-04 ASSESSMENT — ENCOUNTER SYMPTOMS
CHEST TIGHTNESS: 0
CONSTIPATION: 0
DIARRHEA: 0
ABDOMINAL DISTENTION: 0
SHORTNESS OF BREATH: 0
COUGH: 0
WHEEZING: 0
VOMITING: 0
NAUSEA: 0
COLOR CHANGE: 0

## 2019-05-04 ASSESSMENT — PULMONARY FUNCTION TESTS: PEFR_L/MIN: 20

## 2019-05-04 NOTE — TELEPHONE ENCOUNTER
Can you schedule an appointment for this patient with me?   She is being discharged today from the hospital.    Joni Mcknight MD  PGY-1, Department of Internal Medicine  [address], [place]  5/4/2019 10:42 AM

## 2019-05-04 NOTE — PROGRESS NOTES
Today Date:   05/04/19  Patient Name: Nancy Downing  Date of admission: 4/5/2019  8:39 PM  Patient's age: 47 y. o., 1965  Admission Dx: Shock ([facility] [address] [R57.9]    Requesting Physician: Arline Monson MD    CHIEF COMPLAINT:  Abdominal pain    SUBJECTIVE:  The patient was seen and examined. Afebrile this morning. Weight is stable. No acute issues overnight. INR is 2.4. No active bleeding. BRIEF CASE HISTORY:    The patient is a 47 y.o.  female who is admitted to the hospital for   for increased confusion and abdominal pain. She was found to have ischemic bowel and was taken to surgery on 4/6/18. Pathology showed ischemic bowel going to the margins. She was taken for a second surgery on 4/8/18 and more ischemic bowel was appreciated. The patient platelets were about 300,000 on admission and dropped about 98 with her multiple surgeries. Hit antibody was done and was negative. Platelets recovered since then. The patient continues to struggle with recovery. She underwent a CT scan of the abdomen and pelvis today that showed heterogenous changes in the spleen that the new. There was a suspicion of splenic infarction. Vascular were involved and started the patient in aspirin and Plavix as well as heparin. The patient is seen and examined. She is started on anticoagulation. She denies any active bleeding. She has very little insight about her disease and most of the information were obtained from the chart. It is no history of thromboembolism previously . family history is negative for arterial or venous thrombosis. The patient developed worsening renal failure and was started on hemodialysis. Medications:    Reviewed in EPIC     Allergies:  Pcn [penicillins]; Sulfa antibiotics; and Tape [adhesive tape]    REVIEW OF SYSTEMS:    Review of Systems  General: no fever or night sweats, Weight is stable.   Progressive fatigue  ENT: No double or blurred vision, no tinnitus or hearing problem, no dysphagia or sore throat   Respiratory: No chest pain, no shortness of breath, no cough or hemoptysis. Cardiovascular: Denies chest pain, PND or orthopnea. No L E swelling or palpitations. Gastrointestinal:    Abdominal discomfort, wound VAC in place, no bleeding. Genitourinary: Denies dysuria, hematuria, frequency, urgency or incontinence. Neurological: Denies headaches, decreased LOC, no sensory or motor focal deficits. Musculoskeletal:  No arthralgia no back pain or joint swelling. Skin: There are no rashes or bleeding. Psychiatric:  No anxiety, no depression. Endocrine: no diabetes or thyroid disease. Hematologic: no bleeding , no adenopathy. PHYSICAL EXAM:      BP 93/61   Pulse 81   Temp 98.7 °F (37.1 °C) (Temporal)   Resp 18   Ht 5' 1.02\" (1.55 m)   Wt 153 lb (69.4 kg)   SpO2 95%   BMI 28.89 kg/m²    Temp (24hrs), Av.3 °F (37.4 °C), Min:98.5 °F (36.9 °C), Max:100.3 °F (37.9 °C)  Physical Exam  Gen.  Exam, showed a well-appearing patient without evidence of distress or pain  HEENT, normocephalic and atraumatic, PERRLA extraocular muscles are intact  Neck showed no JVD no carotid bruit, no cervical adenopathy  Chest is clear to auscultation bilaterally  Heart is regular without any murmur  Abdomen left upper quadrant tenderness but the abdomen was soft  Lower extremities showed no edema clubbing or cyanosis  Neurological examination was nonfocal, with intact cranial nerves  Skin  No rashes or bruising appreciated  DATA:    Labs:     CBC:   Recent Labs     19  1201 19  0616   WBC 27.2* 18.3*   HGB 9.7* 10.1*   HCT 29.9* 32.9*   * 637*     BMP:   Recent Labs     19  1201 19  0616    133*   K 3.9 4.2   CO2 26 25   BUN 25* 24*   CREATININE 1.76* 1.61*   LABGLOM 30* 34*   GLUCOSE 89 75     PT/INR:   Recent Labs     19  0855 19  0616   PROTIME 20.3* 23.3*   INR 2.0 2.4     APTT:  Recent Labs extrapolated by contextual diversion.

## 2019-05-04 NOTE — PROGRESS NOTES
[facility]  Internal Medicine Residency Program  Inpatient Daily Progress Note  ______________________________________________________________________________    Patient: Davina Adjutant  YOB: 1965   MRN: [id number]    Acct: [de-identified]     Admit date: 4/5/2019  Today's date: 05/04/19  Number of days in the hospital: 29       Admitting Diagnosis: Septic shock ([facility] [address]    Subjective:   Patient seen and examined at bedside. Alert and oriented. Afebrile this morning. Vitals are stable. No acute issues overnight. The INR is 2.4 this morning. Stoma from the output is 775 ml. Urine output is 1800    Review of Systems   Constitutional: Negative for activity change, chills, fatigue and fever. Respiratory: Negative for cough, chest tightness, shortness of breath and wheezing. Cardiovascular: Negative for chest pain, palpitations and leg swelling. Gastrointestinal: Negative for abdominal distention, constipation, diarrhea, nausea and vomiting. Musculoskeletal: Negative for arthralgias, joint swelling and neck pain. Skin: Negative for color change, pallor, rash and wound. Neurological: Negative for dizziness, facial asymmetry and headaches. Psychiatric/Behavioral: Negative for agitation, behavioral problems and confusion.      Objective:   Vital Sign:  BP (!) 101/51   Pulse 82   Temp 97.9 °F (36.6 °C) (Axillary)   Resp 21   Ht 5' 1.02\" (1.55 m)   Wt 153 lb (69.4 kg)   SpO2 98%   BMI 28.89 kg/m²       Physical Exam:  General appearance:   alert, well appearing, and in no distress  Mental Status: alert, oriented to person, place, and time  Neurologic:  alert, oriented, normal speech, no focal findings or movement disorder noted  Lungs:  clear to auscultation, no wheezes, rales or rhonchi, symmetric air entry  Heart[de-identified] normal rate, regular rhythm, normal S1, S2, no murmurs, rubs, clicks or gallops  Abdomen:  soft, nontender, nondistended, no masses or organomegaly.  Stoma on the right side of abdomen  Extremities: peripheral pulses normal, no pedal edema, no clubbing or cyanosis   Skin: normal coloration and turgor, no rashes, no suspicious skin lesions noted    Medications:  Scheduled Medications   loperamide  4 mg Oral TID    chlorhexidine  15 mL Mouth/Throat BID    warfarin  2.5 mg Oral Daily    psyllium  1 packet Oral TID    nicotine  1 patch Transdermal Daily    ipratropium  2 puff Inhalation 4x daily    mometasone-formoterol  2 puff Inhalation BID    bumetanide  1 mg Oral Daily    warfarin (COUMADIN) daily dosing (placeholder)   Other RX Placeholder    midodrine  5 mg Oral TID WC    metoprolol tartrate  25 mg Oral BID    aspirin  81 mg Oral Daily    cyclobenzaprine  5 mg Oral TID    pantoprazole  40 mg Oral BID AC    sodium chloride flush  10 mL Intravenous 2 times per day       PRN Medications  ALPRAZolam 0.5 mg BID PRN   sodium chloride 250 mL PRN   sodium chloride 150 mL PRN   sodium chloride 250 mL PRN   sodium chloride 150 mL PRN   acetaminophen 650 mg Q4H PRN   glucose 15 g PRN   dextrose 12.5 g PRN   glucagon (rDNA) 1 mg PRN   dextrose 100 mL/hr PRN   oxyCODONE 5 mg Q4H PRN   morphine 4 mg Q3H PRN   Or     morphine 6 mg Q3H PRN   sodium chloride 250 mL PRN   sodium chloride 150 mL PRN   dextrose 25 g PRN   sodium chloride flush 10 mL PRN   magnesium hydroxide 30 mL Daily PRN   ondansetron 4 mg Q6H PRN       Diagnostic Labs and Imaging:  CBC:  Recent Labs     05/03/19  0855 05/03/19  1201 05/04/19  0616   WBC 23.3* 27.2* 18.3*   HGB 8.5* 9.7* 10.1*   * 728* 637*     BMP: Recent Labs     05/03/19  0855 05/03/19  1201 05/04/19  0616    135 133*   K 4.0 3.9 4.2   CL 91* 90* 89*   CO2 25 26 25   BUN 25* 25* 24*   CREATININE 1.87* 1.76* 1.61*   GLUCOSE 109* 89 75     Hepatic: Recent Labs     05/04/19  0616   AST 28   ALT 21   BILITOT 0.41   ALKPHOS 235*       Assessment and Plan:     Principal Problem:    Septic shock [facility])  Active Problems:    Shock ([facility] [address]    Rhabdomyolysis    Hyponatremia    Lactic acidosis    MARY (acute kidney injury) ([facility] [address]    Metabolic acidosis    Acute respiratory failure (HCC)    Elevated liver enzymes    Hyperkalemia    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HCC)    Ischemic bowel syndrome (HCC)    Acute GI bleeding    Gram negative septic shock (HCC)    Protein-calorie malnutrition (HCC)    Gastroenteritis    Haemophilus influenzae septicemia (HCC)    Pleural effusion, bilateral    Splenic infarct    Leukocytosis    Mottled skin    Acute renal failure (HCC)    Altered mental status    PMB (postmenopausal bleeding)    Thrombocytosis ([facility] [address]  Resolved Problems:    * No resolved hospital problems. *          Continue to monitor patient off antibiotics.  Repeat BCx did not show any growth so far. · Transvaginal ultrasound was normal.  ObGyn plans to follow the patient outpatient. · Patient stable from cardiology stand point. · Continue Midodrine for low BP  · Pain control with Morphine and Roxicodone PRN  · Continue aspirin.   · Hypercoagulable workup negative. · Started nystatin for oral thrush.    · Continue renal diet  · Started Imodium for ostomy out put   Imodium 4 mg solution 3 times a day with meals. Started patient on Metamucil as well. Continue Pedialyte. · INR 2.4 this morning. Heparin drip discontinued. · Dietitian consult regarding counseling and dietary habits for stoma. · Continue Bumex 1 mg orally daily  · BMP every week for 2 weeks and then follow up in 3-4 weeks with nephrology as outpatient.   · Patient will be discharged today    Red Munoz MD  PGY-1, Department of Internal Medicine  [address] [place] [address]  5/4/2019 11:39 AM

## 2019-05-04 NOTE — PROGRESS NOTES
Patient updated on transfer to [facility], all questions answered. Belongings gathered, Iv pulled. Patient notified family via cell phone. Patient left via [facility] in stretcher. Report called.  Dina Kee RN

## 2019-05-04 NOTE — OP NOTE
[facility]                                OPERATIVE REPORT    PATIENT NAME: Emely Bronson                 :        1965  MED REC NO:   [id number]                             ROOM:       [id number]  ACCOUNT NO:   [de-identified]                           ADMIT DATE: 2019  PROVIDER:     Maria M Kirk    DATE OF PROCEDURE:  2019    PREOPERATIVE DIAGNOSES:  1. Pressure ulcer of tongue. 2.  Tongue lesion. 3.  Coagulopathy secondary to Coumadin. POSTOPERATIVE DIAGNOSES:  1. Pressure ulcer of tongue. 2.  Tongue lesion. 3.  Coagulopathy secondary to Coumadin. PROCEDURE:  Incisional biopsy of anterior tongue. SURGEON:  Maria M Kirk MD    ANESTHESIA:  None. ESTIMATED BLOOD LOSS:  None. OPERATIVE FINDINGS:  She had an area of eschar that was approximately 2  x 4 cm on the dorsal midline tongue. The anterior 2 cm was removed. INDICATION FOR PROCEDURE:  The patient is a 60-year-old female with a  complex past medical history. She was intubated a few weeks back. She  has noted some lesion to be present on her tongue since intubation. Exam revealed it to be what appeared to be just nothing more than a  pressure ulcer, but because of her complex past medical history, I have  recommended we proceed with an incisional biopsy. Verbal informed  consent was then obtained, witnessed by the nurse. OPERATIVE PROCEDURE:  At the patient's bedside, I had her open her mouth  and stick her tongue out. I was able to grab the anterior portion of  the eschar. I loosened up about 2 cm of it and used scissors to  amputate the anterior portion of the eschar and that was sent to  Pathology. No bleeding was encountered. The patient tolerated the  procedure well. That was the completion of the procedure. No  complications were noted.           NIKKY KEYS    D: 2019 12:14:14       T: 2019 13:17:23     [email or web address]  Job#: [id number]     Doc#: [id number]    CC:

## 2019-05-04 NOTE — PROGRESS NOTES
Today Date:   05/03/19  Patient Name: Faheem Paul  Date of admission: 4/5/2019  8:39 PM  Patient's age: 47 y. o., 1965  Admission Dx: Shock ([address] [R57.9]    Requesting Physician: Louisa Moreno MD    CHIEF COMPLAINT:  Abdominal pain    SUBJECTIVE:  The patient was seen and examined. Plts trending down   No bleeding symptoms  Abd pain comntrolled  No active bleeding. +Fever 100.3      BRIEF CASE HISTORY:    The patient is a 47 y.o.  female who is admitted to the hospital for   for increased confusion and abdominal pain. She was found to have ischemic bowel and was taken to surgery on 4/6/18. Pathology showed ischemic bowel going to the margins. She was taken for a second surgery on 4/8/18 and more ischemic bowel was appreciated. The patient platelets were about 300,000 on admission and dropped about 98 with her multiple surgeries. Hit antibody was done and was negative. Platelets recovered since then. The patient continues to struggle with recovery. She underwent a CT scan of the abdomen and pelvis today that showed heterogenous changes in the spleen that the new. There was a suspicion of splenic infarction. Vascular were involved and started the patient in aspirin and Plavix as well as heparin. The patient is seen and examined. She is started on anticoagulation. She denies any active bleeding. She has very little insight about her disease and most of the information were obtained from the chart. It is no history of thromboembolism previously . family history is negative for arterial or venous thrombosis. The patient developed worsening renal failure and was started on hemodialysis. Medications:    Reviewed in EPIC     Allergies:  Pcn [penicillins]; Sulfa antibiotics; and Tape [adhesive tape]    REVIEW OF SYSTEMS:    Review of Systems  General: no fever or night sweats, Weight is stable.   Progressive fatigue  ENT: No double or blurred vision, no tinnitus or hearing problem, no dysphagia or sore throat   Respiratory: No chest pain, no shortness of breath, no cough or hemoptysis. Cardiovascular: Denies chest pain, PND or orthopnea. No L E swelling or palpitations. Gastrointestinal:    Abdominal discomfort, wound VAC in place, no bleeding. Genitourinary: Denies dysuria, hematuria, frequency, urgency or incontinence. Neurological: Denies headaches, decreased LOC, no sensory or motor focal deficits. Musculoskeletal:  No arthralgia no back pain or joint swelling. Skin: There are no rashes or bleeding. Psychiatric:  No anxiety, no depression. Endocrine: no diabetes or thyroid disease. Hematologic: no bleeding , no adenopathy. PHYSICAL EXAM:      /69   Pulse 102   Temp 99.7 °F (37.6 °C) (Temporal)   Resp 16   Ht 5' 1.02\" (1.55 m)   Wt 156 lb 1.4 oz (70.8 kg)   SpO2 94%   BMI 29.47 kg/m²    Temp (24hrs), Av.4 °F (37.4 °C), Min:98.5 °F (36.9 °C), Max:100.3 °F (37.9 °C)  Physical Exam  Gen.  Exam, showed a well-appearing patient without evidence of distress or pain  HEENT, normocephalic and atraumatic, PERRLA extraocular muscles are intact  Neck showed no JVD no carotid bruit, no cervical adenopathy  Chest is clear to auscultation bilaterally  Heart is regular without any murmur  Abdomen left upper quadrant tenderness but the abdomen was soft  Lower extremities showed no edema clubbing or cyanosis  Neurological examination was nonfocal, with intact cranial nerves  Skin  No rashes or bruising appreciated  DATA:    Labs:     CBC:   Recent Labs     19  0855 19  1201   WBC 23.3* 27.2*   HGB 8.5* 9.7*   HCT 27.4* 29.9*   * 728*     BMP:   Recent Labs     19  0855 19  1201    135   K 4.0 3.9   CO2 25 26   BUN 25* 25*   CREATININE 1.87* 1.76*   LABGLOM 28* 30*   GLUCOSE 109* 89     PT/INR:   Recent Labs     19  0640 19  0855   PROTIME 15.8* 20.3*   INR 1.5 2.0     APTT:  Recent Labs 05/02/19  0640 05/03/19  0855   APTT 58.0* 49.1*     LIVER PROFILE:  Recent Labs     05/01/19  0618   AST 68*   ALT 48*   LABALBU 3.0*     IMPRESSION:    Primary Problem  Septic shock [facility])    Active Hospital Problems    Diagnosis Date Noted    Thrombocytosis ([address] [D47.3]     Acute renal failure (HCC) [N17.9]     Altered mental status [R41.82]     PMB (postmenopausal bleeding) [N95.0]     Splenic infarct [D73.5]     Leukocytosis [D72.829]     Mottled skin [L81.9]     Pleural effusion, bilateral [J90]     Protein-calorie malnutrition ([address] [E46]     Septic shock ([address] [A41.9, R65.21]     Gastroenteritis [K52.9]     Haemophilus influenzae septicemia ([address] [A41.3]     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[address] [K55.049] 04/07/2019    Ischemic bowel syndrome ([address] [K55.9] 04/07/2019    Acute GI bleeding [K92.2] 04/07/2019    Gram negative septic shock ([address] [A41.50, R65.21]     Rhabdomyolysis [M62.82] 04/06/2019    Hyponatremia [E87.1] 04/06/2019    Lactic acidosis [E87.2] 04/06/2019    MARY (acute kidney injury) ([address] [N17.9] 70/94/0294    Metabolic acidosis [T87.7] 04/06/2019    Acute respiratory failure ([address] [J96.00] 04/06/2019    Elevated liver enzymes [R74.8] 04/06/2019    Hyperkalemia [E87.5]     Shock ([address] [R57.9] 04/05/2019     RECOMMENDATIONS:  She had Fever. ID following  Coumadin. Target INR between 2 and 3. She will need long-term use of anticoagulation. Leukocytosis and thrombocytosis are likely reactive. However given high plts in the past and use ?hydrea in thye past, BM pathology needs to be ruled out  Awaiting  VOLODYMYR 2 Gene mutation. Anemia. Stable   will follow      José Miguel Wheatley MD  Hematologist/Medical Oncologist    Cell: [phone]      This note is created with the assistance of a speech recognition program.  While intending to generate a document that actually reflects the content of the visit, the document can still have some errors including those of syntax and sound a like substitutions which may escape proof reading. It such instances, actual meaning can be extrapolated by contextual diversion.

## 2019-05-04 NOTE — PROGRESS NOTES
Pharmacy Note  Warfarin Consult follow-up      Recent Labs     05/04/19  0616   INR 2.4     Recent Labs     05/03/19  0855 05/03/19  1201 05/04/19  0616   HGB 8.5* 9.7* 10.1*   HCT 27.4* 29.9* 32.9*   * 728* 637*       Significant Drug-Drug Interactions:acetaminophen, aspirin,   Discontinued drug-drug interactions: heparin drip  Date INR Dose   4/24/19 2.4 2.5 mg   4/25/19 2.4 2.5 mg   4/26/19 2.8 2 mg   4/27/19 3.9 None   4/28/19 3.8 None   4/29/19 1.5 None   4/30/19 1.5 2 mg   5/1/19 1.5 2 mg   5/2/19 1.5 2.5 mg   5/3/19 2.0 2.5 mg   5/4/19 2.4 2 mg       Notes: Will order warfarin 2 mg for today. Will continue to monitor daily PT/INR while inpatient.      Raymundo Hartley PharmD  PGY-1 Pharmacy Resident   5/4/2019  11:19 AM

## 2019-05-04 NOTE — CARE COORDINATION
Care Transition  Called Wanda at [address] to say patient will come today and we are trying for 12 noon. Fax request to [facility]. Per Ravi Zhou at [facility] 12 noon is confirmed. Called Wanda back to notify 12 noon  time. Hens complete. ZULLY/hens/mar faxed to [facility] centralized intake. Called mom and notified of 12 noon transition to Laine at [id number].

## 2019-05-05 LAB — V617F MUTATION, QNT: 0 %

## 2019-05-06 ENCOUNTER — TELEPHONE (OUTPATIENT)
Dept: INTERNAL MEDICINE | Age: 54
End: 2019-05-06

## 2019-05-06 LAB — SURGICAL PATHOLOGY REPORT: NORMAL

## 2019-05-06 NOTE — TELEPHONE ENCOUNTER
Tried to make pt an appt for our office to establish care, unable to contact her. Please clarify script, please have pharmacy make not for eugenie to contact us when she picks up her meds.

## 2019-05-08 LAB
CULTURE: NORMAL
CULTURE: NORMAL
Lab: NORMAL
Lab: NORMAL
SPECIMEN DESCRIPTION: NORMAL
SPECIMEN DESCRIPTION: NORMAL

## 2019-05-10 ENCOUNTER — TELEPHONE (OUTPATIENT)
Dept: INTERNAL MEDICINE | Age: 54
End: 2019-05-10

## 2019-05-10 NOTE — TELEPHONE ENCOUNTER
Rolf Keenan contacted office regard directions for Warfarin script. States they do not monitor INR and the need clear directions. Please advise.

## 2019-05-13 NOTE — TELEPHONE ENCOUNTER
I called [facility]. Talked to Jetta Primrose. She said they are checking INR every other shift. She got coumadin 3 mg yesterday and her INR was 2.1 yesterday. She needs to follow up with me as Outpatient at [facility] clinic.     Omero Cook MD  PGY-1, Department of Internal Medicine  [address] [place] [address]  5/13/2019 11:15 AM

## 2020-07-16 NOTE — PROGRESS NOTES
0008 ABG results reviewed with Dr Tom Little     PH 7.07   PCO2 37.4  PO2 91.2  HCO3 10.8  BE -18.5      RR increased to 20 br/min per Dr Tom Little stated

## 2020-11-03 PROBLEM — N17.9 AKI (ACUTE KIDNEY INJURY) (HCC): Status: RESOLVED | Noted: 2019-04-06 | Resolved: 2020-11-03

## 2021-01-26 NOTE — PROGRESS NOTES
1. Have you been to the ER, urgent care clinic since your last visit? Hospitalized since your last visit? No    2. Have you seen or consulted any other health care providers outside of the [address] [place] [address] since your last visit? Include any pap smears or colon screening.  No Nephrology Progress Note    Initial History  Hospitalized with the delirium associated with abdominal pain nausea vomiting. Initial assessment showed hypotensive lady with the imaging studies revealing evidence of ileus. Further investigations demonstrated neutrophilic leukocytosis along with acute hepatitis/acute kidney injury and elevated lactic acid. Underwent surgical resection for ischemic gut. Hospital course further complicated by persistent atrial fibrillation needing DC cardioversion. She was started on dialysis in view of life-threatening hyperkalemia and acute kidney injury        Subjective:  Patient seen and examined,   Dialysis Friday 2.4 L removed, 2.6 kg removed  Had CT abdomen concerning for splenic infarct. Awaiting transesophageal echo to rule out infective cause.   Vascular input noted in regards to mottled lower extremity    Objective:  CURRENT TEMPERATURE:  Temp: 98.1 °F (36.7 °C)  MAXIMUM TEMPERATURE OVER 24HRS:  Temp (24hrs), Av.7 °F (37.1 °C), Min:98.1 °F (36.7 °C), Max:99.2 °F (37.3 °C)    CURRENT RESPIRATORY RATE:  Resp: 27  CURRENT PULSE:  Pulse: 104  CURRENT BLOOD PRESSURE:  BP: 135/69  24HR BLOOD PRESSURE RANGE:  Systolic (72PSA), BVQ:312 , Min:119 , OXH:751   ; Diastolic (75VOJ), JOHN:01, Min:58, Max:69    24HR INTAKE/OUTPUT:      Intake/Output Summary (Last 24 hours) at 2019 1158  Last data filed at 2019 9154  Gross per 24 hour   Intake 595 ml   Output 2600 ml   Net -2005 ml     Patient Vitals for the past 96 hrs (Last 3 readings):   Weight   19 0545 187 lb 6.3 oz (85 kg)   19 1845 187 lb 9.8 oz (85.1 kg)   19 1445 193 lb 5.5 oz (87.7 kg)         Physical Exam:  General appearance:Awake, alert, in no acute distress  Skin: warm and dry, no rash or erythema  Eyes: conjunctivae normal and sclera anicteric  ENT:no thrush no pharyngeal congestion   Neck:  No JVD, No Thyromegaly  Pulmonary: clear to auscultation and no wheezing or rhonchi Cardiovascular: normal S1 and S2. NO rubs and NO murmur. No S3 OR S4    Extremities: no cyanosis, clubbing or present edema     Access:  Temporary catheter    Current Medications:      furosemide (LASIX) injection 40 mg BID   aspirin chewable tablet 81 mg Daily   heparin (porcine) injection 4,000 Units PRN   heparin (porcine) injection 2,000 Units PRN   heparin 25,000 units in dextrose 5% 250 mL infusion Continuous   levofloxacin (LEVAQUIN) tablet 500 mg Every Other Day   metoprolol tartrate (LOPRESSOR) tablet 25 mg BID   oxyCODONE (ROXICODONE) immediate release tablet 5 mg Q4H PRN   cyclobenzaprine (FLEXERIL) tablet 5 mg TID   morphine injection 4 mg Q3H PRN   Or    morphine injection 6 mg Q3H PRN   0.9 % sodium chloride bolus PRN   0.9 % sodium chloride bolus PRN   pantoprazole (PROTONIX) tablet 40 mg BID AC   meropenem (MERREM) 1 g in sodium chloride 0.9 % 100 mL IVPB (mini-bag) Q24H   midodrine (PROAMATINE) tablet 5 mg TID WC   anticoagulant sodium citrate 4 % injection 3 mL PRN   anticoagulant sodium citrate 4 % injection 3 mL PRN   dextrose 50 % solution 25 g PRN   sodium chloride flush 0.9 % injection 10 mL 2 times per day   sodium chloride flush 0.9 % injection 10 mL PRN   magnesium hydroxide (MILK OF MAGNESIA) 400 MG/5ML suspension 30 mL Daily PRN   ondansetron (ZOFRAN) injection 4 mg Q6H PRN     Labs:   CBC:   Recent Labs     04/13/19  0635 04/13/19  1945 04/14/19  0854   WBC 41.7* 38.3* 39.6*   RBC 2.89* 3.28* 2.61*   HGB 9.0* 9.9* 8.0*   HCT 28.9* 31.0* 25.8*   PLT See Reflexed IPF Result 168 268   MPV NOT REPORTED 12.8 12.4      BMP:   Recent Labs     04/12/19  0617 04/13/19  1004 04/13/19  1145 04/14/19  0854    135  --  129*   K 4.3 5.0 4.1 4.3   CL 98 98  --  94*   CO2 24 22  --  20   BUN 48* 50*  --  66*   CREATININE 4.10* 3.75*  --  4.61*   GLUCOSE 88 110*  --  82   CALCIUM 7.9* 8.0*  --  7.8*        Phosphorus:  No results for input(s): PHOS in the last 72 hours.   Magnesium: No results for

## 2023-12-12 NOTE — PROGRESS NOTES
Karrie Salcedo presents today for post partum visit.  She had a spontaneous vaginal delivery on October 27, 2023.    She currently is breast-feeding. She denies any trouble with her breast.  She denies any trouble with bowel movements.  She has not had intercourse.  Contraception:  She would like an IUD    Physical Exam:  Visit Vitals  /70   Ht 5' 4.5\" (1.638 m)   Wt 84.1 kg (185 lb 4.8 oz)   LMP 01/23/2023 (Exact Date)   Breastfeeding Yes   BMI 31.31 kg/m²     GENERAL:  She is alert and in no apparent distress.  HEENT: Head is normocephalic. Extraocular muscles are intact. Pupils are equal and round  SKIN: Skin is warm dry and intact. There are no rashes or lesions  LUNGS: Lungs are clear to auscultation with normal respiratory rate and effort.  CARDIAC: Heart has a regular rate and rhythm without murmur rub or gallop.  ABDOMEN: Abdomen is nondistended. Bowel sounds are present in all 4 quadrants. There is no abdominal bruit. There are no peritoneal signs. There is no hepatosplenomegaly. I do not appreciate any hernias.  PELVIC EXAM: She has normal external genitalia. BUS glands are normal. She has normal vaginal mucosa. Normal vaginal pool. Cervix is without any obvious lesions. On bimanual exam, the uterus is anteverted, midline and optimally involuted. I do not appreciate any adnexal masses. The adnexa is nontender.    Impression:  1. Postpartum follow-up    2. Type O blood, Rh negative    3. Gestational hypertension, antepartum        Plan:  No orders of the defined types were placed in this encounter.    No follow-ups on file.       Today Date:   04/28/19  Patient Name: Gloria Olson  Date of admission: 4/5/2019  8:39 PM  Patient's age: 47 y. o., 1965  Admission Dx: Shock ([address] [R57.9]    Requesting Physician: Jed Rodriguez MD    CHIEF COMPLAINT:  Abdominal pain    SUBJECTIVE:  The patient was seen and examined. Abd pain comntrolled  No active bleeding. Overall she is improving. No fever or chills. Started on Coumadin. INR 3.8  Plts slightly better    BRIEF CASE HISTORY:    The patient is a 47 y.o.  female who is admitted to the hospital for   for increased confusion and abdominal pain. She was found to have ischemic bowel and was taken to surgery on 4/6/18. Pathology showed ischemic bowel going to the margins. She was taken for a second surgery on 4/8/18 and more ischemic bowel was appreciated. The patient platelets were about 300,000 on admission and dropped about 98 with her multiple surgeries. Hit antibody was done and was negative. Platelets recovered since then. The patient continues to struggle with recovery. She underwent a CT scan of the abdomen and pelvis today that showed heterogenous changes in the spleen that the new. There was a suspicion of splenic infarction. Vascular were involved and started the patient in aspirin and Plavix as well as heparin. The patient is seen and examined. She is started on anticoagulation. She denies any active bleeding. She has very little insight about her disease and most of the information were obtained from the chart. It is no history of thromboembolism previously . family history is negative for arterial or venous thrombosis. The patient developed worsening renal failure and was started on hemodialysis. Medications:    Reviewed in EPIC     Allergies:  Pcn [penicillins]; Sulfa antibiotics; and Tape [adhesive tape]    REVIEW OF SYSTEMS:    Review of Systems  General: no fever or night sweats, Weight is stable.   Progressive fatigue  ENT: No double or blurred vision, no tinnitus or hearing problem, no dysphagia or sore throat   Respiratory: No chest pain, no shortness of breath, no cough or hemoptysis. Cardiovascular: Denies chest pain, PND or orthopnea. No L E swelling or palpitations. Gastrointestinal:    Abdominal discomfort, wound VAC in place, no bleeding. Genitourinary: Denies dysuria, hematuria, frequency, urgency or incontinence. Neurological: Denies headaches, decreased LOC, no sensory or motor focal deficits. Musculoskeletal:  No arthralgia no back pain or joint swelling. Skin: There are no rashes or bleeding. Psychiatric:  No anxiety, no depression. Endocrine: no diabetes or thyroid disease. Hematologic: no bleeding , no adenopathy. PHYSICAL EXAM:      /76   Pulse 109   Temp 98.8 °F (37.1 °C) (Oral)   Resp 20   Ht 5' 1.02\" (1.55 m)   Wt 157 lb 3 oz (71.3 kg)   SpO2 98%   BMI 29.68 kg/m²    Temp (24hrs), Av.8 °F (37.1 °C), Min:98.5 °F (36.9 °C), Max:99.4 °F (37.4 °C)  Physical Exam  Gen.  Exam, showed a well-appearing patient without evidence of distress or pain  HEENT, normocephalic and atraumatic, PERRLA extraocular muscles are intact  Neck showed no JVD no carotid bruit, no cervical adenopathy  Chest is clear to auscultation bilaterally  Heart is regular without any murmur  Abdomen left upper quadrant tenderness but the abdomen was soft  Lower extremities showed no edema clubbing or cyanosis  Neurological examination was nonfocal, with intact cranial nerves  Skin  No rashes or bruising appreciated  DATA:    Labs:     CBC:   Recent Labs     19  0551 19  0636   WBC 22.4* 23.7*   HGB 8.7* 8.1*   HCT 28.0* 26.7*   * 823*     BMP:   Recent Labs     19  0551 19  0636    138   K 3.9 3.7   CO2 21 21   BUN 21* 21*   CREATININE 2.66* 2.59*   LABGLOM 19* 19*   GLUCOSE 89 98     PT/INR:   Recent Labs     19  0551 19  0636   PROTIME 36.6* 36.5*   INR 3.9 3.8     APTT:  No results for input(s): APTT in the last 72 hours. 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]     Leukocytosis [D72.829]     Mottled skin [L81.9]     Pleural effusion, bilateral [J90]     Protein-calorie malnutrition ([address] [E46]     Septic shock ([facility] [A41.9, R65.21]     Gastroenteritis [K52.9]     Haemophilus influenzae septicemia ([facility] [A41.3]     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[facility] [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.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] 04/07/2019    Ischemic bowel syndrome ([address] [K55.9] 04/07/2019    Acute GI bleeding [K92.2] 04/07/2019    Gram negative septic shock ([address] [A41.50, R65.21]     Rhabdomyolysis [M62.82] 04/06/2019    Hyponatremia [E87.1] 04/06/2019    Lactic acidosis [E87.2] 04/06/2019    MARY (acute kidney injury) ([facility] [N17.9] 74/26/2315    Metabolic acidosis [M55.0] 04/06/2019    Acute respiratory failure ([address] [J96.00] 04/06/2019    Elevated liver enzymes [R74.8] 04/06/2019    Hyperkalemia [E87.5]     Shock ([address] [R57.9] 04/05/2019     RECOMMENDATIONS:  The patient is doing well clinically stable. Slight improvement. .   Continue heparin and aspirin  Coumadin. Target INR between 2 and 3. She will need long-term use of anticoagulation. Patient is aware. Leukocytosis and thrombocytosis are likely reactive. Awaiting  VOLODYMYR 2 Gene mutation. Anemia. Stable   will follow      José Miguel R.  Sylvia Haff, MD  Hematologist/Medical Oncologist    Cell: [phone]      This note is created with the assistance of a speech recognition program.  While intending to generate a document that actually reflects the content of the visit, the document can still have some errors including those of syntax and sound a like substitutions which may escape proof reading. It such instances, actual meaning can be extrapolated by contextual diversion.

## 2024-02-19 NOTE — PROGRESS NOTES
Patient Name: Mckenna Wilhelm  Date of admission: 4/5/2019  8:39 PM  Patient's age: 47 y. o., 1965  Admission Dx: Shock ([address] [R57.9]      Requesting Physician: Susan Gracia MD    CHIEF COMPLAINT:  Abdominal pain  SUBJECTIVE:  . The patient was seen and examined. Continues to have abdominal pain. No active bleeding. Overall she is improving. No fever or chills. She will need long-term anticoagulation,   Started on Coumadin. Tolerated well so far. BRIEF CASE HISTORY:    The patient is a 47 y.o.  female who is admitted to the hospital for   for increased confusion and abdominal pain. She was found to have ischemic bowel and was taken to surgery on 4/6/18. Pathology showed ischemic bowel going to the margins. She was taken for a second surgery on 4/8/18 and more ischemic bowel was appreciated. The patient platelets were about 300,000 on admission and dropped about 98 with her multiple surgeries. Hit antibody was done and was negative. Platelets recovered since then. The patient continues to struggle with recovery. She underwent a CT scan of the abdomen and pelvis today that showed heterogenous changes in the spleen that the new. There was a suspicion of splenic infarction. Vascular were involved and started the patient in aspirin and Plavix as well as heparin. The patient is seen and examined. She is started on anticoagulation. She denies any active bleeding. She has very little insight about her disease and most of the information were obtained from the chart. It is no history of thromboembolism previously . family history is negative for arterial or venous thrombosis. The patient developed worsening renal failure and was started on hemodialysis. Past Medical History:   has a past medical history of Asthma, Back pain, chronic, Hypertension, Snores, and Wears glasses.     Past Please see pended referral and sign if appropriate. Thank you   Surgical History:   has a past surgical history that includes Tonsillectomy; Knee arthroscopy (Left, 1980); Ulnar tunnel release (Right, 2013); Knee arthroscopy (Left, 09/28/2016); LAPAROTOMY EXPLORATORY (N/A, 4/6/2019); and LAPAROTOMY EXPLORATORY (N/A, 4/8/2019). Family History: family history includes Diabetes in her maternal grandfather and maternal grandmother; Emphysema in her maternal grandfather; Heart Disease in her mother; Heart Surgery in her mother; Melyssa Graham in her maternal uncle; Stroke in her mother. Social History:   reports that she has been smoking cigarettes. She started smoking about 38 years ago. She has been smoking about 0.25 packs per day. She has never used smokeless tobacco. She reports that she drinks alcohol. She reports that she does not use drugs. Medications:    Reviewed in EPIC     Allergies:  Pcn [penicillins]; Sulfa antibiotics; and Tape [adhesive tape]    REVIEW OF SYSTEMS:      Review of Systems  General: no fever or night sweats, Weight is stable. Progressive fatigue  ENT: No double or blurred vision, no tinnitus or hearing problem, no dysphagia or sore throat   Respiratory: No chest pain, no shortness of breath, no cough or hemoptysis. Cardiovascular: Denies chest pain, PND or orthopnea. No L E swelling or palpitations. Gastrointestinal:    Abdominal discomfort, wound VAC in place, no bleeding. Genitourinary: Denies dysuria, hematuria, frequency, urgency or incontinence. Neurological: Denies headaches, decreased LOC, no sensory or motor focal deficits. Musculoskeletal:  No arthralgia no back pain or joint swelling. Skin: There are no rashes or bleeding. Psychiatric:  No anxiety, no depression. Endocrine: no diabetes or thyroid disease. Hematologic: no bleeding , no adenopathy.                 PHYSICAL EXAM:      /76   Pulse 86   Temp 98.3 °F (36.8 °C) (Oral)   Resp 17   Ht 5' 1.02\" (1.55 m)   Wt 157 lb 3 oz (71.3 kg)   SpO2 99%   BMI 29.68 kg/m²    Temp (24hrs), Av.7 °F (37.1 °C), Min:97.7 °F (36.5 °C), Max:101.1 °F (38.4 °C)      Physical Exam  Gen.  Exam, showed a well-appearing patient without evidence of distress or pain  HEENT, normocephalic and atraumatic, PERRLA extraocular muscles are intact  Neck showed no JVD no carotid bruit, no cervical adenopathy  Chest is clear to auscultation bilaterally  Heart is regular without any murmur  Abdomen left upper quadrant tenderness but the abdomen was soft  Lower extremities showed no edema clubbing or cyanosis  Neurological examination was nonfocal, with intact cranial nerves  Skin  No rashes or bruising appreciated          DATA:      Labs:       CBC:   Recent Labs     19  0847 19  0631   WBC 20.6* 21.7*   HGB 8.8* 9.1*   HCT 27.3* 29.3*   * 961*     BMP:   Recent Labs     19  0847 19  0631   * 135   K 4.2 3.8   CO2 26 22   BUN 24* 26*   CREATININE 3.32* 3.42*   LABGLOM 15* 14*   GLUCOSE 108* 113*     PT/INR:   Recent Labs     19  0847 19  0631   PROTIME 23.3* 24.0*   INR 2.4 2.4     APTT:  Recent Labs     19  2245 19  0610   APTT 92.9* 72.5*     LIVER PROFILE:  Recent Labs     19  0631   AST 16   ALT 16   LABALBU 2.4*               IMPRESSION:    Primary Problem  Septic shock [facility])    Active Hospital Problems    Diagnosis Date Noted    Acute renal failure ([address] [N17.9]     Altered mental status [R41.82]     PMB (postmenopausal bleeding) [N95.0]     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]     Leukemoid reaction [D72.823]     Mottled skin [L81.9]     Pleural effusion, bilateral [J90]     Protein-calorie malnutrition ([address] [E46]     Septic shock ([address] [A41.9, R65.21]     Gastroenteritis [K52.9]     Haemophilus influenzae septicemia ([address] [A41.3]     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[facility] [K55.049] 2019    Small bowel ischemia ([facility]) [K55.9] 2019    Acute GI bleeding [K92.2] 2019    Gram negative septic shock ([facility] [A41.50, R65.21]     Rhabdomyolysis [M62.82] 04/06/2019    Hyponatremia [E87.1] 04/06/2019    Lactic acidosis [E87.2] 04/06/2019    MARY (acute kidney injury) ([facility] [N17.9] 46/86/2271    Metabolic acidosis [I88.8] 04/06/2019    [facility] failure [facility] [J96.00] 04/06/2019    Elevated liver enzymes [R74.8] 04/06/2019    Hyperkalemia [E87.5]     Shock ([facility] [R57.9] 04/05/2019       RECOMMENDATIONS:  The patient is doing well clinically stable. Slight improvement. .   Continue heparin and aspirin  Coumadin. Target INR between 2 and 3. She will need long-term use of anticoagulation. Patient is aware. Leukocytosis , reactive   Reactive thrombocytosis   Anemia. Status post blood transfusion with dialysis  Hemoglobin is stable now.                                 [address] [facility]                          Cell: [phone]

## (undated) DEVICE — COVER LT HNDL BLU PLAS

## (undated) DEVICE — CHLORAPREP 26ML ORANGE

## (undated) DEVICE — RELOAD STPL L75MM OPN H3.8MM CLS 1.5MM WIRE DIA0.2MM REG

## (undated) DEVICE — WOUND RETRACTOR AND PROTECTOR: Brand: ALEXIS O WOUND PROTECTOR-RETRACTOR

## (undated) DEVICE — PAD,ABDOMINAL,5"X9",ST,LF,25/BX: Brand: MEDLINE INDUSTRIES, INC.

## (undated) DEVICE — GLOVE SURG SZ 6 THK91MIL LTX FREE SYN POLYISOPRENE ANTI

## (undated) DEVICE — GARMENT,MEDLINE,DVT,INT,CALF,MED, GEN2: Brand: MEDLINE

## (undated) DEVICE — SOLUTION SURG PREP POV IOD 7.5% 4 OZ

## (undated) DEVICE — GOWN,AURORA,NONREINFORCED,LARGE: Brand: MEDLINE

## (undated) DEVICE — SUTURE VCRL SZ 3-0 L27IN ABSRB UD L26MM SH 1/2 CIR J416H

## (undated) DEVICE — GLOVE ORANGE PI 7   MSG9070

## (undated) DEVICE — AGENT HEMSTAT W2XL4IN OXIDIZED REGENERATED CELOS ABSRB SFT

## (undated) DEVICE — GLOVE SURG SZ 75 L12IN FNGR THK87MIL WHT LTX FREE

## (undated) DEVICE — 1-PIECE DRAINABLE OSTOMY POUCH, CERAPLUS: Brand: PREMIER

## (undated) DEVICE — KIT DSG ABTHERA SENSATRAC

## (undated) DEVICE — GOWN,AURORA,NONRNF,XL,30/CS: Brand: MEDLINE

## (undated) DEVICE — STAPLER INT L75MM CUT LN L73MM STPL LN L77MM BLU B FRM 8

## (undated) DEVICE — YANKAUER,POOLE TIP,STERILE,50/CS: Brand: MEDLINE

## (undated) DEVICE — EXTRA LARGE VISCERA RETAINER: Brand: VISCERA RETAINER, FISH

## (undated) DEVICE — GLOVE SURG SZ 65 THK91MIL LTX FREE SYN POLYISOPRENE

## (undated) DEVICE — GLOVE EXAM M L95IN FNGR THK35MIL PALM THK24MIL OFF WHT

## (undated) DEVICE — DRAPE IRRIG FLD WRM W44XL66IN W/ AORN STD PRTBL INTRATEMP

## (undated) DEVICE — PACK SURG ABD SVMMC

## (undated) DEVICE — SUTURE PDS II SZ 0 L60IN ABSRB VLT L65MM TP-1 1/2 CIR Z991G

## (undated) DEVICE — TOWEL,OR,DSP,ST,NATURAL,DLX,4/PK,20PK/CS: Brand: MEDLINE

## (undated) DEVICE — SEALER TISS L20CM DIA13MM ADV BPLR L CRV JAW OPN APPRCH

## (undated) DEVICE — SUTURE PERMAHAND SZ 3-0 L18IN NONABSORBABLE BLK L26MM SH C013D

## (undated) DEVICE — CONTAINER,SPECIMEN,4OZ,OR STRL: Brand: MEDLINE

## (undated) DEVICE — CANISTER NEG PRSS 1000ML W/ GEL INFOVAC

## (undated) DEVICE — GAUZE,PACKING STRIP,PLAIN,1/2"X5YD,STRL: Brand: CURAD

## (undated) DEVICE — GLOVE ORANGE PI 7 1/2   MSG9075

## (undated) DEVICE — PREP SOL PVP IODINE 4%  4 OZ/BTL

## (undated) DEVICE — GAUZE,SPONGE,FLUFF,6"X6.75",STRL,5/TRAY: Brand: MEDLINE

## (undated) DEVICE — SPONGE LAP W18XL18IN WHT COT 4 PLY FLD STRUNG RADPQ DISP ST

## (undated) DEVICE — PACK PROCEDURE SURG FACILITY SPEC GI BWL SPT SVMMC